# Patient Record
Sex: FEMALE | Race: WHITE | Employment: UNEMPLOYED | URBAN - METROPOLITAN AREA
[De-identification: names, ages, dates, MRNs, and addresses within clinical notes are randomized per-mention and may not be internally consistent; named-entity substitution may affect disease eponyms.]

---

## 2017-10-02 ENCOUNTER — HOSPITAL ENCOUNTER (EMERGENCY)
Age: 62
Discharge: HOME | End: 2017-10-02
Attending: EMERGENCY MEDICINE
Payer: COMMERCIAL

## 2017-10-02 VITALS
BODY MASS INDEX: 23.04 KG/M2 | DIASTOLIC BLOOD PRESSURE: 101 MMHG | WEIGHT: 130 LBS | HEIGHT: 63 IN | RESPIRATION RATE: 18 BRPM | HEART RATE: 85 BPM | TEMPERATURE: 98 F | SYSTOLIC BLOOD PRESSURE: 165 MMHG | OXYGEN SATURATION: 98 %

## 2017-10-02 DIAGNOSIS — L73.9 FOLLICULITIS: ICD-10-CM

## 2017-10-02 DIAGNOSIS — L30.9 DERMATITIS: Primary | ICD-10-CM

## 2017-10-02 PROCEDURE — 99284 EMERGENCY DEPT VISIT MOD MDM: CPT | Performed by: EMERGENCY MEDICINE

## 2017-10-02 PROCEDURE — 74011250637 HC RX REV CODE- 250/637: Performed by: EMERGENCY MEDICINE

## 2017-10-02 RX ORDER — LISINOPRIL 20 MG/1
40 TABLET ORAL ONCE
Status: COMPLETED | OUTPATIENT
Start: 2017-10-02 | End: 2017-10-02

## 2017-10-02 RX ORDER — HYDROXYZINE 25 MG/1
25-50 TABLET, FILM COATED ORAL
Qty: 24 TAB | Refills: 0 | Status: SHIPPED | OUTPATIENT
Start: 2017-10-02 | End: 2017-10-07

## 2017-10-02 RX ORDER — HYDROXYZINE PAMOATE 25 MG/1
50 CAPSULE ORAL
Status: COMPLETED | OUTPATIENT
Start: 2017-10-02 | End: 2017-10-02

## 2017-10-02 RX ORDER — IBUPROFEN 800 MG/1
800 TABLET ORAL
Status: COMPLETED | OUTPATIENT
Start: 2017-10-02 | End: 2017-10-02

## 2017-10-02 RX ORDER — BUTALBITAL, ACETAMINOPHEN AND CAFFEINE 50; 325; 40 MG/1; MG/1; MG/1
2 TABLET ORAL
Status: COMPLETED | OUTPATIENT
Start: 2017-10-02 | End: 2017-10-02

## 2017-10-02 RX ORDER — SULFAMETHOXAZOLE AND TRIMETHOPRIM 800; 160 MG/1; MG/1
1 TABLET ORAL 2 TIMES DAILY
Qty: 14 TAB | Refills: 0 | Status: SHIPPED | OUTPATIENT
Start: 2017-10-02 | End: 2019-07-26

## 2017-10-02 RX ORDER — SULFAMETHOXAZOLE AND TRIMETHOPRIM 800; 160 MG/1; MG/1
2 TABLET ORAL
Status: COMPLETED | OUTPATIENT
Start: 2017-10-02 | End: 2017-10-02

## 2017-10-02 RX ADMIN — IBUPROFEN 800 MG: 800 TABLET, FILM COATED ORAL at 00:59

## 2017-10-02 RX ADMIN — BUTALBITAL, ACETAMINOPHEN AND CAFFEINE 2 TABLET: 50; 325; 40 TABLET ORAL at 00:59

## 2017-10-02 RX ADMIN — SULFAMETHOXAZOLE AND TRIMETHOPRIM 2 TABLET: 800; 160 TABLET ORAL at 00:59

## 2017-10-02 RX ADMIN — LISINOPRIL 40 MG: 20 TABLET ORAL at 00:59

## 2017-10-02 RX ADMIN — HYDROXYZINE PAMOATE 50 MG: 25 CAPSULE ORAL at 00:59

## 2017-10-02 NOTE — ED NOTES
PIV established presumptively out of triage due to HTN, labs collected. Physician has signed up for the case- will await physician eval prior to any lab studies placed.

## 2017-10-02 NOTE — ED NOTES
Patient ambulatory into the ER with c/c skin infection. Patient reports persistent skin infections x \"a couple of years. \" Patient has had treatment in the past.  Patient reports has seen derm, PMDs, rheumatology over the past few years with no diagnosis. Patient reports the worst of the infected areas are on the scalp. Patient with HTN at time of triage (205/110). Patient reports taking anti-HTN Rx in AM/PM, had her dose tonight. Patient w/o fever- reports she \"seldom has a fever when I have an infection. \"

## 2017-10-02 NOTE — DISCHARGE INSTRUCTIONS
Get some hibicleanse surgical soap, and lather up  -head to toe, twice a day. Let ti sit on skin for five minutes before rinsing off. Follow up with your family md, or a different deratologist.     Dermatitis: Care Instructions  Your Care Instructions  Dermatitis is the general name used for any rash or inflammation of the skin. Different kinds of dermatitis cause different kinds of rashes. Common causes of a rash include new medicines, plants (such as poison oak or poison ivy), heat, and stress. Certain illnesses can also cause a rash. An allergic reaction to something that touches your skin, such as latex, nickel, or poison ivy, is called contact dermatitis. Contact dermatitis may also be caused by something that irritates the skin, such as bleach, a chemical, or soap. These types of rashes cannot be spread from person to person. How long your rash will last depends on what caused it. Rashes may last a few days or months. Follow-up care is a key part of your treatment and safety. Be sure to make and go to all appointments, and call your doctor if you are having problems. It's also a good idea to know your test results and keep a list of the medicines you take. How can you care for yourself at home? · Do not scratch the rash. Cut your nails short, and file them smooth. Or wear gloves if this helps keep you from scratching. · Wash the area with water only. Pat dry. · Put cold, wet cloths on the rash to reduce itching. · Keep cool, and stay out of the sun. · Leave the rash open to the air as much as possible. · If the rash itches, use hydrocortisone cream. Follow the directions on the label. Calamine lotion may help for plant rashes. · Take an over-the-counter antihistamine, such as diphenhydramine (Benadryl) or loratadine (Claritin), to help calm the itching. Read and follow all instructions on the label. · If your doctor prescribed a cream, use it as directed.  If your doctor prescribed medicine, take it exactly as directed. When should you call for help? Call your doctor now or seek immediate medical care if:  · You have symptoms of infection, such as:  ¨ Increased pain, swelling, warmth, or redness. ¨ Red streaks leading from the area. ¨ Pus draining from the area. ¨ A fever. · You have joint pain along with the rash. Watch closely for changes in your health, and be sure to contact your doctor if:  · Your rash is changing or getting worse. · You are not getting better as expected. Where can you learn more? Go to http://sandeep-ponce.info/. Enter [phone] in the search box to learn more about \"Dermatitis: Care Instructions. \"  Current as of: October 13, 2016  Content Version: 11.3  © 2698-2594 [employer]. Care instructions adapted under license by [employer] (which disclaims liability or warranty for this information). If you have questions about a medical condition or this instruction, always ask your healthcare professional. [place] [address] any warranty or liability for your use of this information. Folliculitis: Care Instructions  Your Care Instructions    Folliculitis (say \"wfe-LJC-mow-LY-tus\") is an infection of the pouches (follicles) in the skin where hair grows. It can occur on any part of the body, but it is most common on the scalp, face, armpits, and groin. Bacteria, such as those found in a hot tub, can cause folliculitis. Folliculitis begins as a red, tender area near a strand of hair. The skin can itch or burn and may drain pus or blood. Sometimes folliculitis can lead to more serious skin infections. Your doctor usually can treat mild folliculitis with an antibiotic cream or ointment. If you have folliculitis on your scalp, you may use a shampoo that kills bacteria. Antibiotics you take as pills can treat infections deeper in the skin. For stubborn cases of folliculitis, laser treatment may be an option.  Laser treatment uses strong beams of light to destroy the hair follicle. But hair will no longer grow in the treated area. Follow-up care is a key part of your treatment and safety. Be sure to make and go to all appointments, and call your doctor if you are having problems. It's also a good idea to know your test results and keep a list of the medicines you take. How can you care for yourself at home? · Take your medicine exactly as prescribed. If your doctor prescribed antibiotics, take them as directed. Do not stop taking them just because you feel better. You need to take the full course of antibiotics. · Use a soap that kills bacteria to wash the infected area. If your scalp or beard is infected, use a shampoo with selenium or propylene glycol. Be careful. Do not scrub too long or too hard. · Mix 1 1/3 cup warm water and 1 tablespoon vinegar. Soak a cloth in the mixture, and place it over the infected skin until it cools off (usually 5 to 10 minutes). You can do this 3 to 6 times a day. · Do not share your razor, towel, or washcloth. That can spread folliculitis. · Use a new blade in your razor each time you shave to keep from re-infecting your skin. · If you tend to get folliculitis, avoid using hot tubs. They can contain bacteria that cause folliculitis. When should you call for help? Call your doctor now or seek immediate medical care if:  · You have symptoms of infection, such as:  ¨ Increased pain, swelling, warmth, or redness. ¨ Red streaks leading from the area. ¨ Pus draining from the area. ¨ A fever. Watch closely for changes in your health, and be sure to contact your doctor if:  · You do not get better as expected. Where can you learn more? Go to http://sandeep-ponce.info/. Enter M257 in the search box to learn more about \"Folliculitis: Care Instructions. \"  Current as of: October 13, 2016  Content Version: 11.3  © 8270-0266 [employer].  Care instructions adapted under license by [employer] (which disclaims liability or warranty for this information). If you have questions about a medical condition or this instruction, always ask your healthcare professional. [address] any warranty or liability for your use of this information.

## 2017-10-02 NOTE — ED NOTES
I have reviewed discharge instructions with the patient. The patient verbalized understanding. Patient left ED via Discharge Method: ambulatory to Home with family member. Opportunity for questions and clarification provided. Patient given 2 scripts.

## 2017-10-06 NOTE — ED PROVIDER NOTES
HPI Comments: patient presents with diffuse skin lesions to include scalp, torso, extremities. Some of these are scabbed, some weeping. Some oozing\" \"pus\" according to sister. Has been placed on different remedies has seen dermatologists and other doctors but \"men can figure it out\" patient is not sure if she is or been put on any antibiotics for this. sHe seems to take offense at the suggestion that this may be anxiety skin picking related in nature. Patient is a 57 y.o. female presenting with skin problem. The history is provided by the patient. Skin Problem    This is a chronic problem. Past Medical History:   Diagnosis Date    CAD (coronary artery disease)     Chronic back pain     COPD (chronic obstructive pulmonary disease) (HCC)     Family history of colonic polyps     mother    History of MI (myocardial infarction)     History of peptic ulcer     HTN (hypertension)     Sjogren's syndrome ([facility] [address]     Tobacco abuse     1 ppd x 40 years       Past Surgical History:   Procedure Laterality Date    ABDOMEN SURGERY PROC UNLISTED      partial lower intestine removal 1991    BREAST SURGERY PROCEDURE UNLISTED      20yrs ago, removed tumor    CARDIAC SURG PROCEDURE UNLIST      heart cath with stent 2009    HX CORONARY STENT PLACEMENT      x3    HX TEE AND BSO  1991         Family History:   Problem Relation Age of Onset    Cancer Other      colon       Social History     Social History    Marital status:      Spouse name: N/A    Number of children: N/A    Years of education: N/A     Occupational History    Not on file.      Social History Main Topics    Smoking status: Current Every Day Smoker     Packs/day: 1.00     Years: 40.00    Smokeless tobacco: Never Used    Alcohol use No    Drug use: No    Sexual activity: Not on file     Other Topics Concern    Not on file     Social History Narrative         ALLERGIES: Aspirin and Codeine    Review of Systems   Constitutional: Negative for chills and fever. Skin: Positive for rash and wound. Negative for color change. Vitals:    10/02/17 0019 10/02/17 0059 10/02/17 0102   BP: (!) 205/110 (!) 165/101 (!) 165/101   Pulse: 88 85 85   Resp: 20  18   Temp: 97.6 °F (36.4 °C)  98 °F (36.7 °C)   SpO2: 97%  98%   Weight: 59 kg (130 lb)     Height: 5' 3\" (1.6 m)              Physical Exam   Constitutional: She is oriented to person, place, and time. She appears well-developed and well-nourished. No distress. HENT:   Head: Normocephalic and atraumatic. Eyes: Conjunctivae and EOM are normal. Right eye exhibits no discharge. Left eye exhibits no discharge. Neck: Normal range of motion. Neck supple. Pulmonary/Chest: Effort normal. No respiratory distress. Musculoskeletal: Normal range of motion. Neurological: She is alert and oriented to person, place, and time. She has normal strength. She exhibits normal muscle tone. cni 2-12 grossly  Nl gait,  Nl speech     Skin: Skin is warm and dry. Rash noted. She is not diaphoretic. Diffuse rash and excoriated lesions appears consistent with skin picking. Some seem to have minimal element of bacterial superinfection    No abscesses   Psychiatric: She has a normal mood and affect. Her behavior is normal.   Nursing note and vitals reviewed. MDM  Number of Diagnoses or Management Options  Dermatitis:   Folliculitis:   Diagnosis management comments: Medical decision making note:  Chronic neurotic dermatitis  Hydroxyzine and antibiotics  Follow-up  This concludes the \"medical decision making note\" part of this emergency department visit note.       ED Course       Procedures

## 2019-07-26 ENCOUNTER — HOSPITAL ENCOUNTER (EMERGENCY)
Age: 64
Discharge: LEFT BEFORE DISCHARGE | End: 2019-07-27
Attending: EMERGENCY MEDICINE
Payer: MEDICARE

## 2019-07-26 VITALS
RESPIRATION RATE: 18 BRPM | OXYGEN SATURATION: 95 % | HEART RATE: 93 BPM | WEIGHT: 160 LBS | DIASTOLIC BLOOD PRESSURE: 109 MMHG | BODY MASS INDEX: 29.44 KG/M2 | SYSTOLIC BLOOD PRESSURE: 190 MMHG | TEMPERATURE: 97.3 F | HEIGHT: 62 IN

## 2019-07-26 LAB
ALBUMIN SERPL-MCNC: 3.9 G/DL (ref 3.2–4.6)
ALBUMIN/GLOB SERPL: 0.8 {RATIO} (ref 1.2–3.5)
ALP SERPL-CCNC: 113 U/L (ref 50–136)
ALT SERPL-CCNC: 21 U/L (ref 12–65)
ANION GAP SERPL CALC-SCNC: 9 MMOL/L (ref 7–16)
AST SERPL-CCNC: 19 U/L (ref 15–37)
BASOPHILS # BLD: 0.1 K/UL (ref 0–0.2)
BASOPHILS NFR BLD: 1 % (ref 0–2)
BILIRUB SERPL-MCNC: 0.4 MG/DL (ref 0.2–1.1)
BUN SERPL-MCNC: 7 MG/DL (ref 8–23)
CALCIUM SERPL-MCNC: 9.9 MG/DL (ref 8.3–10.4)
CHLORIDE SERPL-SCNC: 106 MMOL/L (ref 98–107)
CO2 SERPL-SCNC: 24 MMOL/L (ref 21–32)
CREAT SERPL-MCNC: 0.73 MG/DL (ref 0.6–1)
DIFFERENTIAL METHOD BLD: ABNORMAL
EOSINOPHIL # BLD: 0.3 K/UL (ref 0–0.8)
EOSINOPHIL NFR BLD: 5 % (ref 0.5–7.8)
ERYTHROCYTE [DISTWIDTH] IN BLOOD BY AUTOMATED COUNT: 14.9 % (ref 11.9–14.6)
GLOBULIN SER CALC-MCNC: 4.8 G/DL (ref 2.3–3.5)
GLUCOSE SERPL-MCNC: 106 MG/DL (ref 65–100)
HCT VFR BLD AUTO: 50.5 % (ref 35.8–46.3)
HGB BLD-MCNC: 17 G/DL (ref 11.7–15.4)
IMM GRANULOCYTES # BLD AUTO: 0 K/UL (ref 0–0.5)
IMM GRANULOCYTES NFR BLD AUTO: 0 % (ref 0–5)
LACTATE BLD-SCNC: 0.79 MMOL/L (ref 0.5–1.9)
LYMPHOCYTES # BLD: 1.4 K/UL (ref 0.5–4.6)
LYMPHOCYTES NFR BLD: 25 % (ref 13–44)
MCH RBC QN AUTO: 29.9 PG (ref 26.1–32.9)
MCHC RBC AUTO-ENTMCNC: 33.7 G/DL (ref 31.4–35)
MCV RBC AUTO: 88.8 FL (ref 79.6–97.8)
MONOCYTES # BLD: 0.4 K/UL (ref 0.1–1.3)
MONOCYTES NFR BLD: 7 % (ref 4–12)
NEUTS SEG # BLD: 3.6 K/UL (ref 1.7–8.2)
NEUTS SEG NFR BLD: 63 % (ref 43–78)
NRBC # BLD: 0 K/UL (ref 0–0.2)
PLATELET # BLD AUTO: 239 K/UL (ref 150–450)
PMV BLD AUTO: 10.7 FL (ref 9.4–12.3)
POTASSIUM SERPL-SCNC: 4 MMOL/L (ref 3.5–5.1)
PROT SERPL-MCNC: 8.7 G/DL (ref 6.3–8.2)
RBC # BLD AUTO: 5.69 M/UL (ref 4.05–5.2)
SODIUM SERPL-SCNC: 139 MMOL/L (ref 136–145)
WBC # BLD AUTO: 5.8 K/UL (ref 4.3–11.1)

## 2019-07-26 PROCEDURE — 80053 COMPREHEN METABOLIC PANEL: CPT

## 2019-07-26 PROCEDURE — 75810000275 HC EMERGENCY DEPT VISIT NO LEVEL OF CARE: Performed by: EMERGENCY MEDICINE

## 2019-07-26 PROCEDURE — 87040 BLOOD CULTURE FOR BACTERIA: CPT

## 2019-07-26 PROCEDURE — 83605 ASSAY OF LACTIC ACID: CPT

## 2019-07-26 PROCEDURE — 85025 COMPLETE CBC W/AUTO DIFF WBC: CPT

## 2019-07-26 NOTE — ED TRIAGE NOTES
Patient reports bilateral \"breast infection. \" States that she has been \"sick\" and unable to keep anything down. Reports vomiting and diarrhea. Patient has small sores all over upper torso that \"sting, burn and drain green stuff. \"  Hx of lupus.

## 2019-08-01 LAB
BACTERIA SPEC CULT: NORMAL
SERVICE CMNT-IMP: NORMAL

## 2020-11-09 ENCOUNTER — APPOINTMENT (RX ONLY)
Dept: URBAN - METROPOLITAN AREA CLINIC 23 | Facility: CLINIC | Age: 65
Setting detail: DERMATOLOGY
End: 2020-11-09

## 2023-01-31 ENCOUNTER — HOSPITAL ENCOUNTER (EMERGENCY)
Age: 68
Discharge: HOME | End: 2023-01-31
Attending: GENERAL PRACTICE
Payer: MEDICARE

## 2023-01-31 VITALS
HEIGHT: 61 IN | HEART RATE: 85 BPM | SYSTOLIC BLOOD PRESSURE: 112 MMHG | WEIGHT: 130 LBS | RESPIRATION RATE: 16 BRPM | OXYGEN SATURATION: 95 % | BODY MASS INDEX: 24.55 KG/M2 | TEMPERATURE: 97.8 F | DIASTOLIC BLOOD PRESSURE: 62 MMHG

## 2023-01-31 DIAGNOSIS — R10.13 DYSPEPSIA: Primary | ICD-10-CM

## 2023-01-31 DIAGNOSIS — G62.9 NEUROPATHY: ICD-10-CM

## 2023-01-31 LAB
ALBUMIN SERPL-MCNC: 2.3 G/DL (ref 3.2–4.6)
ALBUMIN/GLOB SERPL: 0.5 (ref 0.4–1.6)
ALP SERPL-CCNC: 95 U/L (ref 50–136)
ALT SERPL-CCNC: 18 U/L (ref 12–65)
ANION GAP SERPL CALC-SCNC: 11 MMOL/L (ref 2–11)
AST SERPL-CCNC: 12 U/L (ref 15–37)
BASOPHILS # BLD: 0 K/UL (ref 0–0.2)
BASOPHILS NFR BLD: 1 % (ref 0–2)
BILIRUB SERPL-MCNC: 0.3 MG/DL (ref 0.2–1.1)
BUN SERPL-MCNC: 7 MG/DL (ref 8–23)
CALCIUM SERPL-MCNC: 9 MG/DL (ref 8.3–10.4)
CHLORIDE SERPL-SCNC: 103 MMOL/L (ref 101–110)
CO2 SERPL-SCNC: 26 MMOL/L (ref 21–32)
CREAT SERPL-MCNC: 0.8 MG/DL (ref 0.6–1)
DIFFERENTIAL METHOD BLD: ABNORMAL
EOSINOPHIL # BLD: 0.7 K/UL (ref 0–0.8)
EOSINOPHIL NFR BLD: 8 % (ref 0.5–7.8)
ERYTHROCYTE [DISTWIDTH] IN BLOOD BY AUTOMATED COUNT: 14.5 % (ref 11.9–14.6)
GLOBULIN SER CALC-MCNC: 4.5 G/DL (ref 2.8–4.5)
GLUCOSE SERPL-MCNC: 97 MG/DL (ref 65–100)
HCT VFR BLD AUTO: 46 % (ref 35.8–46.3)
HGB BLD-MCNC: 15.3 G/DL (ref 11.7–15.4)
IMM GRANULOCYTES # BLD AUTO: 0 K/UL (ref 0–0.5)
IMM GRANULOCYTES NFR BLD AUTO: 0 % (ref 0–5)
LIPASE SERPL-CCNC: 62 U/L (ref 73–393)
LYMPHOCYTES # BLD: 1.6 K/UL (ref 0.5–4.6)
LYMPHOCYTES NFR BLD: 20 % (ref 13–44)
MCH RBC QN AUTO: 29 PG (ref 26.1–32.9)
MCHC RBC AUTO-ENTMCNC: 33.3 G/DL (ref 31.4–35)
MCV RBC AUTO: 87.1 FL (ref 82–102)
MONOCYTES # BLD: 0.7 K/UL (ref 0.1–1.3)
MONOCYTES NFR BLD: 9 % (ref 4–12)
NEUTS SEG # BLD: 5 K/UL (ref 1.7–8.2)
NEUTS SEG NFR BLD: 62 % (ref 43–78)
NRBC # BLD: 0 K/UL (ref 0–0.2)
PLATELET # BLD AUTO: 237 K/UL (ref 150–450)
PMV BLD AUTO: 10.1 FL (ref 9.4–12.3)
POTASSIUM SERPL-SCNC: 3.5 MMOL/L (ref 3.5–5.1)
PROT SERPL-MCNC: 6.8 G/DL (ref 6.3–8.2)
RBC # BLD AUTO: 5.28 M/UL (ref 4.05–5.2)
SODIUM SERPL-SCNC: 140 MMOL/L (ref 133–143)
WBC # BLD AUTO: 8.1 K/UL (ref 4.3–11.1)

## 2023-01-31 PROCEDURE — 99284 EMERGENCY DEPT VISIT MOD MDM: CPT

## 2023-01-31 PROCEDURE — 80053 COMPREHEN METABOLIC PANEL: CPT

## 2023-01-31 PROCEDURE — 6370000000 HC RX 637 (ALT 250 FOR IP): Performed by: GENERAL PRACTICE

## 2023-01-31 PROCEDURE — 85025 COMPLETE CBC W/AUTO DIFF WBC: CPT

## 2023-01-31 PROCEDURE — 96374 THER/PROPH/DIAG INJ IV PUSH: CPT

## 2023-01-31 PROCEDURE — 83690 ASSAY OF LIPASE: CPT

## 2023-01-31 PROCEDURE — 6360000002 HC RX W HCPCS: Performed by: GENERAL PRACTICE

## 2023-01-31 RX ORDER — ONDANSETRON 2 MG/ML
4 INJECTION INTRAMUSCULAR; INTRAVENOUS ONCE
Status: COMPLETED | OUTPATIENT
Start: 2023-01-31 | End: 2023-01-31

## 2023-01-31 RX ORDER — MAGNESIUM HYDROXIDE/ALUMINUM HYDROXICE/SIMETHICONE 120; 1200; 1200 MG/30ML; MG/30ML; MG/30ML
30 SUSPENSION ORAL
Status: COMPLETED | OUTPATIENT
Start: 2023-01-31 | End: 2023-01-31

## 2023-01-31 RX ORDER — PREGABALIN 50 MG/1
100 CAPSULE ORAL
Status: COMPLETED | OUTPATIENT
Start: 2023-01-31 | End: 2023-01-31

## 2023-01-31 RX ORDER — FAMOTIDINE 20 MG/1
20 TABLET, FILM COATED ORAL 2 TIMES DAILY
Qty: 60 TABLET | Refills: 0 | Status: SHIPPED | OUTPATIENT
Start: 2023-01-31

## 2023-01-31 RX ORDER — PREGABALIN 150 MG/1
150 CAPSULE ORAL 2 TIMES DAILY
Qty: 60 CAPSULE | Refills: 0 | Status: SHIPPED | OUTPATIENT
Start: 2023-01-31 | End: 2023-03-02

## 2023-01-31 RX ADMIN — ALUMINUM HYDROXIDE, MAGNESIUM HYDROXIDE, AND SIMETHICONE 30 ML: 200; 200; 20 SUSPENSION ORAL at 17:08

## 2023-01-31 RX ADMIN — ONDANSETRON 4 MG: 2 INJECTION INTRAMUSCULAR; INTRAVENOUS at 17:08

## 2023-01-31 RX ADMIN — PREGABALIN 100 MG: 50 CAPSULE ORAL at 19:12

## 2023-01-31 ASSESSMENT — PAIN SCALES - GENERAL
PAINLEVEL_OUTOF10: 10

## 2023-01-31 ASSESSMENT — PAIN DESCRIPTION - DESCRIPTORS: DESCRIPTORS: BURNING

## 2023-01-31 ASSESSMENT — PAIN DESCRIPTION - LOCATION
LOCATION: ABDOMEN
LOCATION: ABDOMEN
LOCATION: GENERALIZED

## 2023-01-31 ASSESSMENT — LIFESTYLE VARIABLES
HOW OFTEN DO YOU HAVE A DRINK CONTAINING ALCOHOL: NEVER
HOW MANY STANDARD DRINKS CONTAINING ALCOHOL DO YOU HAVE ON A TYPICAL DAY: PATIENT DOES NOT DRINK

## 2023-01-31 ASSESSMENT — PAIN DESCRIPTION - ORIENTATION
ORIENTATION: UPPER;MID
ORIENTATION: MID;UPPER

## 2023-01-31 ASSESSMENT — PAIN - FUNCTIONAL ASSESSMENT: PAIN_FUNCTIONAL_ASSESSMENT: 0-10

## 2023-01-31 NOTE — ED TRIAGE NOTES
Pt arrives from home via EMS. Pt has Lupus Pt noticed sores on chest and abdomen x 2 wks and took protocol meds but w/ no improvement. Pt is complaining of pain and felt febrile. Pt also complaining of abd pain and nausea. No emesis.  /86 HR 80 96% RA

## 2023-01-31 NOTE — ED PROVIDER NOTES
Emergency Department Provider Note                   PCP:                None None               Age: 69 y.o. Sex: female     No diagnosis found. DISPOSITION          Medical Decision Making  Patient presents with epigastric abdominal pain. Differential is dyspepsia, peptic ulcer disease, GERD, bowel obstruction, surgical abdomen among other emergent pathologies. Patient's labs are normal and exam is benign and reassuring. On repeat examination the patient had no subjective pain after a GI cocktail and repeat exam remained benign. Patient also complaining of bilateral leg pain which is chronic which she takes Lyrica and she has run out of her prescriptions on the 28th. There is nothing to suggest limb ischemia DVT, or any other emergent pathology. Patient will be treated symptomatically and expectantly with primary care follow-up and return precautions. Problems Addressed:  Dyspepsia: acute illness or injury  Neuropathy: acute illness or injury    Amount and/or Complexity of Data Reviewed  Labs: ordered. Details: no acute abnormality    Risk  OTC drugs. Prescription drug management. Complexity of Problem: 2 or more stable chronic illnesses. (4)    I have conducted an independent ordering and review of Labs. Considerations: The following labs and/or imaging studies were considered but not ordered:   I did consider imaging of the abdomen but patient had a benign exam and negative diagnostic work-up and I did not feel she needed emergent imaging of the abdomen         patient is discharged in stable and improved condition with symptomatic measures and return precautions are given. Patient to follow-up with primary care.         Orders Placed This Encounter   Procedures    CMP    CBC with Auto Differential    Lipase        Medications   ondansetron (ZOFRAN) injection 4 mg (has no administration in time range)   aluminum & magnesium hydroxide-simethicone (MAALOX) 200-200-20 MG/5ML suspension 30 mL (has no administration in time range)       New Prescriptions    No medications on file        Rimma Ohara is a 69 y.o. female who presents to the Emergency Department with chief complaint of    Chief Complaint   Patient presents with    Lupus    Abdominal Pain      Patient presents with epigastric abdominal pain. Patient states has had the pain for 3 days. It has been mostly constant. She describes it as a burning pain without radiation to the back. Patient relates it to her lupus and that she has had no appetite and is dehydrated and has had very poor p.o. intake. She has had pain like this in the past.  Patient denies any shortness of breath or fevers or cough. She denies vomiting. She denies blood in the stool or melena. Review of Systems   All other systems reviewed and are negative. Past Medical History:   Diagnosis Date    CAD (coronary artery disease)     Chronic back pain     COPD (chronic obstructive pulmonary disease) (HCC)     Family history of colonic polyps     mother    History of MI (myocardial infarction)     History of peptic ulcer     HTN (hypertension)     Sjogren's syndrome ([facility] [address]     Tobacco abuse     1 ppd x 40 years        Past Surgical History:   Procedure Laterality Date    BREAST SURGERY      20yrs ago, removed tumor    CORONARY ANGIOPLASTY WITH STENT PLACEMENT      x3    [place] ABDOMEN SURGERY PROC UNLISTED      partial lower intestine removal     [place] CARDIAC SURG PROCEDURE UNLIST      heart cath with stent     MATY AND BSO  1991        Family History   Problem Relation Age of Onset    Cancer Other         colon        Social History     Socioeconomic History    Marital status:     Tobacco Use    Smoking status: Former     Packs/day: 1.00     Types: Cigarettes     Quit date: 2017     Years since quittin.5    Smokeless tobacco: Never   Substance and Sexual Activity    Alcohol use: No    Drug use: No         Codeine and Aspirin Previous Medications    LISINOPRIL (PRINIVIL;ZESTRIL) 10 MG TABLET    Take by mouth daily    PREGABALIN (LYRICA) 150 MG CAPSULE    Take by mouth. Vitals signs and nursing note reviewed. Patient Vitals for the past 4 hrs:   Temp Pulse Resp BP SpO2   01/31/23 1631 97.8 °F (36.6 °C) 79 20 126/72 92 %          Physical Exam  Constitutional:       General: She is not in acute distress. HENT:      Head: Normocephalic and atraumatic. Nose: Nose normal.      Mouth/Throat:      Mouth: Mucous membranes are moist.   Eyes:      Extraocular Movements: Extraocular movements intact. Pupils: Pupils are equal, round, and reactive to light. Cardiovascular:      Rate and Rhythm: Normal rate and regular rhythm. Heart sounds: Normal heart sounds. Pulmonary:      Effort: No respiratory distress. Breath sounds: No wheezing. Abdominal:      General: Abdomen is flat. Palpations: Abdomen is soft. Tenderness: There is abdominal tenderness in the epigastric area. Musculoskeletal:         General: No swelling or tenderness. Cervical back: Normal range of motion. Skin:     Findings: No erythema or rash. Neurological:      General: No focal deficit present. Mental Status: She is oriented to person, place, and time. Psychiatric:         Mood and Affect: Mood normal.         Behavior: Behavior normal.        Procedures    No results found for any visits on 01/31/23. No orders to display                       Voice dictation software was used during the making of this note. This software is not perfect and grammatical and other typographical errors may be present. This note has not been completely proofread for errors.      Domi Cole,   01/31/23 3119

## 2023-02-01 NOTE — ED NOTES
I have reviewed discharge instructions with the patient. The patient verbalized understanding. Patient left ED via Discharge Method: wheelchair to Home with (family). Opportunity for questions and clarification provided. Patient given 2 scripts. To continue your aftercare when you leave the hospital, you may receive an automated call from our care team to check in on how you are doing. This is a free service and part of our promise to provide the best care and service to meet your aftercare needs.  If you have questions, or wish to unsubscribe from this service please call [phone]. Thank you for Choosing our [facility] Emergency Department.        Caity Staples RN  01/31/23 6956

## 2023-02-03 ENCOUNTER — NURSE TRIAGE (OUTPATIENT)
Dept: OTHER | Facility: CLINIC | Age: 68
End: 2023-02-03

## 2023-02-03 NOTE — TELEPHONE ENCOUNTER
Location of patient: [place]    Received call from [facility] at [facility]; Patient with Red Flag Complaint requesting to establish care with [facility]. Subjective: Caller states \"swelling in ankles\"     Current Symptoms: see above, bilateral ankle swelling, feet, and all the way to her groin, and blisters all over her body    Onset: this is a chronic condition that she has been dealing with for 15 years, but has flared over the last 2 months ago; improving, intermittent. Says she has lupus. Associated Symptoms: reduced activity    Pain Severity: 10/10; burning; constant    Temperature: denies fever     What has been tried: lyrica, hydrochloriquine, pepcid    LMP: NA Pregnant: NA    Recommended disposition: Go to ED/UCC Now (Or to Office with PCP Approval), patient states she has already been and they do not help her. She wanted to be transferred to schedule new patient appointment. Care advice provided, patient verbalizes understanding; denies any other questions or concerns; instructed to call back for any new or worsening symptoms. Patient/Caller agrees with recommended disposition; writer provided warm transfer to [facility] at [facility] for appointment scheduling    Attention Provider: Thank you for allowing me to participate in the care of your patient. The patient was connected to triage in response to information provided to the ECC. Please do not respond through this encounter as the response is not directed to a shared pool.     Reason for Disposition   SEVERE swelling (e.g., swelling extends above knee, entire leg is swollen, weeping fluid)    Protocols used: Leg Swelling and Edema-ADULT-[place]

## 2023-06-21 ENCOUNTER — APPOINTMENT (OUTPATIENT)
Dept: GENERAL RADIOLOGY | Age: 68
End: 2023-06-21
Payer: MEDICARE

## 2023-06-21 ENCOUNTER — HOSPITAL ENCOUNTER (INPATIENT)
Age: 68
LOS: 2 days | Discharge: HOME | End: 2023-06-24
Attending: EMERGENCY MEDICINE | Admitting: FAMILY MEDICINE
Payer: MEDICARE
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
## 2023-06-21 DIAGNOSIS — M35.9 AUTOIMMUNE DISEASE (HCC): ICD-10-CM

## 2023-06-21 DIAGNOSIS — L98.9 SKIN LESIONS: ICD-10-CM

## 2023-06-21 DIAGNOSIS — J44.1 COPD EXACERBATION (HCC): Primary | ICD-10-CM

## 2023-06-21 DIAGNOSIS — Z59.82 LACK OF ACCESS TO TRANSPORTATION: ICD-10-CM

## 2023-06-21 DIAGNOSIS — R60.0 BILATERAL EDEMA OF LOWER EXTREMITY: ICD-10-CM

## 2023-06-21 DIAGNOSIS — J96.01 ACUTE RESPIRATORY FAILURE WITH HYPOXIA (HCC): ICD-10-CM

## 2023-06-21 LAB
ALBUMIN SERPL-MCNC: 1.9 G/DL (ref 3.2–4.6)
ALBUMIN/GLOB SERPL: 0.5 (ref 0.4–1.6)
ALP SERPL-CCNC: 109 U/L (ref 50–136)
ALT SERPL-CCNC: 13 U/L (ref 12–65)
ANION GAP SERPL CALC-SCNC: 3 MMOL/L (ref 2–11)
AST SERPL-CCNC: 25 U/L (ref 15–37)
BASOPHILS # BLD: 0.1 K/UL (ref 0–0.2)
BASOPHILS NFR BLD: 1 % (ref 0–2)
BILIRUB SERPL-MCNC: 0.1 MG/DL (ref 0.2–1.1)
BUN SERPL-MCNC: 14 MG/DL (ref 8–23)
CALCIUM SERPL-MCNC: 8.4 MG/DL (ref 8.3–10.4)
CHLORIDE SERPL-SCNC: 112 MMOL/L (ref 101–110)
CO2 SERPL-SCNC: 30 MMOL/L (ref 21–32)
CREAT SERPL-MCNC: 0.8 MG/DL (ref 0.6–1)
CRP SERPL-MCNC: 13.2 MG/DL (ref 0–0.9)
DIFFERENTIAL METHOD BLD: ABNORMAL
EOSINOPHIL # BLD: 1.2 K/UL (ref 0–0.8)
EOSINOPHIL NFR BLD: 13 % (ref 0.5–7.8)
ERYTHROCYTE [DISTWIDTH] IN BLOOD BY AUTOMATED COUNT: 14.6 % (ref 11.9–14.6)
GLOBULIN SER CALC-MCNC: 4.2 G/DL (ref 2.8–4.5)
GLUCOSE SERPL-MCNC: 102 MG/DL (ref 65–100)
HCT VFR BLD AUTO: 37.3 % (ref 35.8–46.3)
HGB BLD-MCNC: 11.6 G/DL (ref 11.7–15.4)
IMM GRANULOCYTES # BLD AUTO: 0 K/UL (ref 0–0.5)
IMM GRANULOCYTES NFR BLD AUTO: 0 % (ref 0–5)
LACTATE SERPL-SCNC: 1.4 MMOL/L (ref 0.4–2)
LYMPHOCYTES # BLD: 1.6 K/UL (ref 0.5–4.6)
LYMPHOCYTES NFR BLD: 18 % (ref 13–44)
MCH RBC QN AUTO: 28.1 PG (ref 26.1–32.9)
MCHC RBC AUTO-ENTMCNC: 31.1 G/DL (ref 31.4–35)
MCV RBC AUTO: 90.3 FL (ref 82–102)
MONOCYTES # BLD: 0.8 K/UL (ref 0.1–1.3)
MONOCYTES NFR BLD: 9 % (ref 4–12)
NEUTS SEG # BLD: 5.4 K/UL (ref 1.7–8.2)
NEUTS SEG NFR BLD: 59 % (ref 43–78)
NRBC # BLD: 0 K/UL (ref 0–0.2)
NT PRO BNP: 431 PG/ML (ref 5–125)
PLATELET # BLD AUTO: 225 K/UL (ref 150–450)
PMV BLD AUTO: 10.7 FL (ref 9.4–12.3)
POTASSIUM SERPL-SCNC: 4.1 MMOL/L (ref 3.5–5.1)
PROCALCITONIN SERPL-MCNC: <0.05 NG/ML (ref 0–0.49)
PROT SERPL-MCNC: 6.1 G/DL (ref 6.3–8.2)
RBC # BLD AUTO: 4.13 M/UL (ref 4.05–5.2)
SARS-COV-2 RDRP RESP QL NAA+PROBE: NOT DETECTED
SODIUM SERPL-SCNC: 145 MMOL/L (ref 133–143)
SOURCE: NORMAL
WBC # BLD AUTO: 9 K/UL (ref 4.3–11.1)

## 2023-06-21 PROCEDURE — 86140 C-REACTIVE PROTEIN: CPT

## 2023-06-21 PROCEDURE — 84145 PROCALCITONIN (PCT): CPT

## 2023-06-21 PROCEDURE — 96375 TX/PRO/DX INJ NEW DRUG ADDON: CPT

## 2023-06-21 PROCEDURE — 87040 BLOOD CULTURE FOR BACTERIA: CPT

## 2023-06-21 PROCEDURE — 6360000002 HC RX W HCPCS: Performed by: EMERGENCY MEDICINE

## 2023-06-21 PROCEDURE — 6370000000 HC RX 637 (ALT 250 FOR IP): Performed by: EMERGENCY MEDICINE

## 2023-06-21 PROCEDURE — 87635 SARS-COV-2 COVID-19 AMP PRB: CPT

## 2023-06-21 PROCEDURE — 83605 ASSAY OF LACTIC ACID: CPT

## 2023-06-21 PROCEDURE — 80053 COMPREHEN METABOLIC PANEL: CPT

## 2023-06-21 PROCEDURE — 83880 ASSAY OF NATRIURETIC PEPTIDE: CPT

## 2023-06-21 PROCEDURE — 99285 EMERGENCY DEPT VISIT HI MDM: CPT

## 2023-06-21 PROCEDURE — 71045 X-RAY EXAM CHEST 1 VIEW: CPT

## 2023-06-21 PROCEDURE — 93005 ELECTROCARDIOGRAM TRACING: CPT | Performed by: EMERGENCY MEDICINE

## 2023-06-21 PROCEDURE — 94640 AIRWAY INHALATION TREATMENT: CPT

## 2023-06-21 PROCEDURE — 96365 THER/PROPH/DIAG IV INF INIT: CPT

## 2023-06-21 PROCEDURE — 81003 URINALYSIS AUTO W/O SCOPE: CPT

## 2023-06-21 PROCEDURE — 81001 URINALYSIS AUTO W/SCOPE: CPT

## 2023-06-21 PROCEDURE — 2580000003 HC RX 258: Performed by: EMERGENCY MEDICINE

## 2023-06-21 PROCEDURE — 85025 COMPLETE CBC W/AUTO DIFF WBC: CPT

## 2023-06-21 PROCEDURE — 2500000003 HC RX 250 WO HCPCS: Performed by: EMERGENCY MEDICINE

## 2023-06-21 RX ORDER — AMOXICILLIN AND CLAVULANATE POTASSIUM 875; 125 MG/1; MG/1
1 TABLET, FILM COATED ORAL
Status: COMPLETED | OUTPATIENT
Start: 2023-06-21 | End: 2023-06-21

## 2023-06-21 RX ORDER — MAGNESIUM SULFATE IN WATER 40 MG/ML
2000 INJECTION, SOLUTION INTRAVENOUS ONCE
Status: DISCONTINUED | OUTPATIENT
Start: 2023-06-21 | End: 2023-06-21

## 2023-06-21 RX ORDER — FAMOTIDINE 20 MG/1
40 TABLET, FILM COATED ORAL ONCE
Status: COMPLETED | OUTPATIENT
Start: 2023-06-21 | End: 2023-06-21

## 2023-06-21 RX ORDER — ALBUTEROL SULFATE 2.5 MG/3ML
10 SOLUTION RESPIRATORY (INHALATION)
Status: COMPLETED | OUTPATIENT
Start: 2023-06-21 | End: 2023-06-21

## 2023-06-21 RX ORDER — DOXYCYCLINE HYCLATE 100 MG/1
100 CAPSULE ORAL
Status: DISCONTINUED | OUTPATIENT
Start: 2023-06-21 | End: 2023-06-21

## 2023-06-21 RX ORDER — ALBUTEROL SULFATE 2.5 MG/3ML
7.5 SOLUTION RESPIRATORY (INHALATION)
Status: COMPLETED | OUTPATIENT
Start: 2023-06-22 | End: 2023-06-22

## 2023-06-21 RX ORDER — SODIUM CHLORIDE, SODIUM LACTATE, POTASSIUM CHLORIDE, AND CALCIUM CHLORIDE .6; .31; .03; .02 G/100ML; G/100ML; G/100ML; G/100ML
2000 INJECTION, SOLUTION INTRAVENOUS ONCE
Status: COMPLETED | OUTPATIENT
Start: 2023-06-21 | End: 2023-06-22

## 2023-06-21 RX ADMIN — IPRATROPIUM BROMIDE 1.5 MG: 0.5 SOLUTION RESPIRATORY (INHALATION) at 22:20

## 2023-06-21 RX ADMIN — SODIUM CHLORIDE, POTASSIUM CHLORIDE, SODIUM LACTATE AND CALCIUM CHLORIDE 2000 ML: 600; 310; 30; 20 INJECTION, SOLUTION INTRAVENOUS at 23:43

## 2023-06-21 RX ADMIN — AMOXICILLIN AND CLAVULANATE POTASSIUM 1 TABLET: 875; 125 TABLET, FILM COATED ORAL at 23:13

## 2023-06-21 RX ADMIN — FAMOTIDINE 40 MG: 20 TABLET, FILM COATED ORAL at 23:13

## 2023-06-21 RX ADMIN — METHYLPREDNISOLONE SODIUM SUCCINATE 80 MG: 125 INJECTION, POWDER, FOR SOLUTION INTRAMUSCULAR; INTRAVENOUS at 23:16

## 2023-06-21 RX ADMIN — DOXYCYCLINE 100 MG: 100 INJECTION, POWDER, LYOPHILIZED, FOR SOLUTION INTRAVENOUS at 23:18

## 2023-06-21 RX ADMIN — ALBUTEROL SULFATE 10 MG/HR: 2.5 SOLUTION RESPIRATORY (INHALATION) at 22:19

## 2023-06-21 SDOH — ECONOMIC STABILITY - TRANSPORTATION SECURITY: TRANSPORTATION INSECURITY: Z59.82

## 2023-06-21 ASSESSMENT — PAIN SCALES - GENERAL: PAINLEVEL_OUTOF10: 7

## 2023-06-21 ASSESSMENT — PAIN DESCRIPTION - LOCATION: LOCATION: GENERALIZED

## 2023-06-21 ASSESSMENT — PAIN - FUNCTIONAL ASSESSMENT: PAIN_FUNCTIONAL_ASSESSMENT: 0-10

## 2023-06-22 ENCOUNTER — APPOINTMENT (OUTPATIENT)
Dept: NON INVASIVE DIAGNOSTICS | Age: 68
End: 2023-06-22
Attending: FAMILY MEDICINE
Payer: MEDICARE

## 2023-06-22 ENCOUNTER — APPOINTMENT (OUTPATIENT)
Dept: CT IMAGING | Age: 68
End: 2023-06-22
Payer: MEDICARE

## 2023-06-22 ENCOUNTER — TELEPHONE (OUTPATIENT)
Dept: PULMONOLOGY | Age: 68
End: 2023-06-22

## 2023-06-22 DIAGNOSIS — R91.8 LUNG MASS: Primary | ICD-10-CM

## 2023-06-22 PROBLEM — J44.1 COPD WITH ACUTE EXACERBATION (HCC): Status: ACTIVE | Noted: 2023-06-22

## 2023-06-22 PROBLEM — R21 RASH/SKIN ERUPTION: Status: ACTIVE | Noted: 2017-12-14

## 2023-06-22 PROBLEM — J96.01 ACUTE RESPIRATORY FAILURE WITH HYPOXIA (HCC): Status: ACTIVE | Noted: 2023-06-22

## 2023-06-22 PROBLEM — S81.851A: Status: ACTIVE | Noted: 2023-06-22

## 2023-06-22 PROBLEM — F17.219 CIGARETTE NICOTINE DEPENDENCE WITH NICOTINE-INDUCED DISORDER: Status: ACTIVE | Noted: 2023-06-22

## 2023-06-22 PROBLEM — Z85.3 HISTORY OF RIGHT BREAST CANCER: Chronic | Status: ACTIVE | Noted: 2023-06-22

## 2023-06-22 PROBLEM — H04.123 DRY EYES: Status: ACTIVE | Noted: 2017-12-14

## 2023-06-22 PROBLEM — I10 HYPERTENSION: Status: ACTIVE | Noted: 2017-08-22

## 2023-06-22 PROBLEM — F17.211 CIGARETTE NICOTINE DEPENDENCE IN REMISSION: Chronic | Status: ACTIVE | Noted: 2023-06-22

## 2023-06-22 PROBLEM — J44.1 COPD EXACERBATION (HCC): Status: ACTIVE | Noted: 2023-06-22
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
## 2023-06-22 PROBLEM — E77.8 HYPOPROTEINEMIA (HCC): Status: ACTIVE | Noted: 2023-06-22

## 2023-06-22 PROBLEM — M35.00 H/O SJOGREN'S DISEASE (HCC): Status: ACTIVE | Noted: 2023-06-22

## 2023-06-22 PROBLEM — M35.9 AUTOIMMUNE DISEASE (HCC): Status: ACTIVE | Noted: 2023-06-22

## 2023-06-22 PROBLEM — D72.10 EOSINOPHILIA: Status: ACTIVE | Noted: 2023-06-22

## 2023-06-22 PROBLEM — M32.9 SLE (SYSTEMIC LUPUS ERYTHEMATOSUS) (HCC): Status: ACTIVE | Noted: 2023-06-22

## 2023-06-22 PROBLEM — K21.9 GERD (GASTROESOPHAGEAL REFLUX DISEASE): Status: ACTIVE | Noted: 2023-06-22

## 2023-06-22 PROBLEM — R68.2 DRY MOUTH: Status: ACTIVE | Noted: 2017-12-14

## 2023-06-22 PROBLEM — R79.1 COAGULATION TEST ABNORMALITY: Status: ACTIVE | Noted: 2017-08-22

## 2023-06-22 LAB
APPEARANCE UR: CLEAR
ARTERIAL PATENCY WRIST A: POSITIVE
B PERT DNA SPEC QL NAA+PROBE: NOT DETECTED
BACTERIA URNS QL MICRO: ABNORMAL /HPF
BASE EXCESS BLD CALC-SCNC: 1.4 MMOL/L
BDY SITE: ABNORMAL
BILIRUB UR QL: NEGATIVE
BORDETELLA PARAPERTUSSIS BY PCR: NOT DETECTED
C PNEUM DNA SPEC QL NAA+PROBE: NOT DETECTED
CASTS URNS QL MICRO: ABNORMAL /LPF
COLOR UR: ABNORMAL
ECHO AO ASC DIAM: 3 CM
ECHO AO ASCENDING AORTA INDEX: 1.79 CM/M2
ECHO AO ROOT DIAM: 2.7 CM
ECHO AO ROOT INDEX: 1.61 CM/M2
ECHO AV AREA PEAK VELOCITY: 2.3 CM2
ECHO AV AREA VTI: 2.2 CM2
ECHO AV AREA/BSA PEAK VELOCITY: 1.4 CM2/M2
ECHO AV AREA/BSA VTI: 1.3 CM2/M2
ECHO AV MEAN GRADIENT: 9 MMHG
ECHO AV MEAN GRADIENT: 9 MMHG
ECHO AV MEAN VELOCITY: 1.4 M/S
ECHO AV MEAN VELOCITY: 1.4 M/S
ECHO AV PEAK GRADIENT: 20 MMHG
ECHO AV PEAK VELOCITY: 2.2 M/S
ECHO AV VELOCITY RATIO: 0.73
ECHO AV VTI: 44.2 CM
ECHO BSA: 1.65 M2
ECHO EST RA PRESSURE: 15 MMHG
ECHO IVC PROX: 3.2 CM
ECHO LA AREA 2C: 16.7 CM2
ECHO LA AREA 4C: 16.6 CM2
ECHO LA DIAMETER INDEX: 1.85 CM/M2
ECHO LA DIAMETER: 3.1 CM
ECHO LA MAJOR AXIS: 5.5 CM
ECHO LA MINOR AXIS: 5.6 CM
ECHO LA TO AORTIC ROOT RATIO: 1.15
ECHO LA VOL 2C: 41 ML (ref 22–52)
ECHO LA VOL 4C: 39 ML (ref 22–52)
ECHO LA VOL BP: 40 ML (ref 22–52)
ECHO LA VOL/BSA BIPLANE: 24 ML/M2 (ref 16–34)
ECHO LA VOLUME INDEX A2C: 24 ML/M2 (ref 16–34)
ECHO LA VOLUME INDEX A4C: 23 ML/M2 (ref 16–34)
ECHO LV E' LATERAL VELOCITY: 11 CM/S
ECHO LV E' SEPTAL VELOCITY: 9 CM/S
ECHO LV EDV A2C: 80 ML
ECHO LV EDV A4C: 103 ML
ECHO LV EDV INDEX A4C: 61 ML/M2
ECHO LV EDV NDEX A2C: 48 ML/M2
ECHO LV EJECTION FRACTION A2C: 61 %
ECHO LV EJECTION FRACTION A4C: 69 %
ECHO LV EJECTION FRACTION BIPLANE: 67 % (ref 55–100)
ECHO LV ESV A2C: 31 ML
ECHO LV ESV A4C: 32 ML
ECHO LV ESV INDEX A2C: 18 ML/M2
ECHO LV ESV INDEX A4C: 19 ML/M2
ECHO LV FRACTIONAL SHORTENING: 47 % (ref 28–44)
ECHO LV INTERNAL DIMENSION DIASTOLE INDEX: 2.56 CM/M2
ECHO LV INTERNAL DIMENSION DIASTOLIC: 4.3 CM (ref 3.9–5.3)
ECHO LV INTERNAL DIMENSION SYSTOLIC INDEX: 1.37 CM/M2
ECHO LV INTERNAL DIMENSION SYSTOLIC: 2.3 CM
ECHO LV IVSD: 1 CM (ref 0.6–0.9)
ECHO LV MASS 2D: 132.7 G (ref 67–162)
ECHO LV MASS INDEX 2D: 79 G/M2 (ref 43–95)
ECHO LV POSTERIOR WALL DIASTOLIC: 0.9 CM (ref 0.6–0.9)
ECHO LV RELATIVE WALL THICKNESS RATIO: 0.42
ECHO LVOT AREA: 3.1 CM2
ECHO LVOT AV VTI INDEX: 0.7
ECHO LVOT DIAM: 2 CM
ECHO LVOT MEAN GRADIENT: 5 MMHG
ECHO LVOT PEAK GRADIENT: 11 MMHG
ECHO LVOT PEAK VELOCITY: 1.6 M/S
ECHO LVOT STROKE VOLUME INDEX: 58.1 ML/M2
ECHO LVOT SV: 97.7 ML
ECHO LVOT VTI: 31.1 CM
ECHO MV A VELOCITY: 1.77 M/S
ECHO MV AREA VTI: 2.3 CM2
ECHO MV E DECELERATION TIME (DT): 283 MS
ECHO MV E VELOCITY: 1.29 M/S
ECHO MV E/A RATIO: 0.73
ECHO MV E/E' LATERAL: 11.73
ECHO MV E/E' RATIO (AVERAGED): 13.03
ECHO MV E/E' SEPTAL: 14.33
ECHO MV LVOT VTI INDEX: 1.34
ECHO MV MAX VELOCITY: 1.9 M/S
ECHO MV MEAN GRADIENT: 8 MMHG
ECHO MV MEAN VELOCITY: 1.3 M/S
ECHO MV PEAK GRADIENT: 14 MMHG
ECHO MV VTI: 41.8 CM
ECHO PV ACCELERATION TIME (AT): 129 MS
ECHO PV MAX VELOCITY: 1.1 M/S
ECHO PV PEAK GRADIENT: 5 MMHG
ECHO RIGHT VENTRICULAR SYSTOLIC PRESSURE (RVSP): 53 MMHG
ECHO RV BASAL DIMENSION: 3.5 CM
ECHO RV FREE WALL PEAK S': 13 CM/S
ECHO RV INTERNAL DIMENSION: 3 CM
ECHO RV TAPSE: 2.5 CM (ref 1.7–?)
ECHO TV REGURGITANT MAX VELOCITY: 3.07 M/S
ECHO TV REGURGITANT PEAK GRADIENT: 38 MMHG
EKG ATRIAL RATE: 92 BPM
EKG DIAGNOSIS: NORMAL
EKG P AXIS: 55 DEGREES
EKG P-R INTERVAL: 134 MS
EKG Q-T INTERVAL: 362 MS
EKG QRS DURATION: 88 MS
EKG QTC CALCULATION (BAZETT): 447 MS
EKG R AXIS: 83 DEGREES
EKG T AXIS: 44 DEGREES
EKG VENTRICULAR RATE: 92 BPM
EPI CELLS #/AREA URNS HPF: ABNORMAL /HPF
ERYTHROCYTE [SEDIMENTATION RATE] IN BLOOD: 70 MM/HR (ref 0–30)
FERRITIN SERPL-MCNC: 434 NG/ML (ref 8–388)
FLUAV SUBTYP SPEC NAA+PROBE: NOT DETECTED
FLUBV RNA SPEC QL NAA+PROBE: NOT DETECTED
FOLATE SERPL-MCNC: 12 NG/ML (ref 3.1–17.5)
GAS FLOW.O2 O2 DELIVERY SYS: ABNORMAL
GLUCOSE UR STRIP.AUTO-MCNC: NEGATIVE MG/DL
HADV DNA SPEC QL NAA+PROBE: NOT DETECTED
HCO3 BLD-SCNC: 27.3 MMOL/L (ref 22–26)
HCOV 229E RNA SPEC QL NAA+PROBE: NOT DETECTED
HCOV HKU1 RNA SPEC QL NAA+PROBE: NOT DETECTED
HCOV NL63 RNA SPEC QL NAA+PROBE: NOT DETECTED
HCOV OC43 RNA SPEC QL NAA+PROBE: NOT DETECTED
HGB UR QL STRIP: NEGATIVE
HMPV RNA SPEC QL NAA+PROBE: NOT DETECTED
HPIV1 RNA SPEC QL NAA+PROBE: NOT DETECTED
HPIV2 RNA SPEC QL NAA+PROBE: NOT DETECTED
HPIV3 RNA SPEC QL NAA+PROBE: NOT DETECTED
HPIV4 RNA SPEC QL NAA+PROBE: NOT DETECTED
IRON SATN MFR SERPL: 5 %
IRON SERPL-MCNC: 10 UG/DL (ref 35–150)
KETONES UR QL STRIP.AUTO: NEGATIVE MG/DL
LEUKOCYTE ESTERASE UR QL STRIP.AUTO: ABNORMAL
M PNEUMO DNA SPEC QL NAA+PROBE: NOT DETECTED
NITRITE UR QL STRIP.AUTO: NEGATIVE
OTHER OBSERVATIONS: ABNORMAL
PCO2 BLD: 47.4 MMHG (ref 35–45)
PH BLD: 7.37 (ref 7.35–7.45)
PH UR STRIP: 5 (ref 5–9)
PO2 BLD: 58 MMHG (ref 75–100)
POC FIO2: 2
PROT UR STRIP-MCNC: ABNORMAL MG/DL
RBC #/AREA URNS HPF: ABNORMAL /HPF
RHEUMATOID FACT SER QL LA: POSITIVE
RHEUMATOID FACT TITR SER LA: NORMAL {TITER}
RPR SER QL: NONREACTIVE
RSV RNA SPEC QL NAA+PROBE: NOT DETECTED
RV+EV RNA SPEC QL NAA+PROBE: NOT DETECTED
SAO2 % BLD: 88.5 % (ref 95–98)
SARS-COV-2 RNA RESP QL NAA+PROBE: NOT DETECTED
SERVICE CMNT-IMP: ABNORMAL
SP GR UR REFRACTOMETRY: 1.02 (ref 1–1.02)
SPECIMEN TYPE: ABNORMAL
TIBC SERPL-MCNC: 192 UG/DL (ref 250–450)
UROBILINOGEN UR QL STRIP.AUTO: 0.2 EU/DL (ref 0.2–1)
VIT B12 SERPL-MCNC: 186 PG/ML (ref 193–986)
WBC URNS QL MICRO: ABNORMAL /HPF

## 2023-06-22 PROCEDURE — 86225 DNA ANTIBODY NATIVE: CPT

## 2023-06-22 PROCEDURE — 6370000000 HC RX 637 (ALT 250 FOR IP): Performed by: FAMILY MEDICINE

## 2023-06-22 PROCEDURE — 93306 TTE W/DOPPLER COMPLETE: CPT

## 2023-06-22 PROCEDURE — 97535 SELF CARE MNGMENT TRAINING: CPT

## 2023-06-22 PROCEDURE — 85652 RBC SED RATE AUTOMATED: CPT

## 2023-06-22 PROCEDURE — 86037 ANCA TITER EACH ANTIBODY: CPT

## 2023-06-22 PROCEDURE — 94760 N-INVAS EAR/PLS OXIMETRY 1: CPT

## 2023-06-22 PROCEDURE — 86200 CCP ANTIBODY: CPT

## 2023-06-22 PROCEDURE — 36600 WITHDRAWAL OF ARTERIAL BLOOD: CPT

## 2023-06-22 PROCEDURE — 0202U NFCT DS 22 TRGT SARS-COV-2: CPT

## 2023-06-22 PROCEDURE — 97165 OT EVAL LOW COMPLEX 30 MIN: CPT

## 2023-06-22 PROCEDURE — 83540 ASSAY OF IRON: CPT

## 2023-06-22 PROCEDURE — 82728 ASSAY OF FERRITIN: CPT

## 2023-06-22 PROCEDURE — 71250 CT THORAX DX C-: CPT

## 2023-06-22 PROCEDURE — 36415 COLL VENOUS BLD VENIPUNCTURE: CPT

## 2023-06-22 PROCEDURE — 6360000002 HC RX W HCPCS: Performed by: FAMILY MEDICINE

## 2023-06-22 PROCEDURE — 94761 N-INVAS EAR/PLS OXIMETRY MLT: CPT

## 2023-06-22 PROCEDURE — 82803 BLOOD GASES ANY COMBINATION: CPT

## 2023-06-22 PROCEDURE — 99223 1ST HOSP IP/OBS HIGH 75: CPT | Performed by: INTERNAL MEDICINE

## 2023-06-22 PROCEDURE — 94640 AIRWAY INHALATION TREATMENT: CPT

## 2023-06-22 PROCEDURE — 86431 RHEUMATOID FACTOR QUANT: CPT

## 2023-06-22 PROCEDURE — 86235 NUCLEAR ANTIGEN ANTIBODY: CPT

## 2023-06-22 PROCEDURE — 86430 RHEUMATOID FACTOR TEST QUAL: CPT

## 2023-06-22 PROCEDURE — 83520 IMMUNOASSAY QUANT NOS NONAB: CPT

## 2023-06-22 PROCEDURE — 82607 VITAMIN B-12: CPT

## 2023-06-22 PROCEDURE — 82746 ASSAY OF FOLIC ACID SERUM: CPT

## 2023-06-22 PROCEDURE — 2580000003 HC RX 258: Performed by: PHYSICIAN ASSISTANT

## 2023-06-22 PROCEDURE — 6360000002 HC RX W HCPCS: Performed by: EMERGENCY MEDICINE

## 2023-06-22 PROCEDURE — 86038 ANTINUCLEAR ANTIBODIES: CPT

## 2023-06-22 PROCEDURE — 97530 THERAPEUTIC ACTIVITIES: CPT

## 2023-06-22 PROCEDURE — 83516 IMMUNOASSAY NONANTIBODY: CPT

## 2023-06-22 PROCEDURE — 6370000000 HC RX 637 (ALT 250 FOR IP): Performed by: PHYSICIAN ASSISTANT

## 2023-06-22 PROCEDURE — 1100000000 HC RM PRIVATE

## 2023-06-22 PROCEDURE — 97161 PT EVAL LOW COMPLEX 20 MIN: CPT

## 2023-06-22 PROCEDURE — 2700000000 HC OXYGEN THERAPY PER DAY

## 2023-06-22 PROCEDURE — 83550 IRON BINDING TEST: CPT

## 2023-06-22 PROCEDURE — 86592 SYPHILIS TEST NON-TREP QUAL: CPT

## 2023-06-22 PROCEDURE — 82785 ASSAY OF IGE: CPT

## 2023-06-22 PROCEDURE — 2580000003 HC RX 258: Performed by: FAMILY MEDICINE

## 2023-06-22 RX ORDER — DOXYCYCLINE HYCLATE 100 MG/1
100 CAPSULE ORAL EVERY 12 HOURS SCHEDULED
Status: DISCONTINUED | OUTPATIENT
Start: 2023-06-22 | End: 2023-06-24 | Stop reason: HOSPADM

## 2023-06-22 RX ORDER — SODIUM CHLORIDE 9 MG/ML
INJECTION, SOLUTION INTRAVENOUS PRN
Status: DISCONTINUED | OUTPATIENT
Start: 2023-06-22 | End: 2023-06-24 | Stop reason: HOSPADM

## 2023-06-22 RX ORDER — ACETAMINOPHEN 325 MG/1
650 TABLET ORAL EVERY 6 HOURS PRN
Status: DISCONTINUED | OUTPATIENT
Start: 2023-06-22 | End: 2023-06-24 | Stop reason: HOSPADM

## 2023-06-22 RX ORDER — AMOXICILLIN AND CLAVULANATE POTASSIUM 875; 125 MG/1; MG/1
1 TABLET, FILM COATED ORAL EVERY 12 HOURS SCHEDULED
Status: DISCONTINUED | OUTPATIENT
Start: 2023-06-22 | End: 2023-06-24 | Stop reason: HOSPADM

## 2023-06-22 RX ORDER — BUDESONIDE 0.5 MG/2ML
0.5 INHALANT ORAL 2 TIMES DAILY
Status: DISCONTINUED | OUTPATIENT
Start: 2023-06-22 | End: 2023-06-24 | Stop reason: HOSPADM

## 2023-06-22 RX ORDER — POLYETHYLENE GLYCOL 3350 17 G/17G
17 POWDER, FOR SOLUTION ORAL DAILY PRN
Status: DISCONTINUED | OUTPATIENT
Start: 2023-06-22 | End: 2023-06-24 | Stop reason: HOSPADM

## 2023-06-22 RX ORDER — PREDNISONE 20 MG/1
60 TABLET ORAL DAILY
Status: DISCONTINUED | OUTPATIENT
Start: 2023-06-22 | End: 2023-06-24 | Stop reason: HOSPADM

## 2023-06-22 RX ORDER — SODIUM CHLORIDE 0.9 % (FLUSH) 0.9 %
5-40 SYRINGE (ML) INJECTION PRN
Status: DISCONTINUED | OUTPATIENT
Start: 2023-06-22 | End: 2023-06-24 | Stop reason: HOSPADM

## 2023-06-22 RX ORDER — ONDANSETRON 2 MG/ML
4 INJECTION INTRAMUSCULAR; INTRAVENOUS EVERY 6 HOURS PRN
Status: DISCONTINUED | OUTPATIENT
Start: 2023-06-22 | End: 2023-06-24 | Stop reason: HOSPADM

## 2023-06-22 RX ORDER — HYDROXYCHLOROQUINE SULFATE 200 MG/1
200 TABLET, FILM COATED ORAL 2 TIMES DAILY WITH MEALS
Status: DISCONTINUED | OUTPATIENT
Start: 2023-06-22 | End: 2023-06-24 | Stop reason: HOSPADM

## 2023-06-22 RX ORDER — CETIRIZINE HYDROCHLORIDE 10 MG/1
10 TABLET ORAL DAILY
Status: DISCONTINUED | OUTPATIENT
Start: 2023-06-22 | End: 2023-06-24 | Stop reason: HOSPADM

## 2023-06-22 RX ORDER — SODIUM CHLORIDE, SODIUM LACTATE, POTASSIUM CHLORIDE, CALCIUM CHLORIDE 600; 310; 30; 20 MG/100ML; MG/100ML; MG/100ML; MG/100ML
INJECTION, SOLUTION INTRAVENOUS CONTINUOUS
Status: ACTIVE | OUTPATIENT
Start: 2023-06-22 | End: 2023-06-23

## 2023-06-22 RX ORDER — ARFORMOTEROL TARTRATE 15 UG/2ML
15 SOLUTION RESPIRATORY (INHALATION) 2 TIMES DAILY
Status: DISCONTINUED | OUTPATIENT
Start: 2023-06-22 | End: 2023-06-24 | Stop reason: HOSPADM

## 2023-06-22 RX ORDER — ACETAMINOPHEN 650 MG/1
650 SUPPOSITORY RECTAL EVERY 6 HOURS PRN
Status: DISCONTINUED | OUTPATIENT
Start: 2023-06-22 | End: 2023-06-24 | Stop reason: HOSPADM

## 2023-06-22 RX ORDER — GUAIFENESIN 600 MG/1
600 TABLET, EXTENDED RELEASE ORAL 2 TIMES DAILY
Status: DISCONTINUED | OUTPATIENT
Start: 2023-06-22 | End: 2023-06-22

## 2023-06-22 RX ORDER — IPRATROPIUM BROMIDE AND ALBUTEROL SULFATE 2.5; .5 MG/3ML; MG/3ML
1 SOLUTION RESPIRATORY (INHALATION) EVERY 4 HOURS PRN
Status: DISCONTINUED | OUTPATIENT
Start: 2023-06-22 | End: 2023-06-24 | Stop reason: HOSPADM

## 2023-06-22 RX ORDER — GUAIFENESIN 600 MG/1
1200 TABLET, EXTENDED RELEASE ORAL 2 TIMES DAILY
Status: DISCONTINUED | OUTPATIENT
Start: 2023-06-22 | End: 2023-06-24 | Stop reason: HOSPADM

## 2023-06-22 RX ORDER — HYDROXYCHLOROQUINE SULFATE 200 MG/1
TABLET, FILM COATED ORAL
COMMUNITY
Start: 2023-06-09

## 2023-06-22 RX ORDER — SODIUM CHLORIDE 0.9 % (FLUSH) 0.9 %
5-40 SYRINGE (ML) INJECTION EVERY 12 HOURS SCHEDULED
Status: DISCONTINUED | OUTPATIENT
Start: 2023-06-22 | End: 2023-06-24 | Stop reason: HOSPADM

## 2023-06-22 RX ORDER — MONTELUKAST SODIUM 10 MG/1
10 TABLET ORAL NIGHTLY
Status: DISCONTINUED | OUTPATIENT
Start: 2023-06-22 | End: 2023-06-24 | Stop reason: HOSPADM

## 2023-06-22 RX ORDER — SODIUM CHLORIDE, SODIUM LACTATE, POTASSIUM CHLORIDE, CALCIUM CHLORIDE 600; 310; 30; 20 MG/100ML; MG/100ML; MG/100ML; MG/100ML
INJECTION, SOLUTION INTRAVENOUS CONTINUOUS
Status: DISCONTINUED | OUTPATIENT
Start: 2023-06-22 | End: 2023-06-22

## 2023-06-22 RX ORDER — LISINOPRIL AND HYDROCHLOROTHIAZIDE 20; 12.5 MG/1; MG/1
TABLET ORAL
COMMUNITY
Start: 2023-04-07

## 2023-06-22 RX ORDER — PREGABALIN 200 MG/1
CAPSULE ORAL
COMMUNITY
Start: 2023-06-10

## 2023-06-22 RX ORDER — TRIAMCINOLONE ACETONIDE 1 MG/G
CREAM TOPICAL 2 TIMES DAILY
Status: DISPENSED | OUTPATIENT
Start: 2023-06-22 | End: 2023-06-23

## 2023-06-22 RX ORDER — ALBUTEROL SULFATE 2.5 MG/3ML
2.5 SOLUTION RESPIRATORY (INHALATION) 4 TIMES DAILY
Status: DISCONTINUED | OUTPATIENT
Start: 2023-06-22 | End: 2023-06-24 | Stop reason: HOSPADM

## 2023-06-22 RX ORDER — TRAZODONE HYDROCHLORIDE 50 MG/1
50 TABLET ORAL NIGHTLY PRN
Status: DISCONTINUED | OUTPATIENT
Start: 2023-06-22 | End: 2023-06-24 | Stop reason: HOSPADM

## 2023-06-22 RX ORDER — PANTOPRAZOLE SODIUM 40 MG/1
40 TABLET, DELAYED RELEASE ORAL
Status: DISCONTINUED | OUTPATIENT
Start: 2023-06-22 | End: 2023-06-24 | Stop reason: HOSPADM

## 2023-06-22 RX ORDER — ONDANSETRON 4 MG/1
4 TABLET, ORALLY DISINTEGRATING ORAL EVERY 8 HOURS PRN
Status: DISCONTINUED | OUTPATIENT
Start: 2023-06-22 | End: 2023-06-24 | Stop reason: HOSPADM

## 2023-06-22 RX ORDER — ENOXAPARIN SODIUM 100 MG/ML
40 INJECTION SUBCUTANEOUS DAILY
Status: DISCONTINUED | OUTPATIENT
Start: 2023-06-22 | End: 2023-06-24 | Stop reason: HOSPADM

## 2023-06-22 RX ORDER — HYDROXYZINE PAMOATE 25 MG/1
50 CAPSULE ORAL EVERY 6 HOURS PRN
Status: DISCONTINUED | OUTPATIENT
Start: 2023-06-22 | End: 2023-06-24 | Stop reason: HOSPADM

## 2023-06-22 RX ORDER — FAMOTIDINE 20 MG/1
20 TABLET, FILM COATED ORAL 2 TIMES DAILY PRN
Status: DISCONTINUED | OUTPATIENT
Start: 2023-06-22 | End: 2023-06-24 | Stop reason: HOSPADM

## 2023-06-22 RX ORDER — TRAZODONE HYDROCHLORIDE 100 MG/1
TABLET ORAL
Status: ON HOLD | COMMUNITY
Start: 2023-05-09 | End: 2023-06-24 | Stop reason: HOSPADM

## 2023-06-22 RX ORDER — BENZONATATE 100 MG/1
100 CAPSULE ORAL 3 TIMES DAILY PRN
Status: DISCONTINUED | OUTPATIENT
Start: 2023-06-22 | End: 2023-06-24 | Stop reason: HOSPADM

## 2023-06-22 RX ADMIN — AMOXICILLIN AND CLAVULANATE POTASSIUM 1 TABLET: 875; 125 TABLET, FILM COATED ORAL at 09:30

## 2023-06-22 RX ADMIN — ALBUTEROL SULFATE 2.5 MG: 2.5 SOLUTION RESPIRATORY (INHALATION) at 07:26

## 2023-06-22 RX ADMIN — PANTOPRAZOLE SODIUM 40 MG: 40 TABLET, DELAYED RELEASE ORAL at 05:22

## 2023-06-22 RX ADMIN — SODIUM CHLORIDE, PRESERVATIVE FREE 10 ML: 5 INJECTION INTRAVENOUS at 09:31

## 2023-06-22 RX ADMIN — GUAIFENESIN 600 MG: 600 TABLET ORAL at 09:29

## 2023-06-22 RX ADMIN — MONTELUKAST 10 MG: 10 TABLET, FILM COATED ORAL at 01:12

## 2023-06-22 RX ADMIN — SODIUM CHLORIDE, POTASSIUM CHLORIDE, SODIUM LACTATE AND CALCIUM CHLORIDE: 600; 310; 30; 20 INJECTION, SOLUTION INTRAVENOUS at 02:52

## 2023-06-22 RX ADMIN — DOXYCYCLINE HYCLATE 100 MG: 100 CAPSULE ORAL at 09:44

## 2023-06-22 RX ADMIN — CETIRIZINE HYDROCHLORIDE 10 MG: 10 TABLET, FILM COATED ORAL at 01:12

## 2023-06-22 RX ADMIN — BUDESONIDE INHALATION 500 MCG: 0.5 SUSPENSION RESPIRATORY (INHALATION) at 07:27

## 2023-06-22 RX ADMIN — BUDESONIDE INHALATION 500 MCG: 0.5 SUSPENSION RESPIRATORY (INHALATION) at 21:49

## 2023-06-22 RX ADMIN — TRAZODONE HYDROCHLORIDE 50 MG: 50 TABLET ORAL at 23:23

## 2023-06-22 RX ADMIN — ARFORMOTEROL TARTRATE 15 MCG: 15 SOLUTION RESPIRATORY (INHALATION) at 00:25

## 2023-06-22 RX ADMIN — PREGABALIN 200 MG: 25 CAPSULE ORAL at 19:30

## 2023-06-22 RX ADMIN — SODIUM CHLORIDE, SODIUM LACTATE, POTASSIUM CHLORIDE, AND CALCIUM CHLORIDE: 600; 310; 30; 20 INJECTION, SOLUTION INTRAVENOUS at 10:18

## 2023-06-22 RX ADMIN — ALBUTEROL SULFATE 7.5 MG/HR: 2.5 SOLUTION RESPIRATORY (INHALATION) at 00:25

## 2023-06-22 RX ADMIN — ALBUTEROL SULFATE 2.5 MG: 2.5 SOLUTION RESPIRATORY (INHALATION) at 21:49

## 2023-06-22 RX ADMIN — ALBUTEROL SULFATE 2.5 MG: 2.5 SOLUTION RESPIRATORY (INHALATION) at 15:19

## 2023-06-22 RX ADMIN — ARFORMOTEROL TARTRATE 15 MCG: 15 SOLUTION RESPIRATORY (INHALATION) at 21:49

## 2023-06-22 RX ADMIN — PREGABALIN 200 MG: 25 CAPSULE ORAL at 09:30

## 2023-06-22 RX ADMIN — ALBUTEROL SULFATE 2.5 MG: 2.5 SOLUTION RESPIRATORY (INHALATION) at 11:31

## 2023-06-22 RX ADMIN — ARFORMOTEROL TARTRATE 15 MCG: 15 SOLUTION RESPIRATORY (INHALATION) at 07:27

## 2023-06-22 RX ADMIN — TRIAMCINOLONE ACETONIDE: 1 CREAM TOPICAL at 13:56

## 2023-06-22 RX ADMIN — PREDNISONE 60 MG: 20 TABLET ORAL at 09:30

## 2023-06-22 RX ADMIN — HYDROXYCHLOROQUINE SULFATE 200 MG: 200 TABLET, FILM COATED ORAL at 16:56

## 2023-06-22 RX ADMIN — ENOXAPARIN SODIUM 40 MG: 40 INJECTION SUBCUTANEOUS at 09:30

## 2023-06-22 RX ADMIN — BUDESONIDE INHALATION 500 MCG: 0.5 SUSPENSION RESPIRATORY (INHALATION) at 00:25

## 2023-06-22 ASSESSMENT — PAIN SCALES - GENERAL: PAINLEVEL_OUTOF10: 0

## 2023-06-22 NOTE — H&P
Hospitalist History and Physical   Admit Date:  2023  9:22 PM   Name:  Cori Lynn   Age:  69 y.o. Sex:  female  :  1955   MRN:  [id number]   Room:  [place] [address]    Presenting/Chief Complaint: Animal Bite, Shortness of Breath, and Lupus     Reason(s) for Admission: COPD with acute exacerbation ([address] [J44.1]     History of Present Illness:   Cori Lynn is a 69 y.o. female with medical history of R breast cancer who presented with SOB x several months with acute worsening over the last week associated with waking up with rhinorrhea/congestion pouring out of her and BLE edema. Exacerbates by any exertion, walking around then improves with rest. Assocaited with mid thoracic back pain. No fever or chills. Dog bite yesterday right thigh. History of diffuse pustular rash that she notes first appeared 12 years ago that also acutely worsened. Very itchy. Blisters and erupts. Per record review, she had a rash s/p biopsy by dermatology results showed something called pemphigus foliaceus performed by mr. Pankaj FAJARDO at [facility] dermatology. Saw rheumatology with jeni in 2017 work up significant for + DAMIEN homogenous pattern, anticardiolipid antibody IgM (42), ++ dRVVT 66 sec, positive dimf1ldpnolniudwl I ab, iGM 26, RF 74     She has smoked for >40 years 0.5-1 PPD down to 5 cigs/day. Takes lyrica, lisonpril/hctz, hydroxychloroquine     Poor social situation, unable to afford meds. In ED   Sna 145 LA 1.4 Procal nil CRP 13.2 NTProBNP 431 albumin 1.9 hgb 11.6 with AEC 1170       CXR There is moderate underlying chronic interstitial lung disease. This is not   uncommon with chronic illness such as lupus. In ED, she rec'd albuterol 10 mg/hr continuous neb, f/b 7.5 mg./hr neb, atroventn neb, pepcid 40 mg, IV doxy 100, augmentin PO    Despite treatent remaind hypoxic on RA in the mid 80s with tachypnea.      On my eval 91% on 2 L   Assessment & Plan:     Acute respiratory insuffiency

## 2023-06-22 NOTE — ED NOTES
TRANSFER - OUT REPORT:    Verbal report given to SALENA Louis on [address]  being transferred to  for routine progression of patient care       Report consisted of patient's Situation, Background, Assessment and   Recommendations(SBAR). Information from the following report(s) ED SBAR was reviewed with the receiving nurse. Lines:   Peripheral IV 06/21/23 Left Antecubital (Active)       Peripheral IV 06/22/23 Right Antecubital (Active)   Site Assessment Clean, dry & intact 06/22/23 0113   Line Status Blood return noted 06/22/23 0113   Phlebitis Assessment No symptoms 06/22/23 0113   Infiltration Assessment 0 06/22/23 0113        Opportunity for questions and clarification was provided.       Patient transported with:  Joel Bustillos RN  06/22/23 7366

## 2023-06-22 NOTE — PROGRESS NOTES
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 Work-up so far consistent with hypoalbuminemia, high eosinophils, hypoxia (requiring 3L, no home o2 needs) and CT of the chest concerning for right lower lobe masses    Has been lost to follow-up with rheumatology and dermatology. Poor social situation, unable to afford meds. She was admitted for Acute respiratory insuffiency 2/2 Possible AECOPD or allergic asthma ex vs newly diagnosed ILD flare, started on nebulizers, steroids and antibiotics. Pulmonology consulted after high-resolution CT of the chest showed extensive emphysema and 2 right lower lobe masses with concerns for rounded atelectasis versus malignancy. Pulmonology recommends treating COPD and then following up with outpatient PET scan before pursuing BAL and biopsy. She probably needs Trelegy. RE rash: see pics in media. Wound team consulted due to her diffuse rash. We will continue on prednisone and initiate Kenalog. Resume home Plaquenil. Per up-to-date, rituximab may be used for this condition. Unfortunately we do not have inpatient dermatology consultants. Dr. Monika Zacarias ordered an extensive rheumatologic work-up, much of which is pending.

## 2023-06-22 NOTE — ED TRIAGE NOTES
Pt arrives to ED via POV with multiple complaints. Pt states that she was bitten by her dog yesterday. Pt also states that she has worsening SOB x 2-3 days. Pt has swelling to BLE, and skin problems from lupus. Pt also would like to talk to a  about her living conditions.

## 2023-06-22 NOTE — PROGRESS NOTES
ACUTE PHYSICAL THERAPY GOALS:   (Developed with and agreed upon by patient and/or caregiver.)  Pt will perform supine to/from sit with mod I in 7 treatment days. Pt will perform sit to/from stand with Mod I and LRAD in 7 treatment days. Pt will ambulate 150 ft with mod I and LRAD in 7 treatment days. Pt will negotiate 4 stairs with mod I and rail in 7 treatment days. Pt will be independent with HEP in 7 days. PHYSICAL THERAPY Initial Assessment, Daily Note, and AM  (Link to Caseload Tracking: PT Visit Days : 1  Acknowledge Orders  Time In/Out  PT Charge Capture  Rehab Caseload Tracker    Rodney Lobo is a 69 y.o. female   PRIMARY DIAGNOSIS: COPD with acute exacerbation ([address]  Autoimmune disease ([address] [M35.9]  COPD exacerbation ([address] [J44.1]  COPD with acute exacerbation ([address] [J44.1]  Acute respiratory failure with hypoxia ([address] [J96.01]  Bilateral edema of lower extremity [R60.0]  Dog bite, initial encounter [Q20. 0XXA]  Skin lesions [L98.9]  Lack of access to transportation [Z59.82]       Reason for Referral: Generalized Muscle Weakness (M62.81)  Difficulty in walking, Not elsewhere classified (R26.2)  Other abnormalities of gait and mobility (R26.89)  Inpatient: Payor: Ivy Lua / Plan: Exelis Watters / Product Type: *No Product type* /     ASSESSMENT:     REHAB RECOMMENDATIONS:   Recommendation to date pending progress:  Setting:  Home Health Therapy    Equipment:    None     ASSESSMENT:  Ms. Rinku Hill is a 69 y.o. female with hx of R breast cx admitted with SOB, dog bite, and rash. Upon PT evaluation, pt exhibits reduced respiratory reserve, requiring 2 lpm to maintain SpO2 >90%, BLE weakness, impaired balance, and reduced activity tolerance resulting in limited independence with functional mobility. At baseline, pt is independent for all mobility. Pt is now requiring SBA to ambulate 3 bouts of 40'  by standing rest breaks due to SOB.       Pt will require HHPT

## 2023-06-22 NOTE — ED PROVIDER NOTES
Emergency Department Provider Note                   PCP:                None None               Age: 69 y.o. Sex: female   Final diagnosis/impression:  1. COPD exacerbation ([facility]    2. Acute respiratory failure with hypoxia (HCC)    3. Dog bite, initial encounter    4. Lack of access to transportation    5. Autoimmune disease ([facility]    6. Skin lesions       Disposition: Admit to Hospitalist    MDM/Clinical Course:  Patient seen by myself at the [facility] emergency department. Patient had signs symptoms and clinical history most consistent with multiple complaints including increased bilateral lower extremity edema, possible lower extremity cellulitis, severe COPD exacerbation and shortness of breath. My independent analysis/interpretation of laboratory work-up here shows CBC unremarkable for acute/emergent abnormality though hemoglobin somewhat lower than January visit, unclear etiology overall, no reported hematochezia/melena symptoms, MCV elevated at 90.3 as I do not suspect blood loss related overall but more likely nutritional/vitamin related versus autoimmune possible but felt less likely. CMP shows signs of likely dehydration wound with minimally elevated BUN 14, creatinine 0.08, sodium elevated at 145, chloride 112, procalcitonin negative at less than 0.05, NT proBNP unremarkable at 431 potentially associated with COPD/pulmonary hypertension, x-ray shows chronic interstitial lung disease, this provider questions of possible right lower opacity but will defer to radiology read as clinical exam shows bilateral crackles and likely chronic interstitial lung disease as well as wheezing, urinalysis shows small leuk esterase, trace protein. While under my care, patient received hour-long treatments of albuterol, Atrovent, also received 80 mg IV Solu-Medrol, 40 mg p.o. famotidine. Patient given antibiotics including p.o.  Augmentin for dog bite, IV doxycycline for COPD exacerbation as well as soft

## 2023-06-22 NOTE — DISCHARGE INSTRUCTIONS
[facility]  [facility] [address]   [address]   [phone]    The pulmonary office will call you in 1-2 business days to arrange follow up in the pulmonary office. If you have not heard from the office after 2 full business days, please call the office to arrange follow up.

## 2023-06-22 NOTE — PROGRESS NOTES
ACUTE OCCUPATIONAL THERAPY GOALS:   (Developed with and agreed upon by patient and/or caregiver.)  1. Patient will complete lower body bathing and dressing with modified independence and adaptive equipment as needed. 2. Patient will complete toileting with modified independence. 3. Patient will tolerate 30 minutes of OT treatment with 2-3 rest breaks to increase activity tolerance for ADLs. 4. Patient will complete functional transfers with modified independence and adaptive equipment as needed. 5. Patient will complete functional mobility with modified independence and good safety awareness. Timeframe: 7 visits       OCCUPATIONAL THERAPY Initial Assessment, Daily Note, and AM       OT Visit Days: 1  Acknowledge Orders  Time  OT Charge Capture  Rehab Caseload Tracker      Jesús Rivera is a 69 y.o. female   PRIMARY DIAGNOSIS: COPD with acute exacerbation ([address]  Autoimmune disease ([address] [M35.9]  COPD exacerbation ([address] [J44.1]  COPD with acute exacerbation ([address] [J44.1]  Acute respiratory failure with hypoxia ([address] [J96.01]  Bilateral edema of lower extremity [R60.0]  Dog bite, initial encounter [U97. 0XXA]  Skin lesions [L98.9]  Lack of access to transportation [Z59.82]       Reason for Referral: Generalized Muscle Weakness (M62.81)  Other lack of cordination (R27.8)  Inpatient: Payor: Shanthi Hernandez / Plan: Gideon ALVAREZ MEDICARE HMO/PPO / Product Type: *No Product type* /     ASSESSMENT:     REHAB RECOMMENDATIONS:   Recommendation to date pending progress:  Setting:  Home Health Therapy    Equipment:    To Be Determined     ASSESSMENT:  Ms. Woodrow Casey presents to the hosptial  with COPD exacerbation, B LE edema,  and skin lesions. Pt is supine upon with no visitors at bedside and is alert and appropriate for her age. Pt lives alone and is typically modified independent at baseline. Pt reports no falls in the recent past.  Pt currently wearing 3 L of O2 but typically is not on O2 at home.  Pt

## 2023-06-22 NOTE — PROGRESS NOTES
TRANSFER - IN REPORT:    Verbal report received from SALENA Ceron on [address]  being received from ED for routine progression of patient care      Report consisted of patient's Situation, Background, Assessment and   Recommendations(SBAR). Information from the following report(s) Nurse Handoff Report was reviewed with the receiving nurse. Opportunity for questions and clarification was provided. Assessment completed upon patient's arrival to unit and care assumed.

## 2023-06-22 NOTE — WOUND CARE
Patient has diffuse rash. Her history includes lupus. Reported Dog bite right leg difficult to see the area within the rash, there is some bruising on the inner right thigh and patient states it was 2 places on her thigh. She is on antibiotics and prednisone, sometimes uses kenalog cream usually on Plaquenil not currently on MAR updated primary team about cream and plaquenil. These areas do not look infected to me, very severe Edema bilateral will wrap legs with ace after cream is available. 1330 Wrapped both calves will need to wrap above tomorrow.

## 2023-06-22 NOTE — PROGRESS NOTES
Pt resting in bed at present time without complaints. IVF infusing. Hourly rounds completed, all needs met this shift. Bed locked and low, call light within reach. Will give report to oncoming day shift nurse.

## 2023-06-22 NOTE — CONSULTS
ml     PHYSICAL EXAM   Constitutional:  the patient is well developed and in no acute distress  EENMT:  Sclera clear, pupils equal, oral mucosa moist  Respiratory: symmetric chest rise. Diminished throughout, no wheezing on 2lpm  Cardiovascular:  RRR without M,G,R. There is 2+ lower extremity edema. Gastrointestinal: soft and non-tender; with positive bowel sounds. Musculoskeletal: warm without cyanosis. Normal muscle tone. Skin:  no jaundice , +rash- chronic  Neurologic: symmetric strength, fluent speech  Psychiatric:  calm, alert    Imaging: I performed an independent interpretation of the patient's images. CXR:        CT Chest:           Recent Labs     06/21/23 2136   WBC 9.0   HGB 11.6*   HCT 37.3      *   K 4.1   *   CO2 30   BUN 14   CREATININE 0.80   BILITOT 0.1*   AST 25   ALT 13   ALKPHOS 109   NTPROBNP 431*   CRP 13.2*       Lab Results   Component Value Date/Time     06/21/2023 09:36 PM    K 4.1 06/21/2023 09:36 PM     06/21/2023 09:36 PM    CO2 30 06/21/2023 09:36 PM    BUN 14 06/21/2023 09:36 PM    CREATININE 0.80 06/21/2023 09:36 PM    GLUCOSE 102 06/21/2023 09:36 PM    CALCIUM 8.4 06/21/2023 09:36 PM      No results found for: BNP    ECHO: No results found for this or any previous visit. MICRO:   Recent Labs     06/21/23 2136   CULTURE NO GROWTH AFTER 9 HOURS     Assessment and Plan:  (Medical Decision Making)   Impression: 74QY with PMH of SLE, Sjogren's, COPD, tobacco abuse and breast cancer. Lost to follow up with Rheum. Presented with worsening SOB, dog bit and wheezing. Principal Problem:    COPD with acute exacerbation ([address]  Plan: currently on prednisone, pulmicort/brovana and albuterol. On augmentin and doxy. Increase mucinex. Will need OP Pulmonary follow up and patient is agreeable. Active Problems:    Rash/skin eruption  Plan: chronic, has been seen by Derm in past.     Eosinophilia  Plan: IgE in process, lactic and procal negative.

## 2023-06-22 NOTE — PROGRESS NOTES
6 Minute Walk Test   Pre-Test Vitals:  ;   95% 2L                                  92% room air    Post-Test Vitals:      94% room air    Distance:  ;  150'

## 2023-06-23 LAB
ALBUMIN SERPL-MCNC: 1.8 G/DL (ref 3.2–4.6)
ALBUMIN/GLOB SERPL: 0.4 (ref 0.4–1.6)
ALP SERPL-CCNC: 96 U/L (ref 50–136)
ALT SERPL-CCNC: 11 U/L (ref 12–65)
ANION GAP SERPL CALC-SCNC: 3 MMOL/L (ref 2–11)
AST SERPL-CCNC: 18 U/L (ref 15–37)
BASOPHILS # BLD: 0 K/UL (ref 0–0.2)
BASOPHILS NFR BLD: 0 % (ref 0–2)
BILIRUB SERPL-MCNC: 0.1 MG/DL (ref 0.2–1.1)
BUN SERPL-MCNC: 14 MG/DL (ref 8–23)
C-ANCA TITR SER IF: NORMAL TITER
CALCIUM SERPL-MCNC: 8.8 MG/DL (ref 8.3–10.4)
CCP IGA+IGG SERPL IA-ACNC: 4 UNITS (ref 0–19)
CHLORIDE SERPL-SCNC: 109 MMOL/L (ref 101–110)
CO2 SERPL-SCNC: 29 MMOL/L (ref 21–32)
CREAT SERPL-MCNC: 0.7 MG/DL (ref 0.6–1)
CRP SERPL-MCNC: 7.4 MG/DL (ref 0–0.9)
DIFFERENTIAL METHOD BLD: ABNORMAL
EOSINOPHIL # BLD: 0 K/UL (ref 0–0.8)
EOSINOPHIL NFR BLD: 0 % (ref 0.5–7.8)
ERYTHROCYTE [DISTWIDTH] IN BLOOD BY AUTOMATED COUNT: 14.6 % (ref 11.9–14.6)
GLOBULIN SER CALC-MCNC: 4.1 G/DL (ref 2.8–4.5)
GLUCOSE SERPL-MCNC: 117 MG/DL (ref 65–100)
HCT VFR BLD AUTO: 34.3 % (ref 35.8–46.3)
HGB BLD-MCNC: 10.6 G/DL (ref 11.7–15.4)
IMM GRANULOCYTES # BLD AUTO: 0.1 K/UL (ref 0–0.5)
IMM GRANULOCYTES NFR BLD AUTO: 1 % (ref 0–5)
LYMPHOCYTES # BLD: 0.9 K/UL (ref 0.5–4.6)
LYMPHOCYTES NFR BLD: 8 % (ref 13–44)
MCH RBC QN AUTO: 28.3 PG (ref 26.1–32.9)
MCHC RBC AUTO-ENTMCNC: 30.9 G/DL (ref 31.4–35)
MCV RBC AUTO: 91.5 FL (ref 82–102)
MONOCYTES # BLD: 0.7 K/UL (ref 0.1–1.3)
MONOCYTES NFR BLD: 6 % (ref 4–12)
MYELOPEROXIDASE AB SER IA-ACNC: <0.2 UNITS (ref 0–0.9)
NEUTS SEG # BLD: 10 K/UL (ref 1.7–8.2)
NEUTS SEG NFR BLD: 85 % (ref 43–78)
NRBC # BLD: 0 K/UL (ref 0–0.2)
P-ANCA ATYPICAL TITR SER IF: NORMAL TITER
P-ANCA TITR SER IF: NORMAL TITER
PATH REV BLD -IMP: NORMAL
PLATELET # BLD AUTO: 241 K/UL (ref 150–450)
PMV BLD AUTO: 10.7 FL (ref 9.4–12.3)
POTASSIUM SERPL-SCNC: 4.4 MMOL/L (ref 3.5–5.1)
PROT SERPL-MCNC: 5.9 G/DL (ref 6.3–8.2)
PROTEINASE3 AB SER IA-ACNC: 0.4 UNITS (ref 0–0.9)
RBC # BLD AUTO: 3.75 M/UL (ref 4.05–5.2)
SODIUM SERPL-SCNC: 141 MMOL/L (ref 133–143)
WBC # BLD AUTO: 11.7 K/UL (ref 4.3–11.1)

## 2023-06-23 PROCEDURE — 1100000000 HC RM PRIVATE

## 2023-06-23 PROCEDURE — 97530 THERAPEUTIC ACTIVITIES: CPT

## 2023-06-23 PROCEDURE — 6370000000 HC RX 637 (ALT 250 FOR IP): Performed by: PHYSICIAN ASSISTANT

## 2023-06-23 PROCEDURE — 85025 COMPLETE CBC W/AUTO DIFF WBC: CPT

## 2023-06-23 PROCEDURE — 80053 COMPREHEN METABOLIC PANEL: CPT

## 2023-06-23 PROCEDURE — 6370000000 HC RX 637 (ALT 250 FOR IP): Performed by: STUDENT IN AN ORGANIZED HEALTH CARE EDUCATION/TRAINING PROGRAM

## 2023-06-23 PROCEDURE — 6360000002 HC RX W HCPCS: Performed by: FAMILY MEDICINE

## 2023-06-23 PROCEDURE — 6370000000 HC RX 637 (ALT 250 FOR IP): Performed by: FAMILY MEDICINE

## 2023-06-23 PROCEDURE — 97116 GAIT TRAINING THERAPY: CPT

## 2023-06-23 PROCEDURE — 36415 COLL VENOUS BLD VENIPUNCTURE: CPT

## 2023-06-23 PROCEDURE — 6360000002 HC RX W HCPCS: Performed by: NURSE PRACTITIONER

## 2023-06-23 PROCEDURE — 94640 AIRWAY INHALATION TREATMENT: CPT

## 2023-06-23 PROCEDURE — 2700000000 HC OXYGEN THERAPY PER DAY

## 2023-06-23 PROCEDURE — 94761 N-INVAS EAR/PLS OXIMETRY MLT: CPT

## 2023-06-23 PROCEDURE — 86140 C-REACTIVE PROTEIN: CPT

## 2023-06-23 PROCEDURE — 6370000000 HC RX 637 (ALT 250 FOR IP): Performed by: NURSE PRACTITIONER

## 2023-06-23 RX ORDER — CYANOCOBALAMIN 1000 UG/ML
1000 INJECTION, SOLUTION INTRAMUSCULAR; SUBCUTANEOUS DAILY
Status: DISCONTINUED | OUTPATIENT
Start: 2023-06-23 | End: 2023-06-24 | Stop reason: HOSPADM

## 2023-06-23 RX ORDER — FERROUS SULFATE 325(65) MG
325 TABLET ORAL 2 TIMES DAILY WITH MEALS
Status: DISCONTINUED | OUTPATIENT
Start: 2023-06-23 | End: 2023-06-24 | Stop reason: HOSPADM

## 2023-06-23 RX ADMIN — ALBUTEROL SULFATE 2.5 MG: 2.5 SOLUTION RESPIRATORY (INHALATION) at 11:00

## 2023-06-23 RX ADMIN — HYDROXYCHLOROQUINE SULFATE 200 MG: 200 TABLET, FILM COATED ORAL at 09:45

## 2023-06-23 RX ADMIN — TRIAMCINOLONE ACETONIDE: 1 CREAM TOPICAL at 09:49

## 2023-06-23 RX ADMIN — ENOXAPARIN SODIUM 40 MG: 40 INJECTION SUBCUTANEOUS at 09:43

## 2023-06-23 RX ADMIN — CYANOCOBALAMIN 1000 MCG: 1000 INJECTION, SOLUTION INTRAMUSCULAR; SUBCUTANEOUS at 09:42

## 2023-06-23 RX ADMIN — CETIRIZINE HYDROCHLORIDE 10 MG: 10 TABLET, FILM COATED ORAL at 09:44

## 2023-06-23 RX ADMIN — ALBUTEROL SULFATE 2.5 MG: 2.5 SOLUTION RESPIRATORY (INHALATION) at 19:28

## 2023-06-23 RX ADMIN — PREGABALIN 200 MG: 25 CAPSULE ORAL at 20:00

## 2023-06-23 RX ADMIN — BUDESONIDE INHALATION 500 MCG: 0.5 SUSPENSION RESPIRATORY (INHALATION) at 07:11

## 2023-06-23 RX ADMIN — DOXYCYCLINE HYCLATE 100 MG: 100 CAPSULE ORAL at 09:45

## 2023-06-23 RX ADMIN — ARFORMOTEROL TARTRATE 15 MCG: 15 SOLUTION RESPIRATORY (INHALATION) at 07:11

## 2023-06-23 RX ADMIN — FERROUS SULFATE TAB 325 MG (65 MG ELEMENTAL FE) 325 MG: 325 (65 FE) TAB at 17:12

## 2023-06-23 RX ADMIN — ALBUTEROL SULFATE 2.5 MG: 2.5 SOLUTION RESPIRATORY (INHALATION) at 07:11

## 2023-06-23 RX ADMIN — ARFORMOTEROL TARTRATE 15 MCG: 15 SOLUTION RESPIRATORY (INHALATION) at 19:28

## 2023-06-23 RX ADMIN — MONTELUKAST 10 MG: 10 TABLET, FILM COATED ORAL at 20:01

## 2023-06-23 RX ADMIN — GUAIFENESIN 1200 MG: 600 TABLET ORAL at 20:00

## 2023-06-23 RX ADMIN — PREDNISONE 60 MG: 20 TABLET ORAL at 09:45

## 2023-06-23 RX ADMIN — HYDROXYCHLOROQUINE SULFATE 200 MG: 200 TABLET, FILM COATED ORAL at 17:10

## 2023-06-23 RX ADMIN — AMOXICILLIN AND CLAVULANATE POTASSIUM 1 TABLET: 875; 125 TABLET, FILM COATED ORAL at 09:44

## 2023-06-23 RX ADMIN — BUDESONIDE INHALATION 500 MCG: 0.5 SUSPENSION RESPIRATORY (INHALATION) at 19:28

## 2023-06-23 RX ADMIN — GUAIFENESIN 1200 MG: 600 TABLET ORAL at 09:44

## 2023-06-23 RX ADMIN — PREGABALIN 200 MG: 25 CAPSULE ORAL at 09:44

## 2023-06-23 RX ADMIN — ALBUTEROL SULFATE 2.5 MG: 2.5 SOLUTION RESPIRATORY (INHALATION) at 15:20

## 2023-06-23 ASSESSMENT — PAIN SCALES - GENERAL: PAINLEVEL_OUTOF10: 0

## 2023-06-23 NOTE — PROGRESS NOTES
ACUTE PHYSICAL THERAPY GOALS:   (Developed with and agreed upon by patient and/or caregiver.)  Pt will perform supine to/from sit with mod I in 7 treatment days. Pt will perform sit to/from stand with Mod I and LRAD in 7 treatment days. Pt will ambulate 150 ft with mod I and LRAD in 7 treatment days. Pt will negotiate 4 stairs with mod I and rail in 7 treatment days. Pt will be independent with HEP in 7 days    PHYSICAL THERAPY: Daily Note PM   (Link to Caseload Tracking: PT Visit Days : 2  Time In/Out PT Charge Capture  Rehab Caseload Tracker  Orders    Dalton Little is a 69 y.o. female   PRIMARY DIAGNOSIS: COPD with acute exacerbation ([address]  Autoimmune disease ([address] [M35.9]  COPD exacerbation ([address] [J44.1]  COPD with acute exacerbation ([address] [J44.1]  Acute respiratory failure with hypoxia ([address] [J96.01]  Bilateral edema of lower extremity [R60.0]  Dog bite, initial encounter [C54. 0XXA]  Skin lesions [L98.9]  Lack of access to transportation [Z59.82]       Inpatient: Payor: Jack Dow / Plan: Danna Mccarty / Product Type: *No Product type* /     ASSESSMENT:     REHAB RECOMMENDATIONS:   Recommendation to date pending progress:  Setting:  Home Health Therapy    Equipment:    To Be Determined     ASSESSMENT:  Ms. Cassius Garcia presents sitting up in chair, agreeable to PT. Pt presents on RA with SpO2 reading 90%, increased to 95% after deep breath. Pt able to perform STS and ambulate 250' x2 without AD with SBA for safety, pt did this on RA, SpO2 after activity was 89%, recovered to 93% after approx 10 sec.  Pt did well, only one instance of LOB that came when pt was walking and talking to someone behind her, pt making progress, will continue to see     SUBJECTIVE:   Ms. Cassius Garcia states, \"I tried to go downstairs and get me some candy\"     Social/Functional Lives With: Alone  Type of Home: House  Home Layout: One level  Home Access: Stairs to enter with rails  Entrance Stairs - Number of

## 2023-06-23 NOTE — PLAN OF CARE
Problem: Respiratory - Adult  Goal: Achieves optimal ventilation and oxygenation  6/23/2023 0939 by Ardiolimpia Dale, RCP  Outcome: Progressing  Flowsheets (Taken 6/23/2023 9515)  Achieves optimal ventilation and oxygenation:   Assess for changes in respiratory status   Respiratory therapy support as indicated   Encourage broncho-pulmonary hygiene including cough, deep breathe, incentive spirometry   Oxygen supplementation based on oxygen saturation or arterial blood gases  6/23/2023 0938 by Ardiolimpia Dale, RCP  Outcome: Progressing  Flowsheets (Taken 6/23/2023 1120)  Achieves optimal ventilation and oxygenation:   Assess for changes in respiratory status   Respiratory therapy support as indicated   Oxygen supplementation based on oxygen saturation or arterial blood gases   Encourage broncho-pulmonary hygiene including cough, deep breathe, incentive spirometry

## 2023-06-23 NOTE — PLAN OF CARE
Problem: Discharge Planning  Goal: Discharge to home or other facility with appropriate resources  Outcome: Progressing  Flowsheets (Taken 6/22/2023 1941)  Discharge to home or other facility with appropriate resources:   Identify barriers to discharge with patient and caregiver   Arrange for needed discharge resources and transportation as appropriate     Problem: Pain  Goal: Verbalizes/displays adequate comfort level or baseline comfort level  Outcome: Progressing     Problem: Safety - Adult  Goal: Free from fall injury  Outcome: Progressing     Problem: Skin/Tissue Integrity  Goal: Absence of new skin breakdown  Description: 1. Monitor for areas of redness and/or skin breakdown  2. Assess vascular access sites hourly  3. Every 4-6 hours minimum:  Change oxygen saturation probe site  4. Every 4-6 hours:  If on nasal continuous positive airway pressure, respiratory therapy assess nares and determine need for appliance change or resting period.   Outcome: Progressing

## 2023-06-23 NOTE — WOUND CARE
Noted great deal of improvement with edema and rash with Kenalog cream and wrap. Patient declines wraps at this time. Nurse updated and prn order for ace wraps for edema in chart.

## 2023-06-23 NOTE — PROGRESS NOTES
Hospitalist Progress Note   Admit Date:  2023  9:22 PM   Name:  Katharina Barrientos   Age:  69 y.o. Sex:  female  :  1955   MRN:  [id number]   Room:  [facility]/    Presenting/Chief Complaint: Animal Bite, Shortness of Breath, and Lupus     Reason(s) for Admission: Autoimmune disease ([facility] [M35.9]  COPD exacerbation ([facility] [J44.1]  COPD with acute exacerbation ([facility] [J44.1]  Acute respiratory failure with hypoxia ([facility] [J96.01]  Bilateral edema of lower extremity [R60.0]  Dog bite, initial encounter [T27. 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 Work-up so far consistent with hypoalbuminemia, high eosinophils, hypoxia (requiring 3L, no home o2 needs) and CT of the chest concerning for right lower lobe masses     Has been lost to follow-up with rheumatology and dermatology. Poor social situation, unable to afford meds. She was admitted for Acute respiratory insuffiency 2/2 Possible AECOPD or allergic asthma ex vs newly diagnosed ILD flare, started on nebulizers, steroids and antibiotics. Pulmonology consulted after high-resolution CT of the chest showed extensive emphysema and 2 right lower lobe masses with concerns for rounded atelectasis versus malignancy. Pulmonology recommends treating COPD and then following up with outpatient PET scan before pursuing BAL and biopsy. She probably needs Trelegy. RE rash: see pics in media. Wound team consulted due to her diffuse rash. We will continue on prednisone and initiate Kenalog. Resume home Plaquenil. Per up-to-date, rituximab may be used for this condition. Unfortunately we do not have inpatient dermatology consultants.       Subjective & 24hr

## 2023-06-24 VITALS
RESPIRATION RATE: 20 BRPM | HEIGHT: 61 IN | BODY MASS INDEX: 27.47 KG/M2 | DIASTOLIC BLOOD PRESSURE: 69 MMHG | SYSTOLIC BLOOD PRESSURE: 138 MMHG | TEMPERATURE: 98.1 F | HEART RATE: 86 BPM | OXYGEN SATURATION: 87 % | WEIGHT: 145.5 LBS

## 2023-06-24 LAB
ANA SER QL: POSITIVE
CENTROMERE B AB SER-ACNC: <0.2 AI (ref 0–0.9)
CHROMATIN AB SERPL-ACNC: <0.2 AI (ref 0–0.9)
DSDNA AB SER-ACNC: 1 IU/ML (ref 0–9)
ENA JO1 AB SER-ACNC: <0.2 AI (ref 0–0.9)
ENA RNP AB SER-ACNC: <0.2 AI (ref 0–0.9)
ENA SCL70 AB SER-ACNC: <0.2 AI (ref 0–0.9)
ENA SM AB SER-ACNC: <0.2 AI (ref 0–0.9)
ENA SS-A AB SER-ACNC: >8 AI (ref 0–0.9)
ENA SS-B AB SER-ACNC: <0.2 AI (ref 0–0.9)
Lab: ABNORMAL

## 2023-06-24 PROCEDURE — 6360000002 HC RX W HCPCS: Performed by: FAMILY MEDICINE

## 2023-06-24 PROCEDURE — 94761 N-INVAS EAR/PLS OXIMETRY MLT: CPT

## 2023-06-24 PROCEDURE — 6370000000 HC RX 637 (ALT 250 FOR IP): Performed by: FAMILY MEDICINE

## 2023-06-24 PROCEDURE — 94640 AIRWAY INHALATION TREATMENT: CPT

## 2023-06-24 RX ORDER — MONTELUKAST SODIUM 10 MG/1
10 TABLET ORAL NIGHTLY
Qty: 30 TABLET | Refills: 3 | Status: SHIPPED | OUTPATIENT
Start: 2023-06-24

## 2023-06-24 RX ORDER — PREDNISONE 10 MG/1
TABLET ORAL
Qty: 28 TABLET | Refills: 0 | Status: SHIPPED | OUTPATIENT
Start: 2023-06-25 | End: 2023-07-01

## 2023-06-24 RX ORDER — FLUTICASONE FUROATE, UMECLIDINIUM BROMIDE AND VILANTEROL TRIFENATATE 200; 62.5; 25 UG/1; UG/1; UG/1
1 POWDER RESPIRATORY (INHALATION) DAILY
Qty: 1 EACH | Refills: 1 | Status: SHIPPED | OUTPATIENT
Start: 2023-06-24 | End: 2023-08-23

## 2023-06-24 RX ORDER — FERROUS SULFATE 325(65) MG
325 TABLET ORAL 2 TIMES DAILY WITH MEALS
Qty: 30 TABLET | Refills: 3 | Status: SHIPPED | OUTPATIENT
Start: 2023-06-24

## 2023-06-24 RX ORDER — ALBUTEROL SULFATE 90 UG/1
2 AEROSOL, METERED RESPIRATORY (INHALATION) 4 TIMES DAILY PRN
Qty: 54 G | Refills: 1 | Status: SHIPPED | OUTPATIENT
Start: 2023-06-24

## 2023-06-24 RX ORDER — DOXYCYCLINE HYCLATE 100 MG/1
100 CAPSULE ORAL EVERY 12 HOURS SCHEDULED
Qty: 8 CAPSULE | Refills: 0 | Status: SHIPPED | OUTPATIENT
Start: 2023-06-24 | End: 2023-06-28

## 2023-06-24 RX ORDER — GUAIFENESIN 600 MG/1
1200 TABLET, EXTENDED RELEASE ORAL 2 TIMES DAILY
Qty: 12 TABLET | Refills: 0 | Status: SHIPPED | OUTPATIENT
Start: 2023-06-24 | End: 2023-06-27

## 2023-06-24 RX ORDER — AMOXICILLIN AND CLAVULANATE POTASSIUM 875; 125 MG/1; MG/1
1 TABLET, FILM COATED ORAL EVERY 12 HOURS SCHEDULED
Qty: 8 TABLET | Refills: 0 | Status: SHIPPED | OUTPATIENT
Start: 2023-06-24 | End: 2023-06-28

## 2023-06-24 RX ADMIN — ARFORMOTEROL TARTRATE 15 MCG: 15 SOLUTION RESPIRATORY (INHALATION) at 07:47

## 2023-06-24 RX ADMIN — PANTOPRAZOLE SODIUM 40 MG: 40 TABLET, DELAYED RELEASE ORAL at 04:55

## 2023-06-24 RX ADMIN — ALBUTEROL SULFATE 2.5 MG: 2.5 SOLUTION RESPIRATORY (INHALATION) at 07:47

## 2023-06-24 RX ADMIN — PREGABALIN 200 MG: 25 CAPSULE ORAL at 10:04

## 2023-06-24 RX ADMIN — BUDESONIDE INHALATION 500 MCG: 0.5 SUSPENSION RESPIRATORY (INHALATION) at 07:47

## 2023-06-24 RX ADMIN — ENOXAPARIN SODIUM 40 MG: 40 INJECTION SUBCUTANEOUS at 10:03

## 2023-06-24 NOTE — PROGRESS NOTES
Pt refused to take her morning meds stating she is too sick on the stomach. Offered zofran, she declined.

## 2023-06-24 NOTE — DISCHARGE SUMMARY
Hospitalist Discharge Summary   Admit Date:  2023  9:22 PM   DC Note date: 2023  Name:  Arthur Campbell   Age:  69 y.o. Sex:  female  :  1955   MRN:  [id number]   Room:  [facility] [place]  PCP:  Jamie Stern MD    Presenting Complaint: Animal Bite, Shortness of Breath, and Lupus     Initial Admission Diagnosis: Autoimmune disease ([address] [M35.9]  COPD exacerbation ([address] [J44.1]  COPD with acute exacerbation ([address] [J44.1]  Acute respiratory failure with hypoxia ([address] [J96.01]  Bilateral edema of lower extremity [R60.0]  Dog bite, initial encounter [H72. 0XXA]  Skin lesions [L98.9]  Lack of access to transportation [Z59.82]     Problem List for this Hospitalization (present on admission):    Principal Problem:    COPD with acute exacerbation ([address]  Active Problems:    Rash/skin eruption    Hypertension    Hypoproteinemia (HCC)    Eosinophilia    GERD (gastroesophageal reflux disease)    Cigarette nicotine dependence with nicotine-induced disorder    History of right breast cancer    Dog bite of lower leg, right, initial encounter    SLE (systemic lupus erythematosus) (HCC)    H/O Sjogren's disease ([address]    Acute respiratory failure with hypoxia (HCC)    Lung mass    Autoimmune disease ([address]    COPD exacerbation ([address]  Resolved Problems:    * No resolved hospital problems. 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 Work-up so far consistent with hypoalbuminemia, high eosinophils, hypoxia (requiring 3L, no home o2 needs) and CT of the chest concerning for right lower lobe masses. Has been lost to follow-up with rheumatology and dermatology. Poor social situation, unable to afford meds.      She was admitted for Acute respiratory insuffiency 2/2 Possible AECOPD or allergic asthma ex vs newly

## 2023-06-24 NOTE — PROGRESS NOTES
Oxygen Qualifier       Room air: SpO2 with O2 and liter flow   Resting SpO2  85%  88% on 1L / 91% / 91% on 2L     Ambulating SpO2     PT refuses to walk , finish breathing treatment or wear oxygen    Completed by:    Cooper Pierce RCP

## 2023-06-24 NOTE — CARE COORDINATION
Dispo update: From home health choices, Ms. Vandana Calderon set up with [facility] home [facility] OT, PT, and RN (SN). Ordered faxed to [facility] [phone] (phone [phone]).
Pt is for discharge home today with [facility]. Referral called/faxed to [facility] for follow up home care as ordered. Pt was provided with portable home oxygen tank by Misti SMITH was notified of the pt being discharged for follow up. No additional CM orders received or supportive care needs expressed at this time. 06/24/23 [address] Discharge   Transition of Care Consult (CM Consult) Discharge Planning;Home Health   Internal Home Health No  ([facility])   Reason Outside Agency Chosen   (Pt preference)   [address] Provided? No   Mode of Transport at Discharge Other (see comment)  (Family)   Confirm Follow Up Transport Family   Condition of Participation: Discharge Planning   The Plan for Transition of Care is related to the following treatment goals: Pt will return home at discharge with [facility] health. Pt was provided with portable home oxygen. The Patient and/or Patient Representative was provided with a Choice of Provider? Patient   The Patient and/Or Patient Representative agree with the Discharge Plan? Yes   Freedom of Choice list was provided with basic dialogue that supports the patient's individualized plan of care/goals, treatment preferences, and shares the quality data associated with the providers?   Yes
return to prior living arrangement Unknown at present   Ability to make needs known: Good   Family able to assist with home care needs: No   Would you like for me to discuss the discharge plan with any other family members/significant others, and if so, who?  No   Condition of Participation: Discharge Planning   The Plan for Transition of Care is related to the following treatment goals: LUIS GODWIN to follow

## 2023-06-24 NOTE — PROGRESS NOTES
It is with great ivone we pray for your family today: \"Because you dared to believe,    Your luisa has healed you. Go with peace in your heart,    And be free from your suffering! \"    Dalton Corbin,    I believe that if you would touch my body I shall be healed. Give me the luisa to come boldly into your presence.     Amen

## 2023-06-25 LAB
BACTERIA SPEC CULT: NORMAL
BACTERIA SPEC CULT: NORMAL
SERVICE CMNT-IMP: NORMAL
SERVICE CMNT-IMP: NORMAL
TRYPTASE SERPL-MCNC: 9 UG/L (ref 2.2–13.2)

## 2023-06-26 LAB
BACTERIA SPEC CULT: NORMAL
IGE SERPL-ACNC: 2507 IU/ML (ref 6–495)
SERVICE CMNT-IMP: NORMAL

## 2023-06-27 LAB
BACTERIA SPEC CULT: NORMAL
C-ANCA TITR SER IF: NORMAL TITER
MYELOPEROXIDASE AB SER IA-ACNC: <0.2 UNITS (ref 0–0.9)
P-ANCA ATYPICAL TITR SER IF: NORMAL TITER
P-ANCA TITR SER IF: NORMAL TITER
PROTEINASE3 AB SER IA-ACNC: 0.4 UNITS (ref 0–0.9)
SERVICE CMNT-IMP: NORMAL

## 2023-07-24 ENCOUNTER — TRANSCRIBE ORDERS (OUTPATIENT)
Dept: SCHEDULING | Age: 68
End: 2023-07-24

## 2023-07-24 DIAGNOSIS — Z12.31 ENCOUNTER FOR SCREENING MAMMOGRAM FOR MALIGNANT NEOPLASM OF BREAST: Primary | ICD-10-CM

## 2023-07-24 DIAGNOSIS — R91.8 OTHER NONSPECIFIC ABNORMAL FINDING OF LUNG FIELD: Primary | ICD-10-CM

## 2023-08-06 ENCOUNTER — APPOINTMENT (OUTPATIENT)
Dept: GENERAL RADIOLOGY | Age: 68
End: 2023-08-06
Payer: MEDICARE

## 2023-08-06 ENCOUNTER — HOSPITAL ENCOUNTER (EMERGENCY)
Age: 68
Discharge: HOME | End: 2023-08-06
Attending: EMERGENCY MEDICINE
Payer: MEDICARE

## 2023-08-06 VITALS
OXYGEN SATURATION: 91 % | DIASTOLIC BLOOD PRESSURE: 80 MMHG | HEIGHT: 61 IN | HEART RATE: 97 BPM | RESPIRATION RATE: 20 BRPM | TEMPERATURE: 97.6 F | BODY MASS INDEX: 29.36 KG/M2 | SYSTOLIC BLOOD PRESSURE: 138 MMHG | WEIGHT: 155.5 LBS

## 2023-08-06 DIAGNOSIS — R21 RASH AND OTHER NONSPECIFIC SKIN ERUPTION: Primary | ICD-10-CM

## 2023-08-06 DIAGNOSIS — R05.2 SUBACUTE COUGH: ICD-10-CM

## 2023-08-06 LAB
ALBUMIN SERPL-MCNC: 2 G/DL (ref 3.2–4.6)
ALBUMIN/GLOB SERPL: 0.5 (ref 0.4–1.6)
ALP SERPL-CCNC: 84 U/L (ref 50–136)
ALT SERPL-CCNC: 11 U/L (ref 12–65)
ANION GAP SERPL CALC-SCNC: 12 MMOL/L (ref 2–11)
AST SERPL-CCNC: 17 U/L (ref 15–37)
BASOPHILS # BLD: 0 K/UL (ref 0–0.2)
BASOPHILS NFR BLD: 0 % (ref 0–2)
BILIRUB SERPL-MCNC: 0.5 MG/DL (ref 0.2–1.1)
BUN SERPL-MCNC: 15 MG/DL (ref 8–23)
CALCIUM SERPL-MCNC: 8.7 MG/DL (ref 8.3–10.4)
CHLORIDE SERPL-SCNC: 101 MMOL/L (ref 101–110)
CO2 SERPL-SCNC: 25 MMOL/L (ref 21–32)
CREAT SERPL-MCNC: 0.5 MG/DL (ref 0.6–1)
DIFFERENTIAL METHOD BLD: ABNORMAL
EOSINOPHIL # BLD: 0.5 K/UL (ref 0–0.8)
EOSINOPHIL NFR BLD: 5 % (ref 0.5–7.8)
ERYTHROCYTE [DISTWIDTH] IN BLOOD BY AUTOMATED COUNT: 16.8 % (ref 11.9–14.6)
GLOBULIN SER CALC-MCNC: 4.3 G/DL (ref 2.8–4.5)
GLUCOSE BLD STRIP.AUTO-MCNC: 76 MG/DL (ref 65–100)
GLUCOSE SERPL-MCNC: 49 MG/DL (ref 65–100)
HCT VFR BLD AUTO: 45.7 % (ref 35.8–46.3)
HGB BLD-MCNC: 14.6 G/DL (ref 11.7–15.4)
IMM GRANULOCYTES # BLD AUTO: 0.1 K/UL (ref 0–0.5)
IMM GRANULOCYTES NFR BLD AUTO: 1 % (ref 0–5)
INR PPP: 1
LACTATE SERPL-SCNC: 1 MMOL/L (ref 0.4–2)
LYMPHOCYTES # BLD: 1 K/UL (ref 0.5–4.6)
LYMPHOCYTES NFR BLD: 10 % (ref 13–44)
MCH RBC QN AUTO: 27.7 PG (ref 26.1–32.9)
MCHC RBC AUTO-ENTMCNC: 31.9 G/DL (ref 31.4–35)
MCV RBC AUTO: 86.7 FL (ref 82–102)
MONOCYTES # BLD: 1.1 K/UL (ref 0.1–1.3)
MONOCYTES NFR BLD: 11 % (ref 4–12)
NEUTS SEG # BLD: 7.2 K/UL (ref 1.7–8.2)
NEUTS SEG NFR BLD: 73 % (ref 43–78)
NRBC # BLD: 0 K/UL (ref 0–0.2)
PLATELET # BLD AUTO: 328 K/UL (ref 150–450)
PMV BLD AUTO: 9.8 FL (ref 9.4–12.3)
POTASSIUM SERPL-SCNC: 3.3 MMOL/L (ref 3.5–5.1)
PROCALCITONIN SERPL-MCNC: <0.05 NG/ML (ref 0–0.49)
PROT SERPL-MCNC: 6.3 G/DL (ref 6.3–8.2)
PROTHROMBIN TIME: 14 SEC (ref 12.6–14.3)
RBC # BLD AUTO: 5.27 M/UL (ref 4.05–5.2)
SERVICE CMNT-IMP: NORMAL
SODIUM SERPL-SCNC: 138 MMOL/L (ref 133–143)
TROPONIN I SERPL HS-MCNC: 8 PG/ML (ref 0–14)
WBC # BLD AUTO: 9.8 K/UL (ref 4.3–11.1)

## 2023-08-06 PROCEDURE — 2580000003 HC RX 258: Performed by: EMERGENCY MEDICINE

## 2023-08-06 PROCEDURE — 96361 HYDRATE IV INFUSION ADD-ON: CPT

## 2023-08-06 PROCEDURE — 6360000002 HC RX W HCPCS: Performed by: STUDENT IN AN ORGANIZED HEALTH CARE EDUCATION/TRAINING PROGRAM

## 2023-08-06 PROCEDURE — 6370000000 HC RX 637 (ALT 250 FOR IP): Performed by: STUDENT IN AN ORGANIZED HEALTH CARE EDUCATION/TRAINING PROGRAM

## 2023-08-06 PROCEDURE — 82962 GLUCOSE BLOOD TEST: CPT

## 2023-08-06 PROCEDURE — 6360000002 HC RX W HCPCS: Performed by: EMERGENCY MEDICINE

## 2023-08-06 PROCEDURE — 80053 COMPREHEN METABOLIC PANEL: CPT

## 2023-08-06 PROCEDURE — 71045 X-RAY EXAM CHEST 1 VIEW: CPT

## 2023-08-06 PROCEDURE — 84145 PROCALCITONIN (PCT): CPT

## 2023-08-06 PROCEDURE — 6360000002 HC RX W HCPCS: Performed by: INTERNAL MEDICINE

## 2023-08-06 PROCEDURE — 85610 PROTHROMBIN TIME: CPT

## 2023-08-06 PROCEDURE — 96366 THER/PROPH/DIAG IV INF ADDON: CPT

## 2023-08-06 PROCEDURE — 96375 TX/PRO/DX INJ NEW DRUG ADDON: CPT

## 2023-08-06 PROCEDURE — 2580000003 HC RX 258: Performed by: INTERNAL MEDICINE

## 2023-08-06 PROCEDURE — 83605 ASSAY OF LACTIC ACID: CPT

## 2023-08-06 PROCEDURE — 96376 TX/PRO/DX INJ SAME DRUG ADON: CPT

## 2023-08-06 PROCEDURE — 85025 COMPLETE CBC W/AUTO DIFF WBC: CPT

## 2023-08-06 PROCEDURE — 96365 THER/PROPH/DIAG IV INF INIT: CPT

## 2023-08-06 PROCEDURE — 96367 TX/PROPH/DG ADDL SEQ IV INF: CPT

## 2023-08-06 PROCEDURE — 99285 EMERGENCY DEPT VISIT HI MDM: CPT

## 2023-08-06 PROCEDURE — 87040 BLOOD CULTURE FOR BACTERIA: CPT

## 2023-08-06 PROCEDURE — 84484 ASSAY OF TROPONIN QUANT: CPT

## 2023-08-06 RX ORDER — MORPHINE SULFATE 4 MG/ML
4 INJECTION INTRAVENOUS ONCE
Status: COMPLETED | OUTPATIENT
Start: 2023-08-06 | End: 2023-08-06

## 2023-08-06 RX ORDER — NICOTINE 21 MG/24HR
1 PATCH, TRANSDERMAL 24 HOURS TRANSDERMAL ONCE
Status: DISCONTINUED | OUTPATIENT
Start: 2023-08-06 | End: 2023-08-06 | Stop reason: HOSPADM

## 2023-08-06 RX ORDER — IBUPROFEN 200 MG
TABLET ORAL ONCE
Status: COMPLETED | OUTPATIENT
Start: 2023-08-06 | End: 2023-08-06

## 2023-08-06 RX ORDER — SODIUM CHLORIDE, SODIUM LACTATE, POTASSIUM CHLORIDE, AND CALCIUM CHLORIDE .6; .31; .03; .02 G/100ML; G/100ML; G/100ML; G/100ML
1000 INJECTION, SOLUTION INTRAVENOUS ONCE
Status: COMPLETED | OUTPATIENT
Start: 2023-08-06 | End: 2023-08-06

## 2023-08-06 RX ORDER — ALBUTEROL SULFATE 2.5 MG/3ML
2.5 SOLUTION RESPIRATORY (INHALATION) EVERY 4 HOURS
Status: DISCONTINUED | OUTPATIENT
Start: 2023-08-06 | End: 2023-08-06 | Stop reason: HOSPADM

## 2023-08-06 RX ADMIN — BACITRACIN ZINC NEOMYCIN SULFATE POLYMYXIN B SULFATE: 400; 3.5; 5 OINTMENT TOPICAL at 19:34

## 2023-08-06 RX ADMIN — SODIUM CHLORIDE, POTASSIUM CHLORIDE, SODIUM LACTATE AND CALCIUM CHLORIDE 1000 ML: 600; 310; 30; 20 INJECTION, SOLUTION INTRAVENOUS at 14:55

## 2023-08-06 RX ADMIN — MORPHINE SULFATE 4 MG: 4 INJECTION INTRAVENOUS at 21:21

## 2023-08-06 RX ADMIN — VANCOMYCIN HYDROCHLORIDE 1750 MG: 10 INJECTION, POWDER, LYOPHILIZED, FOR SOLUTION INTRAVENOUS at 16:25

## 2023-08-06 RX ADMIN — WATER 40 MG: 1 INJECTION INTRAMUSCULAR; INTRAVENOUS; SUBCUTANEOUS at 18:04

## 2023-08-06 RX ADMIN — CEFEPIME 2000 MG: 2 INJECTION, POWDER, FOR SOLUTION INTRAVENOUS at 15:33

## 2023-08-06 RX ADMIN — FENTANYL CITRATE 50 MCG: 50 INJECTION, SOLUTION INTRAMUSCULAR; INTRAVENOUS at 15:32

## 2023-08-06 RX ADMIN — MORPHINE SULFATE 4 MG: 4 INJECTION INTRAVENOUS at 17:25

## 2023-08-06 ASSESSMENT — PAIN SCALES - GENERAL
PAINLEVEL_OUTOF10: 7

## 2023-08-06 ASSESSMENT — LIFESTYLE VARIABLES
HOW MANY STANDARD DRINKS CONTAINING ALCOHOL DO YOU HAVE ON A TYPICAL DAY: PATIENT DOES NOT DRINK
HOW OFTEN DO YOU HAVE A DRINK CONTAINING ALCOHOL: NEVER

## 2023-08-06 ASSESSMENT — ENCOUNTER SYMPTOMS
GASTROINTESTINAL NEGATIVE: 1
BACK PAIN: 0
RESPIRATORY NEGATIVE: 1

## 2023-08-06 NOTE — ED PROVIDER NOTES
MCG/ACT INHALER    Inhale 2 puffs into the lungs 4 times daily as needed for Wheezing or Shortness of Breath    FAMOTIDINE (PEPCID) 20 MG TABLET    Take 1 tablet by mouth 2 times daily    FERROUS SULFATE (IRON 325) 325 (65 FE) MG TABLET    Take 1 tablet by mouth 2 times daily (with meals)    FLUTICASONE-UMECLIDIN-VILANT (TRELEGY ELLIPTA) 200-62.5-25 MCG/ACT AEPB INHALER    Inhale 1 puff into the lungs daily    HYDROXYCHLOROQUINE (PLAQUENIL) 200 MG TABLET        LISINOPRIL-HYDROCHLOROTHIAZIDE (PRINZIDE;ZESTORETIC) 20-12.5 MG PER TABLET        MONTELUKAST (SINGULAIR) 10 MG TABLET    Take 1 tablet by mouth nightly    PREGABALIN (LYRICA) 200 MG CAPSULE            Results for orders placed or performed during the hospital encounter of 08/06/23   XR CHEST PORTABLE    Narrative    Chest portable    CLINICAL INDICATION: Acute sepsis with chills, generalized body rash. History  of COPD, lupus. COMPARISON: 6/22/2023 CT, 6/21/2023 radiography    TECHNIQUE: single AP portable view chest at 3:00 PM upright     FINDINGS: Lungs are well inflated. Right lower lobe opacities, and COPD,  scattered scarring have not definitely changed and were further evaluated on the  recent CT. There is no evidence of consolidation, pneumothorax, pleural  effusion or pulmonary edema. The mediastinal and hilar contours are stable given  technique. Impression    1. No acute consolidation. 2.  No significant change involving the opacities further evaluated on recent  CT.      Comprehensive Metabolic Panel   Result Value Ref Range    Sodium 138 133 - 143 mmol/L    Potassium 3.3 (L) 3.5 - 5.1 mmol/L    Chloride 101 101 - 110 mmol/L    CO2 25 21 - 32 mmol/L    Anion Gap 12 (H) 2 - 11 mmol/L    Glucose 49 (L) 65 - 100 mg/dL    BUN 15 8 - 23 MG/DL    Creatinine 0.50 (L) 0.6 - 1.0 MG/DL    Est, Glom Filt Rate >60 >60 ml/min/1.73m2    Calcium 8.7 8.3 - 10.4 MG/DL    Total Bilirubin 0.5 0.2 - 1.1 MG/DL    ALT 11 (L) 12 - 65 U/L    AST 17 15 - 37

## 2023-08-06 NOTE — ED TRIAGE NOTES
Pt brought in by EMS from home c/o painful pustules w/ irregular rounded edges over her entire body (7/10) (x4 days). Pt reports that the pustules have been draining greenish/ yellowish fluid. Pt denies any previous rash of this type or sick contacts. Pt reports body chills but unsure what temp was. Pt reports hx of lupus and COPD. Pt wears home oxygen. A&O 4/4. GCS 15. Speech clear. Pt resting in bed attached to cardiac monitor.

## 2023-08-06 NOTE — ED NOTES
Report given to [facility], RN and care assumed at this time.       Aleja Reyes RN  08/06/23 1933

## 2023-08-06 NOTE — ED NOTES
TRANSFER - OUT REPORT:    Verbal report given to Steffanie Rm RN on 1 Paras Christian  being transferred to burn center for routine progression of patient care       Report consisted of patient's Situation, Background, Assessment and   Recommendations(SBAR). Information from the following report(s) ED Encounter Summary, ED SBAR, [facility], and Recent Results was reviewed with the receiving nurse. [place] Assessment:    Presents to emergency department  because of falls (Syncope, seizure, or loss of consciousness): No  Age > 70: No  Altered Mental Status, Intoxication with alcohol or substance confusion (Disorientation, impaired judgment, poor safety awaremess, or inability to follow instructions): No  Impaired Mobility: Ambulates or transfers with assistive devices or assistance; Unable to ambulate or transer.: No  Nursing Judgement: No          Lines:   Peripheral IV 08/06/23 Left;Dorsal Forearm (Active)        Opportunity for questions and clarification was provided. Patient transported with:  Tech will be transferred via transport truck.            Diana Martin, [place]  08/06/23 3902

## 2023-08-06 NOTE — PROGRESS NOTES
It is with great ivone we pray for your family today:        \" If you trust him to save your soul,  Why would you not also,trust him to care for your body and life? He has never refused to carry your burdens; He has never fainted under their weight. So then, be finished with anxious worry, and leave all your concerns behind,  Placing them in the hand of a gracious God. \"              Faithful God,    The weight of my illness is more than I can bear. I need your help. I place the full weight of my burdens in your loving care. Thank you for being so faithful to me.     Dennis hernandez  [phone]

## 2023-08-06 NOTE — CONSULTS
[facility] Hospitalist Consult   Admit Date:  2023  2:05 PM   Name:  Dylan Costello   Age:  69 y.o. Sex:  female  :  1955   MRN:  [id number]   Room:  [facility]/    Presenting/Chief Complaint: Rash    Reason(s) for Admission: No admission diagnoses are documented for this encounter. Hospitalists consulted by Sharmaine Patel MD for: admission for worsening full-body rash and possible pneumonia    History of Presenting Illness:   Dylan Costello is a 69 y.o. 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 She was admitted to [facility] at the end of 2023 for shortness of breath with CT imaging of the chest showing RLL masses and treated for acute hypoxic respiratory failure. She was initiated on prednisone and Kenalog with referrals sent for dermatology and rheumatology . Today, patient with subjective fevers/chills and shortness of breath. No sick contacts. Per chart review she has been lost to follow up. She is not the most reliable historian as she says she is unsure of how much worse (or when) her rash has changed over the course of the past several weeks. She does have a cough and productive of \"clear sputum\". ED course: labs essentially unrevealing, given vancomycin and cefepime along with fentanyl, LR bolus. Hospitalist consulted for admission. 10 point ROS negative except for HPI above.    Assessment & Plan:     ASSESSMENT:  # COPD exacerbation  # Worsening pustular skin rash with skin sloughing, unclear if this is her known pemphigus or if this represents SJS    PLAN:   - discussed with Dr. Luis Armando Estrada, would recommend that she be evaluated at burn center as we do not have inpatient dermatology at [facility]  - Solumedrol and jet nebs for COPD exacerbation  - discussed that patient's skin condition puts her at higher risk for

## 2023-08-11 LAB
BACTERIA SPEC CULT: NORMAL
BACTERIA SPEC CULT: NORMAL
SERVICE CMNT-IMP: NORMAL
SERVICE CMNT-IMP: NORMAL

## 2023-10-03 ENCOUNTER — HOSPITAL ENCOUNTER (EMERGENCY)
Age: 68
Discharge: LEFT BEFORE DISCHARGE | End: 2023-10-03
Attending: GENERAL PRACTICE
Payer: MEDICARE

## 2023-10-03 VITALS
DIASTOLIC BLOOD PRESSURE: 106 MMHG | HEART RATE: 91 BPM | WEIGHT: 142 LBS | TEMPERATURE: 97.7 F | RESPIRATION RATE: 20 BRPM | HEIGHT: 61 IN | SYSTOLIC BLOOD PRESSURE: 171 MMHG | BODY MASS INDEX: 26.81 KG/M2 | OXYGEN SATURATION: 95 %

## 2023-10-03 DIAGNOSIS — L03.019 PARONYCHIA OF FINGER, UNSPECIFIED LATERALITY: Primary | ICD-10-CM

## 2023-10-03 PROCEDURE — 2500000003 HC RX 250 WO HCPCS: Performed by: GENERAL PRACTICE

## 2023-10-03 PROCEDURE — 99283 EMERGENCY DEPT VISIT LOW MDM: CPT

## 2023-10-03 RX ORDER — LIDOCAINE HYDROCHLORIDE 10 MG/ML
5 INJECTION, SOLUTION EPIDURAL; INFILTRATION; INTRACAUDAL; PERINEURAL ONCE
Status: DISCONTINUED | OUTPATIENT
Start: 2023-10-03 | End: 2023-10-03 | Stop reason: SDUPTHER

## 2023-10-03 RX ORDER — LIDOCAINE HYDROCHLORIDE 10 MG/ML
5 INJECTION, SOLUTION INFILTRATION; PERINEURAL ONCE
Status: COMPLETED | OUTPATIENT
Start: 2023-10-03 | End: 2023-10-03

## 2023-10-03 RX ORDER — LIDOCAINE/RACEPINEP/TETRACAINE 4-0.05-0.5
SOLUTION WITH PREFILLED APPLICATOR (ML) TOPICAL
Status: COMPLETED | OUTPATIENT
Start: 2023-10-03 | End: 2023-10-03

## 2023-10-03 RX ADMIN — LIDOCAINE HYDROCHLORIDE 5 ML: 10 INJECTION, SOLUTION INFILTRATION; PERINEURAL at 20:45

## 2023-10-03 RX ADMIN — Medication 3 ML: at 20:42

## 2023-10-03 ASSESSMENT — PAIN DESCRIPTION - ORIENTATION: ORIENTATION: RIGHT

## 2023-10-03 ASSESSMENT — PAIN - FUNCTIONAL ASSESSMENT
PAIN_FUNCTIONAL_ASSESSMENT: 0-10
PAIN_FUNCTIONAL_ASSESSMENT: 0-10

## 2023-10-03 ASSESSMENT — PAIN SCALES - GENERAL
PAINLEVEL_OUTOF10: 10
PAINLEVEL_OUTOF10: 0

## 2023-10-03 ASSESSMENT — PAIN DESCRIPTION - LOCATION: LOCATION: HAND

## 2023-10-03 ASSESSMENT — PAIN DESCRIPTION - PAIN TYPE: TYPE: ACUTE PAIN

## 2023-10-03 ASSESSMENT — PAIN DESCRIPTION - DESCRIPTORS: DESCRIPTORS: ACHING

## 2023-10-03 NOTE — ED PROVIDER NOTES
Cardiovascular:      Rate and Rhythm: Normal rate and regular rhythm. Pulmonary:      Effort: Pulmonary effort is normal.   Abdominal:      General: Abdomen is flat. Palpations: Abdomen is soft. Tenderness: There is no abdominal tenderness. Musculoskeletal:         General: No swelling or tenderness. Cervical back: Normal range of motion. Comments: Swelling to the eponychium of the first and third nailbeds on the right hand. Redness and tenderness. Pictures provided   Skin:     Findings: No erythema or rash. Neurological:      General: No focal deficit present. Mental Status: She is oriented to person, place, and time. Psychiatric:         Mood and Affect: Mood normal.         Behavior: Behavior normal.              Procedures     Procedures    No orders of the defined types were placed in this encounter. Medications given during this emergency department visit:  Medications   lidocaine-EPINEPHrine-tetracaine soln (3 mLs Topical Given 10/3/23 2042)   lidocaine 1 % injection 5 mL (5 mLs IntraDERmal Given 10/3/23 2045)       Discharge Medication List as of 10/3/2023 10:55 PM           Past Medical History:   Diagnosis Date    CAD (coronary artery disease)     Chronic back pain     COPD (chronic obstructive pulmonary disease) (HCC)     Family history of colonic polyps     mother    History of MI (myocardial infarction)     History of peptic ulcer     HTN (hypertension)     Sjogren's syndrome ([address])     Tobacco abuse     1 ppd x 40 years        Past Surgical History:   Procedure Laterality Date    BREAST SURGERY      20yrs ago, removed tumor    CORONARY ANGIOPLASTY WITH STENT PLACEMENT      x3    AR UNLISTED PROCEDURE ABDOMEN PERITONEUM & OMENTUM      partial lower intestine removal 1991    AR UNLISTED PROCEDURE CARDIAC SURGERY      heart cath with stent 2009    MATY AND BSO (CERVIX REMOVED)  1991        Social History     Socioeconomic History    Marital status:

## 2023-10-03 NOTE — ED TRIAGE NOTES
Pt ambulatory to ED w/ cc of pain in first and third digits of R hand x 1 wk. Hx of Lupus. Pt states that she has had infections in other digits before and had to have fingernails removed.  Pt denies chest pain, shortness of breath, NVD, Fever Pt A&O x 4

## 2023-11-02 ENCOUNTER — APPOINTMENT (OUTPATIENT)
Dept: GENERAL RADIOLOGY | Age: 68
DRG: 191 | End: 2023-11-02
Payer: MEDICARE

## 2023-11-02 ENCOUNTER — HOSPITAL ENCOUNTER (INPATIENT)
Age: 68
LOS: 3 days | Discharge: HOME | DRG: 191 | End: 2023-11-05
Attending: EMERGENCY MEDICINE | Admitting: FAMILY MEDICINE
Payer: MEDICARE

## 2023-11-02 DIAGNOSIS — J44.1 COPD EXACERBATION (HCC): ICD-10-CM

## 2023-11-02 DIAGNOSIS — M32.9 SLE EXACERBATION (HCC): Primary | ICD-10-CM

## 2023-11-02 LAB
ANION GAP SERPL CALC-SCNC: 7 MMOL/L (ref 2–11)
APPEARANCE UR: CLEAR
BASOPHILS # BLD: 0.1 K/UL (ref 0–0.2)
BASOPHILS NFR BLD: 1 % (ref 0–2)
BILIRUB UR QL: NEGATIVE
BUN SERPL-MCNC: 18 MG/DL (ref 8–23)
CALCIUM SERPL-MCNC: 8.9 MG/DL (ref 8.3–10.4)
CHLORIDE SERPL-SCNC: 106 MMOL/L (ref 101–110)
CO2 SERPL-SCNC: 25 MMOL/L (ref 21–32)
COLOR UR: NORMAL
CREAT SERPL-MCNC: 0.9 MG/DL (ref 0.6–1)
CRP SERPL-MCNC: 0.5 MG/DL (ref 0–0.9)
DIFFERENTIAL METHOD BLD: ABNORMAL
EKG ATRIAL RATE: 89 BPM
EKG DIAGNOSIS: NORMAL
EKG P AXIS: -12 DEGREES
EKG P-R INTERVAL: 113 MS
EKG Q-T INTERVAL: 374 MS
EKG QRS DURATION: 97 MS
EKG QTC CALCULATION (BAZETT): 458 MS
EKG R AXIS: 84 DEGREES
EKG T AXIS: 14 DEGREES
EKG VENTRICULAR RATE: 90 BPM
EOSINOPHIL # BLD: 1.4 K/UL (ref 0–0.8)
EOSINOPHIL NFR BLD: 12 % (ref 0.5–7.8)
ERYTHROCYTE [DISTWIDTH] IN BLOOD BY AUTOMATED COUNT: 18.3 % (ref 11.9–14.6)
ERYTHROCYTE [SEDIMENTATION RATE] IN BLOOD: 32 MM/HR (ref 0–30)
GLUCOSE SERPL-MCNC: 141 MG/DL (ref 65–100)
GLUCOSE UR STRIP.AUTO-MCNC: NEGATIVE MG/DL
HCT VFR BLD AUTO: 47.7 % (ref 35.8–46.3)
HGB BLD-MCNC: 15.2 G/DL (ref 11.7–15.4)
HGB UR QL STRIP: NEGATIVE
IMM GRANULOCYTES # BLD AUTO: 0 K/UL (ref 0–0.5)
IMM GRANULOCYTES NFR BLD AUTO: 0 % (ref 0–5)
KETONES UR QL STRIP.AUTO: NEGATIVE MG/DL
LACTATE SERPL-SCNC: 0.9 MMOL/L (ref 0.4–2)
LACTATE SERPL-SCNC: 2.7 MMOL/L (ref 0.4–2)
LEUKOCYTE ESTERASE UR QL STRIP.AUTO: NEGATIVE
LYMPHOCYTES # BLD: 1.7 K/UL (ref 0.5–4.6)
LYMPHOCYTES NFR BLD: 15 % (ref 13–44)
MAGNESIUM SERPL-MCNC: 2.2 MG/DL (ref 1.8–2.4)
MCH RBC QN AUTO: 27.4 PG (ref 26.1–32.9)
MCHC RBC AUTO-ENTMCNC: 31.9 G/DL (ref 31.4–35)
MCV RBC AUTO: 86.1 FL (ref 82–102)
MONOCYTES # BLD: 0.9 K/UL (ref 0.1–1.3)
MONOCYTES NFR BLD: 8 % (ref 4–12)
NEUTS SEG # BLD: 7.1 K/UL (ref 1.7–8.2)
NEUTS SEG NFR BLD: 64 % (ref 43–78)
NITRITE UR QL STRIP.AUTO: NEGATIVE
NRBC # BLD: 0 K/UL (ref 0–0.2)
PH UR STRIP: 5.5 (ref 5–9)
PLATELET # BLD AUTO: 275 K/UL (ref 150–450)
PMV BLD AUTO: 10.2 FL (ref 9.4–12.3)
POTASSIUM SERPL-SCNC: 3.3 MMOL/L (ref 3.5–5.1)
PROCALCITONIN SERPL-MCNC: <0.05 NG/ML (ref 0–0.49)
PROT UR STRIP-MCNC: NEGATIVE MG/DL
RBC # BLD AUTO: 5.54 M/UL (ref 4.05–5.2)
SODIUM SERPL-SCNC: 138 MMOL/L (ref 133–143)
SP GR UR REFRACTOMETRY: 1.02 (ref 1–1.02)
TROPONIN I SERPL HS-MCNC: 10.8 PG/ML (ref 0–14)
TROPONIN I SERPL HS-MCNC: 8.5 PG/ML (ref 0–14)
UROBILINOGEN UR QL STRIP.AUTO: 0.2 EU/DL (ref 0.2–1)
WBC # BLD AUTO: 11.2 K/UL (ref 4.3–11.1)

## 2023-11-02 PROCEDURE — 85025 COMPLETE CBC W/AUTO DIFF WBC: CPT

## 2023-11-02 PROCEDURE — 2580000003 HC RX 258: Performed by: EMERGENCY MEDICINE

## 2023-11-02 PROCEDURE — 96365 THER/PROPH/DIAG IV INF INIT: CPT

## 2023-11-02 PROCEDURE — 96375 TX/PRO/DX INJ NEW DRUG ADDON: CPT

## 2023-11-02 PROCEDURE — 1100000000 HC RM PRIVATE

## 2023-11-02 PROCEDURE — 84145 PROCALCITONIN (PCT): CPT

## 2023-11-02 PROCEDURE — 84484 ASSAY OF TROPONIN QUANT: CPT

## 2023-11-02 PROCEDURE — 94640 AIRWAY INHALATION TREATMENT: CPT

## 2023-11-02 PROCEDURE — 80048 BASIC METABOLIC PNL TOTAL CA: CPT

## 2023-11-02 PROCEDURE — 85652 RBC SED RATE AUTOMATED: CPT

## 2023-11-02 PROCEDURE — 2580000003 HC RX 258: Performed by: FAMILY MEDICINE

## 2023-11-02 PROCEDURE — 2700000000 HC OXYGEN THERAPY PER DAY

## 2023-11-02 PROCEDURE — 83605 ASSAY OF LACTIC ACID: CPT

## 2023-11-02 PROCEDURE — 96366 THER/PROPH/DIAG IV INF ADDON: CPT

## 2023-11-02 PROCEDURE — 93005 ELECTROCARDIOGRAM TRACING: CPT | Performed by: EMERGENCY MEDICINE

## 2023-11-02 PROCEDURE — 87040 BLOOD CULTURE FOR BACTERIA: CPT

## 2023-11-02 PROCEDURE — 83735 ASSAY OF MAGNESIUM: CPT

## 2023-11-02 PROCEDURE — 6370000000 HC RX 637 (ALT 250 FOR IP): Performed by: EMERGENCY MEDICINE

## 2023-11-02 PROCEDURE — 81003 URINALYSIS AUTO W/O SCOPE: CPT

## 2023-11-02 PROCEDURE — 93010 ELECTROCARDIOGRAM REPORT: CPT | Performed by: INTERNAL MEDICINE

## 2023-11-02 PROCEDURE — 99285 EMERGENCY DEPT VISIT HI MDM: CPT

## 2023-11-02 PROCEDURE — 71046 X-RAY EXAM CHEST 2 VIEWS: CPT

## 2023-11-02 PROCEDURE — 6360000002 HC RX W HCPCS: Performed by: EMERGENCY MEDICINE

## 2023-11-02 PROCEDURE — 96368 THER/DIAG CONCURRENT INF: CPT

## 2023-11-02 PROCEDURE — 86140 C-REACTIVE PROTEIN: CPT

## 2023-11-02 PROCEDURE — 6370000000 HC RX 637 (ALT 250 FOR IP): Performed by: FAMILY MEDICINE

## 2023-11-02 PROCEDURE — 36415 COLL VENOUS BLD VENIPUNCTURE: CPT

## 2023-11-02 RX ORDER — IPRATROPIUM BROMIDE AND ALBUTEROL SULFATE 2.5; .5 MG/3ML; MG/3ML
1 SOLUTION RESPIRATORY (INHALATION)
Status: DISCONTINUED | OUTPATIENT
Start: 2023-11-03 | End: 2023-11-04

## 2023-11-02 RX ORDER — 0.9 % SODIUM CHLORIDE 0.9 %
1000 INTRAVENOUS SOLUTION INTRAVENOUS ONCE
Status: DISCONTINUED | OUTPATIENT
Start: 2023-11-02 | End: 2023-11-05 | Stop reason: HOSPADM

## 2023-11-02 RX ORDER — SODIUM CHLORIDE 0.9 % (FLUSH) 0.9 %
5-40 SYRINGE (ML) INJECTION PRN
Status: DISCONTINUED | OUTPATIENT
Start: 2023-11-02 | End: 2023-11-05 | Stop reason: HOSPADM

## 2023-11-02 RX ORDER — POLYETHYLENE GLYCOL 3350 17 G/17G
17 POWDER, FOR SOLUTION ORAL DAILY PRN
Status: DISCONTINUED | OUTPATIENT
Start: 2023-11-02 | End: 2023-11-05 | Stop reason: HOSPADM

## 2023-11-02 RX ORDER — ACETAMINOPHEN 325 MG/1
650 TABLET ORAL EVERY 6 HOURS PRN
Status: DISCONTINUED | OUTPATIENT
Start: 2023-11-02 | End: 2023-11-05 | Stop reason: HOSPADM

## 2023-11-02 RX ORDER — LISINOPRIL 20 MG/1
20 TABLET ORAL DAILY
Status: DISCONTINUED | OUTPATIENT
Start: 2023-11-03 | End: 2023-11-05 | Stop reason: HOSPADM

## 2023-11-02 RX ORDER — PREGABALIN 100 MG/1
200 CAPSULE ORAL
Status: COMPLETED | OUTPATIENT
Start: 2023-11-02 | End: 2023-11-02

## 2023-11-02 RX ORDER — IPRATROPIUM BROMIDE AND ALBUTEROL SULFATE 2.5; .5 MG/3ML; MG/3ML
1 SOLUTION RESPIRATORY (INHALATION)
Status: COMPLETED | OUTPATIENT
Start: 2023-11-02 | End: 2023-11-02

## 2023-11-02 RX ORDER — SODIUM CHLORIDE 9 MG/ML
INJECTION, SOLUTION INTRAVENOUS PRN
Status: DISCONTINUED | OUTPATIENT
Start: 2023-11-02 | End: 2023-11-05 | Stop reason: HOSPADM

## 2023-11-02 RX ORDER — MONTELUKAST SODIUM 10 MG/1
10 TABLET ORAL NIGHTLY
Status: DISCONTINUED | OUTPATIENT
Start: 2023-11-02 | End: 2023-11-05 | Stop reason: HOSPADM

## 2023-11-02 RX ORDER — LISINOPRIL AND HYDROCHLOROTHIAZIDE 20; 12.5 MG/1; MG/1
1 TABLET ORAL DAILY
Status: DISCONTINUED | OUTPATIENT
Start: 2023-11-03 | End: 2023-11-02 | Stop reason: SDUPTHER

## 2023-11-02 RX ORDER — FAMOTIDINE 20 MG/1
20 TABLET, FILM COATED ORAL DAILY
Status: DISCONTINUED | OUTPATIENT
Start: 2023-11-03 | End: 2023-11-05 | Stop reason: HOSPADM

## 2023-11-02 RX ORDER — ENOXAPARIN SODIUM 100 MG/ML
40 INJECTION SUBCUTANEOUS DAILY
Status: DISCONTINUED | OUTPATIENT
Start: 2023-11-03 | End: 2023-11-05 | Stop reason: HOSPADM

## 2023-11-02 RX ORDER — ALBUTEROL SULFATE 2.5 MG/3ML
2.5 SOLUTION RESPIRATORY (INHALATION) EVERY 4 HOURS PRN
Status: DISCONTINUED | OUTPATIENT
Start: 2023-11-02 | End: 2023-11-05 | Stop reason: HOSPADM

## 2023-11-02 RX ORDER — ONDANSETRON 2 MG/ML
4 INJECTION INTRAMUSCULAR; INTRAVENOUS EVERY 6 HOURS PRN
Status: DISCONTINUED | OUTPATIENT
Start: 2023-11-02 | End: 2023-11-05 | Stop reason: HOSPADM

## 2023-11-02 RX ORDER — PREGABALIN 100 MG/1
200 CAPSULE ORAL DAILY
Status: DISCONTINUED | OUTPATIENT
Start: 2023-11-03 | End: 2023-11-04

## 2023-11-02 RX ORDER — SODIUM CHLORIDE 0.9 % (FLUSH) 0.9 %
5-40 SYRINGE (ML) INJECTION EVERY 12 HOURS SCHEDULED
Status: DISCONTINUED | OUTPATIENT
Start: 2023-11-02 | End: 2023-11-05 | Stop reason: HOSPADM

## 2023-11-02 RX ORDER — HYDROCHLOROTHIAZIDE 25 MG/1
12.5 TABLET ORAL DAILY
Status: DISCONTINUED | OUTPATIENT
Start: 2023-11-03 | End: 2023-11-05 | Stop reason: HOSPADM

## 2023-11-02 RX ORDER — POTASSIUM CHLORIDE 20 MEQ/1
40 TABLET, EXTENDED RELEASE ORAL PRN
Status: DISCONTINUED | OUTPATIENT
Start: 2023-11-02 | End: 2023-11-05 | Stop reason: HOSPADM

## 2023-11-02 RX ORDER — POTASSIUM CHLORIDE 7.45 MG/ML
10 INJECTION INTRAVENOUS PRN
Status: DISCONTINUED | OUTPATIENT
Start: 2023-11-02 | End: 2023-11-05 | Stop reason: HOSPADM

## 2023-11-02 RX ORDER — ACETAMINOPHEN 650 MG/1
650 SUPPOSITORY RECTAL EVERY 6 HOURS PRN
Status: DISCONTINUED | OUTPATIENT
Start: 2023-11-02 | End: 2023-11-05 | Stop reason: HOSPADM

## 2023-11-02 RX ORDER — MAGNESIUM SULFATE IN WATER 40 MG/ML
2000 INJECTION, SOLUTION INTRAVENOUS PRN
Status: DISCONTINUED | OUTPATIENT
Start: 2023-11-02 | End: 2023-11-05 | Stop reason: HOSPADM

## 2023-11-02 RX ORDER — HYDROXYCHLOROQUINE SULFATE 200 MG/1
200 TABLET, FILM COATED ORAL DAILY
Status: DISCONTINUED | OUTPATIENT
Start: 2023-11-03 | End: 2023-11-05 | Stop reason: HOSPADM

## 2023-11-02 RX ORDER — DIPHENHYDRAMINE HCL 25 MG
25 CAPSULE ORAL EVERY 6 HOURS PRN
Status: DISCONTINUED | OUTPATIENT
Start: 2023-11-02 | End: 2023-11-03

## 2023-11-02 RX ORDER — ONDANSETRON 4 MG/1
4 TABLET, ORALLY DISINTEGRATING ORAL EVERY 8 HOURS PRN
Status: DISCONTINUED | OUTPATIENT
Start: 2023-11-02 | End: 2023-11-05 | Stop reason: HOSPADM

## 2023-11-02 RX ORDER — FERROUS SULFATE 325(65) MG
325 TABLET ORAL 2 TIMES DAILY WITH MEALS
Status: DISCONTINUED | OUTPATIENT
Start: 2023-11-03 | End: 2023-11-05 | Stop reason: HOSPADM

## 2023-11-02 RX ORDER — SODIUM CHLORIDE 9 MG/ML
INJECTION, SOLUTION INTRAVENOUS CONTINUOUS
Status: ACTIVE | OUTPATIENT
Start: 2023-11-02 | End: 2023-11-04

## 2023-11-02 RX ADMIN — METHYLPREDNISOLONE SODIUM SUCCINATE 125 MG: 125 INJECTION, POWDER, FOR SOLUTION INTRAMUSCULAR; INTRAVENOUS at 17:05

## 2023-11-02 RX ADMIN — SODIUM CHLORIDE: 9 INJECTION, SOLUTION INTRAVENOUS at 21:12

## 2023-11-02 RX ADMIN — VANCOMYCIN HYDROCHLORIDE 1500 MG: 10 INJECTION, POWDER, LYOPHILIZED, FOR SOLUTION INTRAVENOUS at 17:54

## 2023-11-02 RX ADMIN — Medication 1000 ML: at 19:44

## 2023-11-02 RX ADMIN — IPRATROPIUM BROMIDE AND ALBUTEROL SULFATE 1 DOSE: 2.5; .5 SOLUTION RESPIRATORY (INHALATION) at 16:44

## 2023-11-02 RX ADMIN — MONTELUKAST 10 MG: 10 TABLET, FILM COATED ORAL at 21:33

## 2023-11-02 RX ADMIN — CEFEPIME 2000 MG: 2 INJECTION, POWDER, FOR SOLUTION INTRAVENOUS at 17:05

## 2023-11-02 RX ADMIN — SODIUM CHLORIDE, PRESERVATIVE FREE 10 ML: 5 INJECTION INTRAVENOUS at 21:13

## 2023-11-02 RX ADMIN — PREGABALIN 200 MG: 100 CAPSULE ORAL at 17:08

## 2023-11-02 ASSESSMENT — ENCOUNTER SYMPTOMS
WHEEZING: 1
RHINORRHEA: 0
NAUSEA: 0
ABDOMINAL PAIN: 0
VOMITING: 0
DIARRHEA: 0
COUGH: 1
SHORTNESS OF BREATH: 1

## 2023-11-02 ASSESSMENT — PAIN DESCRIPTION - LOCATION: LOCATION: GENERALIZED

## 2023-11-02 ASSESSMENT — PAIN SCALES - GENERAL: PAINLEVEL_OUTOF10: 10

## 2023-11-02 ASSESSMENT — PAIN - FUNCTIONAL ASSESSMENT: PAIN_FUNCTIONAL_ASSESSMENT: 0-10

## 2023-11-02 NOTE — ED TRIAGE NOTES
Patient arrives to ED via EMS from home. Patient reports Lupus flare up x 3 days. Patient reports burning rash all over her body. Patient also reports chest pain and SOB.

## 2023-11-02 NOTE — ED PROVIDER NOTES
and time. Cranial Nerves: No cranial nerve deficit. Sensory: No sensory deficit. Motor: No weakness.                 Procedures     Procedures    Orders Placed This Encounter   Procedures    Blood Culture 1    Blood Culture 2    XR CHEST (2 VW)    Basic Metabolic Panel    CBC with Auto Differential    Troponin    Magnesium    Sedimentation Rate    C-Reactive Protein    Lactate, Sepsis    Procalcitonin    Urinalysis    Cardiac Monitor - ED Only    Initiate Oxygen Therapy Protocol    Pulse oximetry, continuous    EKG 12 Lead    Saline lock IV        Medications given during this emergency department visit:  Medications   vancomycin (VANCOCIN) 1500 mg in sodium chloride 0.9 % 250 mL IVPB (has no administration in time range)   ipratropium 0.5 mg-albuterol 2.5 mg (DUONEB) nebulizer solution 1 Dose (1 Dose Inhalation Given 11/2/23 1644)   methylPREDNISolone sodium (PF) (SOLU-MEDROL PF) injection 125 mg (125 mg IntraVENous Given 11/2/23 1705)   pregabalin (LYRICA) capsule 200 mg (200 mg Oral Given 11/2/23 1708)   ceFEPIme (MAXIPIME) 2,000 mg in sodium chloride 0.9 % 100 mL IVPB (mini-bag) (2,000 mg IntraVENous New Bag 11/2/23 1705)       New Prescriptions    No medications on file        Past Medical History:   Diagnosis Date    CAD (coronary artery disease)     Chronic back pain     COPD (chronic obstructive pulmonary disease) (HCC)     Family history of colonic polyps     mother    History of MI (myocardial infarction)     History of peptic ulcer     HTN (hypertension)     Sjogren's syndrome ([address])     Tobacco abuse     1 ppd x 40 years        Past Surgical History:   Procedure Laterality Date    BREAST SURGERY      20yrs ago, removed tumor    CORONARY ANGIOPLASTY WITH STENT PLACEMENT      x3    LA UNLISTED PROCEDURE ABDOMEN PERITONEUM & OMENTUM      partial lower intestine removal 1991    LA UNLISTED PROCEDURE CARDIAC SURGERY      heart cath with stent 2009    MATY AND BSO (CERVIX REMOVED)  1991

## 2023-11-03 LAB
ANION GAP SERPL CALC-SCNC: 4 MMOL/L (ref 2–11)
BASOPHILS # BLD: 0 K/UL (ref 0–0.2)
BASOPHILS NFR BLD: 0 % (ref 0–2)
BUN SERPL-MCNC: 17 MG/DL (ref 8–23)
CALCIUM SERPL-MCNC: 8.8 MG/DL (ref 8.3–10.4)
CHLORIDE SERPL-SCNC: 112 MMOL/L (ref 101–110)
CO2 SERPL-SCNC: 26 MMOL/L (ref 21–32)
CREAT SERPL-MCNC: 0.8 MG/DL (ref 0.6–1)
DIFFERENTIAL METHOD BLD: ABNORMAL
EOSINOPHIL # BLD: 0 K/UL (ref 0–0.8)
EOSINOPHIL NFR BLD: 0 % (ref 0.5–7.8)
ERYTHROCYTE [DISTWIDTH] IN BLOOD BY AUTOMATED COUNT: 17.6 % (ref 11.9–14.6)
GLUCOSE SERPL-MCNC: 125 MG/DL (ref 65–100)
HCT VFR BLD AUTO: 44.8 % (ref 35.8–46.3)
HGB BLD-MCNC: 13.8 G/DL (ref 11.7–15.4)
IMM GRANULOCYTES # BLD AUTO: 0 K/UL (ref 0–0.5)
IMM GRANULOCYTES NFR BLD AUTO: 0 % (ref 0–5)
LYMPHOCYTES # BLD: 1.2 K/UL (ref 0.5–4.6)
LYMPHOCYTES NFR BLD: 17 % (ref 13–44)
MCH RBC QN AUTO: 26.8 PG (ref 26.1–32.9)
MCHC RBC AUTO-ENTMCNC: 30.8 G/DL (ref 31.4–35)
MCV RBC AUTO: 87 FL (ref 82–102)
MONOCYTES # BLD: 0.4 K/UL (ref 0.1–1.3)
MONOCYTES NFR BLD: 6 % (ref 4–12)
NEUTS SEG # BLD: 5.1 K/UL (ref 1.7–8.2)
NEUTS SEG NFR BLD: 76 % (ref 43–78)
NRBC # BLD: 0 K/UL (ref 0–0.2)
PLATELET # BLD AUTO: 247 K/UL (ref 150–450)
PMV BLD AUTO: 10.9 FL (ref 9.4–12.3)
POTASSIUM SERPL-SCNC: 4.5 MMOL/L (ref 3.5–5.1)
RBC # BLD AUTO: 5.15 M/UL (ref 4.05–5.2)
SODIUM SERPL-SCNC: 142 MMOL/L (ref 133–143)
WBC # BLD AUTO: 6.7 K/UL (ref 4.3–11.1)

## 2023-11-03 PROCEDURE — 87070 CULTURE OTHR SPECIMN AEROBIC: CPT

## 2023-11-03 PROCEDURE — 2700000000 HC OXYGEN THERAPY PER DAY

## 2023-11-03 PROCEDURE — 6370000000 HC RX 637 (ALT 250 FOR IP): Performed by: HOSPITALIST

## 2023-11-03 PROCEDURE — 6370000000 HC RX 637 (ALT 250 FOR IP): Performed by: FAMILY MEDICINE

## 2023-11-03 PROCEDURE — 6360000002 HC RX W HCPCS: Performed by: FAMILY MEDICINE

## 2023-11-03 PROCEDURE — 36415 COLL VENOUS BLD VENIPUNCTURE: CPT

## 2023-11-03 PROCEDURE — 94761 N-INVAS EAR/PLS OXIMETRY MLT: CPT

## 2023-11-03 PROCEDURE — 87205 SMEAR GRAM STAIN: CPT

## 2023-11-03 PROCEDURE — 2580000003 HC RX 258: Performed by: HOSPITALIST

## 2023-11-03 PROCEDURE — 1100000000 HC RM PRIVATE

## 2023-11-03 PROCEDURE — 94640 AIRWAY INHALATION TREATMENT: CPT

## 2023-11-03 PROCEDURE — 6370000000 HC RX 637 (ALT 250 FOR IP): Performed by: DERMATOLOGY

## 2023-11-03 PROCEDURE — 94760 N-INVAS EAR/PLS OXIMETRY 1: CPT

## 2023-11-03 PROCEDURE — 2580000003 HC RX 258: Performed by: FAMILY MEDICINE

## 2023-11-03 PROCEDURE — 6360000002 HC RX W HCPCS: Performed by: HOSPITALIST

## 2023-11-03 PROCEDURE — 80048 BASIC METABOLIC PNL TOTAL CA: CPT

## 2023-11-03 PROCEDURE — 88305 TISSUE EXAM BY PATHOLOGIST: CPT

## 2023-11-03 PROCEDURE — 85025 COMPLETE CBC W/AUTO DIFF WBC: CPT

## 2023-11-03 RX ORDER — HYDROXYZINE HYDROCHLORIDE 25 MG/1
25 TABLET, FILM COATED ORAL 4 TIMES DAILY PRN
Status: DISCONTINUED | OUTPATIENT
Start: 2023-11-03 | End: 2023-11-05 | Stop reason: HOSPADM

## 2023-11-03 RX ORDER — POLYETHYLENE GLYCOL 3350 17 G
2 POWDER IN PACKET (EA) ORAL
Status: DISCONTINUED | OUTPATIENT
Start: 2023-11-03 | End: 2023-11-05 | Stop reason: HOSPADM

## 2023-11-03 RX ADMIN — FERROUS SULFATE TAB 325 MG (65 MG ELEMENTAL FE) 325 MG: 325 (65 FE) TAB at 08:41

## 2023-11-03 RX ADMIN — HYDROXYCHLOROQUINE SULFATE 200 MG: 200 TABLET ORAL at 08:41

## 2023-11-03 RX ADMIN — IPRATROPIUM BROMIDE AND ALBUTEROL SULFATE 1 DOSE: 2.5; .5 SOLUTION RESPIRATORY (INHALATION) at 09:22

## 2023-11-03 RX ADMIN — SODIUM CHLORIDE: 9 INJECTION, SOLUTION INTRAVENOUS at 02:44

## 2023-11-03 RX ADMIN — LISINOPRIL 20 MG: 20 TABLET ORAL at 08:40

## 2023-11-03 RX ADMIN — SODIUM CHLORIDE, PRESERVATIVE FREE 10 ML: 5 INJECTION INTRAVENOUS at 08:42

## 2023-11-03 RX ADMIN — SODIUM CHLORIDE: 9 INJECTION, SOLUTION INTRAVENOUS at 23:44

## 2023-11-03 RX ADMIN — NICOTINE POLACRILEX 2 MG: 2 LOZENGE ORAL at 21:07

## 2023-11-03 RX ADMIN — CEFEPIME 2000 MG: 2 INJECTION, POWDER, FOR SOLUTION INTRAVENOUS at 13:50

## 2023-11-03 RX ADMIN — HYDROCHLOROTHIAZIDE 12.5 MG: 25 TABLET ORAL at 08:40

## 2023-11-03 RX ADMIN — METHYLPREDNISOLONE SODIUM SUCCINATE 40 MG: 40 INJECTION INTRAMUSCULAR; INTRAVENOUS at 08:42

## 2023-11-03 RX ADMIN — DIPHENHYDRAMINE HYDROCHLORIDE 25 MG: 25 CAPSULE ORAL at 11:03

## 2023-11-03 RX ADMIN — ACETAMINOPHEN 650 MG: 325 TABLET ORAL at 11:03

## 2023-11-03 RX ADMIN — PREGABALIN 200 MG: 100 CAPSULE ORAL at 08:41

## 2023-11-03 RX ADMIN — IPRATROPIUM BROMIDE AND ALBUTEROL SULFATE 1 DOSE: 2.5; .5 SOLUTION RESPIRATORY (INHALATION) at 20:18

## 2023-11-03 RX ADMIN — FAMOTIDINE 20 MG: 20 TABLET, FILM COATED ORAL at 08:41

## 2023-11-03 RX ADMIN — MONTELUKAST 10 MG: 10 TABLET, FILM COATED ORAL at 21:06

## 2023-11-03 RX ADMIN — IPRATROPIUM BROMIDE AND ALBUTEROL SULFATE 1 DOSE: 2.5; .5 SOLUTION RESPIRATORY (INHALATION) at 15:18

## 2023-11-03 RX ADMIN — HYDROXYZINE HYDROCHLORIDE 25 MG: 25 TABLET, FILM COATED ORAL at 21:25

## 2023-11-03 RX ADMIN — ENOXAPARIN SODIUM 40 MG: 100 INJECTION SUBCUTANEOUS at 08:41

## 2023-11-03 RX ADMIN — SODIUM CHLORIDE, PRESERVATIVE FREE 10 ML: 5 INJECTION INTRAVENOUS at 21:06

## 2023-11-03 RX ADMIN — FERROUS SULFATE TAB 325 MG (65 MG ELEMENTAL FE) 325 MG: 325 (65 FE) TAB at 17:08

## 2023-11-03 RX ADMIN — IPRATROPIUM BROMIDE AND ALBUTEROL SULFATE 1 DOSE: 2.5; .5 SOLUTION RESPIRATORY (INHALATION) at 11:44

## 2023-11-03 RX ADMIN — NICOTINE POLACRILEX 2 MG: 2 LOZENGE ORAL at 11:09

## 2023-11-03 RX ADMIN — VANCOMYCIN HYDROCHLORIDE 1000 MG: 1 INJECTION, POWDER, LYOPHILIZED, FOR SOLUTION INTRAVENOUS at 11:38

## 2023-11-03 ASSESSMENT — PAIN SCALES - GENERAL
PAINLEVEL_OUTOF10: 0
PAINLEVEL_OUTOF10: 0
PAINLEVEL_OUTOF10: 3

## 2023-11-03 ASSESSMENT — PAIN DESCRIPTION - LOCATION: LOCATION: BACK

## 2023-11-03 ASSESSMENT — PAIN DESCRIPTION - DESCRIPTORS: DESCRIPTORS: BURNING;ITCHING

## 2023-11-03 NOTE — ED NOTES
TRANSFER - OUT REPORT:    Verbal report given to RN on 1 Paras Christian  being transferred to [address] [place] [address] for routine progression of patient care       Report consisted of patient's Situation, Background, Assessment and   Recommendations(SBAR). Information from the following report(s) Nurse Handoff Report, ED Encounter Summary, ED SBAR, Adult Overview, Intake/Output, MAR, and Recent Results was reviewed with the receiving nurse. [place] Fall Assessment:    Presents to emergency department  because of falls (Syncope, seizure, or loss of consciousness): No  Age > 70: No  Altered Mental Status, Intoxication with alcohol or substance confusion (Disorientation, impaired judgment, poor safety awaremess, or inability to follow instructions): No  Impaired Mobility: Ambulates or transfers with assistive devices or assistance; Unable to ambulate or transer.: No             Lines:   Peripheral IV 11/02/23 Right Antecubital (Active)       Peripheral IV 11/02/23 Left Antecubital (Active)        Opportunity for questions and clarification was provided.       Patient transported with:  O2 @ 2lpm and [address] [place], [place]  11/02/23 2018

## 2023-11-03 NOTE — ASSESSMENT & PLAN NOTE
- Currently on 2L/m supplemental O2 via NC  - Duoneb and solumedrol given in ER  - Continue scheduled Duonebs, IV solumedrol, PRN albuterol  - Wean O2 as tolerated

## 2023-11-03 NOTE — ASSESSMENT & PLAN NOTE
- Diffuse rash throughout body  - Reports symptoms started 3 days ago, however rash/lesions appear older than that  - Will continue home plaquenil  - Lesions/rash do not appear to be infected to me at this time, however patient did receive IV antibiotics in the ER, will defer additional antibiotics for now  - IV solumedrol started for COPD exacerbation may also help with rash if this is a cutaneous manifestation of her SLE  - PRN benadryl

## 2023-11-03 NOTE — H&P
76 yo wf  with hx of lupus dx by dr Evangelist Bernard yrs ago'has gina on plaquenil  Wa in [facility] in [place] for skin flare  Supposedly cleared but then strted to [facility]   Has taken pred prn in past   Not being followed by rheum now  Progressive  skin eruption with pustules      O/  Unusual arcuate plaques  with raised borders and surface crusting -very extensive  Central clearing   Scattered pustules  All over trunk  some on extremities     A  Not a typical lupus rash at all    Not typical erythema multiforme    Suspect agep  Acute generalized exanthematous pustulosis  Mostly caused by meds but not always  Will bx  Cont solumedrol  40 mg ed for now   Will also culture pustule

## 2023-11-03 NOTE — PLAN OF CARE
Problem: Respiratory - Adult  Goal: Achieves optimal ventilation and oxygenation  Outcome: Progressing  Flowsheets (Taken 11/3/2023 6222)  Achieves optimal ventilation and oxygenation:   Assess for changes in respiratory status   Position to facilitate oxygenation and minimize respiratory effort   Respiratory therapy support as indicated   Assess for changes in mentation and behavior   Assess and instruct to report shortness of breath or any respiratory difficulty

## 2023-11-03 NOTE — H&P
Hospitalist History and Physical   Admit Date:  2023  2:27 PM   Name:  Sherwin David   Age:  77 y.o. Sex:  female  :  1955   MRN:  [id number]     Presenting Complaint: rash  Reason(s) for Admission: COPD exacerbation ([address]) [J44.1]  SLE exacerbation ([address]) [M32.9]     History of Present Illness:   Sherwin David is a 77 y.o. female with medical history of SLE who presented to ED with lupus flare. She reports that she has developed a burning, pruritic rash all over her body. Started ~3 days ago. Reports that she ran out of lyrica as well at the same time. Upon ER evaluation, patient found to be hypoxic down to 89% on RA. Patient currently requiring 2L/m supplemental O2 to maintain O2 sat of 94%. Lab workup shows WBC of 11.2.  K is 3.3. Patient does have wide-spread rash on body, throughout torso. Given hypoxia, Hospitalist asked to admit for COPD exacerbation. Review of Systems:  10 systems reviewed and negative except as noted in HPI. Assessment & Plan:   * COPD exacerbation ([address])  Assessment & Plan  - Currently on 2L/m supplemental O2 via NC  - Duoneb and solumedrol given in ER  - Continue scheduled Duonebs, IV solumedrol, PRN albuterol  - Wean O2 as tolerated    SLE (systemic lupus erythematosus) (HCC)  Assessment & Plan  - Continue home plaquenil and lyrica    Rash/skin eruption  Assessment & Plan  - Diffuse rash throughout body  - Reports symptoms started 3 days ago, however rash/lesions appear older than that  - Will continue home plaquenil  - Lesions/rash do not appear to be infected to me at this time, however patient did receive IV antibiotics in the ER, will defer additional antibiotics for now  - IV solumedrol started for COPD exacerbation may also help with rash if this is a cutaneous manifestation of her SLE  - PRN benadryl        Disposition: inpatient      Past medical history reviewed.     Past Medical History:   Diagnosis Date    CAD (coronary artery disease)

## 2023-11-04 LAB
ANION GAP SERPL CALC-SCNC: 6 MMOL/L (ref 2–11)
BASOPHILS # BLD: 0.1 K/UL (ref 0–0.2)
BASOPHILS NFR BLD: 1 % (ref 0–2)
BUN SERPL-MCNC: 17 MG/DL (ref 8–23)
CALCIUM SERPL-MCNC: 8.7 MG/DL (ref 8.3–10.4)
CHLORIDE SERPL-SCNC: 112 MMOL/L (ref 101–110)
CO2 SERPL-SCNC: 26 MMOL/L (ref 21–32)
CREAT SERPL-MCNC: 0.9 MG/DL (ref 0.6–1)
DIFFERENTIAL METHOD BLD: ABNORMAL
EOSINOPHIL # BLD: 0.3 K/UL (ref 0–0.8)
EOSINOPHIL NFR BLD: 3 % (ref 0.5–7.8)
ERYTHROCYTE [DISTWIDTH] IN BLOOD BY AUTOMATED COUNT: 17.9 % (ref 11.9–14.6)
GLUCOSE SERPL-MCNC: 121 MG/DL (ref 65–100)
HCT VFR BLD AUTO: 40.7 % (ref 35.8–46.3)
HGB BLD-MCNC: 12.3 G/DL (ref 11.7–15.4)
IMM GRANULOCYTES # BLD AUTO: 0 K/UL (ref 0–0.5)
IMM GRANULOCYTES NFR BLD AUTO: 0 % (ref 0–5)
LYMPHOCYTES # BLD: 2.1 K/UL (ref 0.5–4.6)
LYMPHOCYTES NFR BLD: 17 % (ref 13–44)
MCH RBC QN AUTO: 27 PG (ref 26.1–32.9)
MCHC RBC AUTO-ENTMCNC: 30.2 G/DL (ref 31.4–35)
MCV RBC AUTO: 89.3 FL (ref 82–102)
MONOCYTES # BLD: 1.5 K/UL (ref 0.1–1.3)
MONOCYTES NFR BLD: 12 % (ref 4–12)
NEUTS SEG # BLD: 8.2 K/UL (ref 1.7–8.2)
NEUTS SEG NFR BLD: 67 % (ref 43–78)
NRBC # BLD: 0 K/UL (ref 0–0.2)
PLATELET # BLD AUTO: 238 K/UL (ref 150–450)
PMV BLD AUTO: 11.3 FL (ref 9.4–12.3)
POTASSIUM SERPL-SCNC: 3.7 MMOL/L (ref 3.5–5.1)
RBC # BLD AUTO: 4.56 M/UL (ref 4.05–5.2)
SODIUM SERPL-SCNC: 144 MMOL/L (ref 133–143)
VANCOMYCIN SERPL-MCNC: 10.9 UG/ML
WBC # BLD AUTO: 12.2 K/UL (ref 4.3–11.1)

## 2023-11-04 PROCEDURE — 2700000000 HC OXYGEN THERAPY PER DAY

## 2023-11-04 PROCEDURE — 94760 N-INVAS EAR/PLS OXIMETRY 1: CPT

## 2023-11-04 PROCEDURE — 2580000003 HC RX 258: Performed by: FAMILY MEDICINE

## 2023-11-04 PROCEDURE — 2580000003 HC RX 258: Performed by: HOSPITALIST

## 2023-11-04 PROCEDURE — 94640 AIRWAY INHALATION TREATMENT: CPT

## 2023-11-04 PROCEDURE — 85025 COMPLETE CBC W/AUTO DIFF WBC: CPT

## 2023-11-04 PROCEDURE — 6360000002 HC RX W HCPCS: Performed by: FAMILY MEDICINE

## 2023-11-04 PROCEDURE — 94761 N-INVAS EAR/PLS OXIMETRY MLT: CPT

## 2023-11-04 PROCEDURE — 6370000000 HC RX 637 (ALT 250 FOR IP): Performed by: HOSPITALIST

## 2023-11-04 PROCEDURE — 6360000002 HC RX W HCPCS: Performed by: HOSPITALIST

## 2023-11-04 PROCEDURE — 97161 PT EVAL LOW COMPLEX 20 MIN: CPT

## 2023-11-04 PROCEDURE — 1100000000 HC RM PRIVATE

## 2023-11-04 PROCEDURE — 80048 BASIC METABOLIC PNL TOTAL CA: CPT

## 2023-11-04 PROCEDURE — 80202 ASSAY OF VANCOMYCIN: CPT

## 2023-11-04 PROCEDURE — 6370000000 HC RX 637 (ALT 250 FOR IP): Performed by: FAMILY MEDICINE

## 2023-11-04 PROCEDURE — 36415 COLL VENOUS BLD VENIPUNCTURE: CPT

## 2023-11-04 PROCEDURE — 6370000000 HC RX 637 (ALT 250 FOR IP): Performed by: DERMATOLOGY

## 2023-11-04 RX ORDER — IPRATROPIUM BROMIDE AND ALBUTEROL SULFATE 2.5; .5 MG/3ML; MG/3ML
1 SOLUTION RESPIRATORY (INHALATION)
Status: DISCONTINUED | OUTPATIENT
Start: 2023-11-04 | End: 2023-11-05 | Stop reason: HOSPADM

## 2023-11-04 RX ORDER — IPRATROPIUM BROMIDE AND ALBUTEROL SULFATE 2.5; .5 MG/3ML; MG/3ML
1 SOLUTION RESPIRATORY (INHALATION)
Status: DISCONTINUED | OUTPATIENT
Start: 2023-11-05 | End: 2023-11-04

## 2023-11-04 RX ORDER — PREGABALIN 100 MG/1
200 CAPSULE ORAL 2 TIMES DAILY
Status: DISCONTINUED | OUTPATIENT
Start: 2023-11-04 | End: 2023-11-05 | Stop reason: HOSPADM

## 2023-11-04 RX ORDER — IPRATROPIUM BROMIDE AND ALBUTEROL SULFATE 2.5; .5 MG/3ML; MG/3ML
1 SOLUTION RESPIRATORY (INHALATION)
Status: DISCONTINUED | OUTPATIENT
Start: 2023-11-04 | End: 2023-11-04

## 2023-11-04 RX ADMIN — SODIUM CHLORIDE, PRESERVATIVE FREE 10 ML: 5 INJECTION INTRAVENOUS at 21:27

## 2023-11-04 RX ADMIN — CEFEPIME 2000 MG: 2 INJECTION, POWDER, FOR SOLUTION INTRAVENOUS at 12:53

## 2023-11-04 RX ADMIN — METHYLPREDNISOLONE SODIUM SUCCINATE 40 MG: 40 INJECTION INTRAMUSCULAR; INTRAVENOUS at 09:34

## 2023-11-04 RX ADMIN — SODIUM CHLORIDE, PRESERVATIVE FREE 10 ML: 5 INJECTION INTRAVENOUS at 09:35

## 2023-11-04 RX ADMIN — HYDROXYCHLOROQUINE SULFATE 200 MG: 200 TABLET ORAL at 09:33

## 2023-11-04 RX ADMIN — PREGABALIN 200 MG: 100 CAPSULE ORAL at 21:25

## 2023-11-04 RX ADMIN — HYDROXYZINE HYDROCHLORIDE 25 MG: 25 TABLET, FILM COATED ORAL at 09:33

## 2023-11-04 RX ADMIN — FERROUS SULFATE TAB 325 MG (65 MG ELEMENTAL FE) 325 MG: 325 (65 FE) TAB at 17:58

## 2023-11-04 RX ADMIN — NICOTINE POLACRILEX 2 MG: 2 LOZENGE ORAL at 00:37

## 2023-11-04 RX ADMIN — LISINOPRIL 20 MG: 20 TABLET ORAL at 09:34

## 2023-11-04 RX ADMIN — CEFEPIME 2000 MG: 2 INJECTION, POWDER, FOR SOLUTION INTRAVENOUS at 01:42

## 2023-11-04 RX ADMIN — MONTELUKAST 10 MG: 10 TABLET, FILM COATED ORAL at 21:25

## 2023-11-04 RX ADMIN — ACETAMINOPHEN 650 MG: 325 TABLET ORAL at 00:37

## 2023-11-04 RX ADMIN — VANCOMYCIN HYDROCHLORIDE 1000 MG: 1 INJECTION, POWDER, LYOPHILIZED, FOR SOLUTION INTRAVENOUS at 06:00

## 2023-11-04 RX ADMIN — FERROUS SULFATE TAB 325 MG (65 MG ELEMENTAL FE) 325 MG: 325 (65 FE) TAB at 09:33

## 2023-11-04 RX ADMIN — FAMOTIDINE 20 MG: 20 TABLET, FILM COATED ORAL at 09:34

## 2023-11-04 RX ADMIN — PREGABALIN 200 MG: 100 CAPSULE ORAL at 09:33

## 2023-11-04 RX ADMIN — ENOXAPARIN SODIUM 40 MG: 100 INJECTION SUBCUTANEOUS at 09:34

## 2023-11-04 RX ADMIN — IPRATROPIUM BROMIDE AND ALBUTEROL SULFATE 1 DOSE: 2.5; .5 SOLUTION RESPIRATORY (INHALATION) at 09:21

## 2023-11-04 ASSESSMENT — PAIN SCALES - GENERAL
PAINLEVEL_OUTOF10: 10
PAINLEVEL_OUTOF10: 4
PAINLEVEL_OUTOF10: 0
PAINLEVEL_OUTOF10: 3

## 2023-11-04 ASSESSMENT — PAIN DESCRIPTION - ORIENTATION: ORIENTATION: RIGHT

## 2023-11-04 ASSESSMENT — PAIN DESCRIPTION - DESCRIPTORS
DESCRIPTORS: ACHING;DISCOMFORT
DESCRIPTORS: BURNING

## 2023-11-04 ASSESSMENT — PAIN DESCRIPTION - LOCATION
LOCATION: GENERALIZED
LOCATION: BREAST

## 2023-11-05 VITALS
HEART RATE: 108 BPM | DIASTOLIC BLOOD PRESSURE: 95 MMHG | BODY MASS INDEX: 23.77 KG/M2 | RESPIRATION RATE: 22 BRPM | SYSTOLIC BLOOD PRESSURE: 118 MMHG | WEIGHT: 125.9 LBS | OXYGEN SATURATION: 94 % | HEIGHT: 61 IN | TEMPERATURE: 98.2 F

## 2023-11-05 LAB
ANION GAP SERPL CALC-SCNC: 5 MMOL/L (ref 2–11)
APPEARANCE UR: CLEAR
ARTERIAL PATENCY WRIST A: ABNORMAL
BACTERIA URNS QL MICRO: NEGATIVE /HPF
BASE EXCESS BLD CALC-SCNC: 2.1 MMOL/L
BDY SITE: ABNORMAL
BILIRUB UR QL: NEGATIVE
BUN SERPL-MCNC: 19 MG/DL (ref 8–23)
CALCIUM SERPL-MCNC: 9 MG/DL (ref 8.3–10.4)
CASTS URNS QL MICRO: ABNORMAL /LPF
CHLORIDE SERPL-SCNC: 112 MMOL/L (ref 101–110)
CO2 SERPL-SCNC: 27 MMOL/L (ref 21–32)
COLOR UR: ABNORMAL
CREAT SERPL-MCNC: 0.9 MG/DL (ref 0.6–1)
EPI CELLS #/AREA URNS HPF: ABNORMAL /HPF
GAS FLOW.O2 O2 DELIVERY SYS: ABNORMAL
GLUCOSE BLD STRIP.AUTO-MCNC: 109 MG/DL (ref 65–100)
GLUCOSE SERPL-MCNC: 100 MG/DL (ref 65–100)
GLUCOSE UR STRIP.AUTO-MCNC: NEGATIVE MG/DL
HCO3 BLD-SCNC: 27.7 MMOL/L (ref 22–26)
HGB UR QL STRIP: NEGATIVE
KETONES UR QL STRIP.AUTO: NEGATIVE MG/DL
LEUKOCYTE ESTERASE UR QL STRIP.AUTO: ABNORMAL
NITRITE UR QL STRIP.AUTO: NEGATIVE
PCO2 BLD: 46 MMHG (ref 35–45)
PH BLD: 7.39 (ref 7.35–7.45)
PH UR STRIP: 5.5 (ref 5–9)
PO2 BLD: 63 MMHG (ref 75–100)
POTASSIUM SERPL-SCNC: 4.1 MMOL/L (ref 3.5–5.1)
PROT UR STRIP-MCNC: NEGATIVE MG/DL
RBC #/AREA URNS HPF: ABNORMAL /HPF
SAO2 % BLD: 91.1 % (ref 95–98)
SERVICE CMNT-IMP: ABNORMAL
SERVICE CMNT-IMP: ABNORMAL
SODIUM SERPL-SCNC: 144 MMOL/L (ref 133–143)
SP GR UR REFRACTOMETRY: 1.01 (ref 1–1.02)
SPECIMEN TYPE: ABNORMAL
UROBILINOGEN UR QL STRIP.AUTO: 0.2 EU/DL (ref 0.2–1)
WBC URNS QL MICRO: ABNORMAL /HPF

## 2023-11-05 PROCEDURE — 6360000002 HC RX W HCPCS: Performed by: INTERNAL MEDICINE

## 2023-11-05 PROCEDURE — 81001 URINALYSIS AUTO W/SCOPE: CPT

## 2023-11-05 PROCEDURE — 36415 COLL VENOUS BLD VENIPUNCTURE: CPT

## 2023-11-05 PROCEDURE — 2580000003 HC RX 258: Performed by: FAMILY MEDICINE

## 2023-11-05 PROCEDURE — 6360000002 HC RX W HCPCS: Performed by: HOSPITALIST

## 2023-11-05 PROCEDURE — 82962 GLUCOSE BLOOD TEST: CPT

## 2023-11-05 PROCEDURE — 6370000000 HC RX 637 (ALT 250 FOR IP): Performed by: FAMILY MEDICINE

## 2023-11-05 PROCEDURE — 82803 BLOOD GASES ANY COMBINATION: CPT

## 2023-11-05 PROCEDURE — 94761 N-INVAS EAR/PLS OXIMETRY MLT: CPT

## 2023-11-05 PROCEDURE — 36600 WITHDRAWAL OF ARTERIAL BLOOD: CPT

## 2023-11-05 PROCEDURE — 80048 BASIC METABOLIC PNL TOTAL CA: CPT

## 2023-11-05 PROCEDURE — 2580000003 HC RX 258: Performed by: HOSPITALIST

## 2023-11-05 PROCEDURE — 94640 AIRWAY INHALATION TREATMENT: CPT

## 2023-11-05 PROCEDURE — 6360000002 HC RX W HCPCS: Performed by: FAMILY MEDICINE

## 2023-11-05 PROCEDURE — 2580000003 HC RX 258: Performed by: INTERNAL MEDICINE

## 2023-11-05 PROCEDURE — 6370000000 HC RX 637 (ALT 250 FOR IP): Performed by: INTERNAL MEDICINE

## 2023-11-05 PROCEDURE — 94760 N-INVAS EAR/PLS OXIMETRY 1: CPT

## 2023-11-05 RX ORDER — PREDNISONE 20 MG/1
20 TABLET ORAL DAILY
Qty: 5 TABLET | Refills: 0 | Status: SHIPPED | OUTPATIENT
Start: 2023-11-05 | End: 2023-11-10

## 2023-11-05 RX ADMIN — CEFEPIME 2000 MG: 2 INJECTION, POWDER, FOR SOLUTION INTRAVENOUS at 01:05

## 2023-11-05 RX ADMIN — SODIUM CHLORIDE, PRESERVATIVE FREE 10 ML: 5 INJECTION INTRAVENOUS at 11:41

## 2023-11-05 RX ADMIN — LISINOPRIL 20 MG: 20 TABLET ORAL at 09:10

## 2023-11-05 RX ADMIN — METHYLPREDNISOLONE SODIUM SUCCINATE 40 MG: 40 INJECTION INTRAMUSCULAR; INTRAVENOUS at 09:10

## 2023-11-05 RX ADMIN — HYDROXYCHLOROQUINE SULFATE 200 MG: 200 TABLET ORAL at 09:10

## 2023-11-05 RX ADMIN — PREGABALIN 200 MG: 100 CAPSULE ORAL at 09:10

## 2023-11-05 RX ADMIN — VANCOMYCIN HYDROCHLORIDE 1250 MG: 10 INJECTION, POWDER, LYOPHILIZED, FOR SOLUTION INTRAVENOUS at 00:17

## 2023-11-05 RX ADMIN — FAMOTIDINE 20 MG: 20 TABLET, FILM COATED ORAL at 09:10

## 2023-11-05 RX ADMIN — IPRATROPIUM BROMIDE AND ALBUTEROL SULFATE 1 DOSE: 2.5; .5 SOLUTION RESPIRATORY (INHALATION) at 07:48

## 2023-11-05 RX ADMIN — FERROUS SULFATE TAB 325 MG (65 MG ELEMENTAL FE) 325 MG: 325 (65 FE) TAB at 09:13

## 2023-11-05 RX ADMIN — ENOXAPARIN SODIUM 40 MG: 100 INJECTION SUBCUTANEOUS at 09:11

## 2023-11-05 NOTE — CARE COORDINATION
Patient has discahrge orders for today. CM has reviewed patient medical chart/ discharge summary, Disposition: Home and reviewed weekend report states that patient will be here this weekend. PT was pending and recommendations: No further skilled physical therapy after discharge from hospital.  CM arranged  [address] for transport home. Patient has no needs identified at this time. Family will transport patient home. Patient has met all treatment goals / milestones. CM will continue to follow and remain available for nay needs, concerns or questions that may arise. 11/03/23 0929   Service Assessment   Patient Orientation Alert and Oriented   Cognition Alert   History Provided By Patient   Primary Caregiver Self   Accompanied By/Relationship no one in the room   Support Systems None   Patient's Healthcare Decision Maker is:   (patient states she has no support)   PCP Verified by CM Yes   Last Visit to PCP Within last 3 months   Prior Functional Level Independent in ADLs/IADLs   Current Functional Level Other (see comment)  (therapy will see to determine needs)   Can patient return to prior living arrangement Yes   Ability to make needs known: Good   Family able to assist with home care needs: No   Would you like for me to discuss the discharge plan with any other family members/significant others, and if so, who? No   Financial Resources Medicare   Community Resources None   Social/Functional History   Lives With Alone   Type of Home House   Home Equipment Oxygen; [place], toni   Receives Help From Other (comment)  (patient has no support)   ADL Assistance Independent   [place] Responsibilities No   Ambulation Assistance Independent   Transfer Assistance Independent   Active  No   Occupation Retired   Discharge Planning   Type of [facility] Prior To Admission None   Potential Assistance Needed N/A
following.

## 2023-11-05 NOTE — PLAN OF CARE
Problem: Respiratory - Adult  Goal: Achieves optimal ventilation and oxygenation  Outcome: Progressing  Flowsheets (Taken 11/5/2023 0755)  Achieves optimal ventilation and oxygenation:   Assess for changes in respiratory status   Respiratory therapy support as indicated   Assess for changes in mentation and behavior   Assess and instruct to report shortness of breath or any respiratory difficulty   Encourage broncho-pulmonary hygiene including cough, deep breathe, incentive spirometry

## 2023-11-05 NOTE — PLAN OF CARE
Problem: Discharge Planning  Goal: Discharge to home or other facility with appropriate resources  Outcome: Adequate for Discharge     Problem: Pain  Goal: Verbalizes/displays adequate comfort level or baseline comfort level  Outcome: Adequate for Discharge     Problem: Skin/Tissue Integrity  Goal: Absence of new skin breakdown  Description: 1. Monitor for areas of redness and/or skin breakdown  2. Assess vascular access sites hourly  3. Every 4-6 hours minimum:  Change oxygen saturation probe site  4. Every 4-6 hours:  If on nasal continuous positive airway pressure, respiratory therapy assess nares and determine need for appliance change or resting period.   Outcome: Adequate for Discharge     Problem: Safety - Adult  Goal: Free from fall injury  Outcome: Adequate for Discharge     Problem: Respiratory - Adult  Goal: Achieves optimal ventilation and oxygenation  11/5/2023 1158 by Cassie Bowling RN  Outcome: Adequate for Discharge  11/5/2023 0755 by Mark Sheffield RCP  Outcome: Progressing  Flowsheets (Taken 11/5/2023 0755)  Achieves optimal ventilation and oxygenation:   Assess for changes in respiratory status   Respiratory therapy support as indicated   Assess for changes in mentation and behavior   Assess and instruct to report shortness of breath or any respiratory difficulty   Encourage broncho-pulmonary hygiene including cough, deep breathe, incentive spirometry

## 2023-11-06 LAB
BACTERIA SPEC CULT: NORMAL
GRAM STN SPEC: NORMAL
GRAM STN SPEC: NORMAL
SERVICE CMNT-IMP: NORMAL

## 2023-12-30 ENCOUNTER — APPOINTMENT (OUTPATIENT)
Dept: GENERAL RADIOLOGY | Age: 68
End: 2023-12-30
Payer: MEDICARE

## 2023-12-30 ENCOUNTER — APPOINTMENT (OUTPATIENT)
Dept: CT IMAGING | Age: 68
End: 2023-12-30
Payer: MEDICARE

## 2023-12-30 ENCOUNTER — HOSPITAL ENCOUNTER (EMERGENCY)
Age: 68
Discharge: HOME | End: 2023-12-30
Attending: EMERGENCY MEDICINE
Payer: MEDICARE

## 2023-12-30 VITALS
BODY MASS INDEX: 28.47 KG/M2 | SYSTOLIC BLOOD PRESSURE: 159 MMHG | WEIGHT: 145 LBS | HEART RATE: 94 BPM | RESPIRATION RATE: 18 BRPM | HEIGHT: 60 IN | TEMPERATURE: 98 F | OXYGEN SATURATION: 90 % | DIASTOLIC BLOOD PRESSURE: 85 MMHG

## 2023-12-30 DIAGNOSIS — M25.552 LEFT HIP PAIN: Primary | ICD-10-CM

## 2023-12-30 DIAGNOSIS — M54.32 SCIATICA OF LEFT SIDE: ICD-10-CM

## 2023-12-30 DIAGNOSIS — R07.9 CHEST PAIN, UNSPECIFIED TYPE: ICD-10-CM

## 2023-12-30 LAB
ALBUMIN SERPL-MCNC: 3.9 G/DL (ref 3.2–4.6)
ALBUMIN/GLOB SERPL: 1 (ref 0.4–1.6)
ALP SERPL-CCNC: 104 U/L (ref 50–136)
ALT SERPL-CCNC: 23 U/L (ref 12–65)
ANION GAP SERPL CALC-SCNC: 2 MMOL/L (ref 2–11)
AST SERPL-CCNC: 20 U/L (ref 15–37)
BASOPHILS # BLD: 0.1 K/UL (ref 0–0.2)
BASOPHILS NFR BLD: 1 % (ref 0–2)
BILIRUB SERPL-MCNC: 1 MG/DL (ref 0.2–1.1)
BUN SERPL-MCNC: 14 MG/DL (ref 8–23)
CALCIUM SERPL-MCNC: 9.4 MG/DL (ref 8.3–10.4)
CHLORIDE SERPL-SCNC: 108 MMOL/L (ref 103–113)
CO2 SERPL-SCNC: 26 MMOL/L (ref 21–32)
CREAT SERPL-MCNC: 0.9 MG/DL (ref 0.6–1)
DIFFERENTIAL METHOD BLD: ABNORMAL
EKG ATRIAL RATE: 82 BPM
EKG ATRIAL RATE: 86 BPM
EKG DIAGNOSIS: NORMAL
EKG DIAGNOSIS: NORMAL
EKG P AXIS: 60 DEGREES
EKG P AXIS: 71 DEGREES
EKG P-R INTERVAL: 132 MS
EKG P-R INTERVAL: 140 MS
EKG Q-T INTERVAL: 362 MS
EKG Q-T INTERVAL: 370 MS
EKG QRS DURATION: 76 MS
EKG QRS DURATION: 86 MS
EKG QTC CALCULATION (BAZETT): 432 MS
EKG QTC CALCULATION (BAZETT): 433 MS
EKG R AXIS: 84 DEGREES
EKG R AXIS: 86 DEGREES
EKG T AXIS: 58 DEGREES
EKG T AXIS: 59 DEGREES
EKG VENTRICULAR RATE: 82 BPM
EKG VENTRICULAR RATE: 86 BPM
EOSINOPHIL # BLD: 0.3 K/UL (ref 0–0.8)
EOSINOPHIL NFR BLD: 3 % (ref 0.5–7.8)
ERYTHROCYTE [DISTWIDTH] IN BLOOD BY AUTOMATED COUNT: 20.6 % (ref 11.9–14.6)
GLOBULIN SER CALC-MCNC: 3.8 G/DL (ref 2.8–4.5)
GLUCOSE SERPL-MCNC: 157 MG/DL (ref 65–100)
HCT VFR BLD AUTO: 46.5 % (ref 35.8–46.3)
HGB BLD-MCNC: 14.1 G/DL (ref 11.7–15.4)
IMM GRANULOCYTES # BLD AUTO: 0 K/UL (ref 0–0.5)
IMM GRANULOCYTES NFR BLD AUTO: 0 % (ref 0–5)
LIPASE SERPL-CCNC: 124 U/L (ref 73–393)
LYMPHOCYTES # BLD: 1.9 K/UL (ref 0.5–4.6)
LYMPHOCYTES NFR BLD: 20 % (ref 13–44)
MCH RBC QN AUTO: 29.7 PG (ref 26.1–32.9)
MCHC RBC AUTO-ENTMCNC: 30.3 G/DL (ref 31.4–35)
MCV RBC AUTO: 97.9 FL (ref 82–102)
MONOCYTES # BLD: 0.9 K/UL (ref 0.1–1.3)
MONOCYTES NFR BLD: 9 % (ref 4–12)
NEUTS SEG # BLD: 6.5 K/UL (ref 1.7–8.2)
NEUTS SEG NFR BLD: 67 % (ref 43–78)
NRBC # BLD: 0 K/UL (ref 0–0.2)
PLATELET # BLD AUTO: 288 K/UL (ref 150–450)
PMV BLD AUTO: 10 FL (ref 9.4–12.3)
POTASSIUM SERPL-SCNC: 3.9 MMOL/L (ref 3.5–5.1)
PROT SERPL-MCNC: 7.7 G/DL (ref 6.3–8.2)
RBC # BLD AUTO: 4.75 M/UL (ref 4.05–5.2)
SODIUM SERPL-SCNC: 136 MMOL/L (ref 136–146)
TROPONIN I SERPL HS-MCNC: 12.1 PG/ML (ref 0–14)
TROPONIN I SERPL HS-MCNC: 12.8 PG/ML (ref 0–14)
WBC # BLD AUTO: 9.7 K/UL (ref 4.3–11.1)

## 2023-12-30 PROCEDURE — 93005 ELECTROCARDIOGRAM TRACING: CPT | Performed by: EMERGENCY MEDICINE

## 2023-12-30 PROCEDURE — 6370000000 HC RX 637 (ALT 250 FOR IP): Performed by: EMERGENCY MEDICINE

## 2023-12-30 PROCEDURE — 99285 EMERGENCY DEPT VISIT HI MDM: CPT

## 2023-12-30 PROCEDURE — 85025 COMPLETE CBC W/AUTO DIFF WBC: CPT

## 2023-12-30 PROCEDURE — 83690 ASSAY OF LIPASE: CPT

## 2023-12-30 PROCEDURE — 73700 CT LOWER EXTREMITY W/O DYE: CPT

## 2023-12-30 PROCEDURE — 6360000002 HC RX W HCPCS: Performed by: EMERGENCY MEDICINE

## 2023-12-30 PROCEDURE — 72131 CT LUMBAR SPINE W/O DYE: CPT

## 2023-12-30 PROCEDURE — 93010 ELECTROCARDIOGRAM REPORT: CPT | Performed by: INTERNAL MEDICINE

## 2023-12-30 PROCEDURE — 84484 ASSAY OF TROPONIN QUANT: CPT

## 2023-12-30 PROCEDURE — 96372 THER/PROPH/DIAG INJ SC/IM: CPT

## 2023-12-30 PROCEDURE — 80053 COMPREHEN METABOLIC PANEL: CPT

## 2023-12-30 PROCEDURE — 71046 X-RAY EXAM CHEST 2 VIEWS: CPT

## 2023-12-30 RX ORDER — MORPHINE SULFATE 4 MG/ML
4 INJECTION, SOLUTION INTRAMUSCULAR; INTRAVENOUS
Status: COMPLETED | OUTPATIENT
Start: 2023-12-30 | End: 2023-12-30

## 2023-12-30 RX ORDER — HYDROCODONE BITARTRATE AND ACETAMINOPHEN 7.5; 325 MG/1; MG/1
1 TABLET ORAL EVERY 6 HOURS PRN
Qty: 12 TABLET | Refills: 0 | Status: SHIPPED | OUTPATIENT
Start: 2023-12-30 | End: 2024-01-02

## 2023-12-30 RX ORDER — KETOROLAC TROMETHAMINE 30 MG/ML
30 INJECTION, SOLUTION INTRAMUSCULAR; INTRAVENOUS
Status: COMPLETED | OUTPATIENT
Start: 2023-12-30 | End: 2023-12-30

## 2023-12-30 RX ORDER — LIDOCAINE HYDROCHLORIDE 20 MG/ML
15 SOLUTION OROPHARYNGEAL
Status: COMPLETED | OUTPATIENT
Start: 2023-12-30 | End: 2023-12-30

## 2023-12-30 RX ORDER — DICYCLOMINE HYDROCHLORIDE 10 MG/1
10 CAPSULE ORAL
Status: COMPLETED | OUTPATIENT
Start: 2023-12-30 | End: 2023-12-30

## 2023-12-30 RX ORDER — MAGNESIUM HYDROXIDE/ALUMINUM HYDROXICE/SIMETHICONE 120; 1200; 1200 MG/30ML; MG/30ML; MG/30ML
30 SUSPENSION ORAL
Status: COMPLETED | OUTPATIENT
Start: 2023-12-30 | End: 2023-12-30

## 2023-12-30 RX ADMIN — ALUMINUM HYDROXIDE, MAGNESIUM HYDROXIDE, AND SIMETHICONE 30 ML: 1200; 120; 1200 SUSPENSION ORAL at 17:31

## 2023-12-30 RX ADMIN — MORPHINE SULFATE 4 MG: 4 INJECTION INTRAVENOUS at 15:20

## 2023-12-30 RX ADMIN — LIDOCAINE HYDROCHLORIDE 15 ML: 20 SOLUTION ORAL; TOPICAL at 17:31

## 2023-12-30 RX ADMIN — KETOROLAC TROMETHAMINE 30 MG: 30 INJECTION, SOLUTION INTRAMUSCULAR; INTRAVENOUS at 15:15

## 2023-12-30 RX ADMIN — DICYCLOMINE HYDROCHLORIDE 10 MG: 10 CAPSULE ORAL at 17:31

## 2023-12-30 ASSESSMENT — PAIN DESCRIPTION - ORIENTATION
ORIENTATION: LEFT

## 2023-12-30 ASSESSMENT — PAIN DESCRIPTION - LOCATION
LOCATION: HIP
LOCATION: HIP
LOCATION: CHEST
LOCATION: HIP

## 2023-12-30 ASSESSMENT — PAIN SCALES - GENERAL
PAINLEVEL_OUTOF10: 8
PAINLEVEL_OUTOF10: 7
PAINLEVEL_OUTOF10: 6
PAINLEVEL_OUTOF10: 10

## 2023-12-30 ASSESSMENT — PAIN - FUNCTIONAL ASSESSMENT: PAIN_FUNCTIONAL_ASSESSMENT: 0-10

## 2023-12-30 ASSESSMENT — PAIN DESCRIPTION - DESCRIPTORS
DESCRIPTORS: JABBING
DESCRIPTORS: ACHING

## 2023-12-30 NOTE — ED TRIAGE NOTES
Pt BP elevated 190/96 pt reports HX of HTN and reports she did not take her BP medication this am,  notified of pt BP

## 2023-12-30 NOTE — ED TRIAGE NOTES
Pt brought in by EMS by home c/o right hip. Pt has a fall last Thursday and was seen and evaluated in ER. Pt reports pain is still present.  Per EMS vitals stable en route

## 2023-12-30 NOTE — ED NOTES
Patient states she came by ambulance and she states she cannot walk and does not have a ride home. We cannot provide a ride home for her because she will need to walk from the uber car to her home and she cannot walk.       Alyson Boucher, LPN  26/51/96 27 [facility] HospitalZ[facility], LPN  35/27/23 5794

## 2023-12-30 NOTE — ED PROVIDER NOTES
[employer] Emergency Department Provider Note                   PCP:                Monica Larkin MD               Age: 77 y.o. Sex: female     MEDICAL DECISION MAKING  Complexity of Problems Addressed:   1 or more acute illness/injury that poses a threat to life or bodily function    Data Reviewed and Analyzed:  Category 1:    I have reviewed outside records from an external source for any pertinent PMH, ED visits, primary care visits, specialist visits, labs, EKG, and/or radiologic studies. Category 2:     ED EKG Interpretation  EKG was interpreted in the absence of a cardiologist.    Rate: Rate: Normal  EKG Interpretation: EKG Interpretation: sinus rhythm, no evidence of arrhythmia  ST Segments: Nonspecific ST segments - NO STEMI, motion artifact    Repeat EKG showed normal sinus rhythm with no acute ischemic ST changes, no motion artifact        I have reviewed the Radiologist interpretation of the radiologic studies. Category 3:     Discussion of management or test interpretation:    MDM  Number of Diagnoses or Management Options  Chest pain, unspecified type  Left hip pain  Sciatica of left side  Diagnosis management comments: Patient had a mechanical fall about a week and a half ago and presented with persistent left hip as well as low back pain. She does have some symptoms consistent with sciatica. Patient already had a left hip x-ray which showed no fracture on her previous visit. I obtained a left hip CT which showed no fracture or dislocation. There is degenerative changes and incidental small 3 cm AAA. Patient's lumbar spine CT shows moderate to severe degenerative disc disease with moderate disc bulging but no fracture or acute process. Patient has no cauda equina or red flag symptoms to warrant emergent spinal MRI. Patient was given Norco and Toradol. Afterwards, patient complained of some upper abdominal and lower chest pain which may be GERD.   Her EKG showed normal sinus burning sensation.  She states it radiates up to her throat.  She denies any history of GERD.  She is currently on oxygen which she wears for chronic lung disease.  She is at her normal level of shortness of breath.  Her EKG shows a normal sinus rhythm with nonspecific ST changes but there is a lot of motion artifact.  Will give a GI cocktail and check chest x-ray and labs including serial troponins [CW]   1740 Patient received GI cocktail which she states resolved her chest pain.  I did repeat her EKG and it is in normal sinus rhythm no acute ischemic ST changes [CW]      ED Course User Index  [CW] Favian Ro MD             ICD-10-CM    1. Left hip pain  M25.552 HYDROcodone-acetaminophen (NORCO) 7.5-325 MG per tablet      2. Sciatica of left side  M54.32       3. Chest pain, unspecified type  R07.9           DISPOSITION         Voice dictation software was used during the making of this note.  This software is not perfect and grammatical and other typographical errors may be present.  This note has not been completely proofread for errors.     Favian Ro MD  12/30/23 9381

## 2023-12-30 NOTE — DISCHARGE INSTRUCTIONS
We encourage you to follow-up with your primary care physician concerning your sciatica and hip pain.   Your workup for your chest pain here shows no signs of any acute cardiac abnormalities  Return to the ER for any new, worsening or life-threatening symptoms

## 2023-12-31 NOTE — ED NOTES
I have reviewed discharge instructions with the patient.  The patient verbalized understanding.    Patient left ED via Discharge Method: ambulance to Home with self.    Opportunity for questions and clarification provided.       Patient given 1 scripts.         To continue your aftercare when you leave the hospital, you may receive an automated call from our care team to check in on how you are doing.  This is a free service and part of our promise to provide the best care and service to meet your aftercare needs.” If you have questions, or wish to unsubscribe from this service please call [phone].  Thank you for Choosing our [facility] Emergency Department.        Africa Oro, RN  12/30/23 7971

## 2023-12-31 NOTE — ED PROVIDER NOTES
Emergency Department Provider Signout / Continuation of Care Note         DISPOSITION         ICD-10-CM    1. Left hip pain  M25.552 HYDROcodone-acetaminophen (NORCO) 7.5-325 MG per tablet      2. Sciatica of left side  M54.32       3. Chest pain, unspecified type  R07.9           The patient's care was signed out to me at shift change.      Final Plan      I received signout on patient pending results of cardiac enzymes.  Patient has 2 negative sets of cardiac enzymes and is chest pain-free at this time.  Plan to discharge home.  She was discussed with results of her previous CT scanning.  Encourage PCP follow-up       Chuck Ngo MD  12/30/23 4879

## 2024-01-24 ENCOUNTER — HOSPITAL ENCOUNTER (INPATIENT)
Age: 69
LOS: 7 days | Discharge: HOME | DRG: 872 | End: 2024-01-31
Attending: EMERGENCY MEDICINE | Admitting: INTERNAL MEDICINE
Payer: MEDICARE

## 2024-01-24 ENCOUNTER — APPOINTMENT (OUTPATIENT)
Dept: GENERAL RADIOLOGY | Age: 69
DRG: 872 | End: 2024-01-24
Payer: MEDICARE

## 2024-01-24 DIAGNOSIS — J44.1 COPD WITH ACUTE EXACERBATION (HCC): ICD-10-CM

## 2024-01-24 DIAGNOSIS — L27.0 ACUTE GENERALIZED EXANTHEMATOUS PUSTULOSIS DUE TO DRUG: Primary | ICD-10-CM

## 2024-01-24 DIAGNOSIS — T50.905A ACUTE GENERALIZED EXANTHEMATOUS PUSTULOSIS DUE TO DRUG: Primary | ICD-10-CM

## 2024-01-24 DIAGNOSIS — G93.40 ENCEPHALOPATHY: ICD-10-CM

## 2024-01-24 PROBLEM — L03.115 BILATERAL LOWER LEG CELLULITIS: Status: ACTIVE | Noted: 2024-01-24

## 2024-01-24 PROBLEM — R21 RASH: Status: ACTIVE | Noted: 2024-01-24

## 2024-01-24 PROBLEM — L08.0 PUSTULAR RASH: Status: ACTIVE | Noted: 2024-01-24

## 2024-01-24 PROBLEM — L03.116 BILATERAL LOWER LEG CELLULITIS: Status: ACTIVE | Noted: 2024-01-24

## 2024-01-24 LAB
ALBUMIN SERPL-MCNC: 2.7 G/DL (ref 3.2–4.6)
ALBUMIN/GLOB SERPL: 0.8 (ref 0.4–1.6)
ALP SERPL-CCNC: 111 U/L (ref 50–136)
ALT SERPL-CCNC: 11 U/L (ref 12–65)
ANION GAP SERPL CALC-SCNC: 3 MMOL/L (ref 2–11)
AST SERPL-CCNC: 11 U/L (ref 15–37)
BASOPHILS # BLD: 0 K/UL (ref 0–0.2)
BASOPHILS NFR BLD: 0 % (ref 0–2)
BILIRUB SERPL-MCNC: 0.5 MG/DL (ref 0.2–1.1)
BUN SERPL-MCNC: 16 MG/DL (ref 8–23)
CALCIUM SERPL-MCNC: 9.4 MG/DL (ref 8.3–10.4)
CHLORIDE SERPL-SCNC: 107 MMOL/L (ref 103–113)
CO2 SERPL-SCNC: 30 MMOL/L (ref 21–32)
CORTIS BS SERPL-MCNC: 23.7 UG/DL
CREAT SERPL-MCNC: 0.8 MG/DL (ref 0.6–1)
DIFFERENTIAL METHOD BLD: ABNORMAL
EKG ATRIAL RATE: 224 BPM
EKG DIAGNOSIS: NORMAL
EKG P AXIS: 0 DEGREES
EKG P-R INTERVAL: 51 MS
EKG Q-T INTERVAL: 405 MS
EKG QRS DURATION: 100 MS
EKG QTC CALCULATION (BAZETT): 485 MS
EKG R AXIS: 83 DEGREES
EKG T AXIS: 56 DEGREES
EKG VENTRICULAR RATE: 86 BPM
EOSINOPHIL # BLD: 0.8 K/UL (ref 0–0.8)
EOSINOPHIL NFR BLD: 7 % (ref 0.5–7.8)
ERYTHROCYTE [DISTWIDTH] IN BLOOD BY AUTOMATED COUNT: 14.6 % (ref 11.9–14.6)
GLOBULIN SER CALC-MCNC: 3.5 G/DL (ref 2.8–4.5)
GLUCOSE SERPL-MCNC: 149 MG/DL (ref 65–100)
HCT VFR BLD AUTO: 38.1 % (ref 35.8–46.3)
HGB BLD-MCNC: 11.7 G/DL (ref 11.7–15.4)
IMM GRANULOCYTES # BLD AUTO: 0 K/UL (ref 0–0.5)
IMM GRANULOCYTES NFR BLD AUTO: 0 % (ref 0–5)
LACTATE SERPL-SCNC: 1.6 MMOL/L (ref 0.4–2)
LYMPHOCYTES # BLD: 1.8 K/UL (ref 0.5–4.6)
LYMPHOCYTES NFR BLD: 15 % (ref 13–44)
MCH RBC QN AUTO: 31.5 PG (ref 26.1–32.9)
MCHC RBC AUTO-ENTMCNC: 30.7 G/DL (ref 31.4–35)
MCV RBC AUTO: 102.4 FL (ref 82–102)
MONOCYTES # BLD: 1.6 K/UL (ref 0.1–1.3)
MONOCYTES NFR BLD: 14 % (ref 4–12)
NEUTS SEG # BLD: 7.5 K/UL (ref 1.7–8.2)
NEUTS SEG NFR BLD: 64 % (ref 43–78)
NRBC # BLD: 0 K/UL (ref 0–0.2)
PLATELET # BLD AUTO: 213 K/UL (ref 150–450)
PMV BLD AUTO: 10.3 FL (ref 9.4–12.3)
POTASSIUM SERPL-SCNC: 3.8 MMOL/L (ref 3.5–5.1)
PROT SERPL-MCNC: 6.2 G/DL (ref 6.3–8.2)
RBC # BLD AUTO: 3.72 M/UL (ref 4.05–5.2)
SODIUM SERPL-SCNC: 140 MMOL/L (ref 136–146)
WBC # BLD AUTO: 11.7 K/UL (ref 4.3–11.1)

## 2024-01-24 PROCEDURE — 6370000000 HC RX 637 (ALT 250 FOR IP): Performed by: EMERGENCY MEDICINE

## 2024-01-24 PROCEDURE — 2580000003 HC RX 258: Performed by: EMERGENCY MEDICINE

## 2024-01-24 PROCEDURE — 87040 BLOOD CULTURE FOR BACTERIA: CPT

## 2024-01-24 PROCEDURE — 1100000000 HC RM PRIVATE

## 2024-01-24 PROCEDURE — 87186 SC STD MICRODIL/AGAR DIL: CPT

## 2024-01-24 PROCEDURE — 82533 TOTAL CORTISOL: CPT

## 2024-01-24 PROCEDURE — 2580000003 HC RX 258: Performed by: INTERNAL MEDICINE

## 2024-01-24 PROCEDURE — 36415 COLL VENOUS BLD VENIPUNCTURE: CPT

## 2024-01-24 PROCEDURE — 71045 X-RAY EXAM CHEST 1 VIEW: CPT

## 2024-01-24 PROCEDURE — 96374 THER/PROPH/DIAG INJ IV PUSH: CPT

## 2024-01-24 PROCEDURE — 6360000002 HC RX W HCPCS: Performed by: EMERGENCY MEDICINE

## 2024-01-24 PROCEDURE — 6370000000 HC RX 637 (ALT 250 FOR IP): Performed by: INTERNAL MEDICINE

## 2024-01-24 PROCEDURE — 87205 SMEAR GRAM STAIN: CPT

## 2024-01-24 PROCEDURE — 87077 CULTURE AEROBIC IDENTIFY: CPT

## 2024-01-24 PROCEDURE — 6360000002 HC RX W HCPCS

## 2024-01-24 PROCEDURE — 80053 COMPREHEN METABOLIC PANEL: CPT

## 2024-01-24 PROCEDURE — 93005 ELECTROCARDIOGRAM TRACING: CPT | Performed by: EMERGENCY MEDICINE

## 2024-01-24 PROCEDURE — 93010 ELECTROCARDIOGRAM REPORT: CPT | Performed by: INTERNAL MEDICINE

## 2024-01-24 PROCEDURE — 6360000002 HC RX W HCPCS: Performed by: INTERNAL MEDICINE

## 2024-01-24 PROCEDURE — 87070 CULTURE OTHR SPECIMN AEROBIC: CPT

## 2024-01-24 PROCEDURE — 99285 EMERGENCY DEPT VISIT HI MDM: CPT

## 2024-01-24 PROCEDURE — 83605 ASSAY OF LACTIC ACID: CPT

## 2024-01-24 PROCEDURE — 85025 COMPLETE CBC W/AUTO DIFF WBC: CPT

## 2024-01-24 RX ORDER — PREDNISONE 10 MG/1
40 TABLET ORAL
Status: DISCONTINUED | OUTPATIENT
Start: 2024-01-24 | End: 2024-01-24

## 2024-01-24 RX ORDER — SODIUM CHLORIDE 0.9 % (FLUSH) 0.9 %
5-40 SYRINGE (ML) INJECTION EVERY 12 HOURS SCHEDULED
Status: DISCONTINUED | OUTPATIENT
Start: 2024-01-24 | End: 2024-01-31 | Stop reason: HOSPADM

## 2024-01-24 RX ORDER — 0.9 % SODIUM CHLORIDE 0.9 %
1000 INTRAVENOUS SOLUTION INTRAVENOUS ONCE
Status: DISCONTINUED | OUTPATIENT
Start: 2024-01-24 | End: 2024-01-31 | Stop reason: HOSPADM

## 2024-01-24 RX ORDER — ONDANSETRON 2 MG/ML
4 INJECTION INTRAMUSCULAR; INTRAVENOUS EVERY 6 HOURS PRN
Status: DISCONTINUED | OUTPATIENT
Start: 2024-01-24 | End: 2024-01-31 | Stop reason: HOSPADM

## 2024-01-24 RX ORDER — ENOXAPARIN SODIUM 100 MG/ML
40 INJECTION SUBCUTANEOUS DAILY
Status: DISCONTINUED | OUTPATIENT
Start: 2024-01-24 | End: 2024-01-31 | Stop reason: HOSPADM

## 2024-01-24 RX ORDER — FERROUS SULFATE 325(65) MG
325 TABLET ORAL 2 TIMES DAILY WITH MEALS
Status: DISCONTINUED | OUTPATIENT
Start: 2024-01-24 | End: 2024-01-31 | Stop reason: HOSPADM

## 2024-01-24 RX ORDER — HYDROCODONE BITARTRATE AND ACETAMINOPHEN 5; 325 MG/1; MG/1
1 TABLET ORAL
Status: COMPLETED | OUTPATIENT
Start: 2024-01-24 | End: 2024-01-24

## 2024-01-24 RX ORDER — ACETAMINOPHEN 650 MG/1
650 SUPPOSITORY RECTAL EVERY 6 HOURS PRN
Status: DISCONTINUED | OUTPATIENT
Start: 2024-01-24 | End: 2024-01-31 | Stop reason: HOSPADM

## 2024-01-24 RX ORDER — HYDROCODONE BITARTRATE AND ACETAMINOPHEN 5; 325 MG/1; MG/1
1 TABLET ORAL EVERY 6 HOURS PRN
Status: DISCONTINUED | OUTPATIENT
Start: 2024-01-24 | End: 2024-01-25

## 2024-01-24 RX ORDER — TRAZODONE HYDROCHLORIDE 50 MG/1
100 TABLET ORAL NIGHTLY PRN
Status: DISCONTINUED | OUTPATIENT
Start: 2024-01-24 | End: 2024-01-29

## 2024-01-24 RX ORDER — POTASSIUM CHLORIDE 7.45 MG/ML
10 INJECTION INTRAVENOUS PRN
Status: DISCONTINUED | OUTPATIENT
Start: 2024-01-24 | End: 2024-01-31 | Stop reason: HOSPADM

## 2024-01-24 RX ORDER — ONDANSETRON 4 MG/1
4 TABLET, ORALLY DISINTEGRATING ORAL EVERY 8 HOURS PRN
Status: DISCONTINUED | OUTPATIENT
Start: 2024-01-24 | End: 2024-01-31 | Stop reason: HOSPADM

## 2024-01-24 RX ORDER — MORPHINE SULFATE 4 MG/ML
2 INJECTION INTRAVENOUS ONCE
Status: DISCONTINUED | OUTPATIENT
Start: 2024-01-24 | End: 2024-01-25

## 2024-01-24 RX ORDER — POTASSIUM CHLORIDE 20 MEQ/1
40 TABLET, EXTENDED RELEASE ORAL PRN
Status: DISCONTINUED | OUTPATIENT
Start: 2024-01-24 | End: 2024-01-31 | Stop reason: HOSPADM

## 2024-01-24 RX ORDER — HYDROXYCHLOROQUINE SULFATE 200 MG/1
200 TABLET, FILM COATED ORAL DAILY
Status: DISCONTINUED | OUTPATIENT
Start: 2024-01-25 | End: 2024-01-31 | Stop reason: HOSPADM

## 2024-01-24 RX ORDER — ACETAMINOPHEN 325 MG/1
650 TABLET ORAL EVERY 6 HOURS PRN
Status: DISCONTINUED | OUTPATIENT
Start: 2024-01-24 | End: 2024-01-31 | Stop reason: HOSPADM

## 2024-01-24 RX ORDER — 0.9 % SODIUM CHLORIDE 0.9 %
500 INTRAVENOUS SOLUTION INTRAVENOUS
Status: COMPLETED | OUTPATIENT
Start: 2024-01-24 | End: 2024-01-24

## 2024-01-24 RX ORDER — DAPSONE 25 MG/1
100 TABLET ORAL DAILY
Status: DISCONTINUED | OUTPATIENT
Start: 2024-01-25 | End: 2024-01-31 | Stop reason: HOSPADM

## 2024-01-24 RX ORDER — POLYETHYLENE GLYCOL 3350 17 G/17G
17 POWDER, FOR SOLUTION ORAL DAILY PRN
Status: DISCONTINUED | OUTPATIENT
Start: 2024-01-24 | End: 2024-01-31 | Stop reason: HOSPADM

## 2024-01-24 RX ORDER — PREDNISONE 20 MG/1
40 TABLET ORAL DAILY
Status: DISCONTINUED | OUTPATIENT
Start: 2024-01-24 | End: 2024-01-31 | Stop reason: HOSPADM

## 2024-01-24 RX ORDER — MAGNESIUM SULFATE IN WATER 40 MG/ML
2000 INJECTION, SOLUTION INTRAVENOUS PRN
Status: DISCONTINUED | OUTPATIENT
Start: 2024-01-24 | End: 2024-01-31 | Stop reason: HOSPADM

## 2024-01-24 RX ORDER — SODIUM CHLORIDE 9 MG/ML
INJECTION, SOLUTION INTRAVENOUS PRN
Status: DISCONTINUED | OUTPATIENT
Start: 2024-01-24 | End: 2024-01-31 | Stop reason: HOSPADM

## 2024-01-24 RX ORDER — SODIUM CHLORIDE 0.9 % (FLUSH) 0.9 %
5-40 SYRINGE (ML) INJECTION PRN
Status: DISCONTINUED | OUTPATIENT
Start: 2024-01-24 | End: 2024-01-31 | Stop reason: HOSPADM

## 2024-01-24 RX ORDER — MORPHINE SULFATE 2 MG/ML
INJECTION, SOLUTION INTRAMUSCULAR; INTRAVENOUS
Status: COMPLETED
Start: 2024-01-24 | End: 2024-01-24

## 2024-01-24 RX ADMIN — HYDROCODONE BITARTRATE AND ACETAMINOPHEN 1 TABLET: 5; 325 TABLET ORAL at 22:52

## 2024-01-24 RX ADMIN — ENOXAPARIN SODIUM 40 MG: 100 INJECTION SUBCUTANEOUS at 22:51

## 2024-01-24 RX ADMIN — PREGABALIN 200 MG: 150 CAPSULE ORAL at 22:52

## 2024-01-24 RX ADMIN — MORPHINE SULFATE 2 MG: 2 INJECTION, SOLUTION INTRAMUSCULAR; INTRAVENOUS at 15:40

## 2024-01-24 RX ADMIN — WATER 1000 MG: 1 INJECTION INTRAMUSCULAR; INTRAVENOUS; SUBCUTANEOUS at 17:33

## 2024-01-24 RX ADMIN — Medication 1000 ML: at 17:45

## 2024-01-24 RX ADMIN — SODIUM CHLORIDE 500 ML: 9 INJECTION, SOLUTION INTRAVENOUS at 14:59

## 2024-01-24 RX ADMIN — HYDROCODONE BITARTRATE AND ACETAMINOPHEN 1 TABLET: 5; 325 TABLET ORAL at 16:41

## 2024-01-24 RX ADMIN — SODIUM CHLORIDE, PRESERVATIVE FREE 10 ML: 5 INJECTION INTRAVENOUS at 20:17

## 2024-01-24 RX ADMIN — VANCOMYCIN HYDROCHLORIDE 1750 MG: 10 INJECTION, POWDER, LYOPHILIZED, FOR SOLUTION INTRAVENOUS at 20:15

## 2024-01-24 ASSESSMENT — PAIN DESCRIPTION - FREQUENCY: FREQUENCY: CONTINUOUS

## 2024-01-24 ASSESSMENT — PAIN DESCRIPTION - LOCATION
LOCATION: LEG
LOCATION: LEG

## 2024-01-24 ASSESSMENT — PAIN SCALES - GENERAL
PAINLEVEL_OUTOF10: 0
PAINLEVEL_OUTOF10: 10
PAINLEVEL_OUTOF10: 10
PAINLEVEL_OUTOF10: 0
PAINLEVEL_OUTOF10: 10

## 2024-01-24 ASSESSMENT — PAIN DESCRIPTION - DESCRIPTORS
DESCRIPTORS: ACHING
DESCRIPTORS: BURNING

## 2024-01-24 ASSESSMENT — PAIN DESCRIPTION - ORIENTATION
ORIENTATION: RIGHT;LEFT
ORIENTATION: LEFT;RIGHT

## 2024-01-24 ASSESSMENT — PAIN - FUNCTIONAL ASSESSMENT
PAIN_FUNCTIONAL_ASSESSMENT: 0-10
PAIN_FUNCTIONAL_ASSESSMENT: ACTIVITIES ARE NOT PREVENTED

## 2024-01-24 ASSESSMENT — PAIN DESCRIPTION - ONSET: ONSET: ON-GOING

## 2024-01-24 ASSESSMENT — PAIN DESCRIPTION - PAIN TYPE: TYPE: CHRONIC PAIN

## 2024-01-24 NOTE — ED PROVIDER NOTES
0.9 % injection 5-40 mL (has no administration in time range)   0.9 % sodium chloride infusion (has no administration in time range)   potassium chloride (KLOR-CON M) extended release tablet 40 mEq (has no administration in time range)     Or   potassium bicarb-citric acid (EFFER-K) effervescent tablet 40 mEq (has no administration in time range)     Or   potassium chloride 10 mEq/100 mL IVPB (Peripheral Line) (has no administration in time range)   magnesium sulfate 2000 mg in 50 mL IVPB premix (has no administration in time range)   ondansetron (ZOFRAN-ODT) disintegrating tablet 4 mg (has no administration in time range)     Or   ondansetron (ZOFRAN) injection 4 mg (has no administration in time range)   polyethylene glycol (GLYCOLAX) packet 17 g (has no administration in time range)   acetaminophen (TYLENOL) tablet 650 mg (has no administration in time range)     Or   acetaminophen (TYLENOL) suppository 650 mg (has no administration in time range)   enoxaparin (LOVENOX) injection 40 mg (has no administration in time range)   cefTRIAXone (ROCEPHIN) 1,000 mg in sterile water 10 mL IV syringe (has no administration in time range)   HYDROcodone-acetaminophen (NORCO) 5-325 MG per tablet 1 tablet (has no administration in time range)   vancomycin (VANCOCIN) 1750 mg in sodium chloride 0.9 % 500 mL IVPB (1,750 mg IntraVENous New Bag 1/24/24 2015)   vancomycin (VANCOCIN) 1250 mg in sodium chloride 0.9% 250 mL IVPB (has no administration in time range)   predniSONE (DELTASONE) tablet 40 mg (has no administration in time range)   sodium chloride 0.9 % bolus 500 mL (0 mLs IntraVENous Stopped 1/24/24 1613)   morphine 2 MG/ML injection (2 mg  Given 1/24/24 1540)   HYDROcodone-acetaminophen (NORCO) 5-325 MG per tablet 1 tablet (1 tablet Oral Given 1/24/24 1641)   cefTRIAXone (ROCEPHIN) 1,000 mg in sterile water 10 mL IV syringe (1,000 mg IntraVENous Given 1/24/24 1733)       Current Discharge Medication List           Past  Differential Type AUTOMATED      Neutrophils % 64 43 - 78 %    Lymphocytes % 15 13 - 44 %    Monocytes % 14 (H) 4.0 - 12.0 %    Eosinophils % 7 0.5 - 7.8 %    Basophils % 0 0.0 - 2.0 %    Immature Granulocytes 0 0.0 - 5.0 %    Neutrophils Absolute 7.5 1.7 - 8.2 K/UL    Lymphocytes Absolute 1.8 0.5 - 4.6 K/UL    Monocytes Absolute 1.6 (H) 0.1 - 1.3 K/UL    Eosinophils Absolute 0.8 0.0 - 0.8 K/UL    Basophils Absolute 0.0 0.0 - 0.2 K/UL    Absolute Immature Granulocyte 0.0 0.0 - 0.5 K/UL   Lactate, Sepsis   Result Value Ref Range    Lactic Acid, Sepsis 1.6 0.4 - 2.0 MMOL/L   EKG 12 Lead   Result Value Ref Range    Ventricular Rate 86 BPM    Atrial Rate 224 BPM    P-R Interval 51 ms    QRS Duration 100 ms    Q-T Interval 405 ms    QTc Calculation (Bazett) 485 ms    P Axis 0 degrees    R Axis 83 degrees    T Axis 56 degrees    Diagnosis       Sinus rhythm  Short ID interval  Borderline right axis deviation      Confirmed by Blaine Escobar MD ([id number]) on 1/24/2024 4:13:14 PM          XR CHEST PORTABLE   Final Result   Increased interstitial prominence and mild pulmonary edema with   concern for right lower lobe infiltrate.                              Voice dictation software was used during the making of this note.  This software is not perfect and grammatical and other typographical errors may be present.  This note has not been completely proofread for errors.     Liu Rivera MD  01/24/24 2040

## 2024-01-24 NOTE — ED TRIAGE NOTES
Pt reports bilateral leg sores and face sores for 2 weeks.   Pt was seen Friday and Sunday at [facility].  Pt was prescribed keflex with no relief.     Vitals stable

## 2024-01-24 NOTE — ED NOTES
TRANSFER - OUT REPORT:    Verbal report given to Jean MARTINO on Meli Blancas  being transferred to 5th floor for routine progression of patient care       Report consisted of patient's Situation, Background, Assessment and   Recommendations(SBAR).     Information from the following report(s) Nurse Handoff Report was reviewed with the receiving nurse.    Anaya Fall Assessment:    Presents to emergency department  because of falls (Syncope, seizure, or loss of consciousness): No  Age > 70: No  Altered Mental Status, Intoxication with alcohol or substance confusion (Disorientation, impaired judgment, poor safety awaremess, or inability to follow instructions): No  Impaired Mobility: Ambulates or transfers with assistive devices or assistance; Unable to ambulate or transer.: Yes  Nursing Judgement: Yes          Lines:   Peripheral IV 01/24/24 Right Antecubital (Active)        Opportunity for questions and clarification was provided.      Patient transported with:  Jenna Talamantes RN  01/24/24 8841

## 2024-01-24 NOTE — PROGRESS NOTES
TRANSFER - IN REPORT:    Verbal report received from SALENA Arizmendi on Meli Blancas  being received from ED for routine progression of patient care      Report consisted of patient's Situation, Background, Assessment and   Recommendations(SBAR).     Information from the following report(s) Nurse Handoff Report was reviewed with the receiving nurse.    Opportunity for questions and clarification was provided.      Report taken for primary nurse SALENA Cho who will be assuming care upon admission the unit .

## 2024-01-24 NOTE — PROGRESS NOTES
VANCO DAILY FOLLOW UP NOTE  [facility]   Pharmacy Pharmacokinetic Monitoring Service - Vancomycin    Consulting Provider: Mitch De Paz MDAttending    Indication: SSTI  Target Concentration: Goal AUC/AVTAR 400-600 mg*hr/L  Day of Therapy: 1  Additional Antimicrobials: ceftriaxone    Patient eligible for piperacillin-tazobactam to cefepime auto-substitution per P&T approved protocol? N/A    Pertinent Laboratory Values:   Wt Readings from Last 1 Encounters:   01/24/24 65.8 kg (145 lb 1 oz)     Temp Readings from Last 1 Encounters:   01/24/24 97.7 °F (36.5 °C) (Oral)     Recent Labs     01/24/24  1451   BUN 16   CREATININE 0.80   WBC 11.7*   LACTSEPSIS 1.6     Estimated Creatinine Clearance: 55 mL/min (based on SCr of 0.8 mg/dL).    Lab Results   Component Value Date/Time    VANCORANDOM 10.9 11/04/2023 03:47 AM       MRSA Nasal Swab: N/A. Non-respiratory infection    Assessment:  Date/Time Dose Concentration AUC         Note: Serum concentrations collected for AUC dosing may appear elevated if collected in close proximity to the dose administered, this is not necessarily an indication of toxicity    Plan:  Dosing recommendations based on Bayesian software  Start vancomycin with 1750 mg x 1 and then 1250 mg q24h  Anticipated AUC of 486 and trough concentration of 11.8 at steady state  Renal labs as indicated   Vancomycin concentrations will be ordered as clinically appropriate   Pharmacy will continue to monitor patient and adjust therapy as indicated    Thank you for the consult,  Jesse Ferrell, PharmD, BCOP  Clinical Pharmacist  Contact Via [employer]
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
## 2024-01-25 PROBLEM — R50.9 FEBRILE ILLNESS: Status: ACTIVE | Noted: 2024-01-25

## 2024-01-25 LAB
ALBUMIN SERPL-MCNC: 2.7 G/DL (ref 3.2–4.6)
ALBUMIN/GLOB SERPL: 0.8 (ref 0.4–1.6)
ALP SERPL-CCNC: 113 U/L (ref 50–136)
ALT SERPL-CCNC: 11 U/L (ref 12–65)
ANION GAP SERPL CALC-SCNC: 1 MMOL/L (ref 2–11)
APPEARANCE UR: ABNORMAL
AST SERPL-CCNC: 15 U/L (ref 15–37)
BACTERIA URNS QL MICRO: ABNORMAL /HPF
BASOPHILS # BLD: 0.1 K/UL (ref 0–0.2)
BASOPHILS NFR BLD: 0 % (ref 0–2)
BILIRUB SERPL-MCNC: 0.3 MG/DL (ref 0.2–1.1)
BILIRUB UR QL: NEGATIVE
BUN SERPL-MCNC: 14 MG/DL (ref 8–23)
CALCIUM SERPL-MCNC: 8.8 MG/DL (ref 8.3–10.4)
CHLORIDE SERPL-SCNC: 106 MMOL/L (ref 103–113)
CO2 SERPL-SCNC: 28 MMOL/L (ref 21–32)
COLOR UR: ABNORMAL
CORTIS AM PEAK SERPL-MCNC: 16.5 UG/DL (ref 7–25)
CREAT SERPL-MCNC: 0.8 MG/DL (ref 0.6–1)
DIFFERENTIAL METHOD BLD: ABNORMAL
EOSINOPHIL # BLD: 0.8 K/UL (ref 0–0.8)
EOSINOPHIL NFR BLD: 7 % (ref 0.5–7.8)
EPI CELLS #/AREA URNS HPF: ABNORMAL /HPF
ERYTHROCYTE [DISTWIDTH] IN BLOOD BY AUTOMATED COUNT: 14.4 % (ref 11.9–14.6)
GLOBULIN SER CALC-MCNC: 3.6 G/DL (ref 2.8–4.5)
GLUCOSE SERPL-MCNC: 90 MG/DL (ref 65–100)
GLUCOSE UR STRIP.AUTO-MCNC: NEGATIVE MG/DL
HCT VFR BLD AUTO: 37.6 % (ref 35.8–46.3)
HGB BLD-MCNC: 11.4 G/DL (ref 11.7–15.4)
HGB UR QL STRIP: NEGATIVE
IMM GRANULOCYTES # BLD AUTO: 0 K/UL (ref 0–0.5)
IMM GRANULOCYTES NFR BLD AUTO: 0 % (ref 0–5)
KETONES UR QL STRIP.AUTO: NEGATIVE MG/DL
LEUKOCYTE ESTERASE UR QL STRIP.AUTO: ABNORMAL
LYMPHOCYTES # BLD: 2.1 K/UL (ref 0.5–4.6)
LYMPHOCYTES NFR BLD: 17 % (ref 13–44)
MCH RBC QN AUTO: 31.8 PG (ref 26.1–32.9)
MCHC RBC AUTO-ENTMCNC: 30.3 G/DL (ref 31.4–35)
MCV RBC AUTO: 105 FL (ref 82–102)
MONOCYTES # BLD: 1.5 K/UL (ref 0.1–1.3)
MONOCYTES NFR BLD: 12 % (ref 4–12)
MUCOUS THREADS URNS QL MICRO: ABNORMAL /LPF
NEUTS SEG # BLD: 7.9 K/UL (ref 1.7–8.2)
NEUTS SEG NFR BLD: 64 % (ref 43–78)
NITRITE UR QL STRIP.AUTO: NEGATIVE
NRBC # BLD: 0 K/UL (ref 0–0.2)
OTHER OBSERVATIONS: ABNORMAL
PH UR STRIP: 5 (ref 5–9)
PLATELET # BLD AUTO: 206 K/UL (ref 150–450)
PMV BLD AUTO: 10.3 FL (ref 9.4–12.3)
POTASSIUM SERPL-SCNC: 4.2 MMOL/L (ref 3.5–5.1)
PROT SERPL-MCNC: 6.3 G/DL (ref 6.3–8.2)
PROT UR STRIP-MCNC: ABNORMAL MG/DL
RBC # BLD AUTO: 3.58 M/UL (ref 4.05–5.2)
RBC #/AREA URNS HPF: ABNORMAL /HPF
SODIUM SERPL-SCNC: 135 MMOL/L (ref 136–146)
SP GR UR REFRACTOMETRY: 1.03 (ref 1–1.02)
UROBILINOGEN UR QL STRIP.AUTO: 1 EU/DL (ref 0.2–1)
WBC # BLD AUTO: 12.5 K/UL (ref 4.3–11.1)
WBC URNS QL MICRO: ABNORMAL /HPF

## 2024-01-25 PROCEDURE — 2500000003 HC RX 250 WO HCPCS: Performed by: PHYSICIAN ASSISTANT

## 2024-01-25 PROCEDURE — 81001 URINALYSIS AUTO W/SCOPE: CPT

## 2024-01-25 PROCEDURE — 85025 COMPLETE CBC W/AUTO DIFF WBC: CPT

## 2024-01-25 PROCEDURE — 2580000003 HC RX 258: Performed by: INTERNAL MEDICINE

## 2024-01-25 PROCEDURE — 6360000002 HC RX W HCPCS: Performed by: INTERNAL MEDICINE

## 2024-01-25 PROCEDURE — 80053 COMPREHEN METABOLIC PANEL: CPT

## 2024-01-25 PROCEDURE — 2580000003 HC RX 258: Performed by: PHYSICIAN ASSISTANT

## 2024-01-25 PROCEDURE — 6370000000 HC RX 637 (ALT 250 FOR IP): Performed by: INTERNAL MEDICINE

## 2024-01-25 PROCEDURE — 82533 TOTAL CORTISOL: CPT

## 2024-01-25 PROCEDURE — 1100000000 HC RM PRIVATE

## 2024-01-25 PROCEDURE — 36415 COLL VENOUS BLD VENIPUNCTURE: CPT

## 2024-01-25 RX ORDER — HYDROCODONE BITARTRATE AND ACETAMINOPHEN 10; 325 MG/1; MG/1
1 TABLET ORAL EVERY 4 HOURS PRN
Status: DISCONTINUED | OUTPATIENT
Start: 2024-01-25 | End: 2024-01-30

## 2024-01-25 RX ORDER — HYDROMORPHONE HYDROCHLORIDE 1 MG/ML
0.5 INJECTION, SOLUTION INTRAMUSCULAR; INTRAVENOUS; SUBCUTANEOUS EVERY 4 HOURS PRN
Status: DISCONTINUED | OUTPATIENT
Start: 2024-01-25 | End: 2024-01-31 | Stop reason: HOSPADM

## 2024-01-25 RX ORDER — HYDROMORPHONE HYDROCHLORIDE 1 MG/ML
1 INJECTION, SOLUTION INTRAMUSCULAR; INTRAVENOUS; SUBCUTANEOUS EVERY 4 HOURS PRN
Status: DISCONTINUED | OUTPATIENT
Start: 2024-01-25 | End: 2024-01-30

## 2024-01-25 RX ORDER — SODIUM CHLORIDE 9 MG/ML
INJECTION, SOLUTION INTRAVENOUS CONTINUOUS
Status: DISCONTINUED | OUTPATIENT
Start: 2024-01-25 | End: 2024-01-26

## 2024-01-25 RX ADMIN — DAPSONE 100 MG: 25 TABLET ORAL at 07:58

## 2024-01-25 RX ADMIN — PREGABALIN 200 MG: 150 CAPSULE ORAL at 07:58

## 2024-01-25 RX ADMIN — SODIUM CHLORIDE, PRESERVATIVE FREE 10 ML: 5 INJECTION INTRAVENOUS at 07:59

## 2024-01-25 RX ADMIN — FERROUS SULFATE TAB 325 MG (65 MG ELEMENTAL FE) 325 MG: 325 (65 FE) TAB at 07:58

## 2024-01-25 RX ADMIN — PREGABALIN 200 MG: 150 CAPSULE ORAL at 20:06

## 2024-01-25 RX ADMIN — HYDROMORPHONE HYDROCHLORIDE 1 MG: 1 INJECTION, SOLUTION INTRAMUSCULAR; INTRAVENOUS; SUBCUTANEOUS at 22:38

## 2024-01-25 RX ADMIN — SODIUM CHLORIDE, PRESERVATIVE FREE 10 ML: 5 INJECTION INTRAVENOUS at 20:07

## 2024-01-25 RX ADMIN — HYDROMORPHONE HYDROCHLORIDE 0.5 MG: 1 INJECTION, SOLUTION INTRAMUSCULAR; INTRAVENOUS; SUBCUTANEOUS at 09:57

## 2024-01-25 RX ADMIN — WATER 1000 MG: 1 INJECTION INTRAMUSCULAR; INTRAVENOUS; SUBCUTANEOUS at 17:18

## 2024-01-25 RX ADMIN — ENOXAPARIN SODIUM 40 MG: 100 INJECTION SUBCUTANEOUS at 20:06

## 2024-01-25 RX ADMIN — HYDROXYCHLOROQUINE SULFATE 200 MG: 200 TABLET ORAL at 07:59

## 2024-01-25 RX ADMIN — FERROUS SULFATE TAB 325 MG (65 MG ELEMENTAL FE) 325 MG: 325 (65 FE) TAB at 17:17

## 2024-01-25 RX ADMIN — SODIUM CHLORIDE: 9 INJECTION, SOLUTION INTRAVENOUS at 09:43

## 2024-01-25 RX ADMIN — SODIUM CHLORIDE: 9 INJECTION, SOLUTION INTRAVENOUS at 22:34

## 2024-01-25 RX ADMIN — PREDNISONE 40 MG: 20 TABLET ORAL at 07:58

## 2024-01-25 RX ADMIN — VANCOMYCIN HYDROCHLORIDE 1250 MG: 10 INJECTION, POWDER, LYOPHILIZED, FOR SOLUTION INTRAVENOUS at 18:17

## 2024-01-25 RX ADMIN — HYDROMORPHONE HYDROCHLORIDE 0.5 MG: 1 INJECTION, SOLUTION INTRAMUSCULAR; INTRAVENOUS; SUBCUTANEOUS at 18:24

## 2024-01-25 ASSESSMENT — PAIN DESCRIPTION - DESCRIPTORS
DESCRIPTORS: ACHING
DESCRIPTORS: ACHING
DESCRIPTORS: STABBING
DESCRIPTORS: ACHING

## 2024-01-25 ASSESSMENT — PAIN DESCRIPTION - LOCATION
LOCATION: LEG
LOCATION: GENERALIZED
LOCATION: GENERALIZED
LOCATION: LEG

## 2024-01-25 ASSESSMENT — PAIN DESCRIPTION - FREQUENCY: FREQUENCY: CONTINUOUS

## 2024-01-25 ASSESSMENT — PAIN - FUNCTIONAL ASSESSMENT
PAIN_FUNCTIONAL_ASSESSMENT: ACTIVITIES ARE NOT PREVENTED
PAIN_FUNCTIONAL_ASSESSMENT: PREVENTS OR INTERFERES SOME ACTIVE ACTIVITIES AND ADLS

## 2024-01-25 ASSESSMENT — PAIN SCALES - GENERAL
PAINLEVEL_OUTOF10: 7
PAINLEVEL_OUTOF10: 9
PAINLEVEL_OUTOF10: 4
PAINLEVEL_OUTOF10: 10
PAINLEVEL_OUTOF10: 4
PAINLEVEL_OUTOF10: 0

## 2024-01-25 ASSESSMENT — PAIN DESCRIPTION - ORIENTATION
ORIENTATION: RIGHT;LEFT
ORIENTATION: RIGHT;LEFT;LOWER;INNER;MID
ORIENTATION: RIGHT;LEFT
ORIENTATION: OTHER (COMMENT)

## 2024-01-25 ASSESSMENT — PAIN DESCRIPTION - PAIN TYPE: TYPE: CHRONIC PAIN

## 2024-01-25 ASSESSMENT — PAIN DESCRIPTION - ONSET: ONSET: ON-GOING

## 2024-01-25 NOTE — PROGRESS NOTES
VANCO DAILY FOLLOW UP NOTE  [facility]   Pharmacy Pharmacokinetic Monitoring Service - Vancomycin    Consulting Provider: Mitch De Paz MDAttending    Indication: SSTI  Target Concentration: Goal AUC/AVTAR 400-600 mg*hr/L  Day of Therapy: 2  Additional Antimicrobials: ceftriaxone    Patient eligible for piperacillin-tazobactam to cefepime auto-substitution per P&T approved protocol? N/A    Pertinent Laboratory Values:   Wt Readings from Last 1 Encounters:   01/24/24 69 kg (152 lb 1.6 oz)     Temp Readings from Last 1 Encounters:   01/25/24 (!) 101.7 °F (38.7 °C) (Oral)     Recent Labs     01/24/24  1451 01/25/24  0533   BUN 16 14   CREATININE 0.80 0.80   WBC 11.7* 12.5*   LACTSEPSIS 1.6  --      Estimated Creatinine Clearance: 57 mL/min (based on SCr of 0.8 mg/dL).    Lab Results   Component Value Date/Time    VANCORANDOM 10.9 11/04/2023 03:47 AM       MRSA Nasal Swab: N/A. Non-respiratory infection    Assessment:  Date/Time Dose Concentration AUC         Note: Serum concentrations collected for AUC dosing may appear elevated if collected in close proximity to the dose administered, this is not necessarily an indication of toxicity    Plan:  Dosing recommendations based on Bayesian software  Continue vancomycin 1250 mg q24h  Renal labs as indicated   Vancomycin concentrations will be ordered as clinically appropriate   Pharmacy will continue to monitor patient and adjust therapy as indicated    Thank you for the consult,  Jesse Ferrell, PharmD, BCOP  Clinical Pharmacist  Contact Via [employer]

## 2024-01-25 NOTE — CARE COORDINATION
she would like listed as an emergency contact \"but you can put the mortuary.\"  Per pt she has [facility].  Pt has the following home DME: quad cane, RW, shower chair, and home O2 through [facility] that she uses PRN.  Pt does not drive and pays people to run errands.  CM provided pt with information about CLTC and encouraged her to call her Medicaid  to apply for this program as it could definitely benefit pt.  Pt was pleasant during conversation but short and voiced dislike for every HH agency she has had as well as hospital staff.  Pt is observed to be quick-tempered and impatient.  During rounds it was discussed that pt will likely remain IP through the weekend unless the decision is made to transfer her to the [facility].  Pt's skin has multiple skin lesions that are weeping.  Dermatology and wound care have been consulted.  Anticipated dispo: pt will return home once medically stable unless she is d/c to the [facility] for further Tx.  CM will continue to follow and remain available if any needs arise.    VALDEZ Nava 01/25/24 11:42 AM

## 2024-01-25 NOTE — PROGRESS NOTES
attempted to visit patient, as per staff request.  Patient was out of the room at time.   will plan to follow up.    Peace be with you,  Signed by  ALBERTO ColemanDiv.   [phone]

## 2024-01-25 NOTE — ACP (ADVANCE CARE PLANNING)
Advance Care Planning   Healthcare Decision Maker:      Click here to complete Healthcare Decision Makers including selection of the Healthcare Decision Maker Relationship (ie \"Primary\").  Today we Pt states she does not have anyone she wants listed as an emergency contact or healthcare decision maker.       If the relationship to the patient does NOT follow our state's Next of Kin hierarchy, the patient MUST complete an ACP Document to allow him/her to act on the patient's behalf. :  No ACP documents on file.  Full Code per physician order.
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
## 2024-01-26 LAB
ANION GAP SERPL CALC-SCNC: 4 MMOL/L (ref 2–11)
BUN SERPL-MCNC: 13 MG/DL (ref 8–23)
CALCIUM SERPL-MCNC: 9.1 MG/DL (ref 8.3–10.4)
CHLORIDE SERPL-SCNC: 112 MMOL/L (ref 103–113)
CO2 SERPL-SCNC: 25 MMOL/L (ref 21–32)
CREAT SERPL-MCNC: 0.6 MG/DL (ref 0.6–1)
ERYTHROCYTE [DISTWIDTH] IN BLOOD BY AUTOMATED COUNT: 14.1 % (ref 11.9–14.6)
GLUCOSE SERPL-MCNC: 111 MG/DL (ref 65–100)
HCT VFR BLD AUTO: 32.6 % (ref 35.8–46.3)
HGB BLD-MCNC: 10.2 G/DL (ref 11.7–15.4)
MCH RBC QN AUTO: 31.5 PG (ref 26.1–32.9)
MCHC RBC AUTO-ENTMCNC: 31.3 G/DL (ref 31.4–35)
MCV RBC AUTO: 100.6 FL (ref 82–102)
NRBC # BLD: 0 K/UL (ref 0–0.2)
PLATELET # BLD AUTO: 238 K/UL (ref 150–450)
PMV BLD AUTO: 10 FL (ref 9.4–12.3)
POTASSIUM SERPL-SCNC: 3.7 MMOL/L (ref 3.5–5.1)
RBC # BLD AUTO: 3.24 M/UL (ref 4.05–5.2)
SODIUM SERPL-SCNC: 141 MMOL/L (ref 136–146)
VANCOMYCIN SERPL-MCNC: 10.7 UG/ML
WBC # BLD AUTO: 11.6 K/UL (ref 4.3–11.1)

## 2024-01-26 PROCEDURE — 6360000002 HC RX W HCPCS: Performed by: PHYSICIAN ASSISTANT

## 2024-01-26 PROCEDURE — 2580000003 HC RX 258: Performed by: PHYSICIAN ASSISTANT

## 2024-01-26 PROCEDURE — 80202 ASSAY OF VANCOMYCIN: CPT

## 2024-01-26 PROCEDURE — 2580000003 HC RX 258: Performed by: INTERNAL MEDICINE

## 2024-01-26 PROCEDURE — 6370000000 HC RX 637 (ALT 250 FOR IP): Performed by: INTERNAL MEDICINE

## 2024-01-26 PROCEDURE — 2500000003 HC RX 250 WO HCPCS: Performed by: PHYSICIAN ASSISTANT

## 2024-01-26 PROCEDURE — 85027 COMPLETE CBC AUTOMATED: CPT

## 2024-01-26 PROCEDURE — 6360000002 HC RX W HCPCS: Performed by: INTERNAL MEDICINE

## 2024-01-26 PROCEDURE — 1100000000 HC RM PRIVATE

## 2024-01-26 PROCEDURE — 6370000000 HC RX 637 (ALT 250 FOR IP): Performed by: DERMATOLOGY

## 2024-01-26 PROCEDURE — 6370000000 HC RX 637 (ALT 250 FOR IP): Performed by: PHYSICIAN ASSISTANT

## 2024-01-26 PROCEDURE — 36415 COLL VENOUS BLD VENIPUNCTURE: CPT

## 2024-01-26 PROCEDURE — 80048 BASIC METABOLIC PNL TOTAL CA: CPT

## 2024-01-26 PROCEDURE — 76937 US GUIDE VASCULAR ACCESS: CPT

## 2024-01-26 RX ORDER — HYDROXYZINE HYDROCHLORIDE 25 MG/1
25 TABLET, FILM COATED ORAL 3 TIMES DAILY PRN
Status: DISCONTINUED | OUTPATIENT
Start: 2024-01-26 | End: 2024-01-31 | Stop reason: HOSPADM

## 2024-01-26 RX ORDER — LORAZEPAM 0.5 MG/1
0.5 TABLET ORAL NIGHTLY PRN
Status: DISCONTINUED | OUTPATIENT
Start: 2024-01-26 | End: 2024-01-29

## 2024-01-26 RX ADMIN — HYDROXYZINE HYDROCHLORIDE 25 MG: 25 TABLET, FILM COATED ORAL at 11:48

## 2024-01-26 RX ADMIN — HYDROMORPHONE HYDROCHLORIDE 1 MG: 1 INJECTION, SOLUTION INTRAMUSCULAR; INTRAVENOUS; SUBCUTANEOUS at 20:39

## 2024-01-26 RX ADMIN — ENOXAPARIN SODIUM 40 MG: 100 INJECTION SUBCUTANEOUS at 20:39

## 2024-01-26 RX ADMIN — Medication: at 09:16

## 2024-01-26 RX ADMIN — VANCOMYCIN HYDROCHLORIDE 1000 MG: 1 INJECTION, POWDER, LYOPHILIZED, FOR SOLUTION INTRAVENOUS at 18:02

## 2024-01-26 RX ADMIN — DAPSONE 100 MG: 25 TABLET ORAL at 08:30

## 2024-01-26 RX ADMIN — FERROUS SULFATE TAB 325 MG (65 MG ELEMENTAL FE) 325 MG: 325 (65 FE) TAB at 18:01

## 2024-01-26 RX ADMIN — FERROUS SULFATE TAB 325 MG (65 MG ELEMENTAL FE) 325 MG: 325 (65 FE) TAB at 08:27

## 2024-01-26 RX ADMIN — PREGABALIN 200 MG: 150 CAPSULE ORAL at 08:30

## 2024-01-26 RX ADMIN — PREDNISONE 40 MG: 20 TABLET ORAL at 08:27

## 2024-01-26 RX ADMIN — WATER 1000 MG: 1 INJECTION INTRAMUSCULAR; INTRAVENOUS; SUBCUTANEOUS at 18:01

## 2024-01-26 RX ADMIN — SODIUM CHLORIDE, PRESERVATIVE FREE 10 ML: 5 INJECTION INTRAVENOUS at 20:39

## 2024-01-26 RX ADMIN — HYDROXYCHLOROQUINE SULFATE 200 MG: 200 TABLET ORAL at 08:30

## 2024-01-26 RX ADMIN — HYDROMORPHONE HYDROCHLORIDE 1 MG: 1 INJECTION, SOLUTION INTRAMUSCULAR; INTRAVENOUS; SUBCUTANEOUS at 09:27

## 2024-01-26 RX ADMIN — PREGABALIN 200 MG: 150 CAPSULE ORAL at 20:39

## 2024-01-26 ASSESSMENT — PAIN DESCRIPTION - ORIENTATION
ORIENTATION: RIGHT
ORIENTATION: RIGHT

## 2024-01-26 ASSESSMENT — PAIN SCALES - GENERAL
PAINLEVEL_OUTOF10: 10
PAINLEVEL_OUTOF10: 10

## 2024-01-26 ASSESSMENT — PAIN DESCRIPTION - DESCRIPTORS
DESCRIPTORS: STABBING
DESCRIPTORS: ACHING;THROBBING

## 2024-01-26 ASSESSMENT — PAIN DESCRIPTION - LOCATION
LOCATION: HEAD
LOCATION: LEG

## 2024-01-26 NOTE — PROGRESS NOTES
VANCO DAILY FOLLOW UP NOTE  [facility]   Pharmacy Pharmacokinetic Monitoring Service - Vancomycin    Consulting Provider: Mitch De Paz MD    Indication: SSTI  Target Concentration: Goal AUC/AVTAR 400-600 mg*hr/L  Day of Therapy: 3  Additional Antimicrobials: ceftriaxone    Patient eligible for piperacillin-tazobactam to cefepime auto-substitution per P&T approved protocol? N/A    Pertinent Laboratory Values:   Wt Readings from Last 1 Encounters:   01/24/24 69 kg (152 lb 1.6 oz)     Temp Readings from Last 1 Encounters:   01/26/24 98.1 °F (36.7 °C)     Recent Labs     01/24/24  1451 01/25/24  0533 01/26/24  0615   BUN 16 14 13   CREATININE 0.80 0.80 0.60   WBC 11.7* 12.5* 11.6*   LACTSEPSIS 1.6  --   --      Estimated Creatinine Clearance: 76 mL/min (based on SCr of 0.6 mg/dL).    Lab Results   Component Value Date/Time    VANCORANDOM 10.7 01/26/2024 06:15 AM       MRSA Nasal Swab: N/A. Non-respiratory infection    Assessment:  Date/Time Dose Concentration AUC   1/26 0615 1250 mg Q24H 10.7 321   Note: Serum concentrations collected for AUC dosing may appear elevated if collected in close proximity to the dose administered, this is not necessarily an indication of toxicity    Plan:  Dosing recommendations based on Bayesian software  Regimen subthrapeutic, so adjusted to 1000 mg Q12H for predicted AUC/Tr of 490/12.9  Renal labs as indicated   Vancomycin concentrations will be ordered as clinically appropriate   Pharmacy will continue to monitor patient and adjust therapy as indicated    Thank you for the consult,  Feliciano Anderson [facility]

## 2024-01-26 NOTE — H&P
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
## 2024-01-26 NOTE — PROGRESS NOTES
US Guided PIV access-    Ultrasound was used to find the vein which was compressible and without any ultrasound features of an artery or nerve bundle. Skin was cleaned and disinfected prior to IV puncture.  Under real-time ultrasound guidance peripheral access was obtained in the right forearm using 20 G 1.75\" Peripheral IV catheter after 1 attempt(s). Blood return was present and IV flushed without difficulty with no clinical signs of infiltration. IV dressing applied and no immediate complications noted. Patient tolerated the procedure well.
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
## 2024-01-26 NOTE — PROGRESS NOTES
Report received from Mynor MARTINO.     1100: dressing applied to bilateral legs per order, pt tolerated well.

## 2024-01-26 NOTE — PROGRESS NOTES
completed initial visit with patient, per previous staff request.  Patient had had a rapid response and a code violet earlier in the day.  At time of visit patient was calm and reflective.  Patient talked much about the stress of her illness and her grief after losing several family members and friends, including her mother and her  in years past.  Patient stated that she feels she is facing these stresses alone, without her family and friends to support her.  Patient also recalled the events of her rapid response and code violet earlier in the day.  Patient vented her emotions and stated is was a relief to be able to talk about it with a calm presence and to pray together.   and patent hugged, to which patient stated she was grateful and felt cared for.   provided pastoral presence, prayer and empathetic listening.  Peace be with you,  Signed by  ALBERTO ColemanDiv.   [phone]

## 2024-01-27 PROCEDURE — 6360000002 HC RX W HCPCS: Performed by: INTERNAL MEDICINE

## 2024-01-27 PROCEDURE — 1100000000 HC RM PRIVATE

## 2024-01-27 PROCEDURE — 2580000003 HC RX 258: Performed by: INTERNAL MEDICINE

## 2024-01-27 PROCEDURE — 2500000003 HC RX 250 WO HCPCS: Performed by: PHYSICIAN ASSISTANT

## 2024-01-27 PROCEDURE — 2580000003 HC RX 258: Performed by: PHYSICIAN ASSISTANT

## 2024-01-27 PROCEDURE — 6360000002 HC RX W HCPCS: Performed by: PHYSICIAN ASSISTANT

## 2024-01-27 PROCEDURE — 6370000000 HC RX 637 (ALT 250 FOR IP): Performed by: INTERNAL MEDICINE

## 2024-01-27 RX ORDER — LANOLIN ALCOHOL/MO/W.PET/CERES
3 CREAM (GRAM) TOPICAL NIGHTLY PRN
Status: DISCONTINUED | OUTPATIENT
Start: 2024-01-27 | End: 2024-01-31 | Stop reason: HOSPADM

## 2024-01-27 RX ORDER — FUROSEMIDE 10 MG/ML
40 INJECTION INTRAMUSCULAR; INTRAVENOUS ONCE
Status: COMPLETED | OUTPATIENT
Start: 2024-01-27 | End: 2024-01-27

## 2024-01-27 RX ADMIN — FERROUS SULFATE TAB 325 MG (65 MG ELEMENTAL FE) 325 MG: 325 (65 FE) TAB at 16:37

## 2024-01-27 RX ADMIN — PREGABALIN 200 MG: 150 CAPSULE ORAL at 20:20

## 2024-01-27 RX ADMIN — FERROUS SULFATE TAB 325 MG (65 MG ELEMENTAL FE) 325 MG: 325 (65 FE) TAB at 09:34

## 2024-01-27 RX ADMIN — VANCOMYCIN HYDROCHLORIDE 1000 MG: 1 INJECTION, POWDER, LYOPHILIZED, FOR SOLUTION INTRAVENOUS at 16:41

## 2024-01-27 RX ADMIN — PREGABALIN 200 MG: 150 CAPSULE ORAL at 09:35

## 2024-01-27 RX ADMIN — SODIUM CHLORIDE, PRESERVATIVE FREE 10 ML: 5 INJECTION INTRAVENOUS at 09:35

## 2024-01-27 RX ADMIN — PREDNISONE 40 MG: 20 TABLET ORAL at 09:35

## 2024-01-27 RX ADMIN — FUROSEMIDE 40 MG: 10 INJECTION, SOLUTION INTRAMUSCULAR; INTRAVENOUS at 10:11

## 2024-01-27 RX ADMIN — SODIUM CHLORIDE, PRESERVATIVE FREE 10 ML: 5 INJECTION INTRAVENOUS at 20:27

## 2024-01-27 RX ADMIN — VANCOMYCIN HYDROCHLORIDE 1000 MG: 1 INJECTION, POWDER, LYOPHILIZED, FOR SOLUTION INTRAVENOUS at 05:08

## 2024-01-27 RX ADMIN — SODIUM CHLORIDE: 9 INJECTION, SOLUTION INTRAVENOUS at 16:41

## 2024-01-27 RX ADMIN — TUBERCULIN PURIFIED PROTEIN DERIVATIVE 5 UNITS: 5 INJECTION, SOLUTION INTRADERMAL at 10:11

## 2024-01-27 RX ADMIN — HYDROMORPHONE HYDROCHLORIDE 0.5 MG: 1 INJECTION, SOLUTION INTRAMUSCULAR; INTRAVENOUS; SUBCUTANEOUS at 10:12

## 2024-01-27 RX ADMIN — HYDROMORPHONE HYDROCHLORIDE 0.5 MG: 1 INJECTION, SOLUTION INTRAMUSCULAR; INTRAVENOUS; SUBCUTANEOUS at 21:47

## 2024-01-27 RX ADMIN — DAPSONE 100 MG: 25 TABLET ORAL at 09:34

## 2024-01-27 RX ADMIN — HYDROXYCHLOROQUINE SULFATE 200 MG: 200 TABLET ORAL at 09:34

## 2024-01-27 RX ADMIN — HYDROMORPHONE HYDROCHLORIDE 0.5 MG: 1 INJECTION, SOLUTION INTRAMUSCULAR; INTRAVENOUS; SUBCUTANEOUS at 17:49

## 2024-01-27 RX ADMIN — ENOXAPARIN SODIUM 40 MG: 100 INJECTION SUBCUTANEOUS at 20:20

## 2024-01-27 ASSESSMENT — PAIN DESCRIPTION - FREQUENCY
FREQUENCY: CONTINUOUS
FREQUENCY: INTERMITTENT
FREQUENCY: INTERMITTENT

## 2024-01-27 ASSESSMENT — PAIN DESCRIPTION - ORIENTATION
ORIENTATION: LEFT;RIGHT;ANTERIOR;POSTERIOR
ORIENTATION: RIGHT;LEFT
ORIENTATION: LEFT;RIGHT

## 2024-01-27 ASSESSMENT — PAIN DESCRIPTION - PAIN TYPE
TYPE: CHRONIC PAIN

## 2024-01-27 ASSESSMENT — PAIN DESCRIPTION - DESCRIPTORS
DESCRIPTORS: DISCOMFORT
DESCRIPTORS: BURNING;CRAMPING;SORE
DESCRIPTORS: ACHING;SORE;TENDER

## 2024-01-27 ASSESSMENT — PAIN DESCRIPTION - LOCATION
LOCATION: LEG
LOCATION: LEG
LOCATION: GENERALIZED

## 2024-01-27 ASSESSMENT — PAIN - FUNCTIONAL ASSESSMENT
PAIN_FUNCTIONAL_ASSESSMENT: PREVENTS OR INTERFERES SOME ACTIVE ACTIVITIES AND ADLS
PAIN_FUNCTIONAL_ASSESSMENT: ACTIVITIES ARE NOT PREVENTED
PAIN_FUNCTIONAL_ASSESSMENT: ACTIVITIES ARE NOT PREVENTED

## 2024-01-27 ASSESSMENT — PAIN DESCRIPTION - ONSET
ONSET: ON-GOING

## 2024-01-27 ASSESSMENT — PAIN SCALES - GENERAL
PAINLEVEL_OUTOF10: 10
PAINLEVEL_OUTOF10: 4
PAINLEVEL_OUTOF10: 10
PAINLEVEL_OUTOF10: 10
PAINLEVEL_OUTOF10: 5

## 2024-01-27 NOTE — PROGRESS NOTES
VANCO DAILY FOLLOW UP NOTE  [facility]   Pharmacy Pharmacokinetic Monitoring Service - Vancomycin    Consulting Provider: Mitch De Paz MDAttending    Indication: SSTI  Target Concentration: Goal AUC/AVTAR 400-600 mg*hr/L  Day of Therapy: 4  Additional Antimicrobials: ceftriaxone    Patient eligible for piperacillin-tazobactam to cefepime auto-substitution per P&T approved protocol? N/A    Pertinent Laboratory Values:   Wt Readings from Last 1 Encounters:   01/24/24 69 kg (152 lb 1.6 oz)     Temp Readings from Last 1 Encounters:   01/27/24 98.3 °F (36.8 °C) (Oral)     Recent Labs     01/24/24  1451 01/25/24  0533 01/26/24  0615   BUN 16 14 13   CREATININE 0.80 0.80 0.60   WBC 11.7* 12.5* 11.6*   LACTSEPSIS 1.6  --   --      Estimated Creatinine Clearance: 76 mL/min (based on SCr of 0.6 mg/dL).    Lab Results   Component Value Date/Time    VANCORANDOM 10.7 01/26/2024 06:15 AM       MRSA Nasal Swab: N/A. Non-respiratory infection    Assessment:  Date/Time Dose Concentration AUC   1/26 0615 1250 mg Q24H 10.7 321   Note: Serum concentrations collected for AUC dosing may appear elevated if collected in close proximity to the dose administered, this is not necessarily an indication of toxicity    Plan:  Dosing recommendations based on Bayesian software  Will continue the current vancomycin dose of 1000 mg IV every 12 hours  Renal labs as indicated   Vancomycin concentrations will be ordered as clinically appropriate   Pharmacy will continue to monitor patient and adjust therapy as indicated    Thank you for the consult,  Chuck Kaba [facility]

## 2024-01-27 NOTE — PROGRESS NOTES
Hospitalist Progress Note   Admit Date:  2024  2:33 PM   Name:  Meli Blancas   Age:  68 y.o.  Sex:  female  :  1955   MRN:  [id number]   Room:  [facility]    Presenting/Chief Complaint: Sore and Rash     Reason(s) for Admission: Rash [R21]     Hospital Course:   Meli Blancas is a 68 y.o. female with a h/o COPD who presents today with worsening blisters over her entire body for the past 2-3 weeks. She is convinced it has something to do with her hot water heater being broken and unable to cleans herself. She was admitted and started on steroids and ABX: rocephin, dapsone, vancomycin. Baseline cortisol 23.7, AM cortisol 16.4.    Subjective & 24hr Events:   Rash continues to improve  She continues to wake up and feel discombobulated and out of sorts.  Apparently got agitated with staff.  I was called up to prevent another code Kasey.  She is redirectable.  It seems like it takes a couple of minutes for her to reorient herself in the morning.  Her disorientation is also coupled by the fact that she is fighting at least superficial infection of her skin as cultures from her skin is growing staph.    Antibiotics de-escalated to contain only dapsone and vancomycin, Rocephin discontinued.  Her legs have been wrapped with Aquaphor and Kerlix.  Her underarm area is much less sore today.    We discussed her mental health.  She tells me that she is dealt with anxiety and depression ever since losing her mother and her .  Has been put on SSRI by her primary doctor but she states she only took it for short while and then discontinued it.  She intermittently became tearful having this conversation with me.  She seems to struggle with coping and seems to have some prolonged grief about her mother's loss as she lives in the house that her mother used to live in.    She agrees to Lasix today as she appears hypervolemic after giving IV fluids on admission  She agrees to being seen by physical therapy  35.8 - 46.3 %    .6 82 - 102 FL    MCH 31.5 26.1 - 32.9 PG    MCHC 31.3 (L) 31.4 - 35.0 g/dL    RDW 14.1 11.9 - 14.6 %    Platelets 238 150 - 450 K/uL    MPV 10.0 9.4 - 12.3 FL    nRBC 0.00 0.0 - 0.2 K/uL   Basic Metabolic Panel w/ Reflex to MG    Collection Time: 01/26/24  6:15 AM   Result Value Ref Range    Sodium 141 136 - 146 mmol/L    Potassium 3.7 3.5 - 5.1 mmol/L    Chloride 112 103 - 113 mmol/L    CO2 25 21 - 32 mmol/L    Anion Gap 4 2 - 11 mmol/L    Glucose 111 (H) 65 - 100 mg/dL    BUN 13 8 - 23 MG/DL    Creatinine 0.60 0.6 - 1.0 MG/DL    Est, Glom Filt Rate >60 >60 ml/min/1.73m2    Calcium 9.1 8.3 - 10.4 MG/DL       Current Meds:  Current Facility-Administered Medications   Medication Dose Route Frequency    tuberculin injection 5 Units  5 Units IntraDERmal Once    mineral oil-hydrophilic petrolatum (AQUAPHOR) ointment   Topical Daily    hydrOXYzine HCl (ATARAX) tablet 25 mg  25 mg Oral TID PRN    LORazepam (ATIVAN) tablet 0.5 mg  0.5 mg Oral Nightly PRN    vancomycin (VANCOCIN) 1,000 mg in sodium chloride 0.9 % 250 mL IVPB (Icue2Mnw)  1,000 mg IntraVENous Q12H    HYDROcodone-acetaminophen (NORCO)  MG per tablet 1 tablet  1 tablet Oral Q4H PRN    HYDROmorphone HCl PF (DILAUDID) injection 0.5 mg  0.5 mg IntraVENous Q4H PRN    HYDROmorphone HCl PF (DILAUDID) injection 1 mg  1 mg IntraVENous Q4H PRN    sodium chloride 0.9 % bolus 1,000 mL  1,000 mL IntraVENous Once    dapsone tablet 100 mg  100 mg Oral Daily    hydroxychloroquine (PLAQUENIL) tablet 200 mg  200 mg Oral Daily    ferrous sulfate (IRON 325) tablet 325 mg  325 mg Oral BID WC    pregabalin (LYRICA) capsule 200 mg  200 mg Oral BID    traZODone (DESYREL) tablet 100 mg  100 mg Oral Nightly PRN    sodium chloride flush 0.9 % injection 5-40 mL  5-40 mL IntraVENous 2 times per day    sodium chloride flush 0.9 % injection 5-40 mL  5-40 mL IntraVENous PRN    0.9 % sodium chloride infusion   IntraVENous PRN    potassium chloride (KLOR-CON M)

## 2024-01-27 NOTE — PROGRESS NOTES
Pt agitated this am refusing vs, medications and AM assessment. Pt redirected, security call, notified NP. Pt pleasant for the rest of the shift without further incident.

## 2024-01-28 LAB
BACTERIA SPEC CULT: ABNORMAL
GRAM STN SPEC: ABNORMAL
GRAM STN SPEC: ABNORMAL
SERVICE CMNT-IMP: ABNORMAL
VANCOMYCIN SERPL-MCNC: 12.5 UG/ML

## 2024-01-28 PROCEDURE — 2580000003 HC RX 258: Performed by: INTERNAL MEDICINE

## 2024-01-28 PROCEDURE — 80202 ASSAY OF VANCOMYCIN: CPT

## 2024-01-28 PROCEDURE — 6360000002 HC RX W HCPCS: Performed by: INTERNAL MEDICINE

## 2024-01-28 PROCEDURE — 6360000002 HC RX W HCPCS: Performed by: PHYSICIAN ASSISTANT

## 2024-01-28 PROCEDURE — 2580000003 HC RX 258: Performed by: PHYSICIAN ASSISTANT

## 2024-01-28 PROCEDURE — 6370000000 HC RX 637 (ALT 250 FOR IP): Performed by: DERMATOLOGY

## 2024-01-28 PROCEDURE — 36415 COLL VENOUS BLD VENIPUNCTURE: CPT

## 2024-01-28 PROCEDURE — 97161 PT EVAL LOW COMPLEX 20 MIN: CPT

## 2024-01-28 PROCEDURE — 6370000000 HC RX 637 (ALT 250 FOR IP): Performed by: PHYSICIAN ASSISTANT

## 2024-01-28 PROCEDURE — 6370000000 HC RX 637 (ALT 250 FOR IP): Performed by: FAMILY MEDICINE

## 2024-01-28 PROCEDURE — 97530 THERAPEUTIC ACTIVITIES: CPT

## 2024-01-28 PROCEDURE — 1100000000 HC RM PRIVATE

## 2024-01-28 PROCEDURE — 2500000003 HC RX 250 WO HCPCS: Performed by: PHYSICIAN ASSISTANT

## 2024-01-28 PROCEDURE — 97535 SELF CARE MNGMENT TRAINING: CPT

## 2024-01-28 PROCEDURE — 6370000000 HC RX 637 (ALT 250 FOR IP): Performed by: INTERNAL MEDICINE

## 2024-01-28 PROCEDURE — 97165 OT EVAL LOW COMPLEX 30 MIN: CPT

## 2024-01-28 RX ORDER — FUROSEMIDE 20 MG/1
20 TABLET ORAL ONCE
Status: COMPLETED | OUTPATIENT
Start: 2024-01-28 | End: 2024-01-28

## 2024-01-28 RX ORDER — GUAIFENESIN 600 MG/1
1200 TABLET, EXTENDED RELEASE ORAL 2 TIMES DAILY
Status: DISCONTINUED | OUTPATIENT
Start: 2024-01-28 | End: 2024-01-31 | Stop reason: HOSPADM

## 2024-01-28 RX ADMIN — HYDROXYCHLOROQUINE SULFATE 200 MG: 200 TABLET ORAL at 08:47

## 2024-01-28 RX ADMIN — PREDNISONE 40 MG: 20 TABLET ORAL at 08:47

## 2024-01-28 RX ADMIN — DAPSONE 100 MG: 25 TABLET ORAL at 08:46

## 2024-01-28 RX ADMIN — HYDROMORPHONE HYDROCHLORIDE 0.5 MG: 1 INJECTION, SOLUTION INTRAMUSCULAR; INTRAVENOUS; SUBCUTANEOUS at 21:10

## 2024-01-28 RX ADMIN — Medication: at 08:56

## 2024-01-28 RX ADMIN — PREGABALIN 200 MG: 150 CAPSULE ORAL at 08:46

## 2024-01-28 RX ADMIN — HYDROMORPHONE HYDROCHLORIDE 0.5 MG: 1 INJECTION, SOLUTION INTRAMUSCULAR; INTRAVENOUS; SUBCUTANEOUS at 04:19

## 2024-01-28 RX ADMIN — FUROSEMIDE 20 MG: 20 TABLET ORAL at 21:09

## 2024-01-28 RX ADMIN — GUAIFENESIN 1200 MG: 600 TABLET ORAL at 14:32

## 2024-01-28 RX ADMIN — HYDROMORPHONE HYDROCHLORIDE 1 MG: 1 INJECTION, SOLUTION INTRAMUSCULAR; INTRAVENOUS; SUBCUTANEOUS at 17:02

## 2024-01-28 RX ADMIN — PREGABALIN 200 MG: 150 CAPSULE ORAL at 19:50

## 2024-01-28 RX ADMIN — FERROUS SULFATE TAB 325 MG (65 MG ELEMENTAL FE) 325 MG: 325 (65 FE) TAB at 08:47

## 2024-01-28 RX ADMIN — HYDROMORPHONE HYDROCHLORIDE 1 MG: 1 INJECTION, SOLUTION INTRAMUSCULAR; INTRAVENOUS; SUBCUTANEOUS at 08:47

## 2024-01-28 RX ADMIN — FERROUS SULFATE TAB 325 MG (65 MG ELEMENTAL FE) 325 MG: 325 (65 FE) TAB at 16:59

## 2024-01-28 RX ADMIN — VANCOMYCIN HYDROCHLORIDE 1000 MG: 1 INJECTION, POWDER, LYOPHILIZED, FOR SOLUTION INTRAVENOUS at 17:06

## 2024-01-28 RX ADMIN — ENOXAPARIN SODIUM 40 MG: 100 INJECTION SUBCUTANEOUS at 19:50

## 2024-01-28 RX ADMIN — SODIUM CHLORIDE, PRESERVATIVE FREE 10 ML: 5 INJECTION INTRAVENOUS at 08:47

## 2024-01-28 RX ADMIN — VANCOMYCIN HYDROCHLORIDE 1000 MG: 1 INJECTION, POWDER, LYOPHILIZED, FOR SOLUTION INTRAVENOUS at 04:28

## 2024-01-28 RX ADMIN — SODIUM CHLORIDE, PRESERVATIVE FREE 10 ML: 5 INJECTION INTRAVENOUS at 19:50

## 2024-01-28 ASSESSMENT — PAIN SCALES - GENERAL
PAINLEVEL_OUTOF10: 3
PAINLEVEL_OUTOF10: 10
PAINLEVEL_OUTOF10: 10
PAINLEVEL_OUTOF10: 0
PAINLEVEL_OUTOF10: 3
PAINLEVEL_OUTOF10: 7
PAINLEVEL_OUTOF10: 4
PAINLEVEL_OUTOF10: 10

## 2024-01-28 ASSESSMENT — PAIN - FUNCTIONAL ASSESSMENT
PAIN_FUNCTIONAL_ASSESSMENT: ACTIVITIES ARE NOT PREVENTED
PAIN_FUNCTIONAL_ASSESSMENT: ACTIVITIES ARE NOT PREVENTED

## 2024-01-28 ASSESSMENT — PAIN DESCRIPTION - LOCATION
LOCATION: LEG

## 2024-01-28 ASSESSMENT — PAIN DESCRIPTION - ORIENTATION
ORIENTATION: LEFT;RIGHT
ORIENTATION: RIGHT;LEFT
ORIENTATION: LEFT;RIGHT

## 2024-01-28 ASSESSMENT — PAIN DESCRIPTION - PAIN TYPE
TYPE: CHRONIC PAIN
TYPE: CHRONIC PAIN

## 2024-01-28 ASSESSMENT — PAIN DESCRIPTION - ONSET
ONSET: PROGRESSIVE
ONSET: ON-GOING

## 2024-01-28 ASSESSMENT — PAIN DESCRIPTION - DESCRIPTORS
DESCRIPTORS: SHARP;SORE
DESCRIPTORS: ACHING;BURNING

## 2024-01-28 ASSESSMENT — PAIN DESCRIPTION - FREQUENCY
FREQUENCY: CONTINUOUS
FREQUENCY: CONTINUOUS

## 2024-01-28 NOTE — PLAN OF CARE
Problem: Pain  Goal: Verbalizes/displays adequate comfort level or baseline comfort level  Outcome: Progressing     Problem: Safety - Adult  Goal: Free from fall injury  1/27/2024 2559 by Manas Og RN  Outcome: Progressing     Problem: Skin/Tissue Integrity  Goal: Absence of new skin breakdown  Description: 1.  Monitor for areas of redness and/or skin breakdown  2.  Assess vascular access sites hourly  3.  Every 4-6 hours minimum:  Change oxygen saturation probe site  4.  Every 4-6 hours:  If on nasal continuous positive airway pressure, respiratory therapy assess nares and determine need for appliance change or resting period.  Outcome: Progressing

## 2024-01-28 NOTE — PROGRESS NOTES
Hospitalist Progress Note   Admit Date:  2024  2:33 PM   Name:  Meli Blancas   Age:  68 y.o.  Sex:  female  :  1955   MRN:  [id number]   Room:  [facility]    Presenting/Chief Complaint: Sore and Rash     Reason(s) for Admission: Rash [R21]     Hospital Course:   Meli Blancas is a 68 y.o. female with a h/o COPD who presents today with worsening blisters over her entire body for the past 2-3 weeks. She is convinced it has something to do with her hot water heater being broken and unable to cleans herself. She was admitted and started on steroids and ABX: rocephin, dapsone, vancomycin. Baseline cortisol 23.7, AM cortisol 16.4.    Subjective & 24hr Events:   Rash continues to improve  She was pleasant this am   Asking for mucinex  Afebrile  No hypoxia   Diuresed well yesterday despite I&O    Assessment & Plan:       Bilateral lower leg cellulitis // pustula rash  - woriking dx of pemphigus foliaceous   - Dr Humeniuk saw her   - Will continue on Vancomycin (day 5) and dapsone (day 4).  Discontinued rocephin based off of wound cultures which is growing staph   - continue steroids: prednisone 40 daily  - Aquaphor and kerlex to legs  - wound care ordered  - per Derm, once  better will try cellcept, ultimately may consider rituxan  - continue analgesia: Norco and Dilaudud  - Follow bcx and wound cx      Hypotension   - hold anti-hypertensives (lisinopril-hydrochlorothiazide)      COPD  - not in exacerbation      Hyponatremia  -Resolved    Anxiety/depression  - I discussed initiating BuSpar or resuming SSRI but she does not express any interest in doing this.  Would not initiate an antipsychotic on her.    Anticipated Discharge Arrangements:   Stay through the weekend     PT/OT evals and PPD ordered?  No  Diet:  ADULT DIET; Regular  VTE prophylaxis: lovenox   Code status: Full Code      Non-peripheral Lines and Tubes (if present):          Telemetry (if present):       Hospital

## 2024-01-28 NOTE — PROGRESS NOTES
VANCO DAILY FOLLOW UP NOTE  [facility]   Pharmacy Pharmacokinetic Monitoring Service - Vancomycin    Consulting Provider: Mitch De Paz MD    Indication: SSTI  Target Concentration: Goal AUC/AVTAR 400-600 mg*hr/L  Day of Therapy: 5  Additional Antimicrobials: ceftriaxone    Patient eligible for piperacillin-tazobactam to cefepime auto-substitution per P&T approved protocol? N/A    Pertinent Laboratory Values:   Wt Readings from Last 1 Encounters:   01/24/24 69 kg (152 lb 1.6 oz)     Temp Readings from Last 1 Encounters:   01/28/24 98.1 °F (36.7 °C) (Oral)     Recent Labs     01/26/24  0615   BUN 13   CREATININE 0.60   WBC 11.6*     Estimated Creatinine Clearance: 76 mL/min (based on SCr of 0.6 mg/dL).    Lab Results   Component Value Date/Time    VANCORANDOM 12.5 01/28/2024 03:49 AM       MRSA Nasal Swab: N/A. Non-respiratory infection    Assessment:  Date/Time Dose Concentration AUC   1/26 0615 1250 mg Q24H 10.7 321   1/28 0349 1000 mg Q12H 12.5 521   Note: Serum concentrations collected for AUC dosing may appear elevated if collected in close proximity to the dose administered, this is not necessarily an indication of toxicity    Plan:  Dosing recommendations based on Bayesian software  Will continue the current vancomycin dose of 1000 mg IV every 12 hours  Renal labs as indicated   Vancomycin concentrations will be ordered as clinically appropriate   Pharmacy will continue to monitor patient and adjust therapy as indicated    Thank you for the consult,  Chuck Kaba RP

## 2024-01-28 NOTE — PROGRESS NOTES
putting on and taking off regular upper body clothing?: None  How much help is needed for taking care of personal grooming?: None  How much help for eating meals?: None  AM-PAC Inpatient Daily Activity Raw Score: 21  AM-PAC Inpatient ADL T-Scale Score : 44.27  ADL Inpatient CMS 0-100% Score: 32.79  ADL Inpatient CMS G-Code Modifier : CJ           SUBJECTIVE:     Ms. Balncas states, \"I am doing much better.\"     Social/Functional Lives With: Alone  Type of Home: House  Home Layout: One level  Bathroom Toilet: Standard  Bathroom Equipment: Shower chair  Bathroom Accessibility: Accessible  Home Equipment: Cane, quad, Walker, rolling, Oxygen (Home O2 through [employer] (uses PRN))  Receives Help From: Other (comment), Home health (Medicaid .  Per pt she has [facility].)  ADL Assistance: Independent  Homemaking Assistance: Independent  Ambulation Assistance: Independent  Transfer Assistance: Independent  Active : No  Patient's  Info: Pays people for transportation  Occupation: Retired    OBJECTIVE:     LINES / DRAINS / AIRWAY: IV    RESTRICTIONS/PRECAUTIONS:       PAIN: VITALS / O2:   Pre Treatment:    10/10      Post Treatment: 10/10 (BLEs - RN aware)       Vitals          Oxygen     RA       GROSS EVALUATION: INTACT IMPAIRED   (See Comments)   UE AROM [x] []Generally decreased   UE PROM [x] []   Strength [x] Generally decreased; BUE WFL     Posture / Balance [] intact unsupported sitting balance   Fair + static standing balance  Fair + dynamic standing balance   Sensation [x]     Coordination [x]       Tone [x]       Edema [] 2+ edema in BLEs   Activity Tolerance [] Generally decreased        Hand Dominance R [] L []      COGNITION/  PERCEPTION: INTACT IMPAIRED   (See Comments)   Orientation [x]     Vision [x]     Hearing [x]     Cognition  [x]     Perception [x]       MOBILITY: I Mod I S SBA CGA Min Mod Max Total  NT x2 Comments:   Bed Mobility    Rolling [] [] [] [] [] [] [] [] []  [] [] Already sitting EOB upon arrival   Supine to Sit [] [] [] [] [] [] [] [] [] [] []    Scooting [] [] [] [] [] [] [] [] [] [] []    Sit to Supine [] [] [] [] [] [] [] [] [] [] []    Transfers    Sit to Stand [] [] [x] [x] [] [] [] [] [] [] []    Bed to Chair [] [] [x] [x] [] [] [] [] [] [] []    Stand to Sit [] [] [x] [x] [] [] [] [] [] [] []    Tub/Shower [] [] [] [] [] [] [] [] [] [] []     Toilet [] [] [] [] [] [] [] [] [] [] []      [] [] [] [] [] [] [] [] [] [] []    I=Independent, Mod I=Modified Independent, S=Supervision/Setup, SBA=Standby Assistance, CGA=Contact Guard Assistance, Min=Minimal Assistance, Mod=Moderate Assistance, Max=Maximal Assistance, Total=Total Assistance, NT=Not Tested    ACTIVITIES OF DAILY LIVING: I Mod I S SBA CGA Min Mod Max Total NT Comments   BASIC ADLs:              Upper Body Bathing  [] [] [] [] [] [] [] [] [] []     Lower Body Bathing [] [] [] [] [] [] [] [] [] []     Toileting [] [] [] [] [] [] [] [] [] []    Upper Body Dressing [] [] [x] [] [] [] [] [] [] [] Donning gown as robe   Lower Body Dressing [] [] [] [] [] [] [] [] [] []    Feeding [] [] [] [] [] [] [] [] [] []    Grooming [] [] [x] [x] [] [] [] [] [] [] Standing EOS   Personal Device Care [] [] [] [] [] [] [] [] [] []    Functional Mobility [] [] [x] [x] [] [] [] [] [] []    I=Independent, Mod I=Modified Independent, S=Supervision/Setup, SBA=Standby Assistance, CGA=Contact Guard Assistance, Min=Minimal Assistance, Mod=Moderate Assistance, Max=Maximal Assistance, Total=Total Assistance, NT=Not Tested    PLAN:   FREQUENCY/DURATION     for duration of hospital stay or until stated goals are met, whichever comes first.    PROBLEM LIST:   (Skilled intervention is medically necessary to address:)  discharge   INTERVENTIONS PLANNED:  (Benefits and precautions of occupational therapy have been discussed with the patient.)  discharge         TREATMENT:     EVALUATION: LOW COMPLEXITY: (Untimed Charge)    TREATMENT:   Self

## 2024-01-29 ENCOUNTER — APPOINTMENT (OUTPATIENT)
Dept: GENERAL RADIOLOGY | Age: 69
DRG: 872 | End: 2024-01-29
Payer: MEDICARE

## 2024-01-29 ENCOUNTER — APPOINTMENT (OUTPATIENT)
Dept: CT IMAGING | Age: 69
DRG: 872 | End: 2024-01-29
Payer: MEDICARE

## 2024-01-29 ENCOUNTER — APPOINTMENT (OUTPATIENT)
Dept: ULTRASOUND IMAGING | Age: 69
DRG: 872 | End: 2024-01-29
Payer: MEDICARE

## 2024-01-29 PROBLEM — B95.62 CELLULITIS DUE TO MRSA: Status: ACTIVE | Noted: 2024-01-29

## 2024-01-29 PROBLEM — R41.0 DELIRIUM: Status: ACTIVE | Noted: 2024-01-29

## 2024-01-29 PROBLEM — F39 MOOD DISORDER (HCC): Status: ACTIVE | Noted: 2024-01-29

## 2024-01-29 PROBLEM — L03.90 CELLULITIS DUE TO MRSA: Status: ACTIVE | Noted: 2024-01-29

## 2024-01-29 LAB
ALBUMIN SERPL-MCNC: 2.8 G/DL (ref 3.2–4.6)
ALBUMIN/GLOB SERPL: 0.9 (ref 0.4–1.6)
ALP SERPL-CCNC: 113 U/L (ref 50–136)
ALT SERPL-CCNC: 18 U/L (ref 12–65)
AMMONIA PLAS-SCNC: 33 UMOL/L (ref 11–32)
ANION GAP BLD CALC-SCNC: ABNORMAL MMOL/L
ANION GAP SERPL CALC-SCNC: 1 MMOL/L (ref 2–11)
ANION GAP SERPL CALC-SCNC: 4 MMOL/L (ref 2–11)
APPEARANCE UR: CLEAR
ARTERIAL PATENCY WRIST A: POSITIVE
AST SERPL-CCNC: 19 U/L (ref 15–37)
BACTERIA SPEC CULT: NORMAL
BACTERIA SPEC CULT: NORMAL
BASE EXCESS BLD CALC-SCNC: 6.2 MMOL/L
BASOPHILS # BLD: 0.1 K/UL (ref 0–0.2)
BASOPHILS # BLD: 0.1 K/UL (ref 0–0.2)
BASOPHILS NFR BLD: 1 % (ref 0–2)
BASOPHILS NFR BLD: 1 % (ref 0–2)
BDY SITE: ABNORMAL
BILIRUB SERPL-MCNC: 0.5 MG/DL (ref 0.2–1.1)
BILIRUB UR QL: NEGATIVE
BUN SERPL-MCNC: 15 MG/DL (ref 8–23)
BUN SERPL-MCNC: 15 MG/DL (ref 8–23)
CA-I BLD-MCNC: 1.27 MMOL/L (ref 1.12–1.32)
CALCIUM SERPL-MCNC: 8.9 MG/DL (ref 8.3–10.4)
CALCIUM SERPL-MCNC: 9.5 MG/DL (ref 8.3–10.4)
CHLORIDE SERPL-SCNC: 107 MMOL/L (ref 103–113)
CHLORIDE SERPL-SCNC: 107 MMOL/L (ref 103–113)
CO2 BLD-SCNC: 32 MMOL/L (ref 13–23)
CO2 SERPL-SCNC: 30 MMOL/L (ref 21–32)
CO2 SERPL-SCNC: 33 MMOL/L (ref 21–32)
COLOR UR: NORMAL
CREAT SERPL-MCNC: 0.6 MG/DL (ref 0.6–1)
CREAT SERPL-MCNC: 0.7 MG/DL (ref 0.6–1)
CRP SERPL-MCNC: 2.3 MG/DL (ref 0–0.9)
D DIMER PPP FEU-MCNC: <0.27 UG/ML(FEU)
DIFFERENTIAL METHOD BLD: ABNORMAL
DIFFERENTIAL METHOD BLD: ABNORMAL
EOSINOPHIL # BLD: 0.1 K/UL (ref 0–0.8)
EOSINOPHIL # BLD: 0.3 K/UL (ref 0–0.8)
EOSINOPHIL NFR BLD: 2 % (ref 0.5–7.8)
EOSINOPHIL NFR BLD: 4 % (ref 0.5–7.8)
ERYTHROCYTE [DISTWIDTH] IN BLOOD BY AUTOMATED COUNT: 14.7 % (ref 11.9–14.6)
ERYTHROCYTE [DISTWIDTH] IN BLOOD BY AUTOMATED COUNT: 14.9 % (ref 11.9–14.6)
ERYTHROCYTE [SEDIMENTATION RATE] IN BLOOD: 28 MM/HR (ref 0–30)
GAS FLOW.O2 O2 DELIVERY SYS: ABNORMAL
GLOBULIN SER CALC-MCNC: 3.2 G/DL (ref 2.8–4.5)
GLUCOSE BLD STRIP.AUTO-MCNC: 108 MG/DL (ref 65–100)
GLUCOSE BLD STRIP.AUTO-MCNC: 108 MG/DL (ref 65–100)
GLUCOSE SERPL-MCNC: 124 MG/DL (ref 65–100)
GLUCOSE SERPL-MCNC: 99 MG/DL (ref 65–100)
GLUCOSE UR STRIP.AUTO-MCNC: NEGATIVE MG/DL
HCO3 BLD-SCNC: 32.4 MMOL/L (ref 22–26)
HCT VFR BLD AUTO: 33 % (ref 35.8–46.3)
HCT VFR BLD AUTO: 33.9 % (ref 35.8–46.3)
HGB BLD-MCNC: 10 G/DL (ref 11.7–15.4)
HGB BLD-MCNC: 10.4 G/DL (ref 11.7–15.4)
HGB UR QL STRIP: NEGATIVE
IMM GRANULOCYTES # BLD AUTO: 0.1 K/UL (ref 0–0.5)
IMM GRANULOCYTES # BLD AUTO: 0.2 K/UL (ref 0–0.5)
IMM GRANULOCYTES NFR BLD AUTO: 2 % (ref 0–5)
IMM GRANULOCYTES NFR BLD AUTO: 2 % (ref 0–5)
KETONES UR QL STRIP.AUTO: NEGATIVE MG/DL
LACTATE SERPL-SCNC: 1.5 MMOL/L (ref 0.4–2)
LEUKOCYTE ESTERASE UR QL STRIP.AUTO: NEGATIVE
LYMPHOCYTES # BLD: 0.9 K/UL (ref 0.5–4.6)
LYMPHOCYTES # BLD: 1.3 K/UL (ref 0.5–4.6)
LYMPHOCYTES NFR BLD: 11 % (ref 13–44)
LYMPHOCYTES NFR BLD: 18 % (ref 13–44)
MAGNESIUM SERPL-MCNC: 2.2 MG/DL (ref 1.8–2.4)
MAGNESIUM SERPL-MCNC: 2.2 MG/DL (ref 1.8–2.4)
MCH RBC QN AUTO: 30.9 PG (ref 26.1–32.9)
MCH RBC QN AUTO: 31.1 PG (ref 26.1–32.9)
MCHC RBC AUTO-ENTMCNC: 30.3 G/DL (ref 31.4–35)
MCHC RBC AUTO-ENTMCNC: 30.7 G/DL (ref 31.4–35)
MCV RBC AUTO: 100.6 FL (ref 82–102)
MCV RBC AUTO: 102.5 FL (ref 82–102)
MM INDURATION, POC: 0 MM (ref 0–5)
MONOCYTES # BLD: 1 K/UL (ref 0.1–1.3)
MONOCYTES # BLD: 1.3 K/UL (ref 0.1–1.3)
MONOCYTES NFR BLD: 12 % (ref 4–12)
MONOCYTES NFR BLD: 18 % (ref 4–12)
NEUTS SEG # BLD: 4.4 K/UL (ref 1.7–8.2)
NEUTS SEG # BLD: 6.4 K/UL (ref 1.7–8.2)
NEUTS SEG NFR BLD: 59 % (ref 43–78)
NEUTS SEG NFR BLD: 70 % (ref 43–78)
NITRITE UR QL STRIP.AUTO: NEGATIVE
NRBC # BLD: 0.02 K/UL (ref 0–0.2)
NRBC # BLD: 0.03 K/UL (ref 0–0.2)
NT PRO BNP: 1415 PG/ML (ref 5–125)
PCO2 BLD: 53.2 MMHG (ref 35–45)
PH BLD: 7.39 (ref 7.35–7.45)
PH UR STRIP: 5 (ref 5–9)
PLATELET # BLD AUTO: 298 K/UL (ref 150–450)
PLATELET # BLD AUTO: 301 K/UL (ref 150–450)
PMV BLD AUTO: 9.6 FL (ref 9.4–12.3)
PMV BLD AUTO: 9.8 FL (ref 9.4–12.3)
PO2 BLD: 90 MMHG (ref 75–100)
POC FIO2: 4
POTASSIUM BLD-SCNC: 3.2 MMOL/L (ref 3.5–5.1)
POTASSIUM SERPL-SCNC: 3.2 MMOL/L (ref 3.5–5.1)
POTASSIUM SERPL-SCNC: 3.3 MMOL/L (ref 3.5–5.1)
PPD, POC: NEGATIVE
PROCALCITONIN SERPL-MCNC: <0.05 NG/ML (ref 0–0.49)
PROCALCITONIN SERPL-MCNC: <0.05 NG/ML (ref 0–0.49)
PROT SERPL-MCNC: 6 G/DL (ref 6.3–8.2)
PROT UR STRIP-MCNC: NEGATIVE MG/DL
RBC # BLD AUTO: 3.22 M/UL (ref 4.05–5.2)
RBC # BLD AUTO: 3.37 M/UL (ref 4.05–5.2)
SAO2 % BLD: 97 %
SERVICE CMNT-IMP: ABNORMAL
SERVICE CMNT-IMP: ABNORMAL
SERVICE CMNT-IMP: NORMAL
SERVICE CMNT-IMP: NORMAL
SODIUM BLD-SCNC: 142 MMOL/L (ref 136–145)
SODIUM SERPL-SCNC: 141 MMOL/L (ref 136–146)
SODIUM SERPL-SCNC: 141 MMOL/L (ref 136–146)
SP GR UR REFRACTOMETRY: 1.02 (ref 1–1.02)
SPECIMEN SITE: ABNORMAL
UROBILINOGEN UR QL STRIP.AUTO: 0.2 EU/DL (ref 0.2–1)
WBC # BLD AUTO: 7.2 K/UL (ref 4.3–11.1)
WBC # BLD AUTO: 9 K/UL (ref 4.3–11.1)

## 2024-01-29 PROCEDURE — 84295 ASSAY OF SERUM SODIUM: CPT

## 2024-01-29 PROCEDURE — 93970 EXTREMITY STUDY: CPT

## 2024-01-29 PROCEDURE — 2580000003 HC RX 258: Performed by: INTERNAL MEDICINE

## 2024-01-29 PROCEDURE — 82947 ASSAY GLUCOSE BLOOD QUANT: CPT

## 2024-01-29 PROCEDURE — 85379 FIBRIN DEGRADATION QUANT: CPT

## 2024-01-29 PROCEDURE — 87899 AGENT NOS ASSAY W/OPTIC: CPT

## 2024-01-29 PROCEDURE — 2500000003 HC RX 250 WO HCPCS: Performed by: PHYSICIAN ASSISTANT

## 2024-01-29 PROCEDURE — 85652 RBC SED RATE AUTOMATED: CPT

## 2024-01-29 PROCEDURE — 87641 MR-STAPH DNA AMP PROBE: CPT

## 2024-01-29 PROCEDURE — 81003 URINALYSIS AUTO W/O SCOPE: CPT

## 2024-01-29 PROCEDURE — 82140 ASSAY OF AMMONIA: CPT

## 2024-01-29 PROCEDURE — 1100000000 HC RM PRIVATE

## 2024-01-29 PROCEDURE — 6360000002 HC RX W HCPCS: Performed by: INTERNAL MEDICINE

## 2024-01-29 PROCEDURE — 83880 ASSAY OF NATRIURETIC PEPTIDE: CPT

## 2024-01-29 PROCEDURE — 83735 ASSAY OF MAGNESIUM: CPT

## 2024-01-29 PROCEDURE — 82330 ASSAY OF CALCIUM: CPT

## 2024-01-29 PROCEDURE — 83605 ASSAY OF LACTIC ACID: CPT

## 2024-01-29 PROCEDURE — 82962 GLUCOSE BLOOD TEST: CPT

## 2024-01-29 PROCEDURE — 6370000000 HC RX 637 (ALT 250 FOR IP): Performed by: PHYSICIAN ASSISTANT

## 2024-01-29 PROCEDURE — 84145 PROCALCITONIN (PCT): CPT

## 2024-01-29 PROCEDURE — 84132 ASSAY OF SERUM POTASSIUM: CPT

## 2024-01-29 PROCEDURE — 87449 NOS EACH ORGANISM AG IA: CPT

## 2024-01-29 PROCEDURE — 86140 C-REACTIVE PROTEIN: CPT

## 2024-01-29 PROCEDURE — 2580000003 HC RX 258: Performed by: PHYSICIAN ASSISTANT

## 2024-01-29 PROCEDURE — 85025 COMPLETE CBC W/AUTO DIFF WBC: CPT

## 2024-01-29 PROCEDURE — 80053 COMPREHEN METABOLIC PANEL: CPT

## 2024-01-29 PROCEDURE — 36415 COLL VENOUS BLD VENIPUNCTURE: CPT

## 2024-01-29 PROCEDURE — 70450 CT HEAD/BRAIN W/O DYE: CPT

## 2024-01-29 PROCEDURE — 82803 BLOOD GASES ANY COMBINATION: CPT

## 2024-01-29 PROCEDURE — 6370000000 HC RX 637 (ALT 250 FOR IP): Performed by: INTERNAL MEDICINE

## 2024-01-29 PROCEDURE — 71045 X-RAY EXAM CHEST 1 VIEW: CPT

## 2024-01-29 PROCEDURE — 6360000002 HC RX W HCPCS: Performed by: PHYSICIAN ASSISTANT

## 2024-01-29 PROCEDURE — 86738 MYCOPLASMA ANTIBODY: CPT

## 2024-01-29 RX ORDER — TRAZODONE HYDROCHLORIDE 50 MG/1
100 TABLET ORAL NIGHTLY
Status: DISCONTINUED | OUTPATIENT
Start: 2024-01-29 | End: 2024-01-31 | Stop reason: HOSPADM

## 2024-01-29 RX ORDER — QUETIAPINE FUMARATE 25 MG/1
25 TABLET, FILM COATED ORAL NIGHTLY
Status: DISCONTINUED | OUTPATIENT
Start: 2024-01-29 | End: 2024-01-29

## 2024-01-29 RX ORDER — LABETALOL HYDROCHLORIDE 5 MG/ML
10 INJECTION, SOLUTION INTRAVENOUS ONCE
Status: COMPLETED | OUTPATIENT
Start: 2024-01-29 | End: 2024-01-29

## 2024-01-29 RX ORDER — LABETALOL HYDROCHLORIDE 5 MG/ML
10 INJECTION, SOLUTION INTRAVENOUS EVERY 4 HOURS PRN
Status: DISCONTINUED | OUTPATIENT
Start: 2024-01-29 | End: 2024-01-31 | Stop reason: HOSPADM

## 2024-01-29 RX ORDER — LISINOPRIL 20 MG/1
20 TABLET ORAL DAILY
Status: DISCONTINUED | OUTPATIENT
Start: 2024-01-29 | End: 2024-01-31 | Stop reason: HOSPADM

## 2024-01-29 RX ORDER — FUROSEMIDE 20 MG/1
20 TABLET ORAL DAILY
Status: DISCONTINUED | OUTPATIENT
Start: 2024-01-29 | End: 2024-01-31 | Stop reason: HOSPADM

## 2024-01-29 RX ORDER — OLANZAPINE 5 MG/1
2.5 TABLET ORAL NIGHTLY PRN
Status: DISCONTINUED | OUTPATIENT
Start: 2024-01-29 | End: 2024-01-31 | Stop reason: HOSPADM

## 2024-01-29 RX ADMIN — PREGABALIN 200 MG: 150 CAPSULE ORAL at 09:23

## 2024-01-29 RX ADMIN — PREDNISONE 40 MG: 20 TABLET ORAL at 09:23

## 2024-01-29 RX ADMIN — DAPSONE 100 MG: 25 TABLET ORAL at 09:23

## 2024-01-29 RX ADMIN — HYDROMORPHONE HYDROCHLORIDE 0.5 MG: 1 INJECTION, SOLUTION INTRAMUSCULAR; INTRAVENOUS; SUBCUTANEOUS at 19:49

## 2024-01-29 RX ADMIN — PREGABALIN 200 MG: 150 CAPSULE ORAL at 19:51

## 2024-01-29 RX ADMIN — Medication 1 AMPULE: at 19:56

## 2024-01-29 RX ADMIN — VANCOMYCIN HYDROCHLORIDE 1000 MG: 1 INJECTION, POWDER, LYOPHILIZED, FOR SOLUTION INTRAVENOUS at 04:55

## 2024-01-29 RX ADMIN — GUAIFENESIN 1200 MG: 600 TABLET ORAL at 19:51

## 2024-01-29 RX ADMIN — GUAIFENESIN 1200 MG: 600 TABLET ORAL at 09:23

## 2024-01-29 RX ADMIN — ENOXAPARIN SODIUM 40 MG: 100 INJECTION SUBCUTANEOUS at 19:51

## 2024-01-29 RX ADMIN — HYDROMORPHONE HYDROCHLORIDE 1 MG: 1 INJECTION, SOLUTION INTRAMUSCULAR; INTRAVENOUS; SUBCUTANEOUS at 11:19

## 2024-01-29 RX ADMIN — WATER 1000 MG: 1 INJECTION INTRAMUSCULAR; INTRAVENOUS; SUBCUTANEOUS at 17:09

## 2024-01-29 RX ADMIN — SODIUM CHLORIDE, PRESERVATIVE FREE 10 ML: 5 INJECTION INTRAVENOUS at 09:23

## 2024-01-29 RX ADMIN — VANCOMYCIN HYDROCHLORIDE 1000 MG: 1 INJECTION, POWDER, LYOPHILIZED, FOR SOLUTION INTRAVENOUS at 17:16

## 2024-01-29 RX ADMIN — HYDROXYCHLOROQUINE SULFATE 200 MG: 200 TABLET ORAL at 09:23

## 2024-01-29 RX ADMIN — FERROUS SULFATE TAB 325 MG (65 MG ELEMENTAL FE) 325 MG: 325 (65 FE) TAB at 17:07

## 2024-01-29 RX ADMIN — LISINOPRIL 20 MG: 20 TABLET ORAL at 17:07

## 2024-01-29 RX ADMIN — HYDROMORPHONE HYDROCHLORIDE 0.5 MG: 1 INJECTION, SOLUTION INTRAMUSCULAR; INTRAVENOUS; SUBCUTANEOUS at 05:38

## 2024-01-29 RX ADMIN — LABETALOL HYDROCHLORIDE 10 MG: 5 INJECTION INTRAVENOUS at 11:19

## 2024-01-29 RX ADMIN — SODIUM CHLORIDE, PRESERVATIVE FREE 10 ML: 5 INJECTION INTRAVENOUS at 19:54

## 2024-01-29 RX ADMIN — FUROSEMIDE 20 MG: 20 TABLET ORAL at 14:03

## 2024-01-29 ASSESSMENT — PAIN SCALES - GENERAL
PAINLEVEL_OUTOF10: 10
PAINLEVEL_OUTOF10: 10
PAINLEVEL_OUTOF10: 4
PAINLEVEL_OUTOF10: 4
PAINLEVEL_OUTOF10: 3
PAINLEVEL_OUTOF10: 10

## 2024-01-29 ASSESSMENT — PAIN DESCRIPTION - ORIENTATION: ORIENTATION: RIGHT;LEFT

## 2024-01-29 ASSESSMENT — PAIN DESCRIPTION - LOCATION
LOCATION: LEG
LOCATION: LEG

## 2024-01-29 ASSESSMENT — PAIN DESCRIPTION - PAIN TYPE: TYPE: CHRONIC PAIN

## 2024-01-29 ASSESSMENT — PAIN DESCRIPTION - ONSET: ONSET: PROGRESSIVE

## 2024-01-29 ASSESSMENT — PAIN DESCRIPTION - DESCRIPTORS: DESCRIPTORS: BURNING;SHARP

## 2024-01-29 ASSESSMENT — PAIN DESCRIPTION - FREQUENCY: FREQUENCY: CONTINUOUS

## 2024-01-29 ASSESSMENT — PAIN - FUNCTIONAL ASSESSMENT: PAIN_FUNCTIONAL_ASSESSMENT: ACTIVITIES ARE NOT PREVENTED

## 2024-01-29 NOTE — PROGRESS NOTES
0410: Patient standing at nursing station in tears, stating \"I have to pee,\" and \"I don't know where I am.\" This RN walked patient back to her room, and assisted her to bathroom. Patient unable to tell this RN her name or where she is. Patient verbalizing \"I don't know\" to all questions. Patient also seemed to be falling asleep on the toilet. This RN asked for another nurse to assist in the room.    0415: Vitals taken. Oxygen found to be 72% on RA, with elevated HR and BP. Patient put on 6L O2 via NC until saturation up to mid-90's. Turned down to 4L NC, sating 95%. Pupils equal and reactive.    0420: Patient stopped responding to all verbal stimuli. Sternal rubbing patient, patient would open her eyes for a few seconds, and then seemed to fall back asleep. Due to decreased responsive, this RN called rapid response.     0422: Rapid response team arrived at bedside. MD sternal rubbed patient and patient cried out. MD ordered ABG's, patient became inconsolable after ABG's drawn. CBC and BMP drawn, as well. ICU [place] and this RN calmed patient down.    0445: Patient resting quietly in bed. Bedrails up x 3. Bed alarm on. Door left open. Call light left within reach. No new orders received at this time. Will continue to monitor.

## 2024-01-29 NOTE — PROGRESS NOTES
RRT Clinical Rounding Nurse Update    Vitals:    01/29/24 1119 01/29/24 1201 01/29/24 1550 01/29/24 1611   BP:  (!) 140/74 (!) 164/122 (!) 146/72   Pulse:  80 (!) 103    Resp: 17 18 18    Temp:  97.7 °F (36.5 °C) 97.9 °F (36.6 °C)    TempSrc:  Oral Oral    SpO2:  90% 90%    Weight:       Height:            DETERIORATION INDEX SCORE: 25    ASSESSMENT:  Previous outreach assessment was reviewed. There have been no significant changes since previous assessment.    PLAN:  Will discharge from RRT Clinical Rounding Program per protocol. Please call if needed.    Vesna Hoffmann RN  [facility]: [phone]  [facility]: [phone]

## 2024-01-29 NOTE — PROGRESS NOTES
Hospitalist Rapid Response or Critical Care Event   Admit Date:  2024  2:33 PM   Name:  Meli Blancas   Age:  68 y.o.  Sex:  female  :  1955   MRN:  [id number]   Room:  [facility]    Presenting Complaint: Sore and Rash    Reason(s) for Admission: Rash [R21]     Rapid Response or Critical Care Event:   Rapid Response called for obtundation.  Patient had walked to the nurses station and reportedly told staff that she had to go to the bathroom and did not know where she was.  Her primary RN escorted her back to her room.  Upon returning to her room, patient was found to be hypoxic with O2 sat of 72% on RA.  Patient then stopped talking and would not respond any further.  Rapid was then called.  Upon my bedside evaluation, patient had her eyes partially closed and was sitting up in bed.  Would not respond to verbal stimuli or light touch/shake.  Upon attempt to open patient's eyes, she appeared to resist and her eyes would follow her lids back into her sockets (not allowing assessment of pupils, even though RN checked pupils earlier without problem).  Upon deep sternal rub, patient began sobbing loudly.  BG was 108.  ABG was ordered as patient reportedly takes off her O2 frequently.  Upon obtaining ABG, patient began speaking coherently telling us that we were \"hurting her.\"  She continued to cry loudly, but moved all 4 extremities spontaneously.  She then clutch the ICU [place] and hugged him until primary RN and ICU rover were able to calm her down.  Episode likely related to psychiatric issue.  Patient may be sundowning at night as well.  Patient stable to stay on floor.    Additionally, patient was weaned down to baseline O2 and ABG was unremarkable.    Critical care and other interventions:   STAT glucose check.  STAT ABG.  CBC/BMP.    Total critical care time spent: 35 minutes.          Current Problem List:   Principal Problem:    Bilateral lower leg cellulitis  Active Problems:    Pustular rash

## 2024-01-29 NOTE — PROGRESS NOTES
Physician Progress Note      PATIENT:               MERT AREVALO  CSN #:                  [id number]  :                       1955  ADMIT DATE:       2024 2:33 PM  DISCH DATE:  RESPONDING  PROVIDER #:        ISAIAH CHADWICK          QUERY TEXT:    Pt admitted with Cellulitis B/L LE. Pt noted to have hypotension, tachycardia   and leukocytosis. If possible, please document in the progress notes and   discharge summary if you are evaluating and /or treating any of the following:    The medical record reflects the following:  Risk Factors: Cellulitis B/L LE  Clinical Indicators: WBC 11.7>12.5, HR 90s with hypotension  Treatment: hold anti-hypertensives, aggressive IVF, continue on Vancomycin,   rocephin and dapsone  Options provided:  -- Sepsis, present on admission  -- Sepsis, present on admission, now resolved  -- Sepsis was ruled out  -- Other - I will add my own diagnosis  -- Disagree - Not applicable / Not valid  -- Disagree - Clinically unable to determine / Unknown  -- Refer to Clinical Documentation Reviewer    PROVIDER RESPONSE TEXT:    This patient has sepsis which was present on admission.    Query created by: Bhargavi Cid on 2024 10:07 AM      Electronically signed by:  ISAIAH CHADWICK 2024 3:18 PM

## 2024-01-29 NOTE — PROGRESS NOTES
VANCO DAILY FOLLOW UP NOTE  [facility]   Pharmacy Pharmacokinetic Monitoring Service - Vancomycin    Consulting Provider: Mitch De Paz MD    Indication: SSTI  Target Concentration: Goal AUC/AVTAR 400-600 mg*hr/L  Day of Therapy: 6  Additional Antimicrobials: ceftriaxone    Patient eligible for piperacillin-tazobactam to cefepime auto-substitution per P&T approved protocol? N/A    Pertinent Laboratory Values:   Wt Readings from Last 1 Encounters:   01/28/24 73 kg (161 lb)     Temp Readings from Last 1 Encounters:   01/29/24 98.2 °F (36.8 °C) (Oral)     Recent Labs     01/29/24  0445   BUN 15   CREATININE 0.60   WBC 7.2     Estimated Creatinine Clearance: 78 mL/min (based on SCr of 0.6 mg/dL).    Lab Results   Component Value Date/Time    VANCORANDOM 12.5 01/28/2024 03:49 AM       MRSA Nasal Swab: N/A. Non-respiratory infection    Assessment:  Date/Time Dose Concentration AUC   1/26 0615 1250 mg Q24H 10.7 321   1/28 0349 1000 mg Q12H 12.5 521   Note: Serum concentrations collected for AUC dosing may appear elevated if collected in close proximity to the dose administered, this is not necessarily an indication of toxicity    Plan:  Dosing recommendations based on Bayesian software  Will continue the current vancomycin dose of 1000 mg IV every 12 hours  Renal labs as indicated   Vancomycin concentrations will be ordered as clinically appropriate   Pharmacy will continue to monitor patient and adjust therapy as indicated    Thank you for the consult,  Jesse Ferrell, PharmD, BCOP  Clinical Pharmacist  [employer]

## 2024-01-29 NOTE — PROGRESS NOTES
RRT Clinical Rounding Nurse Progress Report      SUBJECTIVE: Patient assessed secondary to recent rapid response.      Vitals:    01/29/24 0538 01/29/24 0815 01/29/24 1119 01/29/24 1201   BP:  (!) 189/72  (!) 140/74   Pulse:  (!) 108  80   Resp: 22 20 17 18   Temp:  98.2 °F (36.8 °C)  97.7 °F (36.5 °C)   TempSrc:  Oral  Oral   SpO2:  93%  90%   Weight:       Height:            DETERIORATION INDEX SCORE: 39    ASSESSMENT:  Pt sitting up on side of bed, in NAD.  Alert and oriented x3.  Lung sounds w/expiratory wheezes noted.  Does not have NC in place, O2 Sat 88-89% on room air.  Per pt, she wears her oxygen at home prn, when she feels like she needs it.  Appetite is good.  Voiding ok.  Denies any complaints at this time.     PLAN:  Will follow per RRT Clinical Rounding Program protocol.    Vesna Hoffmann RN  [facility]: [phone]  [facility]: [phone]

## 2024-01-29 NOTE — PROGRESS NOTES
Pt had  confusion and hypoxia lat night   This am await and orientated   Blisters have pretty much dried in   Mras  rown from  wounds  When ready to dischange place on pred 30 mg qd and have pt see me in the officee right away   Will consider trial of cell cept for steroid sparing effect   If not  rituxin next

## 2024-01-29 NOTE — ICUWATCH
Rapid Response Team Note      Subjective: Responded to Rapid Response secondary to decreased LOC/somnolence.    Summary: RR called for patient somnolent in bed. Pt walked to nursing station confused and crying, was redirected to her bed where she lay down and stopped responding to staff. Pt could not state name, only saying \"I don't know\". On arrival pt was in bed on 4L NC, no responding to voice. On painful stimulation pt began crying but could not state name. ABG drawn [id number], , SpO2 95% on 4L. Pupils equal and reactive. Chest expansion symmetrical, breathing unlabored. Hx of anxiety and depression noted, no longer taking SSRI. Has had agitation and Code Kasey situation during this hospital stay. Labs ordered and drawn. Pt consoled and calm, primary nurse at bedside.      See Rapid Response/Code Blue Narrator for full documentation    Outcome: Patient to remain in current location. Will follow-up per Critical Care Clinical Rounding protocol.      Art Gary RN  [facility]: [phone]  [facility]: [phone]

## 2024-01-29 NOTE — PROGRESS NOTES
Hospitalist Progress Note   Admit Date:  2024  2:33 PM   Name:  Meli Blancas   Age:  68 y.o.  Sex:  female  :  1955   MRN:  [id number]   Room:  [facility]    Presenting/Chief Complaint: Sore and Rash     Reason(s) for Admission: Rash [R21]     Hospital Course:   Meli Blancas is a 68 y.o. female with a h/o mood disorder, COPD who presents today with worsening blisters over her entire body for the past 2-3 weeks. She is convinced it has something to do with her hot water heater being broken and unable to cleans herself. She was admitted and started on steroids and ABX: rocephin, dapsone, vancomycin. Baseline cortisol 23.7, AM cortisol 16.4.    She has had some issues with Sundowning  Had Rapid response 24 due to somnolence but nothing organic to explain    Subjective & 24hr Events:   Rash continues to improve but now with cellulitis and induration and swelling to posterior calves  Dyspnea but states mucinex helps  Appears hypervolemic to LE  MRSA in wound culture  Seems she has not been taking her trazodone nightly  Got disoriented last night again   Was ?sleep walking  Rapid note reviewed  She does remember some of the events that occurred     Assessment & Plan:       Bilateral lower leg cellulitis // pustula rash //MRSA infection  - woriking dx of pemphigus foliaceous   - Dr Humeniuk saw her   - Will continue on Vancomycin (day 6) and dapsone (day 5, she takes this outpatient).  Discontinued rocephin based off of wound cultures which is growing MRSA sens to doxy, clinda and bactrim  - nozin  - continue steroids: prednisone 40 mg daily; at ID will send her out on 30 mg  - Aquaphor and kerlex to legs  - wound care ordered  - per Derm, once  better will try cellcept, ultimately may consider rituxan  - continue analgesia: Norco and Era  - wound cx with MRSA  - she may benefit from bleach baths but will defer to derm  - dopplers of LE - concerns for abscess forming in legs  mg Oral Nightly PRN    vancomycin (VANCOCIN) 1,000 mg in sodium chloride 0.9 % 250 mL IVPB ([id number])  1,000 mg IntraVENous Q12H    HYDROcodone-acetaminophen (NORCO)  MG per tablet 1 tablet  1 tablet Oral Q4H PRN    HYDROmorphone HCl PF (DILAUDID) injection 0.5 mg  0.5 mg IntraVENous Q4H PRN    HYDROmorphone HCl PF (DILAUDID) injection 1 mg  1 mg IntraVENous Q4H PRN    sodium chloride 0.9 % bolus 1,000 mL  1,000 mL IntraVENous Once    dapsone tablet 100 mg  100 mg Oral Daily    hydroxychloroquine (PLAQUENIL) tablet 200 mg  200 mg Oral Daily    ferrous sulfate (IRON 325) tablet 325 mg  325 mg Oral BID WC    pregabalin (LYRICA) capsule 200 mg  200 mg Oral BID    sodium chloride flush 0.9 % injection 5-40 mL  5-40 mL IntraVENous 2 times per day    sodium chloride flush 0.9 % injection 5-40 mL  5-40 mL IntraVENous PRN    0.9 % sodium chloride infusion   IntraVENous PRN    potassium chloride (KLOR-CON M) extended release tablet 40 mEq  40 mEq Oral PRN    Or    potassium bicarb-citric acid (EFFER-K) effervescent tablet 40 mEq  40 mEq Oral PRN    Or    potassium chloride 10 mEq/100 mL IVPB (Peripheral Line)  10 mEq IntraVENous PRN    magnesium sulfate 2000 mg in 50 mL IVPB premix  2,000 mg IntraVENous PRN    ondansetron (ZOFRAN-ODT) disintegrating tablet 4 mg  4 mg Oral Q8H PRN    Or    ondansetron (ZOFRAN) injection 4 mg  4 mg IntraVENous Q6H PRN    polyethylene glycol (GLYCOLAX) packet 17 g  17 g Oral Daily PRN    acetaminophen (TYLENOL) tablet 650 mg  650 mg Oral Q6H PRN    Or    acetaminophen (TYLENOL) suppository 650 mg  650 mg Rectal Q6H PRN    enoxaparin (LOVENOX) injection 40 mg  40 mg SubCUTAneous Daily    predniSONE (DELTASONE) tablet 40 mg  40 mg Oral Daily     Signed:  SCOTTY Eastman    Part of this note may have been written by using a voice dictation software.  The note has been proof read but may still contain some grammatical/other typographical errors.

## 2024-01-30 LAB
ANION GAP SERPL CALC-SCNC: 1 MMOL/L (ref 2–11)
BUN SERPL-MCNC: 13 MG/DL (ref 8–23)
CALCIUM SERPL-MCNC: 8.7 MG/DL (ref 8.3–10.4)
CHLORIDE SERPL-SCNC: 108 MMOL/L (ref 103–113)
CO2 SERPL-SCNC: 34 MMOL/L (ref 21–32)
CREAT SERPL-MCNC: 0.8 MG/DL (ref 0.6–1)
CRP SERPL-MCNC: 1.8 MG/DL (ref 0–0.9)
ERYTHROCYTE [DISTWIDTH] IN BLOOD BY AUTOMATED COUNT: 15.5 % (ref 11.9–14.6)
FLUAV RNA SPEC QL NAA+PROBE: NOT DETECTED
FLUBV RNA SPEC QL NAA+PROBE: NOT DETECTED
GLUCOSE SERPL-MCNC: 84 MG/DL (ref 65–100)
HCT VFR BLD AUTO: 35 % (ref 35.8–46.3)
HGB BLD-MCNC: 10.4 G/DL (ref 11.7–15.4)
M PNEUMO IGG SER IA-ACNC: <100 U/ML (ref 0–99)
M PNEUMO IGM SER IA-ACNC: <770 U/ML (ref 0–769)
MAGNESIUM SERPL-MCNC: 2.1 MG/DL (ref 1.8–2.4)
MCH RBC QN AUTO: 30.9 PG (ref 26.1–32.9)
MCHC RBC AUTO-ENTMCNC: 29.7 G/DL (ref 31.4–35)
MCV RBC AUTO: 103.9 FL (ref 82–102)
MRSA DNA SPEC QL NAA+PROBE: DETECTED
NRBC # BLD: 0.03 K/UL (ref 0–0.2)
PLATELET # BLD AUTO: 311 K/UL (ref 150–450)
PMV BLD AUTO: 9.3 FL (ref 9.4–12.3)
POTASSIUM SERPL-SCNC: 3.2 MMOL/L (ref 3.5–5.1)
RBC # BLD AUTO: 3.37 M/UL (ref 4.05–5.2)
S AUREUS CPE NOSE QL NAA+PROBE: DETECTED
SARS-COV-2 RDRP RESP QL NAA+PROBE: NOT DETECTED
SODIUM SERPL-SCNC: 143 MMOL/L (ref 136–146)
SOURCE: NORMAL
WBC # BLD AUTO: 10.3 K/UL (ref 4.3–11.1)

## 2024-01-30 PROCEDURE — 2700000000 HC OXYGEN THERAPY PER DAY

## 2024-01-30 PROCEDURE — 6370000000 HC RX 637 (ALT 250 FOR IP): Performed by: PHYSICIAN ASSISTANT

## 2024-01-30 PROCEDURE — 87502 INFLUENZA DNA AMP PROBE: CPT

## 2024-01-30 PROCEDURE — 2500000003 HC RX 250 WO HCPCS: Performed by: PHYSICIAN ASSISTANT

## 2024-01-30 PROCEDURE — 87635 SARS-COV-2 COVID-19 AMP PRB: CPT

## 2024-01-30 PROCEDURE — 6370000000 HC RX 637 (ALT 250 FOR IP): Performed by: INTERNAL MEDICINE

## 2024-01-30 PROCEDURE — 85027 COMPLETE CBC AUTOMATED: CPT

## 2024-01-30 PROCEDURE — 94640 AIRWAY INHALATION TREATMENT: CPT

## 2024-01-30 PROCEDURE — 94760 N-INVAS EAR/PLS OXIMETRY 1: CPT

## 2024-01-30 PROCEDURE — 6360000002 HC RX W HCPCS: Performed by: PHYSICIAN ASSISTANT

## 2024-01-30 PROCEDURE — 94761 N-INVAS EAR/PLS OXIMETRY MLT: CPT

## 2024-01-30 PROCEDURE — 2580000003 HC RX 258: Performed by: PHYSICIAN ASSISTANT

## 2024-01-30 PROCEDURE — 6370000000 HC RX 637 (ALT 250 FOR IP): Performed by: FAMILY MEDICINE

## 2024-01-30 PROCEDURE — 1100000000 HC RM PRIVATE

## 2024-01-30 PROCEDURE — 36415 COLL VENOUS BLD VENIPUNCTURE: CPT

## 2024-01-30 PROCEDURE — 80048 BASIC METABOLIC PNL TOTAL CA: CPT

## 2024-01-30 PROCEDURE — 6370000000 HC RX 637 (ALT 250 FOR IP): Performed by: DERMATOLOGY

## 2024-01-30 PROCEDURE — 2580000003 HC RX 258: Performed by: INTERNAL MEDICINE

## 2024-01-30 PROCEDURE — 83735 ASSAY OF MAGNESIUM: CPT

## 2024-01-30 PROCEDURE — 86140 C-REACTIVE PROTEIN: CPT

## 2024-01-30 RX ORDER — MONTELUKAST SODIUM 10 MG/1
10 TABLET ORAL NIGHTLY
Status: DISCONTINUED | OUTPATIENT
Start: 2024-01-31 | End: 2024-01-31 | Stop reason: HOSPADM

## 2024-01-30 RX ORDER — LORAZEPAM 0.5 MG/1
0.5 TABLET ORAL ONCE
Status: DISCONTINUED | OUTPATIENT
Start: 2024-01-30 | End: 2024-01-30

## 2024-01-30 RX ORDER — IPRATROPIUM BROMIDE AND ALBUTEROL SULFATE 2.5; .5 MG/3ML; MG/3ML
1 SOLUTION RESPIRATORY (INHALATION)
Status: DISCONTINUED | OUTPATIENT
Start: 2024-01-30 | End: 2024-01-31 | Stop reason: HOSPADM

## 2024-01-30 RX ORDER — GUAIFENESIN/DEXTROMETHORPHAN 100-10MG/5
5 SYRUP ORAL ONCE
Status: COMPLETED | OUTPATIENT
Start: 2024-01-30 | End: 2024-01-30

## 2024-01-30 RX ORDER — MONTELUKAST SODIUM 10 MG/1
10 TABLET ORAL ONCE
Status: COMPLETED | OUTPATIENT
Start: 2024-01-30 | End: 2024-01-30

## 2024-01-30 RX ORDER — QUETIAPINE FUMARATE 25 MG/1
25 TABLET, FILM COATED ORAL 2 TIMES DAILY
Status: DISCONTINUED | OUTPATIENT
Start: 2024-01-30 | End: 2024-01-31 | Stop reason: HOSPADM

## 2024-01-30 RX ORDER — LANOLIN ALCOHOL/MO/W.PET/CERES
3 CREAM (GRAM) TOPICAL NIGHTLY
Status: DISCONTINUED | OUTPATIENT
Start: 2024-01-30 | End: 2024-01-31 | Stop reason: HOSPADM

## 2024-01-30 RX ORDER — DOXYCYCLINE HYCLATE 100 MG/1
100 CAPSULE ORAL 2 TIMES DAILY
Status: DISCONTINUED | OUTPATIENT
Start: 2024-01-30 | End: 2024-01-31 | Stop reason: HOSPADM

## 2024-01-30 RX ADMIN — Medication: at 08:50

## 2024-01-30 RX ADMIN — IPRATROPIUM BROMIDE AND ALBUTEROL SULFATE 1 DOSE: .5; 3 SOLUTION RESPIRATORY (INHALATION) at 15:34

## 2024-01-30 RX ADMIN — IPRATROPIUM BROMIDE AND ALBUTEROL SULFATE 1 DOSE: .5; 3 SOLUTION RESPIRATORY (INHALATION) at 19:44

## 2024-01-30 RX ADMIN — GUAIFENESIN SYRUP AND DEXTROMETHORPHAN 5 ML: 100; 10 SYRUP ORAL at 08:47

## 2024-01-30 RX ADMIN — HYDROMORPHONE HYDROCHLORIDE 0.5 MG: 1 INJECTION, SOLUTION INTRAMUSCULAR; INTRAVENOUS; SUBCUTANEOUS at 14:03

## 2024-01-30 RX ADMIN — MONTELUKAST 10 MG: 10 TABLET, FILM COATED ORAL at 10:03

## 2024-01-30 RX ADMIN — FERROUS SULFATE TAB 325 MG (65 MG ELEMENTAL FE) 325 MG: 325 (65 FE) TAB at 08:47

## 2024-01-30 RX ADMIN — DAPSONE 100 MG: 25 TABLET ORAL at 08:47

## 2024-01-30 RX ADMIN — VANCOMYCIN HYDROCHLORIDE 750 MG: 750 INJECTION, POWDER, LYOPHILIZED, FOR SOLUTION INTRAVENOUS at 22:36

## 2024-01-30 RX ADMIN — LISINOPRIL 20 MG: 20 TABLET ORAL at 08:47

## 2024-01-30 RX ADMIN — Medication 1 AMPULE: at 19:47

## 2024-01-30 RX ADMIN — HYDROXYCHLOROQUINE SULFATE 200 MG: 200 TABLET ORAL at 08:47

## 2024-01-30 RX ADMIN — SODIUM CHLORIDE, PRESERVATIVE FREE 10 ML: 5 INJECTION INTRAVENOUS at 08:50

## 2024-01-30 RX ADMIN — WATER 5 MG: 1 INJECTION INTRAMUSCULAR; INTRAVENOUS; SUBCUTANEOUS at 20:35

## 2024-01-30 RX ADMIN — FERROUS SULFATE TAB 325 MG (65 MG ELEMENTAL FE) 325 MG: 325 (65 FE) TAB at 17:23

## 2024-01-30 RX ADMIN — SODIUM CHLORIDE: 9 INJECTION, SOLUTION INTRAVENOUS at 22:35

## 2024-01-30 RX ADMIN — WATER 1000 MG: 1 INJECTION INTRAMUSCULAR; INTRAVENOUS; SUBCUTANEOUS at 17:23

## 2024-01-30 RX ADMIN — GUAIFENESIN 1200 MG: 600 TABLET ORAL at 08:47

## 2024-01-30 RX ADMIN — HYDROMORPHONE HYDROCHLORIDE 1 MG: 1 INJECTION, SOLUTION INTRAMUSCULAR; INTRAVENOUS; SUBCUTANEOUS at 10:02

## 2024-01-30 RX ADMIN — HYDROMORPHONE HYDROCHLORIDE 0.5 MG: 1 INJECTION, SOLUTION INTRAMUSCULAR; INTRAVENOUS; SUBCUTANEOUS at 01:30

## 2024-01-30 RX ADMIN — PREGABALIN 200 MG: 150 CAPSULE ORAL at 08:46

## 2024-01-30 RX ADMIN — VANCOMYCIN HYDROCHLORIDE 1000 MG: 1 INJECTION, POWDER, LYOPHILIZED, FOR SOLUTION INTRAVENOUS at 05:14

## 2024-01-30 RX ADMIN — PREDNISONE 40 MG: 20 TABLET ORAL at 08:47

## 2024-01-30 RX ADMIN — IPRATROPIUM BROMIDE AND ALBUTEROL SULFATE 1 DOSE: .5; 3 SOLUTION RESPIRATORY (INHALATION) at 09:54

## 2024-01-30 RX ADMIN — FUROSEMIDE 20 MG: 20 TABLET ORAL at 08:47

## 2024-01-30 RX ADMIN — WATER 80 MG: 1 INJECTION INTRAMUSCULAR; INTRAVENOUS; SUBCUTANEOUS at 10:03

## 2024-01-30 RX ADMIN — OLANZAPINE 2.5 MG: 5 TABLET, FILM COATED ORAL at 01:33

## 2024-01-30 ASSESSMENT — PAIN - FUNCTIONAL ASSESSMENT: PAIN_FUNCTIONAL_ASSESSMENT: ACTIVITIES ARE NOT PREVENTED

## 2024-01-30 ASSESSMENT — PAIN DESCRIPTION - ONSET: ONSET: GRADUAL

## 2024-01-30 ASSESSMENT — PAIN DESCRIPTION - LOCATION: LOCATION: LEG

## 2024-01-30 ASSESSMENT — PAIN SCALES - GENERAL
PAINLEVEL_OUTOF10: 10
PAINLEVEL_OUTOF10: 10
PAINLEVEL_OUTOF10: 0
PAINLEVEL_OUTOF10: 0
PAINLEVEL_OUTOF10: 7
PAINLEVEL_OUTOF10: 4

## 2024-01-30 ASSESSMENT — PAIN DESCRIPTION - ORIENTATION: ORIENTATION: RIGHT;LEFT

## 2024-01-30 ASSESSMENT — PAIN DESCRIPTION - DESCRIPTORS: DESCRIPTORS: BURNING;SHARP

## 2024-01-30 ASSESSMENT — PAIN DESCRIPTION - PAIN TYPE: TYPE: CHRONIC PAIN

## 2024-01-30 ASSESSMENT — PAIN DESCRIPTION - FREQUENCY: FREQUENCY: CONTINUOUS

## 2024-01-30 NOTE — PROGRESS NOTES
VANCO DAILY FOLLOW UP NOTE  [facility]   Pharmacy Pharmacokinetic Monitoring Service - Vancomycin    Consulting Provider: Mitch De Paz MD    Indication: SSTI  Target Concentration: Goal AUC/AVTAR 400-600 mg*hr/L  Day of Therapy: 7  Additional Antimicrobials: ceftriaxone    Patient eligible for piperacillin-tazobactam to cefepime auto-substitution per P&T approved protocol? N/A    Pertinent Laboratory Values:   Wt Readings from Last 1 Encounters:   01/29/24 69.5 kg (153 lb 3.2 oz)     Temp Readings from Last 1 Encounters:   01/30/24 97.8 °F (36.6 °C) (Oral)     Recent Labs     01/29/24  0445 01/29/24  1105 01/29/24  1640 01/30/24  1019   BUN 15 15  --  13   CREATININE 0.60 0.70  --  0.80   WBC 7.2 9.0  --  10.3   PROCAL  --  <0.05 <0.05  --    LACACIDPL  --  1.5  --   --      Estimated Creatinine Clearance: 57 mL/min (based on SCr of 0.8 mg/dL).    Lab Results   Component Value Date/Time    VANCORANDOM 12.5 01/28/2024 03:49 AM       MRSA Nasal Swab: N/A. Non-respiratory infection    Assessment:  Date/Time Dose Concentration AUC   1/26 0615 1250 mg Q24H 10.7 321   1/28 0349 1000 mg Q12H 12.5 521   Note: Serum concentrations collected for AUC dosing may appear elevated if collected in close proximity to the dose administered, this is not necessarily an indication of toxicity    Plan:  Current dosing regimen is supra-therapeutic  Decrease dose to 750 mg q12h for predicted AUC/Tr of 530/16  Repeat vancomycin concentrations will be ordered as clinically appropriate   Pharmacy will continue to monitor patient and adjust therapy as indicated    Thank you for the consult,  Jesse Ferrell, PharmD, BCOP  Clinical Pharmacist  Contact [employer]

## 2024-01-30 NOTE — PROGRESS NOTES
Phlebotomist in room attempting to draw labs on patient. This RN went to bedside to assist with keeping patient still. Patient pulled hand back as she was stuck. Patient began screaming and cursing and swinging at this RN. Patient screaming, \"I just want to be left alone!\" Patient refusing band-aid, hand bleeding. Put band-aid on hand. Told phlebotomist that patient is more alert during the day, may attempt to redraw later today.     On-call MD made aware.

## 2024-01-30 NOTE — PROGRESS NOTES
Patient showered independently.    After shower, wound care was completed per MD order.     Patient tolerated well, and has no complaints of pain.

## 2024-01-30 NOTE — CARE COORDINATION
CM continuing to follow for ongoing discharge planning.  Last CM documentation on 1/25 states, \"Pt states she does not have anyone she would like listed as an emergency contact \"but you can put the mortuary.\"  Per pt she has [facility].  Pt has the following home DME: quad cane, RW, shower chair, and [facility] that she uses PRN.  Pt does not drive and pays people to run errands.  CM provided pt with information about CLTC and encouraged her to call her Medicaid  to apply for this program as it could definitely benefit pt.  Pt was pleasant during conversation but short and voiced dislike for every HH agency she has had as well as hospital staff.  Pt is observed to be quick-tempered and impatient.  During rounds it was discussed that pt will likely remain IP through the weekend unless the decision is made to transfer her to the [facility].\"    PT/OT recommending no further needs at discharge.      CM will continue to follow.

## 2024-01-31 VITALS
HEIGHT: 60 IN | BODY MASS INDEX: 30.08 KG/M2 | OXYGEN SATURATION: 91 % | DIASTOLIC BLOOD PRESSURE: 77 MMHG | WEIGHT: 153.2 LBS | SYSTOLIC BLOOD PRESSURE: 106 MMHG | TEMPERATURE: 98.1 F | HEART RATE: 125 BPM | RESPIRATION RATE: 18 BRPM

## 2024-01-31 PROBLEM — R50.9 FEBRILE ILLNESS: Status: RESOLVED | Noted: 2024-01-25 | Resolved: 2024-01-31

## 2024-01-31 PROBLEM — J44.1 COPD EXACERBATION (HCC): Status: RESOLVED | Noted: 2023-06-22 | Resolved: 2024-01-31

## 2024-01-31 LAB
FLUID CULTURE: NORMAL
L PNEUMO1 AG UR QL IA: NEGATIVE
Lab: NORMAL
ORGANISM ID: NORMAL
S PNEUM AG SPEC QL LA: NEGATIVE
SPECIMEN SOURCE: NORMAL
SPECIMEN SOURCE: NORMAL
SPECIMEN: NORMAL

## 2024-01-31 PROCEDURE — 2700000000 HC OXYGEN THERAPY PER DAY

## 2024-01-31 PROCEDURE — 6370000000 HC RX 637 (ALT 250 FOR IP): Performed by: PHYSICIAN ASSISTANT

## 2024-01-31 PROCEDURE — 6370000000 HC RX 637 (ALT 250 FOR IP): Performed by: INTERNAL MEDICINE

## 2024-01-31 PROCEDURE — 94640 AIRWAY INHALATION TREATMENT: CPT

## 2024-01-31 PROCEDURE — 2500000003 HC RX 250 WO HCPCS: Performed by: PHYSICIAN ASSISTANT

## 2024-01-31 RX ORDER — POTASSIUM CHLORIDE 20 MEQ/1
20 TABLET, EXTENDED RELEASE ORAL DAILY
Qty: 5 TABLET | Refills: 0 | Status: SHIPPED | OUTPATIENT
Start: 2024-01-31 | End: 2024-02-05

## 2024-01-31 RX ORDER — GUAIFENESIN 600 MG/1
1200 TABLET, EXTENDED RELEASE ORAL 2 TIMES DAILY
Qty: 40 TABLET | Refills: 0 | Status: SHIPPED | OUTPATIENT
Start: 2024-01-31 | End: 2024-02-10

## 2024-01-31 RX ORDER — PREDNISONE 10 MG/1
30 TABLET ORAL DAILY
Qty: 90 TABLET | Refills: 0 | Status: SHIPPED | OUTPATIENT
Start: 2024-01-31 | End: 2024-03-01

## 2024-01-31 RX ORDER — HYDROXYZINE HYDROCHLORIDE 25 MG/1
25 TABLET, FILM COATED ORAL 3 TIMES DAILY PRN
Qty: 30 TABLET | Refills: 0 | Status: SHIPPED | OUTPATIENT
Start: 2024-01-31 | End: 2024-02-10

## 2024-01-31 RX ORDER — DOXYCYCLINE HYCLATE 100 MG/1
100 CAPSULE ORAL 2 TIMES DAILY
Qty: 14 CAPSULE | Refills: 0 | Status: SHIPPED | OUTPATIENT
Start: 2024-01-31 | End: 2024-02-07

## 2024-01-31 RX ORDER — FUROSEMIDE 20 MG/1
20 TABLET ORAL DAILY
Qty: 5 TABLET | Refills: 0 | Status: SHIPPED | OUTPATIENT
Start: 2024-02-01 | End: 2024-02-06

## 2024-01-31 RX ORDER — IPRATROPIUM BROMIDE AND ALBUTEROL SULFATE 2.5; .5 MG/3ML; MG/3ML
3 SOLUTION RESPIRATORY (INHALATION)
Qty: 360 ML | Refills: 0 | Status: SHIPPED | OUTPATIENT
Start: 2024-01-31 | End: 2024-03-01

## 2024-01-31 RX ADMIN — PREGABALIN 200 MG: 150 CAPSULE ORAL at 08:24

## 2024-01-31 RX ADMIN — IPRATROPIUM BROMIDE AND ALBUTEROL SULFATE 1 DOSE: .5; 3 SOLUTION RESPIRATORY (INHALATION) at 07:04

## 2024-01-31 RX ADMIN — FUROSEMIDE 20 MG: 20 TABLET ORAL at 08:15

## 2024-01-31 RX ADMIN — PREDNISONE 40 MG: 20 TABLET ORAL at 08:16

## 2024-01-31 RX ADMIN — HYDROXYCHLOROQUINE SULFATE 200 MG: 200 TABLET ORAL at 08:15

## 2024-01-31 RX ADMIN — FERROUS SULFATE TAB 325 MG (65 MG ELEMENTAL FE) 325 MG: 325 (65 FE) TAB at 08:16

## 2024-01-31 RX ADMIN — DAPSONE 100 MG: 25 TABLET ORAL at 08:15

## 2024-01-31 RX ADMIN — HYDROMORPHONE HYDROCHLORIDE 0.5 MG: 1 INJECTION, SOLUTION INTRAMUSCULAR; INTRAVENOUS; SUBCUTANEOUS at 05:27

## 2024-01-31 RX ADMIN — Medication: at 08:25

## 2024-01-31 RX ADMIN — GUAIFENESIN 1200 MG: 600 TABLET ORAL at 05:45

## 2024-01-31 RX ADMIN — LISINOPRIL 20 MG: 20 TABLET ORAL at 08:16

## 2024-01-31 ASSESSMENT — PAIN SCALES - GENERAL
PAINLEVEL_OUTOF10: 10
PAINLEVEL_OUTOF10: 0

## 2024-01-31 ASSESSMENT — PAIN DESCRIPTION - DESCRIPTORS: DESCRIPTORS: ACHING

## 2024-01-31 ASSESSMENT — PAIN DESCRIPTION - ORIENTATION: ORIENTATION: OTHER (COMMENT)

## 2024-01-31 ASSESSMENT — PAIN DESCRIPTION - LOCATION: LOCATION: LEG;BACK

## 2024-01-31 NOTE — PROGRESS NOTES
END OF SHIFT SUMMARY:    Significant vitals this shift:    Pt refused vitals signs for entire shift, screaming and swatting at staff who attempt to approach her  Pt cooperative for vitals at 0330, O2 SAT found to be at 80% on RA. Pt encouraged to use O2. 88% on 3L HFNC      Additional events this shift:   Pt removed dressings to BLE, refused to have them replaced. This RN made several attempts this shift.     Pt extremely agitated and aggressive throughout entirity of shift. Pt screaming at staff using vulgar language and calling staff inappropriate terms. Pt slamming doors and threw water pitcher at wall telling all staff to \"get out.\" Code violet called on pt, IM zyprexa administered with house supervisors and security at bedside.     Multiple attempts to deescalate behavior made throughout shift by this RN as well as nursing supervisor and unit supervisor    Pt refusing all meds at PM med pass    Pt alert and oriented x4 throughout shift. No signs of confusion noted.     Pt made multiple calls to 911 this shift stating, security notified. Officer responded to bedside to do a welfare check.     Bedside shift report given to Roxi Daigle, RN

## 2024-01-31 NOTE — PROGRESS NOTES
01/31/24 0842   Resting (On O2)   SpO2 91      O2 Device Nasal cannula   O2 Flow Rate (l/min) 5 l/min   FIO2 (%) 40     Patient states she is unable to walk due to \"they cut my pain medicine\". Unable to assess need while ambulating. Patient states \"I know what I need. Malu been dealing with this for years\".

## 2024-01-31 NOTE — DISCHARGE INSTRUCTIONS
Apply aquaphor to legs then wrap with kerlex dressing  Continue doxycycline for 7 additional days  Continue lasix and potassium for 5 days  Wear your oxygen at night. I recommend you have a sleep study and referred you to sleep clinic.   Continue prednisone but you need to take 30mg until you see Dr. Humeniuck   Be well!

## 2024-01-31 NOTE — DISCHARGE SUMMARY
Hospitalist Discharge Summary   Admit Date:  2024  2:33 PM   DC Note date: 2024  Name:  Meli Blancas   Age:  68 y.o.  Sex:  female  :  1955   MRN:  [id number]   Room:  [facility]  PCP:  Florencio Banegas MD    Presenting Complaint: Sore and Rash     Initial Admission Diagnosis: Rash [R21]     Problem List for this Hospitalization (present on admission):    Principal Problem:    Bilateral lower leg cellulitis  Active Problems:    Pustular rash    Cellulitis due to MRSA    Delirium    Mood disorder (HCC)  Resolved Problems:    COPD exacerbation ([facility])    Febrile illness      Hospital Course:  Meli Blancas is a 68 y.o. female with a h/o mood disorder, COPD who presents today with worsening blisters over her entire body for the past 2-3 weeks. She is convinced it has something to do with her hot water heater being broken and unable to cleans herself. She was admitted and started on steroids and ABX: rocephin, dapsone, vancomycin. Baseline cortisol 23.7, AM cortisol 16.4. Dermatology was consulted: woriking dx of pemphigus foliaceous. Dr Humeniuk recommended that she continue on oral prednisone daily with transition to 30 mg daily at discharge.  He also recommended Aquaphor and Curlex dressings to prophylax.  He ultimately will likely transition her to CellCept or Rituxan.  Notably her wound cultures grew MRSA sensitive to tetracyclines, clindamycin and Bactrim.  She was treated with 7 days of vancomycin inpatient and then transition to doxycycline for an additional 7 days at discharge.    Her wound is healed up very nicely but she still had a very diffuse rash throughout her entire body.  On  she developed a COPD exacerbation and was given high-dose steroids as well as DuoNebs.  Her exacerbation resolved.  She wears supplemental oxygen at home and may ultimately need this more frequently than she has been wearing it.  Especially at nighttime.      She seemed to have trouble with  nondistended.  Extremities:     No cyanosis or clubbing.  No edema  Skin:                Bilateral LE pitting edema has much improved.  Overall the pustules on her skin seem to have improved tremendously.  Some are intact and some are still purulent and covered.  No foul odors.  The sloughing of her skin under her breasts has much improved and she no longer has any honey colored crusting.     Neuro:             CN II-XII grossly intact.    Psych:             agitated this am, curing, not easily redirectable. She has a rather labile mood. She was not hallucinating when I saw her.     Signed:  SCOTTY Eastman    Part of this note may have been written by using a voice dictation software.  The note has been proof read but may still contain some grammatical/other typographical errors.

## 2024-01-31 NOTE — CARE COORDINATION
Pt to d/c home today.  Transport scheduled via [employer] for 0945.  CM called [employer] and obtained permission from Leta to send pt home with a portable O2 tank.  Nebulizer kit ordered through [employer].  Per pt HH has been arranged prior to admission.  PT/OT do not recommend any further skilled therapy at d/c.  No other supportive care needs identified.  Pt agrees with d/c plan.  Milestones met.  LOS = 7 days       01/25/24 0953   Service Assessment   Patient Orientation Alert and Oriented;Person;Place;Self   Cognition Alert   History Provided By Patient;Medical Record   Primary Caregiver Self   Accompanied By/Relationship N/A   Support Systems Other (Comment)  (Medicaid )   Patient's Healthcare Decision Maker is: Legal Next of Kin   PCP Verified by CM Yes  (Florencio Banegas MD)   Last Visit to PCP Within last 3 months   Prior Functional Level Independent in ADLs/IADLs   Current Functional Level Independent in ADLs/IADLs   Can patient return to prior living arrangement Yes   Ability to make needs known: Good   Family able to assist with home care needs: No  (Pt states she does not have any familial support)   Would you like for me to discuss the discharge plan with any other family members/significant others, and if so, who? No   Financial Resources Medicare;Medicaid  ([facility].  Pt states she was recently approved for Medicaid.  This is not showing in the chart.)   Community Resources None   CM/SW Referral Other (see comment)  (N/A)   Social/Functional History   Lives With Alone   Type of Home House   Home Layout One level   Bathroom Toilet Standard   Bathroom Equipment Shower chair   Bathroom Accessibility Accessible   Home Equipment Cane, quad;Walker, rolling;Oxygen  (Home O2 through [employer] (uses PRN))   Receives Help From Other (comment);Home health  (Medicaid .  Per pt she has [facility].)   ADL Assistance Independent   Homemaking Assistance Independent    Ambulation Assistance Independent   Transfer Assistance Independent   Active  No   Patient's  Info Pays people for transportation   Occupation Retired   Discharge Planning   Type of Residence House   Living Arrangements Alone   Current Services Prior To Admission Durable Medical Equipment;Home Care  (Pt states she has [facility] )   Current DME Prior to Arrival Oxygen Therapy (Comment);Cane;Shower Chair;Walker  (Home O2 through [employer] (uses PRN))   Potential Assistance Needed N/A   DME Ordered? Home aerosol  (Nebulizer kit ordered through [employer].  Portable O2 tank sent with pt.)   Potential Assistance Purchasing Medications No   Type of Home Care Services Nursing Services;Aide Services   Patient expects to be discharged to: House   One/Two Story Residence One story   History of falls? 0   Services At/After Discharge   Transition of Care Consult (CM Consult) Discharge Planning;DME/Supply Assistance;Transportation Assistance   Services At/After Discharge DME;Transport;In cab  ([facility])   [place] Resource Information Provided? No   Mode of Transport at Discharge Other (see comment)  (Roundtrip)   Confirm Follow Up Transport Other (see comment)  (Roundtrip)   Condition of Participation: Discharge Planning   The Plan for Transition of Care is related to the following treatment goals: Pt to obtain care to become medically stable and to return with a safe transition.   The Patient and/or Patient Representative was provided with a Choice of Provider? Patient   The Patient and/Or Patient Representative agree with the Discharge Plan? Yes   Freedom of Choice list was provided with basic dialogue that supports the patient's individualized plan of care/goals, treatment preferences, and shares the quality data associated with the providers?  Yes

## 2024-02-04 ENCOUNTER — HOSPITAL ENCOUNTER (EMERGENCY)
Age: 69
Discharge: HOME | End: 2024-02-04
Attending: EMERGENCY MEDICINE
Payer: MEDICARE

## 2024-02-04 ENCOUNTER — APPOINTMENT (OUTPATIENT)
Dept: GENERAL RADIOLOGY | Age: 69
End: 2024-02-04
Payer: MEDICARE

## 2024-02-04 VITALS
RESPIRATION RATE: 18 BRPM | DIASTOLIC BLOOD PRESSURE: 90 MMHG | HEIGHT: 60 IN | BODY MASS INDEX: 29.45 KG/M2 | OXYGEN SATURATION: 90 % | WEIGHT: 150 LBS | SYSTOLIC BLOOD PRESSURE: 168 MMHG | TEMPERATURE: 97.7 F | HEART RATE: 90 BPM

## 2024-02-04 DIAGNOSIS — J44.1 COPD EXACERBATION (HCC): Primary | ICD-10-CM

## 2024-02-04 PROCEDURE — 71045 X-RAY EXAM CHEST 1 VIEW: CPT

## 2024-02-04 PROCEDURE — 94664 DEMO&/EVAL PT USE INHALER: CPT

## 2024-02-04 PROCEDURE — 2580000003 HC RX 258: Performed by: EMERGENCY MEDICINE

## 2024-02-04 PROCEDURE — 6370000000 HC RX 637 (ALT 250 FOR IP): Performed by: EMERGENCY MEDICINE

## 2024-02-04 PROCEDURE — 94640 AIRWAY INHALATION TREATMENT: CPT

## 2024-02-04 PROCEDURE — 99284 EMERGENCY DEPT VISIT MOD MDM: CPT

## 2024-02-04 PROCEDURE — 94761 N-INVAS EAR/PLS OXIMETRY MLT: CPT

## 2024-02-04 PROCEDURE — 6360000002 HC RX W HCPCS: Performed by: EMERGENCY MEDICINE

## 2024-02-04 PROCEDURE — 96372 THER/PROPH/DIAG INJ SC/IM: CPT

## 2024-02-04 RX ORDER — IPRATROPIUM BROMIDE AND ALBUTEROL SULFATE 2.5; .5 MG/3ML; MG/3ML
1 SOLUTION RESPIRATORY (INHALATION)
Status: COMPLETED | OUTPATIENT
Start: 2024-02-04 | End: 2024-02-04

## 2024-02-04 RX ADMIN — IPRATROPIUM BROMIDE AND ALBUTEROL SULFATE 1 DOSE: .5; 3 SOLUTION RESPIRATORY (INHALATION) at 12:09

## 2024-02-04 RX ADMIN — WATER 125 MG: 1 INJECTION INTRAMUSCULAR; INTRAVENOUS; SUBCUTANEOUS at 12:16

## 2024-02-04 ASSESSMENT — PAIN - FUNCTIONAL ASSESSMENT: PAIN_FUNCTIONAL_ASSESSMENT: 0-10

## 2024-02-04 ASSESSMENT — PAIN SCALES - GENERAL: PAINLEVEL_OUTOF10: 10

## 2024-02-04 NOTE — ED NOTES
I have reviewed discharge instructions with the patient.  The patient verbalized understanding.    Patient left ED via Discharge Method: stretcher to Home with EMS    Opportunity for questions and clarification provided.       Patient given 0 scripts.         To continue your aftercare when you leave the hospital, you may receive an automated call from our care team to check in on how you are doing.  This is a free service and part of our promise to provide the best care and service to meet your aftercare needs.” If you have questions, or wish to unsubscribe from this service please call [phone].  Thank you for Choosing our [facility] Emergency Department.

## 2024-02-04 NOTE — ED PROVIDER NOTES
Emergency Department Provider Note       PCP: Florencio Banegas MD   Age: 68 y.o.   Sex: female     DISPOSITION Decision To Discharge 02/04/2024 02:11:00 PM       ICD-10-CM    1. COPD exacerbation (HCC)  J44.1           Medical Decision Making   Level 5 48719  68-year-old female presents to the emergency department via EMS with chief complaint shortness of breath.  Patient states she has been feeling short of breath ever since being discharged from the hospital 1-24.  Vital signs here are reviewed.  Respiratory work appears initiated external records were reviewed shared decision-making was utilized in the patient's visit.  Patient is somewhat noncompliant with her medical history as well as medication use.  She does have wheezing on exam.  Patient was given a breathing treatment here as well as Solu-Medrol.  She is already on prednisone at home currently she has not establish any type of follow-up with the pulmonologist.  Chest x-ray shows some vascular congestion.  I did recommend some blood work but the patient refused.  She states that she is ready to go home.  I told her I will place a social care worker consult for helping establish pulmonology follow-up.  Patient is satting 90% on 4 L of nasal cannula on discharge    Complexity of Problems Addressed:  1 or more chronic illnesses with a severe exacerbation or progression.  1 or more acute illnesses that pose a threat to life or bodily function.     Data Reviewed and Analyzed:  I independently ordered and reviewed each unique test.  I reviewed external records: ED visit note from an outside group.  I reviewed external records: provider visit note from PCP.  I reviewed external records: provider visit note from outside specialist.   The patients assessment required an independent historian: ems.  The reason they were needed is pre hospital report.    I interpreted the X-rays vascular congestion.    Discussion of management or test interpretation.  Discussed the

## 2024-02-04 NOTE — DISCHARGE INSTRUCTIONS
You are not requiring any extra oxygen demand.  Will treat you for COPD exacerbation.  I put in for case management consult for contacting you about getting you set up for pulmonology follow-up.  Please return to the ER for any worsening symptoms

## 2024-02-04 NOTE — ED TRIAGE NOTES
Patient arrives from home VIA EMS for SOB and cough. Patient reports recent visit for cellulitis, unsure of which prescribed medications she filled. EMS gave nebulizer treatment prior to arrival.

## 2024-02-05 NOTE — PROGRESS NOTES
Physician Progress Note      PATIENT:               MERT AREVALO  CSN #:                  [id number]  :                       1955  ADMIT DATE:       2024 2:33 PM  DISCH DATE:        2024 9:45 AM  RESPONDING  PROVIDER #:        ISAIAH CHADWICK          QUERY TEXT:    Patient admitted with sepsis, noted to have abnormal CXR. If possible, please   document in progress notes and discharge summary if you are evaluating and/or   treating any of the following:    The medical record reflects the following:  Risk Factors: ***  Clinical Indicators: Progress notes on 24 by Madeleine Martin PAC -    URI vs LRI  - CXR concerning for PNA  Imaging orders and results report - Chest X-ray - Decreased, but persistent   mild consolidation right lower lung consistent with pneumonia  Treatment: s/p 7 days vancomycin, add on rocephin, mucinex, PNA studies  Options provided:  -- Pneumonia  -- Pneumonia not clinically significant  -- Other - I will add my own diagnosis  -- Disagree - Not applicable / Not valid  -- Disagree - Clinically unable to determine / Unknown  -- Refer to Clinical Documentation Reviewer    PROVIDER RESPONSE TEXT:    This patient has pneumonia.    Query created by: Bhargavi Cid on 2024 3:28 PM      Electronically signed by:  ISAIAH CHADWICK 2024 3:33 PM

## 2024-03-17 ENCOUNTER — HOSPITAL ENCOUNTER (EMERGENCY)
Age: 69
Discharge: HOME | End: 2024-03-17
Attending: GENERAL PRACTICE
Payer: MEDICARE

## 2024-03-17 ENCOUNTER — APPOINTMENT (OUTPATIENT)
Dept: GENERAL RADIOLOGY | Age: 69
End: 2024-03-17
Payer: MEDICARE

## 2024-03-17 VITALS
OXYGEN SATURATION: 93 % | DIASTOLIC BLOOD PRESSURE: 84 MMHG | HEART RATE: 84 BPM | TEMPERATURE: 98 F | RESPIRATION RATE: 18 BRPM | SYSTOLIC BLOOD PRESSURE: 128 MMHG

## 2024-03-17 DIAGNOSIS — I50.9 CONGESTIVE HEART FAILURE, UNSPECIFIED HF CHRONICITY, UNSPECIFIED HEART FAILURE TYPE (HCC): ICD-10-CM

## 2024-03-17 DIAGNOSIS — J44.1 COPD EXACERBATION (HCC): Primary | ICD-10-CM

## 2024-03-17 LAB
ALBUMIN SERPL-MCNC: 2.4 G/DL (ref 3.2–4.6)
ALBUMIN/GLOB SERPL: 0.6 (ref 0.4–1.6)
ALP SERPL-CCNC: 134 U/L (ref 50–136)
ALT SERPL-CCNC: 16 U/L (ref 12–65)
ANION GAP SERPL CALC-SCNC: 2 MMOL/L (ref 2–11)
AST SERPL-CCNC: 20 U/L (ref 15–37)
BASOPHILS # BLD: 0.1 K/UL (ref 0–0.2)
BASOPHILS NFR BLD: 1 % (ref 0–2)
BILIRUB SERPL-MCNC: 0.5 MG/DL (ref 0.2–1.1)
BUN SERPL-MCNC: 5 MG/DL (ref 8–23)
CALCIUM SERPL-MCNC: 9.3 MG/DL (ref 8.3–10.4)
CHLORIDE SERPL-SCNC: 105 MMOL/L (ref 103–113)
CO2 SERPL-SCNC: 34 MMOL/L (ref 21–32)
CREAT SERPL-MCNC: 0.6 MG/DL (ref 0.6–1)
DIFFERENTIAL METHOD BLD: ABNORMAL
EOSINOPHIL # BLD: 0.4 K/UL (ref 0–0.8)
EOSINOPHIL NFR BLD: 4 % (ref 0.5–7.8)
ERYTHROCYTE [DISTWIDTH] IN BLOOD BY AUTOMATED COUNT: 17.3 % (ref 11.9–14.6)
GLOBULIN SER CALC-MCNC: 4 G/DL (ref 2.8–4.5)
GLUCOSE SERPL-MCNC: 91 MG/DL (ref 65–100)
HCT VFR BLD AUTO: 37 % (ref 35.8–46.3)
HGB BLD-MCNC: 11 G/DL (ref 11.7–15.4)
IMM GRANULOCYTES # BLD AUTO: 0.1 K/UL (ref 0–0.5)
IMM GRANULOCYTES NFR BLD AUTO: 1 % (ref 0–5)
LYMPHOCYTES # BLD: 2.5 K/UL (ref 0.5–4.6)
LYMPHOCYTES NFR BLD: 26 % (ref 13–44)
MCH RBC QN AUTO: 26.6 PG (ref 26.1–32.9)
MCHC RBC AUTO-ENTMCNC: 29.7 G/DL (ref 31.4–35)
MCV RBC AUTO: 89.6 FL (ref 82–102)
MONOCYTES # BLD: 0.8 K/UL (ref 0.1–1.3)
MONOCYTES NFR BLD: 9 % (ref 4–12)
NEUTS SEG # BLD: 5.8 K/UL (ref 1.7–8.2)
NEUTS SEG NFR BLD: 60 % (ref 43–78)
NRBC # BLD: 0 K/UL (ref 0–0.2)
NT PRO BNP: 1640 PG/ML (ref 5–125)
PLATELET # BLD AUTO: 336 K/UL (ref 150–450)
PMV BLD AUTO: 9.1 FL (ref 9.4–12.3)
POTASSIUM SERPL-SCNC: 3.5 MMOL/L (ref 3.5–5.1)
PROT SERPL-MCNC: 6.4 G/DL (ref 6.3–8.2)
RBC # BLD AUTO: 4.13 M/UL (ref 4.05–5.2)
SODIUM SERPL-SCNC: 141 MMOL/L (ref 136–146)
WBC # BLD AUTO: 9.7 K/UL (ref 4.3–11.1)

## 2024-03-17 PROCEDURE — 71045 X-RAY EXAM CHEST 1 VIEW: CPT

## 2024-03-17 PROCEDURE — 83880 ASSAY OF NATRIURETIC PEPTIDE: CPT

## 2024-03-17 PROCEDURE — 80053 COMPREHEN METABOLIC PANEL: CPT

## 2024-03-17 PROCEDURE — 6360000002 HC RX W HCPCS: Performed by: GENERAL PRACTICE

## 2024-03-17 PROCEDURE — 99285 EMERGENCY DEPT VISIT HI MDM: CPT

## 2024-03-17 PROCEDURE — 93005 ELECTROCARDIOGRAM TRACING: CPT | Performed by: GENERAL PRACTICE

## 2024-03-17 PROCEDURE — 85025 COMPLETE CBC W/AUTO DIFF WBC: CPT

## 2024-03-17 PROCEDURE — 96374 THER/PROPH/DIAG INJ IV PUSH: CPT

## 2024-03-17 RX ORDER — FUROSEMIDE 10 MG/ML
40 INJECTION INTRAMUSCULAR; INTRAVENOUS ONCE
Status: COMPLETED | OUTPATIENT
Start: 2024-03-17 | End: 2024-03-17

## 2024-03-17 RX ORDER — FUROSEMIDE 20 MG/1
40 TABLET ORAL DAILY
Qty: 60 TABLET | Refills: 0 | Status: SHIPPED | OUTPATIENT
Start: 2024-03-17 | End: 2024-04-16

## 2024-03-17 RX ADMIN — FUROSEMIDE 40 MG: 10 INJECTION, SOLUTION INTRAMUSCULAR; INTRAVENOUS at 16:00

## 2024-03-17 ASSESSMENT — PAIN DESCRIPTION - LOCATION: LOCATION: BUTTOCKS

## 2024-03-17 ASSESSMENT — PAIN SCALES - GENERAL: PAINLEVEL_OUTOF10: 8

## 2024-03-17 NOTE — ED PROVIDER NOTES
Emergency Department Provider Note       PCP: Florencio Banegas MD   Age: 68 y.o.   Sex: female     DISPOSITION Decision To Discharge 03/17/2024 07:45:42 PM       ICD-10-CM    1. COPD exacerbation ([facility])  J44.1       2. Congestive heart failure, unspecified HF chronicity, unspecified heart failure type (HCC)  I50.9           Medical Decision Making     Patient presented with wheezing and route though she was not wheezing here but she also had an exam more consistent with heart failure.  Patient was able to be taken off BiPAP immediately upon arrival and she was able to stay off BiPAP and on her baseline oxygen.  Workup here does show some mild edema on chest x-ray and mild elevation of BNP.  There is nothing to suggest pulmonary embolism or pneumonia.  I did discuss with her and I told her that I did have a slight preference for admission to the hospital but the patient stated that she will not be admitted.  She says she has not been taking diuretic and she is agreeable to take it now.  At this time, she is in no significant distress and she is on her baseline oxygen and through shared decision making she will be discharged home with Lasix and strict return precautions are given and primary care follow-up is encouraged.     1 or more acute illnesses that pose a threat to life or bodily function.   1 chronic illness with exacerbation.  Prescription drug management performed.  Drug therapy given requiring intensive monitoring for toxicity.  Patient was discharged risks and benefits of hospitalization were considered.  Chronic medical problems impacting care include COPD.  Shared medical decision making was utilized in creating the patients health plan today.    I independently ordered and reviewed each unique test.  I reviewed external records: ED visit note from an outside group.  I reviewed external records: provider visit note from outside specialist.  I reviewed external records: previous lab results from outside

## 2024-03-17 NOTE — ED TRIAGE NOTES
Pt arrives via [facility] coming from home c/o SOB that started last night. At time of arrival, pt is on CPAP. 90% on 4L NC on EMS arrival with a RR of 36.

## 2024-03-18 LAB
EKG ATRIAL RATE: 93 BPM
EKG DIAGNOSIS: NORMAL
EKG P AXIS: 57 DEGREES
EKG P-R INTERVAL: 141 MS
EKG Q-T INTERVAL: 379 MS
EKG QRS DURATION: 92 MS
EKG QTC CALCULATION (BAZETT): 472 MS
EKG R AXIS: 74 DEGREES
EKG T AXIS: 34 DEGREES
EKG VENTRICULAR RATE: 93 BPM

## 2024-03-18 PROCEDURE — 93010 ELECTROCARDIOGRAM REPORT: CPT | Performed by: INTERNAL MEDICINE

## 2024-04-15 ENCOUNTER — HOSPITAL ENCOUNTER (INPATIENT)
Age: 69
LOS: 3 days | Discharge: LEFT BEFORE DISCHARGE | End: 2024-04-18
Attending: EMERGENCY MEDICINE | Admitting: FAMILY MEDICINE
Payer: MEDICARE

## 2024-04-15 ENCOUNTER — APPOINTMENT (OUTPATIENT)
Dept: GENERAL RADIOLOGY | Age: 69
End: 2024-04-15
Payer: MEDICARE

## 2024-04-15 DIAGNOSIS — J18.9 PNEUMONIA OF RIGHT LOWER LOBE DUE TO INFECTIOUS ORGANISM: ICD-10-CM

## 2024-04-15 DIAGNOSIS — R09.02 HYPOXEMIA: ICD-10-CM

## 2024-04-15 DIAGNOSIS — J44.1 COPD EXACERBATION (HCC): Primary | ICD-10-CM

## 2024-04-15 LAB
ALBUMIN SERPL-MCNC: 2.8 G/DL (ref 3.2–4.6)
ALBUMIN/GLOB SERPL: 0.6 (ref 0.4–1.6)
ALP SERPL-CCNC: 156 U/L (ref 50–136)
ALT SERPL-CCNC: 10 U/L (ref 12–65)
ANION GAP SERPL CALC-SCNC: 4 MMOL/L (ref 2–11)
AST SERPL-CCNC: 16 U/L (ref 15–37)
BASOPHILS # BLD: 0.1 K/UL (ref 0–0.2)
BASOPHILS NFR BLD: 1 % (ref 0–2)
BILIRUB SERPL-MCNC: 0.5 MG/DL (ref 0.2–1.1)
BUN SERPL-MCNC: 7 MG/DL (ref 8–23)
CALCIUM SERPL-MCNC: 9.7 MG/DL (ref 8.3–10.4)
CHLORIDE SERPL-SCNC: 107 MMOL/L (ref 103–113)
CO2 SERPL-SCNC: 29 MMOL/L (ref 21–32)
CREAT SERPL-MCNC: 1 MG/DL (ref 0.6–1)
DIFFERENTIAL METHOD BLD: ABNORMAL
EOSINOPHIL # BLD: 0.4 K/UL (ref 0–0.8)
EOSINOPHIL NFR BLD: 5 % (ref 0.5–7.8)
ERYTHROCYTE [DISTWIDTH] IN BLOOD BY AUTOMATED COUNT: 20.8 % (ref 11.9–14.6)
GLOBULIN SER CALC-MCNC: 4.7 G/DL (ref 2.8–4.5)
GLUCOSE SERPL-MCNC: 163 MG/DL (ref 65–100)
HCT VFR BLD AUTO: 46 % (ref 35.8–46.3)
HGB BLD-MCNC: 13.4 G/DL (ref 11.7–15.4)
IMM GRANULOCYTES # BLD AUTO: 0 K/UL (ref 0–0.5)
IMM GRANULOCYTES NFR BLD AUTO: 0 % (ref 0–5)
LACTATE SERPL-SCNC: 1.2 MMOL/L (ref 0.4–2)
LYMPHOCYTES # BLD: 2.5 K/UL (ref 0.5–4.6)
LYMPHOCYTES NFR BLD: 29 % (ref 13–44)
MAGNESIUM SERPL-MCNC: 2.2 MG/DL (ref 1.8–2.4)
MCH RBC QN AUTO: 24 PG (ref 26.1–32.9)
MCHC RBC AUTO-ENTMCNC: 29.1 G/DL (ref 31.4–35)
MCV RBC AUTO: 82.3 FL (ref 82–102)
MONOCYTES # BLD: 0.9 K/UL (ref 0.1–1.3)
MONOCYTES NFR BLD: 11 % (ref 4–12)
NEUTS SEG # BLD: 4.7 K/UL (ref 1.7–8.2)
NEUTS SEG NFR BLD: 54 % (ref 43–78)
NRBC # BLD: 0 K/UL (ref 0–0.2)
NT PRO BNP: 1507 PG/ML (ref 5–125)
PLATELET # BLD AUTO: 243 K/UL (ref 150–450)
PMV BLD AUTO: 10.3 FL (ref 9.4–12.3)
POTASSIUM SERPL-SCNC: 3.9 MMOL/L (ref 3.5–5.1)
PROCALCITONIN SERPL-MCNC: <0.05 NG/ML (ref 0–0.49)
PROT SERPL-MCNC: 7.5 G/DL (ref 6.3–8.2)
RBC # BLD AUTO: 5.59 M/UL (ref 4.05–5.2)
SODIUM SERPL-SCNC: 140 MMOL/L (ref 136–146)
TROPONIN I SERPL HS-MCNC: 6.1 PG/ML (ref 0–14)
TROPONIN I SERPL HS-MCNC: 7.5 PG/ML (ref 0–14)
WBC # BLD AUTO: 8.6 K/UL (ref 4.3–11.1)

## 2024-04-15 PROCEDURE — 99285 EMERGENCY DEPT VISIT HI MDM: CPT

## 2024-04-15 PROCEDURE — 96375 TX/PRO/DX INJ NEW DRUG ADDON: CPT

## 2024-04-15 PROCEDURE — 84484 ASSAY OF TROPONIN QUANT: CPT

## 2024-04-15 PROCEDURE — 83605 ASSAY OF LACTIC ACID: CPT

## 2024-04-15 PROCEDURE — 96365 THER/PROPH/DIAG IV INF INIT: CPT

## 2024-04-15 PROCEDURE — 71045 X-RAY EXAM CHEST 1 VIEW: CPT

## 2024-04-15 PROCEDURE — 87040 BLOOD CULTURE FOR BACTERIA: CPT

## 2024-04-15 PROCEDURE — 93005 ELECTROCARDIOGRAM TRACING: CPT | Performed by: EMERGENCY MEDICINE

## 2024-04-15 PROCEDURE — 6360000002 HC RX W HCPCS: Performed by: EMERGENCY MEDICINE

## 2024-04-15 PROCEDURE — 94640 AIRWAY INHALATION TREATMENT: CPT

## 2024-04-15 PROCEDURE — 6370000000 HC RX 637 (ALT 250 FOR IP): Performed by: EMERGENCY MEDICINE

## 2024-04-15 PROCEDURE — 94761 N-INVAS EAR/PLS OXIMETRY MLT: CPT

## 2024-04-15 PROCEDURE — 2580000003 HC RX 258: Performed by: EMERGENCY MEDICINE

## 2024-04-15 PROCEDURE — 84145 PROCALCITONIN (PCT): CPT

## 2024-04-15 PROCEDURE — 1100000000 HC RM PRIVATE

## 2024-04-15 PROCEDURE — 83735 ASSAY OF MAGNESIUM: CPT

## 2024-04-15 PROCEDURE — 93005 ELECTROCARDIOGRAM TRACING: CPT | Performed by: FAMILY MEDICINE

## 2024-04-15 PROCEDURE — 80053 COMPREHEN METABOLIC PANEL: CPT

## 2024-04-15 PROCEDURE — 85025 COMPLETE CBC W/AUTO DIFF WBC: CPT

## 2024-04-15 PROCEDURE — 83880 ASSAY OF NATRIURETIC PEPTIDE: CPT

## 2024-04-15 RX ORDER — FERROUS SULFATE 325(65) MG
325 TABLET ORAL 2 TIMES DAILY WITH MEALS
Status: DISCONTINUED | OUTPATIENT
Start: 2024-04-16 | End: 2024-04-18 | Stop reason: HOSPADM

## 2024-04-15 RX ORDER — BUDESONIDE 0.5 MG/2ML
0.5 INHALANT ORAL
Status: DISCONTINUED | OUTPATIENT
Start: 2024-04-15 | End: 2024-04-18 | Stop reason: HOSPADM

## 2024-04-15 RX ORDER — LISINOPRIL 20 MG/1
20 TABLET ORAL DAILY
Status: DISCONTINUED | OUTPATIENT
Start: 2024-04-16 | End: 2024-04-15

## 2024-04-15 RX ORDER — SODIUM CHLORIDE 0.9 % (FLUSH) 0.9 %
5-40 SYRINGE (ML) INJECTION EVERY 12 HOURS SCHEDULED
Status: DISCONTINUED | OUTPATIENT
Start: 2024-04-15 | End: 2024-04-18 | Stop reason: HOSPADM

## 2024-04-15 RX ORDER — DEXAMETHASONE SODIUM PHOSPHATE 10 MG/ML
10 INJECTION INTRAMUSCULAR; INTRAVENOUS
Status: COMPLETED | OUTPATIENT
Start: 2024-04-15 | End: 2024-04-15

## 2024-04-15 RX ORDER — ACETAMINOPHEN 650 MG/1
650 SUPPOSITORY RECTAL EVERY 6 HOURS PRN
Status: DISCONTINUED | OUTPATIENT
Start: 2024-04-15 | End: 2024-04-18 | Stop reason: HOSPADM

## 2024-04-15 RX ORDER — FUROSEMIDE 10 MG/ML
40 INJECTION INTRAMUSCULAR; INTRAVENOUS
Status: COMPLETED | OUTPATIENT
Start: 2024-04-15 | End: 2024-04-15

## 2024-04-15 RX ORDER — GUAIFENESIN 600 MG/1
1200 TABLET, EXTENDED RELEASE ORAL 2 TIMES DAILY
Status: DISCONTINUED | OUTPATIENT
Start: 2024-04-15 | End: 2024-04-18 | Stop reason: HOSPADM

## 2024-04-15 RX ORDER — IPRATROPIUM BROMIDE AND ALBUTEROL SULFATE 2.5; .5 MG/3ML; MG/3ML
1 SOLUTION RESPIRATORY (INHALATION)
Status: COMPLETED | OUTPATIENT
Start: 2024-04-15 | End: 2024-04-15

## 2024-04-15 RX ORDER — ACETAMINOPHEN 325 MG/1
650 TABLET ORAL EVERY 6 HOURS PRN
Status: DISCONTINUED | OUTPATIENT
Start: 2024-04-15 | End: 2024-04-18 | Stop reason: HOSPADM

## 2024-04-15 RX ORDER — FAMOTIDINE 20 MG/1
10 TABLET, FILM COATED ORAL DAILY PRN
Status: DISCONTINUED | OUTPATIENT
Start: 2024-04-15 | End: 2024-04-18 | Stop reason: HOSPADM

## 2024-04-15 RX ORDER — ENOXAPARIN SODIUM 100 MG/ML
40 INJECTION SUBCUTANEOUS DAILY
Status: DISCONTINUED | OUTPATIENT
Start: 2024-04-16 | End: 2024-04-18 | Stop reason: HOSPADM

## 2024-04-15 RX ORDER — SODIUM CHLORIDE 9 MG/ML
INJECTION, SOLUTION INTRAVENOUS PRN
Status: DISCONTINUED | OUTPATIENT
Start: 2024-04-15 | End: 2024-04-18 | Stop reason: HOSPADM

## 2024-04-15 RX ORDER — DAPSONE 25 MG/1
100 TABLET ORAL DAILY
Status: DISCONTINUED | OUTPATIENT
Start: 2024-04-16 | End: 2024-04-18 | Stop reason: HOSPADM

## 2024-04-15 RX ORDER — POTASSIUM CHLORIDE 20 MEQ/1
40 TABLET, EXTENDED RELEASE ORAL PRN
Status: DISCONTINUED | OUTPATIENT
Start: 2024-04-15 | End: 2024-04-18 | Stop reason: HOSPADM

## 2024-04-15 RX ORDER — MAGNESIUM HYDROXIDE/ALUMINUM HYDROXICE/SIMETHICONE 120; 1200; 1200 MG/30ML; MG/30ML; MG/30ML
30 SUSPENSION ORAL EVERY 6 HOURS PRN
Status: DISCONTINUED | OUTPATIENT
Start: 2024-04-15 | End: 2024-04-18 | Stop reason: HOSPADM

## 2024-04-15 RX ORDER — POLYETHYLENE GLYCOL 3350 17 G/17G
17 POWDER, FOR SOLUTION ORAL DAILY PRN
Status: DISCONTINUED | OUTPATIENT
Start: 2024-04-15 | End: 2024-04-18 | Stop reason: HOSPADM

## 2024-04-15 RX ORDER — SODIUM CHLORIDE 0.9 % (FLUSH) 0.9 %
5-40 SYRINGE (ML) INJECTION PRN
Status: DISCONTINUED | OUTPATIENT
Start: 2024-04-15 | End: 2024-04-18 | Stop reason: HOSPADM

## 2024-04-15 RX ORDER — MAGNESIUM SULFATE IN WATER 40 MG/ML
2000 INJECTION, SOLUTION INTRAVENOUS PRN
Status: DISCONTINUED | OUTPATIENT
Start: 2024-04-15 | End: 2024-04-18 | Stop reason: HOSPADM

## 2024-04-15 RX ORDER — TRAZODONE HYDROCHLORIDE 50 MG/1
100 TABLET ORAL NIGHTLY
Status: DISCONTINUED | OUTPATIENT
Start: 2024-04-15 | End: 2024-04-15

## 2024-04-15 RX ORDER — DAPSONE 25 MG/1
100 TABLET ORAL DAILY
Status: DISCONTINUED | OUTPATIENT
Start: 2024-04-16 | End: 2024-04-15

## 2024-04-15 RX ORDER — POTASSIUM CHLORIDE 7.45 MG/ML
10 INJECTION INTRAVENOUS PRN
Status: DISCONTINUED | OUTPATIENT
Start: 2024-04-15 | End: 2024-04-18 | Stop reason: HOSPADM

## 2024-04-15 RX ORDER — BISACODYL 10 MG
10 SUPPOSITORY, RECTAL RECTAL DAILY PRN
Status: DISCONTINUED | OUTPATIENT
Start: 2024-04-15 | End: 2024-04-18 | Stop reason: HOSPADM

## 2024-04-15 RX ADMIN — DEXAMETHASONE SODIUM PHOSPHATE 10 MG: 10 INJECTION INTRAMUSCULAR; INTRAVENOUS at 18:27

## 2024-04-15 RX ADMIN — WATER 1000 MG: 1 INJECTION INTRAMUSCULAR; INTRAVENOUS; SUBCUTANEOUS at 20:52

## 2024-04-15 RX ADMIN — IPRATROPIUM BROMIDE AND ALBUTEROL SULFATE 1 DOSE: .5; 3 SOLUTION RESPIRATORY (INHALATION) at 17:54

## 2024-04-15 RX ADMIN — FUROSEMIDE 40 MG: 10 INJECTION, SOLUTION INTRAMUSCULAR; INTRAVENOUS at 18:27

## 2024-04-15 RX ADMIN — AZITHROMYCIN MONOHYDRATE 500 MG: 500 INJECTION, POWDER, LYOPHILIZED, FOR SOLUTION INTRAVENOUS at 20:55

## 2024-04-15 ASSESSMENT — ENCOUNTER SYMPTOMS
COLOR CHANGE: 0
DIARRHEA: 1
SHORTNESS OF BREATH: 1
BACK PAIN: 0
VOMITING: 0
RHINORRHEA: 0
ABDOMINAL PAIN: 1
COUGH: 0
NAUSEA: 0

## 2024-04-15 NOTE — ED PROVIDER NOTES
Emergency Department Provider Note       PCP: Florencio Banegas MD   Age: 68 y.o.   Sex: female     DISPOSITION Decision To Admit 04/15/2024 08:47:04 PM       ICD-10-CM    1. COPD exacerbation (HCC)  J44.1       2. Hypoxemia  R09.02       3. Pneumonia of right lower lobe due to infectious organism  J18.9           Medical Decision Making     Patient presented with increasing shortness of breath over the last week with a history of COPD.  She complained of dehydration due to diarrhea throughout the day today.  DuoNeb and steroids provided.  Initial chest x-ray looked to me more like a CHF but only mild elevation of BNP to 1500.  Radiology read as right lower lobe infiltrate.  Procalcitonin and lactic acid were normal and there was no leukocytosis or fever.  Blood cultures were obtained and antibiotics provided.  No sign of sepsis.  Later blood pressure dropped transiently likely due to dehydration.  Small fluid bolus ordered.  Given increased oxygen requirements hospitalist was consulted for admission     1 or more acute illnesses that pose a threat to life or bodily function.   1 or more chronic illnesses with a severe exacerbation or progression.  Shared medical decision making was utilized in creating the patients health plan today.    I independently ordered and reviewed each unique test.  I reviewed external records: provider visit note from outside specialist.   The patients assessment required an independent historian: Patient's son.  The reason they were needed is important historical information not provided by the patient.  I interpreted the X-rays CHF versus right lower lobe infiltrate on chest x-ray.  My Independent EKG Interpretation: sinus rhythm, no evidence of arrhythmia, non-specific EKG, and prolonged QT interval      ST Segments:Nonspecific ST segments - NO STEMI   Rate: 91  The patient was admitted and I have discussed patient management with the admitting provider.    Exclusion criteria - the  mother    History of MI (myocardial infarction)     History of peptic ulcer     HTN (hypertension)     Mood disorder (HCC) 2024    Sjogren's syndrome (HCC)     Tobacco abuse     1 ppd x 40 years        Past Surgical History:   Procedure Laterality Date    BREAST SURGERY      20yrs ago, removed tumor    CORONARY ANGIOPLASTY WITH STENT PLACEMENT      x3    MA UNLISTED PROCEDURE ABDOMEN PERITONEUM & OMENTUM      partial lower intestine removal     MA UNLISTED PROCEDURE CARDIAC SURGERY      heart cath with stent 2009    MATY AND BSO (CERVIX REMOVED)          Social History     Socioeconomic History    Marital status:    Tobacco Use    Smoking status: Former     Current packs/day: 0.00     Types: Cigarettes     Quit date: 2017     Years since quittin.7    Smokeless tobacco: Never   Substance and Sexual Activity    Alcohol use: No    Drug use: No     Social Determinants of Health     Food Insecurity: No Food Insecurity (2024)    Hunger Vital Sign     Worried About Running Out of Food in the Last Year: Never true     Ran Out of Food in the Last Year: Never true   Transportation Needs: Unmet Transportation Needs (2024)    PRAPARE - Transportation     Lack of Transportation (Medical): Yes     Lack of Transportation (Non-Medical): Yes   Housing Stability: High Risk (2024)    Housing Stability Vital Sign     Unable to Pay for Housing in the Last Year: Yes     Number of Places Lived in the Last Year: 1     Unstable Housing in the Last Year: No        Previous Medications    DAPSONE 100 MG TABLET    Take 1 tablet by mouth daily    FERROUS SULFATE (IRON 325) 325 (65 FE) MG TABLET    Take 1 tablet by mouth 2 times daily (with meals)    FUROSEMIDE (LASIX) 20 MG TABLET    Take 1 tablet by mouth daily for 5 days    FUROSEMIDE (LASIX) 20 MG TABLET    Take 2 tablets by mouth daily    HYDROXYCHLOROQUINE (PLAQUENIL) 200 MG TABLET    Take 1 tablet by mouth daily    IPRATROPIUM 0.5 MG-ALBUTEROL

## 2024-04-15 NOTE — ED TRIAGE NOTES
Pt arrives via EMS from home c/o SOB/diarrhea/ lower abd discomfort intermittently. Denies nausea/vomiting. Pt presents discolored and fatigue. Pt denies chest discomfort. Pt reports difficult to get a deep breath.       88% 10L NRB with fire. 15% with EMS. 91%. Wears 2L at baseline due to COPD. Clear lung sounds.     12 lead unrem.      18g l ac.

## 2024-04-16 ENCOUNTER — APPOINTMENT (OUTPATIENT)
Dept: CT IMAGING | Age: 69
End: 2024-04-16
Payer: MEDICARE

## 2024-04-16 LAB
ALBUMIN SERPL-MCNC: 2.8 G/DL (ref 3.2–4.6)
ALBUMIN/GLOB SERPL: 0.7 (ref 0.4–1.6)
ALP SERPL-CCNC: 138 U/L (ref 50–136)
ALT SERPL-CCNC: 10 U/L (ref 12–65)
ANION GAP SERPL CALC-SCNC: 2 MMOL/L (ref 2–11)
ARTERIAL PATENCY WRIST A: POSITIVE
AST SERPL-CCNC: 12 U/L (ref 15–37)
BASE EXCESS BLD CALC-SCNC: 2.4 MMOL/L
BASOPHILS # BLD: 0 K/UL (ref 0–0.2)
BASOPHILS NFR BLD: 0 % (ref 0–2)
BDY SITE: ABNORMAL
BILIRUB DIRECT SERPL-MCNC: 0.2 MG/DL
BILIRUB SERPL-MCNC: 0.6 MG/DL (ref 0.2–1.1)
BUN SERPL-MCNC: 13 MG/DL (ref 8–23)
CALCIUM SERPL-MCNC: 8.9 MG/DL (ref 8.3–10.4)
CHLORIDE SERPL-SCNC: 103 MMOL/L (ref 103–113)
CO2 SERPL-SCNC: 30 MMOL/L (ref 21–32)
CREAT SERPL-MCNC: 0.9 MG/DL (ref 0.6–1)
D DIMER PPP FEU-MCNC: 1.98 UG/ML(FEU)
DIFFERENTIAL METHOD BLD: ABNORMAL
EKG ATRIAL RATE: 91 BPM
EKG ATRIAL RATE: 94 BPM
EKG DIAGNOSIS: NORMAL
EKG DIAGNOSIS: NORMAL
EKG P AXIS: 59 DEGREES
EKG P AXIS: 84 DEGREES
EKG P-R INTERVAL: 133 MS
EKG P-R INTERVAL: 170 MS
EKG Q-T INTERVAL: 372 MS
EKG Q-T INTERVAL: 447 MS
EKG QRS DURATION: 95 MS
EKG QRS DURATION: 97 MS
EKG QTC CALCULATION (BAZETT): 466 MS
EKG QTC CALCULATION (BAZETT): 550 MS
EKG R AXIS: 90 DEGREES
EKG R AXIS: 90 DEGREES
EKG T AXIS: -28 DEGREES
EKG T AXIS: 5 DEGREES
EKG VENTRICULAR RATE: 91 BPM
EKG VENTRICULAR RATE: 94 BPM
EOSINOPHIL # BLD: 0 K/UL (ref 0–0.8)
EOSINOPHIL NFR BLD: 0 % (ref 0.5–7.8)
ERYTHROCYTE [DISTWIDTH] IN BLOOD BY AUTOMATED COUNT: 20.7 % (ref 11.9–14.6)
GAS FLOW.O2 O2 DELIVERY SYS: ABNORMAL
GLOBULIN SER CALC-MCNC: 4.1 G/DL (ref 2.8–4.5)
GLUCOSE SERPL-MCNC: 159 MG/DL (ref 65–100)
HCO3 BLD-SCNC: 28.4 MMOL/L (ref 22–26)
HCT VFR BLD AUTO: 43.2 % (ref 35.8–46.3)
HGB BLD-MCNC: 12.4 G/DL (ref 11.7–15.4)
IMM GRANULOCYTES # BLD AUTO: 0 K/UL (ref 0–0.5)
IMM GRANULOCYTES NFR BLD AUTO: 0 % (ref 0–5)
LYMPHOCYTES # BLD: 1.6 K/UL (ref 0.5–4.6)
LYMPHOCYTES NFR BLD: 16 % (ref 13–44)
MCH RBC QN AUTO: 23.5 PG (ref 26.1–32.9)
MCHC RBC AUTO-ENTMCNC: 28.7 G/DL (ref 31.4–35)
MCV RBC AUTO: 82 FL (ref 82–102)
MONOCYTES # BLD: 0.4 K/UL (ref 0.1–1.3)
MONOCYTES NFR BLD: 4 % (ref 4–12)
NEUTS SEG # BLD: 7.9 K/UL (ref 1.7–8.2)
NEUTS SEG NFR BLD: 79 % (ref 43–78)
NRBC # BLD: 0 K/UL (ref 0–0.2)
PCO2 BLD: 47.7 MMHG (ref 35–45)
PH BLD: 7.38 (ref 7.35–7.45)
PLATELET # BLD AUTO: 241 K/UL (ref 150–450)
PMV BLD AUTO: 10.5 FL (ref 9.4–12.3)
PO2 BLD: 82 MMHG (ref 75–100)
POC FIO2: 9
POTASSIUM SERPL-SCNC: 4.1 MMOL/L (ref 3.5–5.1)
PROT SERPL-MCNC: 6.9 G/DL (ref 6.3–8.2)
RBC # BLD AUTO: 5.27 M/UL (ref 4.05–5.2)
SAO2 % BLD: 95.6 % (ref 95–98)
SERVICE CMNT-IMP: ABNORMAL
SERVICE CMNT-IMP: ABNORMAL
SODIUM SERPL-SCNC: 135 MMOL/L (ref 136–146)
SPECIMEN TYPE: ABNORMAL
WBC # BLD AUTO: 10.1 K/UL (ref 4.3–11.1)

## 2024-04-16 PROCEDURE — 94761 N-INVAS EAR/PLS OXIMETRY MLT: CPT

## 2024-04-16 PROCEDURE — 93010 ELECTROCARDIOGRAM REPORT: CPT | Performed by: INTERNAL MEDICINE

## 2024-04-16 PROCEDURE — 82803 BLOOD GASES ANY COMBINATION: CPT

## 2024-04-16 PROCEDURE — 2580000003 HC RX 258: Performed by: FAMILY MEDICINE

## 2024-04-16 PROCEDURE — 71260 CT THORAX DX C+: CPT

## 2024-04-16 PROCEDURE — 6360000002 HC RX W HCPCS: Performed by: FAMILY MEDICINE

## 2024-04-16 PROCEDURE — 94640 AIRWAY INHALATION TREATMENT: CPT

## 2024-04-16 PROCEDURE — 97165 OT EVAL LOW COMPLEX 30 MIN: CPT

## 2024-04-16 PROCEDURE — 6370000000 HC RX 637 (ALT 250 FOR IP): Performed by: INTERNAL MEDICINE

## 2024-04-16 PROCEDURE — 6370000000 HC RX 637 (ALT 250 FOR IP): Performed by: FAMILY MEDICINE

## 2024-04-16 PROCEDURE — 80076 HEPATIC FUNCTION PANEL: CPT

## 2024-04-16 PROCEDURE — 80048 BASIC METABOLIC PNL TOTAL CA: CPT

## 2024-04-16 PROCEDURE — 97112 NEUROMUSCULAR REEDUCATION: CPT

## 2024-04-16 PROCEDURE — 2700000000 HC OXYGEN THERAPY PER DAY

## 2024-04-16 PROCEDURE — 97530 THERAPEUTIC ACTIVITIES: CPT

## 2024-04-16 PROCEDURE — 97161 PT EVAL LOW COMPLEX 20 MIN: CPT

## 2024-04-16 PROCEDURE — 36600 WITHDRAWAL OF ARTERIAL BLOOD: CPT

## 2024-04-16 PROCEDURE — 85379 FIBRIN DEGRADATION QUANT: CPT

## 2024-04-16 PROCEDURE — 2500000003 HC RX 250 WO HCPCS: Performed by: FAMILY MEDICINE

## 2024-04-16 PROCEDURE — 1100000000 HC RM PRIVATE

## 2024-04-16 PROCEDURE — 85025 COMPLETE CBC W/AUTO DIFF WBC: CPT

## 2024-04-16 PROCEDURE — 36415 COLL VENOUS BLD VENIPUNCTURE: CPT

## 2024-04-16 PROCEDURE — 6360000004 HC RX CONTRAST MEDICATION: Performed by: FAMILY MEDICINE

## 2024-04-16 RX ORDER — PREGABALIN 100 MG/1
200 CAPSULE ORAL 2 TIMES DAILY
Status: DISCONTINUED | OUTPATIENT
Start: 2024-04-16 | End: 2024-04-18 | Stop reason: HOSPADM

## 2024-04-16 RX ORDER — TRAZODONE HYDROCHLORIDE 50 MG/1
100 TABLET ORAL NIGHTLY PRN
Status: DISCONTINUED | OUTPATIENT
Start: 2024-04-16 | End: 2024-04-18 | Stop reason: HOSPADM

## 2024-04-16 RX ADMIN — SODIUM CHLORIDE: 9 INJECTION, SOLUTION INTRAVENOUS at 01:22

## 2024-04-16 RX ADMIN — ENOXAPARIN SODIUM 40 MG: 100 INJECTION SUBCUTANEOUS at 08:38

## 2024-04-16 RX ADMIN — PIPERACILLIN AND TAZOBACTAM 3375 MG: 3; .375 INJECTION, POWDER, FOR SOLUTION INTRAVENOUS at 13:33

## 2024-04-16 RX ADMIN — PIPERACILLIN AND TAZOBACTAM 3375 MG: 3; .375 INJECTION, POWDER, FOR SOLUTION INTRAVENOUS at 05:13

## 2024-04-16 RX ADMIN — GUAIFENESIN 1200 MG: 600 TABLET ORAL at 21:22

## 2024-04-16 RX ADMIN — WATER 125 MG: 1 INJECTION INTRAMUSCULAR; INTRAVENOUS; SUBCUTANEOUS at 08:38

## 2024-04-16 RX ADMIN — PREGABALIN 200 MG: 75 CAPSULE ORAL at 08:38

## 2024-04-16 RX ADMIN — IOPAMIDOL 75 ML: 755 INJECTION, SOLUTION INTRAVENOUS at 09:25

## 2024-04-16 RX ADMIN — DAPSONE 100 MG: 25 TABLET ORAL at 10:13

## 2024-04-16 RX ADMIN — PREGABALIN 200 MG: 100 CAPSULE ORAL at 22:21

## 2024-04-16 RX ADMIN — SODIUM CHLORIDE: 9 INJECTION, SOLUTION INTRAVENOUS at 13:30

## 2024-04-16 RX ADMIN — SODIUM CHLORIDE, PRESERVATIVE FREE 10 ML: 5 INJECTION INTRAVENOUS at 08:44

## 2024-04-16 RX ADMIN — BUDESONIDE 500 MCG: 0.5 SUSPENSION RESPIRATORY (INHALATION) at 19:42

## 2024-04-16 RX ADMIN — TRAZODONE HYDROCHLORIDE 100 MG: 50 TABLET ORAL at 01:24

## 2024-04-16 RX ADMIN — SODIUM CHLORIDE, PRESERVATIVE FREE 10 ML: 5 INJECTION INTRAVENOUS at 22:00

## 2024-04-16 RX ADMIN — FERROUS SULFATE TAB 325 MG (65 MG ELEMENTAL FE) 325 MG: 325 (65 FE) TAB at 17:47

## 2024-04-16 RX ADMIN — PIPERACILLIN AND TAZOBACTAM 3375 MG: 3; .375 INJECTION, POWDER, FOR SOLUTION INTRAVENOUS at 21:59

## 2024-04-16 RX ADMIN — GUAIFENESIN 1200 MG: 600 TABLET ORAL at 01:25

## 2024-04-16 RX ADMIN — GUAIFENESIN 1200 MG: 600 TABLET ORAL at 08:38

## 2024-04-16 RX ADMIN — TUBERCULIN PURIFIED PROTEIN DERIVATIVE 5 UNITS: 5 INJECTION, SOLUTION INTRADERMAL at 02:04

## 2024-04-16 RX ADMIN — FERROUS SULFATE TAB 325 MG (65 MG ELEMENTAL FE) 325 MG: 325 (65 FE) TAB at 08:38

## 2024-04-16 RX ADMIN — PREGABALIN 200 MG: 75 CAPSULE ORAL at 01:24

## 2024-04-16 RX ADMIN — SODIUM CHLORIDE, PRESERVATIVE FREE 10 ML: 5 INJECTION INTRAVENOUS at 01:49

## 2024-04-16 RX ADMIN — PIPERACILLIN AND TAZOBACTAM 4500 MG: 4; .5 INJECTION, POWDER, LYOPHILIZED, FOR SOLUTION INTRAVENOUS at 01:23

## 2024-04-16 RX ADMIN — SODIUM CHLORIDE: 9 INJECTION, SOLUTION INTRAVENOUS at 05:13

## 2024-04-16 RX ADMIN — VANCOMYCIN HYDROCHLORIDE 1500 MG: 10 INJECTION, POWDER, LYOPHILIZED, FOR SOLUTION INTRAVENOUS at 02:04

## 2024-04-16 RX ADMIN — ACETAMINOPHEN 650 MG: 325 TABLET ORAL at 14:50

## 2024-04-16 ASSESSMENT — PAIN SCALES - GENERAL: PAINLEVEL_OUTOF10: 7

## 2024-04-16 NOTE — PROGRESS NOTES
4 Eyes Skin Assessment     NAME:  Meli Blancas  YOB: 1955  MEDICAL RECORD NUMBER:  [id number]    The patient is being assessed for  Admission    I agree that at least one RN has performed a thorough Head to Toe Skin Assessment on the patient. ALL assessment sites listed below have been assessed.      Areas assessed by both nurses:    Head, Face, Ears, Shoulders, Back, Chest, Arms, Elbows, Hands, Sacrum. Buttock, Coccyx, Ischium, and Legs. Feet and Heels        Does the Patient have a Wound? No noted wound(s)       David Prevention initiated by RN: No  Wound Care Orders initiated by RN: No    Pressure Injury (Stage 3,4, Unstageable, DTI, NWPT, and Complex wounds) if present, place Wound referral order by RN under : No    New Ostomies, if present place, Ostomy referral order under : No     Nurse 1 eSignature: Electronically signed by AMANDA CHAN RN on 4/16/24 at 2:14 AM EDT    **SHARE this note so that the co-signing nurse can place an eSignature**    Nurse 2 eSignature: Electronically signed by DANIA GRIMM RN on 4/16/24 at 2:17 AM EDT

## 2024-04-16 NOTE — PROGRESS NOTES
ACUTE PHYSICAL THERAPY GOALS:   (Developed with and agreed upon by patient and/or caregiver.)    (1.) Meli Blancas  will move from supine to sit and sit to supine , scoot up and down, and roll side to side with INDEPENDENT within 7 treatment day(s).    (2.) Meli Blancas will transfer from bed to chair and chair to bed with INDEPENDENT using the least restrictive device within 7 treatment day(s).    (3.) Meli Blancas will ambulate with INDEPENDENT for 150 feet with the least restrictive device within 7 treatment day(s).   (4.) Meli Blancas will perform standing static and dynamic balance activities x 10 minutes with SUPERVISION to improve safety within 7 treatment day(s).  (5.) Meli Blancas will ascend and descend 5 stairs using 1 hand rail(s) with SUPERVISION to improve functional mobility and safety within 7 treatment day(s).  (6.) Meli Blancas will perform therapeutic exercises x 10 min for HEP with SUPERVISION to improve strength, endurance, and functional mobility within 7 treatment day(s).      PHYSICAL THERAPY Initial Assessment, Daily Note, and AM  (Link to Caseload Tracking: PT Visit Days : 1  Acknowledge Orders  Time In/Out  PT Charge Capture  Rehab Caseload Tracker    Meli Blancas is a 68 y.o. female   PRIMARY DIAGNOSIS: Community acquired pneumonia of right lower lobe of lung  Hypoxemia [R09.02]  COPD exacerbation (HCC) [J44.1]  Pneumonia of right lower lobe due to infectious organism [J18.9]  Community acquired pneumonia of right lower lobe of lung [J18.9]       Reason for Referral: Generalized Muscle Weakness (M62.81)  Inpatient: Payor: [facility] MEDICARE / Plan: [facility] MEDICARE COMPLETE / Product Type: *No Product type* /     ASSESSMENT:     REHAB RECOMMENDATIONS:   Recommendation to date pending progress:  Setting:  Home Health Therapy    Equipment:    None     ASSESSMENT:   Ms. Blancas is a 68 year old female who presents to hospital secondary to above diagnosis.  LIST:   (Skilled intervention is medically necessary to address:)  Decreased ADL/Functional Activities  Decreased Activity Tolerance  Decreased Balance  Decreased Gait Ability  Decreased Strength  Decreased Transfer Abilities INTERVENTIONS PLANNED:   (Benefits and precautions of physical therapy have been discussed with the patient.)  Self Care Training  Therapeutic Activity  Therapeutic Exercise/HEP  Gait Training  Education       TREATMENT:   EVALUATION: LOW COMPLEXITY: (Untimed Charge)  The initial evaluation charge encompasses clinical chart review, objective assessment, interpretation of assessment, and skilled monitoring of the patient's response to treatment in order to develop a plan of care.     TREATMENT:   Co-Treatment PT/OT necessary due to patient's decreased overall endurance/tolerance levels, as well as need for high level skilled assistance to complete functional transfers/mobility and functional tasks  Therapeutic Activity (21 Minutes): Therapeutic activity included Supine to Sit, Scooting, Transfer Training, Ambulation on level ground, Sitting balance , and Standing balance to improve functional Activity tolerance, Balance, Mobility, and Strength.    TREATMENT GRID:  N/A    AFTER TREATMENT PRECAUTIONS: Alarm Activated, Bed/Chair Locked, Call light within reach, Chair, Needs within reach, and RN notified    INTERDISCIPLINARY COLLABORATION:  RN/ PCT and OT/ PETERS    EDUCATION: Education Given To: Patient  Education Provided: Role of Therapy;Plan of Care;Transfer Training;Fall Prevention Strategies  Education Method: Verbal  Barriers to Learning: None  Education Outcome: Verbalized understanding    TIME IN/OUT:  Time In: 1058  Time Out: 1123  Minutes: 25    Rick Celis PT

## 2024-04-16 NOTE — ED NOTES
TRANSFER - OUT REPORT:    Verbal report given to RN on Meli Blancas  being transferred to [facility] for routine progression of patient care       Report consisted of patient's Situation, Background, Assessment and   Recommendations(SBAR).     Information from the following report(s) Nurse Handoff Report was reviewed with the receiving nurse.    Anaya Fall Assessment:    Presents to emergency department  because of falls (Syncope, seizure, or loss of consciousness): No  Age > 70: Yes  Altered Mental Status, Intoxication with alcohol or substance confusion (Disorientation, impaired judgment, poor safety awaremess, or inability to follow instructions): No  Impaired Mobility: Ambulates or transfers with assistive devices or assistance; Unable to ambulate or transer.: Yes  Nursing Judgement: Yes          Lines:   Peripheral IV 04/15/24 Left Antecubital (Active)   Site Assessment Clean, dry & intact 04/15/24 1636   Line Status Blood return noted 04/15/24 1636   Phlebitis Assessment No symptoms 04/15/24 1636   Infiltration Assessment 0 04/15/24 1636        Opportunity for questions and clarification was provided.      Patient transported with:  Piyush Yeager RN  04/16/24 0017

## 2024-04-16 NOTE — PROGRESS NOTES
Code violent called on pt around 6:47. Phlebotomist yelled for nursing staff from pt's room because pt was pushing phlebotomist, yelling about how she was not allowing her to use the bathroom, actively trying to rip out her IV. Pt A&Ox3(refused to state situation). Pt continuously stating \"leave me alone\" to RN staff and security. Pt was assisted to bathroom and educated once again on using call light.

## 2024-04-16 NOTE — PROGRESS NOTES
Physician Progress Note      PATIENT:               MERT AREVALO  CSN #:                  [id number]  :                       1955  ADMIT DATE:       4/15/2024 4:27 PM  DISCH DATE:  RESPONDING  PROVIDER #:        Hank Amado MD          QUERY TEXT:    Pt admitted with COPD exacerbation.  Pt noted to have increase in supplemental   oxygen requirements from baseline. If possible, please document in the   progress notes and discharge summary if you are evaluating and/or treating any   of the following:    The medical record reflects the following:  Risk Factors: COPD on 2LNC, mood disorder, recent rare skin lesions, SLE  Clinical Indicators: ED Triage Note- 88% 10L NRB with fire. 15% with EMS. 91%.   Wears 2L at baseline due to COPD  Treatment: Supplemental O2 noted up to 14L  Options provided:  -- Acute respiratory failure with hypoxia  -- Acute respiratory failure with hypercapnia  -- Acute respiratory failure with hypoxia and hypercapnia  -- Chronic respiratory failure with hypoxia  -- Chronic respiratory failure with hypercapnia  -- Chronic respiratory failure with hypoxia and hypercapnia  -- Acute on chronic respiratory failure with hypoxia  -- Acute on chronic respiratory failure with hypercapnia  -- Acute on chronic respiratory failure with hypoxia and hypercapnia  -- Other - I will add my own diagnosis  -- Disagree - Not applicable / Not valid  -- Disagree - Clinically unable to determine / Unknown  -- Refer to Clinical Documentation Reviewer    PROVIDER RESPONSE TEXT:    This patient is in acute respiratory failure with hypoxia.    Query created by: Bhargavi Cid on 2024 11:44 AM      Electronically signed by:  Hank Amado MD 2024 1:08 PM

## 2024-04-16 NOTE — PROGRESS NOTES
Pt is refusing to go down for CT scan. Tried to educate pt on rationale for going but pt still refused.

## 2024-04-16 NOTE — PROGRESS NOTES
Pt is in bed without complaints. Hourly rounds completed and all needs met. Pt initially refused CT scan this morning but agreed to go after breakfast. Tylenol given x1 for headache. Bed is low, locked, and call light is in reach. Pt encouraged to call for assistance.

## 2024-04-16 NOTE — PROGRESS NOTES
VANCO DAILY FOLLOW UP NOTE  [facility]   Pharmacy Pharmacokinetic Monitoring Service - Vancomycin    Consulting Provider: Dr. Grimaldo   Indication: HAP  Target Concentration: Goal AUC/AVTAR 400-600 mg*hr/L  Day of Therapy: 1 of 7  Additional Antimicrobials: pip/tazo    Pertinent Laboratory Values:   Wt Readings from Last 1 Encounters:   04/15/24 64.9 kg (143 lb)     Temp Readings from Last 1 Encounters:   04/16/24 97.7 °F (36.5 °C) (Oral)     Recent Labs     04/15/24  1639 04/15/24  1737   BUN 7*  --    CREATININE 1.00  --    WBC 8.6  --    PROCAL <0.05  --    LACACIDPL  --  1.2     Estimated Creatinine Clearance: 44 mL/min (based on SCr of 1 mg/dL).    Lab Results   Component Value Date/Time    VANCORANDOM 12.5 01/28/2024 03:49 AM     MRSA Nasal Swab: showed MRSA positive result on 1/30/24    Assessment:  Date/Time Dose Concentration AUC         Note: Serum concentrations collected for AUC dosing may appear elevated if collected in close proximity to the dose administered, this is not necessarily an indication of toxicity    Plan:  Dosing recommendations based on Bayesian software  Continue vancomycin 1250 mg every 24 hours  Renal labs as indicated   Vancomycin concentrations will be ordered as clinically appropriate   Pharmacy will continue to monitor patient and adjust therapy as indicated    Thank you for the consult,  Lee Ann Warren RPH

## 2024-04-16 NOTE — H&P
Hospitalist History and Physical   Admit Date:  4/15/2024  4:27 PM   Name:  Meli Blancas   Age:  68 y.o.  Sex:  female  :  1955   MRN:  [id number]   Room:  [place] [address]    Presenting/Chief Complaint: Shortness of Breath     Reason(s) for Admission: Community acquired pneumonia of right lower lobe of lung [J18.9]     History of Present Illness:   Meli Blancas is a 68 y.o. female who presented to the ED for cc SOB needing more oxygen supplement at home. Now on 10L high flow.     Also with diarrhea over the past week but today resolved.      Hx of COPD on 2LNC, mood disorder, recent rare skin lesions followed by Dr. Toribio, SLE  Assessment & Plan:     Active Problems:    RLL pneumonia - Vanc, Zosyn. Sputum culture. Ordering continuous pulse ox for tonight. Mucinex. Due to the extent of her hypoxia and need for 10L high flow, will order d dimer.     COPD exacerbation - Albuterol, pulmicort, Solumedrol.     Skin lesions - Dapsone    Lyrica BID    Holding plaquenil      PT/OT evals and PPD ordered?  Therapy and PPD  Diet: ADULT DIET; Regular; Low Fat/Low Chol/High Fiber/2 gm Na  VTE prophylaxis: Lovenox  Code status: Full Code      Non-peripheral Lines and Tubes (if present):             Hospital Problems:  Principal Problem:    Community acquired pneumonia of right lower lobe of lung  Active Problems:    COPD with acute exacerbation (HCC)    GERD (gastroesophageal reflux disease)    SLE (systemic lupus erythematosus) (HCC)    Mood disorder (HCC)  Resolved Problems:    * No resolved hospital problems. *        Objective:   Patient Vitals for the past 24 hrs:   Temp Pulse Resp BP SpO2   04/15/24 2300 -- 87 -- 104/71 --   04/15/24 2259 -- 92 13 -- --   04/15/24 2230 -- 92 18 98/75 --   04/15/24 2200 -- 98 17 92/68 --   04/15/24 2131 -- 100 18 -- 91 %   04/15/24 2130 -- 99 15 101/71 --   04/15/24 2101 -- 91 12 (!) 121/103 91 %   04/15/24 2058 -- 91 14 -- 92 %   04/15/24 2031 -- 96 10 (!) 85/66 --

## 2024-04-16 NOTE — ACP (ADVANCE CARE PLANNING)
[employer] Hospitalist Service  At the heart of better care     Advance Care Planning   Admit Date:  4/15/2024  4:27 PM   Name:  Meli Blancas   Age:  68 y.o.  Sex:  female  :  1955   MRN:  [id number]   Room:  [place] [address]    Meli Blancas has capacity to make her own decisions:   Yes    Patient / surrogate decision-maker directed code status:  Full Code    Patient or surrogate consented to discussion of the current conditions, workup, management plans, prognosis, and the risk for further deterioration.  Time spent: 17 minutes in direct discussion.      Signed:  MONIQUE SHARPE DO

## 2024-04-16 NOTE — PROGRESS NOTES
VANCO DAILY FOLLOW UP NOTE  [facility]   Pharmacy Pharmacokinetic Monitoring Service - Vancomycin    Consulting Provider: Dillan   Indication: HAP  Target Concentration: Goal AUC/AVTAR 400-600 mg*hr/L  Day of Therapy: 1  Additional Antimicrobials: Zosyn    Pertinent Laboratory Values:   Wt Readings from Last 1 Encounters:   04/15/24 64.9 kg (143 lb)     Temp Readings from Last 1 Encounters:   04/16/24 98.1 °F (36.7 °C)     Recent Labs     04/15/24  1639 04/15/24  1737   BUN 7*  --    CREATININE 1.00  --    WBC 8.6  --    PROCAL <0.05  --    LACACIDPL  --  1.2     Estimated Creatinine Clearance: 44 mL/min (based on SCr of 1 mg/dL).    Lab Results   Component Value Date/Time    VANCORANDOM 12.5 01/28/2024 03:49 AM       MRSA Nasal Swab: showed MRSA positive result on 1/29/24    Assessment:  Date/Time Dose Concentration AUC         Note: Serum concentrations collected for AUC dosing may appear elevated if collected in close proximity to the dose administered, this is not necessarily an indication of toxicity    Plan:  Dosing recommendations based on Bayesian software  Start vancomycin 1250mg q24h.  Anticipated AUC of 474 and trough concentration of 13.6 at steady state  Renal labs as indicated   Vancomycin concentrations will be ordered as clinically appropriate   Pharmacy will continue to monitor patient and adjust therapy as indicated    Thank you for the consult,  Rach Marina [facility]

## 2024-04-16 NOTE — PROGRESS NOTES
ACUTE OCCUPATIONAL THERAPY GOALS:   (Developed with and agreed upon by patient and/or caregiver.)  1. Patient will complete total body bathing and dressing with independence.   2. Patient will complete toileting with independence.   3. Patient will tolerate 30 minutes of OT treatment with 1-2 rest breaks to increase activity tolerance for ADLs.   4. Patient will complete functional transfers and functional mobility with independence and good safety awareness.   5. Patient will demonstrate 3 ways to complete energy conservation with ADL/functional mobility.     Timeframe: 7 visits       OCCUPATIONAL THERAPY Initial Assessment, Daily Note, and AM       OT Visit Days: 1  Acknowledge Orders  Time  OT Charge Capture  Rehab Caseload Tracker      Meli Blancas is a 68 y.o. female   PRIMARY DIAGNOSIS: Community acquired pneumonia of right lower lobe of lung  Hypoxemia [R09.02]  COPD exacerbation (HCC) [J44.1]  Pneumonia of right lower lobe due to infectious organism [J18.9]  Community acquired pneumonia of right lower lobe of lung [J18.9]       Reason for Referral: Generalized Muscle Weakness (M62.81)  Other lack of cordination (R27.8)  Inpatient: Payor: [facility] MEDICARE / Plan: [facility] MEDICARE COMPLETE / Product Type: *No Product type* /     ASSESSMENT:     REHAB RECOMMENDATIONS:   Recommendation to date pending progress:  Setting:  Home Health Therapy    Equipment:    To Be Determined     ASSESSMENT:  Ms. Blancas presents to the hosptial  with hypoxemia, COPD exacerbation, and pneumonia. Pt is supine in the bed upon arrival with no visitors at bedside is alert and cooperative. Pt lives alone and is typically independent with basic ADL but does have aides come in for iADL and has meal delivery service at baseline. Pt reports one near falls in the recent past. Pt reports that over the past 6 months she has had increased difficulty with her memory. Pt is currently wearing 6 L of O2 but was increased to 7 L of O2 with  Comments)   UE AROM [x] []   UE PROM [x] []   Strength []  Generally decreased     Posture / Balance []  Good sitting; fair+ to fair standing    Sensation []     Coordination []       Tone []       Edema []    Activity Tolerance []  Limited by fatigue     Hand Dominance R [] L []      COGNITION/  PERCEPTION: INTACT IMPAIRED   (See Comments)   Orientation [x]     Vision [x]     Hearing [x]     Cognition  [] Overall Cognitive Status: Exceptions  Safety Judgement: Decreased awareness of need for assistance, Decreased awareness of need for safety   Perception [x]       MOBILITY: I Mod I S SBA CGA Min Mod Max Total  NT x2 Comments:   Bed Mobility    Rolling [] [] [] [x] [] [] [] [] [] [] []    Supine to Sit [] [] [] [x] [] [] [] [] [] [] []    Scooting [] [] [] [x] [] [] [] [] [] [] []    Sit to Supine [] [] [] [] [] [] [] [] [] [x] []    Transfers    Sit to Stand [] [] [] [x] [] [] [] [] [] [] []    Bed to Chair [] [] [] [x] [x] [] [] [] [] [] []    Stand to Sit [] [] [] [x] [] [] [] [] [] [] []    Tub/Shower [] [] [] [] [] [] [] [] [] [x] []     Toilet [] [] [] [] [] [] [] [] [] [x] []      [] [] [] [] [] [] [] [] [] [] []    I=Independent, Mod I=Modified Independent, S=Supervision/Setup, SBA=Standby Assistance, CGA=Contact Guard Assistance, Min=Minimal Assistance, Mod=Moderate Assistance, Max=Maximal Assistance, Total=Total Assistance, NT=Not Tested    ACTIVITIES OF DAILY LIVING: I Mod I S SBA CGA Min Mod Max Total NT Comments   BASIC ADLs:              Upper Body Bathing  [] [] [] [] [] [] [] [] [] [x]     Lower Body Bathing [] [] [] [] [] [] [] [] [] [x]     Toileting [] [] [] [] [] [] [] [] [] [x]    Upper Body Dressing [] [] [] [] [] [] [] [] [] [x]    Lower Body Dressing [] [] [] [] [] [] [] [] [] [x]    Feeding [] [] [] [] [] [] [] [] [] [x]    Grooming [] [] [] [] [] [] [] [] [] [x]    Personal Device Care [] [] [] [] [] [] [] [] [] [x]    Functional Mobility [] [] [] [x] [x] [] [] [] [] [] No AD

## 2024-04-16 NOTE — PROGRESS NOTES
TRANSFER - IN REPORT:    Verbal report received from SALENA Pickett on Meli Blancas  being received from ED for routine progression of patient care      Report consisted of patient's Situation, Background, Assessment and   Recommendations(SBAR).     Information from the following report(s) Nurse Handoff Report was reviewed with the receiving nurse.    Opportunity for questions and clarification was provided.      Assessment completed upon patient's arrival to unit and care assumed.

## 2024-04-16 NOTE — PROGRESS NOTES
Pt medicated per MAR. She arrived on floor on 9L high flow, O2 sat at 90-91%. Continuous pulse ox has been placed on pt. She has had PPD placed In right arm at 02:00. D dimer: 1.98, MD notified via [employer], no new orders placed. Pt states she has not had diarrhea since her admission to hospital. No c/o pain. All known needs met at this time. Bed locked and lowered with call light within reach.

## 2024-04-16 NOTE — PROGRESS NOTES
Hospitalist Progress Note   Admit Date:  4/15/2024  4:27 PM   Name:  Meli Blancas   Age:  68 y.o.  Sex:  female  :  1955   MRN:  [id number]   Room:  [facility]    Presenting/Chief Complaint: Shortness of Breath     Reason(s) for Admission: Hypoxemia [R09.02]  COPD exacerbation (HCC) [J44.1]  Pneumonia of right lower lobe due to infectious organism [J18.9]  Community acquired pneumonia of right lower lobe of lung [J18.9]     Hospital Course:   Meli Blancas is a 68 y.o. female with medical history of COPD on 2LNC, mood disorder, recent rare skin lesions followed by Dr. Toribio, SLE admitted with acute respiratory failure with hypoxia secondary to right lower lobe pneumonia and COPD exacerbation.    Subjective & 24hr Events:   Patient is seen at the bedside.  On high flow nasal cannula 6 L. Shortness of breath is improving.  Denies chest pain, palpitation, nausea or vomiting.    Given extent of her hypoxia and oxygen requirement with elevated D-dimer, I I ordered CTA to rule out PE.    Assessment & Plan:     Acute hypoxic respiratory failure:  Right lower lobe pneumonia:  COPD exacerbation:  On admission requiring 10 L high flow nasal cannula, now down to 6 L.  Continue to wean as tolerated  CTA negative for PE  Continue vancomycin and Zosyn  Continue albuterol nebs treatment  Continue Pulmicort  Continue Solu-Medrol every 8 hours  Continue Mucinex twice daily    Skin lesions:  Continue dapsone  Outpatient dermatology follow-up    Neuropathy:  Continue Lyrica twice daily    SLE:  Arthritis:  Holding Plaquenil due to acute illness      Anticipated Discharge Arrangements:   Home Health    PT/OT evals and PPD ordered?  Therapy   Diet:  ADULT DIET; Regular; Low Fat/Low Chol/High Fiber/2 gm Na  VTE prophylaxis: Lovenox  Code status: Full Code      Non-peripheral Lines and Tubes (if present):          Telemetry (if present):  Cardiac/Telemetry Monitor On: No        Hospital Problems:  Principal  sulfate 2000 mg in 50 mL IVPB premix  2,000 mg IntraVENous PRN    polyethylene glycol (GLYCOLAX) packet 17 g  17 g Oral Daily PRN    bisacodyl (DULCOLAX) suppository 10 mg  10 mg Rectal Daily PRN    famotidine (PEPCID) tablet 10 mg  10 mg Oral Daily PRN    aluminum & magnesium hydroxide-simethicone (MAALOX) 200-200-20 MG/5ML suspension 30 mL  30 mL Oral Q6H PRN    acetaminophen (TYLENOL) tablet 650 mg  650 mg Oral Q6H PRN    Or    acetaminophen (TYLENOL) suppository 650 mg  650 mg Rectal Q6H PRN    enoxaparin (LOVENOX) injection 40 mg  40 mg SubCUTAneous Daily    methylPREDNISolone sodium succ (SOLU-MEDROL) 125 mg in sterile water 2 mL injection  125 mg IntraVENous Daily    guaiFENesin (MUCINEX) extended release tablet 1,200 mg  1,200 mg Oral BID       Signed:  JOEY SWANSON MD    Part of this note may have been written by using a voice dictation software.  The note has been proof read but may still contain some grammatical/other typographical errors.

## 2024-04-17 ENCOUNTER — APPOINTMENT (OUTPATIENT)
Dept: GENERAL RADIOLOGY | Age: 69
End: 2024-04-17
Payer: MEDICARE

## 2024-04-17 PROBLEM — R91.8 LUNG NODULES: Status: ACTIVE | Noted: 2024-04-17

## 2024-04-17 LAB
ANION GAP SERPL CALC-SCNC: 2 MMOL/L (ref 2–11)
BASOPHILS # BLD: 0.1 K/UL (ref 0–0.2)
BASOPHILS NFR BLD: 1 % (ref 0–2)
BUN SERPL-MCNC: 16 MG/DL (ref 8–23)
CALCIUM SERPL-MCNC: 9 MG/DL (ref 8.3–10.4)
CHLORIDE SERPL-SCNC: 105 MMOL/L (ref 103–113)
CO2 SERPL-SCNC: 30 MMOL/L (ref 21–32)
CREAT SERPL-MCNC: 0.8 MG/DL (ref 0.6–1)
DIFFERENTIAL METHOD BLD: ABNORMAL
EOSINOPHIL # BLD: 0 K/UL (ref 0–0.8)
EOSINOPHIL NFR BLD: 0 % (ref 0.5–7.8)
ERYTHROCYTE [DISTWIDTH] IN BLOOD BY AUTOMATED COUNT: 20.8 % (ref 11.9–14.6)
GLUCOSE SERPL-MCNC: 119 MG/DL (ref 65–100)
HCT VFR BLD AUTO: 42 % (ref 35.8–46.3)
HGB BLD-MCNC: 12 G/DL (ref 11.7–15.4)
IMM GRANULOCYTES # BLD AUTO: 0 K/UL (ref 0–0.5)
IMM GRANULOCYTES NFR BLD AUTO: 0 % (ref 0–5)
LYMPHOCYTES # BLD: 2.7 K/UL (ref 0.5–4.6)
LYMPHOCYTES NFR BLD: 25 % (ref 13–44)
MCH RBC QN AUTO: 24 PG (ref 26.1–32.9)
MCHC RBC AUTO-ENTMCNC: 28.6 G/DL (ref 31.4–35)
MCV RBC AUTO: 84.2 FL (ref 82–102)
MM INDURATION, POC: 0 MM (ref 0–5)
MONOCYTES # BLD: 1.2 K/UL (ref 0.1–1.3)
MONOCYTES NFR BLD: 11 % (ref 4–12)
NEUTS SEG # BLD: 6.9 K/UL (ref 1.7–8.2)
NEUTS SEG NFR BLD: 63 % (ref 43–78)
NRBC # BLD: 0 K/UL (ref 0–0.2)
PLATELET # BLD AUTO: 247 K/UL (ref 150–450)
PMV BLD AUTO: 9.9 FL (ref 9.4–12.3)
POTASSIUM SERPL-SCNC: 4 MMOL/L (ref 3.5–5.1)
PPD, POC: NEGATIVE
RBC # BLD AUTO: 4.99 M/UL (ref 4.05–5.2)
SODIUM SERPL-SCNC: 137 MMOL/L (ref 136–146)
WBC # BLD AUTO: 11 K/UL (ref 4.3–11.1)

## 2024-04-17 PROCEDURE — 2700000000 HC OXYGEN THERAPY PER DAY

## 2024-04-17 PROCEDURE — 36415 COLL VENOUS BLD VENIPUNCTURE: CPT

## 2024-04-17 PROCEDURE — 1100000000 HC RM PRIVATE

## 2024-04-17 PROCEDURE — 94640 AIRWAY INHALATION TREATMENT: CPT

## 2024-04-17 PROCEDURE — 6360000002 HC RX W HCPCS: Performed by: FAMILY MEDICINE

## 2024-04-17 PROCEDURE — 2580000003 HC RX 258: Performed by: FAMILY MEDICINE

## 2024-04-17 PROCEDURE — 85025 COMPLETE CBC W/AUTO DIFF WBC: CPT

## 2024-04-17 PROCEDURE — 71045 X-RAY EXAM CHEST 1 VIEW: CPT

## 2024-04-17 PROCEDURE — 6360000002 HC RX W HCPCS: Performed by: STUDENT IN AN ORGANIZED HEALTH CARE EDUCATION/TRAINING PROGRAM

## 2024-04-17 PROCEDURE — 94761 N-INVAS EAR/PLS OXIMETRY MLT: CPT

## 2024-04-17 PROCEDURE — 2580000003 HC RX 258: Performed by: STUDENT IN AN ORGANIZED HEALTH CARE EDUCATION/TRAINING PROGRAM

## 2024-04-17 PROCEDURE — 99223 1ST HOSP IP/OBS HIGH 75: CPT | Performed by: INTERNAL MEDICINE

## 2024-04-17 PROCEDURE — 6370000000 HC RX 637 (ALT 250 FOR IP): Performed by: FAMILY MEDICINE

## 2024-04-17 PROCEDURE — 80048 BASIC METABOLIC PNL TOTAL CA: CPT

## 2024-04-17 PROCEDURE — 94669 MECHANICAL CHEST WALL OSCILL: CPT

## 2024-04-17 RX ORDER — ACETYLCYSTEINE 200 MG/ML
600 SOLUTION ORAL; RESPIRATORY (INHALATION)
Status: DISCONTINUED | OUTPATIENT
Start: 2024-04-17 | End: 2024-04-17

## 2024-04-17 RX ORDER — SODIUM CHLORIDE FOR INHALATION 3 %
4 VIAL, NEBULIZER (ML) INHALATION
Status: DISCONTINUED | OUTPATIENT
Start: 2024-04-17 | End: 2024-04-18 | Stop reason: HOSPADM

## 2024-04-17 RX ADMIN — SODIUM CHLORIDE, PRESERVATIVE FREE 10 ML: 5 INJECTION INTRAVENOUS at 09:56

## 2024-04-17 RX ADMIN — SODIUM CHLORIDE, PRESERVATIVE FREE 10 ML: 5 INJECTION INTRAVENOUS at 20:54

## 2024-04-17 RX ADMIN — WATER 40 MG: 1 INJECTION INTRAMUSCULAR; INTRAVENOUS; SUBCUTANEOUS at 09:55

## 2024-04-17 RX ADMIN — PIPERACILLIN AND TAZOBACTAM 3375 MG: 3; .375 INJECTION, POWDER, FOR SOLUTION INTRAVENOUS at 20:58

## 2024-04-17 RX ADMIN — SODIUM CHLORIDE: 9 INJECTION, SOLUTION INTRAVENOUS at 20:58

## 2024-04-17 RX ADMIN — PREGABALIN 200 MG: 100 CAPSULE ORAL at 20:25

## 2024-04-17 RX ADMIN — GUAIFENESIN 1200 MG: 600 TABLET ORAL at 09:54

## 2024-04-17 RX ADMIN — PIPERACILLIN AND TAZOBACTAM 3375 MG: 3; .375 INJECTION, POWDER, FOR SOLUTION INTRAVENOUS at 06:38

## 2024-04-17 RX ADMIN — Medication 4 ML: at 08:19

## 2024-04-17 RX ADMIN — ENOXAPARIN SODIUM 40 MG: 100 INJECTION SUBCUTANEOUS at 09:56

## 2024-04-17 RX ADMIN — PREGABALIN 200 MG: 100 CAPSULE ORAL at 09:55

## 2024-04-17 RX ADMIN — BUDESONIDE 500 MCG: 0.5 SUSPENSION RESPIRATORY (INHALATION) at 08:19

## 2024-04-17 RX ADMIN — WATER 40 MG: 1 INJECTION INTRAMUSCULAR; INTRAVENOUS; SUBCUTANEOUS at 20:54

## 2024-04-17 RX ADMIN — VANCOMYCIN HYDROCHLORIDE 1250 MG: 10 INJECTION, POWDER, LYOPHILIZED, FOR SOLUTION INTRAVENOUS at 03:04

## 2024-04-17 RX ADMIN — FERROUS SULFATE TAB 325 MG (65 MG ELEMENTAL FE) 325 MG: 325 (65 FE) TAB at 09:55

## 2024-04-17 RX ADMIN — DAPSONE 100 MG: 25 TABLET ORAL at 09:54

## 2024-04-17 ASSESSMENT — PAIN SCALES - GENERAL: PAINLEVEL_OUTOF10: 0

## 2024-04-17 NOTE — PROGRESS NOTES
Family friend at the bedside. Pt is sleeping and barely waking up. Provider at the bedside and STAT ABG ordered. Skin is blue tinged/gray. O2 sats at 83% with 5LNC. AIRVO placed back on pt and sats raised to mid 90's. Pt continues to sleep. No IV access at this time. Stat ABG order cancelled.

## 2024-04-17 NOTE — PROGRESS NOTES
Pt is sitting on the side of the bed. She has ripped out her IV and taken off of her AIRVO. Oxygen sats are at 79%. Code violent called.

## 2024-04-17 NOTE — PROGRESS NOTES
Upon arrival to pts room, airvo off and continuous pulse ox off. Pt very agitated and verbally aggressive threatening to leave AMA. No respiratory distress noted. Pt alert and oriented x4 at this time. No IV access at this time. Pt allowed this nurse to assess her but refusing vitals. MD notified via perfectserve. Order for bedside sitter.

## 2024-04-17 NOTE — CONSULTS
History and Physical Initial Visit NOTE           4/17/2024    Meli Blancas                        Date of Admission:  4/15/2024    The patient's chart is reviewed and the patient is discussed with the staff.    Subjective:     Patient is a 68 y.o.  female seen and evaluated at the request of Dr. Amado for worsening respiratory failure requiring airvo. PMH includes SLE - on plaquenil, follows with Dr. Toribio, COPD on 2lpm PRN, HTN and breast cancer. Not on inhalers, states she can't take them or tolerate them. We saw in consult last June, at that time CT with extensive emphysema and RLL mass but ? Rounded atelectasis. PET CT was recommended at that time however patient has not done this. She quit smoking in November 2023, 1ppd x 40years. Previously checked ANCA, CCP, RF. Has had multiple admissions.    Presented to ER with worsening SOB after working outside in the yard. She states she has felt fine and that her aide called EMS. She denies any cough, fevers, sick contacts, chest pain or edema. She is now on airvo 50L/86%. Started on nebs, steroids and abx.  Recently had biopsies for skin lesions by Rheum and on dapsone.  Notable labs procal <0.05, ntBNP 1507, WBC 8.6, ddimer 1.98. CT Chest negative for PE, still with extensive emphysema.    Review of Systems: Comprehensive ROS negative except in HPI    Current Outpatient Medications   Medication Instructions    dapsone 100 mg, Oral, DAILY    ferrous sulfate (IRON 325) 325 mg, Oral, 2 TIMES DAILY WITH MEALS    furosemide (LASIX) 20 mg, Oral, DAILY    furosemide (LASIX) 40 mg, Oral, DAILY    hydroxychloroquine (PLAQUENIL) 200 mg, Oral, DAILY    ipratropium 0.5 mg-albuterol 2.5 mg (DUONEB) 0.5-2.5 (3) MG/3ML SOLN nebulizer solution 3 mLs, Inhalation, EVERY 4 HOURS WHILE AWAKE RESP    lisinopril (PRINIVIL;ZESTRIL) 20 mg, Oral, DAILY    mineral oil-hydrophilic petrolatum (AQUAPHOR) ointment Apply topically as needed.    potassium chloride  4    Imaging: I performed an independent interpretation of the patient's images.  CXR:  4/17      CT Chest: 4/16      Recent Labs     04/15/24  1639 04/15/24  1935 04/16/24  1031 04/17/24  0501   WBC 8.6  --  10.1 11.0   HGB 13.4  --  12.4 12.0   HCT 46.0  --  43.2 42.0     --  241 247     --  135* 137   K 3.9  --  4.1 4.0     --  103 105   CO2 29  --  30 30   BUN 7*  --  13 16   CREATININE 1.00  --  0.90 0.80   MG 2.2  --   --   --    BILITOT 0.5  --  0.6  --    AST 16  --  12*  --    ALT 10*  --  10*  --    ALKPHOS 156*  --  138*  --    TROPHS 7.5 6.1  --   --    NTPROBNP 1,507*  --   --   --        Lab Results   Component Value Date/Time     04/17/2024 05:01 AM    K 4.0 04/17/2024 05:01 AM     04/17/2024 05:01 AM    CO2 30 04/17/2024 05:01 AM    BUN 16 04/17/2024 05:01 AM    CREATININE 0.80 04/17/2024 05:01 AM    GLUCOSE 119 04/17/2024 05:01 AM    CALCIUM 9.0 04/17/2024 05:01 AM      No results found for: \"BNP\"    ECHO: 06/21/23    TRANSTHORACIC ECHOCARDIOGRAM (TTE) COMPLETE (CONTRAST/BUBBLE/3D PRN) 06/22/2023  4:05 PM (Final)    Interpretation Summary    Left Ventricle: Normal left ventricular systolic function with a visually estimated EF of 60 - 65%. Left ventricle size is normal. Normal wall thickness. Normal wall motion. Indeterminate diastolic function.    Aortic Valve: Moderate sclerosis of the aortic valve cusp. Trace regurgitation.    Mitral Valve: Mild to moderate regurgitation.    Tricuspid Valve: Mild regurgitation. Moderately elevated RVSP. The estimated RVSP is 53 mmHg.    Signed by: Abrahan Damon MD on 6/22/2023  4:05 PM    MICRO:   Recent Labs     04/15/24  1715 04/15/24  1737   CULTURE NO GROWTH 2 DAYS NO GROWTH 2 DAYS     No results for input(s): \"COVID19\" in the last 72 hours.  Assessment and Plan:  (Medical Decision Making)   Impression: 69yo  female with PMH of SLE - on plaquenil, follows with Dr. Toribio, COPD on 2lpm PRN, HTN and breast

## 2024-04-17 NOTE — PROGRESS NOTES
Patient denies any needs at this time. Pt is sitting on the side of the bed at this time. She is on RA. She has remove the continuous pulse ox and AIRVO. Pt refuses to put either on and gets upset when asked. Pt appears narvaez with blue lips and finger tips. No s/s of respiratory distress noted. Pt educated on the importance of compliance with healthcare team but refuses. Bed is in the low position, locked and call light/personal items are within reach. Hourly rounds performed during shift.

## 2024-04-17 NOTE — PROGRESS NOTES
Hospitalist Progress Note   Admit Date:  4/15/2024  4:27 PM   Name:  Meli Blancas   Age:  68 y.o.  Sex:  female  :  1955   MRN:  [id number]   Room:  [facility]    Presenting/Chief Complaint: Shortness of Breath     Reason(s) for Admission: Hypoxemia [R09.02]  COPD exacerbation (HCC) [J44.1]  Pneumonia of right lower lobe due to infectious organism [J18.9]  Community acquired pneumonia of right lower lobe of lung [J18.9]     Hospital Course:   Meli Blancas is a 68 y.o. female with medical history of COPD on 2LNC, mood disorder, recent rare skin lesions followed by Dr. Toribio, SLE admitted with acute respiratory failure with hypoxia secondary to right lower lobe pneumonia and COPD exacerbation.    Subjective & 24hr Events:   Patient is seen at the bedside.  Noted with worsening hypoxia.  Oxygen requirement increased from 8 L high flow nasal cannula to Airvo 85% with saturation 88 to 90%.  Also noted with some bluish discoloration but patient reports she always has bluish discoloration.  Reports she is feeling fine.  Denies chest pain, shortness of breath.    I consulted pulmonology for evaluation.  Patient is very high risk for decompensate.     Addendum :11:25 AM  I responded to code violet.  Nurses found patient sitting on the side of the bed, ripped out her IVs and taken off her Airvo.  Oxygen sats 79% on room air. She refused to wear her oxygen and would like to go home.  Refusing all treatments, adamant not to wear oxygen and would like to go home.  Reports she is feeling fine and can take care of herself.    Patient is alert and oriented at this time.  I informed her if she go home like this she can die.  She reports she would rather die at home than here.  Does not have any family member.  Has home aides.  Reports they can pick her up.  I tried to discuss CODE STATUS but she does not want to talk about it.  She is refusing all treatments.  I requested to just put the nasal cannula  encounter: 64.9 kg (143 lb).    Intake/Output Summary (Last 24 hours) at 4/17/2024 1158  Last data filed at 4/17/2024 0918  Gross per 24 hour   Intake 1131.21 ml   Output --   Net 1131.21 ml           Physical Exam:     General:    Well nourished.  On Airvo  Head:  Normocephalic, atraumatic  Eyes:  Sclerae appear normal.  Pupils equally round.  ENT:  Nares appear normal.  Moist oral mucosa  Neck:  No restricted ROM.  Trachea midline   CV:   RRR.  No m/r/g.  No jugular venous distension.  Lungs:   Decreased breath sounds at the bases, no wheezing  Abdomen:   Soft, nontender, nondistended.  Extremities: No cyanosis or clubbing.  No edema  Skin:     No rashes.  Normal coloration.   Warm and dry.    Neuro:  CN II-XII grossly intact.    Psych:  Agitated    I have personally reviewed labs and tests:  Recent Labs:  Recent Results (from the past 48 hour(s))   EKG 12 Lead    Collection Time: 04/15/24  4:36 PM   Result Value Ref Range    Ventricular Rate 94 BPM    Atrial Rate 94 BPM    P-R Interval 133 ms    QRS Duration 95 ms    Q-T Interval 372 ms    QTc Calculation (Bazett) 466 ms    P Axis 59 degrees    R Axis 90 degrees    T Axis -28 degrees    Diagnosis       Sinus rhythm  Probable left atrial enlargement  Borderline right axis deviation  Borderline T abnormalities, diffuse leads    Confirmed by BAILEY IVAN (), NANY LEDEZMA ([id number]) on 4/16/2024 9:14:01 AM     CBC with Auto Differential    Collection Time: 04/15/24  4:39 PM   Result Value Ref Range    WBC 8.6 4.3 - 11.1 K/uL    RBC 5.59 (H) 4.05 - 5.2 M/uL    Hemoglobin 13.4 11.7 - 15.4 g/dL    Hematocrit 46.0 35.8 - 46.3 %    MCV 82.3 82 - 102 FL    MCH 24.0 (L) 26.1 - 32.9 PG    MCHC 29.1 (L) 31.4 - 35.0 g/dL    RDW 20.8 (H) 11.9 - 14.6 %    Platelets 243 150 - 450 K/uL    MPV 10.3 9.4 - 12.3 FL    nRBC 0.00 0.0 - 0.2 K/uL    Differential Type AUTOMATED      Neutrophils % 54 43 - 78 %    Lymphocytes % 29 13 - 44 %    Monocytes % 11 4.0 - 12.0 %    Eosinophils % 5 0.5 -  PG    MCHC 28.6 (L) 31.4 - 35.0 g/dL    RDW 20.8 (H) 11.9 - 14.6 %    Platelets 247 150 - 450 K/uL    MPV 9.9 9.4 - 12.3 FL    nRBC 0.00 0.0 - 0.2 K/uL    Differential Type AUTOMATED      Neutrophils % 63 43 - 78 %    Lymphocytes % 25 13 - 44 %    Monocytes % 11 4.0 - 12.0 %    Eosinophils % 0 (L) 0.5 - 7.8 %    Basophils % 1 0.0 - 2.0 %    Immature Granulocytes % 0 0.0 - 5.0 %    Neutrophils Absolute 6.9 1.7 - 8.2 K/UL    Lymphocytes Absolute 2.7 0.5 - 4.6 K/UL    Monocytes Absolute 1.2 0.1 - 1.3 K/UL    Eosinophils Absolute 0.0 0.0 - 0.8 K/UL    Basophils Absolute 0.1 0.0 - 0.2 K/UL    Immature Granulocytes Absolute 0.0 0.0 - 0.5 K/UL       No results for input(s): \"COVID19\" in the last 72 hours.    Current Meds:  Current Facility-Administered Medications   Medication Dose Route Frequency    sodium chloride (Inhalant) 3 % nebulizer solution 4 mL  4 mL Nebulization BID RT    acetylcysteine (MUCOMYST) 20 % solution 600 mg  600 mg Inhalation BID RT    [START ON 4/18/2024] vancomycin (VANCOCIN) 1500 mg in sodium chloride 0.9 % 250 mL IVPB  1,500 mg IntraVENous Q24H    methylPREDNISolone sodium succ (SOLU-MEDROL) 40 mg in sterile water 1 mL injection  40 mg IntraVENous Q12H    traZODone (DESYREL) tablet 100 mg  100 mg Oral Nightly PRN    pregabalin (LYRICA) capsule 200 mg  200 mg Oral BID    piperacillin-tazobactam (ZOSYN) 3,375 mg in sodium chloride 0.9 % 50 mL IVPB (mini-bag)  3,375 mg IntraVENous Q8H    ferrous sulfate (IRON 325) tablet 325 mg  325 mg Oral BID WC    albuterol (PROVENTIL) nebulizer solution 2.5 mg  2.5 mg Nebulization Q6H PRN    budesonide (PULMICORT) nebulizer suspension 500 mcg  0.5 mg Nebulization BID RT    dapsone tablet 100 mg  100 mg Oral Daily    sodium chloride flush 0.9 % injection 5-40 mL  5-40 mL IntraVENous 2 times per day    sodium chloride flush 0.9 % injection 5-40 mL  5-40 mL IntraVENous PRN    0.9 % sodium chloride infusion   IntraVENous PRN    potassium chloride (KLOR-CON M)

## 2024-04-17 NOTE — PROGRESS NOTES
VANCO DAILY FOLLOW UP NOTE  [facility]   Pharmacy Pharmacokinetic Monitoring Service - Vancomycin    Consulting Provider: Dr. Grimaldo   Indication: HAP  Target Concentration: Goal AUC/AVTAR 400-600 mg*hr/L  Day of Therapy: 2 of 7  Additional Antimicrobials: pip/tazo    Pertinent Laboratory Values:   Wt Readings from Last 1 Encounters:   04/15/24 64.9 kg (143 lb)     Temp Readings from Last 1 Encounters:   04/17/24 97.5 °F (36.4 °C) (Oral)     Recent Labs     04/15/24  1639 04/15/24  1737 04/16/24  1031 04/17/24  0501   BUN 7*  --  13 16   CREATININE 1.00  --  0.90 0.80   WBC 8.6  --  10.1 11.0   PROCAL <0.05  --   --   --    LACACIDPL  --  1.2  --   --      Estimated Creatinine Clearance: 55 mL/min (based on SCr of 0.8 mg/dL).      MRSA Nasal Swab: showed MRSA positive result on 1/30/24    Assessment:  Date/Time Dose Concentration AUC         Note: Serum concentrations collected for AUC dosing may appear elevated if collected in close proximity to the dose administered, this is not necessarily an indication of toxicity    Plan:  Dosing recommendations based on Bayesian software  Continue vancomycin 1500 mg every 24 hours for predicted AUC/Tr of 487/13.1  Renal labs as indicated   Vancomycin concentrations will be ordered as clinically appropriate   Pharmacy will continue to monitor patient and adjust therapy as indicated    Thank you for the consult,  Feliciano Anderson [facility]

## 2024-04-17 NOTE — ICUWATCH
RRT Clinical Rounding Nurse Progress Report      SUBJECTIVE: Called to assess patient secondary to MD/nurse concern - increased oxygen needs .      Vitals:    04/17/24 0048 04/17/24 0410 04/17/24 0752 04/17/24 0823   BP:  110/77 (!) 151/76    Pulse:  78 75 74   Resp: 18 20 14 16   Temp:  97.7 °F (36.5 °C) 97.5 °F (36.4 °C)    TempSrc:  Oral Oral    SpO2: 90% 91% 92% 92%   Weight:       Height:              ASSESSMENT:  On arrival to room, I found patient to be resting in bed with eyes closed. Patient briefly awakes to voice. Patient is not restless or agitated on exam. Bilateral lung sounds diminished. Respirations even and unlabored. No distress noted on exam.    Patient currently on Airvo 50L 85% with O2 saturation per bedside monitor showing 93%.    PLAN:  VS, labs, and progress notes reviewed. No concern per primary RN. Will follow per RRT Clinical Rounding Program protocol. Primary RN to call with concerns.    Todd Chavarria RN  [place]: [phone]

## 2024-04-17 NOTE — CARE COORDINATION
04/17/24 6602   Service Assessment   Patient Orientation Alert and Oriented;Person;Place;Situation   Cognition Alert   History Provided By Friend   Primary Caregiver Friend   Support Systems Friends/Neighbors;Home Care Staff   Patient's Healthcare Decision Maker is: Legal Next of Kin   PCP Verified by CM Yes   Last Visit to PCP Within last 3 months   Prior Functional Level Assistance with the following:   Current Functional Level Assistance with the following:   Can patient return to prior living arrangement Unknown at present   Ability to make needs known: Good   Family able to assist with home care needs: No  (Has 2 friends that help her)   Financial Resources Medicare  ([facility] Medicare)   Community Resources None   Social/Functional History   Lives With Alone   Active  No   Patient's  Info Friends drive her as needed     CM met with pt friend, Krupa Bishop, at bedside, pt is resting she states pt is A&O x 3, demographics and pcp verified. Pt does not have any family, Krupa and another friend, Leanna, help pt as they can. They are also her part time aide caregivers M-F for 3 hrs a day. No help on the week-ends, pt has refused placement in the past. Friend concerned that there is no hot water in the home. Friends drive her to store and appts as needed, has DME but does not use except for the home O2 provided by [place], baseline rate is @ 2 l/min but pt often increases the rate, has RW and cane. Per MD note, pt has a hx of being noncompliant. Pt had called her friend this morning saying she was leaving AMA but she states she will not transport pt home if she leaves AMA. PT/OT recommending H/H. Referrals not sent yet. CM will follow for discharge plan/needs.

## 2024-04-17 NOTE — PROGRESS NOTES
Pt is in bed resting with eyes closed in bed at this time. Respirations present and WNL. Pt was on RA. 5LNC placed on pt while she was sleeping.

## 2024-04-18 VITALS
BODY MASS INDEX: 28.07 KG/M2 | TEMPERATURE: 97.9 F | OXYGEN SATURATION: 92 % | HEIGHT: 60 IN | HEART RATE: 81 BPM | SYSTOLIC BLOOD PRESSURE: 181 MMHG | DIASTOLIC BLOOD PRESSURE: 89 MMHG | WEIGHT: 143 LBS | RESPIRATION RATE: 20 BRPM

## 2024-04-18 PROBLEM — Z91.199 NON-COMPLIANCE: Status: ACTIVE | Noted: 2024-04-18

## 2024-04-18 LAB
ANION GAP SERPL CALC-SCNC: 0 MMOL/L (ref 2–11)
BASOPHILS # BLD: 0 K/UL (ref 0–0.2)
BASOPHILS NFR BLD: 1 % (ref 0–2)
BUN SERPL-MCNC: 18 MG/DL (ref 8–23)
CALCIUM SERPL-MCNC: 9.6 MG/DL (ref 8.3–10.4)
CHLORIDE SERPL-SCNC: 108 MMOL/L (ref 103–113)
CO2 SERPL-SCNC: 29 MMOL/L (ref 21–32)
CREAT SERPL-MCNC: 0.9 MG/DL (ref 0.6–1)
DIFFERENTIAL METHOD BLD: ABNORMAL
EOSINOPHIL # BLD: 0 K/UL (ref 0–0.8)
EOSINOPHIL NFR BLD: 0 % (ref 0.5–7.8)
ERYTHROCYTE [DISTWIDTH] IN BLOOD BY AUTOMATED COUNT: 21.2 % (ref 11.9–14.6)
GLUCOSE SERPL-MCNC: 148 MG/DL (ref 65–100)
HCT VFR BLD AUTO: 44.6 % (ref 35.8–46.3)
HGB BLD-MCNC: 12.7 G/DL (ref 11.7–15.4)
IMM GRANULOCYTES # BLD AUTO: 0 K/UL (ref 0–0.5)
IMM GRANULOCYTES NFR BLD AUTO: 1 % (ref 0–5)
LYMPHOCYTES # BLD: 2.2 K/UL (ref 0.5–4.6)
LYMPHOCYTES NFR BLD: 26 % (ref 13–44)
MCH RBC QN AUTO: 23.8 PG (ref 26.1–32.9)
MCHC RBC AUTO-ENTMCNC: 28.5 G/DL (ref 31.4–35)
MCV RBC AUTO: 83.5 FL (ref 82–102)
MM INDURATION, POC: 0 MM (ref 0–5)
MONOCYTES # BLD: 0.3 K/UL (ref 0.1–1.3)
MONOCYTES NFR BLD: 4 % (ref 4–12)
NEUTS SEG # BLD: 5.8 K/UL (ref 1.7–8.2)
NEUTS SEG NFR BLD: 69 % (ref 43–78)
NRBC # BLD: 0 K/UL (ref 0–0.2)
PLATELET # BLD AUTO: 255 K/UL (ref 150–450)
PMV BLD AUTO: 9.9 FL (ref 9.4–12.3)
POTASSIUM SERPL-SCNC: 4.3 MMOL/L (ref 3.5–5.1)
PPD, POC: NEGATIVE
RBC # BLD AUTO: 5.34 M/UL (ref 4.05–5.2)
SODIUM SERPL-SCNC: 137 MMOL/L (ref 136–146)
WBC # BLD AUTO: 8.4 K/UL (ref 4.3–11.1)

## 2024-04-18 PROCEDURE — 2580000003 HC RX 258: Performed by: FAMILY MEDICINE

## 2024-04-18 PROCEDURE — 94760 N-INVAS EAR/PLS OXIMETRY 1: CPT

## 2024-04-18 PROCEDURE — 94640 AIRWAY INHALATION TREATMENT: CPT

## 2024-04-18 PROCEDURE — 2700000000 HC OXYGEN THERAPY PER DAY

## 2024-04-18 PROCEDURE — 6360000002 HC RX W HCPCS: Performed by: FAMILY MEDICINE

## 2024-04-18 PROCEDURE — 85025 COMPLETE CBC W/AUTO DIFF WBC: CPT

## 2024-04-18 PROCEDURE — 6370000000 HC RX 637 (ALT 250 FOR IP): Performed by: FAMILY MEDICINE

## 2024-04-18 PROCEDURE — 36415 COLL VENOUS BLD VENIPUNCTURE: CPT

## 2024-04-18 PROCEDURE — 80048 BASIC METABOLIC PNL TOTAL CA: CPT

## 2024-04-18 RX ORDER — CEFDINIR 300 MG/1
300 CAPSULE ORAL 2 TIMES DAILY
Qty: 10 CAPSULE | Refills: 0 | Status: SHIPPED | OUTPATIENT
Start: 2024-04-18 | End: 2024-04-23

## 2024-04-18 RX ORDER — FLUTICASONE PROPIONATE AND SALMETEROL 250; 50 UG/1; UG/1
1 POWDER RESPIRATORY (INHALATION) EVERY 12 HOURS
Qty: 60 EACH | Refills: 3 | Status: SHIPPED | OUTPATIENT
Start: 2024-04-18

## 2024-04-18 RX ORDER — AZITHROMYCIN 500 MG/1
500 TABLET, FILM COATED ORAL DAILY
Qty: 5 TABLET | Refills: 0 | Status: SHIPPED | OUTPATIENT
Start: 2024-04-18 | End: 2024-04-23

## 2024-04-18 RX ORDER — GUAIFENESIN 600 MG/1
1200 TABLET, EXTENDED RELEASE ORAL 2 TIMES DAILY
Qty: 40 TABLET | Refills: 0 | Status: SHIPPED | OUTPATIENT
Start: 2024-04-18 | End: 2024-04-28

## 2024-04-18 RX ORDER — PREDNISONE 20 MG/1
TABLET ORAL
Qty: 9 TABLET | Refills: 0 | Status: SHIPPED | OUTPATIENT
Start: 2024-04-18 | End: 2024-04-25

## 2024-04-18 RX ORDER — SODIUM CHLORIDE FOR INHALATION 3 %
4 VIAL, NEBULIZER (ML) INHALATION
Qty: 30 ML | Refills: 0 | Status: SHIPPED | OUTPATIENT
Start: 2024-04-18

## 2024-04-18 RX ADMIN — PREGABALIN 200 MG: 100 CAPSULE ORAL at 08:29

## 2024-04-18 RX ADMIN — BUDESONIDE 500 MCG: 0.5 SUSPENSION RESPIRATORY (INHALATION) at 07:23

## 2024-04-18 RX ADMIN — ENOXAPARIN SODIUM 40 MG: 100 INJECTION SUBCUTANEOUS at 08:29

## 2024-04-18 RX ADMIN — VANCOMYCIN HYDROCHLORIDE 1500 MG: 10 INJECTION, POWDER, LYOPHILIZED, FOR SOLUTION INTRAVENOUS at 02:08

## 2024-04-18 RX ADMIN — PIPERACILLIN AND TAZOBACTAM 3375 MG: 3; .375 INJECTION, POWDER, FOR SOLUTION INTRAVENOUS at 05:44

## 2024-04-18 RX ADMIN — Medication 4 ML: at 07:23

## 2024-04-18 RX ADMIN — SODIUM CHLORIDE: 9 INJECTION, SOLUTION INTRAVENOUS at 05:36

## 2024-04-18 RX ADMIN — SODIUM CHLORIDE, PRESERVATIVE FREE 10 ML: 5 INJECTION INTRAVENOUS at 08:31

## 2024-04-18 NOTE — ICUWATCH
RRT Clinical Rounding Nurse Update    Vitals:    04/17/24 2113 04/18/24 0538 04/18/24 0713 04/18/24 0727   BP: 103/74 (!) 157/65 (!) 181/89    Pulse: 83 80 81    Resp: 20 20 20    Temp: 97.9 °F (36.6 °C)  97.9 °F (36.6 °C)    TempSrc: Oral Oral Oral    SpO2: (!) 79% (!) 88% 90% 92%   Weight:       Height:            DETERIORATION INDEX SCORE: 38    ASSESSMENT:  Previous outreach assessment was reviewed. There have been no significant changes since previous assessment. Patient is on 3L with O2 saturation of 92%. Patient is hypertensive this morning, but is refusing interventions.    No needs or concerns per primary RN.    PLAN:  Will discharge from RRT Clinical Rounding Program per protocol. Please call if needed.    Todd Chavarria RN  [place]: [phone]

## 2024-04-18 NOTE — PROGRESS NOTES
Pt sitting in chair, allowed nurse to get IV access and give medications. Still refusing to wear oxygen, states \" I don't need all of that yall are pumping me up with too much\". Despite education of the importance of wearing oxygen she refuses airvo but agreeable to wear 2L NC. IV abx infusing and pt content at this time.

## 2024-04-18 NOTE — ICUWATCH
RRT Clinical Rounding Nurse Progress Report      SUBJECTIVE: Patient assessed secondary to RN/provider concern - increased O2 needs.      Vitals:    04/17/24 0752 04/17/24 0823 04/17/24 1555 04/17/24 2113   BP: (!) 151/76  118/72 103/74   Pulse: 75 74 80 83   Resp: 14 16 16 20   Temp: 97.5 °F (36.4 °C)  97.3 °F (36.3 °C) 97.9 °F (36.6 °C)   TempSrc: Oral  Oral Oral   SpO2: 92% 92% 94% (!) 79%   Weight:       Height:            DETERIORATION INDEX SCORE: 33    ASSESSMENT:  Previous outreach assessment reviewed. Pt is resting with eyes closed in the chair, but awakens to voice. Oriented to self, place and time. Pt is not wearing any oxygen at the time of exam, nor at all thus far this shift. Pt has been educated on numerous occassions prior to this exam, and again during this exam, of the importance of wearing oxygen. When educated on the fact that oxygen saturation needs to be above 92%, she replies \"for y'all it does, not for me.\" When educated on her skin color being grey and dusky throughout with blue lips, she replies \"that's my natural skin color, this is how it has always been.\" Pt overall has verbally been educated and remains to refuse any form of O2 therapy presented to her. Currently tachypneic, unlabored breathing. Refuses to allow me to listen to her lungs or approach for a physical assessment.     Pertinent lab work, vital signs and progress notes from today have been reviewed.    PLAN:  Will follow per RRT Clinical Rounding Program protocol.    Blaine Foreman RN  [facility]: [phone]  [facility]: [phone]

## 2024-04-18 NOTE — DISCHARGE SUMMARY
Hospitalist Discharge Summary   Admit Date:  4/15/2024  4:27 PM   DC Note date: 2024  Name:  Meli Blancas   Age:  68 y.o.  Sex:  female  :  1955   MRN:  [id number]   Room:  [facility]  PCP:  Florencio Banegas MD    Presenting Complaint: Shortness of Breath     Initial Admission Diagnosis: Hypoxemia [R09.02]  COPD exacerbation (HCC) [J44.1]  Pneumonia of right lower lobe due to infectious organism [J18.9]  Community acquired pneumonia of right lower lobe of lung [J18.9]     Problem List for this Hospitalization (present on admission):    Principal Problem:    Community acquired pneumonia of right lower lobe of lung  Active Problems:    COPD with acute exacerbation (HCC)    GERD (gastroesophageal reflux disease)    SLE (systemic lupus erythematosus) (HCC)    Acute respiratory failure with hypoxia (HCC)    Mood disorder (HCC)    Lung nodules    Non-compliance  Resolved Problems:    * No resolved hospital problems. *      Hospital Course:  Meli Blancas is a 68 y.o. female with medical history of COPD on 2LNC, mood disorder, recent rare skin lesions followed by Dr. Toribio, SLE and noncompliance admitted with acute respiratory failure with hypoxia secondary to right lower lobe pneumonia and COPD exacerbation.  Patient started on empiric antibiotic, nebs treatment and supportive care.  Oxygen requirement increased and patient requiring Airvo.  Pulmonology consulted.  CT negative for PE but noted for lesions, possible CVA.  Pulmonology recommend to consider PET scan in the future and consider CT-guided biopsy if patient agreeable.    Code violet was called on .  Nurses found patient sitting on the side of the bed, ripped out her IVs and taken off her Airvo.  Oxygen sats 79% on room air. She refused to wear her oxygen and would like to go home.  Refused all treatments, adamant not to wear oxygen and would like to go home.  Reports she is feeling fine and can take care of herself.  Discussed she could  die, if she does not wear oxygen.  Reports she is okay with dying and would like to die at home, not here.  She is alert and oriented and capable of making her own decisions.  She is not suicidal.  Discussed with her POA/caregiver.  Pulmonology also spoke to patient, but patient does not  want any aggressive care.  CODE STATUS changed to DNR.    Patient hypertensive this morning with systolic in 180s.  Does not want to get any treatment for hypertension.  Would like to leave AMA.  In the morning on 3 L, she was saturating 90%.  Currently sitting in bed without oxygen and not letting anyone check her vitals.  I discussed if she leaves AMA, she can die.  She understands all consequences and still wants to leave AMA.  She has home oxygen and only uses as needed.  She signed AMA paper and waiting for her ride to come to leave.    Disposition: Left Against Medical Advice (AMA)  Diet: ADULT DIET; Regular; Low Fat/Low Chol/High Fiber/2 gm Na  Code Status: DNR    Follow Ups:  Follow-up Information       Follow up With Specialties Details Why Contact Info    Florencio Banegas MD Family Medicine Follow up in 1 week(s)  [address]  [place] [id number] [phone]      [facility] Pulmonology Follow up in 1 week(s)  [address]  [facility] [address] [phone]            Time spent in patient discharge and coordination 42 minutes.    Pending Labs       Order Current Status    Culture, Blood 1 Preliminary result    Culture, Blood 1 Preliminary result            Current Discharge Medication List        START taking these medications    Details   guaiFENesin (MUCINEX) 600 MG extended release tablet Take 2 tablets by mouth 2 times daily for 10 days  Qty: 40 tablet, Refills: 0      sodium chloride, Inhalant, 3 % nebulizer solution Take 4 mLs by nebulization in the morning and 4 mLs in the evening.  Qty: 30 mL, Refills: 0      fluticasone-salmeterol (ADVAIR DISKUS)  Collected: 04/15/24 1737    Order Status: Completed Specimen: Blood Updated: 04/18/24 0749     Special Requests --        RIGHT  Antecubital       Culture NO GROWTH 3 DAYS       Culture, Blood 1 [[id number]] Collected: 04/15/24 1715    Order Status: Completed Specimen: Blood Updated: 04/18/24 0749     Special Requests LEFT ARM        Culture NO GROWTH 3 DAYS               All Labs from Last 24 Hrs:  Recent Results (from the past 24 hour(s))   PLEASE READ & DOCUMENT PPD TEST IN 48 HRS    Collection Time: 04/18/24  2:05 AM   Result Value Ref Range    PPD, (POC) Negative     mm Induration 0 0 - 5 mm   Basic Metabolic Panel w/ Reflex to MG    Collection Time: 04/18/24  5:43 AM   Result Value Ref Range    Sodium 137 136 - 146 mmol/L    Potassium 4.3 3.5 - 5.1 mmol/L    Chloride 108 103 - 113 mmol/L    CO2 29 21 - 32 mmol/L    Anion Gap 0 (L) 2 - 11 mmol/L    Glucose 148 (H) 65 - 100 mg/dL    BUN 18 8 - 23 MG/DL    Creatinine 0.90 0.6 - 1.0 MG/DL    Est, Glom Filt Rate 69 >60 ml/min/1.73m2    Calcium 9.6 8.3 - 10.4 MG/DL   CBC with Auto Differential    Collection Time: 04/18/24  5:43 AM   Result Value Ref Range    WBC 8.4 4.3 - 11.1 K/uL    RBC 5.34 (H) 4.05 - 5.2 M/uL    Hemoglobin 12.7 11.7 - 15.4 g/dL    Hematocrit 44.6 35.8 - 46.3 %    MCV 83.5 82 - 102 FL    MCH 23.8 (L) 26.1 - 32.9 PG    MCHC 28.5 (L) 31.4 - 35.0 g/dL    RDW 21.2 (H) 11.9 - 14.6 %    Platelets 255 150 - 450 K/uL    MPV 9.9 9.4 - 12.3 FL    nRBC 0.00 0.0 - 0.2 K/uL    Differential Type AUTOMATED      Neutrophils % 69 43 - 78 %    Lymphocytes % 26 13 - 44 %    Monocytes % 4 4.0 - 12.0 %    Eosinophils % 0 (L) 0.5 - 7.8 %    Basophils % 1 0.0 - 2.0 %    Immature Granulocytes % 1 0.0 - 5.0 %    Neutrophils Absolute 5.8 1.7 - 8.2 K/UL    Lymphocytes Absolute 2.2 0.5 - 4.6 K/UL    Monocytes Absolute 0.3 0.1 - 1.3 K/UL    Eosinophils Absolute 0.0 0.0 - 0.8 K/UL    Basophils Absolute 0.0 0.0 - 0.2 K/UL    Immature Granulocytes Absolute 0.0 0.0 - 0.5 K/UL

## 2024-04-18 NOTE — ICUWATCH
RRT Clinical Rounding Nurse Update    Vitals:    04/17/24 0752 04/17/24 0823 04/17/24 1555 04/17/24 2113   BP: (!) 151/76  118/72 103/74   Pulse: 75 74 80 83   Resp: 14 16 16 20   Temp: 97.5 °F (36.4 °C)  97.3 °F (36.3 °C) 97.9 °F (36.6 °C)   TempSrc: Oral  Oral Oral   SpO2: 92% 92% 94% (!) 79%   Weight:       Height:            DETERIORATION INDEX SCORE: 44    ASSESSMENT:  Previous outreach assessment was reviewed. There have been no significant changes since previous assessment. Pt resting eyes closed in bed with unlabored breathing on 2nd rounds. Agreeable to 2LNC and antibiotic administration.    PLAN:  Will follow per RRT Clinical Rounding Program protocol.    Blaine Foreman RN  [facility]: [phone]  [facility]: [phone]

## 2024-09-25 ENCOUNTER — TRANSCRIBE ORDERS (OUTPATIENT)
Dept: SCHEDULING | Age: 69
End: 2024-09-25

## 2024-09-25 DIAGNOSIS — K43.9 VENTRAL HERNIA WITHOUT OBSTRUCTION OR GANGRENE: Primary | ICD-10-CM

## 2024-10-08 NOTE — PROGRESS NOTES
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 Blancas is a 69 y.o. female who presents today in referral from Dr. Humeniuk for consultation regarding pemphigus foliaceous.  The past medical history is significant for HTN, Tobacco use, Hx of CAD, Disorder of lipid metabolism, Hx of Breast cancer right-UIQ (6/2021), DDD, Sjogren's syndrome, Complex xyxic lesion L suprahyoid area, Depression    Today, patient is here for consultation regarding pemphigus foliaceous.  She was referred for evaluation and treatment with rituximab.  Her dermatological symptoms started about 30 years ago.  She has been treated for various conditions and most recently diagnosed with pemphigus foliaceous.  She is a former smoker of about 40 years and quit 1 year ago.  She was just admitted earlier in October with respiratory failure and bacteremia.  She has been on chronic prednisone, hydroxychloroquine and dapsone.  She stated she has flareups about monthly.  It takes a long time for the lesions to heal - she feels she's always w skin changes.  She was referred by Dr Humeniuk for rituximab therapy.  We discussed the MOA of rituximab and side effects in detail.  She was also given printed information about the medication for her reference.  She was here with her caretaker who has been with her for about a year.  Dosing for Rituxan reviewed with patient and the caretaker.  With her respiratory issues as well as underlying medical conditions, would be safest for her to be admitted for first infusion with observation.  Reviewed this with dr Martinez who will be on call when she's admitted next week.  The dosing of rituximab for
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
## 2024-10-23 ENCOUNTER — CLINICAL DOCUMENTATION (OUTPATIENT)
Dept: ONCOLOGY | Age: 69
End: 2024-10-23

## 2024-10-23 ENCOUNTER — HOSPITAL ENCOUNTER (OUTPATIENT)
Dept: LAB | Age: 69
Discharge: HOME | End: 2024-10-23
Payer: MEDICARE

## 2024-10-23 ENCOUNTER — OFFICE VISIT (OUTPATIENT)
Dept: ONCOLOGY | Age: 69
End: 2024-10-23

## 2024-10-23 VITALS
BODY MASS INDEX: 32.2 KG/M2 | DIASTOLIC BLOOD PRESSURE: 67 MMHG | HEIGHT: 60 IN | RESPIRATION RATE: 16 BRPM | WEIGHT: 164 LBS | OXYGEN SATURATION: 85 % | TEMPERATURE: 97.9 F | HEART RATE: 71 BPM | SYSTOLIC BLOOD PRESSURE: 99 MMHG

## 2024-10-23 DIAGNOSIS — Z13.820 ENCOUNTER FOR OSTEOPOROSIS SCREENING IN ASYMPTOMATIC POSTMENOPAUSAL PATIENT: ICD-10-CM

## 2024-10-23 DIAGNOSIS — Z85.3 HX OF BREAST CANCER: ICD-10-CM

## 2024-10-23 DIAGNOSIS — L10.2 PEMPHIGUS FOLIACEUS: ICD-10-CM

## 2024-10-23 DIAGNOSIS — M35.9 AUTOIMMUNE DISEASE (HCC): ICD-10-CM

## 2024-10-23 DIAGNOSIS — Z78.0 ENCOUNTER FOR OSTEOPOROSIS SCREENING IN ASYMPTOMATIC POSTMENOPAUSAL PATIENT: ICD-10-CM

## 2024-10-23 DIAGNOSIS — Z79.899 HIGH RISK MEDICATION USE: ICD-10-CM

## 2024-10-23 DIAGNOSIS — J96.11 CHRONIC RESPIRATORY FAILURE WITH HYPOXIA: ICD-10-CM

## 2024-10-23 DIAGNOSIS — L10.2 PEMPHIGUS FOLIACEUS: Primary | ICD-10-CM

## 2024-10-23 DIAGNOSIS — Z87.891 SMOKING HISTORY: ICD-10-CM

## 2024-10-23 DIAGNOSIS — I25.10 CORONARY ARTERY CALCIFICATION SEEN ON CT SCAN: ICD-10-CM

## 2024-10-23 DIAGNOSIS — Z12.31 ENCOUNTER FOR SCREENING MAMMOGRAM FOR MALIGNANT NEOPLASM OF BREAST: ICD-10-CM

## 2024-10-23 DIAGNOSIS — Z59.82 TRANSPORTATION INSECURITY: ICD-10-CM

## 2024-10-23 DIAGNOSIS — Z91.89 AT RISK FOR BONE DENSITY LOSS: ICD-10-CM

## 2024-10-23 LAB
ALBUMIN SERPL-MCNC: 3.3 G/DL (ref 3.2–4.6)
ALBUMIN/GLOB SERPL: 1.1 (ref 1–1.9)
ALP SERPL-CCNC: 96 U/L (ref 35–104)
ALT SERPL-CCNC: 14 U/L (ref 8–45)
ANION GAP SERPL CALC-SCNC: 9 MMOL/L (ref 9–18)
AST SERPL-CCNC: 22 U/L (ref 15–37)
BASOPHILS # BLD: 0.1 K/UL (ref 0–0.2)
BASOPHILS NFR BLD: 1 % (ref 0–2)
BILIRUB SERPL-MCNC: 0.9 MG/DL (ref 0–1.2)
BUN SERPL-MCNC: 15 MG/DL (ref 8–23)
CALCIUM SERPL-MCNC: 8.7 MG/DL (ref 8.8–10.2)
CHLORIDE SERPL-SCNC: 99 MMOL/L (ref 98–107)
CO2 SERPL-SCNC: 29 MMOL/L (ref 20–28)
CREAT SERPL-MCNC: 0.9 MG/DL (ref 0.6–1.1)
DIFFERENTIAL METHOD BLD: ABNORMAL
EOSINOPHIL # BLD: 0.4 K/UL (ref 0–0.8)
EOSINOPHIL NFR BLD: 4 % (ref 0.5–7.8)
ERYTHROCYTE [DISTWIDTH] IN BLOOD BY AUTOMATED COUNT: 19.3 % (ref 11.9–14.6)
GLOBULIN SER CALC-MCNC: 3.1 G/DL (ref 2.3–3.5)
GLUCOSE SERPL-MCNC: 89 MG/DL (ref 70–99)
HAV IGM SER QL: NONREACTIVE
HBV CORE IGM SER QL: NONREACTIVE
HBV SURFACE AB SERPL IA-ACNC: <3.5 MIU/ML
HBV SURFACE AG SER QL: NONREACTIVE
HCT VFR BLD AUTO: 40.3 % (ref 35.8–46.3)
HCV AB SER QL: NONREACTIVE
HGB BLD-MCNC: 11.7 G/DL (ref 11.7–15.4)
IMM GRANULOCYTES # BLD AUTO: 0.1 K/UL (ref 0–0.5)
IMM GRANULOCYTES NFR BLD AUTO: 1 % (ref 0–5)
LYMPHOCYTES # BLD: 2.3 K/UL (ref 0.5–4.6)
LYMPHOCYTES NFR BLD: 21 % (ref 13–44)
MCH RBC QN AUTO: 27 PG (ref 26.1–32.9)
MCHC RBC AUTO-ENTMCNC: 29 G/DL (ref 31.4–35)
MCV RBC AUTO: 92.9 FL (ref 82–102)
MONOCYTES # BLD: 1.1 K/UL (ref 0.1–1.3)
MONOCYTES NFR BLD: 10 % (ref 4–12)
NEUTS SEG # BLD: 7 K/UL (ref 1.7–8.2)
NEUTS SEG NFR BLD: 63 % (ref 43–78)
NRBC # BLD: 0 K/UL (ref 0–0.2)
PLATELET # BLD AUTO: 272 K/UL (ref 150–450)
PLATELET COMMENT: ADEQUATE
PMV BLD AUTO: 11.2 FL (ref 9.4–12.3)
POTASSIUM SERPL-SCNC: 4 MMOL/L (ref 3.5–5.1)
PROT SERPL-MCNC: 6.4 G/DL (ref 6.3–8.2)
RBC # BLD AUTO: 4.34 M/UL (ref 4.05–5.2)
RBC MORPH BLD: ABNORMAL
RBC MORPH BLD: ABNORMAL
SODIUM SERPL-SCNC: 137 MMOL/L (ref 136–145)
WBC # BLD AUTO: 11 K/UL (ref 4.3–11.1)
WBC MORPH BLD: ABNORMAL

## 2024-10-23 PROCEDURE — 86706 HEP B SURFACE ANTIBODY: CPT

## 2024-10-23 PROCEDURE — 85025 COMPLETE CBC W/AUTO DIFF WBC: CPT

## 2024-10-23 PROCEDURE — 80053 COMPREHEN METABOLIC PANEL: CPT

## 2024-10-23 PROCEDURE — 80074 ACUTE HEPATITIS PANEL: CPT

## 2024-10-23 PROCEDURE — 83516 IMMUNOASSAY NONANTIBODY: CPT

## 2024-10-23 PROCEDURE — 36415 COLL VENOUS BLD VENIPUNCTURE: CPT

## 2024-10-23 PROCEDURE — 86704 HEP B CORE ANTIBODY TOTAL: CPT

## 2024-10-23 RX ORDER — TRIAMCINOLONE ACETONIDE 1 MG/G
CREAM TOPICAL
COMMUNITY

## 2024-10-23 RX ORDER — HYDROCODONE BITARTRATE AND ACETAMINOPHEN 7.5; 325 MG/1; MG/1
TABLET ORAL
COMMUNITY

## 2024-10-23 RX ORDER — HEPARIN SODIUM (PORCINE) LOCK FLUSH IV SOLN 100 UNIT/ML 100 UNIT/ML
500 SOLUTION INTRAVENOUS PRN
OUTPATIENT
Start: 2024-10-31

## 2024-10-23 RX ORDER — SODIUM CHLORIDE 9 MG/ML
INJECTION, SOLUTION INTRAVENOUS CONTINUOUS
OUTPATIENT
Start: 2024-10-31

## 2024-10-23 RX ORDER — DIPHENHYDRAMINE HYDROCHLORIDE 50 MG/ML
50 INJECTION INTRAMUSCULAR; INTRAVENOUS
OUTPATIENT
Start: 2024-10-31

## 2024-10-23 RX ORDER — ACETAMINOPHEN 325 MG/1
650 TABLET ORAL ONCE
OUTPATIENT
Start: 2024-10-31 | End: 2024-10-31

## 2024-10-23 RX ORDER — SODIUM CHLORIDE 9 MG/ML
5-250 INJECTION, SOLUTION INTRAVENOUS PRN
OUTPATIENT
Start: 2024-10-31

## 2024-10-23 RX ORDER — DIPHENHYDRAMINE HYDROCHLORIDE 50 MG/ML
25 INJECTION INTRAMUSCULAR; INTRAVENOUS ONCE
OUTPATIENT
Start: 2024-10-31 | End: 2024-10-31

## 2024-10-23 RX ORDER — MEPERIDINE HYDROCHLORIDE 50 MG/ML
12.5 INJECTION INTRAMUSCULAR; INTRAVENOUS; SUBCUTANEOUS PRN
OUTPATIENT
Start: 2024-10-31

## 2024-10-23 RX ORDER — EPINEPHRINE 1 MG/ML
0.3 INJECTION, SOLUTION, CONCENTRATE INTRAVENOUS PRN
OUTPATIENT
Start: 2024-10-31

## 2024-10-23 RX ORDER — ACETAMINOPHEN 325 MG/1
650 TABLET ORAL
OUTPATIENT
Start: 2024-10-31

## 2024-10-23 RX ORDER — ALBUTEROL SULFATE 90 UG/1
4 INHALANT RESPIRATORY (INHALATION) PRN
OUTPATIENT
Start: 2024-10-31

## 2024-10-23 RX ORDER — DULOXETIN HYDROCHLORIDE 60 MG/1
CAPSULE, DELAYED RELEASE ORAL
COMMUNITY
Start: 2024-10-21

## 2024-10-23 RX ORDER — ONDANSETRON 2 MG/ML
8 INJECTION INTRAMUSCULAR; INTRAVENOUS
OUTPATIENT
Start: 2024-10-31

## 2024-10-23 RX ORDER — PREDNISONE 10 MG/1
TABLET ORAL
COMMUNITY

## 2024-10-23 RX ORDER — FAMOTIDINE 10 MG/ML
20 INJECTION, SOLUTION INTRAVENOUS
OUTPATIENT
Start: 2024-10-31

## 2024-10-23 RX ORDER — SODIUM CHLORIDE 0.9 % (FLUSH) 0.9 %
5-40 SYRINGE (ML) INJECTION PRN
OUTPATIENT
Start: 2024-10-31

## 2024-10-23 SDOH — ECONOMIC STABILITY - TRANSPORTATION SECURITY: TRANSPORTATION INSECURITY: Z59.82

## 2024-10-23 ASSESSMENT — ENCOUNTER SYMPTOMS
BLOOD IN STOOL: 0
COUGH: 0
SORE THROAT: 0
WHEEZING: 0
HEMOPTYSIS: 0
VOICE CHANGE: 0
DIARRHEA: 0
CONSTIPATION: 0
ABDOMINAL DISTENTION: 0
TROUBLE SWALLOWING: 0
CHEST TIGHTNESS: 0
VOMITING: 0
ABDOMINAL PAIN: 0
NAUSEA: 0
SHORTNESS OF BREATH: 1
SCLERAL ICTERUS: 0

## 2024-10-23 ASSESSMENT — PATIENT HEALTH QUESTIONNAIRE - PHQ9
2. FEELING DOWN, DEPRESSED OR HOPELESS: NOT AT ALL
SUM OF ALL RESPONSES TO PHQ QUESTIONS 1-9: 0
1. LITTLE INTEREST OR PLEASURE IN DOING THINGS: NOT AT ALL
SUM OF ALL RESPONSES TO PHQ9 QUESTIONS 1 & 2: 0
SUM OF ALL RESPONSES TO PHQ QUESTIONS 1-9: 0

## 2024-10-23 NOTE — PROGRESS NOTES
**Please call admitting provider for clarification regarding orders or plan.**    Date of Admission: 10/31/24  Admitting Physician: Dr. Jason Martinez  Diagnosis:Pemphigus foliaceus  Time Conduit Contacted [phone]: 10:19 am  Conduit Associate Name: SALENA Lobo  Bed Assignment, if known: Unknown    UNPLANNED OR PLANNED: PLANNED DIRECT TO FLOOR FROM HOME    Reason for Admission: D1C1 IV Rituximab   Chemotherapy/ Biotherapy Agent & Cycle Number, if applicable: Rituximab  Pertinent Information: Patient receiving IV Rituximab for D1C1   Time Report called to Inpatient Charge(Unplanned admissions only):N/A  Report received by (Unplanned Admissions Only): N/A  Method of Transportation:Personal vehicle approved per provider (Caregiver)

## 2024-10-23 NOTE — PATIENT INSTRUCTIONS
Patient Information from Today's Visit    Diagnosis: Pemphigus foliaceus      Follow Up Instructions: Next week for direct admission.    History reviewed.  Symptoms reviewed.  Referral to Pulmonary.  Referral to Cardiology.  Discussed recommendations of Rituximab.  Discussed hospital admission for first dose.  We will set you up for direct admission next Thursday 10/31 if a bed is available.  DEXA bone density scan and screening mammogram recommended.  You can call to schedule this at [phone].      Treatment Summary has been discussed and given to patient: N/A      Current Labs: N/A    Please refer to After Visit Summary or MyChart for upcoming appointment information. If you have any questions regarding your upcoming schedule please reach out to your care team through [employer] or call [phone].    Please notify your assigned Nurse Navigator of any unplanned hospital admissions or Emergency Room visits within 24 hours of discharge.    -------------------------------------------------------------------------------------------------------------------  Please call our office at [phone] if you have any  of the following symptoms:   Fever of 100.5 or greater  Chills  Shortness of breath  Swelling or pain in one leg    After office hours an answering service is available and will contact a provider for emergencies or if you are experiencing any of the above symptoms.        YENIFER NOEL RN

## 2024-10-24 LAB — HBV CORE AB SERPL QL IA: NEGATIVE

## 2024-10-25 LAB
Lab: NORMAL
Lab: NORMAL
REFERENCE LAB: NORMAL

## 2024-10-30 ENCOUNTER — TELEPHONE (OUTPATIENT)
Dept: INFUSION THERAPY | Age: 69
End: 2024-10-30

## 2024-10-30 ENCOUNTER — HOSPITAL ENCOUNTER (INPATIENT)
Age: 69
LOS: 14 days | Discharge: HOME HEALTH | DRG: 189 | End: 2024-11-13
Attending: EMERGENCY MEDICINE
Payer: MEDICARE

## 2024-10-30 ENCOUNTER — APPOINTMENT (OUTPATIENT)
Dept: CT IMAGING | Age: 69
DRG: 189 | End: 2024-10-30
Payer: MEDICARE

## 2024-10-30 ENCOUNTER — APPOINTMENT (OUTPATIENT)
Dept: GENERAL RADIOLOGY | Age: 69
DRG: 189 | End: 2024-10-30
Payer: MEDICARE

## 2024-10-30 DIAGNOSIS — J96.21 ACUTE ON CHRONIC HYPOXIC RESPIRATORY FAILURE: Primary | ICD-10-CM

## 2024-10-30 DIAGNOSIS — L03.90 CELLULITIS OF SKIN: ICD-10-CM

## 2024-10-30 DIAGNOSIS — J96.01 ACUTE HYPOXIC RESPIRATORY FAILURE: ICD-10-CM

## 2024-10-30 PROBLEM — L03.313 CELLULITIS OF CHEST WALL: Status: ACTIVE | Noted: 2024-10-30

## 2024-10-30 PROBLEM — J43.8 OTHER EMPHYSEMA (HCC): Status: ACTIVE | Noted: 2024-10-30

## 2024-10-30 PROBLEM — M35.0C: Status: ACTIVE | Noted: 2024-10-30

## 2024-10-30 PROBLEM — L10.2 PEMPHIGUS FOLIACEOUS: Status: ACTIVE | Noted: 2024-10-30

## 2024-10-30 PROBLEM — Z79.52 CURRENT CHRONIC USE OF SYSTEMIC STEROIDS: Status: ACTIVE | Noted: 2024-10-30

## 2024-10-30 LAB
ALBUMIN SERPL-MCNC: 2.9 G/DL (ref 3.2–4.6)
ALBUMIN/GLOB SERPL: 0.8 (ref 1–1.9)
ALP SERPL-CCNC: 113 U/L (ref 35–104)
ALT SERPL-CCNC: 18 U/L (ref 8–45)
ANION GAP SERPL CALC-SCNC: 11 MMOL/L (ref 7–16)
AST SERPL-CCNC: 22 U/L (ref 15–37)
BASOPHILS # BLD: 0 K/UL (ref 0–0.2)
BASOPHILS NFR BLD: 0 % (ref 0–2)
BILIRUB SERPL-MCNC: 1.3 MG/DL (ref 0–1.2)
BUN SERPL-MCNC: 8 MG/DL (ref 8–23)
CALCIUM SERPL-MCNC: 9.2 MG/DL (ref 8.8–10.2)
CHLORIDE SERPL-SCNC: 99 MMOL/L (ref 98–107)
CO2 SERPL-SCNC: 30 MMOL/L (ref 20–29)
CREAT SERPL-MCNC: 0.76 MG/DL (ref 0.6–1.1)
DIFFERENTIAL METHOD BLD: ABNORMAL
EOSINOPHIL # BLD: 0.4 K/UL (ref 0–0.8)
EOSINOPHIL NFR BLD: 5 % (ref 0.5–7.8)
ERYTHROCYTE [DISTWIDTH] IN BLOOD BY AUTOMATED COUNT: 21 % (ref 11.9–14.6)
GLOBULIN SER CALC-MCNC: 3.7 G/DL (ref 2.3–3.5)
GLUCOSE SERPL-MCNC: 128 MG/DL (ref 70–99)
HCT VFR BLD AUTO: 45.2 % (ref 35.8–46.3)
HGB BLD-MCNC: 13.3 G/DL (ref 11.7–15.4)
IMM GRANULOCYTES # BLD AUTO: 0.1 K/UL (ref 0–0.5)
IMM GRANULOCYTES NFR BLD AUTO: 1 % (ref 0–5)
LACTATE SERPL-SCNC: 1.4 MMOL/L (ref 0.5–2)
LYMPHOCYTES # BLD: 1.4 K/UL (ref 0.5–4.6)
LYMPHOCYTES NFR BLD: 15 % (ref 13–44)
MCH RBC QN AUTO: 26.7 PG (ref 26.1–32.9)
MCHC RBC AUTO-ENTMCNC: 29.4 G/DL (ref 31.4–35)
MCV RBC AUTO: 90.8 FL (ref 82–102)
MONOCYTES # BLD: 1.1 K/UL (ref 0.1–1.3)
MONOCYTES NFR BLD: 12 % (ref 4–12)
NEUTS SEG # BLD: 6.7 K/UL (ref 1.7–8.2)
NEUTS SEG NFR BLD: 68 % (ref 43–78)
NRBC # BLD: 0 K/UL (ref 0–0.2)
PLATELET # BLD AUTO: 225 K/UL (ref 150–450)
PMV BLD AUTO: 10.3 FL (ref 9.4–12.3)
POTASSIUM SERPL-SCNC: 3.3 MMOL/L (ref 3.5–5.1)
PROT SERPL-MCNC: 6.6 G/DL (ref 6.3–8.2)
RBC # BLD AUTO: 4.98 M/UL (ref 4.05–5.2)
SODIUM SERPL-SCNC: 139 MMOL/L (ref 136–145)
WBC # BLD AUTO: 9.8 K/UL (ref 4.3–11.1)

## 2024-10-30 PROCEDURE — 1100000000 HC RM PRIVATE

## 2024-10-30 PROCEDURE — 2500000003 HC RX 250 WO HCPCS: Performed by: EMERGENCY MEDICINE

## 2024-10-30 PROCEDURE — 80053 COMPREHEN METABOLIC PANEL: CPT

## 2024-10-30 PROCEDURE — 96365 THER/PROPH/DIAG IV INF INIT: CPT

## 2024-10-30 PROCEDURE — 71045 X-RAY EXAM CHEST 1 VIEW: CPT

## 2024-10-30 PROCEDURE — 6370000000 HC RX 637 (ALT 250 FOR IP): Performed by: EMERGENCY MEDICINE

## 2024-10-30 PROCEDURE — 2580000003 HC RX 258: Performed by: EMERGENCY MEDICINE

## 2024-10-30 PROCEDURE — 85025 COMPLETE CBC W/AUTO DIFF WBC: CPT

## 2024-10-30 PROCEDURE — 94640 AIRWAY INHALATION TREATMENT: CPT

## 2024-10-30 PROCEDURE — 71260 CT THORAX DX C+: CPT

## 2024-10-30 PROCEDURE — 83605 ASSAY OF LACTIC ACID: CPT

## 2024-10-30 PROCEDURE — 94761 N-INVAS EAR/PLS OXIMETRY MLT: CPT

## 2024-10-30 PROCEDURE — 99285 EMERGENCY DEPT VISIT HI MDM: CPT

## 2024-10-30 PROCEDURE — 6360000004 HC RX CONTRAST MEDICATION: Performed by: EMERGENCY MEDICINE

## 2024-10-30 PROCEDURE — 2700000000 HC OXYGEN THERAPY PER DAY

## 2024-10-30 RX ORDER — DULOXETIN HYDROCHLORIDE 60 MG/1
60 CAPSULE, DELAYED RELEASE ORAL DAILY
Status: DISCONTINUED | OUTPATIENT
Start: 2024-10-31 | End: 2024-11-13 | Stop reason: HOSPADM

## 2024-10-30 RX ORDER — IPRATROPIUM BROMIDE AND ALBUTEROL SULFATE 2.5; .5 MG/3ML; MG/3ML
1 SOLUTION RESPIRATORY (INHALATION)
Status: COMPLETED | OUTPATIENT
Start: 2024-10-30 | End: 2024-10-30

## 2024-10-30 RX ORDER — PREDNISONE 20 MG/1
40 TABLET ORAL DAILY
Status: DISCONTINUED | OUTPATIENT
Start: 2024-10-30 | End: 2024-11-02

## 2024-10-30 RX ORDER — DOXYCYCLINE 100 MG/1
100 CAPSULE ORAL EVERY 12 HOURS SCHEDULED
Status: DISCONTINUED | OUTPATIENT
Start: 2024-10-31 | End: 2024-11-01

## 2024-10-30 RX ORDER — POTASSIUM CHLORIDE 1500 MG/1
40 TABLET, EXTENDED RELEASE ORAL PRN
Status: DISCONTINUED | OUTPATIENT
Start: 2024-10-30 | End: 2024-11-13 | Stop reason: HOSPADM

## 2024-10-30 RX ORDER — BISACODYL 10 MG
10 SUPPOSITORY, RECTAL RECTAL DAILY PRN
Status: DISCONTINUED | OUTPATIENT
Start: 2024-10-30 | End: 2024-11-13 | Stop reason: HOSPADM

## 2024-10-30 RX ORDER — LISINOPRIL 20 MG/1
20 TABLET ORAL DAILY
Status: DISCONTINUED | OUTPATIENT
Start: 2024-10-31 | End: 2024-11-13 | Stop reason: HOSPADM

## 2024-10-30 RX ORDER — ONDANSETRON 4 MG/1
4 TABLET, ORALLY DISINTEGRATING ORAL EVERY 8 HOURS PRN
Status: DISCONTINUED | OUTPATIENT
Start: 2024-10-30 | End: 2024-11-13 | Stop reason: HOSPADM

## 2024-10-30 RX ORDER — SODIUM CHLORIDE 9 MG/ML
INJECTION, SOLUTION INTRAVENOUS PRN
Status: DISCONTINUED | OUTPATIENT
Start: 2024-10-30 | End: 2024-11-13 | Stop reason: HOSPADM

## 2024-10-30 RX ORDER — ENOXAPARIN SODIUM 100 MG/ML
40 INJECTION SUBCUTANEOUS DAILY
Status: DISCONTINUED | OUTPATIENT
Start: 2024-10-31 | End: 2024-11-13 | Stop reason: HOSPADM

## 2024-10-30 RX ORDER — SODIUM CHLORIDE 0.9 % (FLUSH) 0.9 %
5-40 SYRINGE (ML) INJECTION EVERY 12 HOURS SCHEDULED
Status: DISCONTINUED | OUTPATIENT
Start: 2024-10-30 | End: 2024-11-13 | Stop reason: HOSPADM

## 2024-10-30 RX ORDER — HYDROMORPHONE HYDROCHLORIDE 1 MG/ML
1 INJECTION, SOLUTION INTRAMUSCULAR; INTRAVENOUS; SUBCUTANEOUS ONCE
Status: DISCONTINUED | OUTPATIENT
Start: 2024-10-30 | End: 2024-11-01

## 2024-10-30 RX ORDER — POTASSIUM CHLORIDE 7.45 MG/ML
10 INJECTION INTRAVENOUS PRN
Status: DISCONTINUED | OUTPATIENT
Start: 2024-10-30 | End: 2024-11-13 | Stop reason: HOSPADM

## 2024-10-30 RX ORDER — FERROUS SULFATE 325(65) MG
325 TABLET ORAL 2 TIMES DAILY WITH MEALS
Status: DISCONTINUED | OUTPATIENT
Start: 2024-10-31 | End: 2024-11-13 | Stop reason: HOSPADM

## 2024-10-30 RX ORDER — POTASSIUM CHLORIDE 7.45 MG/ML
10 INJECTION INTRAVENOUS PRN
Status: DISCONTINUED | OUTPATIENT
Start: 2024-10-30 | End: 2024-10-30 | Stop reason: SDUPTHER

## 2024-10-30 RX ORDER — MAGNESIUM HYDROXIDE/ALUMINUM HYDROXICE/SIMETHICONE 120; 1200; 1200 MG/30ML; MG/30ML; MG/30ML
30 SUSPENSION ORAL EVERY 6 HOURS PRN
Status: DISCONTINUED | OUTPATIENT
Start: 2024-10-30 | End: 2024-11-13 | Stop reason: HOSPADM

## 2024-10-30 RX ORDER — MAGNESIUM SULFATE IN WATER 40 MG/ML
2000 INJECTION, SOLUTION INTRAVENOUS PRN
Status: DISCONTINUED | OUTPATIENT
Start: 2024-10-30 | End: 2024-11-13 | Stop reason: HOSPADM

## 2024-10-30 RX ORDER — SODIUM CHLORIDE 0.9 % (FLUSH) 0.9 %
5-40 SYRINGE (ML) INJECTION PRN
Status: DISCONTINUED | OUTPATIENT
Start: 2024-10-30 | End: 2024-11-13 | Stop reason: HOSPADM

## 2024-10-30 RX ORDER — ONDANSETRON 2 MG/ML
4 INJECTION INTRAMUSCULAR; INTRAVENOUS EVERY 6 HOURS PRN
Status: DISCONTINUED | OUTPATIENT
Start: 2024-10-30 | End: 2024-11-13 | Stop reason: HOSPADM

## 2024-10-30 RX ORDER — IPRATROPIUM BROMIDE AND ALBUTEROL SULFATE 2.5; .5 MG/3ML; MG/3ML
1 SOLUTION RESPIRATORY (INHALATION) EVERY 4 HOURS PRN
Status: DISCONTINUED | OUTPATIENT
Start: 2024-10-30 | End: 2024-11-04

## 2024-10-30 RX ORDER — DAPSONE 25 MG/1
100 TABLET ORAL DAILY
Status: DISCONTINUED | OUTPATIENT
Start: 2024-10-31 | End: 2024-11-05

## 2024-10-30 RX ORDER — LANOLIN ALCOHOL/MO/W.PET/CERES
3 CREAM (GRAM) TOPICAL NIGHTLY
Status: DISCONTINUED | OUTPATIENT
Start: 2024-10-30 | End: 2024-11-13 | Stop reason: HOSPADM

## 2024-10-30 RX ORDER — POLYETHYLENE GLYCOL 3350 17 G/17G
17 POWDER, FOR SOLUTION ORAL DAILY PRN
Status: DISCONTINUED | OUTPATIENT
Start: 2024-10-30 | End: 2024-11-13 | Stop reason: HOSPADM

## 2024-10-30 RX ORDER — HYDROXYCHLOROQUINE SULFATE 200 MG/1
200 TABLET, FILM COATED ORAL DAILY
Status: DISCONTINUED | OUTPATIENT
Start: 2024-10-31 | End: 2024-11-13 | Stop reason: HOSPADM

## 2024-10-30 RX ORDER — IOPAMIDOL 755 MG/ML
100 INJECTION, SOLUTION INTRAVASCULAR
Status: COMPLETED | OUTPATIENT
Start: 2024-10-30 | End: 2024-10-30

## 2024-10-30 RX ADMIN — IPRATROPIUM BROMIDE AND ALBUTEROL SULFATE 1 DOSE: 2.5; .5 SOLUTION RESPIRATORY (INHALATION) at 17:49

## 2024-10-30 RX ADMIN — DOXYCYCLINE 100 MG: 100 INJECTION, POWDER, LYOPHILIZED, FOR SOLUTION INTRAVENOUS at 17:29

## 2024-10-30 RX ADMIN — IOPAMIDOL 100 ML: 755 INJECTION, SOLUTION INTRAVENOUS at 20:32

## 2024-10-30 ASSESSMENT — PAIN - FUNCTIONAL ASSESSMENT: PAIN_FUNCTIONAL_ASSESSMENT: 0-10

## 2024-10-30 ASSESSMENT — PAIN SCALES - GENERAL
PAINLEVEL_OUTOF10: 7
PAINLEVEL_OUTOF10: 0

## 2024-10-30 NOTE — TELEPHONE ENCOUNTER
Call back to patient and caregiver Leanna answered.  Explained the direct admission process and patient's caregiver VU.  They are set up to be direct admitted tomorrow 10/31, pending [facility]'s bed availability.  Encouraged them to give our office a call if they do not hear from us or [facility] bed board by 12pm tomorrow.  They VU and appreciated call back.

## 2024-10-30 NOTE — ED TRIAGE NOTES
Arrives via [facility] from home.     Dermatologist called called EMS to be evaluated.   Pt hx of Pemphigus Foliaceous

## 2024-10-30 NOTE — TELEPHONE ENCOUNTER
Patients caregiver called and stated patient was supposed to start treatment tomorrow and has not heard any dates or times. Please advise on when patient needs treatment. Patients caregiver Leanna is requesting a call back regarding this.

## 2024-10-31 ENCOUNTER — CLINICAL DOCUMENTATION (OUTPATIENT)
Dept: ONCOLOGY | Age: 69
End: 2024-10-31

## 2024-10-31 DIAGNOSIS — Z79.899 HIGH RISK MEDICATION USE: ICD-10-CM

## 2024-10-31 DIAGNOSIS — Z85.3 HX OF BREAST CANCER: Primary | ICD-10-CM

## 2024-10-31 DIAGNOSIS — L10.2 PEMPHIGUS FOLIACEUS: ICD-10-CM

## 2024-10-31 PROBLEM — Z91.199 NONCOMPLIANCE: Status: ACTIVE | Noted: 2024-10-31

## 2024-10-31 PROBLEM — J96.21 ACUTE ON CHRONIC HYPOXIC RESPIRATORY FAILURE: Status: ACTIVE | Noted: 2024-10-31

## 2024-10-31 LAB
ALBUMIN SERPL-MCNC: 2.7 G/DL (ref 3.2–4.6)
ALBUMIN/GLOB SERPL: 0.7 (ref 1–1.9)
ALP SERPL-CCNC: 105 U/L (ref 35–104)
ALT SERPL-CCNC: 14 U/L (ref 8–45)
ANION GAP SERPL CALC-SCNC: 11 MMOL/L (ref 7–16)
AST SERPL-CCNC: 22 U/L (ref 15–37)
B PERT DNA SPEC QL NAA+PROBE: NOT DETECTED
BASOPHILS # BLD: 0 K/UL (ref 0–0.2)
BASOPHILS NFR BLD: 0 % (ref 0–2)
BILIRUB SERPL-MCNC: 1.1 MG/DL (ref 0–1.2)
BORDETELLA PARAPERTUSSIS BY PCR: NOT DETECTED
BUN SERPL-MCNC: 11 MG/DL (ref 8–23)
C PNEUM DNA SPEC QL NAA+PROBE: NOT DETECTED
CALCIUM SERPL-MCNC: 8.9 MG/DL (ref 8.8–10.2)
CHLORIDE SERPL-SCNC: 100 MMOL/L (ref 98–107)
CO2 SERPL-SCNC: 25 MMOL/L (ref 20–29)
CREAT SERPL-MCNC: 0.75 MG/DL (ref 0.6–1.1)
DIFFERENTIAL METHOD BLD: ABNORMAL
EOSINOPHIL # BLD: 0.1 K/UL (ref 0–0.8)
EOSINOPHIL NFR BLD: 1 % (ref 0.5–7.8)
ERYTHROCYTE [DISTWIDTH] IN BLOOD BY AUTOMATED COUNT: 20.3 % (ref 11.9–14.6)
FLUAV SUBTYP SPEC NAA+PROBE: NOT DETECTED
FLUBV RNA SPEC QL NAA+PROBE: NOT DETECTED
GLOBULIN SER CALC-MCNC: 3.7 G/DL (ref 2.3–3.5)
GLUCOSE SERPL-MCNC: 148 MG/DL (ref 70–99)
HADV DNA SPEC QL NAA+PROBE: NOT DETECTED
HCOV 229E RNA SPEC QL NAA+PROBE: NOT DETECTED
HCOV HKU1 RNA SPEC QL NAA+PROBE: NOT DETECTED
HCOV NL63 RNA SPEC QL NAA+PROBE: NOT DETECTED
HCOV OC43 RNA SPEC QL NAA+PROBE: NOT DETECTED
HCT VFR BLD AUTO: 43.6 % (ref 35.8–46.3)
HGB BLD-MCNC: 12.6 G/DL (ref 11.7–15.4)
HMPV RNA SPEC QL NAA+PROBE: NOT DETECTED
HPIV1 RNA SPEC QL NAA+PROBE: NOT DETECTED
HPIV2 RNA SPEC QL NAA+PROBE: NOT DETECTED
HPIV3 RNA SPEC QL NAA+PROBE: NOT DETECTED
HPIV4 RNA SPEC QL NAA+PROBE: NOT DETECTED
IMM GRANULOCYTES # BLD AUTO: 0.1 K/UL (ref 0–0.5)
IMM GRANULOCYTES NFR BLD AUTO: 1 % (ref 0–5)
LYMPHOCYTES # BLD: 1 K/UL (ref 0.5–4.6)
LYMPHOCYTES NFR BLD: 10 % (ref 13–44)
M PNEUMO DNA SPEC QL NAA+PROBE: NOT DETECTED
MCH RBC QN AUTO: 26.6 PG (ref 26.1–32.9)
MCHC RBC AUTO-ENTMCNC: 28.9 G/DL (ref 31.4–35)
MCV RBC AUTO: 92 FL (ref 82–102)
MONOCYTES # BLD: 0.3 K/UL (ref 0.1–1.3)
MONOCYTES NFR BLD: 3 % (ref 4–12)
NEUTS SEG # BLD: 9.3 K/UL (ref 1.7–8.2)
NEUTS SEG NFR BLD: 86 % (ref 43–78)
NRBC # BLD: 0 K/UL (ref 0–0.2)
PLATELET # BLD AUTO: 209 K/UL (ref 150–450)
PMV BLD AUTO: 10.1 FL (ref 9.4–12.3)
POTASSIUM SERPL-SCNC: 4.3 MMOL/L (ref 3.5–5.1)
PROT SERPL-MCNC: 6.4 G/DL (ref 6.3–8.2)
RBC # BLD AUTO: 4.74 M/UL (ref 4.05–5.2)
RSV RNA SPEC QL NAA+PROBE: NOT DETECTED
RV+EV RNA SPEC QL NAA+PROBE: NOT DETECTED
SARS-COV-2 RNA RESP QL NAA+PROBE: NOT DETECTED
SODIUM SERPL-SCNC: 136 MMOL/L (ref 136–145)
WBC # BLD AUTO: 10.9 K/UL (ref 4.3–11.1)

## 2024-10-31 PROCEDURE — 99223 1ST HOSP IP/OBS HIGH 75: CPT | Performed by: INTERNAL MEDICINE

## 2024-10-31 PROCEDURE — 6360000002 HC RX W HCPCS

## 2024-10-31 PROCEDURE — APPSS60 APP SPLIT SHARED TIME 46-60 MINUTES: Performed by: NURSE PRACTITIONER

## 2024-10-31 PROCEDURE — 2580000003 HC RX 258: Performed by: FAMILY MEDICINE

## 2024-10-31 PROCEDURE — 5A0955A ASSISTANCE WITH RESPIRATORY VENTILATION, GREATER THAN 96 CONSECUTIVE HOURS, HIGH NASAL FLOW/VELOCITY: ICD-10-PCS | Performed by: INTERNAL MEDICINE

## 2024-10-31 PROCEDURE — 85025 COMPLETE CBC W/AUTO DIFF WBC: CPT

## 2024-10-31 PROCEDURE — 2580000003 HC RX 258

## 2024-10-31 PROCEDURE — 80053 COMPREHEN METABOLIC PANEL: CPT

## 2024-10-31 PROCEDURE — 94640 AIRWAY INHALATION TREATMENT: CPT

## 2024-10-31 PROCEDURE — 6370000000 HC RX 637 (ALT 250 FOR IP)

## 2024-10-31 PROCEDURE — 6360000002 HC RX W HCPCS: Performed by: FAMILY MEDICINE

## 2024-10-31 PROCEDURE — 94761 N-INVAS EAR/PLS OXIMETRY MLT: CPT

## 2024-10-31 PROCEDURE — 36415 COLL VENOUS BLD VENIPUNCTURE: CPT

## 2024-10-31 PROCEDURE — 2500000003 HC RX 250 WO HCPCS: Performed by: FAMILY MEDICINE

## 2024-10-31 PROCEDURE — 2700000000 HC OXYGEN THERAPY PER DAY

## 2024-10-31 PROCEDURE — 1100000000 HC RM PRIVATE

## 2024-10-31 PROCEDURE — 0202U NFCT DS 22 TRGT SARS-COV-2: CPT

## 2024-10-31 RX ORDER — MORPHINE SULFATE 2 MG/ML
2 INJECTION, SOLUTION INTRAMUSCULAR; INTRAVENOUS ONCE
Status: COMPLETED | OUTPATIENT
Start: 2024-10-31 | End: 2024-10-31

## 2024-10-31 RX ADMIN — DOXYCYCLINE HYCLATE 100 MG: 100 CAPSULE ORAL at 05:12

## 2024-10-31 RX ADMIN — Medication 3 MG: at 20:34

## 2024-10-31 RX ADMIN — PREDNISONE 40 MG: 20 TABLET ORAL at 12:38

## 2024-10-31 RX ADMIN — DOXYCYCLINE HYCLATE 100 MG: 100 CAPSULE ORAL at 16:43

## 2024-10-31 RX ADMIN — PREGABALIN 200 MG: 50 CAPSULE ORAL at 20:34

## 2024-10-31 RX ADMIN — ARFORMOTEROL TARTRATE: 15 SOLUTION RESPIRATORY (INHALATION) at 08:03

## 2024-10-31 RX ADMIN — MORPHINE SULFATE 2 MG: 2 INJECTION, SOLUTION INTRAMUSCULAR; INTRAVENOUS at 06:18

## 2024-10-31 RX ADMIN — Medication 3 MG: at 01:01

## 2024-10-31 RX ADMIN — SODIUM CHLORIDE, PRESERVATIVE FREE 10 ML: 5 INJECTION INTRAVENOUS at 20:52

## 2024-10-31 RX ADMIN — DULOXETINE HYDROCHLORIDE 60 MG: 60 CAPSULE, DELAYED RELEASE ORAL at 12:38

## 2024-10-31 RX ADMIN — SODIUM CHLORIDE, PRESERVATIVE FREE 10 ML: 5 INJECTION INTRAVENOUS at 06:45

## 2024-10-31 RX ADMIN — DAPSONE 100 MG: 25 TABLET ORAL at 12:38

## 2024-10-31 RX ADMIN — PREGABALIN 200 MG: 50 CAPSULE ORAL at 01:37

## 2024-10-31 RX ADMIN — SODIUM CHLORIDE, PRESERVATIVE FREE 10 ML: 5 INJECTION INTRAVENOUS at 01:02

## 2024-10-31 RX ADMIN — FERROUS SULFATE TAB 325 MG (65 MG ELEMENTAL FE) 325 MG: 325 (65 FE) TAB at 16:43

## 2024-10-31 RX ADMIN — HYDROXYCHLOROQUINE SULFATE 200 MG: 200 TABLET ORAL at 13:09

## 2024-10-31 RX ADMIN — ENOXAPARIN SODIUM 40 MG: 100 INJECTION SUBCUTANEOUS at 12:39

## 2024-10-31 RX ADMIN — SODIUM CHLORIDE 1 MG: 9 INJECTION INTRAMUSCULAR; INTRAVENOUS; SUBCUTANEOUS at 06:50

## 2024-10-31 RX ADMIN — PREDNISONE 40 MG: 20 TABLET ORAL at 01:37

## 2024-10-31 RX ADMIN — ARFORMOTEROL TARTRATE: 15 SOLUTION RESPIRATORY (INHALATION) at 19:14

## 2024-10-31 ASSESSMENT — PAIN DESCRIPTION - DESCRIPTORS: DESCRIPTORS: SHARP

## 2024-10-31 ASSESSMENT — PAIN SCALES - GENERAL
PAINLEVEL_OUTOF10: 0
PAINLEVEL_OUTOF10: 8

## 2024-10-31 ASSESSMENT — PAIN DESCRIPTION - LOCATION: LOCATION: BREAST

## 2024-10-31 ASSESSMENT — PAIN DESCRIPTION - ORIENTATION: ORIENTATION: MID

## 2024-10-31 NOTE — H&P
for the past 24 hrs:   Temp Pulse Resp BP SpO2   10/30/24 2233 -- -- -- -- 91 %   10/30/24 2230 -- -- -- 126/62 --   10/30/24 2225 -- -- -- -- (!) 81 %   10/30/24 2111 -- -- -- (!) 135/59 --   10/30/24 2019 -- -- -- -- 90 %   10/30/24 2018 -- -- -- 119/61 90 %   10/30/24 1932 -- -- -- -- 91 %   10/30/24 1930 -- -- -- -- 91 %   10/30/24 1929 -- -- -- -- 92 %   10/30/24 1926 -- -- -- -- (!) 89 %   10/30/24 1859 -- -- -- -- 90 %   10/30/24 1844 -- -- -- -- 92 %   10/30/24 1750 -- 88 18 -- 93 %   10/30/24 1714 -- -- -- 131/85 96 %   10/30/24 1709 -- -- -- (!) 159/77 93 %   10/30/24 1703 -- -- 16 -- 94 %   10/30/24 1701 98.2 °F (36.8 °C) 90 -- -- (!) 80 %       Oxygen Therapy  SpO2: 91 %  Pulse via Oximetry: 87 beats per minute  O2 Device: Nasal cannula  O2 Flow Rate (L/min): 4 L/min    Estimated body mass index is 32.03 kg/m² as calculated from the following:    Height as of this encounter: 1.524 m (5').    Weight as of this encounter: 74.4 kg (164 lb).    Intake/Output Summary (Last 24 hours) at 10/30/2024 2253  Last data filed at 10/30/2024 1918  Gross per 24 hour   Intake 99.49 ml   Output --   Net 99.49 ml         Physical Exam:  Patient seen and examined on 10/30/2024  GEN: Alert and answers questions appropriately. Significant distress due to pain.   CV: Regular rate and rhythm.  No lower extremity edema.  PULM: On 5 L NC.  Nonlabored respirations.  No crackles or wheezing.  GI: Abdomen soft, nontender and nondistended.  EXT: Numerous blistering lesions appreciated over chest wall with surround erythema and warmth. Very tender to palpation. Yellow drainage appreciated on shirt.   PSYCH: Appropriate mood and affect.     Orders Placed This Encounter   Medications    doxycycline (VIBRAMYCIN) 100 mg in sodium chloride 0.9 % 100 mL IVPB (mini-bag)     Order Specific Question:   Antimicrobial Indications     Answer:   Skin and Soft Tissue Infection    ipratropium 0.5 mg-albuterol 2.5 mg (DUONEB) nebulizer solution 1

## 2024-10-31 NOTE — PLAN OF CARE
Problem: Discharge Planning  Goal: Discharge to home or other facility with appropriate resources  Outcome: Progressing     Problem: Pain  Goal: Verbalizes/displays adequate comfort level or baseline comfort level  Outcome: Progressing     Problem: Skin/Tissue Integrity  Goal: Absence of new skin breakdown  Outcome: Progressing     Problem: Safety - Adult  Goal: Free from fall injury  Outcome: Progressing

## 2024-10-31 NOTE — FLOWSHEET NOTE
10/31/24 0545 10/31/24 0548 10/31/24 0551   Oxygen Therapy   SpO2 (!) 86 % (!) 87 % (!) 88 %   O2 Device High flow nasal cannula High flow nasal cannula High flow nasal cannula   O2 Flow Rate (L/min) 8 L/min 10 L/min 12 L/min      10/31/24 0600   Oxygen Therapy   SpO2 92 %   O2 Device Non-rebreather mask   O2 Flow Rate (L/min) 15 L/min       Pt arrived to floor. See above sats.    0621: RT at bedside placing pt on airvo, morphine given for pain per MD Boykin    Pt screaming and refusing care upon admission. Supervisor Verónica called to room to assist in bed bath as pt was visibly soiled. See previous skin note on areas that have blisters/ wounds. Antifungal cream applied.    Pt resting in bed. No needs voiced at this time.

## 2024-10-31 NOTE — CARE COORDINATION
ASSESSMENT NOTE    Attending Physician: Chandan Castillo MD  Admit Problem: Cellulitis of skin [L03.90]  Acute hypoxic respiratory failure [J96.01]  Acute on chronic hypoxic respiratory failure [J96.21]  Date/Time of Admission: 10/30/2024  4:54 PM  Problem List:  Patient Active Problem List   Diagnosis    COPD with acute exacerbation (HCC)    Rash/skin eruption    Hypertension    Dry mouth    Dry eyes    Disorder of lipid metabolism    Coagulation test abnormality    Chronic coronary artery disease    Hypoproteinemia (HCC)    Eosinophilia    GERD (gastroesophageal reflux disease)    Cigarette nicotine dependence with nicotine-induced disorder    History of right breast cancer    Dog bite of lower leg, right, initial encounter    SLE (systemic lupus erythematosus) (HCC)    H/O Sjogren's disease (HCC)    Acute respiratory failure with hypoxia    Lung mass    Autoimmune disease (HCC)    COPD exacerbation (HCC)    Pustular rash    Bilateral lower leg cellulitis    Cellulitis due to MRSA    Delirium    Mood disorder (HCC)    Community acquired pneumonia of right lower lobe of lung    Lung nodules    Non-compliance    Pemphigus foliaceus    Chronic respiratory failure with hypoxia    Smoking history    High risk medication use    Coronary artery calcification seen on CT scan    At risk for bone density loss    Encounter for osteoporosis screening in asymptomatic postmenopausal patient    Transportation insecurity    Encounter for screening mammogram for malignant neoplasm of breast    Hx of breast cancer    Pemphigus foliaceous    Acute hypoxic respiratory failure    Cellulitis of chest wall    Other emphysema (HCC)    Sjogren syndrome with dental involvement (HCC)    Current chronic use of systemic steroids       Service Assessment  Patient Orientation Alert and Oriented, Person, Place, Self   Cognition Alert   History Provided By Patient, Medical Record   Primary Caregiver Self   Accompanied By/Relationship N/A

## 2024-10-31 NOTE — PLAN OF CARE
-0650-pt kept removing oxygen this AM despite multiple staff members educating pt on ts importance.  Oxygen dropping into the low 80s on RA.  pt on continuous pulse ox.  VS stable.  Night shift RN administered ativan.   -0900: pt has been resting soundly, oxygen 90-92% on Airvo. Respirations even and unlabored.  -Lisinopril held this AM for soft BP in the 90s.   -no pain or anxiety meds given on day shift.   Patient has slept this AM then on and off this evening. Pt A&O x3-4 while awake.   -Resp viral panel sent per order.     Patient educated on new medication Doxycyline and Dapsone . Patient rounded on hourly by myself or patient assistant and encouraged to call with any needs. No signs of distress noted. Bed low, locked, call bell within reach.   BSSR given to SALENA Pacheco RN    Problem: Discharge Planning  Goal: Discharge to home or other facility with appropriate resources  10/31/2024 0733 by Renata Steen RN  Outcome: Progressing  10/31/2024 0001 by Loren Patel RN  Outcome: Progressing     Problem: Skin/Tissue Integrity  Goal: Absence of new skin breakdown  Description: 1.  Monitor for areas of redness and/or skin breakdown  2.  Assess vascular access sites hourly  3.  Every 4-6 hours minimum:  Change oxygen saturation probe site  4.  Every 4-6 hours:  If on nasal continuous positive airway pressure, respiratory therapy assess nares and determine need for appliance change or resting period.  10/31/2024 0733 by Renata Steen RN  Outcome: Progressing  10/31/2024 0001 by Loren Patel RN  Outcome: Progressing     Problem: Safety - Adult  Goal: Free from fall injury  10/31/2024 0733 by Renata Steen RN  Outcome: Progressing  10/31/2024 0001 by Loren Patel RN  Outcome: Progressing     Problem: Confusion  Goal: Confusion, delirium, dementia, or psychosis is improved or at baseline  Description: INTERVENTIONS:  1. Assess for possible contributors to thought disturbance, including

## 2024-10-31 NOTE — PROGRESS NOTES
Sw received referral requesting assistance with transportation.  Pt is currently admitted however should pt require transportation for  [facility] appointment on 11/7/2024 she has medicaid.  Pt will qualify for transportation services through her insurance provider.  Sw will check with pt as her appointment date approaches.

## 2024-10-31 NOTE — ED NOTES
Pt desatted to 80% on 4LNC when sleeping. 02 increased to 6LNC and 02 sat increased to 91%, Dr. Zepeda aware. RT to place humidified high flow 6LNC     Rossi, Janee SEXTON RN  10/30/24 9519

## 2024-10-31 NOTE — ED NOTES
TRANSFER - OUT REPORT:    Verbal report given to Ralph MARTINO on Meli Blancas  being transferred to [place] [address] for routine progression of patient care       Report consisted of patient's Situation, Background, Assessment and   Recommendations(SBAR).     Information from the following report(s) ED SBAR was reviewed with the receiving nurse.    Baylis Fall Assessment:    Presents to emergency department  because of falls (Syncope, seizure, or loss of consciousness): No  Age > 70: Yes  Altered Mental Status, Intoxication with alcohol or substance confusion (Disorientation, impaired judgment, poor safety awaremess, or inability to follow instructions): No  Impaired Mobility: Ambulates or transfers with assistive devices or assistance; Unable to ambulate or transer.: No             Lines:   Peripheral IV 10/30/24 Right Antecubital (Active)        Opportunity for questions and clarification was provided.      Patient transported with:  Registered Janee Fontana RN  10/30/24 2543

## 2024-10-31 NOTE — WOUND CARE
Rash on chest with known pemphigus foliaceous, which often responds to an ointment such as Kenalog or Aquaphor. Noted she is already on Plaquenil, Dapsone and Prednisone.  If a bandage is desired would use petroleum gauze abd and paper tape with the ointments. Discussed with Charge nurse.  Available if needs arise, please call.

## 2024-11-01 ENCOUNTER — APPOINTMENT (OUTPATIENT)
Dept: NON INVASIVE DIAGNOSTICS | Age: 69
DRG: 189 | End: 2024-11-01
Attending: INTERNAL MEDICINE
Payer: MEDICARE

## 2024-11-01 PROBLEM — J43.8 OTHER EMPHYSEMA (HCC): Status: RESOLVED | Noted: 2024-10-30 | Resolved: 2024-11-01

## 2024-11-01 PROBLEM — I33.0 ACUTE BACTERIAL ENDOCARDITIS: Status: ACTIVE | Noted: 2024-11-01

## 2024-11-01 PROBLEM — J43.2 CENTRILOBULAR EMPHYSEMA (HCC): Status: ACTIVE | Noted: 2024-10-30

## 2024-11-01 LAB
ALBUMIN SERPL-MCNC: 2.6 G/DL (ref 3.2–4.6)
ALBUMIN/GLOB SERPL: 0.8 (ref 1–1.9)
ALP SERPL-CCNC: 97 U/L (ref 35–104)
ALT SERPL-CCNC: 14 U/L (ref 8–45)
ANION GAP SERPL CALC-SCNC: 14 MMOL/L (ref 7–16)
APPEARANCE UR: CLEAR
AST SERPL-CCNC: 25 U/L (ref 15–37)
BASOPHILS # BLD: 0 K/UL (ref 0–0.2)
BASOPHILS NFR BLD: 0 % (ref 0–2)
BILIRUB SERPL-MCNC: 0.6 MG/DL (ref 0–1.2)
BILIRUB UR QL: NEGATIVE
BUN SERPL-MCNC: 21 MG/DL (ref 8–23)
CALCIUM SERPL-MCNC: 9.1 MG/DL (ref 8.8–10.2)
CHLORIDE SERPL-SCNC: 100 MMOL/L (ref 98–107)
CO2 SERPL-SCNC: 23 MMOL/L (ref 20–29)
COLOR UR: NORMAL
CREAT SERPL-MCNC: 0.95 MG/DL (ref 0.6–1.1)
DIFFERENTIAL METHOD BLD: ABNORMAL
EOSINOPHIL # BLD: 0 K/UL (ref 0–0.8)
EOSINOPHIL NFR BLD: 0 % (ref 0.5–7.8)
ERYTHROCYTE [DISTWIDTH] IN BLOOD BY AUTOMATED COUNT: 19.8 % (ref 11.9–14.6)
GLOBULIN SER CALC-MCNC: 3.1 G/DL (ref 2.3–3.5)
GLUCOSE SERPL-MCNC: 153 MG/DL (ref 70–99)
GLUCOSE UR STRIP.AUTO-MCNC: NEGATIVE MG/DL
HCT VFR BLD AUTO: 41.8 % (ref 35.8–46.3)
HGB BLD-MCNC: 12 G/DL (ref 11.7–15.4)
HGB UR QL STRIP: NEGATIVE
IMM GRANULOCYTES # BLD AUTO: 0 K/UL (ref 0–0.5)
IMM GRANULOCYTES NFR BLD AUTO: 1 % (ref 0–5)
KETONES UR QL STRIP.AUTO: NEGATIVE MG/DL
LEUKOCYTE ESTERASE UR QL STRIP.AUTO: NEGATIVE
LYMPHOCYTES # BLD: 1.8 K/UL (ref 0.5–4.6)
LYMPHOCYTES NFR BLD: 21 % (ref 13–44)
MCH RBC QN AUTO: 26.9 PG (ref 26.1–32.9)
MCHC RBC AUTO-ENTMCNC: 28.7 G/DL (ref 31.4–35)
MCV RBC AUTO: 93.7 FL (ref 82–102)
MONOCYTES # BLD: 0.8 K/UL (ref 0.1–1.3)
MONOCYTES NFR BLD: 9 % (ref 4–12)
NEUTS SEG # BLD: 5.9 K/UL (ref 1.7–8.2)
NEUTS SEG NFR BLD: 69 % (ref 43–78)
NITRITE UR QL STRIP.AUTO: NEGATIVE
NRBC # BLD: 0.03 K/UL (ref 0–0.2)
PH UR STRIP: 6 (ref 5–9)
PLATELET # BLD AUTO: 220 K/UL (ref 150–450)
PMV BLD AUTO: 10.6 FL (ref 9.4–12.3)
POTASSIUM SERPL-SCNC: 3.7 MMOL/L (ref 3.5–5.1)
POTASSIUM SERPL-SCNC: 5.3 MMOL/L (ref 3.5–5.1)
PROT SERPL-MCNC: 5.7 G/DL (ref 6.3–8.2)
PROT UR STRIP-MCNC: NEGATIVE MG/DL
RBC # BLD AUTO: 4.46 M/UL (ref 4.05–5.2)
SODIUM SERPL-SCNC: 136 MMOL/L (ref 136–145)
SP GR UR REFRACTOMETRY: 1.01 (ref 1–1.02)
UROBILINOGEN UR QL STRIP.AUTO: 1 EU/DL (ref 0.2–1)
WBC # BLD AUTO: 8.6 K/UL (ref 4.3–11.1)

## 2024-11-01 PROCEDURE — 99233 SBSQ HOSP IP/OBS HIGH 50: CPT | Performed by: INTERNAL MEDICINE

## 2024-11-01 PROCEDURE — 97530 THERAPEUTIC ACTIVITIES: CPT

## 2024-11-01 PROCEDURE — 6370000000 HC RX 637 (ALT 250 FOR IP): Performed by: NURSE PRACTITIONER

## 2024-11-01 PROCEDURE — 97112 NEUROMUSCULAR REEDUCATION: CPT

## 2024-11-01 PROCEDURE — 85025 COMPLETE CBC W/AUTO DIFF WBC: CPT

## 2024-11-01 PROCEDURE — 87040 BLOOD CULTURE FOR BACTERIA: CPT

## 2024-11-01 PROCEDURE — 84132 ASSAY OF SERUM POTASSIUM: CPT

## 2024-11-01 PROCEDURE — 97165 OT EVAL LOW COMPLEX 30 MIN: CPT

## 2024-11-01 PROCEDURE — 94761 N-INVAS EAR/PLS OXIMETRY MLT: CPT

## 2024-11-01 PROCEDURE — 2700000000 HC OXYGEN THERAPY PER DAY

## 2024-11-01 PROCEDURE — 6360000002 HC RX W HCPCS

## 2024-11-01 PROCEDURE — 2580000003 HC RX 258: Performed by: NURSE PRACTITIONER

## 2024-11-01 PROCEDURE — 2580000003 HC RX 258

## 2024-11-01 PROCEDURE — 94640 AIRWAY INHALATION TREATMENT: CPT

## 2024-11-01 PROCEDURE — 97161 PT EVAL LOW COMPLEX 20 MIN: CPT

## 2024-11-01 PROCEDURE — 36415 COLL VENOUS BLD VENIPUNCTURE: CPT

## 2024-11-01 PROCEDURE — 1100000000 HC RM PRIVATE

## 2024-11-01 PROCEDURE — 80053 COMPREHEN METABOLIC PANEL: CPT

## 2024-11-01 PROCEDURE — 6370000000 HC RX 637 (ALT 250 FOR IP)

## 2024-11-01 PROCEDURE — 6360000002 HC RX W HCPCS: Performed by: NURSE PRACTITIONER

## 2024-11-01 PROCEDURE — 81003 URINALYSIS AUTO W/O SCOPE: CPT

## 2024-11-01 PROCEDURE — 93306 TTE W/DOPPLER COMPLETE: CPT

## 2024-11-01 RX ORDER — RIFAMPIN 300 MG/1
300 CAPSULE ORAL EVERY 12 HOURS SCHEDULED
Status: DISCONTINUED | OUTPATIENT
Start: 2024-11-01 | End: 2024-11-01

## 2024-11-01 RX ORDER — SODIUM CHLORIDE FOR INHALATION 3 %
4 VIAL, NEBULIZER (ML) INHALATION 2 TIMES DAILY
Status: DISCONTINUED | OUTPATIENT
Start: 2024-11-01 | End: 2024-11-05

## 2024-11-01 RX ADMIN — PREGABALIN 200 MG: 50 CAPSULE ORAL at 20:55

## 2024-11-01 RX ADMIN — ENOXAPARIN SODIUM 40 MG: 100 INJECTION SUBCUTANEOUS at 09:58

## 2024-11-01 RX ADMIN — VANCOMYCIN HYDROCHLORIDE: 1 INJECTION, POWDER, LYOPHILIZED, FOR SOLUTION INTRAVENOUS at 17:00

## 2024-11-01 RX ADMIN — DULOXETINE HYDROCHLORIDE 60 MG: 60 CAPSULE, DELAYED RELEASE ORAL at 09:56

## 2024-11-01 RX ADMIN — FERROUS SULFATE TAB 325 MG (65 MG ELEMENTAL FE) 325 MG: 325 (65 FE) TAB at 16:09

## 2024-11-01 RX ADMIN — FERROUS SULFATE TAB 325 MG (65 MG ELEMENTAL FE) 325 MG: 325 (65 FE) TAB at 09:56

## 2024-11-01 RX ADMIN — ARFORMOTEROL TARTRATE: 15 SOLUTION RESPIRATORY (INHALATION) at 08:41

## 2024-11-01 RX ADMIN — DOXYCYCLINE HYCLATE 100 MG: 100 CAPSULE ORAL at 05:21

## 2024-11-01 RX ADMIN — Medication 3 MG: at 20:55

## 2024-11-01 RX ADMIN — PREDNISONE 40 MG: 20 TABLET ORAL at 09:57

## 2024-11-01 RX ADMIN — CIPROFLOXACIN 750 MG: 500 TABLET, FILM COATED ORAL at 16:31

## 2024-11-01 RX ADMIN — HYDROXYCHLOROQUINE SULFATE 200 MG: 200 TABLET ORAL at 11:01

## 2024-11-01 RX ADMIN — PREGABALIN 200 MG: 50 CAPSULE ORAL at 09:56

## 2024-11-01 RX ADMIN — SODIUM CHLORIDE, PRESERVATIVE FREE 10 ML: 5 INJECTION INTRAVENOUS at 20:55

## 2024-11-01 RX ADMIN — DAPSONE 100 MG: 25 TABLET ORAL at 09:57

## 2024-11-01 NOTE — THERAPY EVALUATION
ACUTE OCCUPATIONAL THERAPY GOALS:   (Developed with and agreed upon by patient and/or caregiver.)  1) Patient will complete lower body bathing and dressing with INDEPENDENCE and adaptive equipment as needed.   2) Patient will complete toileting with INDEPENDENCE.   3) Patient will complete functional transfers with INDEPENDENCE and adaptive equipment as needed.   4) Patient will tolerate at least 25 minutes of OT activity with 1-2 rest breaks while maintaining O2 sats >90%.   5) Patient will verbalize at least 3 energy conservation technique to utilize during ADL/IADL.     Timeframe: 7 visits      OCCUPATIONAL THERAPY Initial Assessment and Daily Note       OT Visit Days: 1  Acknowledge Orders  Time  OT Charge Capture  Rehab Caseload Tracker      Meli Blancas is a 69 y.o. female   PRIMARY DIAGNOSIS: Acute hypoxic respiratory failure  Cellulitis of skin [L03.90]  Acute hypoxic respiratory failure [J96.01]  Acute on chronic hypoxic respiratory failure [J96.21]       Reason for Referral: Generalized Muscle Weakness (M62.81)  Difficulty in walking, Not elsewhere classified (R26.2)  Inpatient: Payor: [facility] MEDICARE / Plan: [facility] MEDICARE COMPLETE / Product Type: *No Product type* /     ASSESSMENT:     REHAB RECOMMENDATIONS:   Recommendation to date pending progress:  Setting:  Home Health Therapy    Equipment:    To Be Determined     ASSESSMENT:  Ms. Blancas is a 70 y/o female who presents with worsening blistering lesions. Pt with hx of pemphigus foliaceous and COPD; admitted with cellulitis and acute on chronic respiratory failure. At baseline pt lives alone, is independent with ADLs, and does not use any AD for mobility. Pt does get assistance with household tasks and with getting to appointments.    This date, pt presented supine and agreeable to session. Pt overall CGA/SBA for functional transfers and mobility of household distances. Pt easily fatigued with mobility as well as had SpO2 decrease but recovered

## 2024-11-01 NOTE — PLAN OF CARE
0735-  Pt agitated this Am and keep taking Airvo off.  Pt placed on 11L HF oxygen. Oxygen 90-94%. BP 89/64, pt denies symptoms.  Dr. Wallace this AM. Per MD, telma to give Lyrica but hold lisinopril.  Will encourage fluids and recheck BP after breakfast.     1135- BP checked but low,  pt asymptomatic.  Rechecked with new cuff on other arm.  BP in 150s, flowsheet edited to reflect.     Patient has remained A&O x 4. More awake and alert today. Patient educated on new medication Cipro and IV Doxy.   -BC x2  -UA sent per order  -pt aware of needing sputum culture, but has not coughed any sputum up.    Pain when moving,  pt did not request any pain medications. Patient rounded on hourly by myself or patient assistant and encouraged to call with any needs. No signs of distress noted. Bed low, locked, call bell within reach.     BSSR given to SALENA Lu RN    Problem: Pain  Goal: Verbalizes/displays adequate comfort level or baseline comfort level  11/1/2024 0735 by Renata Steen RN  Outcome: Progressing  10/31/2024 2204 by Tara Aly RN  Outcome: Progressing     Problem: Skin/Tissue Integrity  Goal: Absence of new skin breakdown  Description: 1.  Monitor for areas of redness and/or skin breakdown  2.  Assess vascular access sites hourly  3.  Every 4-6 hours minimum:  Change oxygen saturation probe site  4.  Every 4-6 hours:  If on nasal continuous positive airway pressure, respiratory therapy assess nares and determine need for appliance change or resting period.  11/1/2024 0735 by Renata Steen RN  Outcome: Progressing  10/31/2024 2204 by Tara Aly RN  Outcome: Progressing     Problem: Safety - Adult  Goal: Free from fall injury  11/1/2024 0735 by Renata Steen RN  Outcome: Progressing  10/31/2024 2204 by Tara Aly RN  Outcome: Progressing     Problem: Confusion  Goal: Confusion, delirium, dementia, or psychosis is improved or at baseline  Description: INTERVENTIONS:  1. Assess for possible

## 2024-11-02 PROBLEM — I38 ENDOCARDITIS: Status: RESOLVED | Noted: 2024-11-02 | Resolved: 2024-11-02

## 2024-11-02 PROBLEM — I38 ENDOCARDITIS: Status: ACTIVE | Noted: 2024-11-02

## 2024-11-02 LAB
ALBUMIN SERPL-MCNC: 2.4 G/DL (ref 3.2–4.6)
ALBUMIN/GLOB SERPL: 0.8 (ref 1–1.9)
ALP SERPL-CCNC: 83 U/L (ref 35–104)
ALT SERPL-CCNC: 8 U/L (ref 8–45)
ANION GAP SERPL CALC-SCNC: 9 MMOL/L (ref 7–16)
AST SERPL-CCNC: 20 U/L (ref 15–37)
BASOPHILS # BLD: 0 K/UL (ref 0–0.2)
BASOPHILS NFR BLD: 0 % (ref 0–2)
BILIRUB SERPL-MCNC: 0.5 MG/DL (ref 0–1.2)
BUN SERPL-MCNC: 20 MG/DL (ref 8–23)
CALCIUM SERPL-MCNC: 9 MG/DL (ref 8.8–10.2)
CHLORIDE SERPL-SCNC: 101 MMOL/L (ref 98–107)
CO2 SERPL-SCNC: 27 MMOL/L (ref 20–29)
CREAT SERPL-MCNC: 0.62 MG/DL (ref 0.6–1.1)
DIFFERENTIAL METHOD BLD: ABNORMAL
ECHO AO ASC DIAM: 2.9 CM
ECHO AO ASCENDING AORTA INDEX: 1.72 CM/M2
ECHO AO ROOT DIAM: 3 CM
ECHO AO ROOT INDEX: 1.78 CM/M2
ECHO AV AREA PEAK VELOCITY: 2.1 CM2
ECHO AV AREA VTI: 2 CM2
ECHO AV AREA/BSA PEAK VELOCITY: 1.2 CM2/M2
ECHO AV AREA/BSA VTI: 1.2 CM2/M2
ECHO AV MEAN GRADIENT: 7 MMHG
ECHO AV MEAN VELOCITY: 1.2 M/S
ECHO AV PEAK GRADIENT: 13 MMHG
ECHO AV PEAK VELOCITY: 1.8 M/S
ECHO AV VELOCITY RATIO: 0.78
ECHO AV VTI: 35.8 CM
ECHO BSA: 1.77 M2
ECHO EST RA PRESSURE: 15 MMHG
ECHO IVC PROX: 2.4 CM
ECHO LA AREA 2C: 15 CM2
ECHO LA AREA 4C: 13.2 CM2
ECHO LA DIAMETER INDEX: 2.13 CM/M2
ECHO LA DIAMETER: 3.6 CM
ECHO LA MAJOR AXIS: 5 CM
ECHO LA MINOR AXIS: 5 CM
ECHO LA TO AORTIC ROOT RATIO: 1.2
ECHO LA VOL BP: 32 ML (ref 22–52)
ECHO LA VOL MOD A2C: 38 ML (ref 22–52)
ECHO LA VOL MOD A4C: 28 ML (ref 22–52)
ECHO LA VOL/BSA BIPLANE: 19 ML/M2 (ref 16–34)
ECHO LA VOLUME INDEX MOD A2C: 22 ML/M2 (ref 16–34)
ECHO LA VOLUME INDEX MOD A4C: 17 ML/M2 (ref 16–34)
ECHO LV E' LATERAL VELOCITY: 10.1 CM/S
ECHO LV E' SEPTAL VELOCITY: 9.1 CM/S
ECHO LV EDV A2C: 83 ML
ECHO LV EDV A4C: 91 ML
ECHO LV EDV INDEX A4C: 54 ML/M2
ECHO LV EDV NDEX A2C: 49 ML/M2
ECHO LV EJECTION FRACTION A2C: 51 %
ECHO LV EJECTION FRACTION A4C: 58 %
ECHO LV EJECTION FRACTION BIPLANE: 55 % (ref 55–100)
ECHO LV ESV A2C: 40 ML
ECHO LV ESV A4C: 39 ML
ECHO LV ESV INDEX A2C: 24 ML/M2
ECHO LV ESV INDEX A4C: 23 ML/M2
ECHO LV FRACTIONAL SHORTENING: 30 % (ref 28–44)
ECHO LV INTERNAL DIMENSION DIASTOLE INDEX: 2.72 CM/M2
ECHO LV INTERNAL DIMENSION DIASTOLIC: 4.6 CM (ref 3.9–5.3)
ECHO LV INTERNAL DIMENSION SYSTOLIC INDEX: 1.89 CM/M2
ECHO LV INTERNAL DIMENSION SYSTOLIC: 3.2 CM
ECHO LV IVSD: 1.2 CM (ref 0.6–0.9)
ECHO LV MASS 2D: 205 G (ref 67–162)
ECHO LV MASS INDEX 2D: 121.3 G/M2 (ref 43–95)
ECHO LV POSTERIOR WALL DIASTOLIC: 1.2 CM (ref 0.6–0.9)
ECHO LV RELATIVE WALL THICKNESS RATIO: 0.52
ECHO LVOT AREA: 2.8 CM2
ECHO LVOT AV VTI INDEX: 0.69
ECHO LVOT DIAM: 1.9 CM
ECHO LVOT MEAN GRADIENT: 4 MMHG
ECHO LVOT PEAK GRADIENT: 7 MMHG
ECHO LVOT PEAK VELOCITY: 1.4 M/S
ECHO LVOT STROKE VOLUME INDEX: 41.4 ML/M2
ECHO LVOT SV: 70 ML
ECHO LVOT VTI: 24.7 CM
ECHO MV A VELOCITY: 1.61 M/S
ECHO MV AREA VTI: 2.3 CM2
ECHO MV E DECELERATION TIME (DT): 201 MS
ECHO MV E VELOCITY: 1.3 M/S
ECHO MV E/A RATIO: 0.81
ECHO MV E/E' LATERAL: 12.87
ECHO MV E/E' RATIO (AVERAGED): 13.58
ECHO MV E/E' SEPTAL: 14.29
ECHO MV LVOT VTI INDEX: 1.22
ECHO MV MAX VELOCITY: 1.4 M/S
ECHO MV MEAN GRADIENT: 3 MMHG
ECHO MV MEAN VELOCITY: 0.9 M/S
ECHO MV PEAK GRADIENT: 8 MMHG
ECHO MV VTI: 30.1 CM
ECHO PV ACCELERATION TIME (AT): 180 MS
ECHO PV MAX VELOCITY: 1.3 M/S
ECHO PV PEAK GRADIENT: 7 MMHG
ECHO RIGHT VENTRICULAR SYSTOLIC PRESSURE (RVSP): 33 MMHG
ECHO RV BASAL DIMENSION: 3 CM
ECHO RV FREE WALL PEAK S': 11.7 CM/S
ECHO RV INTERNAL DIMENSION: 2.7 CM
ECHO RV TAPSE: 1.9 CM (ref 1.7–?)
ECHO TV REGURGITANT MAX VELOCITY: 2.15 M/S
ECHO TV REGURGITANT PEAK GRADIENT: 18 MMHG
EOSINOPHIL # BLD: 0.2 K/UL (ref 0–0.8)
EOSINOPHIL NFR BLD: 3 % (ref 0.5–7.8)
ERYTHROCYTE [DISTWIDTH] IN BLOOD BY AUTOMATED COUNT: 19.2 % (ref 11.9–14.6)
GLOBULIN SER CALC-MCNC: 3.1 G/DL (ref 2.3–3.5)
GLUCOSE SERPL-MCNC: 122 MG/DL (ref 70–99)
HCT VFR BLD AUTO: 37.2 % (ref 35.8–46.3)
HGB BLD-MCNC: 10.7 G/DL (ref 11.7–15.4)
IMM GRANULOCYTES # BLD AUTO: 0.1 K/UL (ref 0–0.5)
IMM GRANULOCYTES NFR BLD AUTO: 1 % (ref 0–5)
LYMPHOCYTES # BLD: 1.5 K/UL (ref 0.5–4.6)
LYMPHOCYTES NFR BLD: 17 % (ref 13–44)
MCH RBC QN AUTO: 26.4 PG (ref 26.1–32.9)
MCHC RBC AUTO-ENTMCNC: 28.8 G/DL (ref 31.4–35)
MCV RBC AUTO: 91.6 FL (ref 82–102)
MONOCYTES # BLD: 0.7 K/UL (ref 0.1–1.3)
MONOCYTES NFR BLD: 8 % (ref 4–12)
NEUTS SEG # BLD: 6.2 K/UL (ref 1.7–8.2)
NEUTS SEG NFR BLD: 71 % (ref 43–78)
NRBC # BLD: 0 K/UL (ref 0–0.2)
PLATELET # BLD AUTO: 201 K/UL (ref 150–450)
PMV BLD AUTO: 10.2 FL (ref 9.4–12.3)
POTASSIUM SERPL-SCNC: 4.4 MMOL/L (ref 3.5–5.1)
PROT SERPL-MCNC: 5.5 G/DL (ref 6.3–8.2)
RBC # BLD AUTO: 4.06 M/UL (ref 4.05–5.2)
SODIUM SERPL-SCNC: 137 MMOL/L (ref 136–145)
VANCOMYCIN SERPL-MCNC: 17.8 UG/ML
WBC # BLD AUTO: 8.7 K/UL (ref 4.3–11.1)

## 2024-11-02 PROCEDURE — 2580000003 HC RX 258: Performed by: NURSE PRACTITIONER

## 2024-11-02 PROCEDURE — 1100000000 HC RM PRIVATE

## 2024-11-02 PROCEDURE — 85025 COMPLETE CBC W/AUTO DIFF WBC: CPT

## 2024-11-02 PROCEDURE — 94761 N-INVAS EAR/PLS OXIMETRY MLT: CPT

## 2024-11-02 PROCEDURE — 6370000000 HC RX 637 (ALT 250 FOR IP)

## 2024-11-02 PROCEDURE — 99233 SBSQ HOSP IP/OBS HIGH 50: CPT | Performed by: INTERNAL MEDICINE

## 2024-11-02 PROCEDURE — 94640 AIRWAY INHALATION TREATMENT: CPT

## 2024-11-02 PROCEDURE — 2700000000 HC OXYGEN THERAPY PER DAY

## 2024-11-02 PROCEDURE — 6360000002 HC RX W HCPCS

## 2024-11-02 PROCEDURE — 6370000000 HC RX 637 (ALT 250 FOR IP): Performed by: NURSE PRACTITIONER

## 2024-11-02 PROCEDURE — 80202 ASSAY OF VANCOMYCIN: CPT

## 2024-11-02 PROCEDURE — 80053 COMPREHEN METABOLIC PANEL: CPT

## 2024-11-02 PROCEDURE — 6370000000 HC RX 637 (ALT 250 FOR IP): Performed by: INTERNAL MEDICINE

## 2024-11-02 PROCEDURE — 93306 TTE W/DOPPLER COMPLETE: CPT | Performed by: INTERNAL MEDICINE

## 2024-11-02 PROCEDURE — 6360000002 HC RX W HCPCS: Performed by: INTERNAL MEDICINE

## 2024-11-02 PROCEDURE — 36415 COLL VENOUS BLD VENIPUNCTURE: CPT

## 2024-11-02 PROCEDURE — 6360000002 HC RX W HCPCS: Performed by: NURSE PRACTITIONER

## 2024-11-02 PROCEDURE — 2580000003 HC RX 258

## 2024-11-02 PROCEDURE — 2580000003 HC RX 258: Performed by: INTERNAL MEDICINE

## 2024-11-02 PROCEDURE — 6360000002 HC RX W HCPCS: Performed by: FAMILY MEDICINE

## 2024-11-02 RX ORDER — MORPHINE SULFATE 2 MG/ML
1 INJECTION, SOLUTION INTRAMUSCULAR; INTRAVENOUS ONCE
Status: COMPLETED | OUTPATIENT
Start: 2024-11-02 | End: 2024-11-02

## 2024-11-02 RX ADMIN — FERROUS SULFATE TAB 325 MG (65 MG ELEMENTAL FE) 325 MG: 325 (65 FE) TAB at 16:38

## 2024-11-02 RX ADMIN — ARFORMOTEROL TARTRATE: 15 SOLUTION RESPIRATORY (INHALATION) at 08:32

## 2024-11-02 RX ADMIN — ENOXAPARIN SODIUM 40 MG: 100 INJECTION SUBCUTANEOUS at 09:04

## 2024-11-02 RX ADMIN — VANCOMYCIN HYDROCHLORIDE 750 MG: 750 INJECTION, POWDER, LYOPHILIZED, FOR SOLUTION INTRAVENOUS at 20:15

## 2024-11-02 RX ADMIN — VANCOMYCIN HYDROCHLORIDE 750 MG: 750 INJECTION, POWDER, LYOPHILIZED, FOR SOLUTION INTRAVENOUS at 04:04

## 2024-11-02 RX ADMIN — HYDROXYCHLOROQUINE SULFATE 200 MG: 200 TABLET ORAL at 09:50

## 2024-11-02 RX ADMIN — VANCOMYCIN HYDROCHLORIDE 750 MG: 750 INJECTION, POWDER, LYOPHILIZED, FOR SOLUTION INTRAVENOUS at 12:34

## 2024-11-02 RX ADMIN — Medication 4 ML: at 08:32

## 2024-11-02 RX ADMIN — PREGABALIN 200 MG: 50 CAPSULE ORAL at 20:10

## 2024-11-02 RX ADMIN — DAPSONE 100 MG: 25 TABLET ORAL at 09:04

## 2024-11-02 RX ADMIN — SODIUM CHLORIDE, PRESERVATIVE FREE 10 ML: 5 INJECTION INTRAVENOUS at 08:55

## 2024-11-02 RX ADMIN — PREDNISONE 40 MG: 20 TABLET ORAL at 09:05

## 2024-11-02 RX ADMIN — Medication 4 ML: at 19:20

## 2024-11-02 RX ADMIN — CIPROFLOXACIN 750 MG: 500 TABLET, FILM COATED ORAL at 04:30

## 2024-11-02 RX ADMIN — DULOXETINE HYDROCHLORIDE 60 MG: 60 CAPSULE, DELAYED RELEASE ORAL at 09:05

## 2024-11-02 RX ADMIN — FERROUS SULFATE TAB 325 MG (65 MG ELEMENTAL FE) 325 MG: 325 (65 FE) TAB at 09:05

## 2024-11-02 RX ADMIN — ARFORMOTEROL TARTRATE: 15 SOLUTION RESPIRATORY (INHALATION) at 19:19

## 2024-11-02 RX ADMIN — SODIUM CHLORIDE, PRESERVATIVE FREE 10 ML: 5 INJECTION INTRAVENOUS at 20:10

## 2024-11-02 RX ADMIN — CIPROFLOXACIN 750 MG: 500 TABLET, FILM COATED ORAL at 16:38

## 2024-11-02 RX ADMIN — MORPHINE SULFATE 1 MG: 2 INJECTION, SOLUTION INTRAMUSCULAR; INTRAVENOUS at 20:55

## 2024-11-02 RX ADMIN — LISINOPRIL 20 MG: 20 TABLET ORAL at 09:05

## 2024-11-02 RX ADMIN — PREGABALIN 200 MG: 50 CAPSULE ORAL at 09:05

## 2024-11-02 RX ADMIN — Medication 3 MG: at 20:10

## 2024-11-02 ASSESSMENT — PAIN SCALES - GENERAL
PAINLEVEL_OUTOF10: 7
PAINLEVEL_OUTOF10: 0

## 2024-11-02 ASSESSMENT — PAIN DESCRIPTION - LOCATION: LOCATION: CHEST

## 2024-11-02 ASSESSMENT — PAIN DESCRIPTION - DESCRIPTORS: DESCRIPTORS: ACHING

## 2024-11-02 ASSESSMENT — PAIN - FUNCTIONAL ASSESSMENT: PAIN_FUNCTIONAL_ASSESSMENT: ACTIVITIES ARE NOT PREVENTED

## 2024-11-02 ASSESSMENT — PAIN DESCRIPTION - PAIN TYPE: TYPE: ACUTE PAIN

## 2024-11-03 LAB
BASOPHILS # BLD: 0 K/UL (ref 0–0.2)
BASOPHILS NFR BLD: 0 % (ref 0–2)
CREAT SERPL-MCNC: 0.58 MG/DL (ref 0.6–1.1)
DIFFERENTIAL METHOD BLD: ABNORMAL
EOSINOPHIL # BLD: 0.2 K/UL (ref 0–0.8)
EOSINOPHIL NFR BLD: 2 % (ref 0.5–7.8)
ERYTHROCYTE [DISTWIDTH] IN BLOOD BY AUTOMATED COUNT: 19.6 % (ref 11.9–14.6)
HCT VFR BLD AUTO: 35.7 % (ref 35.8–46.3)
HGB BLD-MCNC: 10.1 G/DL (ref 11.7–15.4)
IMM GRANULOCYTES # BLD AUTO: 0 K/UL (ref 0–0.5)
IMM GRANULOCYTES NFR BLD AUTO: 0 % (ref 0–5)
LYMPHOCYTES # BLD: 1.5 K/UL (ref 0.5–4.6)
LYMPHOCYTES NFR BLD: 19 % (ref 13–44)
MCH RBC QN AUTO: 26 PG (ref 26.1–32.9)
MCHC RBC AUTO-ENTMCNC: 28.3 G/DL (ref 31.4–35)
MCV RBC AUTO: 92 FL (ref 82–102)
MONOCYTES # BLD: 0.9 K/UL (ref 0.1–1.3)
MONOCYTES NFR BLD: 12 % (ref 4–12)
NEUTS SEG # BLD: 5.2 K/UL (ref 1.7–8.2)
NEUTS SEG NFR BLD: 66 % (ref 43–78)
NRBC # BLD: 0 K/UL (ref 0–0.2)
PLATELET # BLD AUTO: 202 K/UL (ref 150–450)
PMV BLD AUTO: 10.2 FL (ref 9.4–12.3)
RBC # BLD AUTO: 3.88 M/UL (ref 4.05–5.2)
VANCOMYCIN SERPL-MCNC: 17.8 UG/ML
WBC # BLD AUTO: 7.9 K/UL (ref 4.3–11.1)

## 2024-11-03 PROCEDURE — 6360000002 HC RX W HCPCS: Performed by: INTERNAL MEDICINE

## 2024-11-03 PROCEDURE — 99232 SBSQ HOSP IP/OBS MODERATE 35: CPT | Performed by: INTERNAL MEDICINE

## 2024-11-03 PROCEDURE — 94640 AIRWAY INHALATION TREATMENT: CPT

## 2024-11-03 PROCEDURE — 85025 COMPLETE CBC W/AUTO DIFF WBC: CPT

## 2024-11-03 PROCEDURE — 6360000002 HC RX W HCPCS

## 2024-11-03 PROCEDURE — 6370000000 HC RX 637 (ALT 250 FOR IP): Performed by: NURSE PRACTITIONER

## 2024-11-03 PROCEDURE — 36415 COLL VENOUS BLD VENIPUNCTURE: CPT

## 2024-11-03 PROCEDURE — 6370000000 HC RX 637 (ALT 250 FOR IP): Performed by: INTERNAL MEDICINE

## 2024-11-03 PROCEDURE — 80202 ASSAY OF VANCOMYCIN: CPT

## 2024-11-03 PROCEDURE — 6370000000 HC RX 637 (ALT 250 FOR IP): Performed by: HOSPITALIST

## 2024-11-03 PROCEDURE — 82565 ASSAY OF CREATININE: CPT

## 2024-11-03 PROCEDURE — 1100000000 HC RM PRIVATE

## 2024-11-03 PROCEDURE — 2700000000 HC OXYGEN THERAPY PER DAY

## 2024-11-03 PROCEDURE — 2580000003 HC RX 258

## 2024-11-03 PROCEDURE — 2580000003 HC RX 258: Performed by: NURSE PRACTITIONER

## 2024-11-03 PROCEDURE — 6370000000 HC RX 637 (ALT 250 FOR IP)

## 2024-11-03 PROCEDURE — 2580000003 HC RX 258: Performed by: INTERNAL MEDICINE

## 2024-11-03 PROCEDURE — 94761 N-INVAS EAR/PLS OXIMETRY MLT: CPT

## 2024-11-03 RX ORDER — HYDROCODONE BITARTRATE AND ACETAMINOPHEN 7.5; 325 MG/1; MG/1
1 TABLET ORAL EVERY 6 HOURS PRN
Status: DISCONTINUED | OUTPATIENT
Start: 2024-11-03 | End: 2024-11-13 | Stop reason: HOSPADM

## 2024-11-03 RX ORDER — ACETAMINOPHEN 325 MG/1
650 TABLET ORAL EVERY 4 HOURS PRN
Status: DISCONTINUED | OUTPATIENT
Start: 2024-11-03 | End: 2024-11-13 | Stop reason: HOSPADM

## 2024-11-03 RX ORDER — TRIAMCINOLONE ACETONIDE 1 MG/G
CREAM TOPICAL 2 TIMES DAILY
Status: DISCONTINUED | OUTPATIENT
Start: 2024-11-03 | End: 2024-11-13 | Stop reason: HOSPADM

## 2024-11-03 RX ORDER — HYDROXYZINE HYDROCHLORIDE 10 MG/1
10 TABLET, FILM COATED ORAL 3 TIMES DAILY PRN
Status: DISCONTINUED | OUTPATIENT
Start: 2024-11-03 | End: 2024-11-13 | Stop reason: HOSPADM

## 2024-11-03 RX ORDER — MINERAL OIL/HYDROPHIL PETROLAT
OINTMENT (GRAM) TOPICAL 2 TIMES DAILY PRN
Status: DISCONTINUED | OUTPATIENT
Start: 2024-11-03 | End: 2024-11-13 | Stop reason: HOSPADM

## 2024-11-03 RX ORDER — PANTOPRAZOLE SODIUM 40 MG/1
40 TABLET, DELAYED RELEASE ORAL
Status: DISCONTINUED | OUTPATIENT
Start: 2024-11-03 | End: 2024-11-13 | Stop reason: HOSPADM

## 2024-11-03 RX ADMIN — Medication 4 ML: at 19:08

## 2024-11-03 RX ADMIN — PREGABALIN 200 MG: 50 CAPSULE ORAL at 09:24

## 2024-11-03 RX ADMIN — CIPROFLOXACIN 750 MG: 500 TABLET, FILM COATED ORAL at 17:10

## 2024-11-03 RX ADMIN — DULOXETINE HYDROCHLORIDE 60 MG: 60 CAPSULE, DELAYED RELEASE ORAL at 09:24

## 2024-11-03 RX ADMIN — PANTOPRAZOLE SODIUM 40 MG: 40 TABLET, DELAYED RELEASE ORAL at 09:41

## 2024-11-03 RX ADMIN — TRIAMCINOLONE ACETONIDE: 1 CREAM TOPICAL at 21:40

## 2024-11-03 RX ADMIN — ARFORMOTEROL TARTRATE: 15 SOLUTION RESPIRATORY (INHALATION) at 19:08

## 2024-11-03 RX ADMIN — HYDROXYCHLOROQUINE SULFATE 200 MG: 200 TABLET ORAL at 09:25

## 2024-11-03 RX ADMIN — HYDROCODONE BITARTRATE AND ACETAMINOPHEN 1 TABLET: 7.5; 325 TABLET ORAL at 12:22

## 2024-11-03 RX ADMIN — VANCOMYCIN HYDROCHLORIDE 750 MG: 750 INJECTION, POWDER, LYOPHILIZED, FOR SOLUTION INTRAVENOUS at 21:32

## 2024-11-03 RX ADMIN — Medication 3 MG: at 21:34

## 2024-11-03 RX ADMIN — PREGABALIN 200 MG: 50 CAPSULE ORAL at 21:34

## 2024-11-03 RX ADMIN — CIPROFLOXACIN 750 MG: 500 TABLET, FILM COATED ORAL at 04:30

## 2024-11-03 RX ADMIN — ARFORMOTEROL TARTRATE: 15 SOLUTION RESPIRATORY (INHALATION) at 07:13

## 2024-11-03 RX ADMIN — SODIUM CHLORIDE, PRESERVATIVE FREE 10 ML: 5 INJECTION INTRAVENOUS at 09:27

## 2024-11-03 RX ADMIN — VANCOMYCIN HYDROCHLORIDE 750 MG: 750 INJECTION, POWDER, LYOPHILIZED, FOR SOLUTION INTRAVENOUS at 03:47

## 2024-11-03 RX ADMIN — Medication 4 ML: at 07:14

## 2024-11-03 RX ADMIN — TRIAMCINOLONE ACETONIDE: 1 CREAM TOPICAL at 14:49

## 2024-11-03 RX ADMIN — PREDNISONE 30 MG: 20 TABLET ORAL at 09:25

## 2024-11-03 RX ADMIN — Medication: at 14:48

## 2024-11-03 RX ADMIN — VANCOMYCIN HYDROCHLORIDE 750 MG: 750 INJECTION, POWDER, LYOPHILIZED, FOR SOLUTION INTRAVENOUS at 12:13

## 2024-11-03 RX ADMIN — PANTOPRAZOLE SODIUM 40 MG: 40 TABLET, DELAYED RELEASE ORAL at 17:10

## 2024-11-03 RX ADMIN — ENOXAPARIN SODIUM 40 MG: 100 INJECTION SUBCUTANEOUS at 09:36

## 2024-11-03 RX ADMIN — DAPSONE 100 MG: 25 TABLET ORAL at 09:24

## 2024-11-03 RX ADMIN — HYDROXYZINE HYDROCHLORIDE 10 MG: 10 TABLET ORAL at 12:22

## 2024-11-03 RX ADMIN — SODIUM CHLORIDE, PRESERVATIVE FREE 10 ML: 5 INJECTION INTRAVENOUS at 21:34

## 2024-11-03 ASSESSMENT — PAIN DESCRIPTION - ORIENTATION
ORIENTATION: LEFT
ORIENTATION: LEFT

## 2024-11-03 ASSESSMENT — PAIN SCALES - GENERAL
PAINLEVEL_OUTOF10: 3
PAINLEVEL_OUTOF10: 3
PAINLEVEL_OUTOF10: 6
PAINLEVEL_OUTOF10: 6

## 2024-11-03 ASSESSMENT — PAIN DESCRIPTION - LOCATION
LOCATION: ARM;SHOULDER

## 2024-11-03 ASSESSMENT — PAIN DESCRIPTION - DESCRIPTORS
DESCRIPTORS: ACHING;CRAMPING
DESCRIPTORS: ACHING;CRAMPING;DISCOMFORT

## 2024-11-04 LAB
ANION GAP SERPL CALC-SCNC: 5 MMOL/L (ref 7–16)
BACTERIA SPEC CULT: ABNORMAL
BASOPHILS # BLD: 0 K/UL (ref 0–0.2)
BASOPHILS NFR BLD: 0 % (ref 0–2)
BUN SERPL-MCNC: 22 MG/DL (ref 8–23)
CALCIUM SERPL-MCNC: 9.2 MG/DL (ref 8.8–10.2)
CHLORIDE SERPL-SCNC: 105 MMOL/L (ref 98–107)
CO2 SERPL-SCNC: 33 MMOL/L (ref 20–29)
CREAT SERPL-MCNC: 0.58 MG/DL (ref 0.6–1.1)
DIFFERENTIAL METHOD BLD: ABNORMAL
EOSINOPHIL # BLD: 0.1 K/UL (ref 0–0.8)
EOSINOPHIL NFR BLD: 2 % (ref 0.5–7.8)
ERYTHROCYTE [DISTWIDTH] IN BLOOD BY AUTOMATED COUNT: 19.4 % (ref 11.9–14.6)
GLUCOSE SERPL-MCNC: 127 MG/DL (ref 70–99)
GRAM STN SPEC: ABNORMAL
HCT VFR BLD AUTO: 36.1 % (ref 35.8–46.3)
HGB BLD-MCNC: 10.1 G/DL (ref 11.7–15.4)
IMM GRANULOCYTES # BLD AUTO: 0 K/UL (ref 0–0.5)
IMM GRANULOCYTES NFR BLD AUTO: 0 % (ref 0–5)
LYMPHOCYTES # BLD: 1.1 K/UL (ref 0.5–4.6)
LYMPHOCYTES NFR BLD: 18 % (ref 13–44)
MCH RBC QN AUTO: 25.7 PG (ref 26.1–32.9)
MCHC RBC AUTO-ENTMCNC: 28 G/DL (ref 31.4–35)
MCV RBC AUTO: 91.9 FL (ref 82–102)
MONOCYTES # BLD: 0.7 K/UL (ref 0.1–1.3)
MONOCYTES NFR BLD: 12 % (ref 4–12)
NEUTS SEG # BLD: 4.1 K/UL (ref 1.7–8.2)
NEUTS SEG NFR BLD: 68 % (ref 43–78)
NRBC # BLD: 0 K/UL (ref 0–0.2)
PLATELET # BLD AUTO: 216 K/UL (ref 150–450)
PMV BLD AUTO: 10 FL (ref 9.4–12.3)
POTASSIUM SERPL-SCNC: 4.4 MMOL/L (ref 3.5–5.1)
RBC # BLD AUTO: 3.93 M/UL (ref 4.05–5.2)
SERVICE CMNT-IMP: ABNORMAL
SODIUM SERPL-SCNC: 142 MMOL/L (ref 136–145)
WBC # BLD AUTO: 6.1 K/UL (ref 4.3–11.1)

## 2024-11-04 PROCEDURE — 97112 NEUROMUSCULAR REEDUCATION: CPT

## 2024-11-04 PROCEDURE — 6370000000 HC RX 637 (ALT 250 FOR IP)

## 2024-11-04 PROCEDURE — 2580000003 HC RX 258: Performed by: INTERNAL MEDICINE

## 2024-11-04 PROCEDURE — 6360000002 HC RX W HCPCS

## 2024-11-04 PROCEDURE — 94640 AIRWAY INHALATION TREATMENT: CPT

## 2024-11-04 PROCEDURE — 80048 BASIC METABOLIC PNL TOTAL CA: CPT

## 2024-11-04 PROCEDURE — 6370000000 HC RX 637 (ALT 250 FOR IP): Performed by: INTERNAL MEDICINE

## 2024-11-04 PROCEDURE — 6360000002 HC RX W HCPCS: Performed by: INTERNAL MEDICINE

## 2024-11-04 PROCEDURE — 85025 COMPLETE CBC W/AUTO DIFF WBC: CPT

## 2024-11-04 PROCEDURE — 6370000000 HC RX 637 (ALT 250 FOR IP): Performed by: HOSPITALIST

## 2024-11-04 PROCEDURE — 1100000000 HC RM PRIVATE

## 2024-11-04 PROCEDURE — 6360000002 HC RX W HCPCS: Performed by: STUDENT IN AN ORGANIZED HEALTH CARE EDUCATION/TRAINING PROGRAM

## 2024-11-04 PROCEDURE — 97530 THERAPEUTIC ACTIVITIES: CPT

## 2024-11-04 PROCEDURE — 97535 SELF CARE MNGMENT TRAINING: CPT

## 2024-11-04 PROCEDURE — 36415 COLL VENOUS BLD VENIPUNCTURE: CPT

## 2024-11-04 PROCEDURE — 2580000003 HC RX 258: Performed by: NURSE PRACTITIONER

## 2024-11-04 PROCEDURE — 2580000003 HC RX 258

## 2024-11-04 PROCEDURE — 99233 SBSQ HOSP IP/OBS HIGH 50: CPT | Performed by: STUDENT IN AN ORGANIZED HEALTH CARE EDUCATION/TRAINING PROGRAM

## 2024-11-04 PROCEDURE — 2700000000 HC OXYGEN THERAPY PER DAY

## 2024-11-04 PROCEDURE — 94761 N-INVAS EAR/PLS OXIMETRY MLT: CPT

## 2024-11-04 RX ORDER — ALBUTEROL SULFATE 0.83 MG/ML
2.5 SOLUTION RESPIRATORY (INHALATION) EVERY 4 HOURS PRN
Status: DISCONTINUED | OUTPATIENT
Start: 2024-11-04 | End: 2024-11-13 | Stop reason: HOSPADM

## 2024-11-04 RX ADMIN — DAPTOMYCIN 750 MG: 500 INJECTION, POWDER, LYOPHILIZED, FOR SOLUTION INTRAVENOUS at 16:55

## 2024-11-04 RX ADMIN — VANCOMYCIN HYDROCHLORIDE 750 MG: 750 INJECTION, POWDER, LYOPHILIZED, FOR SOLUTION INTRAVENOUS at 04:32

## 2024-11-04 RX ADMIN — HYDROCODONE BITARTRATE AND ACETAMINOPHEN 1 TABLET: 7.5; 325 TABLET ORAL at 09:29

## 2024-11-04 RX ADMIN — PANTOPRAZOLE SODIUM 40 MG: 40 TABLET, DELAYED RELEASE ORAL at 04:33

## 2024-11-04 RX ADMIN — CIPROFLOXACIN 750 MG: 500 TABLET, FILM COATED ORAL at 04:33

## 2024-11-04 RX ADMIN — ARFORMOTEROL TARTRATE: 15 SOLUTION RESPIRATORY (INHALATION) at 19:18

## 2024-11-04 RX ADMIN — TRIAMCINOLONE ACETONIDE: 1 CREAM TOPICAL at 19:58

## 2024-11-04 RX ADMIN — PREGABALIN 200 MG: 50 CAPSULE ORAL at 19:57

## 2024-11-04 RX ADMIN — LISINOPRIL 20 MG: 20 TABLET ORAL at 09:12

## 2024-11-04 RX ADMIN — ENOXAPARIN SODIUM 40 MG: 100 INJECTION SUBCUTANEOUS at 09:07

## 2024-11-04 RX ADMIN — HYDROXYCHLOROQUINE SULFATE 200 MG: 200 TABLET ORAL at 09:12

## 2024-11-04 RX ADMIN — DAPSONE 100 MG: 25 TABLET ORAL at 09:11

## 2024-11-04 RX ADMIN — Medication 4 ML: at 07:19

## 2024-11-04 RX ADMIN — PREDNISONE 30 MG: 10 TABLET ORAL at 09:11

## 2024-11-04 RX ADMIN — DULOXETINE HYDROCHLORIDE 60 MG: 60 CAPSULE, DELAYED RELEASE ORAL at 09:12

## 2024-11-04 RX ADMIN — ARFORMOTEROL TARTRATE: 15 SOLUTION RESPIRATORY (INHALATION) at 07:19

## 2024-11-04 RX ADMIN — PREGABALIN 200 MG: 50 CAPSULE ORAL at 09:11

## 2024-11-04 RX ADMIN — CIPROFLOXACIN 750 MG: 500 TABLET, FILM COATED ORAL at 15:57

## 2024-11-04 RX ADMIN — PANTOPRAZOLE SODIUM 40 MG: 40 TABLET, DELAYED RELEASE ORAL at 15:57

## 2024-11-04 RX ADMIN — SODIUM CHLORIDE, PRESERVATIVE FREE 10 ML: 5 INJECTION INTRAVENOUS at 19:57

## 2024-11-04 RX ADMIN — IPRATROPIUM BROMIDE 0.5 MG: 0.5 SOLUTION RESPIRATORY (INHALATION) at 19:18

## 2024-11-04 RX ADMIN — Medication 4 ML: at 19:18

## 2024-11-04 RX ADMIN — IPRATROPIUM BROMIDE 0.5 MG: 0.5 SOLUTION RESPIRATORY (INHALATION) at 15:11

## 2024-11-04 RX ADMIN — Medication 3 MG: at 19:57

## 2024-11-04 RX ADMIN — SODIUM CHLORIDE, PRESERVATIVE FREE 10 ML: 5 INJECTION INTRAVENOUS at 09:16

## 2024-11-04 ASSESSMENT — PAIN DESCRIPTION - LOCATION: LOCATION: CHEST;OTHER (COMMENT)

## 2024-11-04 ASSESSMENT — PAIN SCALES - GENERAL
PAINLEVEL_OUTOF10: 6
PAINLEVEL_OUTOF10: 4

## 2024-11-04 NOTE — PLAN OF CARE
Problem: Discharge Planning  Goal: Discharge to home or other facility with appropriate resources  Outcome: Progressing     Problem: Pain  Goal: Verbalizes/displays adequate comfort level or baseline comfort level  Outcome: Progressing  Flowsheets (Taken 11/4/2024 0900)  Verbalizes/displays adequate comfort level or baseline comfort level: Encourage patient to monitor pain and request assistance     Problem: Skin/Tissue Integrity  Goal: Absence of new skin breakdown  Description: 1.  Monitor for areas of redness and/or skin breakdown  2.  Assess vascular access sites hourly  3.  Every 4-6 hours minimum:  Change oxygen saturation probe site  4.  Every 4-6 hours:  If on nasal continuous positive airway pressure, respiratory therapy assess nares and determine need for appliance change or resting period.  11/3/2024 2317 by Bharti Ngo, RN  Outcome: Progressing     Problem: Safety - Adult  Goal: Free from fall injury  11/3/2024 2317 by Bharti Ngo, RN  Outcome: Progressing

## 2024-11-04 NOTE — CARE COORDINATION
Chart screened by CM for d/c planning.  Currently requiring 8L O2 HFNC.  Wean as tolerated.  PT/OT recommend HH at d/c.  ID has placed final recs:  IV Daptomycin 750 mg q 24 with EOT 12/11/24.  CM has placed a referral to [facility] to follow pt.  CM received notification from the liaison that pt has good coverage with her insurance.  CM met with pt at the bedside to discuss d/c planning.  She feels that receiving home IV ABX would be easier.  She states she will speak with her aide.  Dispo: return home with HH and [facility] once able to wean O2.  CM will continue to follow.  LOS = 5 days

## 2024-11-05 LAB
ANION GAP SERPL CALC-SCNC: 6 MMOL/L (ref 7–16)
ARTERIAL PATENCY WRIST A: POSITIVE
BASE EXCESS BLDV CALC-SCNC: 8.9 MMOL/L
BASOPHILS # BLD: 0 K/UL (ref 0–0.2)
BASOPHILS NFR BLD: 0 % (ref 0–2)
BDY SITE: ABNORMAL
BUN SERPL-MCNC: 20 MG/DL (ref 8–23)
CALCIUM SERPL-MCNC: 9.2 MG/DL (ref 8.8–10.2)
CHLORIDE SERPL-SCNC: 101 MMOL/L (ref 98–107)
CK SERPL-CCNC: 18 U/L (ref 21–215)
CO2 SERPL-SCNC: 33 MMOL/L (ref 20–29)
CREAT SERPL-MCNC: 0.75 MG/DL (ref 0.6–1.1)
DIFFERENTIAL METHOD BLD: ABNORMAL
EOSINOPHIL # BLD: 0.2 K/UL (ref 0–0.8)
EOSINOPHIL NFR BLD: 3 % (ref 0.5–7.8)
ERYTHROCYTE [DISTWIDTH] IN BLOOD BY AUTOMATED COUNT: 19.6 % (ref 11.9–14.6)
GAS FLOW.O2 O2 DELIVERY SYS: ABNORMAL
GLUCOSE SERPL-MCNC: 132 MG/DL (ref 70–99)
HCO3 BLDV-SCNC: 36 MMOL/L (ref 23–28)
HCT VFR BLD AUTO: 36.1 % (ref 35.8–46.3)
HGB BLD-MCNC: 10.2 G/DL (ref 11.7–15.4)
IMM GRANULOCYTES # BLD AUTO: 0 K/UL (ref 0–0.5)
IMM GRANULOCYTES NFR BLD AUTO: 0 % (ref 0–5)
LYMPHOCYTES # BLD: 1.2 K/UL (ref 0.5–4.6)
LYMPHOCYTES NFR BLD: 19 % (ref 13–44)
MCH RBC QN AUTO: 26 PG (ref 26.1–32.9)
MCHC RBC AUTO-ENTMCNC: 28.3 G/DL (ref 31.4–35)
MCV RBC AUTO: 92.1 FL (ref 82–102)
MONOCYTES # BLD: 0.8 K/UL (ref 0.1–1.3)
MONOCYTES NFR BLD: 11 % (ref 4–12)
NEUTS SEG # BLD: 4.5 K/UL (ref 1.7–8.2)
NEUTS SEG NFR BLD: 67 % (ref 43–78)
NRBC # BLD: 0 K/UL (ref 0–0.2)
PCO2 BLDV: 60.8 MMHG (ref 41–51)
PH BLDV: 7.38 (ref 7.32–7.42)
PLATELET # BLD AUTO: 225 K/UL (ref 150–450)
PMV BLD AUTO: 10.3 FL (ref 9.4–12.3)
PO2 BLDV: 36 MMHG
POC FIO2: 11
POTASSIUM SERPL-SCNC: 3.9 MMOL/L (ref 3.5–5.1)
RBC # BLD AUTO: 3.92 M/UL (ref 4.05–5.2)
SAO2 % BLDV: 65.3 % (ref 65–88)
SERVICE CMNT-IMP: ABNORMAL
SERVICE CMNT-IMP: ABNORMAL
SODIUM SERPL-SCNC: 140 MMOL/L (ref 136–145)
SPECIMEN TYPE: ABNORMAL
WBC # BLD AUTO: 6.7 K/UL (ref 4.3–11.1)

## 2024-11-05 PROCEDURE — 36415 COLL VENOUS BLD VENIPUNCTURE: CPT

## 2024-11-05 PROCEDURE — 82955 ASSAY OF G6PD ENZYME: CPT

## 2024-11-05 PROCEDURE — 2580000003 HC RX 258: Performed by: INTERNAL MEDICINE

## 2024-11-05 PROCEDURE — 6360000002 HC RX W HCPCS

## 2024-11-05 PROCEDURE — 85025 COMPLETE CBC W/AUTO DIFF WBC: CPT

## 2024-11-05 PROCEDURE — 6360000002 HC RX W HCPCS: Performed by: STUDENT IN AN ORGANIZED HEALTH CARE EDUCATION/TRAINING PROGRAM

## 2024-11-05 PROCEDURE — 6370000000 HC RX 637 (ALT 250 FOR IP)

## 2024-11-05 PROCEDURE — 6360000002 HC RX W HCPCS: Performed by: INTERNAL MEDICINE

## 2024-11-05 PROCEDURE — 6370000000 HC RX 637 (ALT 250 FOR IP): Performed by: HOSPITALIST

## 2024-11-05 PROCEDURE — 99232 SBSQ HOSP IP/OBS MODERATE 35: CPT | Performed by: STUDENT IN AN ORGANIZED HEALTH CARE EDUCATION/TRAINING PROGRAM

## 2024-11-05 PROCEDURE — 94761 N-INVAS EAR/PLS OXIMETRY MLT: CPT

## 2024-11-05 PROCEDURE — 2700000000 HC OXYGEN THERAPY PER DAY

## 2024-11-05 PROCEDURE — 2580000003 HC RX 258

## 2024-11-05 PROCEDURE — 6370000000 HC RX 637 (ALT 250 FOR IP): Performed by: INTERNAL MEDICINE

## 2024-11-05 PROCEDURE — 82550 ASSAY OF CK (CPK): CPT

## 2024-11-05 PROCEDURE — 85041 AUTOMATED RBC COUNT: CPT

## 2024-11-05 PROCEDURE — 1100000000 HC RM PRIVATE

## 2024-11-05 PROCEDURE — 80048 BASIC METABOLIC PNL TOTAL CA: CPT

## 2024-11-05 PROCEDURE — 82803 BLOOD GASES ANY COMBINATION: CPT

## 2024-11-05 PROCEDURE — 94640 AIRWAY INHALATION TREATMENT: CPT

## 2024-11-05 RX ORDER — FUROSEMIDE 10 MG/ML
20 INJECTION INTRAMUSCULAR; INTRAVENOUS ONCE
Status: COMPLETED | OUTPATIENT
Start: 2024-11-05 | End: 2024-11-05

## 2024-11-05 RX ORDER — SODIUM CHLORIDE FOR INHALATION 3 %
4 VIAL, NEBULIZER (ML) INHALATION
Status: DISCONTINUED | OUTPATIENT
Start: 2024-11-05 | End: 2024-11-12

## 2024-11-05 RX ADMIN — HYDROCODONE BITARTRATE AND ACETAMINOPHEN 1 TABLET: 7.5; 325 TABLET ORAL at 09:08

## 2024-11-05 RX ADMIN — PANTOPRAZOLE SODIUM 40 MG: 40 TABLET, DELAYED RELEASE ORAL at 16:45

## 2024-11-05 RX ADMIN — IPRATROPIUM BROMIDE 0.5 MG: 0.5 SOLUTION RESPIRATORY (INHALATION) at 11:23

## 2024-11-05 RX ADMIN — SODIUM CHLORIDE, PRESERVATIVE FREE 10 ML: 5 INJECTION INTRAVENOUS at 08:18

## 2024-11-05 RX ADMIN — PREDNISONE 30 MG: 10 TABLET ORAL at 08:07

## 2024-11-05 RX ADMIN — IPRATROPIUM BROMIDE 0.5 MG: 0.5 SOLUTION RESPIRATORY (INHALATION) at 19:10

## 2024-11-05 RX ADMIN — IPRATROPIUM BROMIDE 0.5 MG: 0.5 SOLUTION RESPIRATORY (INHALATION) at 15:52

## 2024-11-05 RX ADMIN — LISINOPRIL 20 MG: 20 TABLET ORAL at 08:08

## 2024-11-05 RX ADMIN — ENOXAPARIN SODIUM 40 MG: 100 INJECTION SUBCUTANEOUS at 08:12

## 2024-11-05 RX ADMIN — TRIAMCINOLONE ACETONIDE: 1 CREAM TOPICAL at 08:14

## 2024-11-05 RX ADMIN — PANTOPRAZOLE SODIUM 40 MG: 40 TABLET, DELAYED RELEASE ORAL at 04:58

## 2024-11-05 RX ADMIN — DAPTOMYCIN 750 MG: 500 INJECTION, POWDER, LYOPHILIZED, FOR SOLUTION INTRAVENOUS at 16:46

## 2024-11-05 RX ADMIN — SODIUM CHLORIDE, PRESERVATIVE FREE 10 ML: 5 INJECTION INTRAVENOUS at 20:56

## 2024-11-05 RX ADMIN — DAPSONE 100 MG: 25 TABLET ORAL at 08:07

## 2024-11-05 RX ADMIN — PREGABALIN 200 MG: 50 CAPSULE ORAL at 20:56

## 2024-11-05 RX ADMIN — DULOXETINE HYDROCHLORIDE 60 MG: 60 CAPSULE, DELAYED RELEASE ORAL at 08:08

## 2024-11-05 RX ADMIN — CIPROFLOXACIN 750 MG: 500 TABLET, FILM COATED ORAL at 04:58

## 2024-11-05 RX ADMIN — Medication 4 ML: at 19:10

## 2024-11-05 RX ADMIN — ARFORMOTEROL TARTRATE: 15 SOLUTION RESPIRATORY (INHALATION) at 19:10

## 2024-11-05 RX ADMIN — CIPROFLOXACIN 750 MG: 500 TABLET, FILM COATED ORAL at 16:45

## 2024-11-05 RX ADMIN — HYDROCODONE BITARTRATE AND ACETAMINOPHEN 1 TABLET: 7.5; 325 TABLET ORAL at 16:58

## 2024-11-05 RX ADMIN — FUROSEMIDE 20 MG: 10 INJECTION, SOLUTION INTRAMUSCULAR; INTRAVENOUS at 11:38

## 2024-11-05 RX ADMIN — TRIAMCINOLONE ACETONIDE: 1 CREAM TOPICAL at 20:56

## 2024-11-05 RX ADMIN — Medication 3 MG: at 20:56

## 2024-11-05 RX ADMIN — PREGABALIN 200 MG: 50 CAPSULE ORAL at 08:08

## 2024-11-05 ASSESSMENT — PAIN DESCRIPTION - ORIENTATION
ORIENTATION: RIGHT
ORIENTATION: RIGHT;LEFT

## 2024-11-05 ASSESSMENT — PAIN - FUNCTIONAL ASSESSMENT
PAIN_FUNCTIONAL_ASSESSMENT: PREVENTS OR INTERFERES SOME ACTIVE ACTIVITIES AND ADLS
PAIN_FUNCTIONAL_ASSESSMENT: ACTIVITIES ARE NOT PREVENTED

## 2024-11-05 ASSESSMENT — PAIN DESCRIPTION - LOCATION
LOCATION: BACK;GENERALIZED
LOCATION: GENERALIZED;WRIST

## 2024-11-05 ASSESSMENT — PAIN DESCRIPTION - DESCRIPTORS
DESCRIPTORS: THROBBING
DESCRIPTORS: ACHING

## 2024-11-05 ASSESSMENT — PAIN SCALES - GENERAL
PAINLEVEL_OUTOF10: 6
PAINLEVEL_OUTOF10: 6

## 2024-11-05 NOTE — PLAN OF CARE
Patient has remained A&O x 4. Patient educated on new medication.  -refused AM breathing treatment.  Did allow RT to draw MD LAMBERT notified of results.   -norco x2   Patient rounded on hourly by myself or patient assistant and encouraged to call with any needs. No signs of distress noted. Bed low, locked, call bell within reach.   BSSR given to SALENA Hay RN    Problem: Discharge Planning  Goal: Discharge to home or other facility with appropriate resources  Outcome: Progressing     Problem: Pain  Goal: Verbalizes/displays adequate comfort level or baseline comfort level  11/5/2024 1111 by Renata Steen RN  Outcome: Progressing  11/4/2024 2213 by Bharti Ngo RN  Outcome: Progressing  Flowsheets (Taken 11/4/2024 1901)  Verbalizes/displays adequate comfort level or baseline comfort level:   Encourage patient to monitor pain and request assistance   Assess pain using appropriate pain scale     Problem: Skin/Tissue Integrity  Goal: Absence of new skin breakdown  Description: 1.  Monitor for areas of redness and/or skin breakdown  2.  Assess vascular access sites hourly  3.  Every 4-6 hours minimum:  Change oxygen saturation probe site  4.  Every 4-6 hours:  If on nasal continuous positive airway pressure, respiratory therapy assess nares and determine need for appliance change or resting period.  11/5/2024 1111 by Renata Steen RN  Outcome: Progressing  11/4/2024 2213 by Bharti Ngo RN  Outcome: Progressing     Problem: Safety - Adult  Goal: Free from fall injury  11/5/2024 1111 by Renata Steen RN  Outcome: Progressing  11/4/2024 2213 by Bharti Ngo RN  Outcome: Progressing     Problem: Confusion  Goal: Confusion, delirium, dementia, or psychosis is improved or at baseline  Description: INTERVENTIONS:  1. Assess for possible contributors to thought disturbance, including medications, impaired vision or hearing, underlying metabolic abnormalities, dehydration, psychiatric diagnoses, and notify

## 2024-11-06 LAB
ANION GAP SERPL CALC-SCNC: 6 MMOL/L (ref 7–16)
BACTERIA SPEC CULT: NORMAL
BASOPHILS # BLD: 0 K/UL (ref 0–0.2)
BASOPHILS NFR BLD: 0 % (ref 0–2)
BUN SERPL-MCNC: 18 MG/DL (ref 8–23)
CALCIUM SERPL-MCNC: 8.9 MG/DL (ref 8.8–10.2)
CHLORIDE SERPL-SCNC: 100 MMOL/L (ref 98–107)
CO2 SERPL-SCNC: 34 MMOL/L (ref 20–29)
CREAT SERPL-MCNC: 0.62 MG/DL (ref 0.6–1.1)
DIFFERENTIAL METHOD BLD: ABNORMAL
EOSINOPHIL # BLD: 0.2 K/UL (ref 0–0.8)
EOSINOPHIL NFR BLD: 2 % (ref 0.5–7.8)
ERYTHROCYTE [DISTWIDTH] IN BLOOD BY AUTOMATED COUNT: 19.5 % (ref 11.9–14.6)
GLUCOSE SERPL-MCNC: 125 MG/DL (ref 70–99)
HCT VFR BLD AUTO: 35.9 % (ref 35.8–46.3)
HGB BLD-MCNC: 10.2 G/DL (ref 11.7–15.4)
IMM GRANULOCYTES # BLD AUTO: 0 K/UL (ref 0–0.5)
IMM GRANULOCYTES NFR BLD AUTO: 0 % (ref 0–5)
LYMPHOCYTES # BLD: 1.3 K/UL (ref 0.5–4.6)
LYMPHOCYTES NFR BLD: 19 % (ref 13–44)
MCH RBC QN AUTO: 25.9 PG (ref 26.1–32.9)
MCHC RBC AUTO-ENTMCNC: 28.4 G/DL (ref 31.4–35)
MCV RBC AUTO: 91.1 FL (ref 82–102)
MONOCYTES # BLD: 0.8 K/UL (ref 0.1–1.3)
MONOCYTES NFR BLD: 12 % (ref 4–12)
NEUTS SEG # BLD: 4.5 K/UL (ref 1.7–8.2)
NEUTS SEG NFR BLD: 66 % (ref 43–78)
NRBC # BLD: 0 K/UL (ref 0–0.2)
PLATELET # BLD AUTO: 233 K/UL (ref 150–450)
PMV BLD AUTO: 10.2 FL (ref 9.4–12.3)
POTASSIUM SERPL-SCNC: 3.7 MMOL/L (ref 3.5–5.1)
RBC # BLD AUTO: 3.94 M/UL (ref 4.05–5.2)
SERVICE CMNT-IMP: NORMAL
SODIUM SERPL-SCNC: 140 MMOL/L (ref 136–145)
WBC # BLD AUTO: 6.8 K/UL (ref 4.3–11.1)

## 2024-11-06 PROCEDURE — 6370000000 HC RX 637 (ALT 250 FOR IP): Performed by: HOSPITALIST

## 2024-11-06 PROCEDURE — 6370000000 HC RX 637 (ALT 250 FOR IP)

## 2024-11-06 PROCEDURE — 94640 AIRWAY INHALATION TREATMENT: CPT

## 2024-11-06 PROCEDURE — 94761 N-INVAS EAR/PLS OXIMETRY MLT: CPT

## 2024-11-06 PROCEDURE — 80048 BASIC METABOLIC PNL TOTAL CA: CPT

## 2024-11-06 PROCEDURE — 2580000003 HC RX 258

## 2024-11-06 PROCEDURE — 97530 THERAPEUTIC ACTIVITIES: CPT

## 2024-11-06 PROCEDURE — 36415 COLL VENOUS BLD VENIPUNCTURE: CPT

## 2024-11-06 PROCEDURE — 97535 SELF CARE MNGMENT TRAINING: CPT

## 2024-11-06 PROCEDURE — 6360000002 HC RX W HCPCS: Performed by: FAMILY MEDICINE

## 2024-11-06 PROCEDURE — 99232 SBSQ HOSP IP/OBS MODERATE 35: CPT | Performed by: INTERNAL MEDICINE

## 2024-11-06 PROCEDURE — 2700000000 HC OXYGEN THERAPY PER DAY

## 2024-11-06 PROCEDURE — 2580000003 HC RX 258: Performed by: INTERNAL MEDICINE

## 2024-11-06 PROCEDURE — 6360000002 HC RX W HCPCS

## 2024-11-06 PROCEDURE — 1100000000 HC RM PRIVATE

## 2024-11-06 PROCEDURE — 85025 COMPLETE CBC W/AUTO DIFF WBC: CPT

## 2024-11-06 PROCEDURE — 97112 NEUROMUSCULAR REEDUCATION: CPT

## 2024-11-06 PROCEDURE — 6370000000 HC RX 637 (ALT 250 FOR IP): Performed by: INTERNAL MEDICINE

## 2024-11-06 PROCEDURE — 6360000002 HC RX W HCPCS: Performed by: INTERNAL MEDICINE

## 2024-11-06 RX ORDER — SULFAMETHOXAZOLE AND TRIMETHOPRIM 800; 160 MG/1; MG/1
1 TABLET ORAL
Status: DISCONTINUED | OUTPATIENT
Start: 2024-11-06 | End: 2024-11-06

## 2024-11-06 RX ADMIN — TRIAMCINOLONE ACETONIDE: 1 CREAM TOPICAL at 09:28

## 2024-11-06 RX ADMIN — DAPTOMYCIN 750 MG: 500 INJECTION, POWDER, LYOPHILIZED, FOR SOLUTION INTRAVENOUS at 16:15

## 2024-11-06 RX ADMIN — PREGABALIN 200 MG: 50 CAPSULE ORAL at 22:01

## 2024-11-06 RX ADMIN — IPRATROPIUM BROMIDE 0.5 MG: 0.5 SOLUTION RESPIRATORY (INHALATION) at 14:37

## 2024-11-06 RX ADMIN — HYDROCODONE BITARTRATE AND ACETAMINOPHEN 1 TABLET: 7.5; 325 TABLET ORAL at 09:34

## 2024-11-06 RX ADMIN — ARFORMOTEROL TARTRATE: 15 SOLUTION RESPIRATORY (INHALATION) at 19:41

## 2024-11-06 RX ADMIN — PANTOPRAZOLE SODIUM 40 MG: 40 TABLET, DELAYED RELEASE ORAL at 16:15

## 2024-11-06 RX ADMIN — HYDROCODONE BITARTRATE AND ACETAMINOPHEN 1 TABLET: 7.5; 325 TABLET ORAL at 22:01

## 2024-11-06 RX ADMIN — SODIUM CHLORIDE, PRESERVATIVE FREE 10 ML: 5 INJECTION INTRAVENOUS at 09:28

## 2024-11-06 RX ADMIN — ENOXAPARIN SODIUM 40 MG: 100 INJECTION SUBCUTANEOUS at 09:28

## 2024-11-06 RX ADMIN — DULOXETINE HYDROCHLORIDE 60 MG: 60 CAPSULE, DELAYED RELEASE ORAL at 09:27

## 2024-11-06 RX ADMIN — IPRATROPIUM BROMIDE 0.5 MG: 0.5 SOLUTION RESPIRATORY (INHALATION) at 19:41

## 2024-11-06 RX ADMIN — PANTOPRAZOLE SODIUM 40 MG: 40 TABLET, DELAYED RELEASE ORAL at 05:06

## 2024-11-06 RX ADMIN — PREDNISONE 30 MG: 10 TABLET ORAL at 09:27

## 2024-11-06 RX ADMIN — PREGABALIN 200 MG: 50 CAPSULE ORAL at 09:26

## 2024-11-06 RX ADMIN — Medication 3 MG: at 22:01

## 2024-11-06 RX ADMIN — CIPROFLOXACIN 750 MG: 500 TABLET, FILM COATED ORAL at 05:06

## 2024-11-06 RX ADMIN — SODIUM CHLORIDE, PRESERVATIVE FREE 10 ML: 5 INJECTION INTRAVENOUS at 22:02

## 2024-11-06 RX ADMIN — LISINOPRIL 20 MG: 20 TABLET ORAL at 09:26

## 2024-11-06 ASSESSMENT — PAIN SCALES - GENERAL
PAINLEVEL_OUTOF10: 2
PAINLEVEL_OUTOF10: 6
PAINLEVEL_OUTOF10: 0
PAINLEVEL_OUTOF10: 7

## 2024-11-06 ASSESSMENT — PAIN DESCRIPTION - DESCRIPTORS
DESCRIPTORS: DISCOMFORT
DESCRIPTORS: ACHING

## 2024-11-06 ASSESSMENT — PAIN DESCRIPTION - LOCATION
LOCATION: GENERALIZED
LOCATION: BREAST;CHEST

## 2024-11-06 ASSESSMENT — PAIN - FUNCTIONAL ASSESSMENT: PAIN_FUNCTIONAL_ASSESSMENT: ACTIVITIES ARE NOT PREVENTED

## 2024-11-06 ASSESSMENT — PAIN DESCRIPTION - ORIENTATION: ORIENTATION: RIGHT;LEFT

## 2024-11-06 NOTE — ACP (ADVANCE CARE PLANNING)
Advance Care Planning Note   Admit Date:  10/30/2024  4:54 PM   Name:  Meli Blancas   Age:  69 y.o.  Sex:  female  :  1955   MRN:  [id number]   Room:  Formerly named [facility]    Meli Blancas has capacity to make her own decisions:   Yes    If pt unable to make decisions, POA/surrogate decision maker:  Ms Kaitlin Comer who is her aide .  Patient has a sister who is also in a nursing home and is not able to make decisions.  No other family.    Other people present:   Ms Madrigal    Patient / surrogate decision-maker directed code status:  DNR/DNI    Other ACP topics discussed, if applicable:   None    Patient or surrogate consented to discussion of the current conditions, workup, management plans, prognosis, and the risk for further deterioration.  Time spent: 17 minutes in direct discussion.      Signed:  JOEY SWANSON MD

## 2024-11-06 NOTE — PLAN OF CARE
Patient has remained A&O x 4.   -on 10 L NC  -norco x1  -ambulated in [place] with therapy  Patient rounded on hourly by myself or patient assistant and encouraged to call with any needs. No signs of distress noted. Bed low, locked, call bell within reach.   BSSR given to SALENA Olsen RN    Problem: Discharge Planning  Goal: Discharge to home or other facility with appropriate resources  Outcome: Progressing     Problem: Pain  Goal: Verbalizes/displays adequate comfort level or baseline comfort level  Outcome: Progressing     Problem: Skin/Tissue Integrity  Goal: Absence of new skin breakdown  Description: 1.  Monitor for areas of redness and/or skin breakdown  2.  Assess vascular access sites hourly  3.  Every 4-6 hours minimum:  Change oxygen saturation probe site  4.  Every 4-6 hours:  If on nasal continuous positive airway pressure, respiratory therapy assess nares and determine need for appliance change or resting period.  Outcome: Progressing     Problem: Safety - Adult  Goal: Free from fall injury  Outcome: Progressing     Problem: Confusion  Goal: Confusion, delirium, dementia, or psychosis is improved or at baseline  Description: INTERVENTIONS:  1. Assess for possible contributors to thought disturbance, including medications, impaired vision or hearing, underlying metabolic abnormalities, dehydration, psychiatric diagnoses, and notify attending LIP  2. [place] high risk fall precautions, as indicated  3. Provide frequent short contacts to provide reality reorientation, refocusing and direction  4. Decrease environmental stimuli, including noise as appropriate  5. Monitor and intervene to maintain adequate nutrition, hydration, elimination, sleep and activity  6. If unable to ensure safety without constant attention obtain sitter and review sitter guidelines with assigned personnel  7. Initiate Psychosocial CNS and Spiritual Care consult, as indicated  Outcome: Progressing

## 2024-11-06 NOTE — CARE COORDINATION
Pt discussed during IDR and chart screened by CM for d/c planning.  Pt remains agreeable to home IV ABX through [facility].  Still unable to wean O2.  Pt is now requiring 10L HF 70% FiO2.  O2 needs are delaying pt's d/c.  CM will continue to follow.  LOS = 7 days

## 2024-11-07 ENCOUNTER — APPOINTMENT (OUTPATIENT)
Dept: GENERAL RADIOLOGY | Age: 69
DRG: 189 | End: 2024-11-07
Payer: MEDICARE

## 2024-11-07 LAB
ANION GAP SERPL CALC-SCNC: 6 MMOL/L (ref 7–16)
BASOPHILS # BLD: 0 K/UL (ref 0–0.2)
BASOPHILS NFR BLD: 0 % (ref 0–2)
BUN SERPL-MCNC: 21 MG/DL (ref 8–23)
CALCIUM SERPL-MCNC: 9 MG/DL (ref 8.8–10.2)
CHLORIDE SERPL-SCNC: 101 MMOL/L (ref 98–107)
CO2 SERPL-SCNC: 34 MMOL/L (ref 20–29)
CREAT SERPL-MCNC: 0.67 MG/DL (ref 0.6–1.1)
DIFFERENTIAL METHOD BLD: ABNORMAL
EOSINOPHIL # BLD: 0.1 K/UL (ref 0–0.8)
EOSINOPHIL NFR BLD: 2 % (ref 0.5–7.8)
ERYTHROCYTE [DISTWIDTH] IN BLOOD BY AUTOMATED COUNT: 19.8 % (ref 11.9–14.6)
G6PD BLD QN: 382 U/10E12 RBC (ref 127–427)
GLUCOSE SERPL-MCNC: 114 MG/DL (ref 70–99)
HCT VFR BLD AUTO: 34.7 % (ref 35.8–46.3)
HGB BLD-MCNC: 9.9 G/DL (ref 11.7–15.4)
IMM GRANULOCYTES # BLD AUTO: 0.1 K/UL (ref 0–0.5)
IMM GRANULOCYTES NFR BLD AUTO: 1 % (ref 0–5)
LYMPHOCYTES # BLD: 1.4 K/UL (ref 0.5–4.6)
LYMPHOCYTES NFR BLD: 17 % (ref 13–44)
MCH RBC QN AUTO: 25.8 PG (ref 26.1–32.9)
MCHC RBC AUTO-ENTMCNC: 28.5 G/DL (ref 31.4–35)
MCV RBC AUTO: 90.6 FL (ref 82–102)
MONOCYTES # BLD: 0.9 K/UL (ref 0.1–1.3)
MONOCYTES NFR BLD: 10 % (ref 4–12)
NEUTS SEG # BLD: 5.8 K/UL (ref 1.7–8.2)
NEUTS SEG NFR BLD: 70 % (ref 43–78)
NRBC # BLD: 0 K/UL (ref 0–0.2)
PLATELET # BLD AUTO: 236 K/UL (ref 150–450)
PMV BLD AUTO: 11 FL (ref 9.4–12.3)
POTASSIUM SERPL-SCNC: 4 MMOL/L (ref 3.5–5.1)
RBC # BLD AUTO: 3.83 M/UL (ref 4.05–5.2)
RBC # BLD AUTO: 4.01 X10E6/UL (ref 3.77–5.28)
SODIUM SERPL-SCNC: 141 MMOL/L (ref 136–145)
WBC # BLD AUTO: 8.3 K/UL (ref 4.3–11.1)

## 2024-11-07 PROCEDURE — 6360000002 HC RX W HCPCS: Performed by: INTERNAL MEDICINE

## 2024-11-07 PROCEDURE — 94761 N-INVAS EAR/PLS OXIMETRY MLT: CPT

## 2024-11-07 PROCEDURE — 2580000003 HC RX 258

## 2024-11-07 PROCEDURE — 1100000000 HC RM PRIVATE

## 2024-11-07 PROCEDURE — 6370000000 HC RX 637 (ALT 250 FOR IP): Performed by: HOSPITALIST

## 2024-11-07 PROCEDURE — 6360000002 HC RX W HCPCS: Performed by: FAMILY MEDICINE

## 2024-11-07 PROCEDURE — 6360000002 HC RX W HCPCS

## 2024-11-07 PROCEDURE — 2700000000 HC OXYGEN THERAPY PER DAY

## 2024-11-07 PROCEDURE — 99232 SBSQ HOSP IP/OBS MODERATE 35: CPT | Performed by: INTERNAL MEDICINE

## 2024-11-07 PROCEDURE — 97530 THERAPEUTIC ACTIVITIES: CPT

## 2024-11-07 PROCEDURE — 94640 AIRWAY INHALATION TREATMENT: CPT

## 2024-11-07 PROCEDURE — 6370000000 HC RX 637 (ALT 250 FOR IP)

## 2024-11-07 PROCEDURE — 80048 BASIC METABOLIC PNL TOTAL CA: CPT

## 2024-11-07 PROCEDURE — 2580000003 HC RX 258: Performed by: INTERNAL MEDICINE

## 2024-11-07 PROCEDURE — 85025 COMPLETE CBC W/AUTO DIFF WBC: CPT

## 2024-11-07 PROCEDURE — 97535 SELF CARE MNGMENT TRAINING: CPT

## 2024-11-07 PROCEDURE — 36415 COLL VENOUS BLD VENIPUNCTURE: CPT

## 2024-11-07 PROCEDURE — 71045 X-RAY EXAM CHEST 1 VIEW: CPT

## 2024-11-07 RX ORDER — FUROSEMIDE 10 MG/ML
20 INJECTION INTRAMUSCULAR; INTRAVENOUS DAILY
Status: DISCONTINUED | OUTPATIENT
Start: 2024-11-07 | End: 2024-11-07

## 2024-11-07 RX ORDER — FUROSEMIDE 10 MG/ML
20 INJECTION INTRAMUSCULAR; INTRAVENOUS 2 TIMES DAILY
Status: DISCONTINUED | OUTPATIENT
Start: 2024-11-07 | End: 2024-11-09

## 2024-11-07 RX ADMIN — PREGABALIN 200 MG: 50 CAPSULE ORAL at 08:26

## 2024-11-07 RX ADMIN — Medication 3 MG: at 21:00

## 2024-11-07 RX ADMIN — ENOXAPARIN SODIUM 40 MG: 100 INJECTION SUBCUTANEOUS at 08:27

## 2024-11-07 RX ADMIN — DULOXETINE HYDROCHLORIDE 60 MG: 60 CAPSULE, DELAYED RELEASE ORAL at 08:27

## 2024-11-07 RX ADMIN — DAPTOMYCIN 750 MG: 500 INJECTION, POWDER, LYOPHILIZED, FOR SOLUTION INTRAVENOUS at 16:33

## 2024-11-07 RX ADMIN — FUROSEMIDE 20 MG: 10 INJECTION, SOLUTION INTRAMUSCULAR; INTRAVENOUS at 08:32

## 2024-11-07 RX ADMIN — FUROSEMIDE 20 MG: 10 INJECTION, SOLUTION INTRAMUSCULAR; INTRAVENOUS at 16:42

## 2024-11-07 RX ADMIN — IPRATROPIUM BROMIDE 0.5 MG: 0.5 SOLUTION RESPIRATORY (INHALATION) at 14:31

## 2024-11-07 RX ADMIN — LISINOPRIL 20 MG: 20 TABLET ORAL at 08:26

## 2024-11-07 RX ADMIN — PREDNISONE 30 MG: 10 TABLET ORAL at 08:27

## 2024-11-07 RX ADMIN — SODIUM CHLORIDE, PRESERVATIVE FREE 10 ML: 5 INJECTION INTRAVENOUS at 08:32

## 2024-11-07 RX ADMIN — TRIAMCINOLONE ACETONIDE: 1 CREAM TOPICAL at 21:00

## 2024-11-07 RX ADMIN — PREGABALIN 200 MG: 50 CAPSULE ORAL at 20:59

## 2024-11-07 RX ADMIN — PANTOPRAZOLE SODIUM 40 MG: 40 TABLET, DELAYED RELEASE ORAL at 15:29

## 2024-11-07 RX ADMIN — HYDROCODONE BITARTRATE AND ACETAMINOPHEN 1 TABLET: 7.5; 325 TABLET ORAL at 14:23

## 2024-11-07 RX ADMIN — PANTOPRAZOLE SODIUM 40 MG: 40 TABLET, DELAYED RELEASE ORAL at 06:22

## 2024-11-07 RX ADMIN — SODIUM CHLORIDE, PRESERVATIVE FREE 10 ML: 5 INJECTION INTRAVENOUS at 21:00

## 2024-11-07 RX ADMIN — HYDROCODONE BITARTRATE AND ACETAMINOPHEN 1 TABLET: 7.5; 325 TABLET ORAL at 20:59

## 2024-11-07 ASSESSMENT — PAIN SCALES - GENERAL
PAINLEVEL_OUTOF10: 7
PAINLEVEL_OUTOF10: 4

## 2024-11-07 ASSESSMENT — PAIN DESCRIPTION - DESCRIPTORS: DESCRIPTORS: ACHING

## 2024-11-07 NOTE — PLAN OF CARE
Problem: Discharge Planning  Goal: Discharge to home or other facility with appropriate resources  Outcome: Progressing  Flowsheets (Taken 11/7/2024 0825)  Discharge to home or other facility with appropriate resources: Identify barriers to discharge with patient and caregiver     Problem: Pain  Goal: Verbalizes/displays adequate comfort level or baseline comfort level  11/7/2024 1235 by Radames Engle RN  Outcome: Progressing  Flowsheets (Taken 11/7/2024 0825)  Verbalizes/displays adequate comfort level or baseline comfort level: Encourage patient to monitor pain and request assistance  11/7/2024 0549 by Pretty Ortiz RN  Outcome: Progressing     Problem: Skin/Tissue Integrity  Goal: Absence of new skin breakdown  Description: 1.  Monitor for areas of redness and/or skin breakdown  2.  Assess vascular access sites hourly  3.  Every 4-6 hours minimum:  Change oxygen saturation probe site  4.  Every 4-6 hours:  If on nasal continuous positive airway pressure, respiratory therapy assess nares and determine need for appliance change or resting period.  11/7/2024 1235 by Radames Engle RN  Outcome: Progressing  11/7/2024 0549 by Pretty Ortiz RN  Outcome: Progressing     Problem: Safety - Adult  Goal: Free from fall injury  11/7/2024 0549 by Pretty Ortiz RN  Outcome: Progressing     Problem: Confusion  Goal: Confusion, delirium, dementia, or psychosis is improved or at baseline  Description: INTERVENTIONS:  1. Assess for possible contributors to thought disturbance, including medications, impaired vision or hearing, underlying metabolic abnormalities, dehydration, psychiatric diagnoses, and notify attending LIP  2. [place] high risk fall precautions, as indicated  3. Provide frequent short contacts to provide reality reorientation, refocusing and direction  4. Decrease environmental stimuli, including noise as appropriate  5. Monitor and intervene to maintain adequate nutrition, hydration,

## 2024-11-08 LAB
ANION GAP SERPL CALC-SCNC: 6 MMOL/L (ref 7–16)
BASOPHILS # BLD: 0 K/UL (ref 0–0.2)
BASOPHILS NFR BLD: 0 % (ref 0–2)
BUN SERPL-MCNC: 22 MG/DL (ref 8–23)
CALCIUM SERPL-MCNC: 9.6 MG/DL (ref 8.8–10.2)
CHLORIDE SERPL-SCNC: 98 MMOL/L (ref 98–107)
CO2 SERPL-SCNC: 37 MMOL/L (ref 20–29)
CREAT SERPL-MCNC: 0.71 MG/DL (ref 0.6–1.1)
DIFFERENTIAL METHOD BLD: ABNORMAL
EOSINOPHIL # BLD: 0.5 K/UL (ref 0–0.8)
EOSINOPHIL NFR BLD: 4 % (ref 0.5–7.8)
ERYTHROCYTE [DISTWIDTH] IN BLOOD BY AUTOMATED COUNT: 19.7 % (ref 11.9–14.6)
GLUCOSE SERPL-MCNC: 122 MG/DL (ref 70–99)
HCT VFR BLD AUTO: 36.3 % (ref 35.8–46.3)
HGB BLD-MCNC: 10.3 G/DL (ref 11.7–15.4)
IMM GRANULOCYTES # BLD AUTO: 0.1 K/UL (ref 0–0.5)
IMM GRANULOCYTES NFR BLD AUTO: 1 % (ref 0–5)
LYMPHOCYTES # BLD: 1.7 K/UL (ref 0.5–4.6)
LYMPHOCYTES NFR BLD: 16 % (ref 13–44)
MCH RBC QN AUTO: 25.7 PG (ref 26.1–32.9)
MCHC RBC AUTO-ENTMCNC: 28.4 G/DL (ref 31.4–35)
MCV RBC AUTO: 90.5 FL (ref 82–102)
MONOCYTES # BLD: 1.3 K/UL (ref 0.1–1.3)
MONOCYTES NFR BLD: 12 % (ref 4–12)
NEUTS SEG # BLD: 7.1 K/UL (ref 1.7–8.2)
NEUTS SEG NFR BLD: 67 % (ref 43–78)
NRBC # BLD: 0 K/UL (ref 0–0.2)
PLATELET # BLD AUTO: 248 K/UL (ref 150–450)
PMV BLD AUTO: 10.3 FL (ref 9.4–12.3)
POTASSIUM SERPL-SCNC: 4.2 MMOL/L (ref 3.5–5.1)
RBC # BLD AUTO: 4.01 M/UL (ref 4.05–5.2)
SODIUM SERPL-SCNC: 140 MMOL/L (ref 136–145)
WBC # BLD AUTO: 10.6 K/UL (ref 4.3–11.1)

## 2024-11-08 PROCEDURE — 6360000002 HC RX W HCPCS: Performed by: INTERNAL MEDICINE

## 2024-11-08 PROCEDURE — 94761 N-INVAS EAR/PLS OXIMETRY MLT: CPT

## 2024-11-08 PROCEDURE — 85025 COMPLETE CBC W/AUTO DIFF WBC: CPT

## 2024-11-08 PROCEDURE — 6360000002 HC RX W HCPCS

## 2024-11-08 PROCEDURE — 2580000003 HC RX 258: Performed by: INTERNAL MEDICINE

## 2024-11-08 PROCEDURE — 6360000002 HC RX W HCPCS: Performed by: FAMILY MEDICINE

## 2024-11-08 PROCEDURE — 6370000000 HC RX 637 (ALT 250 FOR IP): Performed by: HOSPITALIST

## 2024-11-08 PROCEDURE — 36415 COLL VENOUS BLD VENIPUNCTURE: CPT

## 2024-11-08 PROCEDURE — 99232 SBSQ HOSP IP/OBS MODERATE 35: CPT | Performed by: INTERNAL MEDICINE

## 2024-11-08 PROCEDURE — 80048 BASIC METABOLIC PNL TOTAL CA: CPT

## 2024-11-08 PROCEDURE — 1100000000 HC RM PRIVATE

## 2024-11-08 PROCEDURE — 2700000000 HC OXYGEN THERAPY PER DAY

## 2024-11-08 PROCEDURE — 2580000003 HC RX 258

## 2024-11-08 PROCEDURE — 97530 THERAPEUTIC ACTIVITIES: CPT

## 2024-11-08 PROCEDURE — 6370000000 HC RX 637 (ALT 250 FOR IP)

## 2024-11-08 PROCEDURE — 94640 AIRWAY INHALATION TREATMENT: CPT

## 2024-11-08 PROCEDURE — 97112 NEUROMUSCULAR REEDUCATION: CPT

## 2024-11-08 RX ADMIN — Medication 4 ML: at 07:53

## 2024-11-08 RX ADMIN — SODIUM CHLORIDE, PRESERVATIVE FREE 10 ML: 5 INJECTION INTRAVENOUS at 08:39

## 2024-11-08 RX ADMIN — PREDNISONE 30 MG: 10 TABLET ORAL at 08:34

## 2024-11-08 RX ADMIN — Medication 3 MG: at 20:29

## 2024-11-08 RX ADMIN — FUROSEMIDE 20 MG: 10 INJECTION, SOLUTION INTRAMUSCULAR; INTRAVENOUS at 08:36

## 2024-11-08 RX ADMIN — ENOXAPARIN SODIUM 40 MG: 100 INJECTION SUBCUTANEOUS at 08:37

## 2024-11-08 RX ADMIN — HYDROCODONE BITARTRATE AND ACETAMINOPHEN 1 TABLET: 7.5; 325 TABLET ORAL at 20:29

## 2024-11-08 RX ADMIN — PREGABALIN 200 MG: 50 CAPSULE ORAL at 20:30

## 2024-11-08 RX ADMIN — SODIUM CHLORIDE, PRESERVATIVE FREE 10 ML: 5 INJECTION INTRAVENOUS at 20:30

## 2024-11-08 RX ADMIN — DAPTOMYCIN 750 MG: 500 INJECTION, POWDER, LYOPHILIZED, FOR SOLUTION INTRAVENOUS at 16:33

## 2024-11-08 RX ADMIN — TRIAMCINOLONE ACETONIDE: 1 CREAM TOPICAL at 20:31

## 2024-11-08 RX ADMIN — PREGABALIN 200 MG: 50 CAPSULE ORAL at 08:33

## 2024-11-08 RX ADMIN — DULOXETINE HYDROCHLORIDE 60 MG: 60 CAPSULE, DELAYED RELEASE ORAL at 08:36

## 2024-11-08 RX ADMIN — IPRATROPIUM BROMIDE 0.5 MG: 0.5 SOLUTION RESPIRATORY (INHALATION) at 07:53

## 2024-11-08 RX ADMIN — LISINOPRIL 20 MG: 20 TABLET ORAL at 08:34

## 2024-11-08 RX ADMIN — PANTOPRAZOLE SODIUM 40 MG: 40 TABLET, DELAYED RELEASE ORAL at 06:06

## 2024-11-08 RX ADMIN — ARFORMOTEROL TARTRATE: 15 SOLUTION RESPIRATORY (INHALATION) at 07:53

## 2024-11-08 RX ADMIN — TRIAMCINOLONE ACETONIDE: 1 CREAM TOPICAL at 08:39

## 2024-11-08 ASSESSMENT — PAIN DESCRIPTION - LOCATION
LOCATION: ABDOMEN
LOCATION: CHEST

## 2024-11-08 ASSESSMENT — PAIN SCALES - GENERAL
PAINLEVEL_OUTOF10: 0
PAINLEVEL_OUTOF10: 8
PAINLEVEL_OUTOF10: 6
PAINLEVEL_OUTOF10: 0
PAINLEVEL_OUTOF10: 0

## 2024-11-08 ASSESSMENT — PAIN DESCRIPTION - ORIENTATION: ORIENTATION: MID

## 2024-11-08 ASSESSMENT — PAIN DESCRIPTION - DESCRIPTORS: DESCRIPTORS: BURNING

## 2024-11-08 NOTE — PLAN OF CARE
Problem: Discharge Planning  Goal: Discharge to home or other facility with appropriate resources  Outcome: Progressing     Problem: Pain  Goal: Verbalizes/displays adequate comfort level or baseline comfort level  Outcome: Progressing     Problem: Skin/Tissue Integrity  Goal: Absence of new skin breakdown  Description: 1.  Monitor for areas of redness and/or skin breakdown  2.  Assess vascular access sites hourly  3.  Every 4-6 hours minimum:  Change oxygen saturation probe site  4.  Every 4-6 hours:  If on nasal continuous positive airway pressure, respiratory therapy assess nares and determine need for appliance change or resting period.  Outcome: Progressing     Problem: Safety - Adult  Goal: Free from fall injury  Outcome: Progressing     Problem: Confusion  Goal: Confusion, delirium, dementia, or psychosis is improved or at baseline  Description: INTERVENTIONS:  1. Assess for possible contributors to thought disturbance, including medications, impaired vision or hearing, underlying metabolic abnormalities, dehydration, psychiatric diagnoses, and notify attending LIP  2. [place] high risk fall precautions, as indicated  3. Provide frequent short contacts to provide reality reorientation, refocusing and direction  4. Decrease environmental stimuli, including noise as appropriate  5. Monitor and intervene to maintain adequate nutrition, hydration, elimination, sleep and activity  6. If unable to ensure safety without constant attention obtain sitter and review sitter guidelines with assigned personnel  7. Initiate Psychosocial CNS and Spiritual Care consult, as indicated  Outcome: Progressing

## 2024-11-08 NOTE — CARE COORDINATION
Pt discussed during IDR and chart screened by CM for d/c planning.  Pt remains agreeable to home IV ABX through [facility].  Still trying to wean O2.  Pt is now requiring 7L HF.  O2 needs are delaying pt's d/c.  Important: When pt's O2 is at a liter flow that is acceptable for d/c [facility] must be contacted to perform home IV ABX teaching at the bedside Prior to pt's d/c.  CM will continue to follow.  LOS = 9 days

## 2024-11-09 LAB
ANION GAP SERPL CALC-SCNC: 6 MMOL/L (ref 7–16)
BASOPHILS # BLD: 0 K/UL (ref 0–0.2)
BASOPHILS NFR BLD: 0 % (ref 0–2)
BUN SERPL-MCNC: 24 MG/DL (ref 8–23)
CALCIUM SERPL-MCNC: 9.2 MG/DL (ref 8.8–10.2)
CHLORIDE SERPL-SCNC: 97 MMOL/L (ref 98–107)
CO2 SERPL-SCNC: 38 MMOL/L (ref 20–29)
CREAT SERPL-MCNC: 0.7 MG/DL (ref 0.6–1.1)
DIFFERENTIAL METHOD BLD: ABNORMAL
EOSINOPHIL # BLD: 0.5 K/UL (ref 0–0.8)
EOSINOPHIL NFR BLD: 5 % (ref 0.5–7.8)
ERYTHROCYTE [DISTWIDTH] IN BLOOD BY AUTOMATED COUNT: 19.5 % (ref 11.9–14.6)
GLUCOSE SERPL-MCNC: 87 MG/DL (ref 70–99)
HCT VFR BLD AUTO: 37.3 % (ref 35.8–46.3)
HGB BLD-MCNC: 10.5 G/DL (ref 11.7–15.4)
IMM GRANULOCYTES # BLD AUTO: 0.1 K/UL (ref 0–0.5)
IMM GRANULOCYTES NFR BLD AUTO: 1 % (ref 0–5)
LYMPHOCYTES # BLD: 1.9 K/UL (ref 0.5–4.6)
LYMPHOCYTES NFR BLD: 18 % (ref 13–44)
MCH RBC QN AUTO: 25.6 PG (ref 26.1–32.9)
MCHC RBC AUTO-ENTMCNC: 28.2 G/DL (ref 31.4–35)
MCV RBC AUTO: 91 FL (ref 82–102)
MONOCYTES # BLD: 1.2 K/UL (ref 0.1–1.3)
MONOCYTES NFR BLD: 11 % (ref 4–12)
NEUTS SEG # BLD: 7 K/UL (ref 1.7–8.2)
NEUTS SEG NFR BLD: 66 % (ref 43–78)
NRBC # BLD: 0 K/UL (ref 0–0.2)
PLATELET # BLD AUTO: 255 K/UL (ref 150–450)
PMV BLD AUTO: 10.5 FL (ref 9.4–12.3)
POTASSIUM SERPL-SCNC: 4.2 MMOL/L (ref 3.5–5.1)
RBC # BLD AUTO: 4.1 M/UL (ref 4.05–5.2)
SODIUM SERPL-SCNC: 141 MMOL/L (ref 136–145)
WBC # BLD AUTO: 10.7 K/UL (ref 4.3–11.1)

## 2024-11-09 PROCEDURE — 6360000002 HC RX W HCPCS: Performed by: INTERNAL MEDICINE

## 2024-11-09 PROCEDURE — 94640 AIRWAY INHALATION TREATMENT: CPT

## 2024-11-09 PROCEDURE — 6360000002 HC RX W HCPCS: Performed by: FAMILY MEDICINE

## 2024-11-09 PROCEDURE — 6370000000 HC RX 637 (ALT 250 FOR IP): Performed by: HOSPITALIST

## 2024-11-09 PROCEDURE — 36415 COLL VENOUS BLD VENIPUNCTURE: CPT

## 2024-11-09 PROCEDURE — 6360000002 HC RX W HCPCS

## 2024-11-09 PROCEDURE — 6370000000 HC RX 637 (ALT 250 FOR IP)

## 2024-11-09 PROCEDURE — 1100000000 HC RM PRIVATE

## 2024-11-09 PROCEDURE — 2700000000 HC OXYGEN THERAPY PER DAY

## 2024-11-09 PROCEDURE — 94760 N-INVAS EAR/PLS OXIMETRY 1: CPT

## 2024-11-09 PROCEDURE — 76937 US GUIDE VASCULAR ACCESS: CPT

## 2024-11-09 PROCEDURE — 6370000000 HC RX 637 (ALT 250 FOR IP): Performed by: FAMILY MEDICINE

## 2024-11-09 PROCEDURE — 80048 BASIC METABOLIC PNL TOTAL CA: CPT

## 2024-11-09 PROCEDURE — 6370000000 HC RX 637 (ALT 250 FOR IP): Performed by: INTERNAL MEDICINE

## 2024-11-09 PROCEDURE — 85025 COMPLETE CBC W/AUTO DIFF WBC: CPT

## 2024-11-09 PROCEDURE — 99232 SBSQ HOSP IP/OBS MODERATE 35: CPT | Performed by: INTERNAL MEDICINE

## 2024-11-09 PROCEDURE — 2580000003 HC RX 258: Performed by: INTERNAL MEDICINE

## 2024-11-09 PROCEDURE — 2580000003 HC RX 258

## 2024-11-09 RX ORDER — ACETAZOLAMIDE 250 MG/1
250 TABLET ORAL 2 TIMES DAILY
Status: DISCONTINUED | OUTPATIENT
Start: 2024-11-09 | End: 2024-11-12

## 2024-11-09 RX ORDER — GUAIFENESIN 600 MG/1
1200 TABLET, EXTENDED RELEASE ORAL 2 TIMES DAILY
Status: DISCONTINUED | OUTPATIENT
Start: 2024-11-09 | End: 2024-11-13 | Stop reason: HOSPADM

## 2024-11-09 RX ORDER — FUROSEMIDE 10 MG/ML
20 INJECTION INTRAMUSCULAR; INTRAVENOUS DAILY
Status: DISCONTINUED | OUTPATIENT
Start: 2024-11-10 | End: 2024-11-12

## 2024-11-09 RX ADMIN — Medication 3 MG: at 20:06

## 2024-11-09 RX ADMIN — ACETAZOLAMIDE 250 MG: 250 TABLET ORAL at 13:08

## 2024-11-09 RX ADMIN — PANTOPRAZOLE SODIUM 40 MG: 40 TABLET, DELAYED RELEASE ORAL at 15:43

## 2024-11-09 RX ADMIN — TRIAMCINOLONE ACETONIDE: 1 CREAM TOPICAL at 20:08

## 2024-11-09 RX ADMIN — GUAIFENESIN 1200 MG: 600 TABLET ORAL at 20:06

## 2024-11-09 RX ADMIN — SODIUM CHLORIDE, PRESERVATIVE FREE 10 ML: 5 INJECTION INTRAVENOUS at 07:20

## 2024-11-09 RX ADMIN — SODIUM CHLORIDE, PRESERVATIVE FREE 10 ML: 5 INJECTION INTRAVENOUS at 20:08

## 2024-11-09 RX ADMIN — PREGABALIN 200 MG: 50 CAPSULE ORAL at 07:18

## 2024-11-09 RX ADMIN — ACETAZOLAMIDE 250 MG: 250 TABLET ORAL at 20:06

## 2024-11-09 RX ADMIN — DULOXETINE HYDROCHLORIDE 60 MG: 60 CAPSULE, DELAYED RELEASE ORAL at 07:19

## 2024-11-09 RX ADMIN — HYDROCODONE BITARTRATE AND ACETAMINOPHEN 1 TABLET: 7.5; 325 TABLET ORAL at 15:43

## 2024-11-09 RX ADMIN — Medication 4 ML: at 08:11

## 2024-11-09 RX ADMIN — DAPTOMYCIN 750 MG: 500 INJECTION, POWDER, LYOPHILIZED, FOR SOLUTION INTRAVENOUS at 15:43

## 2024-11-09 RX ADMIN — ARFORMOTEROL TARTRATE: 15 SOLUTION RESPIRATORY (INHALATION) at 08:11

## 2024-11-09 RX ADMIN — IPRATROPIUM BROMIDE 0.5 MG: 0.5 SOLUTION RESPIRATORY (INHALATION) at 08:11

## 2024-11-09 RX ADMIN — PANTOPRAZOLE SODIUM 40 MG: 40 TABLET, DELAYED RELEASE ORAL at 05:54

## 2024-11-09 RX ADMIN — ENOXAPARIN SODIUM 40 MG: 100 INJECTION SUBCUTANEOUS at 07:20

## 2024-11-09 RX ADMIN — LISINOPRIL 20 MG: 20 TABLET ORAL at 07:18

## 2024-11-09 RX ADMIN — PREGABALIN 200 MG: 50 CAPSULE ORAL at 20:06

## 2024-11-09 RX ADMIN — FUROSEMIDE 20 MG: 10 INJECTION, SOLUTION INTRAMUSCULAR; INTRAVENOUS at 07:20

## 2024-11-09 RX ADMIN — TRIAMCINOLONE ACETONIDE: 1 CREAM TOPICAL at 07:55

## 2024-11-09 RX ADMIN — PREDNISONE 30 MG: 10 TABLET ORAL at 07:18

## 2024-11-09 RX ADMIN — HYDROXYCHLOROQUINE SULFATE 200 MG: 200 TABLET ORAL at 07:19

## 2024-11-09 ASSESSMENT — PAIN SCALES - GENERAL
PAINLEVEL_OUTOF10: 0
PAINLEVEL_OUTOF10: 6

## 2024-11-09 ASSESSMENT — PAIN DESCRIPTION - DESCRIPTORS: DESCRIPTORS: ACHING

## 2024-11-09 ASSESSMENT — PAIN DESCRIPTION - LOCATION: LOCATION: GENERALIZED

## 2024-11-09 ASSESSMENT — PAIN DESCRIPTION - ORIENTATION: ORIENTATION: LEFT;RIGHT

## 2024-11-09 ASSESSMENT — PAIN - FUNCTIONAL ASSESSMENT: PAIN_FUNCTIONAL_ASSESSMENT: ACTIVITIES ARE NOT PREVENTED

## 2024-11-09 NOTE — PLAN OF CARE
Problem: Discharge Planning  Goal: Discharge to home or other facility with appropriate resources  11/9/2024 0651 by Malathi Elizabeth RN  Outcome: Progressing  11/9/2024 0506 by Manas Og RN  Outcome: Progressing     Problem: Pain  Goal: Verbalizes/displays adequate comfort level or baseline comfort level  11/9/2024 0651 by Malathi Elizabeth RN  Outcome: Progressing  11/9/2024 0506 by Manas Og RN  Outcome: Progressing     Problem: Skin/Tissue Integrity  Goal: Absence of new skin breakdown  Description: 1.  Monitor for areas of redness and/or skin breakdown  2.  Assess vascular access sites hourly  3.  Every 4-6 hours minimum:  Change oxygen saturation probe site  4.  Every 4-6 hours:  If on nasal continuous positive airway pressure, respiratory therapy assess nares and determine need for appliance change or resting period.  11/9/2024 0651 by Malathi Elizabeth RN  Outcome: Progressing  11/9/2024 0506 by Manas Og RN  Outcome: Progressing     Problem: Safety - Adult  Goal: Free from fall injury  11/9/2024 0506 by Manas Og, RN  Outcome: Progressing     Problem: Confusion  Goal: Confusion, delirium, dementia, or psychosis is improved or at baseline  Description: INTERVENTIONS:  1. Assess for possible contributors to thought disturbance, including medications, impaired vision or hearing, underlying metabolic abnormalities, dehydration, psychiatric diagnoses, and notify attending LIP  2. [place] high risk fall precautions, as indicated  3. Provide frequent short contacts to provide reality reorientation, refocusing and direction  4. Decrease environmental stimuli, including noise as appropriate  5. Monitor and intervene to maintain adequate nutrition, hydration, elimination, sleep and activity  6. If unable to ensure safety without constant attention obtain sitter and review sitter guidelines with assigned personnel  7. Initiate Psychosocial CNS and Spiritual Care consult, as

## 2024-11-09 NOTE — PLAN OF CARE
Patient has remained A&O x 4. Patient educated on new medication.   -oxygen increased from 4 L to 5 L this AM for oxygen periodically dipping inti the low 80S.  Oxygen now 89-92 at rest but does de sat to 85-86% periodically while eating/moving. Decreased back to 4 L. Pt taken off of pulse ox.  Verbal order from Dr. Leone with pulm.   -norco x1  -pt requested mucinex this evening, but non ordered. Dr. Zayas notified.   Patient rounded on hourly by myself or patient assistant and encouraged to call with any needs. No signs of distress noted. Bed low, locked, call bell within reach.   BSSR given to SALENA Gomez RN    Problem: Discharge Planning  Goal: Discharge to home or other facility with appropriate resources  11/9/2024 1155 by Renata Steen RN  Outcome: Progressing  11/9/2024 0651 by Malathi Elizabeth RN  Outcome: Progressing  11/9/2024 0506 by Manas Og RN  Outcome: Progressing     Problem: Pain  Goal: Verbalizes/displays adequate comfort level or baseline comfort level  11/9/2024 1155 by Renata Steen RN  Outcome: Progressing  11/9/2024 0651 by Malathi Elizabeth RN  Outcome: Progressing  11/9/2024 0506 by Manas Og RN  Outcome: Progressing     Problem: Skin/Tissue Integrity  Goal: Absence of new skin breakdown  Description: 1.  Monitor for areas of redness and/or skin breakdown  2.  Assess vascular access sites hourly  3.  Every 4-6 hours minimum:  Change oxygen saturation probe site  4.  Every 4-6 hours:  If on nasal continuous positive airway pressure, respiratory therapy assess nares and determine need for appliance change or resting period.  11/9/2024 1155 by Renata Steen RN  Outcome: Progressing  11/9/2024 0651 by Malathi Elizabeth RN  Outcome: Progressing  11/9/2024 0506 by Manas Og RN  Outcome: Progressing     Problem: Safety - Adult  Goal: Free from fall injury  11/9/2024 1155 by Renata Steen RN  Outcome: Progressing  11/9/2024 0506 by Manas Og RN  Outcome:

## 2024-11-10 ENCOUNTER — APPOINTMENT (OUTPATIENT)
Dept: GENERAL RADIOLOGY | Age: 69
DRG: 189 | End: 2024-11-10
Payer: MEDICARE

## 2024-11-10 LAB
ANION GAP SERPL CALC-SCNC: 7 MMOL/L (ref 7–16)
APPEARANCE UR: CLEAR
BACTERIA URNS QL MICRO: NEGATIVE /HPF
BASOPHILS # BLD: 0.1 K/UL (ref 0–0.2)
BASOPHILS NFR BLD: 1 % (ref 0–2)
BILIRUB UR QL: NEGATIVE
BUN SERPL-MCNC: 24 MG/DL (ref 8–23)
CALCIUM SERPL-MCNC: 9.5 MG/DL (ref 8.8–10.2)
CASTS URNS QL MICRO: 0 /LPF
CHLORIDE SERPL-SCNC: 101 MMOL/L (ref 98–107)
CO2 SERPL-SCNC: 32 MMOL/L (ref 20–29)
COLOR UR: NORMAL
CREAT SERPL-MCNC: 0.91 MG/DL (ref 0.6–1.1)
CRYSTALS URNS QL MICRO: 0 /LPF
DIFFERENTIAL METHOD BLD: ABNORMAL
EKG ATRIAL RATE: 84 BPM
EKG DIAGNOSIS: NORMAL
EKG P AXIS: 40 DEGREES
EKG P-R INTERVAL: 142 MS
EKG Q-T INTERVAL: 348 MS
EKG QRS DURATION: 90 MS
EKG QTC CALCULATION (BAZETT): 411 MS
EKG R AXIS: 76 DEGREES
EKG T AXIS: 26 DEGREES
EKG VENTRICULAR RATE: 84 BPM
EOSINOPHIL # BLD: 0.9 K/UL (ref 0–0.8)
EOSINOPHIL NFR BLD: 5 % (ref 0.5–7.8)
EPI CELLS #/AREA URNS HPF: NORMAL /HPF
ERYTHROCYTE [DISTWIDTH] IN BLOOD BY AUTOMATED COUNT: 19.8 % (ref 11.9–14.6)
GLUCOSE SERPL-MCNC: 122 MG/DL (ref 70–99)
GLUCOSE UR STRIP.AUTO-MCNC: NEGATIVE MG/DL
HCT VFR BLD AUTO: 35.4 % (ref 35.8–46.3)
HGB BLD-MCNC: 10 G/DL (ref 11.7–15.4)
HGB UR QL STRIP: NEGATIVE
HYALINE CASTS URNS QL MICRO: NORMAL /LPF
IMM GRANULOCYTES # BLD AUTO: 0.1 K/UL (ref 0–0.5)
IMM GRANULOCYTES NFR BLD AUTO: 1 % (ref 0–5)
KETONES UR QL STRIP.AUTO: NEGATIVE MG/DL
LEUKOCYTE ESTERASE UR QL STRIP.AUTO: NEGATIVE
LYMPHOCYTES # BLD: 2.1 K/UL (ref 0.5–4.6)
LYMPHOCYTES NFR BLD: 12 % (ref 13–44)
MCH RBC QN AUTO: 26.2 PG (ref 26.1–32.9)
MCHC RBC AUTO-ENTMCNC: 28.2 G/DL (ref 31.4–35)
MCV RBC AUTO: 92.7 FL (ref 82–102)
MONOCYTES # BLD: 1.7 K/UL (ref 0.1–1.3)
MONOCYTES NFR BLD: 10 % (ref 4–12)
MUCOUS THREADS URNS QL MICRO: 0 /LPF
NEUTS SEG # BLD: 12.5 K/UL (ref 1.7–8.2)
NEUTS SEG NFR BLD: 72 % (ref 43–78)
NITRITE UR QL STRIP.AUTO: NEGATIVE
NRBC # BLD: 0 K/UL (ref 0–0.2)
PH UR STRIP: 7 (ref 5–9)
PLATELET # BLD AUTO: 244 K/UL (ref 150–450)
PMV BLD AUTO: 10.6 FL (ref 9.4–12.3)
POTASSIUM SERPL-SCNC: 3.8 MMOL/L (ref 3.5–5.1)
PROT UR STRIP-MCNC: NEGATIVE MG/DL
RBC # BLD AUTO: 3.82 M/UL (ref 4.05–5.2)
RBC #/AREA URNS HPF: NORMAL /HPF
SODIUM SERPL-SCNC: 140 MMOL/L (ref 136–145)
SP GR UR REFRACTOMETRY: 1.01 (ref 1–1.02)
URINE CULTURE IF INDICATED: NORMAL
UROBILINOGEN UR QL STRIP.AUTO: 0.2 EU/DL (ref 0.2–1)
WBC # BLD AUTO: 17.5 K/UL (ref 4.3–11.1)
WBC URNS QL MICRO: NORMAL /HPF

## 2024-11-10 PROCEDURE — 81001 URINALYSIS AUTO W/SCOPE: CPT

## 2024-11-10 PROCEDURE — 6370000000 HC RX 637 (ALT 250 FOR IP): Performed by: NURSE PRACTITIONER

## 2024-11-10 PROCEDURE — 36415 COLL VENOUS BLD VENIPUNCTURE: CPT

## 2024-11-10 PROCEDURE — 94761 N-INVAS EAR/PLS OXIMETRY MLT: CPT

## 2024-11-10 PROCEDURE — 6360000002 HC RX W HCPCS: Performed by: INTERNAL MEDICINE

## 2024-11-10 PROCEDURE — 94640 AIRWAY INHALATION TREATMENT: CPT

## 2024-11-10 PROCEDURE — 2580000003 HC RX 258

## 2024-11-10 PROCEDURE — 1100000000 HC RM PRIVATE

## 2024-11-10 PROCEDURE — 2700000000 HC OXYGEN THERAPY PER DAY

## 2024-11-10 PROCEDURE — 6370000000 HC RX 637 (ALT 250 FOR IP): Performed by: INTERNAL MEDICINE

## 2024-11-10 PROCEDURE — 93010 ELECTROCARDIOGRAM REPORT: CPT | Performed by: INTERNAL MEDICINE

## 2024-11-10 PROCEDURE — 6370000000 HC RX 637 (ALT 250 FOR IP): Performed by: HOSPITALIST

## 2024-11-10 PROCEDURE — 2580000003 HC RX 258: Performed by: INTERNAL MEDICINE

## 2024-11-10 PROCEDURE — 6360000002 HC RX W HCPCS

## 2024-11-10 PROCEDURE — 99232 SBSQ HOSP IP/OBS MODERATE 35: CPT | Performed by: INTERNAL MEDICINE

## 2024-11-10 PROCEDURE — 85025 COMPLETE CBC W/AUTO DIFF WBC: CPT

## 2024-11-10 PROCEDURE — 93005 ELECTROCARDIOGRAM TRACING: CPT | Performed by: INTERNAL MEDICINE

## 2024-11-10 PROCEDURE — 6370000000 HC RX 637 (ALT 250 FOR IP): Performed by: FAMILY MEDICINE

## 2024-11-10 PROCEDURE — 6360000002 HC RX W HCPCS: Performed by: FAMILY MEDICINE

## 2024-11-10 PROCEDURE — 71045 X-RAY EXAM CHEST 1 VIEW: CPT

## 2024-11-10 PROCEDURE — 80048 BASIC METABOLIC PNL TOTAL CA: CPT

## 2024-11-10 PROCEDURE — 6370000000 HC RX 637 (ALT 250 FOR IP)

## 2024-11-10 PROCEDURE — 94760 N-INVAS EAR/PLS OXIMETRY 1: CPT

## 2024-11-10 RX ORDER — LIDOCAINE 4 G/G
1 PATCH TOPICAL DAILY
Status: DISCONTINUED | OUTPATIENT
Start: 2024-11-10 | End: 2024-11-13 | Stop reason: HOSPADM

## 2024-11-10 RX ADMIN — PREGABALIN 200 MG: 50 CAPSULE ORAL at 09:39

## 2024-11-10 RX ADMIN — SODIUM CHLORIDE, PRESERVATIVE FREE 10 ML: 5 INJECTION INTRAVENOUS at 10:07

## 2024-11-10 RX ADMIN — PANTOPRAZOLE SODIUM 40 MG: 40 TABLET, DELAYED RELEASE ORAL at 16:27

## 2024-11-10 RX ADMIN — SODIUM CHLORIDE, PRESERVATIVE FREE 10 ML: 5 INJECTION INTRAVENOUS at 20:42

## 2024-11-10 RX ADMIN — IPRATROPIUM BROMIDE 0.5 MG: 0.5 SOLUTION RESPIRATORY (INHALATION) at 11:37

## 2024-11-10 RX ADMIN — Medication 4 ML: at 11:35

## 2024-11-10 RX ADMIN — HYDROCODONE BITARTRATE AND ACETAMINOPHEN 1 TABLET: 7.5; 325 TABLET ORAL at 09:39

## 2024-11-10 RX ADMIN — ACETAZOLAMIDE 250 MG: 250 TABLET ORAL at 20:40

## 2024-11-10 RX ADMIN — Medication 3 MG: at 20:40

## 2024-11-10 RX ADMIN — PREDNISONE 30 MG: 10 TABLET ORAL at 09:39

## 2024-11-10 RX ADMIN — DAPTOMYCIN 750 MG: 500 INJECTION, POWDER, LYOPHILIZED, FOR SOLUTION INTRAVENOUS at 16:27

## 2024-11-10 RX ADMIN — GUAIFENESIN 1200 MG: 600 TABLET ORAL at 20:40

## 2024-11-10 RX ADMIN — ACETAZOLAMIDE 250 MG: 250 TABLET ORAL at 09:39

## 2024-11-10 RX ADMIN — ENOXAPARIN SODIUM 40 MG: 100 INJECTION SUBCUTANEOUS at 09:40

## 2024-11-10 RX ADMIN — PANTOPRAZOLE SODIUM 40 MG: 40 TABLET, DELAYED RELEASE ORAL at 06:28

## 2024-11-10 RX ADMIN — PREGABALIN 200 MG: 50 CAPSULE ORAL at 20:40

## 2024-11-10 RX ADMIN — DULOXETINE HYDROCHLORIDE 60 MG: 60 CAPSULE, DELAYED RELEASE ORAL at 09:39

## 2024-11-10 RX ADMIN — GUAIFENESIN 1200 MG: 600 TABLET ORAL at 09:39

## 2024-11-10 RX ADMIN — HYDROCODONE BITARTRATE AND ACETAMINOPHEN 1 TABLET: 7.5; 325 TABLET ORAL at 20:41

## 2024-11-10 RX ADMIN — FUROSEMIDE 20 MG: 10 INJECTION, SOLUTION INTRAMUSCULAR; INTRAVENOUS at 09:39

## 2024-11-10 RX ADMIN — ARFORMOTEROL TARTRATE: 15 SOLUTION RESPIRATORY (INHALATION) at 11:34

## 2024-11-10 ASSESSMENT — PAIN DESCRIPTION - LOCATION: LOCATION: CHEST

## 2024-11-10 ASSESSMENT — PAIN DESCRIPTION - DESCRIPTORS
DESCRIPTORS: DISCOMFORT;ACHING
DESCRIPTORS: ACHING;DISCOMFORT

## 2024-11-10 ASSESSMENT — PAIN DESCRIPTION - ORIENTATION
ORIENTATION: RIGHT;LEFT
ORIENTATION: UPPER;RIGHT

## 2024-11-10 ASSESSMENT — PAIN SCALES - GENERAL
PAINLEVEL_OUTOF10: 6
PAINLEVEL_OUTOF10: 6

## 2024-11-10 ASSESSMENT — PAIN - FUNCTIONAL ASSESSMENT: PAIN_FUNCTIONAL_ASSESSMENT: PREVENTS OR INTERFERES SOME ACTIVE ACTIVITIES AND ADLS

## 2024-11-10 NOTE — PLAN OF CARE
Patient has remained A&O x 4 mostly. This evening pt was seen ambulating in the [place] without oxygen on and took a moment to find her room again.  Pt placed back on oxygen and required 4 L NC to recover to 91%  (was previously on 2 L.  -has remained afebrile.   -WBC increased to 17.5, pt looks more tired today. She states that she is not feeling well today. C/o right chest pain.   - Urinalysis sent- Negative  -EKG- NSR  -Lidocaine patch applied to right chest per order.      Patient educated on new medication, Lidocaine patch. Patient rounded on hourly by myself or patient assistant and encouraged to call with any needs. No signs of distress noted. Bed low, locked, call bell within reach.   BSSR given to SALENA Thayer RN    Problem: Discharge Planning  Goal: Discharge to home or other facility with appropriate resources  11/10/2024 1133 by Renata Steen RN  Outcome: Progressing  Flowsheets (Taken 11/10/2024 0800)  Discharge to home or other facility with appropriate resources:   Identify barriers to discharge with patient and caregiver   Arrange for needed discharge resources and transportation as appropriate   Identify discharge learning needs (meds, wound care, etc)   Arrange for interpreters to assist at discharge as needed   Refer to discharge planning if patient needs post-hospital services based on physician order or complex needs related to functional status, cognitive ability or social support system  11/9/2024 2253 by Patricia Harris RN  Outcome: Progressing     Problem: Pain  Goal: Verbalizes/displays adequate comfort level or baseline comfort level  11/10/2024 1133 by Renata Steen RN  Outcome: Progressing  11/9/2024 2253 by Patricia Harris RN  Outcome: Progressing     Problem: Skin/Tissue Integrity  Goal: Absence of new skin breakdown  Description: 1.  Monitor for areas of redness and/or skin breakdown  2.  Assess vascular access sites hourly  3.  Every 4-6 hours minimum:  Change oxygen

## 2024-11-11 PROBLEM — R91.8 PULMONARY INFILTRATES: Status: ACTIVE | Noted: 2024-11-11

## 2024-11-11 LAB
ANION GAP SERPL CALC-SCNC: 9 MMOL/L (ref 7–16)
BASOPHILS # BLD: 0.1 K/UL (ref 0–0.2)
BASOPHILS NFR BLD: 0 % (ref 0–2)
BUN SERPL-MCNC: 31 MG/DL (ref 8–23)
CALCIUM SERPL-MCNC: 9.8 MG/DL (ref 8.8–10.2)
CHLORIDE SERPL-SCNC: 101 MMOL/L (ref 98–107)
CO2 SERPL-SCNC: 30 MMOL/L (ref 20–29)
CREAT SERPL-MCNC: 0.79 MG/DL (ref 0.6–1.1)
DIFFERENTIAL METHOD BLD: ABNORMAL
EOSINOPHIL # BLD: 1.2 K/UL (ref 0–0.8)
EOSINOPHIL NFR BLD: 6 % (ref 0.5–7.8)
ERYTHROCYTE [DISTWIDTH] IN BLOOD BY AUTOMATED COUNT: 19.8 % (ref 11.9–14.6)
GLUCOSE SERPL-MCNC: 150 MG/DL (ref 70–99)
HCT VFR BLD AUTO: 36.4 % (ref 35.8–46.3)
HGB BLD-MCNC: 10.3 G/DL (ref 11.7–15.4)
IMM GRANULOCYTES # BLD AUTO: 0.1 K/UL (ref 0–0.5)
IMM GRANULOCYTES NFR BLD AUTO: 1 % (ref 0–5)
LYMPHOCYTES # BLD: 2.2 K/UL (ref 0.5–4.6)
LYMPHOCYTES NFR BLD: 10 % (ref 13–44)
MCH RBC QN AUTO: 26 PG (ref 26.1–32.9)
MCHC RBC AUTO-ENTMCNC: 28.3 G/DL (ref 31.4–35)
MCV RBC AUTO: 91.9 FL (ref 82–102)
MONOCYTES # BLD: 1.7 K/UL (ref 0.1–1.3)
MONOCYTES NFR BLD: 8 % (ref 4–12)
NEUTS SEG # BLD: 15.8 K/UL (ref 1.7–8.2)
NEUTS SEG NFR BLD: 75 % (ref 43–78)
NRBC # BLD: 0 K/UL (ref 0–0.2)
PLATELET # BLD AUTO: 259 K/UL (ref 150–450)
PMV BLD AUTO: 10.6 FL (ref 9.4–12.3)
POTASSIUM SERPL-SCNC: 3.8 MMOL/L (ref 3.5–5.1)
RBC # BLD AUTO: 3.96 M/UL (ref 4.05–5.2)
SODIUM SERPL-SCNC: 139 MMOL/L (ref 136–145)
WBC # BLD AUTO: 21 K/UL (ref 4.3–11.1)

## 2024-11-11 PROCEDURE — 94760 N-INVAS EAR/PLS OXIMETRY 1: CPT

## 2024-11-11 PROCEDURE — 1100000000 HC RM PRIVATE

## 2024-11-11 PROCEDURE — 6370000000 HC RX 637 (ALT 250 FOR IP)

## 2024-11-11 PROCEDURE — 2580000003 HC RX 258

## 2024-11-11 PROCEDURE — 97535 SELF CARE MNGMENT TRAINING: CPT

## 2024-11-11 PROCEDURE — 6370000000 HC RX 637 (ALT 250 FOR IP): Performed by: INTERNAL MEDICINE

## 2024-11-11 PROCEDURE — 85025 COMPLETE CBC W/AUTO DIFF WBC: CPT

## 2024-11-11 PROCEDURE — 2580000003 HC RX 258: Performed by: INTERNAL MEDICINE

## 2024-11-11 PROCEDURE — 6370000000 HC RX 637 (ALT 250 FOR IP): Performed by: HOSPITALIST

## 2024-11-11 PROCEDURE — 94761 N-INVAS EAR/PLS OXIMETRY MLT: CPT

## 2024-11-11 PROCEDURE — 6360000002 HC RX W HCPCS: Performed by: INTERNAL MEDICINE

## 2024-11-11 PROCEDURE — 6360000002 HC RX W HCPCS: Performed by: FAMILY MEDICINE

## 2024-11-11 PROCEDURE — 99232 SBSQ HOSP IP/OBS MODERATE 35: CPT | Performed by: INTERNAL MEDICINE

## 2024-11-11 PROCEDURE — 6360000002 HC RX W HCPCS

## 2024-11-11 PROCEDURE — 94640 AIRWAY INHALATION TREATMENT: CPT

## 2024-11-11 PROCEDURE — 80048 BASIC METABOLIC PNL TOTAL CA: CPT

## 2024-11-11 PROCEDURE — 2700000000 HC OXYGEN THERAPY PER DAY

## 2024-11-11 PROCEDURE — 6370000000 HC RX 637 (ALT 250 FOR IP): Performed by: NURSE PRACTITIONER

## 2024-11-11 PROCEDURE — 36415 COLL VENOUS BLD VENIPUNCTURE: CPT

## 2024-11-11 PROCEDURE — 97530 THERAPEUTIC ACTIVITIES: CPT

## 2024-11-11 PROCEDURE — 6370000000 HC RX 637 (ALT 250 FOR IP): Performed by: FAMILY MEDICINE

## 2024-11-11 RX ADMIN — ENOXAPARIN SODIUM 40 MG: 100 INJECTION SUBCUTANEOUS at 08:37

## 2024-11-11 RX ADMIN — PREDNISONE 30 MG: 10 TABLET ORAL at 08:32

## 2024-11-11 RX ADMIN — IPRATROPIUM BROMIDE 0.5 MG: 0.5 SOLUTION RESPIRATORY (INHALATION) at 21:44

## 2024-11-11 RX ADMIN — IPRATROPIUM BROMIDE 0.5 MG: 0.5 SOLUTION RESPIRATORY (INHALATION) at 07:15

## 2024-11-11 RX ADMIN — Medication 4 ML: at 07:15

## 2024-11-11 RX ADMIN — PANTOPRAZOLE SODIUM 40 MG: 40 TABLET, DELAYED RELEASE ORAL at 06:08

## 2024-11-11 RX ADMIN — SODIUM CHLORIDE, PRESERVATIVE FREE 10 ML: 5 INJECTION INTRAVENOUS at 08:38

## 2024-11-11 RX ADMIN — ARFORMOTEROL TARTRATE: 15 SOLUTION RESPIRATORY (INHALATION) at 21:44

## 2024-11-11 RX ADMIN — SODIUM CHLORIDE, PRESERVATIVE FREE 10 ML: 5 INJECTION INTRAVENOUS at 22:21

## 2024-11-11 RX ADMIN — ARFORMOTEROL TARTRATE: 15 SOLUTION RESPIRATORY (INHALATION) at 07:15

## 2024-11-11 RX ADMIN — TRIAMCINOLONE ACETONIDE: 1 CREAM TOPICAL at 08:38

## 2024-11-11 RX ADMIN — PREGABALIN 200 MG: 50 CAPSULE ORAL at 08:33

## 2024-11-11 RX ADMIN — DAPTOMYCIN 750 MG: 500 INJECTION, POWDER, LYOPHILIZED, FOR SOLUTION INTRAVENOUS at 17:23

## 2024-11-11 RX ADMIN — LISINOPRIL 20 MG: 20 TABLET ORAL at 08:31

## 2024-11-11 RX ADMIN — FUROSEMIDE 20 MG: 10 INJECTION, SOLUTION INTRAMUSCULAR; INTRAVENOUS at 08:57

## 2024-11-11 RX ADMIN — GUAIFENESIN 1200 MG: 600 TABLET ORAL at 08:34

## 2024-11-11 RX ADMIN — ACETAZOLAMIDE 250 MG: 250 TABLET ORAL at 08:29

## 2024-11-11 RX ADMIN — HYDROCODONE BITARTRATE AND ACETAMINOPHEN 1 TABLET: 7.5; 325 TABLET ORAL at 06:08

## 2024-11-11 RX ADMIN — Medication 4 ML: at 21:44

## 2024-11-11 RX ADMIN — HYDROXYCHLOROQUINE SULFATE 200 MG: 200 TABLET ORAL at 08:32

## 2024-11-11 RX ADMIN — DULOXETINE HYDROCHLORIDE 60 MG: 60 CAPSULE, DELAYED RELEASE ORAL at 09:44

## 2024-11-11 RX ADMIN — ALUMINUM HYDROXIDE, MAGNESIUM HYDROXIDE, AND SIMETHICONE 30 ML: 200; 200; 20 SUSPENSION ORAL at 09:47

## 2024-11-11 ASSESSMENT — PAIN DESCRIPTION - ORIENTATION: ORIENTATION: RIGHT;UPPER

## 2024-11-11 ASSESSMENT — PAIN SCALES - GENERAL: PAINLEVEL_OUTOF10: 5

## 2024-11-11 ASSESSMENT — PAIN DESCRIPTION - DESCRIPTORS: DESCRIPTORS: ACHING;DISCOMFORT

## 2024-11-11 ASSESSMENT — PAIN DESCRIPTION - LOCATION: LOCATION: CHEST

## 2024-11-11 NOTE — CARE COORDINATION
Chart screened by CM for d/c planning.  Currently requiring 4L O2 NC.  On 11/10 at 1035 RT attempted to perform a 6MWT with pt but she refused.  Pt will need a 6MWT before d/c.  Pt also needs placement of a central line prior to d/c in order for her to receive home IV ABX.  CM needs to be notified when this will occur so that [facility] can plan to perform IP bedside education.  PT recommends HH at d/c.  OT does not recommend any further skilled therapy at d/c.  CM will continue to follow.  LOS = 12 days    1535: RT conducted a 6MWT and pt required 10L O2 NC.

## 2024-11-12 ENCOUNTER — APPOINTMENT (OUTPATIENT)
Dept: INTERVENTIONAL RADIOLOGY/VASCULAR | Age: 69
DRG: 189 | End: 2024-11-12
Attending: FAMILY MEDICINE
Payer: MEDICARE

## 2024-11-12 LAB
ANION GAP SERPL CALC-SCNC: 7 MMOL/L (ref 7–16)
BASOPHILS # BLD: 0.1 K/UL (ref 0–0.2)
BASOPHILS NFR BLD: 0 % (ref 0–2)
BUN SERPL-MCNC: 29 MG/DL (ref 8–23)
CALCIUM SERPL-MCNC: 10 MG/DL (ref 8.8–10.2)
CHLORIDE SERPL-SCNC: 105 MMOL/L (ref 98–107)
CK SERPL-CCNC: 88 U/L (ref 21–215)
CO2 SERPL-SCNC: 31 MMOL/L (ref 20–29)
CREAT SERPL-MCNC: 0.88 MG/DL (ref 0.6–1.1)
DIFFERENTIAL METHOD BLD: ABNORMAL
EOSINOPHIL # BLD: 1.3 K/UL (ref 0–0.8)
EOSINOPHIL NFR BLD: 6 % (ref 0.5–7.8)
ERYTHROCYTE [DISTWIDTH] IN BLOOD BY AUTOMATED COUNT: 19.3 % (ref 11.9–14.6)
GLUCOSE SERPL-MCNC: 122 MG/DL (ref 70–99)
HCT VFR BLD AUTO: 36.5 % (ref 35.8–46.3)
HGB BLD-MCNC: 10.5 G/DL (ref 11.7–15.4)
IMM GRANULOCYTES # BLD AUTO: 0.2 K/UL (ref 0–0.5)
IMM GRANULOCYTES NFR BLD AUTO: 1 % (ref 0–5)
LYMPHOCYTES # BLD: 2 K/UL (ref 0.5–4.6)
LYMPHOCYTES NFR BLD: 10 % (ref 13–44)
MCH RBC QN AUTO: 26.1 PG (ref 26.1–32.9)
MCHC RBC AUTO-ENTMCNC: 28.8 G/DL (ref 31.4–35)
MCV RBC AUTO: 90.8 FL (ref 82–102)
MONOCYTES # BLD: 1.8 K/UL (ref 0.1–1.3)
MONOCYTES NFR BLD: 9 % (ref 4–12)
NEUTS SEG # BLD: 15.7 K/UL (ref 1.7–8.2)
NEUTS SEG NFR BLD: 74 % (ref 43–78)
NRBC # BLD: 0 K/UL (ref 0–0.2)
PLATELET # BLD AUTO: 271 K/UL (ref 150–450)
PMV BLD AUTO: 10.7 FL (ref 9.4–12.3)
POTASSIUM SERPL-SCNC: 3.9 MMOL/L (ref 3.5–5.1)
RBC # BLD AUTO: 4.02 M/UL (ref 4.05–5.2)
SODIUM SERPL-SCNC: 144 MMOL/L (ref 136–145)
WBC # BLD AUTO: 21.1 K/UL (ref 4.3–11.1)

## 2024-11-12 PROCEDURE — 80048 BASIC METABOLIC PNL TOTAL CA: CPT

## 2024-11-12 PROCEDURE — 6360000002 HC RX W HCPCS: Performed by: INTERNAL MEDICINE

## 2024-11-12 PROCEDURE — 99232 SBSQ HOSP IP/OBS MODERATE 35: CPT | Performed by: INTERNAL MEDICINE

## 2024-11-12 PROCEDURE — 2700000000 HC OXYGEN THERAPY PER DAY

## 2024-11-12 PROCEDURE — 82550 ASSAY OF CK (CPK): CPT

## 2024-11-12 PROCEDURE — 0JH63XZ INSERTION OF TUNNELED VASCULAR ACCESS DEVICE INTO CHEST SUBCUTANEOUS TISSUE AND FASCIA, PERCUTANEOUS APPROACH: ICD-10-PCS | Performed by: RADIOLOGY

## 2024-11-12 PROCEDURE — 6370000000 HC RX 637 (ALT 250 FOR IP): Performed by: HOSPITALIST

## 2024-11-12 PROCEDURE — 76937 US GUIDE VASCULAR ACCESS: CPT | Performed by: PHYSICIAN ASSISTANT

## 2024-11-12 PROCEDURE — 2580000003 HC RX 258

## 2024-11-12 PROCEDURE — 6370000000 HC RX 637 (ALT 250 FOR IP): Performed by: FAMILY MEDICINE

## 2024-11-12 PROCEDURE — 94640 AIRWAY INHALATION TREATMENT: CPT

## 2024-11-12 PROCEDURE — 36558 INSERT TUNNELED CV CATH: CPT | Performed by: PHYSICIAN ASSISTANT

## 2024-11-12 PROCEDURE — 02HV33Z INSERTION OF INFUSION DEVICE INTO SUPERIOR VENA CAVA, PERCUTANEOUS APPROACH: ICD-10-PCS | Performed by: RADIOLOGY

## 2024-11-12 PROCEDURE — 36415 COLL VENOUS BLD VENIPUNCTURE: CPT

## 2024-11-12 PROCEDURE — 77001 FLUOROGUIDE FOR VEIN DEVICE: CPT | Performed by: PHYSICIAN ASSISTANT

## 2024-11-12 PROCEDURE — 6360000002 HC RX W HCPCS: Performed by: FAMILY MEDICINE

## 2024-11-12 PROCEDURE — 85025 COMPLETE CBC W/AUTO DIFF WBC: CPT

## 2024-11-12 PROCEDURE — 1100000000 HC RM PRIVATE

## 2024-11-12 PROCEDURE — 94761 N-INVAS EAR/PLS OXIMETRY MLT: CPT

## 2024-11-12 PROCEDURE — 2580000003 HC RX 258: Performed by: INTERNAL MEDICINE

## 2024-11-12 PROCEDURE — 97530 THERAPEUTIC ACTIVITIES: CPT

## 2024-11-12 PROCEDURE — 97535 SELF CARE MNGMENT TRAINING: CPT

## 2024-11-12 PROCEDURE — 77001 FLUOROGUIDE FOR VEIN DEVICE: CPT

## 2024-11-12 PROCEDURE — 6370000000 HC RX 637 (ALT 250 FOR IP)

## 2024-11-12 PROCEDURE — 6370000000 HC RX 637 (ALT 250 FOR IP): Performed by: INTERNAL MEDICINE

## 2024-11-12 PROCEDURE — 6370000000 HC RX 637 (ALT 250 FOR IP): Performed by: NURSE PRACTITIONER

## 2024-11-12 PROCEDURE — 36558 INSERT TUNNELED CV CATH: CPT

## 2024-11-12 RX ORDER — FUROSEMIDE 20 MG/1
20 TABLET ORAL DAILY
Status: DISCONTINUED | OUTPATIENT
Start: 2024-11-12 | End: 2024-11-13 | Stop reason: HOSPADM

## 2024-11-12 RX ADMIN — PREGABALIN 200 MG: 50 CAPSULE ORAL at 08:31

## 2024-11-12 RX ADMIN — HYDROXYCHLOROQUINE SULFATE 200 MG: 200 TABLET ORAL at 08:32

## 2024-11-12 RX ADMIN — PREDNISONE 30 MG: 10 TABLET ORAL at 08:32

## 2024-11-12 RX ADMIN — SODIUM CHLORIDE, PRESERVATIVE FREE 5 ML: 5 INJECTION INTRAVENOUS at 20:00

## 2024-11-12 RX ADMIN — SODIUM CHLORIDE, PRESERVATIVE FREE 10 ML: 5 INJECTION INTRAVENOUS at 08:33

## 2024-11-12 RX ADMIN — IPRATROPIUM BROMIDE 0.5 MG: 0.5 SOLUTION RESPIRATORY (INHALATION) at 14:48

## 2024-11-12 RX ADMIN — DAPTOMYCIN 750 MG: 500 INJECTION, POWDER, LYOPHILIZED, FOR SOLUTION INTRAVENOUS at 16:34

## 2024-11-12 RX ADMIN — ACETAZOLAMIDE 250 MG: 250 TABLET ORAL at 08:31

## 2024-11-12 RX ADMIN — FUROSEMIDE 20 MG: 20 TABLET ORAL at 14:24

## 2024-11-12 RX ADMIN — GUAIFENESIN 1200 MG: 600 TABLET ORAL at 08:31

## 2024-11-12 RX ADMIN — HYDROCODONE BITARTRATE AND ACETAMINOPHEN 1 TABLET: 7.5; 325 TABLET ORAL at 14:25

## 2024-11-12 RX ADMIN — LISINOPRIL 20 MG: 20 TABLET ORAL at 08:32

## 2024-11-12 RX ADMIN — TRIAMCINOLONE ACETONIDE: 1 CREAM TOPICAL at 08:33

## 2024-11-12 RX ADMIN — PREGABALIN 200 MG: 50 CAPSULE ORAL at 23:36

## 2024-11-12 RX ADMIN — FUROSEMIDE 20 MG: 10 INJECTION, SOLUTION INTRAMUSCULAR; INTRAVENOUS at 08:32

## 2024-11-12 RX ADMIN — DULOXETINE HYDROCHLORIDE 60 MG: 60 CAPSULE, DELAYED RELEASE ORAL at 08:32

## 2024-11-12 RX ADMIN — HYDROCODONE BITARTRATE AND ACETAMINOPHEN 1 TABLET: 7.5; 325 TABLET ORAL at 08:48

## 2024-11-12 ASSESSMENT — PAIN SCALES - GENERAL
PAINLEVEL_OUTOF10: 8
PAINLEVEL_OUTOF10: 0

## 2024-11-12 ASSESSMENT — PAIN DESCRIPTION - LOCATION
LOCATION: CHEST
LOCATION: CHEST

## 2024-11-12 ASSESSMENT — PAIN DESCRIPTION - DESCRIPTORS
DESCRIPTORS: DISCOMFORT
DESCRIPTORS: ACHING

## 2024-11-12 ASSESSMENT — PAIN DESCRIPTION - ORIENTATION
ORIENTATION: RIGHT
ORIENTATION: RIGHT

## 2024-11-12 NOTE — CONSULTS
History and Physical Initial Visit NOTE           10/31/2024    Meli Blancas                        Date of Admission:  10/30/2024    The patient's chart is reviewed and the patient is discussed with the staff.    Subjective:     Patient is a 69 y.o.  female seen and evaluated at the request of Hospitalist for acute hypoxic respiratory failure. Significant PMH of CAD, COPD, hypertension, Sjogren syndome on chronic steroids, and tobacco abuse. We have previously seen her in consult in April of this year for worsening respiratory failure. Hx of COPD and on 2 L oxygen as needed at home at that time. She was also not on inhalers at that time reporting she could not tolerate them. Has 40 year smoking hx 1 PPD. History of noncompliance with treatment and aggressive behavior in the past.      She did have recent admission at [facility] for bacteremia and respiratory failure. Left hospital AMA on 10/17. She is followed by oncology for pemphigus foliaceous and was to start rituximab on 10/31 admission. She has not seen pulmonary outpatient for follow up on Emphysema and on Advair at home. 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  She reported foul smelling drainage from the lesions. During her ER course she became hypoxic with saturations in the 80s on room air. She was then requiring 5 L oxygen to maintain saturations >90%.  WBC 9.8.    She is lying semi-nobles in bed on Airvo NC flow rate 60 L/min with FiO2 85%. Her breathing is shallow and she is mainly breathing through her mouth. She denies any sob, cough, or fever at home prior to going to the ER. Denies being sob presently. She does report having 2 L oxygen at home that she uses as needed. Denies have any noticeable swelling in her lower extremities. She does have purulent rash around neck and under left armpit which she reports is very painful and she does seem very sensitive to any touch with 
ID consult received.  Case discussed/reviewed with Dr. Coffey and plan as follows:    Patient previously seen by ID at [facility] for polymicrobial bacteremia (MRSA and Proteus) 10/10/24 and AV Endocarditis on SILVA 10/16/24.  Patient left AMA on 10/16/24.  ID recommendations for Cipro and Rifampin were made in this scenario given patient was leaving AMA but this was noted to be inferior therapy given bacteremia with endocarditis and only provided as an option because patient was leaving AMA.  Upon review of [facility] Epic, it does not appear patient was discharged with prescriptions for Cipro/Rifampin so not clear patient has been taking medications prior to current hospitalization at [facility].  Also, note Cipro sensitivity was intermediate to MRSA.                  Plan  Obtain blood cultures   Stop doxycyline and rifampin   Continue Cipro for now as I am not certain patient completed full course for Proteus bacteremia previously.    Start Vancomycin pharmacy to dose.  (Plan to utilize Vancomycin for now given patient's respiratory status.  Patient did have a brief episode of elevated eosinophils while on Datpomycin at [facility] but not clear this was truly related to Daptomycin.)  Continue Dapsone   Appreciate Pulmonary ordering TTE  Formal ID consult will be completed tomorrow       
Infectious Diseases Consult    Today's Date: 2024   Admit Date: 10/30/2024  : 1955    Impression:   Recent MRSA aortic valve endocarditis-did not stay for treatment, unclear if oral antibiotics were called in.  She certainly did not pick anything up.  Polymicrobial bacteremia, incompletely treated  Pemphigus foliaceous-has been considered for rituximab, would need to hold off until patient has endocarditis treated.  Currently on dapsone, Plaquenil and prednisone  Acute on chronic hypoxic respiratory failure    Plan:   Continue vancomycin, duration to be determined.  Follow-up blood cultures pending, if those have cleared, will just target  6 weeks of therapy.   Continue ciprofloxacin, will target 5 to 7 days as long as blood cultures remain negative  Patient is stated that she would not be willing to stick around to the hospital for 6 weeks for IV antibiotics.  I think she also included not being willing to stay at a skilled nursing facility for this long either.  I think it would be reasonable to consider dalbavancin in her given her refusal to remain inpatient.   Will recommend holding off on rituximab or other severely immunosuppressive medications until she has had several days of improvement  ID will follow along    Anti-infectives:   Vancomycin 10/10 - 10/16; -  Ciprofloxacin -  Chronic Plaquenil/dapsone    Subjective:   Date of Consultation:  2024  Referring Physician: Chandan Castillo MD for: assistance in management of prior MRSA infection    Patient is a 69 y.o. female with a past medical history of pemphigus foliaceous/erythematous, recurrent skin infections, and a recent history of MRSA positive history of infection in the setting of a new aortic valve vegetation.  She was found to be bacteremic had a ED evaluation on 10/3 blood cultures were obtained.  Eventually one of the stool came back positive for Proteus and MRSA and she was called back to the hospital on 
Procedure performed.  
Neutrophils Absolute 6.7 1.7 - 8.2 K/UL    Lymphocytes Absolute 1.4 0.5 - 4.6 K/UL    Monocytes Absolute 1.1 0.1 - 1.3 K/UL    Eosinophils Absolute 0.4 0.0 - 0.8 K/UL    Basophils Absolute 0.0 0.0 - 0.2 K/UL    Immature Granulocytes Absolute 0.1 0.0 - 0.5 K/UL   CMP    Collection Time: 10/30/24  5:30 PM   Result Value Ref Range    Sodium 139 136 - 145 mmol/L    Potassium 3.3 (L) 3.5 - 5.1 mmol/L    Chloride 99 98 - 107 mmol/L    CO2 30 (H) 20 - 29 mmol/L    Anion Gap 11 7 - 16 mmol/L    Glucose 128 (H) 70 - 99 mg/dL    BUN 8 8 - 23 MG/DL    Creatinine 0.76 0.60 - 1.10 MG/DL    Est, Glom Filt Rate 84 >60 ml/min/1.73m2    Calcium 9.2 8.8 - 10.2 MG/DL    Total Bilirubin 1.3 (H) 0.0 - 1.2 MG/DL    ALT 18 8 - 45 U/L    AST 22 15 - 37 U/L    Alk Phosphatase 113 (H) 35 - 104 U/L    Total Protein 6.6 6.3 - 8.2 g/dL    Albumin 2.9 (L) 3.2 - 4.6 g/dL    Globulin 3.7 (H) 2.3 - 3.5 g/dL    Albumin/Globulin Ratio 0.8 (L) 1.0 - 1.9     Lactic Acid    Collection Time: 10/30/24  5:30 PM   Result Value Ref Range    Lactic Acid 1.4 0.5 - 2.0 mmol/L   CBC with Auto Differential    Collection Time: 10/31/24  6:07 AM   Result Value Ref Range    WBC 10.9 4.3 - 11.1 K/uL    RBC 4.74 4.05 - 5.2 M/uL    Hemoglobin 12.6 11.7 - 15.4 g/dL    Hematocrit 43.6 35.8 - 46.3 %    MCV 92.0 82 - 102 FL    MCH 26.6 26.1 - 32.9 PG    MCHC 28.9 (L) 31.4 - 35.0 g/dL    RDW 20.3 (H) 11.9 - 14.6 %    Platelets 209 150 - 450 K/uL    MPV 10.1 9.4 - 12.3 FL    nRBC 0.00 0.0 - 0.2 K/uL    Differential Type AUTOMATED      Neutrophils % 86 (H) 43 - 78 %    Lymphocytes % 10 (L) 13 - 44 %    Monocytes % 3 (L) 4.0 - 12.0 %    Eosinophils % 1 0.5 - 7.8 %    Basophils % 0 0.0 - 2.0 %    Immature Granulocytes % 1 0.0 - 5.0 %    Neutrophils Absolute 9.3 (H) 1.7 - 8.2 K/UL    Lymphocytes Absolute 1.0 0.5 - 4.6 K/UL    Monocytes Absolute 0.3 0.1 - 1.3 K/UL    Eosinophils Absolute 0.1 0.0 - 0.8 K/UL    Basophils Absolute 0.0 0.0 - 0.2 K/UL    Immature Granulocytes Absolute

## 2024-11-12 NOTE — PLAN OF CARE
Problem: Discharge Planning  Goal: Discharge to home or other facility with appropriate resources  11/12/2024 1117 by Lauryn Albert RN  Outcome: Progressing  11/12/2024 0112 by Vinh Hooker RN  Outcome: Progressing     Problem: Pain  Goal: Verbalizes/displays adequate comfort level or baseline comfort level  11/12/2024 1117 by Lauryn Albert RN  Outcome: Progressing  11/12/2024 0112 by Vinh Hooker RN  Outcome: Progressing     Problem: Skin/Tissue Integrity  Goal: Absence of new skin breakdown  Description: 1.  Monitor for areas of redness and/or skin breakdown  2.  Assess vascular access sites hourly  3.  Every 4-6 hours minimum:  Change oxygen saturation probe site  4.  Every 4-6 hours:  If on nasal continuous positive airway pressure, respiratory therapy assess nares and determine need for appliance change or resting period.  11/12/2024 1117 by Lauryn Albert RN  Outcome: Progressing  11/12/2024 0112 by Vinh Hooker RN  Outcome: Progressing     Problem: Safety - Adult  Goal: Free from fall injury  11/12/2024 1117 by Lauryn Albert RN  Outcome: Progressing  11/12/2024 0112 by Vinh Hooker RN  Outcome: Progressing  Flowsheets (Taken 11/11/2024 2004)  Free From Fall Injury: Instruct family/caregiver on patient safety     Problem: Confusion  Goal: Confusion, delirium, dementia, or psychosis is improved or at baseline  Description: INTERVENTIONS:  1. Assess for possible contributors to thought disturbance, including medications, impaired vision or hearing, underlying metabolic abnormalities, dehydration, psychiatric diagnoses, and notify attending LIP  2. [place] high risk fall precautions, as indicated  3. Provide frequent short contacts to provide reality reorientation, refocusing and direction  4. Decrease environmental stimuli, including noise as appropriate  5. Monitor and intervene to maintain adequate nutrition, hydration, elimination, sleep and activity  6. If unable to ensure

## 2024-11-12 NOTE — BRIEF OP NOTE
[facility]  Department of Interventional Radiology  [phone]        Interventional Radiology Brief Procedure Note    Patient: Meli Blancas MRN: [id number]  SSN: [id number]    YOB: 1955  Age: 69 y.o.  Sex: female      Date of Procedure: 11/12/2024    Pre-Procedure Diagnosis: endocarditis    Post-Procedure Diagnosis: SAME    Procedure(s): Tunneled Central Venous Catheter    Brief Description of Procedure: as above    Performed By: Noemy Antunez PA-C     Assistants: None    Anesthesia:Lidocaine    Estimated Blood Loss: Less than 10ml    Specimens:  None    Implants:  Tunneled Central Venous Catheter    Findings: catheter tip in right atrium     Complications: None    Recommendations: ok to use catheter     Follow Up: prn    Signed By: Noemy Antunez PA-C     November 12, 2024

## 2024-11-12 NOTE — OP NOTE
Title: Tunneled central venous catheter placement.      Indication:   Endocarditis    A tunneled central venous catheter is placed for this patient through the right  internal jugular vein using ultrasound and fluoroscopic guidance with maximum  sterile barrier appropriately documented and fluoroscopy time and images  documented in the report.    :  Noemy Antunez PA-C    Supervising Physician: Feliciano Espinal MD.  The physician attests to supervising  this procedure and agrees with the report as written.      Consent: Informed written and oral consent was obtained from the patient after  explanation of benefits and risks (including, but not limited to: Infection,  hemorrhage, pneumothorax).  The patient's questions were answered to their  satisfaction.  The patient stated understanding and requested that we proceed.    Procedure: This central venous catheter was inserted with all elements of  maximal sterile barrier technique, and cap and mask and sterile gown and sterile  gloves and sterile full-body drape and hand hygiene and 2% chlorhexidine for  cutaneous antisepsis and sterile ultrasound gel and sterile ultrasound probe  cover.    After the patient was prepped and draped, a local field block with lidocaine was  achieved.  Ultrasound evaluation of all potential access sites was performed due  to lack of a palpable vein.   No veins were palpable due to overlying adipose  tissue.  Using real-time ultrasound guidance, with appropriate image recording  and visualization of vascular needle entry, the patent right internal jugular  vein was accessed using micropuncture technique.  Using fluoroscopy, a peel-away  sheath was placed over a wire.    A subcutaneous tunnel was anesthetized on the right chest wall.  The new  catheter was pulled through the subcutaneous tunnel and passed down the  peel-away sheath.      All lumens aspirated easily and were filled with heparinized saline.  The  venotomy

## 2024-11-12 NOTE — DISCHARGE INSTRUCTIONS
[facility]  [address] [place] [address]   [phone]    The pulmonary office will call you in 1-2 business days to arrange follow up in the pulmonary office. If you have not heard from the office after 2 full business days, please call the office to arrange follow up.

## 2024-11-13 VITALS
WEIGHT: 163.8 LBS | HEIGHT: 60 IN | HEART RATE: 87 BPM | RESPIRATION RATE: 20 BRPM | TEMPERATURE: 97.9 F | BODY MASS INDEX: 32.16 KG/M2 | SYSTOLIC BLOOD PRESSURE: 130 MMHG | DIASTOLIC BLOOD PRESSURE: 61 MMHG | OXYGEN SATURATION: 92 %

## 2024-11-13 LAB
ANION GAP SERPL CALC-SCNC: 9 MMOL/L (ref 7–16)
BASOPHILS # BLD: 0.1 K/UL (ref 0–0.2)
BASOPHILS NFR BLD: 1 % (ref 0–2)
BUN SERPL-MCNC: 36 MG/DL (ref 8–23)
CALCIUM SERPL-MCNC: 9.4 MG/DL (ref 8.8–10.2)
CHLORIDE SERPL-SCNC: 104 MMOL/L (ref 98–107)
CO2 SERPL-SCNC: 28 MMOL/L (ref 20–29)
CREAT SERPL-MCNC: 0.88 MG/DL (ref 0.6–1.1)
DIFFERENTIAL METHOD BLD: ABNORMAL
EOSINOPHIL # BLD: 1.2 K/UL (ref 0–0.8)
EOSINOPHIL NFR BLD: 8 % (ref 0.5–7.8)
ERYTHROCYTE [DISTWIDTH] IN BLOOD BY AUTOMATED COUNT: 19.3 % (ref 11.9–14.6)
GLUCOSE SERPL-MCNC: 96 MG/DL (ref 70–99)
HCT VFR BLD AUTO: 34.9 % (ref 35.8–46.3)
HGB BLD-MCNC: 10.1 G/DL (ref 11.7–15.4)
IMM GRANULOCYTES # BLD AUTO: 0.1 K/UL (ref 0–0.5)
IMM GRANULOCYTES NFR BLD AUTO: 1 % (ref 0–5)
LYMPHOCYTES # BLD: 2.3 K/UL (ref 0.5–4.6)
LYMPHOCYTES NFR BLD: 15 % (ref 13–44)
MCH RBC QN AUTO: 26.2 PG (ref 26.1–32.9)
MCHC RBC AUTO-ENTMCNC: 28.9 G/DL (ref 31.4–35)
MCV RBC AUTO: 90.4 FL (ref 82–102)
MONOCYTES # BLD: 1.3 K/UL (ref 0.1–1.3)
MONOCYTES NFR BLD: 9 % (ref 4–12)
NEUTS SEG # BLD: 10.2 K/UL (ref 1.7–8.2)
NEUTS SEG NFR BLD: 67 % (ref 43–78)
NRBC # BLD: 0 K/UL (ref 0–0.2)
PLATELET # BLD AUTO: 268 K/UL (ref 150–450)
PMV BLD AUTO: 10.1 FL (ref 9.4–12.3)
POTASSIUM SERPL-SCNC: 3.7 MMOL/L (ref 3.5–5.1)
RBC # BLD AUTO: 3.86 M/UL (ref 4.05–5.2)
SODIUM SERPL-SCNC: 140 MMOL/L (ref 136–145)
WBC # BLD AUTO: 15.1 K/UL (ref 4.3–11.1)

## 2024-11-13 PROCEDURE — 6360000002 HC RX W HCPCS: Performed by: FAMILY MEDICINE

## 2024-11-13 PROCEDURE — 2700000000 HC OXYGEN THERAPY PER DAY

## 2024-11-13 PROCEDURE — 94760 N-INVAS EAR/PLS OXIMETRY 1: CPT

## 2024-11-13 PROCEDURE — 36415 COLL VENOUS BLD VENIPUNCTURE: CPT

## 2024-11-13 PROCEDURE — 85025 COMPLETE CBC W/AUTO DIFF WBC: CPT

## 2024-11-13 PROCEDURE — 6370000000 HC RX 637 (ALT 250 FOR IP): Performed by: FAMILY MEDICINE

## 2024-11-13 PROCEDURE — 6370000000 HC RX 637 (ALT 250 FOR IP): Performed by: HOSPITALIST

## 2024-11-13 PROCEDURE — 2580000003 HC RX 258

## 2024-11-13 PROCEDURE — 94640 AIRWAY INHALATION TREATMENT: CPT

## 2024-11-13 PROCEDURE — 6360000002 HC RX W HCPCS

## 2024-11-13 PROCEDURE — 6370000000 HC RX 637 (ALT 250 FOR IP)

## 2024-11-13 PROCEDURE — 6370000000 HC RX 637 (ALT 250 FOR IP): Performed by: NURSE PRACTITIONER

## 2024-11-13 PROCEDURE — 80048 BASIC METABOLIC PNL TOTAL CA: CPT

## 2024-11-13 PROCEDURE — 2580000003 HC RX 258: Performed by: INTERNAL MEDICINE

## 2024-11-13 PROCEDURE — 6360000002 HC RX W HCPCS: Performed by: INTERNAL MEDICINE

## 2024-11-13 RX ORDER — SULFAMETHOXAZOLE AND TRIMETHOPRIM 800; 160 MG/1; MG/1
1 TABLET ORAL
Qty: 14 TABLET | Refills: 0 | Status: SHIPPED | OUTPATIENT
Start: 2024-11-13 | End: 2024-12-16

## 2024-11-13 RX ORDER — SODIUM CHLORIDE FOR INHALATION 3 %
4 VIAL, NEBULIZER (ML) INHALATION
Qty: 30 ML | Refills: 0 | Status: SHIPPED | OUTPATIENT
Start: 2024-11-13

## 2024-11-13 RX ORDER — PANTOPRAZOLE SODIUM 40 MG/1
40 TABLET, DELAYED RELEASE ORAL
Qty: 30 TABLET | Refills: 3 | Status: SHIPPED | OUTPATIENT
Start: 2024-11-13

## 2024-11-13 RX ORDER — PREDNISONE 10 MG/1
30 TABLET ORAL DAILY
Qty: 30 TABLET | Refills: 0 | Status: SHIPPED | OUTPATIENT
Start: 2024-11-14 | End: 2024-11-24

## 2024-11-13 RX ORDER — GUAIFENESIN 600 MG/1
1200 TABLET, EXTENDED RELEASE ORAL 2 TIMES DAILY
Qty: 28 TABLET | Refills: 0 | Status: SHIPPED | OUTPATIENT
Start: 2024-11-13 | End: 2024-11-20

## 2024-11-13 RX ORDER — IPRATROPIUM BROMIDE AND ALBUTEROL SULFATE 2.5; .5 MG/3ML; MG/3ML
3 SOLUTION RESPIRATORY (INHALATION)
Qty: 360 ML | Refills: 0 | Status: SHIPPED | OUTPATIENT
Start: 2024-11-13 | End: 2024-12-13

## 2024-11-13 RX ADMIN — PREDNISONE 30 MG: 10 TABLET ORAL at 09:21

## 2024-11-13 RX ADMIN — TRIAMCINOLONE ACETONIDE: 1 CREAM TOPICAL at 09:24

## 2024-11-13 RX ADMIN — IPRATROPIUM BROMIDE 0.5 MG: 0.5 SOLUTION RESPIRATORY (INHALATION) at 07:08

## 2024-11-13 RX ADMIN — GUAIFENESIN 1200 MG: 600 TABLET ORAL at 09:21

## 2024-11-13 RX ADMIN — FUROSEMIDE 20 MG: 20 TABLET ORAL at 09:22

## 2024-11-13 RX ADMIN — PREGABALIN 200 MG: 50 CAPSULE ORAL at 09:21

## 2024-11-13 RX ADMIN — ENOXAPARIN SODIUM 40 MG: 100 INJECTION SUBCUTANEOUS at 09:24

## 2024-11-13 RX ADMIN — LISINOPRIL 20 MG: 20 TABLET ORAL at 09:21

## 2024-11-13 RX ADMIN — SODIUM CHLORIDE, PRESERVATIVE FREE 10 ML: 5 INJECTION INTRAVENOUS at 09:24

## 2024-11-13 RX ADMIN — ARFORMOTEROL TARTRATE: 15 SOLUTION RESPIRATORY (INHALATION) at 07:08

## 2024-11-13 RX ADMIN — HYDROCODONE BITARTRATE AND ACETAMINOPHEN 1 TABLET: 7.5; 325 TABLET ORAL at 09:22

## 2024-11-13 RX ADMIN — DULOXETINE HYDROCHLORIDE 60 MG: 60 CAPSULE, DELAYED RELEASE ORAL at 09:21

## 2024-11-13 RX ADMIN — IPRATROPIUM BROMIDE 0.5 MG: 0.5 SOLUTION RESPIRATORY (INHALATION) at 14:28

## 2024-11-13 RX ADMIN — DAPTOMYCIN 750 MG: 500 INJECTION, POWDER, LYOPHILIZED, FOR SOLUTION INTRAVENOUS at 13:20

## 2024-11-13 ASSESSMENT — PAIN DESCRIPTION - ORIENTATION: ORIENTATION: RIGHT

## 2024-11-13 ASSESSMENT — PAIN DESCRIPTION - LOCATION: LOCATION: CHEST

## 2024-11-13 ASSESSMENT — PAIN DESCRIPTION - DESCRIPTORS: DESCRIPTORS: DISCOMFORT;ACHING

## 2024-11-13 ASSESSMENT — PAIN - FUNCTIONAL ASSESSMENT: PAIN_FUNCTIONAL_ASSESSMENT: ACTIVITIES ARE NOT PREVENTED

## 2024-11-13 ASSESSMENT — PAIN SCALES - GENERAL: PAINLEVEL_OUTOF10: 6

## 2024-11-13 NOTE — CARE COORDINATION
Pt to d/c home today.  Her friend/aide will provide transportation.  PT/OT do not recommend any further skilled therapy at d/c.  Home IV ABX (EOT 12/11) ordered through [facility].  Consents and education completed.  HH SN ordered through [facility] per pt's choice.  RT conducted a repeat 6MWT and pt required 8L with ambulation and 4L at rest.  Pt currently receives home O2 through [facility].  CM sent new O2 orders to [facility] for a 10L home concentrator.  No other supportive care needs identified.  Pt agrees with d/c plan.  Milestones met.  LOS = 14 days    Per pt her aide is unable to provide transportation today.  Roundtrip scheduled for 1530.  CM received permission from [facility] to provide a portable tank to pt for transport.     10/31/24 1141   Service Assessment   Patient Orientation Alert and Oriented;Person;Place;Self   Cognition Alert   History Provided By Patient;Medical Record   Primary Caregiver Self   Accompanied By/Relationship N/A   Support Systems Friends/Neighbors   Patient's Healthcare Decision Maker is: Legal Next of Kin   PCP Verified by CM Yes  (Florencio Banegas MD)   Last Visit to PCP Within last 3 months   Prior Functional Level Assistance with the following:;Mobility;Shopping;Housework   Current Functional Level Assistance with the following:;Mobility;Shopping;Housework   Can patient return to prior living arrangement Yes   Ability to make needs known: Good   Family able to assist with home care needs: No   Would you like for me to discuss the discharge plan with any other family members/significant others, and if so, who? No   Financial Resources Medicaid;Medicare  ([facility] Medicare)   Community Resources None   CM/SW Referral Other (see comment)  (None)   Social/Functional History   Lives With Alone   Type of Home House   Home Layout One level   Home Access Stairs to enter with rails   Entrance Stairs - Number of Steps 6   Entrance Stairs - Rails Both   Bathroom Toilet

## 2024-11-13 NOTE — DISCHARGE SUMMARY
and was to start rituximab on 10/31.  Oncology consulted and recommend to hold off on rituximab at this time due to patient not stable and recommend outpatient follow-up.  Currently on prednisone.  Dapsone discontinued due to concern for Methemoglobinemia on 11/5 Co-oximetry.  G6PD within Normal Limit.     Patient also with recent MRSA aortic valve endocarditis.  Documented on TTE and SILVA 10/16/2024 at [facility] but patient left AMA from [facility] on 10/17.  Blood culture 11/1/2024 here with 1/4 diphtheroids, no MRSA, likely contaminated.  ID consulted and recommended to continue daptomycin for MRSA AV endocarditis, EOT 12/11/2024.  OPAT orders were sent to case management.  ID also recommend patient will need PJP prophylaxis on discharge, Bactrim DS 3 times a week.      PICC line ordered for long-term IV antibiotics and PICC team notified patient is not a candidate for PICC placement due to right partial mastectomy with sentinel lymph node biopsy in 2021 and her left arm with pemphigus foliaceous.  IR consulted for tunneled central line placement 11/12.      Patient is medically stable for discharge today.  Patient was walked and was noted to require 8 L of oxygen to maintain saturation greater than 90% on ambulation.  She will require 4 L of nasal cannula at rest.  Patient has been instructed to continue with home medications and inhaler therapy.  Patient will have new home oxygen machine/concentrator delivered today.  Her IV antibiotics have already been set up for home infusion services.  Patient will be discharged on Bactrim prophylaxis that she will need to continue taking prednisone 30 mg daily for now.  She will need to follow-up with her doctor regarding tapering of her steroids in relation to her current skin issues.  All questions were answered at bedside.  Patient is in agreement plan.  Medications were sent to her preferred pharmacy.  Patient to follow-up with a dermatologist, pulmonologist as instructed.

## 2024-11-13 NOTE — CARE COORDINATION
Chart screened by CM for d/c planning.  On 11/12 pt had CVC placed.  James with [facility] met with pt and signed consents.  Per pt her friend/aide is a retired nurse and has no concern regarding administering home IV ABX.  PT/OT do not recommend any further skilled therapy at d/c.  CM has ordered [facility]: SN for home labs and CVC care.  HH agency per pt's choice.  Pt currently requiring 4L O2 NC.  Pt will require a repeat 6MWT prior to d/c as last test pt required 10L.  CM will continue to follow.  LOS = 14 days

## 2024-11-13 NOTE — PROGRESS NOTES
Meli Blancas  Admission Date: 10/30/2024         Daily Progress Note: 11/1/2024    The patient's chart is reviewed and the patient is discussed with the staff.    Background: 69 y.o.  female seen and evaluated at the request of Hospitalist for acute hypoxic respiratory failure. Significant PMH of CAD, COPD, hypertension, Sjogren syndome on chronic steroids, and tobacco abuse. We have previously seen her in consult in April of this year for worsening respiratory failure. Hx of COPD and on 2 L oxygen as needed at home at that time. She was also not on inhalers at that time reporting she could not tolerate them. Has 40 year smoking hx 1 PPD. History of noncompliance with treatment and aggressive behavior in the past.       She did have recent admission at [facility] for bacteremia (proteus and MRSA) and respiratory failure. TTE at that time with vegetation on the aortic valve and she was diagnosed with infectious endocarditis. Left hospital AMA on 10/17 and ID suggested oral cipro 750mg bid and rifampin 300mg bid EOT 11/21. She is followed by oncology for pemphigus foliaceous and was to start rituximab on 10/31 admission. She has not seen pulmonary outpatient for follow up on Emphysema. 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  She reported foul smelling drainage from the lesions. During her ER course she became hypoxic with saturations in the 80s on room air. She was then requiring 5 L oxygen to maintain saturations >90%.  WBC 9.8.     Was on airvo and has weaned.  Her breathing is shallow and she is mainly breathing through her mouth. She denies any sob, cough, or fever at home prior to going to the ER. Denies being sob presently. She does report having 2 L oxygen at home that she uses as needed. Denies have any noticeable swelling in her lower extremities. She does have purulent rash around neck and under left armpit which she reports is 
               Meli Blancas  Admission Date: 10/30/2024         Daily Progress Note: 11/10/2024    The patient's chart is reviewed and the patient is discussed with the staff.    Background: 69 y.o.  female seen and evaluated at the request of Hospitalist for acute hypoxic respiratory failure. Significant PMH of CAD, COPD, hypertension, Sjogren syndome on chronic steroids, and tobacco abuse. We have previously seen her in consult in April of this year for worsening respiratory failure. Hx of COPD and on 2 L oxygen as needed at home at that time. She was also not on inhalers at that time reporting she could not tolerate them. Has 40 year smoking hx 1 PPD. History of noncompliance with treatment and aggressive behavior in the past.       She did have recent admission at [facility] for bacteremia (proteus and MRSA) and respiratory failure. TTE at that time with vegetation on the aortic valve and she was diagnosed with infectious endocarditis. Left hospital AMA on 10/17 and ID suggested oral cipro 750mg bid and rifampin 300mg bid EOT 11/21. She is followed by oncology for pemphigus foliaceous and was to start rituximab on 10/31 admission. She has not seen pulmonary outpatient for follow up on Emphysema. 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  She reported foul smelling drainage from the lesions. Hypoxic in ED requiring 5 L oxygen to maintain saturations >90%.      Was on airvo and has weaned.  Her breathing is shallow and she is mainly breathing through her mouth. She denies any sob, cough, or fever at home prior to going to the ER. Denies being sob presently. She does report having 2 L oxygen at home that she uses as needed. Denies have any noticeable swelling in her lower extremities. She does have purulent rash around neck and under left armpit which she reports is very painful and she does seem very sensitive to any touch with stethoscope. Anticipate 
               Meli Blancas  Admission Date: 10/30/2024         Daily Progress Note: 11/12/2024    The patient's chart is reviewed and the patient is discussed with the staff.    Background:   69 y.o.  female seen and evaluated at the request of Hospitalist for acute hypoxic respiratory failure. Significant PMH of CAD, COPD, hypertension, Sjogren syndome on chronic steroids, and tobacco abuse. We have previously seen her in consult in April of this year for worsening respiratory failure. Hx of COPD and on 2 L oxygen as needed at home at that time. She was also not on inhalers at that time reporting she could not tolerate them. Has 40 year smoking hx 1 PPD. History of noncompliance with treatment and aggressive behavior in the past.       She had a recent admission at [facility] for bacteremia (proteus and MRSA) and respiratory failure. TTE at that time with vegetation on the aortic valve and she was diagnosed with infectious endocarditis. Left hospital AMA on 10/17 and ID suggested oral cipro 750mg bid and rifampin 300mg bid EOT 11/21. She is followed by oncology for pemphigus foliaceous and was to start rituximab on 10/31 admission. She has not seen pulmonary outpatient for follow up on emphysema. 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  She reported foul smelling drainage from the lesions. Hypoxic in ED requiring 5 L oxygen to maintain saturations >90%.      Was on airvo and has weaned.  Her breathing is shallow and she is mainly breathing through her mouth. She denies any sob, cough, or fever at home prior to going to the ER. Denies being sob presently. She does report having 2 L oxygen at home that she uses as needed. Denies have any noticeable swelling in her lower extremities. She does have purulent rash around neck and under left armpit which she reports is very painful and she does seem very sensitive to any touch with stethoscope. Anticipate this 
               Meli Blancas  Admission Date: 10/30/2024         Daily Progress Note: 11/2/2024    The patient's chart is reviewed and the patient is discussed with the staff.    Background: 69 y.o.  female seen and evaluated at the request of Hospitalist for acute hypoxic respiratory failure. Significant PMH of CAD, COPD, hypertension, Sjogren syndome on chronic steroids, and tobacco abuse. We have previously seen her in consult in April of this year for worsening respiratory failure. Hx of COPD and on 2 L oxygen as needed at home at that time. She was also not on inhalers at that time reporting she could not tolerate them. Has 40 year smoking hx 1 PPD. History of noncompliance with treatment and aggressive behavior in the past.       She did have recent admission at [facility] for bacteremia (proteus and MRSA) and respiratory failure. TTE at that time with vegetation on the aortic valve and she was diagnosed with infectious endocarditis. Left hospital AMA on 10/17 and ID suggested oral cipro 750mg bid and rifampin 300mg bid EOT 11/21. She is followed by oncology for pemphigus foliaceous and was to start rituximab on 10/31 admission. She has not seen pulmonary outpatient for follow up on Emphysema. 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  She reported foul smelling drainage from the lesions. During her ER course she became hypoxic with saturations in the 80s on room air. She was then requiring 5 L oxygen to maintain saturations >90%.  WBC 9.8.     Was on airvo and has weaned.  Her breathing is shallow and she is mainly breathing through her mouth. She denies any sob, cough, or fever at home prior to going to the ER. Denies being sob presently. She does report having 2 L oxygen at home that she uses as needed. Denies have any noticeable swelling in her lower extremities. She does have purulent rash around neck and under left armpit which she reports is 
               Meli Blancas  Admission Date: 10/30/2024         Daily Progress Note: 11/3/2024    The patient's chart is reviewed and the patient is discussed with the staff.    Background: 69 y.o.  female seen and evaluated at the request of Hospitalist for acute hypoxic respiratory failure. Significant PMH of CAD, COPD, hypertension, Sjogren syndome on chronic steroids, and tobacco abuse. We have previously seen her in consult in April of this year for worsening respiratory failure. Hx of COPD and on 2 L oxygen as needed at home at that time. She was also not on inhalers at that time reporting she could not tolerate them. Has 40 year smoking hx 1 PPD. History of noncompliance with treatment and aggressive behavior in the past.       She did have recent admission at [facility] for bacteremia (proteus and MRSA) and respiratory failure. TTE at that time with vegetation on the aortic valve and she was diagnosed with infectious endocarditis. Left hospital AMA on 10/17 and ID suggested oral cipro 750mg bid and rifampin 300mg bid EOT 11/21. She is followed by oncology for pemphigus foliaceous and was to start rituximab on 10/31 admission. She has not seen pulmonary outpatient for follow up on Emphysema. 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  She reported foul smelling drainage from the lesions. During her ER course she became hypoxic with saturations in the 80s on room air. She was then requiring 5 L oxygen to maintain saturations >90%.  WBC 9.8.     Was on airvo and has weaned.  Her breathing is shallow and she is mainly breathing through her mouth. She denies any sob, cough, or fever at home prior to going to the ER. Denies being sob presently. She does report having 2 L oxygen at home that she uses as needed. Denies have any noticeable swelling in her lower extremities. She does have purulent rash around neck and under left armpit which she reports is 
               Meli Blancas  Admission Date: 10/30/2024         Daily Progress Note: 11/6/2024    The patient's chart is reviewed and the patient is discussed with the staff.    Background: 69 y.o.  female seen and evaluated at the request of Hospitalist for acute hypoxic respiratory failure. Significant PMH of CAD, COPD, hypertension, Sjogren syndome on chronic steroids, and tobacco abuse. We have previously seen her in consult in April of this year for worsening respiratory failure. Hx of COPD and on 2 L oxygen as needed at home at that time. She was also not on inhalers at that time reporting she could not tolerate them. Has 40 year smoking hx 1 PPD. History of noncompliance with treatment and aggressive behavior in the past.       She did have recent admission at [facility] for bacteremia (proteus and MRSA) and respiratory failure. TTE at that time with vegetation on the aortic valve and she was diagnosed with infectious endocarditis. Left hospital AMA on 10/17 and ID suggested oral cipro 750mg bid and rifampin 300mg bid EOT 11/21. She is followed by oncology for pemphigus foliaceous and was to start rituximab on 10/31 admission. She has not seen pulmonary outpatient for follow up on Emphysema. 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  She reported foul smelling drainage from the lesions. During her ER course she became hypoxic with saturations in the 80s on room air. She was then requiring 5 L oxygen to maintain saturations >90%.  WBC 9.8.     Was on airvo and has weaned.  Her breathing is shallow and she is mainly breathing through her mouth. She denies any sob, cough, or fever at home prior to going to the ER. Denies being sob presently. She does report having 2 L oxygen at home that she uses as needed. Denies have any noticeable swelling in her lower extremities. She does have purulent rash around neck and under left armpit which she reports is 
               Meli Blancas  Admission Date: 10/30/2024         Daily Progress Note: 11/7/2024    The patient's chart is reviewed and the patient is discussed with the staff.    Background: 69 y.o.  female seen and evaluated at the request of Hospitalist for acute hypoxic respiratory failure. Significant PMH of CAD, COPD, hypertension, Sjogren syndome on chronic steroids, and tobacco abuse. We have previously seen her in consult in April of this year for worsening respiratory failure. Hx of COPD and on 2 L oxygen as needed at home at that time. She was also not on inhalers at that time reporting she could not tolerate them. Has 40 year smoking hx 1 PPD. History of noncompliance with treatment and aggressive behavior in the past.       She did have recent admission at [facility] for bacteremia (proteus and MRSA) and respiratory failure. TTE at that time with vegetation on the aortic valve and she was diagnosed with infectious endocarditis. Left hospital AMA on 10/17 and ID suggested oral cipro 750mg bid and rifampin 300mg bid EOT 11/21. She is followed by oncology for pemphigus foliaceous and was to start rituximab on 10/31 admission. She has not seen pulmonary outpatient for follow up on Emphysema. 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  She reported foul smelling drainage from the lesions. During her ER course she became hypoxic with saturations in the 80s on room air. She was then requiring 5 L oxygen to maintain saturations >90%.  WBC 9.8.     Was on airvo and has weaned.  Her breathing is shallow and she is mainly breathing through her mouth. She denies any sob, cough, or fever at home prior to going to the ER. Denies being sob presently. She does report having 2 L oxygen at home that she uses as needed. Denies have any noticeable swelling in her lower extremities. She does have purulent rash around neck and under left armpit which she reports is 
               Meli Blancas  Admission Date: 10/30/2024         Daily Progress Note: 11/8/2024    The patient's chart is reviewed and the patient is discussed with the staff.    Background: 69 y.o.  female seen and evaluated at the request of Hospitalist for acute hypoxic respiratory failure. Significant PMH of CAD, COPD, hypertension, Sjogren syndome on chronic steroids, and tobacco abuse. We have previously seen her in consult in April of this year for worsening respiratory failure. Hx of COPD and on 2 L oxygen as needed at home at that time. She was also not on inhalers at that time reporting she could not tolerate them. Has 40 year smoking hx 1 PPD. History of noncompliance with treatment and aggressive behavior in the past.       She did have recent admission at [facility] for bacteremia (proteus and MRSA) and respiratory failure. TTE at that time with vegetation on the aortic valve and she was diagnosed with infectious endocarditis. Left hospital AMA on 10/17 and ID suggested oral cipro 750mg bid and rifampin 300mg bid EOT 11/21. She is followed by oncology for pemphigus foliaceous and was to start rituximab on 10/31 admission. She has not seen pulmonary outpatient for follow up on Emphysema. 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  She reported foul smelling drainage from the lesions. During her ER course she became hypoxic with saturations in the 80s on room air. She was then requiring 5 L oxygen to maintain saturations >90%.  WBC 9.8.     Was on airvo and has weaned.  Her breathing is shallow and she is mainly breathing through her mouth. She denies any sob, cough, or fever at home prior to going to the ER. Denies being sob presently. She does report having 2 L oxygen at home that she uses as needed. Denies have any noticeable swelling in her lower extremities. She does have purulent rash around neck and under left armpit which she reports is 
               Meli Blancas  Admission Date: 10/30/2024         Daily Progress Note: 11/9/2024    The patient's chart is reviewed and the patient is discussed with the staff.    Background: 69 y.o.  female seen and evaluated at the request of Hospitalist for acute hypoxic respiratory failure. Significant PMH of CAD, COPD, hypertension, Sjogren syndome on chronic steroids, and tobacco abuse. We have previously seen her in consult in April of this year for worsening respiratory failure. Hx of COPD and on 2 L oxygen as needed at home at that time. She was also not on inhalers at that time reporting she could not tolerate them. Has 40 year smoking hx 1 PPD. History of noncompliance with treatment and aggressive behavior in the past.       She did have recent admission at [facility] for bacteremia (proteus and MRSA) and respiratory failure. TTE at that time with vegetation on the aortic valve and she was diagnosed with infectious endocarditis. Left hospital AMA on 10/17 and ID suggested oral cipro 750mg bid and rifampin 300mg bid EOT 11/21. She is followed by oncology for pemphigus foliaceous and was to start rituximab on 10/31 admission. She has not seen pulmonary outpatient for follow up on Emphysema. 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  She reported foul smelling drainage from the lesions. Hypoxic in ED requiring 5 L oxygen to maintain saturations >90%.      Was on airvo and has weaned.  Her breathing is shallow and she is mainly breathing through her mouth. She denies any sob, cough, or fever at home prior to going to the ER. Denies being sob presently. She does report having 2 L oxygen at home that she uses as needed. Denies have any noticeable swelling in her lower extremities. She does have purulent rash around neck and under left armpit which she reports is very painful and she does seem very sensitive to any touch with stethoscope. Anticipate this 
       Hospitalist Progress Note   Admit Date:  10/30/2024  4:54 PM   Name:  Meli Blancas   Age:  69 y.o.  Sex:  female  :  1955   MRN:  [id number]   Room:  [facility] [place]    Presenting/Chief Complaint: Rash     Reason(s) for Admission: Cellulitis of skin [L03.90]  Acute hypoxic respiratory failure [J96.01]  Acute on chronic hypoxic respiratory failure [J96.21]     Hospital Course:   Meli Blancas is a 69 y.o. female with medical history of pemphigus foliaceous, Sjogren syndrome on chronic steroids, COPD with prior tobacco use, hypertension who presented with significant pain in the setting of skin rash secondary to pemphigus fallacious flare with significantly worsening blistering lesions.  Patient was recently evaluated by oncology will plan to admit her to the hospital on 10/31 for maintenance therapy with rituximab.  Due to his symptoms oncology will hold off on rituximab for now.  On presentation patient was hypoxic with low 80s on room air.  She was placed on Airvo with improvement in saturation noted.  Labs were relatively WNL on presentation.  CT PE was negative for acute PE but showed emphysematous changes.  Patient admitted for further management.      Subjective & 24hr Events:   Patient is seen and examined at the bedside.  Alert and oriented.  Reports she is feeling much better today.  Oxygen requirement gradually improving, currently on 10 L high flow nasal cannula.  Denies nausea, vomiting, chest pain or palpitation.    Infectious disease following, considering daptomycin outpatient, EOT 2024.  Case management to arrange outpatient IV antibiotics.    ROS:  10 point review of system is negative except for what mentioned above.    Assessment & Plan:     AV infective endocarditis  Diagnosed on SILVA on 10/16/2024 at [facility]  Recent blood cultures from  positive for gram-positive rods (diphtheroids that could be indicative of culture contamination).  TTE done on  did not show any 
       Hospitalist Progress Note   Admit Date:  10/30/2024  4:54 PM   Name:  Meli Blancas   Age:  69 y.o.  Sex:  female  :  1955   MRN:  [id number]   [facility]    Presenting/Chief Complaint: Rash     Reason(s) for Admission: Cellulitis of skin [L03.90]  Acute hypoxic respiratory failure [J96.01]  Acute on chronic hypoxic respiratory failure [J96.21]     Hospital Course:   Meli Blancas is a 69 y.o. female with medical history of pemphigus foliaceous, Sjogren syndrome on chronic steroids, COPD with prior tobacco use, hypertension who presented with significant pain in the setting of skin rash secondary to pemphigus fallacious flare with significantly worsening blistering lesions.  Patient was recently evaluated by oncology will plan to admit her to the hospital on 10/31 for maintenance therapy with rituximab.  Due to his symptoms oncology will hold off on rituximab for now.  On presentation patient was hypoxic with low 80s on room air.  She was placed on Airvo with improvement in saturation noted.  Labs were relatively WNL on presentation.  CT PE was negative for acute PE but showed emphysematous changes.  Patient admitted for further management.      Subjective & 24hr Events:   Seen and examined at bedside this morning.  No acute events overnight.  She denies any complaints at this time, patient states her oxygen saturation is chronically low, and she does not believe this is an issue of concern.  She states that O2 saturation at home can be as low as in the 70s.  Also notes patient was diagnosed with AV endocarditis on SILVA on 10/16/24 at [facility].  She left AMA but was unable to be treated appropriately.  She was also noncompliant with the antibiotics recommended when she signed out AMA.  She was also seen previously by ID for polymicrobial bacteremia [MRSA and Proteus] on 10/10/2024.  Appreciate pulmonology's help in figuring this out.  Upon further questioning patient states that she signed 
       Hospitalist Progress Note   Admit Date:  10/30/2024  4:54 PM   Name:  Meli Blancas   Age:  69 y.o.  Sex:  female  :  1955   MRN:  [id number]   Room:  [facility]    Presenting/Chief Complaint: Rash     Reason(s) for Admission: Cellulitis of skin [L03.90]  Acute hypoxic respiratory failure [J96.01]  Acute on chronic hypoxic respiratory failure [J96.21]     Hospital Course:   Meli Blancas is a 69 y.o. female with medical history of pemphigus foliaceous, Sjogren syndrome on chronic steroids, COPD with prior tobacco use, hypertension who presented with significant pain in the setting of skin rash secondary to pemphigus fallacious flare with significantly worsening blistering lesions.  Patient was recently evaluated by oncology will plan to admit her to the hospital on 10/31 for maintenance therapy with rituximab.  Due to his symptoms oncology will hold off on rituximab for now.  On presentation patient was hypoxic with low 80s on room air.  She was placed on Airvo with improvement in saturation noted.  Labs were relatively WNL on presentation.  CT PE was negative for acute PE but showed emphysematous changes.  Patient admitted for further management.    Subjective & 24hr Events:   Patient is seen and examined at the bedside.  No acute event reported overnight.  Reports she is feeling much better today.  Denies chest pain, palpitation, nausea, vomiting or shortness of breath.    On 9 L high flow nasal cannula, saturating 97-98%.  Decreased to 6 L HFNC at bedside and patient oxygen saturation remains above 90s.  Continue to wean as able.    ROS:  10 point review of system is negative except for what mentioned above.    Assessment & Plan:     AV infective endocarditis  Diagnosed on SILVA on 10/16/2024 at [facility]  Recent blood cultures from  positive for gram-positive rods (diphtheroids that could be indicative of culture contamination).  TTE done on  did not show any obvious vegetation.  ID 
       Hospitalist Progress Note   Admit Date:  10/30/2024  4:54 PM   Name:  Meli Blancas   Age:  69 y.o.  Sex:  female  :  1955   MRN:  [id number]   Room:  [facility]    Presenting/Chief Complaint: Rash     Reason(s) for Admission: Cellulitis of skin [L03.90]  Acute hypoxic respiratory failure [J96.01]  Acute on chronic hypoxic respiratory failure [J96.21]     Hospital Course:   Meli Blancas is a 69 y.o. female with medical history of pemphigus foliaceous, Sjogren syndrome on chronic steroids, COPD with prior tobacco use, hypertension who presented with significant pain in the setting of skin rash secondary to pemphigus fallacious flare with significantly worsening blistering lesions.  Patient was recently evaluated by oncology will plan to admit her to the hospital on 10/31 for maintenance therapy with rituximab.  Due to his symptoms oncology will hold off on rituximab for now.  On presentation patient was hypoxic with low 80s on room air.  She was placed on Airvo with improvement in saturation noted.  Labs were relatively WNL on presentation.  CT PE was negative for acute PE but showed emphysematous changes.  Patient admitted for further management.    Subjective & 24hr Events:   Patient is seen and examined at the bedside.  Reports she is feeling much better today.  Chest pain has been improved.  Shortness of breath better today as well.  Denies nausea, vomiting.    Patient noted with acute worsening of leukocytosis.  UA benign.  Chest x-ray with improved edema pattern.  Patient remains afebrile.  On antibiotics for infective endocarditis.  I will discuss with infectious disease if they have any concerns.    PICC line ordered for long-term IV antibiotics and PICC team notified patient is not a candidate for PICC placement due to right partial mastectomy with sentinel lymph node biopsy in  and her left arm with pemphigus foliaceous.    Will consult IR for tunneled central line placement.  
       Hospitalist Progress Note   Admit Date:  10/30/2024  4:54 PM   Name:  Meli Blancas   Age:  69 y.o.  Sex:  female  :  1955   MRN:  [id number]   Room:  [facility]    Presenting/Chief Complaint: Rash     Reason(s) for Admission: Cellulitis of skin [L03.90]  Acute hypoxic respiratory failure [J96.01]  Acute on chronic hypoxic respiratory failure [J96.21]     Hospital Course:   Meli Blancas is a 69 y.o. female with medical history of pemphigus foliaceous, Sjogren syndrome on chronic steroids, COPD with prior tobacco use, hypertension who presented with significant pain in the setting of skin rash secondary to pemphigus fallacious flare with significantly worsening blistering lesions.  Patient was recently evaluated by oncology will plan to admit her to the hospital on 10/31 for maintenance therapy with rituximab.  Due to his symptoms oncology will hold off on rituximab for now.  On presentation patient was hypoxic with low 80s on room air.  She was placed on Airvo with improvement in saturation noted.  Labs were relatively WNL on presentation.  CT PE was negative for acute PE but showed emphysematous changes.  Patient admitted for further management.      Subjective & 24hr Events:   Seen and examined at bedside this morning.  No acute events overnight.  Per nursing patient was screaming and refusing care upon admission.  She is seen sleeping comfortably in bed on Airvo.      Assessment & Plan:   Acute hypoxic respiratory failure  COPD   CT chest with emphysematous changes  On Airvo, continue weaning as tolerated  Continue breathing treatment  Pulmonology consulted: Appreciate recs  Continue prednisone 40 mg daily    Hypertension  BP stable  Continue lisinopril    Pemphigus foliaceous  Cellulitis of chest wall  Current chronic use of systemic steroids  Continue with doxycycline  Wound care consulted  Continue prednisone 40 mg daily  Oncology consulted: Appreciate recs      Sjogren syndrome with 
       Hospitalist Progress Note   Admit Date:  10/30/2024  4:54 PM   Name:  Meli Blancas   Age:  69 y.o.  Sex:  female  :  1955   MRN:  [id number]   Room:  [facility] [place]    Presenting/Chief Complaint: Rash     Reason(s) for Admission: Cellulitis of skin [L03.90]  Acute hypoxic respiratory failure [J96.01]  Acute on chronic hypoxic respiratory failure [J96.21]     Hospital Course:   Meli Blancas is a 69 y.o. female with medical history of pemphigus foliaceous, Sjogren syndrome on chronic steroids, COPD with prior tobacco use, hypertension who presented with significant pain in the setting of skin rash secondary to pemphigus fallacious flare with significantly worsening blistering lesions.  Patient was recently evaluated by oncology will plan to admit her to the hospital on 10/31 for maintenance therapy with rituximab.  Due to his symptoms oncology will hold off on rituximab for now.  On presentation patient was hypoxic with low 80s on room air.  She was placed on Airvo with improvement in saturation noted.  Labs were relatively WNL on presentation.  CT PE was negative for acute PE but showed emphysematous changes.  Patient admitted for further management.    Subjective & 24hr Events:   Patient is seen and examined at the bedside.  Reports she is feeling okay.  Denies any shortness of breath.  Denies nausea, chest pain or palpitation.    Remains on 10 L high flow nasal cannula.  Repeat chest x-ray showed bilateral pleural effusion and pulmonary edema.  Will start patient on Lasix.     ROS:  10 point review of system is negative except for what mentioned above.    Assessment & Plan:     AV infective endocarditis  Diagnosed on SILVA on 10/16/2024 at [facility]  Recent blood cultures from  positive for gram-positive rods (diphtheroids that could be indicative of culture contamination).  TTE done on  did not show any obvious vegetation.  ID following, appreciate recommendations  Continue ciprofloxacin, EOT 
       Hospitalist Progress Note   Admit Date:  10/30/2024  4:54 PM   Name:  Meli Blancas   Age:  69 y.o.  Sex:  female  :  1955   MRN:  [id number]   Room:  [facility]    Presenting/Chief Complaint: Rash     Reason(s) for Admission: Cellulitis of skin [L03.90]  Acute hypoxic respiratory failure [J96.01]  Acute on chronic hypoxic respiratory failure [J96.21]     Hospital Course:   Meli Blancas is a 69 y.o. female with medical history of pemphigus foliaceous, Sjogren syndrome on chronic steroids, COPD with prior tobacco use, hypertension who presented with significant pain in the setting of skin rash secondary to pemphigus fallacious flare with significantly worsening blistering lesions.  Patient was recently evaluated by oncology will plan to admit her to the hospital on 10/31 for maintenance therapy with rituximab.  Due to his symptoms oncology will hold off on rituximab for now.  On presentation patient was hypoxic with low 80s on room air.  She was placed on Airvo with improvement in saturation noted.  Labs were relatively WNL on presentation.  CT PE was negative for acute PE but showed emphysematous changes.  Patient admitted for further management.      Subjective & 24hr Events:   Patient is seen and examined at the bedside.  Tearful and wants to go home.  Fear to lose her aides.  Does not have any family here.  Reports she is feeling better.  Denies shortness of breath.  Remains on 11 L high flow nasal cannula.  VBG showed pH 7.3, CO2 60.8.    ROS:  10 point review of system is negative except for what mentioned above.    Assessment & Plan:     AV infective endocarditis  Diagnosed on SILVA on 10/16/2024 at [facility]  Recent blood cultures from  positive for gram-positive rods (diphtheroids that could be indicative of culture contamination).  TTE done on  did not show any obvious vegetation.  ID following, appreciate evaluation  Continue ciprofloxacin, EOT   ID switched vancomycin to 
       Hospitalist Progress Note   Admit Date:  10/30/2024  4:54 PM   Name:  Meli Blancas   Age:  69 y.o.  Sex:  female  :  1955   MRN:  [id number]   Room:  [facility]    Presenting/Chief Complaint: Rash     Reason(s) for Admission: Cellulitis of skin [L03.90]  Acute hypoxic respiratory failure [J96.01]  Acute on chronic hypoxic respiratory failure [J96.21]     Hospital Course:   Meli Blancas is a 69 y.o. female with medical history of pemphigus foliaceous, Sjogren syndrome on chronic steroids, COPD with prior tobacco use, hypertension who presented with significant pain in the setting of skin rash secondary to pemphigus fallacious flare with significantly worsening blistering lesions.  Patient was recently evaluated by oncology will plan to admit her to the hospital on 10/31 for maintenance therapy with rituximab.  Due to his symptoms oncology will hold off on rituximab for now.  On presentation patient was hypoxic with low 80s on room air.  She was placed on Airvo with improvement in saturation noted.  Labs were relatively WNL on presentation.  CT PE was negative for acute PE but showed emphysematous changes.  Patient admitted for further management.    Subjective & 24hr Events:   Patient is seen and examined at the bedside.  Reports she is not feeling well today.  Complaining of chest congestion and cough.  Unable to clear up secretions.  Denies nausea, vomiting or palpitation.    On 4 L nasal cannula and saturating 93 to 97%.  Will order walk test today for oxygen qualifier.    ROS:  10 point review of system is negative except for what mentioned above.    Assessment & Plan:     AV infective endocarditis  Diagnosed on SILVA on 10/16/2024 at [facility]  Recent blood cultures from  positive for gram-positive rods (diphtheroids that could be indicative of culture contamination).  TTE done on  did not show any obvious vegetation.  ID following, appreciate recommendations  Continue ciprofloxacin, EOT 
   11/11/24 1533   Resting (Room Air)   SpO2 84   HR 82   Resting (On O2)   SpO2 91   HR 83   O2 Device Nasal cannula   O2 Flow Rate (l/min) 2 l/min   During Walk (On O2)   SpO2 82      O2 Device Nasal cannula   O2 Flow Rate (l/min) 2 l/min   Need Additional O2 Flow Rate Rows Yes   O2 Flow Rate (l/min) 4 l/min   O2 Saturation 84   O2 Flow Rate (l/min) 6 l/min   O2 Saturation 85   O2 Flow Rate (l/min) 8 l/min   O2 Saturation 87   O2 Flow Rate (l/min) 10 l/min   O2 Saturation 91   After Walk   SpO2 90   HR 88   O2 Device Nasal cannula   O2 Flow Rate (l/min) 3 l/min   Does the Patient Need Portable Oxygen Tanks Yes       
   11/13/24 1140   Resting (Room Air)   SpO2 83   Resting (On O2)   SpO2 92   O2 Flow Rate (l/min) 4 l/min   During Walk (Room Air)   SpO2 72   Walk/Assistance Device Walker   Rate of Dyspnea 0   During Walk (On O2)   SpO2 92   O2 Device Nasal cannula   O2 Flow Rate (l/min) 8 l/min   Need Additional O2 Flow Rate Rows No   O2 Flow Rate (l/min) 4 l/min   O2 Saturation 81   O2 Flow Rate (l/min) 6 l/min   O2 Saturation 88   O2 Flow Rate (l/min) 8 l/min   O2 Saturation 92   Walk/Assistance Device Walker   Rate of Dyspnea 0   Comments patient needs 4 liters at rest and 8 liters with ambulation   After Walk   SpO2 92   O2 Device Nasal cannula   O2 Flow Rate (l/min) 4 l/min   Rate of Dyspnea 0   Does the Patient Qualify for Home O2 Yes   Does the Patient Need Portable Oxygen Tanks Yes       
  TRANSFER - IN REPORT:    Verbal report received from SALENA Pimentel on Meli Blancas  being received from ED for routine progression of patient care      Report consisted of patient’s Situation, Background, Assessment and   Recommendations(SBAR).     Information from the following report(s) Nurse Handoff Report and Index was reviewed with the receiving nurse.    Opportunity for questions and clarification was provided.      Assessment completed upon patient’s arrival to unit and care assume        
0700 The pt refused her morning neb tx    1035 offered pt her neb tx again , she refused   I ask pt. To do a walk test with me and she refused, stated she just felt bad.  
1606- This nurse took over primary care of patient from previous nurse.   
4 Eyes Skin Assessment     NAME:  Meli Blancas  YOB: 1955  MEDICAL RECORD NUMBER:  [id number]    The patient is being assessed for  Admission    I agree that at least one RN has performed a thorough Head to Toe Skin Assessment on the patient. ALL assessment sites listed below have been assessed.      Areas assessed by both nurses:    Head, Face, Ears, Shoulders, Back, Chest, Arms, Elbows, Hands, Sacrum. Buttock, Coccyx, Ischium, and Legs. Feet and Heels        Does the Patient have a Wound? Yes wound(s) were present on assessment. LDA wound assessment was Initiated and completed by RN  Pt has blisters all over body: neck, BLE, BUE, L arm pit. Pt has redness on sacrum that is blanchable. Pt has redness and drainage coming from under breasts. Pt has fungus noted on toenails. WC consulted       David Prevention initiated by RN: Yes  Wound Care Orders initiated by RN: Yes    Pressure Injury (Stage 3,4, Unstageable, DTI, NWPT, and Complex wounds) if present, place Wound referral order by RN under : No    New Ostomies, if present place, Ostomy referral order under : No     Nurse 1 eSignature: Electronically signed by Randa Silveira RN on 10/31/24 at 5:52 AM EDT    **SHARE this note so that the co-signing nurse can place an eSignature**    Nurse 2 eSignature: Electronically signed by Hailey Daigle RN on 10/31/24 at 6:12 AM EDT    
ACUTE OCCUPATIONAL THERAPY GOALS:   (Developed with and agreed upon by patient and/or caregiver.)  1) Patient will complete lower body bathing and dressing with INDEPENDENCE and adaptive equipment as needed.   2) Patient will complete toileting with INDEPENDENCE.   3) Patient will complete functional transfers with INDEPENDENCE and adaptive equipment as needed.   4) Patient will tolerate at least 25 minutes of OT activity with 1-2 rest breaks while maintaining O2 sats >90%.   5) Patient will verbalize at least 3 energy conservation technique to utilize during ADL/IADL.      Timeframe: 7 visits      OCCUPATIONAL THERAPY: Daily Note   OT Visit Days: 5   Time In/Out  OT Charge Capture  Rehab Caseload Tracker  OT Orders    Meli Blancas is a 69 y.o. female   PRIMARY DIAGNOSIS: Acute on chronic hypoxic respiratory failure  Cellulitis of skin [L03.90]  Acute hypoxic respiratory failure [J96.01]  Acute on chronic hypoxic respiratory failure [J96.21]       Inpatient: Payor: [facility] MEDICARE / Plan: [facility] MEDICARE COMPLETE / Product Type: *No Product type* /     ASSESSMENT:     REHAB RECOMMENDATIONS:   Recommendation to date pending progress:  Setting:  No further skilled occupational therapy after discharge from hospital    Equipment:    None--pt has rolling walker, SC, quad cane at home     ASSESSMENT:  Ms. Blancas is progressing well towards OT goals. Today, pt was received sitting in the chair on 8L. Pt was sleeping very soundly--woken from a dead sleep. Completed LB dressing, functional transfers, and ambulation with rolling walker with overall SBA. Pt noted to be very confused (likely due to being woken from deep sleep). Perseverating on needing to \"fix something in the kitchen\" and \"fix her acid reflux\". Confusion noted to decrease as session progressed--appeared back to her baseline mentation at the end of the session. Recommend pt return home at baseline. Ms. Blancas continues to demonstrate overall deficits in 
ACUTE OCCUPATIONAL THERAPY GOALS:   (Developed with and agreed upon by patient and/or caregiver.)  1) Patient will complete lower body bathing and dressing with INDEPENDENCE and adaptive equipment as needed.   2) Patient will complete toileting with INDEPENDENCE.   3) Patient will complete functional transfers with INDEPENDENCE and adaptive equipment as needed.   4) Patient will tolerate at least 25 minutes of OT activity with 1-2 rest breaks while maintaining O2 sats >90%.   5) Patient will verbalize at least 3 energy conservation technique to utilize during ADL/IADL.      Timeframe: 7 visits      OCCUPATIONAL THERAPY: Daily Note   OT Visit Days: 6   Time In/Out  OT Charge Capture  Rehab Caseload Tracker  OT Orders    Meli Blancas is a 69 y.o. female   PRIMARY DIAGNOSIS: Acute on chronic hypoxic respiratory failure  Cellulitis of skin [L03.90]  Acute hypoxic respiratory failure [J96.01]  Acute on chronic hypoxic respiratory failure [J96.21]       Inpatient: Payor: [facility] MEDICARE / Plan: [facility] MEDICARE COMPLETE / Product Type: *No Product type* /     ASSESSMENT:     REHAB RECOMMENDATIONS:   Recommendation to date pending progress:  Setting:  No further skilled occupational therapy after discharge from hospital    Equipment:    None--pt has rolling walker, SC, quad cane at home     ASSESSMENT:  Ms. Blancas presents with decreased activity tolerance, balance, strength and mobility impacting ADLs. Pt is currently on 6L HF nasal cannula, overall supervision rolling walker for functional transfers and mobility of community distances in hallway, no rest breaks needed today. Pt O2 sats ranged 86-92% during ambulation. Pt then completed LE dressing and grooming ADLs standing at sink with supervision and good dynamic balance. Pt did not desat with in-room ADLs. Pt appears to need less and less O2 with every session, increasing activity tolerance well. Pt is progressing toward goals, continue POC.        SUBJECTIVE: 
ACUTE OCCUPATIONAL THERAPY GOALS:   (Developed with and agreed upon by patient and/or caregiver.)  1) Patient will complete lower body bathing and dressing with INDEPENDENCE and adaptive equipment as needed.   2) Patient will complete toileting with INDEPENDENCE.   3) Patient will complete functional transfers with INDEPENDENCE and adaptive equipment as needed.   4) Patient will tolerate at least 25 minutes of OT activity with 1-2 rest breaks while maintaining O2 sats >90%.   5) Patient will verbalize at least 3 energy conservation technique to utilize during ADL/IADL.      Timeframe: 7 visits      OCCUPATIONAL THERAPY: Daily Note PM   OT Visit Days: 2   Time In/Out  OT Charge Capture  Rehab Caseload Tracker  OT Orders    Meli Blancas is a 69 y.o. female   PRIMARY DIAGNOSIS: Acute on chronic hypoxic respiratory failure  Cellulitis of skin [L03.90]  Acute hypoxic respiratory failure [J96.01]  Acute on chronic hypoxic respiratory failure [J96.21]       Inpatient: Payor: [facility] MEDICARE / Plan: [facility] MEDICARE COMPLETE / Product Type: *No Product type* /     ASSESSMENT:     REHAB RECOMMENDATIONS:   Recommendation to date pending progress:  Setting:  Home Health Therapy    Equipment:    None--pt has rolling walker, SC, quad cane at home     ASSESSMENT:  Ms. Blancas presents with decreased activity tolerance, balance, strength and mobility impacting ADLs. Pt is currently resting in bedside chair on 8L HF nasal cannula, currently satting 87%. Pt overall SBA-CGA rolling walker for functional transfers and mobility of household distances in hallway, chair follow for rest breaks. Pt tolerated prolonged standing balance at sink for grooming ADLs with fair+ dynamic balance. Pt required therapist directed rest breaks and pursed lip breathing-- sats ranged 86-91% throughout. Pt with decreased insight/safety, but is progressing well toward OT goals. Continue POC.       SUBJECTIVE:     Ms. Blancas states, \"My oxygen ranges from 
ACUTE PHYSICAL THERAPY GOALS:   (Developed with and agreed upon by patient and/or caregiver.)    (1.) Meli Blancas  will move from supine to sit and sit to supine  with INDEPENDENCE within 7 treatment day(s).    (2.) Meli Blancas will transfer from bed to chair and chair to bed with INDEPENDENCE using the least restrictive device within 7 treatment day(s).    (3.) Meli Blancas will ambulate with MODIFIED INDEPENDENCE for 300 feet with the least restrictive device within 7 treatment day(s).   (4.) Meli Blancas will perform standing static and dynamic balance activities x 10 minutes with SUPERVISION to improve safety within 7 treatment day(s).  (5.) Meli Blancas will ascend and descend 5 stairs using 1 hand rail(s) with SUPERVISION to improve functional mobility and safety within 7 treatment day(s).  (6.) Meli Blancas will perform therapeutic exercises x 20 min for HEP with INDEPENDENCE to improve strength, endurance, and functional mobility within 7 treatment day(s).       PHYSICAL THERAPY Initial Assessment, Daily Note, and AM  (Link to Caseload Tracking: PT Visit Days : 1  Acknowledge Orders  Time In/Out  PT Charge Capture  Rehab Caseload Tracker    Meli Blancas is a 69 y.o. female   PRIMARY DIAGNOSIS: Acute hypoxic respiratory failure  Cellulitis of skin [L03.90]  Acute hypoxic respiratory failure [J96.01]  Acute on chronic hypoxic respiratory failure [J96.21]       Reason for Referral: Generalized Muscle Weakness (M62.81)  Difficulty in walking, Not elsewhere classified (R26.2)  Inpatient: Payor: [facility] MEDICARE / Plan: [facility] MEDICARE COMPLETE / Product Type: *No Product type* /     ASSESSMENT:     REHAB RECOMMENDATIONS:   Recommendation to date pending progress:  Setting:  Home Health Therapy    Equipment:    To Be Determined     ASSESSMENT:  Ms. Blancas  is a 69 year old F who presents with respiratory failure and worsening blistering lesions. PMHx includes pemphigus 
ACUTE PHYSICAL THERAPY GOALS:   (Developed with and agreed upon by patient and/or caregiver.)  (1.) Meli Blancas  will move from supine to sit and sit to supine  with INDEPENDENCE within 7 treatment day(s).    (2.) Meli Blancas will transfer from bed to chair and chair to bed with INDEPENDENCE using the least restrictive device within 7 treatment day(s).    (3.) Meli Blancas will ambulate with MODIFIED INDEPENDENCE for 300 feet with the least restrictive device within 7 treatment day(s).   (4.) Meli Blancas will perform standing static and dynamic balance activities x 10 minutes with SUPERVISION to improve safety within 7 treatment day(s).  (5.) Meli Blancas will ascend and descend 5 stairs using 1 hand rail(s) with SUPERVISION to improve functional mobility and safety within 7 treatment day(s).  (6.) Meli Blancas will perform therapeutic exercises x 20 min for HEP with INDEPENDENCE to improve strength, endurance, and functional mobility within 7 treatment day(s).     PHYSICAL THERAPY: Daily Note AM   (Link to Caseload Tracking: PT Visit Days : 3  Time In/Out PT Charge Capture  Rehab Caseload Tracker  Orders    Meli Blancas is a 69 y.o. female   PRIMARY DIAGNOSIS: Acute on chronic hypoxic respiratory failure  Cellulitis of skin [L03.90]  Acute hypoxic respiratory failure [J96.01]  Acute on chronic hypoxic respiratory failure [J96.21]       Inpatient: Payor: [facility] MEDICARE / Plan: [facility] MEDICARE COMPLETE / Product Type: *No Product type* /     ASSESSMENT:     REHAB RECOMMENDATIONS:   Recommendation to date pending progress:  Setting:  Home Health Therapy    Equipment:    To Be Determined     ASSESSMENT:  Ms. Blancas was supine in bed on 10 L/min upon arrival today.  She was agreeable to therapy and came to sitting on EOB today with SBA.  Pt performed several STS transfers today with SBA/RW.  Pt also ambulated in estrada today with CGA//RW and accelerated santy.  Pt returned to 
ACUTE PHYSICAL THERAPY GOALS:   (Developed with and agreed upon by patient and/or caregiver.)  (1.) Meli Blancas  will move from supine to sit and sit to supine  with INDEPENDENCE within 7 treatment day(s).    (2.) Meli Blancas will transfer from bed to chair and chair to bed with INDEPENDENCE using the least restrictive device within 7 treatment day(s).    (3.) Meli Blancas will ambulate with MODIFIED INDEPENDENCE for 300 feet with the least restrictive device within 7 treatment day(s).   (4.) Meli Blancas will perform standing static and dynamic balance activities x 10 minutes with SUPERVISION to improve safety within 7 treatment day(s).  (5.) Meli Blancas will ascend and descend 5 stairs using 1 hand rail(s) with SUPERVISION to improve functional mobility and safety within 7 treatment day(s).  (6.) Meli Blancas will perform therapeutic exercises x 20 min for HEP with INDEPENDENCE to improve strength, endurance, and functional mobility within 7 treatment day(s).     PHYSICAL THERAPY: Daily Note AM   (Link to Caseload Tracking: PT Visit Days : 4  Time In/Out PT Charge Capture  Rehab Caseload Tracker  Orders    Meli Blancas is a 69 y.o. female   PRIMARY DIAGNOSIS: Acute on chronic hypoxic respiratory failure  Cellulitis of skin [L03.90]  Acute hypoxic respiratory failure [J96.01]  Acute on chronic hypoxic respiratory failure [J96.21]       Inpatient: Payor: [facility] MEDICARE / Plan: [facility] MEDICARE COMPLETE / Product Type: *No Product type* /     ASSESSMENT:     REHAB RECOMMENDATIONS:   Recommendation to date pending progress:  Setting:  Home Health Therapy    Equipment:    To Be Determined     ASSESSMENT:  Ms. Blancas was sitting up in chair, asleep, on 8 L/min O2 upon arrival today.  She was difficult to rouse and was confused upon initiation of mobility. Eventually, as she became more alert, she was conversational and oriented fully. She transferred sit to stand with RW and 
CH attempted to visit PT twice, but PT was asleep or with Staff. CH prayed silently for PT, PT's Family, and Staff as chart states PT is Non-Anabaptism Faith.     Rev. ALBERTO FreemanDiv.    
Comprehensive Nutrition Assessment    Type and Reason for Visit: Initial, LOS  Length of Stay    Nutrition Recommendations/Plan:   Meals and Snacks:  Diet: Continue current order  Oral Nutriton Supplement Therapy:   Medical food supplement therapy:  None Not applicable     Malnutrition Assessment:  Malnutrition Status: No malnutrition  NFPE unremarkable     Nutrition Assessment:  Nutrition History: Patient states baseline intake is 3-5 small meals per day. She reports poor PO for 3-4 days prior to admission. No other changes.     Do You Have Any Cultural, Hindu, or Ethnic Food Preferences?: No   Weight History: 11/13/23 142# (IM), 5/17/24 146# (IM), 10/23/24 164# (onc)  Nutrition Background:       PMH significant for pemphigus foliaceous, Sjogren syndrome on chronic steroids, COPD with prior tobacco use, hypertension who presented with significant pain in the setting of skin rash. Admitted with COPD exacerbation, pemphigus foliaceous flare. Additional findings of endocarditis.   Nutrition Interval:  Patient up to recliner. She states her appetite is good and she has been eating well. She wants some chocolate cake. Denies any needs.    Current Nutrition Therapies:  ADULT DIET; Regular    Current Intake:   Average Meal Intake: 51-75%        Anthropometric Measures:  Height: 152.4 cm (5')  Current Body Wt: 75.4 kg (166 lb 3.6 oz) (11/5), Weight source: Bed scale  BMI: 32.5, Obese Class 1 (BMI 30.0-34.9)  Admission Body Weight: 71.6 kg (157 lb 13.6 oz) (10/31 bed scale)  Ideal Body Weight (Kg) (Calculated): 45 kg (100 lbs), 166.2 %  BMI Category Obese Class 1 (BMI 30.0-34.9)  Estimated Daily Nutrient Needs:  Energy (kcal/day): 9974-1597 (20-25 kcal/kg) (Kcal/kg Weight used: 71.6 kg Admission  Protein (g/day): 57-72 (0.8-1 g/kg) Weight Used: (Admission) 71.6 kg  Fluid (ml/day):   (1 ml/kcal)    Nutrition Diagnosis:   No nutrition diagnosis at this time     Nutrition Interventions:   Food and/or Nutrient Delivery: 
IP Consult to Vascular Access Team  Consult performed by: Gunner Ortega RN  Consult ordered by: Hank Amado MD       US Guided PIV access-    Ultrasound was used to find the vein which was compressible and without any ultrasound features of an artery or nerve bundle. Skin was cleaned and disinfected prior to IV puncture.  Under real-time ultrasound guidance peripheral access was obtained in the right forearm using 22 G 1.75\" Peripheral IV catheter after 1 attempt(s). Blood return was present and IV flushed without difficulty with no clinical signs of infiltration. IV dressing applied and no immediate complications noted. Patient tolerated the procedure well.       
Infectious Disease Chart Review Note      Today's Date: 2024   Admit Date: 10/30/2024      Pt not seen today, chart reviewed.    OPAT orders were sent past week, and CM is assisting.  Will inform ID office of OPAT plans  ID will sign off, please call with questions or concerns      Objective:   Blood pressure (!) 131/55, pulse 82, temperature 98.2 °F (36.8 °C), temperature source Oral, resp. rate 18, height 1.524 m (5'), weight 75.3 kg (166 lb), SpO2 91%.  Visit Vitals  BP (!) 131/55   Pulse 82   Temp 98.2 °F (36.8 °C) (Oral)   Resp 18   Ht 1.524 m (5')   Wt 75.3 kg (166 lb)   SpO2 91%   BMI 32.42 kg/m²     Temp (24hrs), Av.8 °F (36.6 °C), Min:97.5 °F (36.4 °C), Max:98.2 °F (36.8 °C)         CBC:  Recent Labs     24  0452 24  0528 24  0606   WBC 8.3 10.6 10.7   HGB 9.9* 10.3* 10.5*   HCT 34.7* 36.3 37.3    248 255       BMP:  Recent Labs     24  0452 24  0528 24  0606   BUN 21 22 24*    140 141   K 4.0 4.2 4.2    98 97*   CO2 34* 37* 38*       LFTS:  No results for input(s): \"ALT\", \"TP\" in the last 72 hours.    Invalid input(s): \"TBILI\", \"SGOT\", \"AP\", \"ALB\"    Data Review:   Recent Results (from the past 24 hour(s))   CBC with Auto Differential    Collection Time: 24  6:06 AM   Result Value Ref Range    WBC 10.7 4.3 - 11.1 K/uL    RBC 4.10 4.05 - 5.2 M/uL    Hemoglobin 10.5 (L) 11.7 - 15.4 g/dL    Hematocrit 37.3 35.8 - 46.3 %    MCV 91.0 82 - 102 FL    MCH 25.6 (L) 26.1 - 32.9 PG    MCHC 28.2 (L) 31.4 - 35.0 g/dL    RDW 19.5 (H) 11.9 - 14.6 %    Platelets 255 150 - 450 K/uL    MPV 10.5 9.4 - 12.3 FL    nRBC 0.00 0.0 - 0.2 K/uL    Differential Type AUTOMATED      Neutrophils % 66 43 - 78 %    Lymphocytes % 18 13 - 44 %    Monocytes % 11 4.0 - 12.0 %    Eosinophils % 5 0.5 - 7.8 %    Basophils % 0 0.0 - 2.0 %    Immature Granulocytes % 1 0.0 - 5.0 %    Neutrophils Absolute 7.0 1.7 - 8.2 K/UL    Lymphocytes Absolute 1.9 0.5 - 4.6 K/UL    Monocytes 
Infectious Diseases Follow up    Today's Date: 11/3/2024   Admit Date: 10/30/2024  : 1955    Impression:   Recent MRSA aortic valve endocarditis-did not stay for treatment, unclear if oral antibiotics were called in  Diphtheroid from blood culture, -suspect into  Polymicrobial bacteremia, incompletely treated  Pemphigus foliaceous-has been considered for rituximab, would need to hold off until patient has endocarditis treated.  Currently on dapsone, Plaquenil and prednisone  Acute on chronic hypoxic respiratory failure  Left axilla and central sternal pain    Plan:   Continue vancomycin, duration to be determined.  Follow-up blood cultures pending, if those have cleared regarding MRSA, will just target 6 weeks of therapy.   Continue ciprofloxacin, aim to treat for 5 days  She meets updated modified [facility] diagnostic criteria for MRSA infective endocarditis; however it is possible she cleared this with very little treatment.  Still, given her ongoing skin issues that would be high risk for recurrent or true infection, and planned immunosuppressive therapy in near future, I think the balance of benefits and risks is firmly in the treating more aggressively category.  If possible, would recommend dalbavancin x 2 at discharge.  This would ensure she receives treatment (albeit not gold , and would not be as likely to fail versus her prior discharge which was more of a natural history treatment strategy  If patient doing well 2 to 3 weeks into her therapy with dalbavancin without any obvious ongoing infections, at that point would be reasonable to consider rituximab    ID will follow along    Anti-infectives:   Vancomycin 10/10 - 10/16; -  Ciprofloxacin -  Chronic Plaquenil/dapsone    Subjective:     Interval updates:  Since last being seen, patient is now complaining of worsening pain.  She is in her armpit as well as along her sternum.  I am unable to palpate any of her sternum that would be causing her 
Infectious Diseases Follow up    Today's Date: 2024   Admit Date: 10/30/2024  : 1955    Impression:   Recent MRSA aortic valve endocarditis.   Documented on TTE and SILVA 10/16/24 at [facility]. She did not stay for treatment, unclear if oral antibiotics were called in  Repeat blood cx 24 with 1/4 diphtheroids; no MRSA.   Polymicrobial bacteremia 10/3/24: [facility], : MRSA and P. Mirabilis, incompletely treated earlier due leaving AMA  Pemphigus foliaceous-has been considered for rituximab, would need to hold off until patient has endocarditis treated.  Currently on dapsone, Plaquenil and prednisone.   Acute on chronic hypoxic respiratory failure  Left axilla and central sternal pain    Plan:     Stop Cipro  Continue daptomycin for MRSA AV endocarditis, EOT 24  Check weekly CK while on Daptomycin   OPAT orders were sent to CM yesterday, intra-med following. Patient agreeable to home IV abx and PICC line placement   Patient with increased O2 demands and dapsone stopped due to concern for methemoglobinemia  She will need PJP prophylaxis  G6PD being checked; if normal favor Bactrim DS 3 x a week.   Will wait to place PICC until closer to discharge   ID will follow    Anti-infectives:   Vancomycin 10/10 - 10/16; -   Ciprofloxacin -  Daptomycin  -  Chronic Plaquenil/dapsone    Subjective:     Interval updates:  Afebrile, WBC normal. Remains on high flow oxygen. Pulmonary have seen: Note methemoglobinemia and stopping Dapsone. Suspect undcumented COPD and LYDIA. She was up with PT/OT when I came in; looks well; no complaints.       HPI:  Patient is a 69 y.o. female with a past medical history of pemphigus foliaceous/erythematous, recurrent skin infections, and a recent history of MRSA positive history of infection in the setting of a new aortic valve vegetation.  She was found to be bacteremic had a ED evaluation on 10/3 blood cultures were obtained.  Eventually one of the stool came back 
Occupational Therapy Note:    Attempted to see patient this AM for occupational therapy treatment  session. Patient refusing at this time despite education. Will follow and re-attempt as schedule permits/patient available. Thank you,    Lauryn Gonzales, OT    Rehab Caseload Tracker    
Patient has remained A&O x 4.   -VS stable  -Norco x1   Patient rounded on hourly by myself or patient assistant and encouraged to call with any needs. No signs of distress noted. Bed low, locked, call bell within reach.     Discharge education completed with patient and family. Daptomycin given early at 1330 per MD order prior to discharge. Home health set up. Pt made aware of upcoming appointment and educated on new med medications. Pt educated on when to seek medical attention if needed. All questions answered.      Pt discharged home via round trip.  Pt given oxygen tank for ride home. Taken out to car via wheelchair.     
Patient removed her oxygen and would not allow staff to place it back on her despite educationg.  Pt yelled aggressively at [facility] when attempted to place back on oxygen. Pt sheard on phone stating that wer didn't care about her as we wee making her wear her oxygen when she '''Didnt need it\" oxygen 79%  on room air.  Pt eventually allowed staff to place her back on oxygen 11 L. Pt now 89-91%. Pt still talking on phone.    
Physical Therapy Note:    Attempted to see patient this AM for physical therapy treatment  session. Patient refused due to \"having a rough morning.\" Will follow and re-attempt as schedule permits/patient available. Thank you,    Rhonda Vargas, PT     Rehab Caseload Tracker    
Physical Therapy Note:    Attempted to see patient this PM for physical therapy treatment  session. Patient refused at time of attempt due to \"walking earlier\" and as she was \"in the middle of her movie.\" Will follow and re-attempt as schedule permits/patient available. Thank you,    Rhonda Vargas, PT     Rehab Caseload Tracker    
Picc ordered for long term IV antibiotics. Unfortunately, pt not a picc candidate due to right partial mastectomy with sentinel lymph node biopsy(3 nodes removed) in 2021 and her left arm pit pemphigus foliaceous.    PerfectServed Dr. Amado and notified pt not a picc candidate. Recommended IR intervention for tunneled central line.    
Pt was asleep during the time of PM med pass. Attempted to wake pt to have her take medication and pt stated she would take medication later. Attempted to wake pt multiple times to take medicine, which she stated she would take when she wakes up. When pt was awake around this time, this RN asked pt if she took her medication to which pt stated \"I don't want to take that medicine because it makes me sleepy and I take that at night time.\" Pt refused to take any of the medication.  
Results for Arterial Co-oximetry:    Hb   11.5  sO2   90.4  FO2Hb  81.7  FCO2Hb  0.4  FMetHb  9.1  FHHb  8.7      ISAMAR LopezRT    
TRANSFER - IN REPORT:    Verbal report received from SALENA Infante on Meli Blancas  being received from ED for routine progression of patient care      Report consisted of patient's Situation, Background, Assessment and   Recommendations(SBAR).     Information from the following report(s) Nurse Handoff Report, ED SBAR, and MAR was reviewed with the receiving nurse.    Opportunity for questions and clarification was provided.      Assessment completed upon patient's arrival to unit and care assumed.    
VANCO DAILY FOLLOW UP NOTE  [facility]   Pharmacy Pharmacokinetic Monitoring Service - Vancomycin    Consulting Provider: Raissa Hoover NP   Indication: Infective endocarditis  Target Concentration: Goal AUC/AVTAR 400-600 mg*hr/L  Day of Therapy: 3  Additional Antimicrobials: cipro    Pertinent Laboratory Values:   Wt Readings from Last 1 Encounters:   11/01/24 72.6 kg (160 lb 0.9 oz)     Temp Readings from Last 1 Encounters:   11/03/24 98.1 °F (36.7 °C)     Recent Labs     11/01/24  0329 11/02/24  0535 11/02/24  0719 11/03/24  0331   BUN 21 20  --   --    CREATININE 0.95 0.62  --  0.58*   WBC 8.6  --  8.7 7.9     Estimated Creatinine Clearance: 79 mL/min (A) (based on SCr of 0.58 mg/dL (L)).    Lab Results   Component Value Date/Time    VANCORANDOM 17.8 11/03/2024 08:52 AM       MRSA Nasal Swab: N/A. Non-respiratory infection    Assessment:  Date/Time Dose Concentration AUC   11/2 0719 750 mg Q12H 17.8 335   11/3 0852 750 mg Q8H 17.8 522   Note: Serum concentrations collected for AUC dosing may appear elevated if collected in close proximity to the dose administered, this is not necessarily an indication of toxicity    Plan:  Dosing recommendations based on Bayesian software  Continue vancomycin 750 mg Q8H as regimen therapeutic.  Renal labs as indicated   Vancomycin concentrations as clinically appropriate.  Pharmacy will continue to monitor patient and adjust therapy as indicated    Thank you for the consult,  Feliciano Anderson [facility]       
VANCO DAILY FOLLOW UP NOTE  [facility]   Pharmacy Pharmacokinetic Monitoring Service - Vancomycin    Consulting Provider: Raissa Hoover NP   Indication: IE  Target Concentration: Goal AUC/AVTAR 400-600 mg*hr/L  Day of Therapy: 1  Additional Antimicrobials: cipro    Pertinent Laboratory Values:   Wt Readings from Last 1 Encounters:   10/31/24 71.6 kg (157 lb 13.6 oz)     Temp Readings from Last 1 Encounters:   11/01/24 98.2 °F (36.8 °C) (Oral)     Recent Labs     10/30/24  1730 10/31/24  0607 11/01/24  0329   BUN 8 11 21   CREATININE 0.76 0.75 0.95   WBC 9.8 10.9 8.6   LACTA 1.4  --   --      Estimated Creatinine Clearance: 48 mL/min (based on SCr of 0.95 mg/dL).    Lab Results   Component Value Date/Time    VANCORANDOM 12.5 01/28/2024 03:49 AM       MRSA Nasal Swab: N/A. Non-respiratory infection    Assessment:  Date/Time Dose Concentration AUC         Note: Serum concentrations collected for AUC dosing may appear elevated if collected in close proximity to the dose administered, this is not necessarily an indication of toxicity    Plan:  Dosing recommendations based on Bayesian software  Start vancomycin IV 1750 mg x 1, followed by 750 mg q12h.   Anticipated AUC of 588 and trough concentration of 16.4 at steady state  Renal labs as indicated   Vancomycin concentration ordered for 11/02 @ 1000  Pharmacy will continue to monitor patient and adjust therapy as indicated    Thank you for the consult,  Marsha Adames, PharmD   PGY1 Pharmacy Resident    
VANCO DAILY FOLLOW UP NOTE  [facility]   Pharmacy Pharmacokinetic Monitoring Service - Vancomycin    Consulting Provider: Raissa Hoover NP   Indication: Infective endocarditis  Target Concentration: Goal AUC/AVTAR 400-600 mg*hr/L  Day of Therapy: 4  Additional Antimicrobials: cipro    Pertinent Laboratory Values:   Wt Readings from Last 1 Encounters:   11/03/24 74.8 kg (164 lb 14.5 oz)     Temp Readings from Last 1 Encounters:   11/04/24 98.4 °F (36.9 °C) (Oral)     Recent Labs     11/02/24  0535 11/02/24  0719 11/03/24  0331 11/04/24  0357   BUN 20  --   --  22   CREATININE 0.62  --  0.58* 0.58*   WBC  --  8.7 7.9 6.1     Estimated Creatinine Clearance: 80 mL/min (A) (based on SCr of 0.58 mg/dL (L)).    Lab Results   Component Value Date/Time    VANCORANDOM 17.8 11/03/2024 08:52 AM       MRSA Nasal Swab: N/A. Non-respiratory infection    Assessment:  Date/Time Dose Concentration AUC   11/2 0719 750 mg Q12H 17.8 335   11/3 0852 750 mg Q8H 17.8 522   Note: Serum concentrations collected for AUC dosing may appear elevated if collected in close proximity to the dose administered, this is not necessarily an indication of toxicity    Plan:  Dosing recommendations based on Bayesian software  Continue vancomycin 750 mg Q8H.  Renal labs as indicated   Vancomycin concentrations as clinically appropriate.  Pharmacy will continue to monitor patient and adjust therapy as indicated    Thank you for the consult,  Jesse Ferrell, PharmD, BCOP  Clinical Pharmacist  Contact Via [employer]          
VANCO DAILY FOLLOW UP NOTE  [facility]   Pharmacy Pharmacokinetic Monitoring Service - Vancomycin    Consulting Provider: Raissa Hoover NP   Indication: Infective endocarditis  Target Concentration: Goal AUC/AVTAR 400-600 mg*hr/L  Day of Therapy: 2  Additional Antimicrobials: cipro    Pertinent Laboratory Values:   Wt Readings from Last 1 Encounters:   11/01/24 72.6 kg (160 lb 0.9 oz)     Temp Readings from Last 1 Encounters:   11/02/24 97.9 °F (36.6 °C)     Recent Labs     10/30/24  1730 10/31/24  0607 11/01/24  0329 11/02/24  0535 11/02/24  0719   BUN 8 11 21 20  --    CREATININE 0.76 0.75 0.95 0.62  --    WBC 9.8 10.9 8.6  --  8.7   LACTA 1.4  --   --   --   --      Estimated Creatinine Clearance: 74 mL/min (based on SCr of 0.62 mg/dL).    Lab Results   Component Value Date/Time    VANCORANDOM 17.8 11/02/2024 07:19 AM       MRSA Nasal Swab: N/A. Non-respiratory infection    Assessment:  Date/Time Dose Concentration AUC   11/2 0719 750 mg Q12H 17.8 335   Note: Serum concentrations collected for AUC dosing may appear elevated if collected in close proximity to the dose administered, this is not necessarily an indication of toxicity    Plan:  Dosing recommendations based on Bayesian software  Regimen subtherapeutic, so adjusted to 750 mg Q8H.   Anticipated AUC of 467 and trough concentration of 13.3 at steady state  Renal labs as indicated   Vancomycin concentration ordered for 11/03 @ 0900  Pharmacy will continue to monitor patient and adjust therapy as indicated    Thank you for the consult,  Feliciano Anderson [facility]     
L/min.  She demonstrated decreased santy but no loss of balance.  Pt took one seated rest break while walking and SpO2 recorded at 84%.  Pt was able to walk back to room with CGA/RW and transferred to recliner.  Pt progressed in ambulation today.     SUBJECTIVE:   Ms. Blancas states, \"Thanks for telling me that\"     Social/Functional Lives With: Alone  Type of Home: House  Home Layout: One level  Home Access: Stairs to enter with rails  Entrance Stairs - Number of Steps: 6  Entrance Stairs - Rails: Both  Bathroom Toilet: Standard  Bathroom Equipment: Commode, Shower chair  Bathroom Accessibility: Accessible  Home Equipment: Walker - Rolling, Oxygen, Cane - Quad  Receives Help From: Friend(s), Other (comment) ([facility]  ADL Assistance: Independent  Ambulation Assistance: Needs assistance  Transfer Assistance: Independent  Active : No  Patient's  Info: Friends  Mode of Transportation: Friends  Occupation: Retired  OBJECTIVE:     PAIN: VITALS / O2: PRECAUTION / LINES / DRAINS:   Pre Treatment: 0         Post Treatment: 0 Vitals        Oxygen   8 L/min at rest but 10 L/min for walking None    RESTRICTIONS/PRECAUTIONS:  Restrictions/Precautions  Restrictions/Precautions: Fall Risk  Restrictions/Precautions: Fall Risk     MOBILITY: I Mod I S SBA CGA Min Mod Max Total  NT x2 Comments:   Bed Mobility    Rolling [] [] [] [] [] [] [] [] [] [] []    Supine to Sit [] [] [] [] [x] [] [] [] [] [] []    Scooting [] [] [] [] [] [] [] [] [] [] []    Sit to Supine [] [] [] [] [] [] [] [] [] [] []    Transfers    Sit to Stand [] [] [] [] [x] [] [] [] [] [] []    Bed to Chair [] [] [] [x] [x] [] [] [] [] [] []    Stand to Sit [] [] [] [] [x] [] [] [] [] [] []     [] [] [] [] [] [] [] [] [] [] []    I=Independent, Mod I=Modified Independent, S=Supervision, SBA=Standby Assistance, CGA=Contact Guard Assistance,   Min=Minimal Assistance, Mod=Moderate Assistance, Max=Maximal Assistance, 
is making good progress with functional mobility, however activity tolerance and O2 sats with activity, on O2 are limited.  Will continue PT services. HHPT recommended at d/c.     SUBJECTIVE:   Ms. Blancas states, \"I'm not going to let them cut on my neck.\"    Social/Functional Lives With: Alone  Type of Home: House  Home Layout: One level  Home Access: Stairs to enter with rails  Entrance Stairs - Number of Steps: 6  Entrance Stairs - Rails: Both  Bathroom Toilet: Standard  Bathroom Equipment: Commode, Shower chair  Bathroom Accessibility: Accessible  Home Equipment: Walker - Rolling, Oxygen, Cane - Quad  Receives Help From: Friend(s), Other (comment) ([facility]  ADL Assistance: Independent  Ambulation Assistance: Needs assistance  Transfer Assistance: Independent  Active : No  Patient's  Info: Friends  Mode of Transportation: Friends  Occupation: Retired  OBJECTIVE:     PAIN: VITALS / O2: PRECAUTION / LINES / DRAINS:   Pre Treatment: 0/10         Post Treatment: 0/10 Vitals   85-94% with ambulation     Oxygen   2 L/min  Continuous Pulse Oximetry and IV    RESTRICTIONS/PRECAUTIONS:  Restrictions/Precautions  Restrictions/Precautions: Fall Risk  Restrictions/Precautions: Fall Risk     MOBILITY: I Mod I S SBA CGA Min Mod Max Total  NT x2 Comments:   Bed Mobility    Rolling [] [] [] [] [] [] [] [] [] [x] [] Up in chair   Supine to Sit [] [] [] [] [] [] [] [] [] [x] []    Scooting [] [] [] [] [] [] [] [] [] [x] []    Sit to Supine [] [] [] [] [] [] [] [] [] [x] []    Transfers    Sit to Stand [] [] [] [x] [] [] [] [] [] [] [] W/ RW   Bed to Chair [] [] [] [] [] [] [] [] [] [x] []    Stand to Sit [] [] [] [x] [] [] [] [] [] [] []    Commode transfer [] [] [] [x] [] [] [] [] [] [] []    I=Independent, Mod I=Modified Independent, S=Supervision, SBA=Standby Assistance, CGA=Contact Guard Assistance,   Min=Minimal Assistance, Mod=Moderate Assistance, Max=Maximal Assistance, 
progressing toward goals, limited by O2 needs, continue POC.        SUBJECTIVE:     Ms. Blancas states, \"I could stir the pot, but I won't\"     Social/Functional Lives With: Alone  Type of Home: House  Home Layout: One level  Home Access: Stairs to enter with rails  Entrance Stairs - Number of Steps: 6  Entrance Stairs - Rails: Both  Bathroom Toilet: Standard  Bathroom Equipment: Commode, Shower chair  Bathroom Accessibility: Accessible  Home Equipment: Walker - Rolling, Oxygen, Cane - Quad  Receives Help From: Friend(s), Other (comment) ([facility]))  ADL Assistance: Independent  Ambulation Assistance: Needs assistance  Transfer Assistance: Independent  Active : No  Patient's  Info: Friends  Mode of Transportation: Friends  Occupation: Retired    OBJECTIVE:     LINES / DRAINS / AIRWAY: Continuous Pulse Oximetry    RESTRICTIONS/PRECAUTIONS:  Restrictions/Precautions  Restrictions/Precautions: Fall Risk    PAIN: VITALS / O2:   Pre Treatment:   Pain Assessment: None - Denies Pain        Post Treatment: no c/o pain, resting comfortably in bedside chair Vitals          Oxygen  O2 Therapy: Oxygen humidified  O2 Device: High flow nasal cannula  O2 Flow Rate (L/min): 8 L/min  SpO2:  (sats ranged 88-90 when walking)     MOBILITY: I Mod I S SBA CGA Min Mod Max Total  NT x2 Comments: pt received in bedside chair   Bed Mobility    Rolling [] [] [] [] [] [] [] [] [] [x] []    Supine to Sit [] [] [] [] [] [] [] [] [] [x] []    Scooting [] [] [] [] [] [] [] [] [] [x] []    Sit to Supine [] [] [] [] [] [] [] [] [] [x] []    Transfers    Sit to Stand [] [] [x] [] [] [] [] [] [] [] [] RW   Bed to Chair [] [] [] [x] [] [] [] [] [] [] [] RW   Stand to Sit [] [] [x] [] [] [] [] [] [] [] [] RW   Tub/Shower [] [] [] [] [] [] [] [] [] [x] []     Toilet [] [] [] [] [] [] [] [] [] [x] []      [] [] [] [] [] [] [] [] [] [x] []    I=Independent, Mod I=Modified Independent, S=Supervision/Setup, SBA=Standby 
recommendations  Continue ciprofloxacin, EOT 11/8  ID switched vancomycin to daptomycin on 11/4, EOT 12/11/2024  ID recommended Bactrim for PJP prophylaxis when patient more stable  Case management consulted to arrange outpatient IV antibiotics    Acute on chronic hypoxic respiratory failure  COPD   Mild pulmonary edema  Pleural effusion  As per the patient she uses 2 L via NC continuously at home.  Off of Airvo, oxygen requirement gradually improving, weaned to 3-4 L high flow nasal cannula today  CT chest with emphysematous changes  Continue breathing treatment  Continue Lasix 20 mg IV twice daily  Continue prednisone 30 mg p.o. daily  Pulmonology following, appreciate comanagement  Continue supportive care  Incentive spirometry    Hypertension  BP stable  Continue lisinopril    Pemphigus foliaceous  Cellulitis of chest wall  Current chronic use of systemic steroids  Wound care consulted  Continue prednisone 30 mg daily  Oncology consulted: Chase gibson  Topical Aquaphor Kenalog ordered.    Sjogren syndrome with dental involvement (HCC)  On Plaquenil, dapsone, prednisone    Methemoglobinemia  Noted with methemoglobinemia on 11/5 by co-oximetry.  Patient has been on dapsone since summer 2023.  Dapsone held   G6PD 382, within normal limit    Anticipated Discharge Arrangements:   Home Health.  High oxygen needs delaying patient's discharge    PT/OT evals ordered?  Therapy evals ordered  Diet:  ADULT DIET; Regular  VTE prophylaxis: Lovenox  Code status: DNR      Non-peripheral Lines and Tubes (if present):          Telemetry (if present):           Hospital Problems:  Principal Problem:    Acute on chronic hypoxic respiratory failure  Active Problems:    Hypertension    Cellulitis of skin    Pemphigus foliaceous    Cellulitis of chest wall    Centrilobular emphysema (HCC)    Sjogren syndrome with dental involvement (HCC)    Current chronic use of systemic steroids    Noncompliance    Infective 
very painful and she does seem very sensitive to any touch with stethoscope. Anticipate this is contributing to her shallow breathing as well.      Subjective/24 Hr events   11/5  Pt continues to be on 11L. Net even again over past 24hrs. Methemoglobin level noted to be 7.5 per RT - noted it will be scanned in but does not upload with the VBG result.   11/4  On 8L NC this morning. Fluid balance net even over past 24hrs  11/3  This morning she is down to 10L. She denies any acute events overnight.     Current Facility-Administered Medications   Medication Dose Route Frequency    sodium chloride (Inhalant) 3 % nebulizer solution 4 mL  4 mL Nebulization BID RT    ipratropium (ATROVENT) 0.02 % nebulizer solution 0.5 mg  0.5 mg Nebulization 4x Daily RT    albuterol (PROVENTIL) (2.5 MG/3ML) 0.083% nebulizer solution 2.5 mg  2.5 mg Nebulization Q4H PRN    DAPTOmycin (CUBICIN) 750 mg in sodium chloride (PF) 0.9 % 15 mL IV syringe  10 mg/kg IntraVENous Q24H    pantoprazole (PROTONIX) tablet 40 mg  40 mg Oral BID AC    triamcinolone (KENALOG) 0.1 % cream   Topical BID    mineral oil-hydrophilic petrolatum (AQUAPHOR) ointment   Topical BID PRN    hydrOXYzine HCl (ATARAX) tablet 10 mg  10 mg Oral TID PRN    predniSONE (DELTASONE) tablet 30 mg  30 mg Oral Daily    acetaminophen (TYLENOL) tablet 650 mg  650 mg Oral Q4H PRN    HYDROcodone-acetaminophen (NORCO) 7.5-325 MG per tablet 1 tablet  1 tablet Oral Q6H PRN    sodium chloride (OCEAN, BABY AYR) 0.65 % nasal spray 1 spray  1 spray Each Nostril PRN    ciprofloxacin (CIPRO) tablet 750 mg  750 mg Oral Q12H    dapsone tablet 100 mg  100 mg Oral Daily    DULoxetine (CYMBALTA) extended release capsule 60 mg  60 mg Oral Daily    ferrous sulfate (IRON 325) tablet 325 mg  325 mg Oral BID WC    arformoterol 15 mcg-budesonide 0.5 mg neb solution   Nebulization BID RT    hydroxychloroquine (PLAQUENIL) tablet 200 mg  200 mg Oral Daily    lisinopril (PRINIVIL;ZESTRIL) tablet 20 mg  20 mg 
  Upper Body   Bathing [] [] [] [] [] [] [] [] [] [x]     Lower Body Bathing [] [] [] [] [] [] [] [] [] [x]     Toileting [] [] [] [] [] [] [] [] [] [x]    Upper Body Dressing [] [] [] [x] [] [] [] [] [] [] gown   Lower Body Dressing [] [] [] [x] [] [] [] [] [] [] Socks    Feeding [] [] [] [] [] [] [] [] [] [x]    Grooming [] [] [] [x] [] [] [] [] [] [] Brushing teeth, combing hair standing at sink   Personal Device Care [] [] [] [] [] [] [] [] [] [x]    Functional Mobility [] [] [] [x] [] [] [] [] [] [] RW   I=Independent, Mod I=Modified Independent, S=Supervision/Setup, SBA=Standby Assistance, CGA=Contact Guard Assistance, Min=Minimal Assistance, Mod=Moderate Assistance, Max=Maximal Assistance, Total=Total Assistance, NT=Not Tested    BALANCE: Good Fair+ Fair Fair- Poor NT Comments   Sitting Static [x] [] [] [] [] []    Sitting Dynamic [x] [] [] [] [] []              Standing Static [] [x] [] [] [] []    Standing Dynamic [] [x] [] [] [] []        PLAN:     FREQUENCY/DURATION   OT Plan of Care: 3 times/week for duration of hospital stay or until stated goals are met, whichever comes first.    TREATMENT:     TREATMENT:   Co-Treatment PT/OT necessary due to patient's decreased overall endurance/tolerance levels, as well as need for high level skilled assistance to complete functional transfers/mobility and functional tasks  Neuromuscular Re-education (20 Minutes): Patient participated in neuromuscular re-education including functional reaching, weight shifting, postural training, midline training, standing tolerance activity , and sitting balance activity   with minimal assistance, verbal cues, tactile cues, education, and adaptive equipment to improve sitting balance, standing balance, proprioception, posture, coordination, static balance, and dynamic balance in order to prepare for functional task, prepare for seated ADLs, prepare for standing ADLs, prepare for functional transfer, increase safety awareness, 
PRN    0.9 % sodium chloride infusion   IntraVENous PRN    potassium chloride (KLOR-CON M) extended release tablet 40 mEq  40 mEq Oral PRN    Or    potassium bicarb-citric acid (EFFER-K) effervescent tablet 40 mEq  40 mEq Oral PRN    Or    potassium chloride 10 mEq/100 mL IVPB (Peripheral Line)  10 mEq IntraVENous PRN    magnesium sulfate 2000 mg in 50 mL IVPB premix  2,000 mg IntraVENous PRN    ondansetron (ZOFRAN-ODT) disintegrating tablet 4 mg  4 mg Oral Q8H PRN    Or    ondansetron (ZOFRAN) injection 4 mg  4 mg IntraVENous Q6H PRN    polyethylene glycol (GLYCOLAX) packet 17 g  17 g Oral Daily PRN    bisacodyl (DULCOLAX) suppository 10 mg  10 mg Rectal Daily PRN    aluminum & magnesium hydroxide-simethicone (MAALOX) 200-200-20 MG/5ML suspension 30 mL  30 mL Oral Q6H PRN    melatonin tablet 3 mg  3 mg Oral Nightly    enoxaparin (LOVENOX) injection 40 mg  40 mg SubCUTAneous Daily     Review of Systems    Constitutional: negative for fever, chills, sweats  Cardiovascular: negative for chest pain, palpitations, syncope, edema  Gastrointestinal:  negative for dysphagia, reflux, vomiting, diarrhea, abdominal pain, or melena  Neurologic:  negative for focal weakness, numbness, headache  Objective:     Vitals:    11/11/24 0723 11/11/24 0748 11/11/24 0831 11/11/24 1230   BP:  (!) 89/65 128/71 128/61   Pulse:  89  90   Resp:  18  17   Temp:  98.4 °F (36.9 °C)  98.6 °F (37 °C)   TempSrc:  Oral     SpO2: 92% 96%  (!) 89%   Weight:       Height:         [unfilled]  Physical Exam:   Constitution:  the patient is well developed and in no acute distress  HEENT:  Sclera clear, pupils equal, oral mucosa moist  Respiratory: crackles R>L  Cardiovascular:  RRR without M,G,R  Gastrointestinal: soft and non-tender; with positive bowel sounds.  Musculoskeletal: warm without cyanosis. There is no lower extremity edema.  Skin:  no jaundice or rashes, no wounds   Neurologic: no gross neuro deficits     Psychiatric:  alert and 
chronically unwell  HEENT:  NCAT, Sclerae anicteric, PERRL, MMM  CV:   RRR, no M/R/G  Lungs:  No W/R/R. Symmetric expansion, on 11 L NC, lung sounds diminished   Abdomen:  Appears soft, NT, ND  Extremities: No cyanosis or clubbing, no edema  Skin:   Multiple erosive lesions of bilateral lower extremities.  Pustules in the intertriginous areas of left neck, and left axilla.  Psych:  Normal mood and affect      Data Review:   I have personally reviewed labs, micro, and other relevant tests:    Labs: Results:   BMP, Mg, Phos Recent Labs     11/05/24  0407 11/06/24  0454 11/07/24  0452    140 141   K 3.9 3.7 4.0    100 101   CO2 33* 34* 34*   ANIONGAP 6* 6* 6*   BUN 20 18 21   CREATININE 0.75 0.62 0.67   LABGLOM 86 >90 >90   CALCIUM 9.2 8.9 9.0   GLUCOSE 132* 125* 114*      CBC w/ diff Recent Labs     11/05/24  0407 11/05/24  1116 11/06/24 0454 11/07/24 0452   WBC 6.7  --  6.8 8.3   RBC 3.92* 4.01 3.94* 3.83*   HGB 10.2*  --  10.2* 9.9*   HCT 36.1  --  35.9 34.7*   MCV 92.1  --  91.1 90.6   MCH 26.0*  --  25.9* 25.8*   MCHC 28.3*  --  28.4* 28.5*   RDW 19.6*  --  19.5* 19.8*     --  233 236   MPV 10.3  --  10.2 11.0   NRBC 0.00  --  0.00 0.00   LYMPHOPCT 19  --  19 17   MONOPCT 11  --  12 10   BASOPCT 0  --  0 0   IMMGRAN 0  --  0 1   LYMPHSABS 1.2  --  1.3 1.4   EOSABS 0.2  --  0.2 0.1   MONOSABS 0.8  --  0.8 0.9   BASOSABS 0.0  --  0.0 0.0   ABSIMMGRAN 0.0  --  0.0 0.1      LFT No results for input(s): \"BILITOT\", \"BILIDIR\", \"ALKPHOS\", \"AST\", \"ALT\", \"LABALBU\", \"GLOB\" in the last 72 hours.    Invalid input(s): \"PROT\"     A1c No results found for: \"LABA1C\", \"EAG\"     Microbiology:     Results       Procedure Component Value Units Date/Time    Culture, Blood 2 [[id number]]  (Abnormal) Collected: 11/01/24 1640    Order Status: Completed Specimen: Blood Updated: 11/04/24 1922     Special Requests --        RIGHT  HAND       Gram Stain GRAM POSITIVE RODS               AEROBIC AND ANAEROBIC BOTTLES       
participated in lower body dressing ADLs in unsupported sitting and standing with no   cueing to increase independence and increase activity tolerance. Patient also participated in bed mobility, functional mobility, functional transfer, energy conservation, and assistive device training to increase independence and increase activity tolerance. The patient was educated on proper use of assistive device, recommended equipment, transfer training and safety, and energy conservation techniques during ADL/IADLS and patient verbalized understanding.     TREATMENT GRID:  N/A    AFTER TREATMENT PRECAUTIONS: Bed, Call light within reach, Needs within reach, and RN notified    INTERDISCIPLINARY COLLABORATION:  RN/ PCT    EDUCATION:       TOTAL TREATMENT DURATION AND TIME:  Time In: 1344  Time Out: 1410  Minutes: 26    AVANI Meredith, OT              
underlying destructive emphysema, some thickened airways and segmental atelectasis.     LAB:  Recent Labs     11/02/24  0719 11/03/24  0331 11/04/24  0357   WBC 8.7 7.9 6.1   HGB 10.7* 10.1* 10.1*   HCT 37.2 35.7* 36.1    202 216     Recent Labs     11/02/24  0535 11/03/24  0331 11/04/24  0357     --  142   K 4.4  --  4.4     --  105   CO2 27  --  33*   BUN 20  --  22   CREATININE 0.62 0.58* 0.58*   BILITOT 0.5  --   --    AST 20  --   --    ALT 8  --   --    ALKPHOS 83  --   --      No results for input(s): \"TROPHS\", \"NTPROBNP\", \"CRP\", \"ESR\" in the last 72 hours.  Recent Labs     11/02/24  0535 11/04/24  0357   GLUCOSE 122* 127*      Microbiology:   [unfilled]  Recent Labs     11/01/24  1547 11/01/24  1640   CULTURE NO GROWTH 2 DAYS DIPHTHEROIDS This organism may be indicative of culture contamination. Clinical correlation needs to be evaluated as each case is unique.*     No results for input(s): \"COVID19\" in the last 72 hours.  ECHO: 10/30/24    ECHO (TTE) COMPLETE (PRN CONTRAST/BUBBLE/STRAIN/3D) 11/02/2024  5:09 PM (Final)    Interpretation Summary    Left Ventricle: Normal left ventricular systolic function with a visually estimated EF of 55 - 60%. Left ventricle size is normal. Moderately increased wall thickness. Moderately increased ventricular mass. Findings consistent with moderate concentric hypertrophy. Normal wall motion.    Aortic Valve: Mildly thickened cusps. Mildly calcified cusps. Mild sclerosis of the aortic valve cusps.    Mitral Valve: Mild annular calcification.    Image quality is adequate.    Signed by: Pierce Aragon MD on 11/2/2024  5:09 PM    Assessment and Plan:  (Medical Decision Making)   Impression: 70 yo F w/ a hx of chronic steroid use due to pemphigus foliaceous, undocumented COPD, CHRF 2L home o2, active tobacco abuse, probable untreated LYDIA, admitted for AoCHRF     Acute on chronic hypoxic respiratory failure - CTA negative for PE; suspected LYDIA with 
(Restricted: peds pts or suitable admitted adults)    Collection Time: 10/31/24  9:15 AM    Specimen: Nasopharyngeal   Result Value Ref Range    Adenovirus by PCR NOT DETECTED NOTDET      Coronavirus 229E by PCR NOT DETECTED NOTDET      Coronavirus HKU1 by PCR NOT DETECTED NOTDET      Coronavirus NL63 by PCR NOT DETECTED NOTDET      Coronavirus OC43 by PCR NOT DETECTED NOTDET      SARS-CoV-2, PCR NOT DETECTED NOTDET      Human Metapneumovirus by PCR NOT DETECTED NOTDET      Rhinovirus Enterovirus PCR NOT DETECTED NOTDET      Influenza A by PCR NOT DETECTED NOTDET      Influenza B PCR NOT DETECTED NOTDET      Parainfluenza 1 PCR NOT DETECTED NOTDET      Parainfluenza 2 PCR NOT DETECTED NOTDET      Parainfluenza 3 PCR NOT DETECTED NOTDET      Parainfluenza 4 PCR NOT DETECTED NOTDET      Respiratory Syncytial Virus by PCR NOT DETECTED NOTDET      Bordetella parapertussis by PCR NOT DETECTED NOTDET      Bordetella pertussis by PCR NOT DETECTED NOTDET      Chlamydophila Pneumonia PCR NOT DETECTED NOTDET      Mycoplasma pneumo by PCR NOT DETECTED NOTDET     CBC with Auto Differential    Collection Time: 11/01/24  3:29 AM   Result Value Ref Range    WBC 8.6 4.3 - 11.1 K/uL    RBC 4.46 4.05 - 5.2 M/uL    Hemoglobin 12.0 11.7 - 15.4 g/dL    Hematocrit 41.8 35.8 - 46.3 %    MCV 93.7 82 - 102 FL    MCH 26.9 26.1 - 32.9 PG    MCHC 28.7 (L) 31.4 - 35.0 g/dL    RDW 19.8 (H) 11.9 - 14.6 %    Platelets 220 150 - 450 K/uL    MPV 10.6 9.4 - 12.3 FL    nRBC 0.03 0.0 - 0.2 K/uL    Differential Type AUTOMATED      Neutrophils % 69 43 - 78 %    Lymphocytes % 21 13 - 44 %    Monocytes % 9 4.0 - 12.0 %    Eosinophils % 0 (L) 0.5 - 7.8 %    Basophils % 0 0.0 - 2.0 %    Immature Granulocytes % 1 0.0 - 5.0 %    Neutrophils Absolute 5.9 1.7 - 8.2 K/UL    Lymphocytes Absolute 1.8 0.5 - 4.6 K/UL    Monocytes Absolute 0.8 0.1 - 1.3 K/UL    Eosinophils Absolute 0.0 0.0 - 0.8 K/UL    Basophils Absolute 0.0 0.0 - 0.2 K/UL    Immature Granulocytes 
32.9 PG    MCHC 28.4 (L) 31.4 - 35.0 g/dL    RDW 19.5 (H) 11.9 - 14.6 %    Platelets 233 150 - 450 K/uL    MPV 10.2 9.4 - 12.3 FL    nRBC 0.00 0.0 - 0.2 K/uL    Differential Type AUTOMATED      Neutrophils % 66 43 - 78 %    Lymphocytes % 19 13 - 44 %    Monocytes % 12 4.0 - 12.0 %    Eosinophils % 2 0.5 - 7.8 %    Basophils % 0 0.0 - 2.0 %    Immature Granulocytes % 0 0.0 - 5.0 %    Neutrophils Absolute 4.5 1.7 - 8.2 K/UL    Lymphocytes Absolute 1.3 0.5 - 4.6 K/UL    Monocytes Absolute 0.8 0.1 - 1.3 K/UL    Eosinophils Absolute 0.2 0.0 - 0.8 K/UL    Basophils Absolute 0.0 0.0 - 0.2 K/UL    Immature Granulocytes Absolute 0.0 0.0 - 0.5 K/UL   Basic Metabolic Panel w/ Reflex to MG    Collection Time: 11/06/24  4:54 AM   Result Value Ref Range    Sodium 140 136 - 145 mmol/L    Potassium 3.7 3.5 - 5.1 mmol/L    Chloride 100 98 - 107 mmol/L    CO2 34 (H) 20 - 29 mmol/L    Anion Gap 6 (L) 7 - 16 mmol/L    Glucose 125 (H) 70 - 99 mg/dL    BUN 18 8 - 23 MG/DL    Creatinine 0.62 0.60 - 1.10 MG/DL    Est, Glom Filt Rate >90 >60 ml/min/1.73m2    Calcium 8.9 8.8 - 10.2 MG/DL       No results for input(s): \"COVID19\" in the last 72 hours.      Current Meds:  Current Facility-Administered Medications   Medication Dose Route Frequency    ipratropium (ATROVENT) 0.02 % nebulizer solution 0.5 mg  0.5 mg Nebulization Q6H WA RT    sodium chloride (Inhalant) 3 % nebulizer solution 4 mL  4 mL Nebulization BID RT    albuterol (PROVENTIL) (2.5 MG/3ML) 0.083% nebulizer solution 2.5 mg  2.5 mg Nebulization Q4H PRN    DAPTOmycin (CUBICIN) 750 mg in sodium chloride (PF) 0.9 % 15 mL IV syringe  10 mg/kg IntraVENous Q24H    pantoprazole (PROTONIX) tablet 40 mg  40 mg Oral BID AC    triamcinolone (KENALOG) 0.1 % cream   Topical BID    mineral oil-hydrophilic petrolatum (AQUAPHOR) ointment   Topical BID PRN    hydrOXYzine HCl (ATARAX) tablet 10 mg  10 mg Oral TID PRN    predniSONE (DELTASONE) tablet 30 mg  30 mg Oral Daily    acetaminophen (TYLENOL) 
DETECTED        Coronavirus 229E by PCR NOT DETECTED        Coronavirus HKU1 by PCR NOT DETECTED        Coronavirus NL63 by PCR NOT DETECTED        Coronavirus OC43 by PCR NOT DETECTED        SARS-CoV-2, PCR NOT DETECTED        Human Metapneumovirus by PCR NOT DETECTED        Rhinovirus Enterovirus PCR NOT DETECTED        Influenza A by PCR NOT DETECTED        Influenza B PCR NOT DETECTED        Parainfluenza 1 PCR NOT DETECTED        Parainfluenza 2 PCR NOT DETECTED        Parainfluenza 3 PCR NOT DETECTED        Parainfluenza 4 PCR NOT DETECTED        Respiratory Syncytial Virus by PCR NOT DETECTED        Bordetella parapertussis by PCR NOT DETECTED        Bordetella pertussis by PCR NOT DETECTED        Chlamydophila Pneumonia PCR NOT DETECTED        Mycoplasma pneumo by PCR NOT DETECTED               Imaging:     I have personally reviewed imaging studies showing:  No results found.     Echocardiogram:  06/21/23    TRANSTHORACIC ECHOCARDIOGRAM (TTE) COMPLETE (CONTRAST/BUBBLE/3D PRN) 06/22/2023  4:05 PM (Final)    Interpretation Summary    Left Ventricle: Normal left ventricular systolic function with a visually estimated EF of 60 - 65%. Left ventricle size is normal. Normal wall thickness. Normal wall motion. Indeterminate diastolic function.    Aortic Valve: Moderate sclerosis of the aortic valve cusp. Trace regurgitation.    Mitral Valve: Mild to moderate regurgitation.    Tricuspid Valve: Mild regurgitation. Moderately elevated RVSP. The estimated RVSP is 53 mmHg.    Signed by: Abrahan Damon MD on 6/22/2023  4:05 PM      Signed By: YOLETTE CUI MD     November 4, 2024      Part of this note may have been written by using a voice dictation software.  The note has been proof read but may still contain some grammatical/other typographical errors.   
diastolic function.    Aortic Valve: Moderate sclerosis of the aortic valve cusp. Trace regurgitation.    Mitral Valve: Mild to moderate regurgitation.    Tricuspid Valve: Mild regurgitation. Moderately elevated RVSP. The estimated RVSP is 53 mmHg.    Signed by: Abrahan Damon MD on 6/22/2023  4:05 PM      Signed By: YOLETTE CUI MD     November 8, 2024      Part of this note may have been written by using a voice dictation software.  The note has been proof read but may still contain some grammatical/other typographical errors.   
estimated RVSP is 53 mmHg.    Signed by: Abrahan Damon MD on 6/22/2023  4:05 PM      Signed By: BRENNA Castanon     November 5, 2024      Part of this note may have been written by using a voice dictation software.  The note has been proof read but may still contain some grammatical/other typographical errors.   
extended release capsule 60 mg  60 mg Oral Daily    ferrous sulfate (IRON 325) tablet 325 mg  325 mg Oral BID WC    arformoterol 15 mcg-budesonide 0.5 mg neb solution   Nebulization BID RT    hydroxychloroquine (PLAQUENIL) tablet 200 mg  200 mg Oral Daily    lisinopril (PRINIVIL;ZESTRIL) tablet 20 mg  20 mg Oral Daily    pregabalin (LYRICA) capsule 200 mg  200 mg Oral BID    sodium chloride flush 0.9 % injection 5-40 mL  5-40 mL IntraVENous 2 times per day    sodium chloride flush 0.9 % injection 5-40 mL  5-40 mL IntraVENous PRN    0.9 % sodium chloride infusion   IntraVENous PRN    potassium chloride (KLOR-CON M) extended release tablet 40 mEq  40 mEq Oral PRN    Or    potassium bicarb-citric acid (EFFER-K) effervescent tablet 40 mEq  40 mEq Oral PRN    Or    potassium chloride 10 mEq/100 mL IVPB (Peripheral Line)  10 mEq IntraVENous PRN    magnesium sulfate 2000 mg in 50 mL IVPB premix  2,000 mg IntraVENous PRN    ondansetron (ZOFRAN-ODT) disintegrating tablet 4 mg  4 mg Oral Q8H PRN    Or    ondansetron (ZOFRAN) injection 4 mg  4 mg IntraVENous Q6H PRN    polyethylene glycol (GLYCOLAX) packet 17 g  17 g Oral Daily PRN    bisacodyl (DULCOLAX) suppository 10 mg  10 mg Rectal Daily PRN    aluminum & magnesium hydroxide-simethicone (MAALOX) 200-200-20 MG/5ML suspension 30 mL  30 mL Oral Q6H PRN    melatonin tablet 3 mg  3 mg Oral Nightly    enoxaparin (LOVENOX) injection 40 mg  40 mg SubCUTAneous Daily       Signed:  JOEY SWANSON MD    Part of this note may have been written by using a voice dictation software.  The note has been proof read but may still contain some grammatical/other typographical errors.    
injection 4 mg  4 mg IntraVENous Q6H PRN    polyethylene glycol (GLYCOLAX) packet 17 g  17 g Oral Daily PRN    bisacodyl (DULCOLAX) suppository 10 mg  10 mg Rectal Daily PRN    aluminum & magnesium hydroxide-simethicone (MAALOX) 200-200-20 MG/5ML suspension 30 mL  30 mL Oral Q6H PRN    melatonin tablet 3 mg  3 mg Oral Nightly    enoxaparin (LOVENOX) injection 40 mg  40 mg SubCUTAneous Daily       Signed:  Roberto Carlos Wagner MD    Part of this note may have been written by using a voice dictation software.  The note has been proof read but may still contain some grammatical/other typographical errors.

## 2024-11-22 PROBLEM — Z12.31 ENCOUNTER FOR SCREENING MAMMOGRAM FOR MALIGNANT NEOPLASM OF BREAST: Status: RESOLVED | Noted: 2024-10-23 | Resolved: 2024-11-22

## 2024-11-25 ENCOUNTER — TELEPHONE (OUTPATIENT)
Dept: ONCOLOGY | Age: 69
End: 2024-11-25

## 2024-11-25 ENCOUNTER — CLINICAL DOCUMENTATION (OUTPATIENT)
Dept: ONCOLOGY | Age: 69
End: 2024-11-25

## 2024-11-25 DIAGNOSIS — I33.0 INFECTIVE ENDOCARDITIS, DUE TO UNSPECIFIED ORGANISM, UNSPECIFIED CHRONICITY: Primary | ICD-10-CM

## 2024-11-25 RX ORDER — HYDROCORTISONE SODIUM SUCCINATE 100 MG/2ML
100 INJECTION INTRAMUSCULAR; INTRAVENOUS
OUTPATIENT
Start: 2024-11-25

## 2024-11-25 RX ORDER — ALBUTEROL SULFATE 90 UG/1
4 INHALANT RESPIRATORY (INHALATION) PRN
OUTPATIENT
Start: 2024-11-25

## 2024-11-25 RX ORDER — EPINEPHRINE 1 MG/ML
0.3 INJECTION, SOLUTION, CONCENTRATE INTRAVENOUS PRN
OUTPATIENT
Start: 2024-11-25

## 2024-11-25 RX ORDER — ALBUTEROL SULFATE 0.83 MG/ML
2.5 SOLUTION RESPIRATORY (INHALATION)
OUTPATIENT
Start: 2024-11-25

## 2024-11-25 RX ORDER — DIPHENHYDRAMINE HYDROCHLORIDE 50 MG/ML
50 INJECTION INTRAMUSCULAR; INTRAVENOUS
OUTPATIENT
Start: 2024-11-25

## 2024-11-25 RX ORDER — ACETAMINOPHEN 325 MG/1
650 TABLET ORAL
OUTPATIENT
Start: 2024-11-25

## 2024-11-25 RX ORDER — ONDANSETRON 2 MG/ML
8 INJECTION INTRAMUSCULAR; INTRAVENOUS
OUTPATIENT
Start: 2024-11-25

## 2024-11-25 RX ORDER — SODIUM CHLORIDE 9 MG/ML
INJECTION, SOLUTION INTRAVENOUS CONTINUOUS
OUTPATIENT
Start: 2024-11-25

## 2024-11-26 ENCOUNTER — APPOINTMENT (OUTPATIENT)
Dept: GENERAL RADIOLOGY | Age: 69
End: 2024-11-26
Payer: MEDICARE

## 2024-11-26 ENCOUNTER — HOSPITAL ENCOUNTER (EMERGENCY)
Age: 69
Discharge: HOME | End: 2024-11-26
Attending: EMERGENCY MEDICINE
Payer: MEDICARE

## 2024-11-26 ENCOUNTER — TELEPHONE (OUTPATIENT)
Dept: INFUSION THERAPY | Age: 69
End: 2024-11-26

## 2024-11-26 ENCOUNTER — APPOINTMENT (OUTPATIENT)
Dept: INTERVENTIONAL RADIOLOGY/VASCULAR | Age: 69
End: 2024-11-26
Attending: EMERGENCY MEDICINE
Payer: MEDICARE

## 2024-11-26 VITALS
RESPIRATION RATE: 16 BRPM | OXYGEN SATURATION: 92 % | HEIGHT: 60 IN | BODY MASS INDEX: 32.39 KG/M2 | TEMPERATURE: 98.2 F | DIASTOLIC BLOOD PRESSURE: 65 MMHG | SYSTOLIC BLOOD PRESSURE: 147 MMHG | WEIGHT: 165 LBS | HEART RATE: 82 BPM

## 2024-11-26 DIAGNOSIS — T82.9XXA VASCULAR PORT COMPLICATION, INITIAL ENCOUNTER: Primary | ICD-10-CM

## 2024-11-26 PROCEDURE — 77001 FLUOROGUIDE FOR VEIN DEVICE: CPT | Performed by: RADIOLOGY

## 2024-11-26 PROCEDURE — 6360000002 HC RX W HCPCS: Performed by: RADIOLOGY

## 2024-11-26 PROCEDURE — 36581 REPLACE TUNNELED CV CATH: CPT

## 2024-11-26 PROCEDURE — 71046 X-RAY EXAM CHEST 2 VIEWS: CPT

## 2024-11-26 PROCEDURE — 36581 REPLACE TUNNELED CV CATH: CPT | Performed by: RADIOLOGY

## 2024-11-26 PROCEDURE — 99284 EMERGENCY DEPT VISIT MOD MDM: CPT

## 2024-11-26 PROCEDURE — 77001 FLUOROGUIDE FOR VEIN DEVICE: CPT

## 2024-11-26 RX ORDER — LIDOCAINE HYDROCHLORIDE 20 MG/ML
INJECTION, SOLUTION INFILTRATION; PERINEURAL PRN
Status: DISCONTINUED | OUTPATIENT
Start: 2024-11-26 | End: 2024-11-26 | Stop reason: HOSPADM

## 2024-11-26 RX ADMIN — LIDOCAINE HYDROCHLORIDE 10 ML: 20 INJECTION, SOLUTION INFILTRATION; PERINEURAL at 15:51

## 2024-11-26 ASSESSMENT — PAIN SCALES - GENERAL
PAINLEVEL_OUTOF10: 7
PAINLEVEL_OUTOF10: 0

## 2024-11-26 ASSESSMENT — PAIN - FUNCTIONAL ASSESSMENT
PAIN_FUNCTIONAL_ASSESSMENT: NONE - DENIES PAIN
PAIN_FUNCTIONAL_ASSESSMENT: 0-10

## 2024-11-26 NOTE — OR NURSING
Attempted to call report to primary RN. No answer from [facility] staff. IR can be reached with questions at ext [phone].

## 2024-11-26 NOTE — ED PROVIDER NOTES
Emergency Department Provider Note                   PCP:                Florencio Banegas MD               Age: 69 y.o.      Sex: female   Final diagnosis/impression:  1. Vascular port complication, initial encounter       Disposition: Discharged    MDM/Clinical Course:  Patient seen by myself at the [facility] emergency department. Patient had signs symptoms and clinical history most consistent with port/vascular access complaint as previously discussed, overall well-appearing. Radiology shows appropriate position of previous central venous catheter, questionable areas of possible atypical pneumonia, patient without any increased cough, fever/chills or similar so do not feel patient needing on antibiotics or similar.  I did reach out to interventional radiology after speaking with pharmacy regarding daptomycin, the situation, there is a strong need for replacement to ensure effective IV antibiotic therapy given short half-life of daptomycin and significant history of infective endocarditis.  Patient to go to IR for catheter replacement.  This was placed successfully, recommended follow-up as needed with primary care provider, infectious disease.  I recommended close monitoring of symptoms, low threshold to return as needed.  Patient/family given instructions to return as needed for any questions, concerns or worsening symptoms, particularly those as outlined in the disposition section / discharge section of patient discharge paperwork. Patient/family verbalizes understanding and agreement with ED course/plan in shared medical decision making. Questions answered.    Complexity of Problems Addressed:  1 or more acute illnesses that pose a threat to life or bodily function.     Data Reviewed and Analyzed:    Category 1:   I reviewed external records: Reviewed hospital discharge summary from 11/13/2024 for COPD, infective endocarditis  I ordered each unique test.  I reviewed the results of each unique

## 2024-11-26 NOTE — ED TRIAGE NOTES
Pt arrives via ems from home c/o vascular access problem. Per pt she has been instructed administer antibiotics through her central line at home. Pt states she was visited by her HH nurse when \"she pulled her central line out\" and then got in her car and drove off according to patient. Pt states pain around central line. Sutures and dressing not intact time of triage.

## 2024-11-26 NOTE — OR NURSING
TRANSFER - OUT REPORT:           Verbal report given to SALENA Stoddard(name) on Meli Blancas  being transferred to  Recovery 5(unit) for  routine post-op            Report consisted of patient’s Situation, Background, Assessment and      Recommendations(SBAR).          Information from the following report(s) SBAR and Procedure Summary was reviewed with the receiving nurse.       Opportunity for questions and clarification was provided.      Pt tolerated procedure well.

## 2024-11-26 NOTE — DISCHARGE INSTRUCTIONS
Please return for any questions, concerns or worsening symptoms, particularly increasing/unremitting pain, rapidly spreading redness, development of fever, development of nausea/vomiting, lethargy, ill appearance, additional difficulty or complications associated with the report/central venous catheter.

## 2024-11-26 NOTE — ED NOTES
Central line dressing changed. The one placed in IR was saturated in blood due to patient being on blood thinners.      Valerie Parikh, RN  11/26/24 1785

## 2024-11-26 NOTE — ED NOTES
TRANSFER - OUT REPORT:    Verbal report given to Karen MARTINO on Meli Blancas  being transferred to IT for ordered procedure       Report consisted of patient's Situation, Background, Assessment and   Recommendations(SBAR).     Information from the following report(s) ED SBAR was reviewed with the receiving nurse.    [place] Fall Assessment:    Presents to emergency department  because of falls (Syncope, seizure, or loss of consciousness): No  Age > 70: Yes  Altered Mental Status, Intoxication with alcohol or substance confusion (Disorientation, impaired judgment, poor safety awaremess, or inability to follow instructions): No  Impaired Mobility: Ambulates or transfers with assistive devices or assistance; Unable to ambulate or transer.: No             Lines:   CVC  11/12/24 Right Subclavian (Active)        Opportunity for questions and clarification was provided.      Patient transported with:  O2 @ 6lpm and Janee Deleon RN  11/26/24 9423

## 2024-11-26 NOTE — BRIEF OP NOTE
[facility]  Department of Interventional Radiology  [phone]        Interventional Radiology Brief Procedure Note    Patient: Meli Blancas MRN: [id number]  SSN: [id number]    YOB: 1955  Age: 69 y.o.  Sex: female      Date of Procedure: 11/26/2024    Pre-Procedure Diagnosis: fractured IV access device    Post-Procedure Diagnosis: SAME    Procedure(s): Tunneled Central Venous Catheter exchange       Performed By: Feliciano Espinal MD     Assistants: None    Anesthesia:None    Estimated Blood Loss: None    Specimens:  None    Implants:  Tunneled Central Venous Catheter       Complications: None    Recommendations: may use catheter        Signed By: Feliciano Espinal MD     November 26, 2024

## 2024-12-05 ENCOUNTER — CLINICAL DOCUMENTATION (OUTPATIENT)
Dept: ONCOLOGY | Age: 69
End: 2024-12-05

## 2024-12-06 ENCOUNTER — HOSPITAL ENCOUNTER (INPATIENT)
Age: 69
LOS: 48 days | Discharge: HOME HEALTH | DRG: 288 | End: 2025-01-23
Attending: EMERGENCY MEDICINE | Admitting: STUDENT IN AN ORGANIZED HEALTH CARE EDUCATION/TRAINING PROGRAM
Payer: MEDICARE

## 2024-12-06 ENCOUNTER — APPOINTMENT (OUTPATIENT)
Dept: GENERAL RADIOLOGY | Age: 69
DRG: 288 | End: 2024-12-06
Payer: MEDICARE

## 2024-12-06 ENCOUNTER — APPOINTMENT (OUTPATIENT)
Dept: CT IMAGING | Age: 69
DRG: 288 | End: 2024-12-06
Payer: MEDICARE

## 2024-12-06 DIAGNOSIS — R91.8 PULMONARY INFILTRATES: ICD-10-CM

## 2024-12-06 DIAGNOSIS — M79.89 LEFT LEG SWELLING: ICD-10-CM

## 2024-12-06 DIAGNOSIS — B95.62 MRSA BACTEREMIA: ICD-10-CM

## 2024-12-06 DIAGNOSIS — L03.313 CELLULITIS OF CHEST WALL: ICD-10-CM

## 2024-12-06 DIAGNOSIS — R78.81 BACTEREMIA: ICD-10-CM

## 2024-12-06 DIAGNOSIS — R78.81 MRSA BACTEREMIA: ICD-10-CM

## 2024-12-06 DIAGNOSIS — R41.82 ALTERED MENTAL STATUS, UNSPECIFIED ALTERED MENTAL STATUS TYPE: Primary | ICD-10-CM

## 2024-12-06 PROBLEM — G93.41 ACUTE METABOLIC ENCEPHALOPATHY: Status: ACTIVE | Noted: 2024-12-06

## 2024-12-06 LAB
ALBUMIN SERPL-MCNC: 2.7 G/DL (ref 3.2–4.6)
ALBUMIN/GLOB SERPL: 0.7 (ref 1–1.9)
ALP SERPL-CCNC: 94 U/L (ref 35–104)
ALT SERPL-CCNC: 16 U/L (ref 8–45)
ANION GAP SERPL CALC-SCNC: 14 MMOL/L (ref 7–16)
APPEARANCE UR: ABNORMAL
AST SERPL-CCNC: 33 U/L (ref 15–37)
BACTERIA URNS QL MICRO: ABNORMAL /HPF
BASOPHILS # BLD: 0 K/UL (ref 0–0.2)
BASOPHILS NFR BLD: 1 % (ref 0–2)
BILIRUB SERPL-MCNC: 0.4 MG/DL (ref 0–1.2)
BILIRUB UR QL: NEGATIVE
BUN SERPL-MCNC: 15 MG/DL (ref 8–23)
CALCIUM SERPL-MCNC: 9.6 MG/DL (ref 8.8–10.2)
CASTS URNS QL MICRO: 0 /LPF
CHLORIDE SERPL-SCNC: 103 MMOL/L (ref 98–107)
CK SERPL-CCNC: 243 U/L (ref 21–215)
CK SERPL-CCNC: 259 U/L (ref 21–215)
CO2 SERPL-SCNC: 26 MMOL/L (ref 20–29)
COLOR UR: ABNORMAL
CREAT SERPL-MCNC: 0.95 MG/DL (ref 0.6–1.1)
CRYSTALS URNS QL MICRO: 0 /LPF
DIFFERENTIAL METHOD BLD: ABNORMAL
EKG ATRIAL RATE: 109 BPM
EKG DIAGNOSIS: NORMAL
EKG P AXIS: 63 DEGREES
EKG P-R INTERVAL: 122 MS
EKG Q-T INTERVAL: 341 MS
EKG QRS DURATION: 92 MS
EKG QTC CALCULATION (BAZETT): 457 MS
EKG R AXIS: 79 DEGREES
EKG T AXIS: 35 DEGREES
EKG VENTRICULAR RATE: 108 BPM
EOSINOPHIL # BLD: 0.3 K/UL (ref 0–0.8)
EOSINOPHIL NFR BLD: 4 % (ref 0.5–7.8)
EPI CELLS #/AREA URNS HPF: ABNORMAL /HPF
ERYTHROCYTE [DISTWIDTH] IN BLOOD BY AUTOMATED COUNT: 19.9 % (ref 11.9–14.6)
GLOBULIN SER CALC-MCNC: 4 G/DL (ref 2.3–3.5)
GLUCOSE SERPL-MCNC: 157 MG/DL (ref 70–99)
GLUCOSE UR STRIP.AUTO-MCNC: NEGATIVE MG/DL
HCT VFR BLD AUTO: 45.4 % (ref 35.8–46.3)
HGB BLD-MCNC: 13.3 G/DL (ref 11.7–15.4)
HGB UR QL STRIP: NEGATIVE
IMM GRANULOCYTES # BLD AUTO: 0 K/UL (ref 0–0.5)
IMM GRANULOCYTES NFR BLD AUTO: 0 % (ref 0–5)
KETONES UR QL STRIP.AUTO: 15 MG/DL
LACTATE SERPL-SCNC: 2.4 MMOL/L (ref 0.5–2)
LEUKOCYTE ESTERASE UR QL STRIP.AUTO: ABNORMAL
LYMPHOCYTES # BLD: 1.7 K/UL (ref 0.5–4.6)
LYMPHOCYTES NFR BLD: 20 % (ref 13–44)
MCH RBC QN AUTO: 25.1 PG (ref 26.1–32.9)
MCHC RBC AUTO-ENTMCNC: 29.3 G/DL (ref 31.4–35)
MCV RBC AUTO: 85.7 FL (ref 82–102)
MONOCYTES # BLD: 0.8 K/UL (ref 0.1–1.3)
MONOCYTES NFR BLD: 9 % (ref 4–12)
MUCOUS THREADS URNS QL MICRO: 0 /LPF
NEUTS SEG # BLD: 5.8 K/UL (ref 1.7–8.2)
NEUTS SEG NFR BLD: 67 % (ref 43–78)
NITRITE UR QL STRIP.AUTO: NEGATIVE
NRBC # BLD: 0 K/UL (ref 0–0.2)
NT PRO BNP: 518 PG/ML (ref 0–125)
OTHER OBSERVATIONS: ABNORMAL
PH UR STRIP: 5.5 (ref 5–9)
PLATELET # BLD AUTO: 336 K/UL (ref 150–450)
PMV BLD AUTO: 10.6 FL (ref 9.4–12.3)
POTASSIUM SERPL-SCNC: 4 MMOL/L (ref 3.5–5.1)
PROT SERPL-MCNC: 6.8 G/DL (ref 6.3–8.2)
PROT UR STRIP-MCNC: 30 MG/DL
RBC # BLD AUTO: 5.3 M/UL (ref 4.05–5.2)
RBC #/AREA URNS HPF: ABNORMAL /HPF
SODIUM SERPL-SCNC: 144 MMOL/L (ref 136–145)
SP GR UR REFRACTOMETRY: 1.03 (ref 1–1.02)
TROPONIN T SERPL HS-MCNC: 54 NG/L (ref 0–14)
TROPONIN T SERPL HS-MCNC: 63 NG/L (ref 0–14)
URINE CULTURE IF INDICATED: ABNORMAL
UROBILINOGEN UR QL STRIP.AUTO: 1 EU/DL (ref 0.2–1)
WBC # BLD AUTO: 8.7 K/UL (ref 4.3–11.1)
WBC URNS QL MICRO: ABNORMAL /HPF

## 2024-12-06 PROCEDURE — 84484 ASSAY OF TROPONIN QUANT: CPT

## 2024-12-06 PROCEDURE — 2500000003 HC RX 250 WO HCPCS: Performed by: STUDENT IN AN ORGANIZED HEALTH CARE EDUCATION/TRAINING PROGRAM

## 2024-12-06 PROCEDURE — 93005 ELECTROCARDIOGRAM TRACING: CPT | Performed by: EMERGENCY MEDICINE

## 2024-12-06 PROCEDURE — 80053 COMPREHEN METABOLIC PANEL: CPT

## 2024-12-06 PROCEDURE — 6370000000 HC RX 637 (ALT 250 FOR IP): Performed by: STUDENT IN AN ORGANIZED HEALTH CARE EDUCATION/TRAINING PROGRAM

## 2024-12-06 PROCEDURE — 93010 ELECTROCARDIOGRAM REPORT: CPT | Performed by: INTERNAL MEDICINE

## 2024-12-06 PROCEDURE — 6360000002 HC RX W HCPCS: Performed by: EMERGENCY MEDICINE

## 2024-12-06 PROCEDURE — 87205 SMEAR GRAM STAIN: CPT

## 2024-12-06 PROCEDURE — 87040 BLOOD CULTURE FOR BACTERIA: CPT

## 2024-12-06 PROCEDURE — 71045 X-RAY EXAM CHEST 1 VIEW: CPT

## 2024-12-06 PROCEDURE — 99285 EMERGENCY DEPT VISIT HI MDM: CPT

## 2024-12-06 PROCEDURE — 2580000003 HC RX 258: Performed by: STUDENT IN AN ORGANIZED HEALTH CARE EDUCATION/TRAINING PROGRAM

## 2024-12-06 PROCEDURE — 2580000003 HC RX 258: Performed by: EMERGENCY MEDICINE

## 2024-12-06 PROCEDURE — 81001 URINALYSIS AUTO W/SCOPE: CPT

## 2024-12-06 PROCEDURE — 36415 COLL VENOUS BLD VENIPUNCTURE: CPT

## 2024-12-06 PROCEDURE — 87154 CUL TYP ID BLD PTHGN 6+ TRGT: CPT

## 2024-12-06 PROCEDURE — 85025 COMPLETE CBC W/AUTO DIFF WBC: CPT

## 2024-12-06 PROCEDURE — 1100000000 HC RM PRIVATE

## 2024-12-06 PROCEDURE — 70450 CT HEAD/BRAIN W/O DYE: CPT

## 2024-12-06 PROCEDURE — 96374 THER/PROPH/DIAG INJ IV PUSH: CPT

## 2024-12-06 PROCEDURE — 6360000002 HC RX W HCPCS: Performed by: STUDENT IN AN ORGANIZED HEALTH CARE EDUCATION/TRAINING PROGRAM

## 2024-12-06 PROCEDURE — 96375 TX/PRO/DX INJ NEW DRUG ADDON: CPT

## 2024-12-06 PROCEDURE — 82550 ASSAY OF CK (CPK): CPT

## 2024-12-06 PROCEDURE — 83880 ASSAY OF NATRIURETIC PEPTIDE: CPT

## 2024-12-06 PROCEDURE — 83605 ASSAY OF LACTIC ACID: CPT

## 2024-12-06 RX ORDER — SODIUM CHLORIDE 0.9 % (FLUSH) 0.9 %
5-40 SYRINGE (ML) INJECTION PRN
Status: DISCONTINUED | OUTPATIENT
Start: 2024-12-06 | End: 2025-01-23 | Stop reason: HOSPADM

## 2024-12-06 RX ORDER — PANTOPRAZOLE SODIUM 40 MG/1
40 TABLET, DELAYED RELEASE ORAL
Status: DISCONTINUED | OUTPATIENT
Start: 2024-12-07 | End: 2025-01-23 | Stop reason: HOSPADM

## 2024-12-06 RX ORDER — HALOPERIDOL 5 MG/ML
2 INJECTION INTRAMUSCULAR
Status: COMPLETED | OUTPATIENT
Start: 2024-12-06 | End: 2024-12-06

## 2024-12-06 RX ORDER — BENZOCAINE/MENTHOL 6 MG-10 MG
LOZENGE MUCOUS MEMBRANE 4 TIMES DAILY
Status: DISCONTINUED | OUTPATIENT
Start: 2024-12-07 | End: 2025-01-23 | Stop reason: HOSPADM

## 2024-12-06 RX ORDER — LISINOPRIL 20 MG/1
20 TABLET ORAL DAILY
Status: DISCONTINUED | OUTPATIENT
Start: 2024-12-07 | End: 2025-01-23 | Stop reason: HOSPADM

## 2024-12-06 RX ORDER — HYDROMORPHONE HYDROCHLORIDE 1 MG/ML
0.25 INJECTION, SOLUTION INTRAMUSCULAR; INTRAVENOUS; SUBCUTANEOUS EVERY 4 HOURS PRN
Status: DISCONTINUED | OUTPATIENT
Start: 2024-12-06 | End: 2025-01-23 | Stop reason: HOSPADM

## 2024-12-06 RX ORDER — SODIUM CHLORIDE 9 MG/ML
INJECTION, SOLUTION INTRAVENOUS PRN
Status: DISCONTINUED | OUTPATIENT
Start: 2024-12-06 | End: 2025-01-23 | Stop reason: HOSPADM

## 2024-12-06 RX ORDER — POLYETHYLENE GLYCOL 3350 17 G/17G
17 POWDER, FOR SOLUTION ORAL DAILY PRN
Status: DISCONTINUED | OUTPATIENT
Start: 2024-12-06 | End: 2025-01-23 | Stop reason: HOSPADM

## 2024-12-06 RX ORDER — POTASSIUM CHLORIDE 7.45 MG/ML
10 INJECTION INTRAVENOUS PRN
Status: DISCONTINUED | OUTPATIENT
Start: 2024-12-06 | End: 2025-01-23 | Stop reason: HOSPADM

## 2024-12-06 RX ORDER — ONDANSETRON 2 MG/ML
4 INJECTION INTRAMUSCULAR; INTRAVENOUS EVERY 6 HOURS PRN
Status: DISCONTINUED | OUTPATIENT
Start: 2024-12-06 | End: 2025-01-23 | Stop reason: HOSPADM

## 2024-12-06 RX ORDER — POTASSIUM CHLORIDE 1500 MG/1
40 TABLET, EXTENDED RELEASE ORAL PRN
Status: DISCONTINUED | OUTPATIENT
Start: 2024-12-06 | End: 2025-01-23 | Stop reason: HOSPADM

## 2024-12-06 RX ORDER — ACETAMINOPHEN 650 MG/1
650 SUPPOSITORY RECTAL EVERY 6 HOURS PRN
Status: DISCONTINUED | OUTPATIENT
Start: 2024-12-06 | End: 2025-01-23 | Stop reason: HOSPADM

## 2024-12-06 RX ORDER — SODIUM CHLORIDE 0.9 % (FLUSH) 0.9 %
5-40 SYRINGE (ML) INJECTION EVERY 12 HOURS SCHEDULED
Status: DISCONTINUED | OUTPATIENT
Start: 2024-12-06 | End: 2025-01-23 | Stop reason: HOSPADM

## 2024-12-06 RX ORDER — ACETAMINOPHEN 325 MG/1
650 TABLET ORAL EVERY 6 HOURS PRN
Status: DISCONTINUED | OUTPATIENT
Start: 2024-12-06 | End: 2025-01-23 | Stop reason: HOSPADM

## 2024-12-06 RX ORDER — MAGNESIUM SULFATE IN WATER 40 MG/ML
2000 INJECTION, SOLUTION INTRAVENOUS PRN
Status: DISCONTINUED | OUTPATIENT
Start: 2024-12-06 | End: 2025-01-23 | Stop reason: HOSPADM

## 2024-12-06 RX ORDER — ENOXAPARIN SODIUM 100 MG/ML
40 INJECTION SUBCUTANEOUS DAILY
Status: DISCONTINUED | OUTPATIENT
Start: 2024-12-07 | End: 2025-01-23 | Stop reason: HOSPADM

## 2024-12-06 RX ORDER — HYDROMORPHONE HYDROCHLORIDE 1 MG/ML
0.5 INJECTION, SOLUTION INTRAMUSCULAR; INTRAVENOUS; SUBCUTANEOUS EVERY 4 HOURS PRN
Status: DISCONTINUED | OUTPATIENT
Start: 2024-12-06 | End: 2025-01-23 | Stop reason: HOSPADM

## 2024-12-06 RX ORDER — HYDROXYCHLOROQUINE SULFATE 200 MG/1
200 TABLET, FILM COATED ORAL DAILY
Status: DISCONTINUED | OUTPATIENT
Start: 2024-12-07 | End: 2025-01-23 | Stop reason: HOSPADM

## 2024-12-06 RX ORDER — DULOXETIN HYDROCHLORIDE 60 MG/1
60 CAPSULE, DELAYED RELEASE ORAL DAILY
Status: DISCONTINUED | OUTPATIENT
Start: 2024-12-07 | End: 2025-01-23 | Stop reason: HOSPADM

## 2024-12-06 RX ORDER — ONDANSETRON 4 MG/1
4 TABLET, ORALLY DISINTEGRATING ORAL EVERY 8 HOURS PRN
Status: DISCONTINUED | OUTPATIENT
Start: 2024-12-06 | End: 2025-01-23 | Stop reason: HOSPADM

## 2024-12-06 RX ORDER — 0.9 % SODIUM CHLORIDE 0.9 %
1000 INTRAVENOUS SOLUTION INTRAVENOUS ONCE
Status: COMPLETED | OUTPATIENT
Start: 2024-12-06 | End: 2024-12-06

## 2024-12-06 RX ORDER — PREGABALIN 75 MG/1
150 CAPSULE ORAL 2 TIMES DAILY
Status: DISCONTINUED | OUTPATIENT
Start: 2024-12-06 | End: 2025-01-23 | Stop reason: HOSPADM

## 2024-12-06 RX ADMIN — SODIUM CHLORIDE, PRESERVATIVE FREE 10 ML: 5 INJECTION INTRAVENOUS at 21:42

## 2024-12-06 RX ADMIN — WATER 40 MG: 1 INJECTION INTRAMUSCULAR; INTRAVENOUS; SUBCUTANEOUS at 21:47

## 2024-12-06 RX ADMIN — DAPTOMYCIN 750 MG: 500 INJECTION, POWDER, LYOPHILIZED, FOR SOLUTION INTRAVENOUS at 23:20

## 2024-12-06 RX ADMIN — SODIUM CHLORIDE 1000 ML: 9 INJECTION, SOLUTION INTRAVENOUS at 18:20

## 2024-12-06 RX ADMIN — WATER 1000 MG: 1 INJECTION INTRAMUSCULAR; INTRAVENOUS; SUBCUTANEOUS at 23:20

## 2024-12-06 RX ADMIN — HYDROMORPHONE HYDROCHLORIDE 0.5 MG: 1 INJECTION, SOLUTION INTRAMUSCULAR; INTRAVENOUS; SUBCUTANEOUS at 20:28

## 2024-12-06 RX ADMIN — SODIUM CHLORIDE 1 MG: 9 INJECTION INTRAMUSCULAR; INTRAVENOUS; SUBCUTANEOUS at 16:25

## 2024-12-06 RX ADMIN — PREGABALIN 150 MG: 75 CAPSULE ORAL at 21:39

## 2024-12-06 RX ADMIN — HALOPERIDOL LACTATE 2 MG: 5 INJECTION, SOLUTION INTRAMUSCULAR at 18:59

## 2024-12-06 NOTE — ED TRIAGE NOTES
Was found on the floor by ems after a friend called when she tried to visit the pt at her house.pt was found on the ground and had soiled herself, also found with broken glass and her meds and cat litter  surrounding her. Unknown downtime. Has skin infection and baseline hx of a skin disorder. Has a central line placed on R side of chest. Has a

## 2024-12-06 NOTE — ED PROVIDER NOTES
Emergency Department Provider Note       PCP: Florencio Banegas MD   Age: 69 y.o.   Sex: female     DISPOSITION Decision To Admit 12/06/2024 07:31:12 PM    ICD-10-CM    1. Altered mental status, unspecified altered mental status type  R41.82           Medical Decision Making     Patient is being evaluated for fall, altered mental status.  Concern for the possibility of intracranial injury or process, rhabdomyolysis, infection, metabolic or electrolyte abnormalities, dehydration, kidney dysfunction, as well as multiple other disease processes.  Broad workup has been initiated.  Patient was very anxious on arrival so was ordered 1 mg IV Ativan.  ED Course as of 12/06/24 1931   Fri Dec 06, 2024   1629 The infectious disease IGGY called to discuss the patient.  She states that the tunneled catheter in the right chest should not be used and should be removed whenever possible.  They apparently had issues with the patient being compliant and willing to undergo therapy for MRSA bacteremia and endocarditis.  They report that she has been seen at [facility] as well as our facility and has signed out AMA in the past. [ROBYN]   1732 Patient's initial troponin is 63.  CK is only 259.  Lactic acid is 2.4 with no leukocytosis or left shift.  Patient's been ordered IV fluids. [ROBYN]   1734 Reading radiologist suspects lacunar infarct.  No mention of chronicity of the infarct.  Neurology happy to be in the department seeing another patient.  They reviewed the CT imaging.  They stated that it was a chronic infarct. [ROBYN]   1857 Patient keeps removing her oxygen.  She has been very agitated.  Ordered low-dose of Haldol to hopefully calm her down so that she will leave her oxygen in place. [ROBYN]      ED Course User Index  [ROBYN] Jori Shields, DO     1 or more acute illnesses that pose a threat to life or bodily function.   Discussion with external consultants.  I independently ordered and reviewed each unique test.       The patients

## 2024-12-07 ENCOUNTER — APPOINTMENT (OUTPATIENT)
Dept: INTERVENTIONAL RADIOLOGY/VASCULAR | Age: 69
DRG: 288 | End: 2024-12-07
Payer: MEDICARE

## 2024-12-07 PROBLEM — E86.0 DEHYDRATION: Status: ACTIVE | Noted: 2024-12-07

## 2024-12-07 PROBLEM — A49.02 MRSA INFECTION: Status: ACTIVE | Noted: 2024-12-07

## 2024-12-07 PROBLEM — B95.8 ENDOCARDITIS DUE TO STAPHYLOCOCCUS SPECIES: Status: ACTIVE | Noted: 2024-11-01

## 2024-12-07 LAB
25(OH)D3 SERPL-MCNC: 6.3 NG/ML (ref 30–100)
AMMONIA PLAS-SCNC: 22 UMOL/L (ref 11–51)
ANION GAP SERPL CALC-SCNC: 12 MMOL/L (ref 7–16)
BASOPHILS # BLD: 0 K/UL (ref 0–0.2)
BASOPHILS NFR BLD: 1 % (ref 0–2)
BUN SERPL-MCNC: 18 MG/DL (ref 8–23)
CALCIUM SERPL-MCNC: 8.6 MG/DL (ref 8.8–10.2)
CHLORIDE SERPL-SCNC: 107 MMOL/L (ref 98–107)
CK SERPL-CCNC: 100 U/L (ref 21–215)
CO2 SERPL-SCNC: 21 MMOL/L (ref 20–29)
CREAT SERPL-MCNC: 0.86 MG/DL (ref 0.6–1.1)
CRP SERPL-MCNC: 13.4 MG/DL (ref 0–0.4)
DIFFERENTIAL METHOD BLD: ABNORMAL
EOSINOPHIL # BLD: 0 K/UL (ref 0–0.8)
EOSINOPHIL NFR BLD: 0 % (ref 0.5–7.8)
ERYTHROCYTE [DISTWIDTH] IN BLOOD BY AUTOMATED COUNT: 19.2 % (ref 11.9–14.6)
GLUCOSE SERPL-MCNC: 151 MG/DL (ref 70–99)
HCT VFR BLD AUTO: 39.2 % (ref 35.8–46.3)
HGB BLD-MCNC: 11.4 G/DL (ref 11.7–15.4)
IMM GRANULOCYTES # BLD AUTO: 0 K/UL (ref 0–0.5)
IMM GRANULOCYTES NFR BLD AUTO: 0 % (ref 0–5)
LACTATE SERPL-SCNC: 0.6 MMOL/L (ref 0.5–2)
LYMPHOCYTES # BLD: 0.6 K/UL (ref 0.5–4.6)
LYMPHOCYTES NFR BLD: 11 % (ref 13–44)
MCH RBC QN AUTO: 25.9 PG (ref 26.1–32.9)
MCHC RBC AUTO-ENTMCNC: 29.1 G/DL (ref 31.4–35)
MCV RBC AUTO: 88.9 FL (ref 82–102)
MONOCYTES # BLD: 0 K/UL (ref 0.1–1.3)
MONOCYTES NFR BLD: 1 % (ref 4–12)
NEUTS SEG # BLD: 5.1 K/UL (ref 1.7–8.2)
NEUTS SEG NFR BLD: 88 % (ref 43–78)
NRBC # BLD: 0 K/UL (ref 0–0.2)
PLATELET # BLD AUTO: 239 K/UL (ref 150–450)
PMV BLD AUTO: 10.5 FL (ref 9.4–12.3)
POTASSIUM SERPL-SCNC: 4.6 MMOL/L (ref 3.5–5.1)
RBC # BLD AUTO: 4.41 M/UL (ref 4.05–5.2)
SODIUM SERPL-SCNC: 140 MMOL/L (ref 136–145)
T4 FREE SERPL-MCNC: 1.1 NG/DL (ref 0.9–1.7)
TSH, 3RD GENERATION: 0.76 UIU/ML (ref 0.27–4.2)
WBC # BLD AUTO: 5.8 K/UL (ref 4.3–11.1)

## 2024-12-07 PROCEDURE — 84443 ASSAY THYROID STIM HORMONE: CPT

## 2024-12-07 PROCEDURE — 1100000000 HC RM PRIVATE

## 2024-12-07 PROCEDURE — 36589 REMOVAL TUNNELED CV CATH: CPT

## 2024-12-07 PROCEDURE — 36589 REMOVAL TUNNELED CV CATH: CPT | Performed by: RADIOLOGY

## 2024-12-07 PROCEDURE — 84439 ASSAY OF FREE THYROXINE: CPT

## 2024-12-07 PROCEDURE — 83605 ASSAY OF LACTIC ACID: CPT

## 2024-12-07 PROCEDURE — 94760 N-INVAS EAR/PLS OXIMETRY 1: CPT

## 2024-12-07 PROCEDURE — 82140 ASSAY OF AMMONIA: CPT

## 2024-12-07 PROCEDURE — 82306 VITAMIN D 25 HYDROXY: CPT

## 2024-12-07 PROCEDURE — 6370000000 HC RX 637 (ALT 250 FOR IP): Performed by: INTERNAL MEDICINE

## 2024-12-07 PROCEDURE — 80048 BASIC METABOLIC PNL TOTAL CA: CPT

## 2024-12-07 PROCEDURE — 0JPT3XZ REMOVAL OF TUNNELED VASCULAR ACCESS DEVICE FROM TRUNK SUBCUTANEOUS TISSUE AND FASCIA, PERCUTANEOUS APPROACH: ICD-10-PCS | Performed by: RADIOLOGY

## 2024-12-07 PROCEDURE — 6360000002 HC RX W HCPCS: Performed by: INTERNAL MEDICINE

## 2024-12-07 PROCEDURE — 6360000002 HC RX W HCPCS: Performed by: RADIOLOGY

## 2024-12-07 PROCEDURE — 2500000003 HC RX 250 WO HCPCS: Performed by: STUDENT IN AN ORGANIZED HEALTH CARE EDUCATION/TRAINING PROGRAM

## 2024-12-07 PROCEDURE — 85025 COMPLETE CBC W/AUTO DIFF WBC: CPT

## 2024-12-07 PROCEDURE — 6370000000 HC RX 637 (ALT 250 FOR IP): Performed by: NURSE PRACTITIONER

## 2024-12-07 PROCEDURE — 82550 ASSAY OF CK (CPK): CPT

## 2024-12-07 PROCEDURE — 6370000000 HC RX 637 (ALT 250 FOR IP): Performed by: STUDENT IN AN ORGANIZED HEALTH CARE EDUCATION/TRAINING PROGRAM

## 2024-12-07 PROCEDURE — 2580000003 HC RX 258: Performed by: INTERNAL MEDICINE

## 2024-12-07 PROCEDURE — 36415 COLL VENOUS BLD VENIPUNCTURE: CPT

## 2024-12-07 PROCEDURE — 86140 C-REACTIVE PROTEIN: CPT

## 2024-12-07 PROCEDURE — 2580000003 HC RX 258: Performed by: STUDENT IN AN ORGANIZED HEALTH CARE EDUCATION/TRAINING PROGRAM

## 2024-12-07 PROCEDURE — 6360000002 HC RX W HCPCS: Performed by: STUDENT IN AN ORGANIZED HEALTH CARE EDUCATION/TRAINING PROGRAM

## 2024-12-07 PROCEDURE — 02PYX3Z REMOVAL OF INFUSION DEVICE FROM GREAT VESSEL, EXTERNAL APPROACH: ICD-10-PCS | Performed by: RADIOLOGY

## 2024-12-07 PROCEDURE — 87070 CULTURE OTHR SPECIMN AEROBIC: CPT

## 2024-12-07 PROCEDURE — 2700000000 HC OXYGEN THERAPY PER DAY

## 2024-12-07 RX ORDER — 0.9 % SODIUM CHLORIDE 0.9 %
500 INTRAVENOUS SOLUTION INTRAVENOUS ONCE
Status: COMPLETED | OUTPATIENT
Start: 2024-12-07 | End: 2024-12-07

## 2024-12-07 RX ORDER — SODIUM CHLORIDE 9 MG/ML
INJECTION, SOLUTION INTRAVENOUS CONTINUOUS
Status: DISCONTINUED | OUTPATIENT
Start: 2024-12-07 | End: 2024-12-08

## 2024-12-07 RX ORDER — DIPHENHYDRAMINE HYDROCHLORIDE 50 MG/ML
25 INJECTION INTRAMUSCULAR; INTRAVENOUS EVERY 6 HOURS PRN
Status: DISCONTINUED | OUTPATIENT
Start: 2024-12-07 | End: 2025-01-23 | Stop reason: HOSPADM

## 2024-12-07 RX ORDER — LIDOCAINE HYDROCHLORIDE 20 MG/ML
INJECTION, SOLUTION INFILTRATION; PERINEURAL PRN
Status: COMPLETED | OUTPATIENT
Start: 2024-12-07 | End: 2024-12-07

## 2024-12-07 RX ORDER — QUETIAPINE FUMARATE 100 MG/1
50 TABLET, FILM COATED ORAL NIGHTLY
Status: DISCONTINUED | OUTPATIENT
Start: 2024-12-07 | End: 2025-01-23 | Stop reason: HOSPADM

## 2024-12-07 RX ADMIN — SODIUM CHLORIDE, PRESERVATIVE FREE 10 ML: 5 INJECTION INTRAVENOUS at 23:14

## 2024-12-07 RX ADMIN — DIPHENHYDRAMINE HYDROCHLORIDE 25 MG: 50 INJECTION INTRAMUSCULAR; INTRAVENOUS at 14:10

## 2024-12-07 RX ADMIN — HYDROMORPHONE HYDROCHLORIDE 0.5 MG: 1 INJECTION, SOLUTION INTRAMUSCULAR; INTRAVENOUS; SUBCUTANEOUS at 00:23

## 2024-12-07 RX ADMIN — WATER 40 MG: 1 INJECTION INTRAMUSCULAR; INTRAVENOUS; SUBCUTANEOUS at 11:53

## 2024-12-07 RX ADMIN — PREGABALIN 150 MG: 75 CAPSULE ORAL at 23:15

## 2024-12-07 RX ADMIN — SODIUM CHLORIDE: 9 INJECTION, SOLUTION INTRAVENOUS at 17:45

## 2024-12-07 RX ADMIN — WATER 1000 MG: 1 INJECTION INTRAMUSCULAR; INTRAVENOUS; SUBCUTANEOUS at 23:14

## 2024-12-07 RX ADMIN — HYDROCORTISONE ACETATE: 1 CREAM TOPICAL at 00:10

## 2024-12-07 RX ADMIN — WATER 1000 MG: 1 INJECTION INTRAMUSCULAR; INTRAVENOUS; SUBCUTANEOUS at 11:53

## 2024-12-07 RX ADMIN — SODIUM CHLORIDE: 9 INJECTION, SOLUTION INTRAVENOUS at 09:47

## 2024-12-07 RX ADMIN — HYDROMORPHONE HYDROCHLORIDE 0.5 MG: 1 INJECTION, SOLUTION INTRAMUSCULAR; INTRAVENOUS; SUBCUTANEOUS at 23:23

## 2024-12-07 RX ADMIN — ENOXAPARIN SODIUM 40 MG: 100 INJECTION SUBCUTANEOUS at 08:36

## 2024-12-07 RX ADMIN — SODIUM CHLORIDE 500 ML: 9 INJECTION, SOLUTION INTRAVENOUS at 07:29

## 2024-12-07 RX ADMIN — LIDOCAINE HYDROCHLORIDE 10 ML: 20 INJECTION, SOLUTION INFILTRATION; PERINEURAL at 12:45

## 2024-12-07 RX ADMIN — WATER 40 MG: 1 INJECTION INTRAMUSCULAR; INTRAVENOUS; SUBCUTANEOUS at 04:14

## 2024-12-07 RX ADMIN — HYDROCORTISONE ACETATE: 1 CREAM TOPICAL at 23:16

## 2024-12-07 RX ADMIN — WATER 40 MG: 1 INJECTION INTRAMUSCULAR; INTRAVENOUS; SUBCUTANEOUS at 23:15

## 2024-12-07 RX ADMIN — QUETIAPINE FUMARATE 50 MG: 100 TABLET ORAL at 23:15

## 2024-12-07 RX ADMIN — DAPTOMYCIN 750 MG: 500 INJECTION, POWDER, LYOPHILIZED, FOR SOLUTION INTRAVENOUS at 23:14

## 2024-12-07 ASSESSMENT — PAIN DESCRIPTION - LOCATION
LOCATION: GENERALIZED
LOCATION: GENERALIZED

## 2024-12-07 ASSESSMENT — PAIN DESCRIPTION - PAIN TYPE: TYPE: CHRONIC PAIN

## 2024-12-07 ASSESSMENT — PAIN SCALES - GENERAL
PAINLEVEL_OUTOF10: 8
PAINLEVEL_OUTOF10: 9
PAINLEVEL_OUTOF10: 2
PAINLEVEL_OUTOF10: 0

## 2024-12-07 ASSESSMENT — PAIN - FUNCTIONAL ASSESSMENT
PAIN_FUNCTIONAL_ASSESSMENT: PREVENTS OR INTERFERES SOME ACTIVE ACTIVITIES AND ADLS
PAIN_FUNCTIONAL_ASSESSMENT: PREVENTS OR INTERFERES SOME ACTIVE ACTIVITIES AND ADLS

## 2024-12-07 NOTE — PLAN OF CARE
Problem: Discharge Planning  Goal: Discharge to home or other facility with appropriate resources  Outcome: Progressing  Flowsheets (Taken 12/6/2024 2052)  Discharge to home or other facility with appropriate resources:   Identify barriers to discharge with patient and caregiver   Arrange for needed discharge resources and transportation as appropriate   Identify discharge learning needs (meds, wound care, etc)     Problem: Safety - Adult  Goal: Free from fall injury  Outcome: Progressing

## 2024-12-07 NOTE — ED NOTES
Pt refusing to leave 02 in place. Dr. Herrera at bedside and is aware     Janee Richey RN  12/06/24 2008

## 2024-12-07 NOTE — ED NOTES
TRANSFER - OUT REPORT:    Verbal report given to Braeden MARTINO on Meli Blancas  being transferred to 6th floor for routine progression of patient care       Report consisted of patient's Situation, Background, Assessment and   Recommendations(SBAR).     Information from the following report(s) ED SBAR was reviewed with the receiving nurse.    [place] Fall Assessment:                           Lines:   Peripheral IV 12/06/24 Left Antecubital (Active)        Opportunity for questions and clarification was provided.      Patient transported with:  O2 @ 3lpm and Janee Deleon RN  12/06/24 2024

## 2024-12-07 NOTE — CONSULTS
Infectious Disease Consult    Today's Date: 12/7/2024   Admit Date: 12/6/2024    Patient YOB: 1955    Impression:   Acute metabolic encephalopathy  Possible tunneled catheter sepsis  MRSA aortic valve endocarditis  Pemphigus foliaceous  Chronic hypoxic respiratory failure  Sjogren's disease  Immunosuppression due to chronic steroids    Plan:   Difficult to know to what extent infection is playing a role at this point in time.  Clearly we have concern that she hasn't completed an adequate course for MRSA endocarditis.  Line superinfection also a concern.  She will not be a candidate for home IV antibiotics at this point.  I am comfortable continuing daptomycin and cefepime for now pending further info.    Anti-infectives:   IV daptomycin  IV cefepime    Subjective:   Date of Consultation:  December 7, 2024  Referring Physician: Laith Clarke DO for: assistance in management of sepsis    Patient is a 69 y.o. female admitted with acute encephalopathy.  She is known to ID for treatment of MRSA aortic valve endocarditis.  She has been on daptomycin with planned EOT 12/11/24.   On 11/25, ID took a call from home health indicating that pt had been non-adherent to antibiotics and they were dismissing her case.  We made plans for removal of tunneled line and ultimately referred her to the ER on 11/26.  Her line was replaced in IR and she was discharged to home. On 11/27 ID sent SW to the house for a well check, and they expressed concern and called the police.  By 12/6 she was found to be covered in feces at home and tunneled cath site had erythema.  She was started on daptomycin and cefepime on admission.  Today her tunneled line was removed.  ID is consulted for further recs.  She is alert and responsive.  When asked about the events of the past few days, she states \"that's bullshit.\"      Patient Active Problem List   Diagnosis    COPD with acute exacerbation (HCC)    Rash/skin eruption

## 2024-12-07 NOTE — BRIEF OP NOTE
[facility]  Department of Interventional Radiology  [phone]        Brief Procedure Note    Patient: Meli Blancas MRN: [id number]  SSN: [id number]    YOB: 1955  Age: 69 y.o.  Sex: female      Date of Procedure: 12/7/2024     Pre-Procedure Diagnosis:   Tunnelled CVC no longer needed.     Post-Procedure Diagnosis:  SAME    Procedure(s):  Tunneled Central Venous Catheter removal    Brief Description of Procedure:  as above    Performed By:  AMILCAR HORTON MD     Assistants:  None    Anesthesia:  Lidocaine    Estimated Blood Loss:  None    Specimens:  Tip sent to Microbiology    Implants:  None    Findings:  Unremarkable.  No cellulitis.  Numerous pemphigus blisters everywhere but at the catheter site.      Complications:  None    Recommendations:  Remove dressing tomorrow.      Follow Up:  PRN    Signed By: AMILCAR HORTON MD     December 7, 2024

## 2024-12-07 NOTE — OR NURSING
.TRANSFER - OUT REPORT:           Verbal report given to SALENA Leger on Meli Blancas  being transferred to 6th Floor for routine progression of patient care      Report consisted of patient’s Situation, Background, Assessment and Recommendations(SBAR).          Information from the following report(s) SBAR and Procedure Summary was reviewed with the receiving nurse.       Opportunity for questions and clarification was provided.        Pt tolerated procedure well.           Other: bordered gauze clean, dry, intact, and nontender    Patient back to room with 2 Rad Techs in stable condition.

## 2024-12-08 LAB
ACB COMPLEX DNA BLD POS QL NAA+NON-PROBE: NOT DETECTED
ACCESSION NUMBER, LLC1M: NORMAL
B FRAGILIS DNA BLD POS QL NAA+NON-PROBE: NOT DETECTED
BIOFIRE TEST COMMENT: NORMAL
C ALBICANS DNA BLD POS QL NAA+NON-PROBE: NOT DETECTED
C AURIS DNA BLD POS QL NAA+NON-PROBE: NOT DETECTED
C GATTII+NEOFOR DNA BLD POS QL NAA+N-PRB: NOT DETECTED
C GLABRATA DNA BLD POS QL NAA+NON-PROBE: NOT DETECTED
C KRUSEI DNA BLD POS QL NAA+NON-PROBE: NOT DETECTED
C PARAP DNA BLD POS QL NAA+NON-PROBE: NOT DETECTED
C TROPICLS DNA BLD POS QL NAA+NON-PROBE: NOT DETECTED
E CLOAC COMP DNA BLD POS NAA+NON-PROBE: NOT DETECTED
E COLI DNA BLD POS QL NAA+NON-PROBE: NOT DETECTED
E FAECALIS DNA BLD POS QL NAA+NON-PROBE: NOT DETECTED
E FAECIUM DNA BLD POS QL NAA+NON-PROBE: NOT DETECTED
ENTEROBACTERALES DNA BLD POS NAA+N-PRB: NOT DETECTED
GP B STREP DNA BLD POS QL NAA+NON-PROBE: NOT DETECTED
HAEM INFLU DNA BLD POS QL NAA+NON-PROBE: NOT DETECTED
K OXYTOCA DNA BLD POS QL NAA+NON-PROBE: NOT DETECTED
KLEBSIELLA SP DNA BLD POS QL NAA+NON-PRB: NOT DETECTED
KLEBSIELLA SP DNA BLD POS QL NAA+NON-PRB: NOT DETECTED
L MONOCYTOG DNA BLD POS QL NAA+NON-PROBE: NOT DETECTED
N MEN DNA BLD POS QL NAA+NON-PROBE: NOT DETECTED
P AERUGINOSA DNA BLD POS NAA+NON-PROBE: NOT DETECTED
PROTEUS SP DNA BLD POS QL NAA+NON-PROBE: NOT DETECTED
RESISTANT GENE TARGETS: NORMAL
S AUREUS DNA BLD POS QL NAA+NON-PROBE: NOT DETECTED
S AUREUS+CONS DNA BLD POS NAA+NON-PROBE: NOT DETECTED
S EPIDERMIDIS DNA BLD POS QL NAA+NON-PRB: NOT DETECTED
S LUGDUNENSIS DNA BLD POS QL NAA+NON-PRB: NOT DETECTED
S MALTOPHILIA DNA BLD POS QL NAA+NON-PRB: NOT DETECTED
S MARCESCENS DNA BLD POS NAA+NON-PROBE: NOT DETECTED
S PNEUM DNA BLD POS QL NAA+NON-PROBE: NOT DETECTED
S PYO DNA BLD POS QL NAA+NON-PROBE: NOT DETECTED
SALMONELLA DNA BLD POS QL NAA+NON-PROBE: NOT DETECTED
STREPTOCOCCUS DNA BLD POS NAA+NON-PROBE: NOT DETECTED

## 2024-12-08 PROCEDURE — 1100000000 HC RM PRIVATE

## 2024-12-08 PROCEDURE — 2700000000 HC OXYGEN THERAPY PER DAY

## 2024-12-08 PROCEDURE — 6370000000 HC RX 637 (ALT 250 FOR IP): Performed by: NURSE PRACTITIONER

## 2024-12-08 PROCEDURE — 6370000000 HC RX 637 (ALT 250 FOR IP): Performed by: INTERNAL MEDICINE

## 2024-12-08 PROCEDURE — 2580000003 HC RX 258: Performed by: STUDENT IN AN ORGANIZED HEALTH CARE EDUCATION/TRAINING PROGRAM

## 2024-12-08 PROCEDURE — 6360000002 HC RX W HCPCS: Performed by: STUDENT IN AN ORGANIZED HEALTH CARE EDUCATION/TRAINING PROGRAM

## 2024-12-08 PROCEDURE — 2500000003 HC RX 250 WO HCPCS: Performed by: STUDENT IN AN ORGANIZED HEALTH CARE EDUCATION/TRAINING PROGRAM

## 2024-12-08 PROCEDURE — 6370000000 HC RX 637 (ALT 250 FOR IP): Performed by: STUDENT IN AN ORGANIZED HEALTH CARE EDUCATION/TRAINING PROGRAM

## 2024-12-08 RX ORDER — PREDNISONE 20 MG/1
20 TABLET ORAL DAILY
Status: DISCONTINUED | OUTPATIENT
Start: 2024-12-09 | End: 2025-01-03

## 2024-12-08 RX ADMIN — DULOXETINE HYDROCHLORIDE 60 MG: 60 CAPSULE, DELAYED RELEASE ORAL at 09:13

## 2024-12-08 RX ADMIN — HYDROMORPHONE HYDROCHLORIDE 0.25 MG: 1 INJECTION, SOLUTION INTRAMUSCULAR; INTRAVENOUS; SUBCUTANEOUS at 11:52

## 2024-12-08 RX ADMIN — HYDROMORPHONE HYDROCHLORIDE 0.25 MG: 1 INJECTION, SOLUTION INTRAMUSCULAR; INTRAVENOUS; SUBCUTANEOUS at 15:20

## 2024-12-08 RX ADMIN — HYDROMORPHONE HYDROCHLORIDE 0.25 MG: 1 INJECTION, SOLUTION INTRAMUSCULAR; INTRAVENOUS; SUBCUTANEOUS at 15:48

## 2024-12-08 RX ADMIN — SODIUM CHLORIDE, PRESERVATIVE FREE 10 ML: 5 INJECTION INTRAVENOUS at 20:40

## 2024-12-08 RX ADMIN — HYDROMORPHONE HYDROCHLORIDE 0.25 MG: 1 INJECTION, SOLUTION INTRAMUSCULAR; INTRAVENOUS; SUBCUTANEOUS at 09:09

## 2024-12-08 RX ADMIN — HYDROCORTISONE ACETATE: 1 CREAM TOPICAL at 15:21

## 2024-12-08 RX ADMIN — QUETIAPINE FUMARATE 50 MG: 100 TABLET ORAL at 20:37

## 2024-12-08 RX ADMIN — ENOXAPARIN SODIUM 40 MG: 100 INJECTION SUBCUTANEOUS at 09:12

## 2024-12-08 RX ADMIN — HYDROXYCHLOROQUINE SULFATE 200 MG: 200 TABLET ORAL at 09:13

## 2024-12-08 RX ADMIN — WATER 40 MG: 1 INJECTION INTRAMUSCULAR; INTRAVENOUS; SUBCUTANEOUS at 05:48

## 2024-12-08 RX ADMIN — LISINOPRIL 20 MG: 20 TABLET ORAL at 09:12

## 2024-12-08 RX ADMIN — SODIUM CHLORIDE, PRESERVATIVE FREE 10 ML: 5 INJECTION INTRAVENOUS at 09:18

## 2024-12-08 RX ADMIN — PREGABALIN 150 MG: 75 CAPSULE ORAL at 09:12

## 2024-12-08 RX ADMIN — WATER 40 MG: 1 INJECTION INTRAMUSCULAR; INTRAVENOUS; SUBCUTANEOUS at 11:39

## 2024-12-08 RX ADMIN — HYDROCORTISONE ACETATE: 1 CREAM TOPICAL at 20:40

## 2024-12-08 RX ADMIN — HYDROCORTISONE ACETATE: 1 CREAM TOPICAL at 17:25

## 2024-12-08 RX ADMIN — WATER 1000 MG: 1 INJECTION INTRAMUSCULAR; INTRAVENOUS; SUBCUTANEOUS at 22:41

## 2024-12-08 RX ADMIN — DAPTOMYCIN 750 MG: 500 INJECTION, POWDER, LYOPHILIZED, FOR SOLUTION INTRAVENOUS at 17:25

## 2024-12-08 RX ADMIN — HYDROCORTISONE ACETATE: 1 CREAM TOPICAL at 11:40

## 2024-12-08 RX ADMIN — WATER 1000 MG: 1 INJECTION INTRAMUSCULAR; INTRAVENOUS; SUBCUTANEOUS at 11:39

## 2024-12-08 RX ADMIN — PREGABALIN 150 MG: 75 CAPSULE ORAL at 20:39

## 2024-12-08 RX ADMIN — HYDROMORPHONE HYDROCHLORIDE 0.25 MG: 1 INJECTION, SOLUTION INTRAMUSCULAR; INTRAVENOUS; SUBCUTANEOUS at 19:01

## 2024-12-08 ASSESSMENT — PAIN DESCRIPTION - LOCATION
LOCATION: CHEST
LOCATION: GENERALIZED

## 2024-12-08 ASSESSMENT — PAIN SCALES - GENERAL
PAINLEVEL_OUTOF10: 7
PAINLEVEL_OUTOF10: 8
PAINLEVEL_OUTOF10: 4
PAINLEVEL_OUTOF10: 0
PAINLEVEL_OUTOF10: 8
PAINLEVEL_OUTOF10: 7
PAINLEVEL_OUTOF10: 8

## 2024-12-09 ENCOUNTER — APPOINTMENT (OUTPATIENT)
Dept: MRI IMAGING | Age: 69
DRG: 288 | End: 2024-12-09
Payer: MEDICARE

## 2024-12-09 LAB
ANION GAP SERPL CALC-SCNC: 7 MMOL/L (ref 7–16)
BACTERIA SPEC CULT: NORMAL
BUN SERPL-MCNC: 26 MG/DL (ref 8–23)
CALCIUM SERPL-MCNC: 9.2 MG/DL (ref 8.8–10.2)
CHLORIDE SERPL-SCNC: 108 MMOL/L (ref 98–107)
CO2 SERPL-SCNC: 27 MMOL/L (ref 20–29)
CREAT SERPL-MCNC: 0.61 MG/DL (ref 0.6–1.1)
ERYTHROCYTE [DISTWIDTH] IN BLOOD BY AUTOMATED COUNT: 18.5 % (ref 11.9–14.6)
GLUCOSE SERPL-MCNC: 137 MG/DL (ref 70–99)
HCT VFR BLD AUTO: 37.2 % (ref 35.8–46.3)
HGB BLD-MCNC: 10.3 G/DL (ref 11.7–15.4)
MCH RBC QN AUTO: 25.1 PG (ref 26.1–32.9)
MCHC RBC AUTO-ENTMCNC: 27.7 G/DL (ref 31.4–35)
MCV RBC AUTO: 90.7 FL (ref 82–102)
NRBC # BLD: 0 K/UL (ref 0–0.2)
PLATELET # BLD AUTO: 263 K/UL (ref 150–450)
PMV BLD AUTO: 10 FL (ref 9.4–12.3)
POTASSIUM SERPL-SCNC: 4.6 MMOL/L (ref 3.5–5.1)
RBC # BLD AUTO: 4.1 M/UL (ref 4.05–5.2)
SERVICE CMNT-IMP: NORMAL
SODIUM SERPL-SCNC: 142 MMOL/L (ref 136–145)
WBC # BLD AUTO: 8.2 K/UL (ref 4.3–11.1)

## 2024-12-09 PROCEDURE — 87040 BLOOD CULTURE FOR BACTERIA: CPT

## 2024-12-09 PROCEDURE — 85027 COMPLETE CBC AUTOMATED: CPT

## 2024-12-09 PROCEDURE — 36415 COLL VENOUS BLD VENIPUNCTURE: CPT

## 2024-12-09 PROCEDURE — 6370000000 HC RX 637 (ALT 250 FOR IP): Performed by: INTERNAL MEDICINE

## 2024-12-09 PROCEDURE — 2580000003 HC RX 258: Performed by: STUDENT IN AN ORGANIZED HEALTH CARE EDUCATION/TRAINING PROGRAM

## 2024-12-09 PROCEDURE — 80048 BASIC METABOLIC PNL TOTAL CA: CPT

## 2024-12-09 PROCEDURE — 87077 CULTURE AEROBIC IDENTIFY: CPT

## 2024-12-09 PROCEDURE — 6360000002 HC RX W HCPCS: Performed by: STUDENT IN AN ORGANIZED HEALTH CARE EDUCATION/TRAINING PROGRAM

## 2024-12-09 PROCEDURE — 87154 CUL TYP ID BLD PTHGN 6+ TRGT: CPT

## 2024-12-09 PROCEDURE — 6370000000 HC RX 637 (ALT 250 FOR IP): Performed by: FAMILY MEDICINE

## 2024-12-09 PROCEDURE — 2500000003 HC RX 250 WO HCPCS: Performed by: STUDENT IN AN ORGANIZED HEALTH CARE EDUCATION/TRAINING PROGRAM

## 2024-12-09 PROCEDURE — 1100000000 HC RM PRIVATE

## 2024-12-09 PROCEDURE — 6370000000 HC RX 637 (ALT 250 FOR IP): Performed by: STUDENT IN AN ORGANIZED HEALTH CARE EDUCATION/TRAINING PROGRAM

## 2024-12-09 PROCEDURE — 87205 SMEAR GRAM STAIN: CPT

## 2024-12-09 PROCEDURE — 70551 MRI BRAIN STEM W/O DYE: CPT

## 2024-12-09 PROCEDURE — 87186 SC STD MICRODIL/AGAR DIL: CPT

## 2024-12-09 RX ORDER — MINERAL OIL/HYDROPHIL PETROLAT
OINTMENT (GRAM) TOPICAL 2 TIMES DAILY PRN
Status: DISCONTINUED | OUTPATIENT
Start: 2024-12-09 | End: 2025-01-23 | Stop reason: HOSPADM

## 2024-12-09 RX ORDER — TRIAMCINOLONE ACETONIDE 1 MG/G
CREAM TOPICAL 2 TIMES DAILY
Status: DISCONTINUED | OUTPATIENT
Start: 2024-12-09 | End: 2025-01-23 | Stop reason: HOSPADM

## 2024-12-09 RX ADMIN — TRIAMCINOLONE ACETONIDE: 1 CREAM TOPICAL at 15:23

## 2024-12-09 RX ADMIN — HYDROXYCHLOROQUINE SULFATE 200 MG: 200 TABLET ORAL at 08:11

## 2024-12-09 RX ADMIN — QUETIAPINE FUMARATE 50 MG: 100 TABLET ORAL at 20:50

## 2024-12-09 RX ADMIN — DAPTOMYCIN 750 MG: 500 INJECTION, POWDER, LYOPHILIZED, FOR SOLUTION INTRAVENOUS at 17:57

## 2024-12-09 RX ADMIN — PREDNISONE 20 MG: 20 TABLET ORAL at 08:11

## 2024-12-09 RX ADMIN — PREGABALIN 150 MG: 75 CAPSULE ORAL at 08:12

## 2024-12-09 RX ADMIN — HYDROCORTISONE ACETATE: 1 CREAM TOPICAL at 08:14

## 2024-12-09 RX ADMIN — HYDROCORTISONE ACETATE: 1 CREAM TOPICAL at 20:55

## 2024-12-09 RX ADMIN — TRIAMCINOLONE ACETONIDE: 1 CREAM TOPICAL at 20:52

## 2024-12-09 RX ADMIN — HYDROCORTISONE ACETATE: 1 CREAM TOPICAL at 17:57

## 2024-12-09 RX ADMIN — HYDROMORPHONE HYDROCHLORIDE 0.5 MG: 1 INJECTION, SOLUTION INTRAMUSCULAR; INTRAVENOUS; SUBCUTANEOUS at 15:26

## 2024-12-09 RX ADMIN — PANTOPRAZOLE SODIUM 40 MG: 40 TABLET, DELAYED RELEASE ORAL at 15:24

## 2024-12-09 RX ADMIN — HYDROMORPHONE HYDROCHLORIDE 0.25 MG: 1 INJECTION, SOLUTION INTRAMUSCULAR; INTRAVENOUS; SUBCUTANEOUS at 12:06

## 2024-12-09 RX ADMIN — SODIUM CHLORIDE, PRESERVATIVE FREE 10 ML: 5 INJECTION INTRAVENOUS at 08:12

## 2024-12-09 RX ADMIN — HYDROMORPHONE HYDROCHLORIDE 0.25 MG: 1 INJECTION, SOLUTION INTRAMUSCULAR; INTRAVENOUS; SUBCUTANEOUS at 11:47

## 2024-12-09 RX ADMIN — DULOXETINE HYDROCHLORIDE 60 MG: 60 CAPSULE, DELAYED RELEASE ORAL at 08:11

## 2024-12-09 RX ADMIN — ENOXAPARIN SODIUM 40 MG: 100 INJECTION SUBCUTANEOUS at 08:12

## 2024-12-09 RX ADMIN — PREGABALIN 150 MG: 75 CAPSULE ORAL at 20:50

## 2024-12-09 RX ADMIN — HYDROMORPHONE HYDROCHLORIDE 0.5 MG: 1 INJECTION, SOLUTION INTRAMUSCULAR; INTRAVENOUS; SUBCUTANEOUS at 21:00

## 2024-12-09 RX ADMIN — HYDROCORTISONE ACETATE: 1 CREAM TOPICAL at 15:24

## 2024-12-09 RX ADMIN — HYDROMORPHONE HYDROCHLORIDE 0.25 MG: 1 INJECTION, SOLUTION INTRAMUSCULAR; INTRAVENOUS; SUBCUTANEOUS at 08:08

## 2024-12-09 RX ADMIN — LISINOPRIL 20 MG: 20 TABLET ORAL at 08:14

## 2024-12-09 RX ADMIN — PANTOPRAZOLE SODIUM 40 MG: 40 TABLET, DELAYED RELEASE ORAL at 05:34

## 2024-12-09 RX ADMIN — SODIUM CHLORIDE, PRESERVATIVE FREE 10 ML: 5 INJECTION INTRAVENOUS at 20:51

## 2024-12-09 ASSESSMENT — PAIN SCALES - GENERAL
PAINLEVEL_OUTOF10: 7
PAINLEVEL_OUTOF10: 8
PAINLEVEL_OUTOF10: 8

## 2024-12-09 ASSESSMENT — PAIN DESCRIPTION - DESCRIPTORS: DESCRIPTORS: BURNING

## 2024-12-09 ASSESSMENT — PAIN DESCRIPTION - LOCATION
LOCATION: BUTTOCKS
LOCATION: BUTTOCKS
LOCATION: BUTTOCKS;GENERALIZED

## 2024-12-09 ASSESSMENT — PAIN SCALES - WONG BAKER: WONGBAKER_NUMERICALRESPONSE: NO HURT

## 2024-12-09 NOTE — WOUND CARE
Patient would not let me assess sacral area today. Nurse reports shallow open areas on buttocks. She does have known Pemphigus Foliaceous and  Sjogren's syndrome. Last admission rash was on neck/ trunk. Today neck trunk/ improved, however legs and arms have new lesions. Recommend to continue kenalog and aquaphor as she was at home. Dr. Amado notified by perfect serve.

## 2024-12-09 NOTE — CARE COORDINATION
12/09/24 1431   Service Assessment   Patient Orientation Alert and Oriented   Cognition Alert   History Provided By Patient   Primary Caregiver Self   Support Systems Friends/Neighbors   Patient's Healthcare Decision Maker is: Legal Next of Kin   PCP Verified by CM Yes   Last Visit to PCP Within last 6 months   Prior Functional Level Assistance with the following:   Current Functional Level Assistance with the following:   Can patient return to prior living arrangement Unknown at present   Ability to make needs known: Good   Family able to assist with home care needs: No   Would you like for me to discuss the discharge plan with any other family members/significant others, and if so, who? No   Financial Resources Medicaid;Medicare   Community Resources Other (Comment)  (home health)   Social/Functional History   Lives With Alone   Type of Home House   Condition of Participation: Discharge Planning   The Plan for Transition of Care is related to the following treatment goals: return home   The Patient and/or Patient Representative was provided with a Choice of Provider? Patient   The Patient and/Or Patient Representative agree with the Discharge Plan? Yes   Freedom of Choice list was provided with basic dialogue that supports the patient's individualized plan of care/goals, treatment preferences, and shares the quality data associated with the providers?  Yes     Assessment completed with patient at chairside. Patient lives alone. Her neighbor friend Kaitlin has arranged a caregiver to stay with patient at discharge. Patient has a walker she uses with ambulation. She does not . She reports she is independent with adls at baseline. Therapy ordered to evaluate mobility. Patient had home infusions arranged through [facility] and home health arranged through [facility]. CM reached out to [facility] to see if patient can return home with their services. Awaiting blood cultures and final ID plan. Patient may not be

## 2024-12-09 NOTE — PLAN OF CARE
Problem: Discharge Planning  Goal: Discharge to home or other facility with appropriate resources  Outcome: Progressing     Problem: Safety - Adult  Goal: Free from fall injury  Outcome: Progressing     Problem: Pain  Goal: Verbalizes/displays adequate comfort level or baseline comfort level  Outcome: Progressing     Problem: Risk for Elopement  Goal: Patient will not exit the unit/facility without proper excort  Outcome: Progressing     Problem: Skin/Tissue Integrity  Goal: Absence of new skin breakdown  Description: 1.  Monitor for areas of redness and/or skin breakdown  2.  Assess vascular access sites hourly  3.  Every 4-6 hours minimum:  Change oxygen saturation probe site  4.  Every 4-6 hours:  If on nasal continuous positive airway pressure, respiratory therapy assess nares and determine need for appliance change or resting period.  Outcome: Progressing

## 2024-12-10 LAB
ACB COMPLEX DNA BLD POS QL NAA+NON-PROBE: NOT DETECTED
ACCESSION NUMBER, LLC1M: ABNORMAL
B FRAGILIS DNA BLD POS QL NAA+NON-PROBE: NOT DETECTED
BIOFIRE TEST COMMENT: ABNORMAL
C ALBICANS DNA BLD POS QL NAA+NON-PROBE: NOT DETECTED
C AURIS DNA BLD POS QL NAA+NON-PROBE: NOT DETECTED
C GATTII+NEOFOR DNA BLD POS QL NAA+N-PRB: NOT DETECTED
C GLABRATA DNA BLD POS QL NAA+NON-PROBE: NOT DETECTED
C KRUSEI DNA BLD POS QL NAA+NON-PROBE: NOT DETECTED
C PARAP DNA BLD POS QL NAA+NON-PROBE: NOT DETECTED
C TROPICLS DNA BLD POS QL NAA+NON-PROBE: NOT DETECTED
E CLOAC COMP DNA BLD POS NAA+NON-PROBE: NOT DETECTED
E COLI DNA BLD POS QL NAA+NON-PROBE: NOT DETECTED
E FAECALIS DNA BLD POS QL NAA+NON-PROBE: NOT DETECTED
E FAECIUM DNA BLD POS QL NAA+NON-PROBE: NOT DETECTED
ENTEROBACTERALES DNA BLD POS NAA+N-PRB: NOT DETECTED
GP B STREP DNA BLD POS QL NAA+NON-PROBE: NOT DETECTED
HAEM INFLU DNA BLD POS QL NAA+NON-PROBE: NOT DETECTED
K OXYTOCA DNA BLD POS QL NAA+NON-PROBE: NOT DETECTED
KLEBSIELLA SP DNA BLD POS QL NAA+NON-PRB: NOT DETECTED
KLEBSIELLA SP DNA BLD POS QL NAA+NON-PRB: NOT DETECTED
L MONOCYTOG DNA BLD POS QL NAA+NON-PROBE: NOT DETECTED
MECA+MECC+MREJ ISLT/SPM QL: DETECTED
N MEN DNA BLD POS QL NAA+NON-PROBE: NOT DETECTED
P AERUGINOSA DNA BLD POS NAA+NON-PROBE: NOT DETECTED
PROTEUS SP DNA BLD POS QL NAA+NON-PROBE: NOT DETECTED
RESISTANT GENE TARGETS: ABNORMAL
S AUREUS DNA BLD POS QL NAA+NON-PROBE: DETECTED
S AUREUS+CONS DNA BLD POS NAA+NON-PROBE: DETECTED
S EPIDERMIDIS DNA BLD POS QL NAA+NON-PRB: NOT DETECTED
S LUGDUNENSIS DNA BLD POS QL NAA+NON-PRB: NOT DETECTED
S MALTOPHILIA DNA BLD POS QL NAA+NON-PRB: NOT DETECTED
S MARCESCENS DNA BLD POS NAA+NON-PROBE: NOT DETECTED
S PNEUM DNA BLD POS QL NAA+NON-PROBE: NOT DETECTED
S PYO DNA BLD POS QL NAA+NON-PROBE: NOT DETECTED
SALMONELLA DNA BLD POS QL NAA+NON-PROBE: NOT DETECTED
STREPTOCOCCUS DNA BLD POS NAA+NON-PROBE: NOT DETECTED

## 2024-12-10 PROCEDURE — 97161 PT EVAL LOW COMPLEX 20 MIN: CPT

## 2024-12-10 PROCEDURE — 2580000003 HC RX 258: Performed by: STUDENT IN AN ORGANIZED HEALTH CARE EDUCATION/TRAINING PROGRAM

## 2024-12-10 PROCEDURE — 6370000000 HC RX 637 (ALT 250 FOR IP): Performed by: STUDENT IN AN ORGANIZED HEALTH CARE EDUCATION/TRAINING PROGRAM

## 2024-12-10 PROCEDURE — 1100000000 HC RM PRIVATE

## 2024-12-10 PROCEDURE — 6360000002 HC RX W HCPCS: Performed by: STUDENT IN AN ORGANIZED HEALTH CARE EDUCATION/TRAINING PROGRAM

## 2024-12-10 PROCEDURE — 97165 OT EVAL LOW COMPLEX 30 MIN: CPT

## 2024-12-10 PROCEDURE — 6370000000 HC RX 637 (ALT 250 FOR IP): Performed by: INTERNAL MEDICINE

## 2024-12-10 PROCEDURE — 2500000003 HC RX 250 WO HCPCS: Performed by: STUDENT IN AN ORGANIZED HEALTH CARE EDUCATION/TRAINING PROGRAM

## 2024-12-10 PROCEDURE — 76937 US GUIDE VASCULAR ACCESS: CPT

## 2024-12-10 PROCEDURE — 6370000000 HC RX 637 (ALT 250 FOR IP): Performed by: FAMILY MEDICINE

## 2024-12-10 RX ORDER — OXYCODONE HYDROCHLORIDE 5 MG/1
5 TABLET ORAL EVERY 4 HOURS PRN
Status: DISCONTINUED | OUTPATIENT
Start: 2024-12-10 | End: 2024-12-11

## 2024-12-10 RX ADMIN — QUETIAPINE FUMARATE 50 MG: 100 TABLET ORAL at 20:28

## 2024-12-10 RX ADMIN — SODIUM CHLORIDE, PRESERVATIVE FREE 10 ML: 5 INJECTION INTRAVENOUS at 20:31

## 2024-12-10 RX ADMIN — HYDROCORTISONE ACETATE: 1 CREAM TOPICAL at 20:30

## 2024-12-10 RX ADMIN — PANTOPRAZOLE SODIUM 40 MG: 40 TABLET, DELAYED RELEASE ORAL at 05:29

## 2024-12-10 RX ADMIN — SODIUM CHLORIDE, PRESERVATIVE FREE 10 ML: 5 INJECTION INTRAVENOUS at 08:51

## 2024-12-10 RX ADMIN — HYDROMORPHONE HYDROCHLORIDE 0.5 MG: 1 INJECTION, SOLUTION INTRAMUSCULAR; INTRAVENOUS; SUBCUTANEOUS at 20:49

## 2024-12-10 RX ADMIN — DAPTOMYCIN 750 MG: 500 INJECTION, POWDER, LYOPHILIZED, FOR SOLUTION INTRAVENOUS at 17:34

## 2024-12-10 RX ADMIN — OXYCODONE 5 MG: 5 TABLET ORAL at 17:15

## 2024-12-10 RX ADMIN — TRIAMCINOLONE ACETONIDE: 1 CREAM TOPICAL at 20:30

## 2024-12-10 RX ADMIN — PANTOPRAZOLE SODIUM 40 MG: 40 TABLET, DELAYED RELEASE ORAL at 16:31

## 2024-12-10 RX ADMIN — ENOXAPARIN SODIUM 40 MG: 100 INJECTION SUBCUTANEOUS at 08:50

## 2024-12-10 RX ADMIN — PREGABALIN 150 MG: 75 CAPSULE ORAL at 20:28

## 2024-12-10 ASSESSMENT — PAIN DESCRIPTION - DESCRIPTORS: DESCRIPTORS: ACHING

## 2024-12-10 ASSESSMENT — PAIN SCALES - GENERAL
PAINLEVEL_OUTOF10: 7
PAINLEVEL_OUTOF10: 7

## 2024-12-10 ASSESSMENT — PAIN DESCRIPTION - LOCATION: LOCATION: GENERALIZED

## 2024-12-10 NOTE — MANAGEMENT PLAN
Mrs. Blancas was seen today with charge nurse as well as nurse manager in the room.  She was refusing care and demanded to see Dr. Mendoza.  It was explained to her that Dr. Mendoza was no longer in the hospital but that there was coverage from her partners.  I did go out to see her and she had 2 infiltrated IVs that she refused to let nursing remove.  She is also due for IV antibiotics but was refusing this as well. Earlier today a code Maxim/Kasey was called and she was very agitated and yelling at staff in swatted and swung her arms at the nurse and aide.  Security is at the bedside as well to make sure that this is not happen again.  She refused to speak to her primary nurse today or to really do any thing with her medical care and was cussing at them earlier.  I was able to speak to her about her continued care.  She did allow us to remove her 2 IVs that infiltrated and was in agreement with her IV antibiotics.  She would not let any nurse on the floor place an IV but did agree to the PICC team placing vascular access.  She reported that \"I do not care about my heart infection I would not do anything with them\".  I did explain that she had to have her IV antibiotics as her endocarditis could cause major issues for her even death.  She reported that they had not been being nice to her, they being the nursing staff.  I did explain that she also needed to be nice to them and that they could at least be cordial to each other.  She reported that she would agree to medical treatment but she did not need to talk to them.  I again explained that we are just trying to do her job and she does need to allow them to do vital signs, take blood work, and administer medications without her being hostile or violent towards them whether it is physical or verbal.  She reports in agreement as long as they are nice to her.  I have assured her that we are just trying to help her and certainly we will be nice to her.  While I was

## 2024-12-11 ENCOUNTER — APPOINTMENT (OUTPATIENT)
Dept: NON INVASIVE DIAGNOSTICS | Age: 69
DRG: 288 | End: 2024-12-11
Attending: INTERNAL MEDICINE
Payer: MEDICARE

## 2024-12-11 LAB
ANION GAP SERPL CALC-SCNC: 8 MMOL/L (ref 7–16)
BACTERIA SPEC CULT: ABNORMAL
BACTERIA SPEC CULT: ABNORMAL
BACTERIA SPEC CULT: NORMAL
BUN SERPL-MCNC: 17 MG/DL (ref 8–23)
CALCIUM SERPL-MCNC: 8.4 MG/DL (ref 8.8–10.2)
CHLORIDE SERPL-SCNC: 102 MMOL/L (ref 98–107)
CO2 SERPL-SCNC: 30 MMOL/L (ref 20–29)
CREAT SERPL-MCNC: 0.64 MG/DL (ref 0.6–1.1)
ERYTHROCYTE [DISTWIDTH] IN BLOOD BY AUTOMATED COUNT: 18.3 % (ref 11.9–14.6)
GLUCOSE SERPL-MCNC: 102 MG/DL (ref 70–99)
GRAM STN SPEC: ABNORMAL
HCT VFR BLD AUTO: 39.5 % (ref 35.8–46.3)
HGB BLD-MCNC: 11.2 G/DL (ref 11.7–15.4)
MCH RBC QN AUTO: 25.3 PG (ref 26.1–32.9)
MCHC RBC AUTO-ENTMCNC: 28.4 G/DL (ref 31.4–35)
MCV RBC AUTO: 89.2 FL (ref 82–102)
NRBC # BLD: 0 K/UL (ref 0–0.2)
PLATELET # BLD AUTO: 244 K/UL (ref 150–450)
PMV BLD AUTO: 9.5 FL (ref 9.4–12.3)
POTASSIUM SERPL-SCNC: 4.1 MMOL/L (ref 3.5–5.1)
RBC # BLD AUTO: 4.43 M/UL (ref 4.05–5.2)
SERVICE CMNT-IMP: ABNORMAL
SERVICE CMNT-IMP: NORMAL
SODIUM SERPL-SCNC: 139 MMOL/L (ref 136–145)
WBC # BLD AUTO: 8.6 K/UL (ref 4.3–11.1)

## 2024-12-11 PROCEDURE — 6370000000 HC RX 637 (ALT 250 FOR IP): Performed by: FAMILY MEDICINE

## 2024-12-11 PROCEDURE — 2500000003 HC RX 250 WO HCPCS: Performed by: STUDENT IN AN ORGANIZED HEALTH CARE EDUCATION/TRAINING PROGRAM

## 2024-12-11 PROCEDURE — 2700000000 HC OXYGEN THERAPY PER DAY

## 2024-12-11 PROCEDURE — 6360000002 HC RX W HCPCS: Performed by: STUDENT IN AN ORGANIZED HEALTH CARE EDUCATION/TRAINING PROGRAM

## 2024-12-11 PROCEDURE — 85027 COMPLETE CBC AUTOMATED: CPT

## 2024-12-11 PROCEDURE — 36415 COLL VENOUS BLD VENIPUNCTURE: CPT

## 2024-12-11 PROCEDURE — 2580000003 HC RX 258: Performed by: STUDENT IN AN ORGANIZED HEALTH CARE EDUCATION/TRAINING PROGRAM

## 2024-12-11 PROCEDURE — 6370000000 HC RX 637 (ALT 250 FOR IP): Performed by: INTERNAL MEDICINE

## 2024-12-11 PROCEDURE — 87040 BLOOD CULTURE FOR BACTERIA: CPT

## 2024-12-11 PROCEDURE — 93321 DOPPLER ECHO F-UP/LMTD STD: CPT

## 2024-12-11 PROCEDURE — 6370000000 HC RX 637 (ALT 250 FOR IP): Performed by: STUDENT IN AN ORGANIZED HEALTH CARE EDUCATION/TRAINING PROGRAM

## 2024-12-11 PROCEDURE — 1100000000 HC RM PRIVATE

## 2024-12-11 PROCEDURE — 80048 BASIC METABOLIC PNL TOTAL CA: CPT

## 2024-12-11 RX ORDER — GUAIFENESIN 200 MG/10ML
LIQUID ORAL 4 TIMES DAILY
Status: DISCONTINUED | OUTPATIENT
Start: 2024-12-11 | End: 2025-01-23 | Stop reason: HOSPADM

## 2024-12-11 RX ORDER — HYDROCODONE BITARTRATE AND ACETAMINOPHEN 7.5; 325 MG/1; MG/1
1 TABLET ORAL EVERY 6 HOURS PRN
Status: DISCONTINUED | OUTPATIENT
Start: 2024-12-11 | End: 2025-01-23 | Stop reason: HOSPADM

## 2024-12-11 RX ADMIN — SODIUM CHLORIDE, PRESERVATIVE FREE 10 ML: 5 INJECTION INTRAVENOUS at 21:37

## 2024-12-11 RX ADMIN — HYDROCODONE BITARTRATE AND ACETAMINOPHEN 1 TABLET: 7.5; 325 TABLET ORAL at 09:07

## 2024-12-11 RX ADMIN — PANTOPRAZOLE SODIUM 40 MG: 40 TABLET, DELAYED RELEASE ORAL at 16:11

## 2024-12-11 RX ADMIN — QUETIAPINE FUMARATE 50 MG: 100 TABLET ORAL at 20:23

## 2024-12-11 RX ADMIN — HYDROCORTISONE ACETATE: 1 CREAM TOPICAL at 08:34

## 2024-12-11 RX ADMIN — TRIAMCINOLONE ACETONIDE: 1 CREAM TOPICAL at 08:33

## 2024-12-11 RX ADMIN — DULOXETINE HYDROCHLORIDE 60 MG: 60 CAPSULE, DELAYED RELEASE ORAL at 08:31

## 2024-12-11 RX ADMIN — Medication: at 16:12

## 2024-12-11 RX ADMIN — PREGABALIN 150 MG: 75 CAPSULE ORAL at 20:23

## 2024-12-11 RX ADMIN — HYDROXYCHLOROQUINE SULFATE 200 MG: 200 TABLET ORAL at 08:32

## 2024-12-11 RX ADMIN — DAPTOMYCIN 750 MG: 500 INJECTION, POWDER, LYOPHILIZED, FOR SOLUTION INTRAVENOUS at 17:47

## 2024-12-11 RX ADMIN — PANTOPRAZOLE SODIUM 40 MG: 40 TABLET, DELAYED RELEASE ORAL at 05:11

## 2024-12-11 RX ADMIN — ENOXAPARIN SODIUM 40 MG: 100 INJECTION SUBCUTANEOUS at 08:33

## 2024-12-11 RX ADMIN — PREDNISONE 20 MG: 20 TABLET ORAL at 08:32

## 2024-12-11 RX ADMIN — HYDROCODONE BITARTRATE AND ACETAMINOPHEN 1 TABLET: 7.5; 325 TABLET ORAL at 16:11

## 2024-12-11 RX ADMIN — LISINOPRIL 20 MG: 20 TABLET ORAL at 08:32

## 2024-12-11 RX ADMIN — SODIUM CHLORIDE, PRESERVATIVE FREE 10 ML: 5 INJECTION INTRAVENOUS at 08:34

## 2024-12-11 RX ADMIN — PREGABALIN 150 MG: 75 CAPSULE ORAL at 08:32

## 2024-12-11 RX ADMIN — HYDROCORTISONE ACETATE: 1 CREAM TOPICAL at 16:12

## 2024-12-11 RX ADMIN — HYDROCORTISONE ACETATE: 1 CREAM TOPICAL at 20:30

## 2024-12-11 RX ADMIN — HYDROMORPHONE HYDROCHLORIDE 0.25 MG: 1 INJECTION, SOLUTION INTRAMUSCULAR; INTRAVENOUS; SUBCUTANEOUS at 13:44

## 2024-12-11 RX ADMIN — Medication: at 20:29

## 2024-12-11 RX ADMIN — TRIAMCINOLONE ACETONIDE: 1 CREAM TOPICAL at 20:29

## 2024-12-11 RX ADMIN — HYDROMORPHONE HYDROCHLORIDE 0.5 MG: 1 INJECTION, SOLUTION INTRAMUSCULAR; INTRAVENOUS; SUBCUTANEOUS at 21:24

## 2024-12-11 ASSESSMENT — PAIN DESCRIPTION - LOCATION
LOCATION: LEG;BUTTOCKS
LOCATION: SACRUM
LOCATION: BUTTOCKS;LEG
LOCATION: GENERALIZED

## 2024-12-11 ASSESSMENT — PAIN SCALES - GENERAL
PAINLEVEL_OUTOF10: 7
PAINLEVEL_OUTOF10: 0
PAINLEVEL_OUTOF10: 6
PAINLEVEL_OUTOF10: 7
PAINLEVEL_OUTOF10: 6
PAINLEVEL_OUTOF10: 0

## 2024-12-11 ASSESSMENT — PAIN DESCRIPTION - ORIENTATION
ORIENTATION: POSTERIOR

## 2024-12-11 ASSESSMENT — PAIN DESCRIPTION - DESCRIPTORS
DESCRIPTORS: ACHING

## 2024-12-11 ASSESSMENT — PAIN SCALES - WONG BAKER
WONGBAKER_NUMERICALRESPONSE: HURTS EVEN MORE
WONGBAKER_NUMERICALRESPONSE: NO HURT

## 2024-12-11 NOTE — CARE COORDINATION
Chart reviewed and patient discussed in IDT rounds this AM. Awaiting final ID antibiotic plan. Patient is not a OPAT candidate. May be able to go to a STR for IV antibiotics if she is agreeable.     Regino LUCIO, ACM  [place]

## 2024-12-12 PROBLEM — B95.62 MRSA BACTEREMIA: Status: ACTIVE | Noted: 2024-12-12

## 2024-12-12 PROBLEM — R78.81 MRSA BACTEREMIA: Status: ACTIVE | Noted: 2024-12-12

## 2024-12-12 LAB
ECHO EST RA PRESSURE: 3 MMHG
ECHO LV EF PHYSICIAN: 60 %
ECHO LV FRACTIONAL SHORTENING: 31 % (ref 28–44)
ECHO LV INTERNAL DIMENSION DIASTOLIC: 5.2 CM (ref 3.9–5.3)
ECHO LV INTERNAL DIMENSION SYSTOLIC: 3.6 CM
ECHO LV IVSD: 1.1 CM (ref 0.6–0.9)
ECHO LV MASS 2D: 220.8 G (ref 67–162)
ECHO LV POSTERIOR WALL DIASTOLIC: 1.1 CM (ref 0.6–0.9)
ECHO LV RELATIVE WALL THICKNESS RATIO: 0.42
ECHO RIGHT VENTRICULAR SYSTOLIC PRESSURE (RVSP): 35 MMHG
ECHO TV REGURGITANT MAX VELOCITY: 2.85 M/S
ECHO TV REGURGITANT PEAK GRADIENT: 32 MMHG

## 2024-12-12 PROCEDURE — 6360000002 HC RX W HCPCS: Performed by: STUDENT IN AN ORGANIZED HEALTH CARE EDUCATION/TRAINING PROGRAM

## 2024-12-12 PROCEDURE — 2700000000 HC OXYGEN THERAPY PER DAY

## 2024-12-12 PROCEDURE — 1100000000 HC RM PRIVATE

## 2024-12-12 PROCEDURE — 6370000000 HC RX 637 (ALT 250 FOR IP): Performed by: INTERNAL MEDICINE

## 2024-12-12 PROCEDURE — 93325 DOPPLER ECHO COLOR FLOW MAPG: CPT | Performed by: INTERNAL MEDICINE

## 2024-12-12 PROCEDURE — 2580000003 HC RX 258: Performed by: STUDENT IN AN ORGANIZED HEALTH CARE EDUCATION/TRAINING PROGRAM

## 2024-12-12 PROCEDURE — 6370000000 HC RX 637 (ALT 250 FOR IP): Performed by: FAMILY MEDICINE

## 2024-12-12 PROCEDURE — 6370000000 HC RX 637 (ALT 250 FOR IP): Performed by: NURSE PRACTITIONER

## 2024-12-12 PROCEDURE — 99222 1ST HOSP IP/OBS MODERATE 55: CPT | Performed by: INTERNAL MEDICINE

## 2024-12-12 PROCEDURE — 94760 N-INVAS EAR/PLS OXIMETRY 1: CPT

## 2024-12-12 PROCEDURE — 93308 TTE F-UP OR LMTD: CPT | Performed by: INTERNAL MEDICINE

## 2024-12-12 PROCEDURE — 2500000003 HC RX 250 WO HCPCS: Performed by: STUDENT IN AN ORGANIZED HEALTH CARE EDUCATION/TRAINING PROGRAM

## 2024-12-12 PROCEDURE — 6370000000 HC RX 637 (ALT 250 FOR IP): Performed by: STUDENT IN AN ORGANIZED HEALTH CARE EDUCATION/TRAINING PROGRAM

## 2024-12-12 PROCEDURE — 93321 DOPPLER ECHO F-UP/LMTD STD: CPT | Performed by: INTERNAL MEDICINE

## 2024-12-12 RX ADMIN — LISINOPRIL 20 MG: 20 TABLET ORAL at 09:14

## 2024-12-12 RX ADMIN — HYDROMORPHONE HYDROCHLORIDE 0.5 MG: 1 INJECTION, SOLUTION INTRAMUSCULAR; INTRAVENOUS; SUBCUTANEOUS at 20:23

## 2024-12-12 RX ADMIN — PREGABALIN 150 MG: 75 CAPSULE ORAL at 09:14

## 2024-12-12 RX ADMIN — DULOXETINE HYDROCHLORIDE 60 MG: 60 CAPSULE, DELAYED RELEASE ORAL at 09:14

## 2024-12-12 RX ADMIN — PREGABALIN 150 MG: 75 CAPSULE ORAL at 20:11

## 2024-12-12 RX ADMIN — HYDROCODONE BITARTRATE AND ACETAMINOPHEN 1 TABLET: 7.5; 325 TABLET ORAL at 23:49

## 2024-12-12 RX ADMIN — DAPTOMYCIN 750 MG: 500 INJECTION, POWDER, LYOPHILIZED, FOR SOLUTION INTRAVENOUS at 15:58

## 2024-12-12 RX ADMIN — QUETIAPINE FUMARATE 50 MG: 100 TABLET ORAL at 20:11

## 2024-12-12 RX ADMIN — HYDROXYCHLOROQUINE SULFATE 200 MG: 200 TABLET ORAL at 09:14

## 2024-12-12 RX ADMIN — Medication: at 14:00

## 2024-12-12 RX ADMIN — HYDROCORTISONE ACETATE: 1 CREAM TOPICAL at 14:00

## 2024-12-12 RX ADMIN — SODIUM CHLORIDE, PRESERVATIVE FREE 10 ML: 5 INJECTION INTRAVENOUS at 09:14

## 2024-12-12 RX ADMIN — PANTOPRAZOLE SODIUM 40 MG: 40 TABLET, DELAYED RELEASE ORAL at 15:58

## 2024-12-12 RX ADMIN — SODIUM CHLORIDE, PRESERVATIVE FREE 10 ML: 5 INJECTION INTRAVENOUS at 20:12

## 2024-12-12 RX ADMIN — PREDNISONE 20 MG: 20 TABLET ORAL at 09:14

## 2024-12-12 ASSESSMENT — PAIN DESCRIPTION - DESCRIPTORS
DESCRIPTORS: ACHING;STABBING
DESCRIPTORS: ACHING;STABBING;SHARP

## 2024-12-12 ASSESSMENT — PAIN SCALES - GENERAL
PAINLEVEL_OUTOF10: 9
PAINLEVEL_OUTOF10: 0
PAINLEVEL_OUTOF10: 0
PAINLEVEL_OUTOF10: 8

## 2024-12-12 ASSESSMENT — PAIN DESCRIPTION - ORIENTATION
ORIENTATION: LOWER
ORIENTATION: LOWER

## 2024-12-12 ASSESSMENT — PAIN DESCRIPTION - LOCATION
LOCATION: BACK;GENERALIZED
LOCATION: GENERALIZED;BACK

## 2024-12-12 NOTE — CONSULTS
[facility] Cardiology Evaluation                 Date of  Admission: 12/6/2024  3:59 PM     Primary Care Physician:Florencio Banegas MD  Primary Cardiologist: Saw [facility] Oct inpatient   Referring Physician: ID  Supervising Cardiologist: Dr. [place]     Reason for cardiac evaluation: MRSA ?SILVA needed.       Meli Blancas is a 69 y.o. female with prior h/o pemphigus foliaceous, Sjogren's syndrome on chronic steroids, COPD with prior tobacco use, hypertension, and recent diagnosis of MRSA aortic valve endocarditis who presented with altered mental status and was found down at her house.     Of note patient was recently hospitalized for COPD exacerbation as well as pemphigus foliaceous flare and during that hospitalization found to have MRSA aortic valve endocarditis. Patient was discharged on daptomycin to be complete through 12/11/2024.   Also prior admission at [facility] for bacteremia and respiratory failure. Left hospital AMA on 10/17. During that admission SILVA showed AV with mobile vegetation present on noncoronary cusp.     In ED, showed lactic acid 2.4, CK2 59, troponin 63 downtrend to 54, urinalysis positive for leukocyte esterase and 2+ bacteria, nitrate negative. Patient on chronic prednisone. She was placed on IV cefepime with concern for UTI/possible cellulitis given pemphigus foliaceous flare. Patient admitted with acute metabolic encephalopathy, MRSA AV endocarditis, pemphigus foliaceous flare. Hospitalist ordred SILVA. ID saw patient and remains on IV daptomycin.  Her line was removed on 12/7.   Echo yesterday showed mildly thickened left and noncoronary cusps with mobile echodensity, consider endocarditis. SILVA if clinically indicated.   Cardiology was consulted for MRSA.       Patient Active Problem List   Diagnosis    COPD with acute exacerbation (HCC)    Rash/skin eruption    Hypertension    Dry mouth    Dry eyes    Disorder of lipid metabolism    Coagulation test abnormality    Chronic coronary artery  No

## 2024-12-13 ENCOUNTER — APPOINTMENT (OUTPATIENT)
Dept: CARDIAC CATH/INVASIVE PROCEDURES | Age: 69
DRG: 288 | End: 2024-12-13
Payer: MEDICARE

## 2024-12-13 ENCOUNTER — APPOINTMENT (OUTPATIENT)
Dept: CARDIAC CATH/INVASIVE PROCEDURES | Age: 69
DRG: 288 | End: 2024-12-13
Attending: INTERNAL MEDICINE
Payer: MEDICARE

## 2024-12-13 LAB
ANION GAP SERPL CALC-SCNC: 4 MMOL/L (ref 7–16)
BACTERIA SPEC CULT: ABNORMAL
BUN SERPL-MCNC: 12 MG/DL (ref 8–23)
CALCIUM SERPL-MCNC: 9.2 MG/DL (ref 8.8–10.2)
CHLORIDE SERPL-SCNC: 99 MMOL/L (ref 98–107)
CO2 SERPL-SCNC: 38 MMOL/L (ref 20–29)
CREAT SERPL-MCNC: 0.59 MG/DL (ref 0.6–1.1)
ECHO BSA: 1.78 M2
ERYTHROCYTE [DISTWIDTH] IN BLOOD BY AUTOMATED COUNT: 18.1 % (ref 11.9–14.6)
GLUCOSE SERPL-MCNC: 102 MG/DL (ref 70–99)
GRAM STN SPEC: ABNORMAL
HCT VFR BLD AUTO: 37.6 % (ref 35.8–46.3)
HGB BLD-MCNC: 10.8 G/DL (ref 11.7–15.4)
MCH RBC QN AUTO: 25.3 PG (ref 26.1–32.9)
MCHC RBC AUTO-ENTMCNC: 28.7 G/DL (ref 31.4–35)
MCV RBC AUTO: 88.1 FL (ref 82–102)
NRBC # BLD: 0 K/UL (ref 0–0.2)
PLATELET # BLD AUTO: 301 K/UL (ref 150–450)
PMV BLD AUTO: 9.5 FL (ref 9.4–12.3)
POTASSIUM SERPL-SCNC: 4.2 MMOL/L (ref 3.5–5.1)
RBC # BLD AUTO: 4.27 M/UL (ref 4.05–5.2)
SERVICE CMNT-IMP: ABNORMAL
SODIUM SERPL-SCNC: 141 MMOL/L (ref 136–145)
WBC # BLD AUTO: 8.7 K/UL (ref 4.3–11.1)

## 2024-12-13 PROCEDURE — 6370000000 HC RX 637 (ALT 250 FOR IP): Performed by: INTERNAL MEDICINE

## 2024-12-13 PROCEDURE — 6370000000 HC RX 637 (ALT 250 FOR IP): Performed by: STUDENT IN AN ORGANIZED HEALTH CARE EDUCATION/TRAINING PROGRAM

## 2024-12-13 PROCEDURE — 80048 BASIC METABOLIC PNL TOTAL CA: CPT

## 2024-12-13 PROCEDURE — 99152 MOD SED SAME PHYS/QHP 5/>YRS: CPT

## 2024-12-13 PROCEDURE — 99152 MOD SED SAME PHYS/QHP 5/>YRS: CPT | Performed by: INTERNAL MEDICINE

## 2024-12-13 PROCEDURE — 93312 ECHO TRANSESOPHAGEAL: CPT | Performed by: INTERNAL MEDICINE

## 2024-12-13 PROCEDURE — 85027 COMPLETE CBC AUTOMATED: CPT

## 2024-12-13 PROCEDURE — 93325 DOPPLER ECHO COLOR FLOW MAPG: CPT

## 2024-12-13 PROCEDURE — 2580000003 HC RX 258: Performed by: STUDENT IN AN ORGANIZED HEALTH CARE EDUCATION/TRAINING PROGRAM

## 2024-12-13 PROCEDURE — 36415 COLL VENOUS BLD VENIPUNCTURE: CPT

## 2024-12-13 PROCEDURE — 93320 DOPPLER ECHO COMPLETE: CPT | Performed by: INTERNAL MEDICINE

## 2024-12-13 PROCEDURE — 93325 DOPPLER ECHO COLOR FLOW MAPG: CPT | Performed by: INTERNAL MEDICINE

## 2024-12-13 PROCEDURE — 1100000000 HC RM PRIVATE

## 2024-12-13 PROCEDURE — 6370000000 HC RX 637 (ALT 250 FOR IP): Performed by: FAMILY MEDICINE

## 2024-12-13 PROCEDURE — 2700000000 HC OXYGEN THERAPY PER DAY

## 2024-12-13 PROCEDURE — 6360000002 HC RX W HCPCS: Performed by: INTERNAL MEDICINE

## 2024-12-13 PROCEDURE — 94760 N-INVAS EAR/PLS OXIMETRY 1: CPT

## 2024-12-13 PROCEDURE — B24BZZ4 ULTRASONOGRAPHY OF HEART WITH AORTA, TRANSESOPHAGEAL: ICD-10-PCS | Performed by: INTERNAL MEDICINE

## 2024-12-13 PROCEDURE — 6360000002 HC RX W HCPCS: Performed by: STUDENT IN AN ORGANIZED HEALTH CARE EDUCATION/TRAINING PROGRAM

## 2024-12-13 RX ORDER — MIDAZOLAM HYDROCHLORIDE 1 MG/ML
INJECTION, SOLUTION INTRAMUSCULAR; INTRAVENOUS PRN
Status: COMPLETED | OUTPATIENT
Start: 2024-12-13 | End: 2024-12-13

## 2024-12-13 RX ORDER — FENTANYL CITRATE 50 UG/ML
INJECTION, SOLUTION INTRAMUSCULAR; INTRAVENOUS PRN
Status: COMPLETED | OUTPATIENT
Start: 2024-12-13 | End: 2024-12-13

## 2024-12-13 RX ORDER — LIDOCAINE HYDROCHLORIDE 20 MG/ML
SOLUTION OROPHARYNGEAL PRN
Status: COMPLETED | OUTPATIENT
Start: 2024-12-13 | End: 2024-12-13

## 2024-12-13 RX ADMIN — HYDROCORTISONE ACETATE: 1 CREAM TOPICAL at 09:48

## 2024-12-13 RX ADMIN — DAPTOMYCIN 750 MG: 500 INJECTION, POWDER, LYOPHILIZED, FOR SOLUTION INTRAVENOUS at 16:43

## 2024-12-13 RX ADMIN — FENTANYL CITRATE 50 MCG: 50 INJECTION, SOLUTION INTRAMUSCULAR; INTRAVENOUS at 08:24

## 2024-12-13 RX ADMIN — PREDNISONE 20 MG: 20 TABLET ORAL at 09:45

## 2024-12-13 RX ADMIN — Medication 1 AMPULE: at 16:43

## 2024-12-13 RX ADMIN — QUETIAPINE FUMARATE 50 MG: 100 TABLET ORAL at 21:53

## 2024-12-13 RX ADMIN — HYDROCODONE BITARTRATE AND ACETAMINOPHEN 1 TABLET: 7.5; 325 TABLET ORAL at 14:07

## 2024-12-13 RX ADMIN — SODIUM CHLORIDE, PRESERVATIVE FREE 10 ML: 5 INJECTION INTRAVENOUS at 09:46

## 2024-12-13 RX ADMIN — PREGABALIN 150 MG: 75 CAPSULE ORAL at 21:53

## 2024-12-13 RX ADMIN — DULOXETINE HYDROCHLORIDE 60 MG: 60 CAPSULE, DELAYED RELEASE ORAL at 09:46

## 2024-12-13 RX ADMIN — ENOXAPARIN SODIUM 40 MG: 100 INJECTION SUBCUTANEOUS at 09:48

## 2024-12-13 RX ADMIN — LIDOCAINE HYDROCHLORIDE 15 ML: 20 SOLUTION ORAL at 08:15

## 2024-12-13 RX ADMIN — TRIAMCINOLONE ACETONIDE: 1 CREAM TOPICAL at 09:47

## 2024-12-13 RX ADMIN — PANTOPRAZOLE SODIUM 40 MG: 40 TABLET, DELAYED RELEASE ORAL at 16:43

## 2024-12-13 RX ADMIN — HYDROXYCHLOROQUINE SULFATE 200 MG: 200 TABLET ORAL at 09:46

## 2024-12-13 RX ADMIN — Medication: at 09:48

## 2024-12-13 RX ADMIN — MIDAZOLAM 2 MG: 1 INJECTION INTRAMUSCULAR; INTRAVENOUS at 08:25

## 2024-12-13 RX ADMIN — PREGABALIN 150 MG: 75 CAPSULE ORAL at 09:45

## 2024-12-13 ASSESSMENT — PAIN SCALES - GENERAL
PAINLEVEL_OUTOF10: 5
PAINLEVEL_OUTOF10: 0

## 2024-12-13 ASSESSMENT — PAIN DESCRIPTION - LOCATION: LOCATION: ARM

## 2024-12-13 ASSESSMENT — PAIN DESCRIPTION - ORIENTATION: ORIENTATION: LEFT;RIGHT

## 2024-12-13 ASSESSMENT — PAIN DESCRIPTION - DESCRIPTORS: DESCRIPTORS: BURNING;ACHING

## 2024-12-13 NOTE — CARE COORDINATION
Chart screened by CM for d/c planning.  Pt to undergo SILVA today.  Pending final ID recs.  Per most recent ID note:  Non-compliant with taking IV medications at home by at least 11/25  She was terminated by [facility] and was sent to ED to have line removed but was replaced and sent home with it.   Continue Daptomycin given repeat blood cx 12/9 with MRSA  Follow 12/11 blood cx  SILVA today: follow results  IV daptomycin 12/6-     PT/OT recommend HH with caregiver assistance.    Given that both ID and hospialist have charted that pt is not a candidate for OP IV ABX anticipated extended IP stay.  CM will continue to follow.  LOS = 7 days

## 2024-12-13 NOTE — PROCEDURES
PROCEDURE NOTE  Date: 12/13/2024   Name: Meli Blancas  YOB: 1955    Procedures SILVA    Preserved left ventricular systolic function.  Small mobile echodensity noted on the ventricular aspect of noncoronary cusp which is likely a vegetation in the setting of incomplete therapy previously with noncompliance.  No significant regurgitation.  No evidence to suggest abscess.  Defer antibiotics to ID.  No current class I surgical indications .  Please see full report for more details.    Not much further to add from a cardiac standpoint.  Will sign off.  Please call if questions.

## 2024-12-14 LAB
BACTERIA SPEC CULT: NORMAL
SERVICE CMNT-IMP: NORMAL

## 2024-12-14 PROCEDURE — 6370000000 HC RX 637 (ALT 250 FOR IP): Performed by: INTERNAL MEDICINE

## 2024-12-14 PROCEDURE — 2580000003 HC RX 258: Performed by: STUDENT IN AN ORGANIZED HEALTH CARE EDUCATION/TRAINING PROGRAM

## 2024-12-14 PROCEDURE — 1100000000 HC RM PRIVATE

## 2024-12-14 PROCEDURE — 6360000002 HC RX W HCPCS: Performed by: STUDENT IN AN ORGANIZED HEALTH CARE EDUCATION/TRAINING PROGRAM

## 2024-12-14 PROCEDURE — 6370000000 HC RX 637 (ALT 250 FOR IP): Performed by: FAMILY MEDICINE

## 2024-12-14 PROCEDURE — 2500000003 HC RX 250 WO HCPCS: Performed by: STUDENT IN AN ORGANIZED HEALTH CARE EDUCATION/TRAINING PROGRAM

## 2024-12-14 PROCEDURE — 6370000000 HC RX 637 (ALT 250 FOR IP): Performed by: STUDENT IN AN ORGANIZED HEALTH CARE EDUCATION/TRAINING PROGRAM

## 2024-12-14 RX ADMIN — PREDNISONE 20 MG: 20 TABLET ORAL at 08:40

## 2024-12-14 RX ADMIN — QUETIAPINE FUMARATE 50 MG: 100 TABLET ORAL at 20:31

## 2024-12-14 RX ADMIN — Medication: at 15:02

## 2024-12-14 RX ADMIN — DAPTOMYCIN 750 MG: 500 INJECTION, POWDER, LYOPHILIZED, FOR SOLUTION INTRAVENOUS at 17:45

## 2024-12-14 RX ADMIN — ENOXAPARIN SODIUM 40 MG: 100 INJECTION SUBCUTANEOUS at 08:40

## 2024-12-14 RX ADMIN — HYDROXYCHLOROQUINE SULFATE 200 MG: 200 TABLET ORAL at 08:40

## 2024-12-14 RX ADMIN — ONDANSETRON 4 MG: 2 INJECTION, SOLUTION INTRAMUSCULAR; INTRAVENOUS at 20:31

## 2024-12-14 RX ADMIN — Medication: at 17:46

## 2024-12-14 RX ADMIN — HYDROCORTISONE ACETATE: 1 CREAM TOPICAL at 15:01

## 2024-12-14 RX ADMIN — HYDROCODONE BITARTRATE AND ACETAMINOPHEN 1 TABLET: 7.5; 325 TABLET ORAL at 17:46

## 2024-12-14 RX ADMIN — DULOXETINE HYDROCHLORIDE 60 MG: 60 CAPSULE, DELAYED RELEASE ORAL at 08:40

## 2024-12-14 RX ADMIN — PANTOPRAZOLE SODIUM 40 MG: 40 TABLET, DELAYED RELEASE ORAL at 17:48

## 2024-12-14 RX ADMIN — Medication: at 08:41

## 2024-12-14 RX ADMIN — PREGABALIN 150 MG: 75 CAPSULE ORAL at 20:31

## 2024-12-14 RX ADMIN — PREGABALIN 150 MG: 75 CAPSULE ORAL at 08:40

## 2024-12-14 RX ADMIN — HYDROCORTISONE ACETATE: 1 CREAM TOPICAL at 08:42

## 2024-12-14 RX ADMIN — TRIAMCINOLONE ACETONIDE: 1 CREAM TOPICAL at 08:41

## 2024-12-14 RX ADMIN — HYDROMORPHONE HYDROCHLORIDE 0.5 MG: 1 INJECTION, SOLUTION INTRAMUSCULAR; INTRAVENOUS; SUBCUTANEOUS at 20:32

## 2024-12-14 RX ADMIN — LISINOPRIL 20 MG: 20 TABLET ORAL at 08:40

## 2024-12-14 RX ADMIN — SODIUM CHLORIDE, PRESERVATIVE FREE 10 ML: 5 INJECTION INTRAVENOUS at 08:42

## 2024-12-14 RX ADMIN — Medication 1 AMPULE: at 17:48

## 2024-12-14 RX ADMIN — HYDROCORTISONE ACETATE: 1 CREAM TOPICAL at 17:49

## 2024-12-14 RX ADMIN — SODIUM CHLORIDE, PRESERVATIVE FREE 10 ML: 5 INJECTION INTRAVENOUS at 20:32

## 2024-12-14 ASSESSMENT — PAIN DESCRIPTION - LOCATION
LOCATION: SHOULDER;ARM
LOCATION: ABDOMEN

## 2024-12-14 ASSESSMENT — PAIN DESCRIPTION - DESCRIPTORS
DESCRIPTORS: ACHING;DISCOMFORT;PRESSURE
DESCRIPTORS: DISCOMFORT

## 2024-12-14 ASSESSMENT — PAIN SCALES - GENERAL
PAINLEVEL_OUTOF10: 9
PAINLEVEL_OUTOF10: 0
PAINLEVEL_OUTOF10: 8

## 2024-12-15 PROCEDURE — 6360000002 HC RX W HCPCS: Performed by: STUDENT IN AN ORGANIZED HEALTH CARE EDUCATION/TRAINING PROGRAM

## 2024-12-15 PROCEDURE — 2580000003 HC RX 258: Performed by: STUDENT IN AN ORGANIZED HEALTH CARE EDUCATION/TRAINING PROGRAM

## 2024-12-15 PROCEDURE — 6370000000 HC RX 637 (ALT 250 FOR IP): Performed by: INTERNAL MEDICINE

## 2024-12-15 PROCEDURE — 6370000000 HC RX 637 (ALT 250 FOR IP): Performed by: STUDENT IN AN ORGANIZED HEALTH CARE EDUCATION/TRAINING PROGRAM

## 2024-12-15 PROCEDURE — 6370000000 HC RX 637 (ALT 250 FOR IP): Performed by: FAMILY MEDICINE

## 2024-12-15 PROCEDURE — 1100000000 HC RM PRIVATE

## 2024-12-15 PROCEDURE — 2500000003 HC RX 250 WO HCPCS: Performed by: STUDENT IN AN ORGANIZED HEALTH CARE EDUCATION/TRAINING PROGRAM

## 2024-12-15 RX ORDER — LACTOBACILLUS RHAMNOSUS GG 10B CELL
1 CAPSULE ORAL 2 TIMES DAILY WITH MEALS
Status: DISCONTINUED | OUTPATIENT
Start: 2024-12-15 | End: 2025-01-23 | Stop reason: HOSPADM

## 2024-12-15 RX ADMIN — HYDROMORPHONE HYDROCHLORIDE 0.5 MG: 1 INJECTION, SOLUTION INTRAMUSCULAR; INTRAVENOUS; SUBCUTANEOUS at 21:42

## 2024-12-15 RX ADMIN — Medication: at 16:51

## 2024-12-15 RX ADMIN — SODIUM CHLORIDE, PRESERVATIVE FREE 10 ML: 5 INJECTION INTRAVENOUS at 21:51

## 2024-12-15 RX ADMIN — DAPTOMYCIN 750 MG: 500 INJECTION, POWDER, LYOPHILIZED, FOR SOLUTION INTRAVENOUS at 16:50

## 2024-12-15 RX ADMIN — Medication 1 CAPSULE: at 16:50

## 2024-12-15 RX ADMIN — Medication: at 21:44

## 2024-12-15 RX ADMIN — HYDROCORTISONE ACETATE: 1 CREAM TOPICAL at 09:22

## 2024-12-15 RX ADMIN — QUETIAPINE FUMARATE 50 MG: 100 TABLET ORAL at 21:33

## 2024-12-15 RX ADMIN — Medication: at 09:21

## 2024-12-15 RX ADMIN — HYDROCORTISONE ACETATE: 1 CREAM TOPICAL at 11:57

## 2024-12-15 RX ADMIN — SODIUM CHLORIDE, PRESERVATIVE FREE 10 ML: 5 INJECTION INTRAVENOUS at 09:25

## 2024-12-15 RX ADMIN — Medication: at 11:56

## 2024-12-15 RX ADMIN — Medication 1 CAPSULE: at 09:21

## 2024-12-15 RX ADMIN — PREDNISONE 20 MG: 20 TABLET ORAL at 09:21

## 2024-12-15 RX ADMIN — TRIAMCINOLONE ACETONIDE: 1 CREAM TOPICAL at 09:22

## 2024-12-15 RX ADMIN — PREGABALIN 150 MG: 75 CAPSULE ORAL at 09:20

## 2024-12-15 RX ADMIN — HYDROXYCHLOROQUINE SULFATE 200 MG: 200 TABLET ORAL at 09:21

## 2024-12-15 RX ADMIN — PREGABALIN 150 MG: 75 CAPSULE ORAL at 21:32

## 2024-12-15 RX ADMIN — HYDROCODONE BITARTRATE AND ACETAMINOPHEN 1 TABLET: 7.5; 325 TABLET ORAL at 17:04

## 2024-12-15 RX ADMIN — PANTOPRAZOLE SODIUM 40 MG: 40 TABLET, DELAYED RELEASE ORAL at 16:50

## 2024-12-15 RX ADMIN — TRIAMCINOLONE ACETONIDE: 1 CREAM TOPICAL at 21:44

## 2024-12-15 RX ADMIN — ENOXAPARIN SODIUM 40 MG: 100 INJECTION SUBCUTANEOUS at 09:20

## 2024-12-15 RX ADMIN — HYDROCORTISONE ACETATE: 1 CREAM TOPICAL at 21:45

## 2024-12-15 RX ADMIN — HYDROCORTISONE ACETATE: 1 CREAM TOPICAL at 16:51

## 2024-12-15 RX ADMIN — LISINOPRIL 20 MG: 20 TABLET ORAL at 09:21

## 2024-12-15 RX ADMIN — Medication 1 AMPULE: at 16:50

## 2024-12-15 RX ADMIN — DULOXETINE HYDROCHLORIDE 60 MG: 60 CAPSULE, DELAYED RELEASE ORAL at 09:20

## 2024-12-15 ASSESSMENT — PAIN DESCRIPTION - LOCATION
LOCATION: ARM;SHOULDER
LOCATION: CHEST;ABDOMEN

## 2024-12-15 ASSESSMENT — PAIN SCALES - GENERAL
PAINLEVEL_OUTOF10: 8
PAINLEVEL_OUTOF10: 8
PAINLEVEL_OUTOF10: 0

## 2024-12-15 ASSESSMENT — PAIN DESCRIPTION - DESCRIPTORS
DESCRIPTORS: DISCOMFORT;SORE
DESCRIPTORS: DISCOMFORT

## 2024-12-15 ASSESSMENT — PAIN DESCRIPTION - ORIENTATION: ORIENTATION: RIGHT;LEFT

## 2024-12-16 DIAGNOSIS — J96.01 ACUTE HYPOXIC RESPIRATORY FAILURE: Primary | ICD-10-CM

## 2024-12-16 LAB
ANION GAP SERPL CALC-SCNC: 4 MMOL/L (ref 7–16)
BACTERIA SPEC CULT: NORMAL
BACTERIA SPEC CULT: NORMAL
BUN SERPL-MCNC: 18 MG/DL (ref 8–23)
CALCIUM SERPL-MCNC: 9.7 MG/DL (ref 8.8–10.2)
CHLORIDE SERPL-SCNC: 101 MMOL/L (ref 98–107)
CO2 SERPL-SCNC: 39 MMOL/L (ref 20–29)
CREAT SERPL-MCNC: 0.56 MG/DL (ref 0.6–1.1)
ERYTHROCYTE [DISTWIDTH] IN BLOOD BY AUTOMATED COUNT: 18.5 % (ref 11.9–14.6)
GLUCOSE SERPL-MCNC: 90 MG/DL (ref 70–99)
HCT VFR BLD AUTO: 38.4 % (ref 35.8–46.3)
HGB BLD-MCNC: 11.2 G/DL (ref 11.7–15.4)
MCH RBC QN AUTO: 25.3 PG (ref 26.1–32.9)
MCHC RBC AUTO-ENTMCNC: 29.2 G/DL (ref 31.4–35)
MCV RBC AUTO: 86.7 FL (ref 82–102)
NRBC # BLD: 0 K/UL (ref 0–0.2)
PLATELET # BLD AUTO: 290 K/UL (ref 150–450)
PMV BLD AUTO: 9.4 FL (ref 9.4–12.3)
POTASSIUM SERPL-SCNC: 4.3 MMOL/L (ref 3.5–5.1)
RBC # BLD AUTO: 4.43 M/UL (ref 4.05–5.2)
SERVICE CMNT-IMP: NORMAL
SERVICE CMNT-IMP: NORMAL
SODIUM SERPL-SCNC: 143 MMOL/L (ref 136–145)
WBC # BLD AUTO: 7.5 K/UL (ref 4.3–11.1)

## 2024-12-16 PROCEDURE — 6360000002 HC RX W HCPCS: Performed by: NURSE PRACTITIONER

## 2024-12-16 PROCEDURE — 6370000000 HC RX 637 (ALT 250 FOR IP): Performed by: FAMILY MEDICINE

## 2024-12-16 PROCEDURE — 36415 COLL VENOUS BLD VENIPUNCTURE: CPT

## 2024-12-16 PROCEDURE — 6370000000 HC RX 637 (ALT 250 FOR IP): Performed by: STUDENT IN AN ORGANIZED HEALTH CARE EDUCATION/TRAINING PROGRAM

## 2024-12-16 PROCEDURE — 1100000000 HC RM PRIVATE

## 2024-12-16 PROCEDURE — 2580000003 HC RX 258: Performed by: STUDENT IN AN ORGANIZED HEALTH CARE EDUCATION/TRAINING PROGRAM

## 2024-12-16 PROCEDURE — 85027 COMPLETE CBC AUTOMATED: CPT

## 2024-12-16 PROCEDURE — 2580000003 HC RX 258: Performed by: NURSE PRACTITIONER

## 2024-12-16 PROCEDURE — 80048 BASIC METABOLIC PNL TOTAL CA: CPT

## 2024-12-16 PROCEDURE — 2700000000 HC OXYGEN THERAPY PER DAY

## 2024-12-16 PROCEDURE — 94760 N-INVAS EAR/PLS OXIMETRY 1: CPT

## 2024-12-16 PROCEDURE — 6370000000 HC RX 637 (ALT 250 FOR IP): Performed by: INTERNAL MEDICINE

## 2024-12-16 RX ADMIN — Medication: at 20:48

## 2024-12-16 RX ADMIN — PANTOPRAZOLE SODIUM 40 MG: 40 TABLET, DELAYED RELEASE ORAL at 14:53

## 2024-12-16 RX ADMIN — Medication 1 CAPSULE: at 15:58

## 2024-12-16 RX ADMIN — TRIAMCINOLONE ACETONIDE: 1 CREAM TOPICAL at 20:48

## 2024-12-16 RX ADMIN — QUETIAPINE FUMARATE 50 MG: 100 TABLET ORAL at 20:44

## 2024-12-16 RX ADMIN — HYDROCODONE BITARTRATE AND ACETAMINOPHEN 1 TABLET: 7.5; 325 TABLET ORAL at 16:01

## 2024-12-16 RX ADMIN — SODIUM CHLORIDE, PRESERVATIVE FREE 10 ML: 5 INJECTION INTRAVENOUS at 20:49

## 2024-12-16 RX ADMIN — SODIUM CHLORIDE 1500 MG: 9 INJECTION, SOLUTION INTRAVENOUS at 16:04

## 2024-12-16 RX ADMIN — PREGABALIN 150 MG: 75 CAPSULE ORAL at 20:44

## 2024-12-16 RX ADMIN — Medication 1 AMPULE: at 14:50

## 2024-12-16 RX ADMIN — HYDROCORTISONE ACETATE: 1 CREAM TOPICAL at 16:06

## 2024-12-16 RX ADMIN — HYDROCORTISONE ACETATE: 1 CREAM TOPICAL at 20:48

## 2024-12-16 RX ADMIN — Medication: at 16:06

## 2024-12-16 ASSESSMENT — PAIN DESCRIPTION - LOCATION: LOCATION: CHEST;BUTTOCKS

## 2024-12-16 ASSESSMENT — PAIN SCALES - GENERAL: PAINLEVEL_OUTOF10: 8

## 2024-12-16 ASSESSMENT — PAIN DESCRIPTION - DESCRIPTORS: DESCRIPTORS: ACHING

## 2024-12-17 LAB — CREAT SERPL-MCNC: 0.89 MG/DL (ref 0.6–1.1)

## 2024-12-17 PROCEDURE — 6370000000 HC RX 637 (ALT 250 FOR IP): Performed by: STUDENT IN AN ORGANIZED HEALTH CARE EDUCATION/TRAINING PROGRAM

## 2024-12-17 PROCEDURE — 6360000002 HC RX W HCPCS: Performed by: STUDENT IN AN ORGANIZED HEALTH CARE EDUCATION/TRAINING PROGRAM

## 2024-12-17 PROCEDURE — 2580000003 HC RX 258: Performed by: FAMILY MEDICINE

## 2024-12-17 PROCEDURE — 2580000003 HC RX 258: Performed by: STUDENT IN AN ORGANIZED HEALTH CARE EDUCATION/TRAINING PROGRAM

## 2024-12-17 PROCEDURE — 6360000002 HC RX W HCPCS: Performed by: FAMILY MEDICINE

## 2024-12-17 PROCEDURE — 82565 ASSAY OF CREATININE: CPT

## 2024-12-17 PROCEDURE — 6370000000 HC RX 637 (ALT 250 FOR IP): Performed by: FAMILY MEDICINE

## 2024-12-17 PROCEDURE — 6370000000 HC RX 637 (ALT 250 FOR IP): Performed by: INTERNAL MEDICINE

## 2024-12-17 PROCEDURE — 94760 N-INVAS EAR/PLS OXIMETRY 1: CPT

## 2024-12-17 PROCEDURE — 1100000000 HC RM PRIVATE

## 2024-12-17 PROCEDURE — 2700000000 HC OXYGEN THERAPY PER DAY

## 2024-12-17 PROCEDURE — 2500000003 HC RX 250 WO HCPCS: Performed by: STUDENT IN AN ORGANIZED HEALTH CARE EDUCATION/TRAINING PROGRAM

## 2024-12-17 PROCEDURE — 36415 COLL VENOUS BLD VENIPUNCTURE: CPT

## 2024-12-17 PROCEDURE — 6360000002 HC RX W HCPCS: Performed by: INTERNAL MEDICINE

## 2024-12-17 PROCEDURE — 2580000003 HC RX 258: Performed by: INTERNAL MEDICINE

## 2024-12-17 RX ADMIN — DULOXETINE HYDROCHLORIDE 60 MG: 60 CAPSULE, DELAYED RELEASE ORAL at 08:13

## 2024-12-17 RX ADMIN — HYDROMORPHONE HYDROCHLORIDE 0.5 MG: 1 INJECTION, SOLUTION INTRAMUSCULAR; INTRAVENOUS; SUBCUTANEOUS at 21:45

## 2024-12-17 RX ADMIN — PREGABALIN 150 MG: 75 CAPSULE ORAL at 08:12

## 2024-12-17 RX ADMIN — PANTOPRAZOLE SODIUM 40 MG: 40 TABLET, DELAYED RELEASE ORAL at 16:48

## 2024-12-17 RX ADMIN — PREDNISONE 20 MG: 20 TABLET ORAL at 08:12

## 2024-12-17 RX ADMIN — QUETIAPINE FUMARATE 50 MG: 100 TABLET ORAL at 20:33

## 2024-12-17 RX ADMIN — TRIAMCINOLONE ACETONIDE: 1 CREAM TOPICAL at 09:23

## 2024-12-17 RX ADMIN — Medication: at 08:18

## 2024-12-17 RX ADMIN — Medication 1 AMPULE: at 05:23

## 2024-12-17 RX ADMIN — PANTOPRAZOLE SODIUM 40 MG: 40 TABLET, DELAYED RELEASE ORAL at 05:22

## 2024-12-17 RX ADMIN — VANCOMYCIN HYDROCHLORIDE 750 MG: 750 INJECTION, POWDER, LYOPHILIZED, FOR SOLUTION INTRAVENOUS at 20:35

## 2024-12-17 RX ADMIN — ENOXAPARIN SODIUM 40 MG: 100 INJECTION SUBCUTANEOUS at 08:12

## 2024-12-17 RX ADMIN — VANCOMYCIN HYDROCHLORIDE 750 MG: 750 INJECTION, POWDER, LYOPHILIZED, FOR SOLUTION INTRAVENOUS at 00:22

## 2024-12-17 RX ADMIN — LISINOPRIL 20 MG: 20 TABLET ORAL at 08:13

## 2024-12-17 RX ADMIN — PREGABALIN 150 MG: 75 CAPSULE ORAL at 20:29

## 2024-12-17 RX ADMIN — SODIUM CHLORIDE, PRESERVATIVE FREE 10 ML: 5 INJECTION INTRAVENOUS at 08:12

## 2024-12-17 RX ADMIN — VANCOMYCIN HYDROCHLORIDE 750 MG: 750 INJECTION, POWDER, LYOPHILIZED, FOR SOLUTION INTRAVENOUS at 08:17

## 2024-12-17 RX ADMIN — SODIUM CHLORIDE, PRESERVATIVE FREE 10 ML: 5 INJECTION INTRAVENOUS at 20:33

## 2024-12-17 RX ADMIN — HYDROCORTISONE ACETATE: 1 CREAM TOPICAL at 08:18

## 2024-12-17 RX ADMIN — Medication 1 CAPSULE: at 08:12

## 2024-12-17 RX ADMIN — Medication 1 CAPSULE: at 16:48

## 2024-12-17 RX ADMIN — HYDROXYCHLOROQUINE SULFATE 200 MG: 200 TABLET ORAL at 08:13

## 2024-12-17 RX ADMIN — HYDROCODONE BITARTRATE AND ACETAMINOPHEN 1 TABLET: 7.5; 325 TABLET ORAL at 08:12

## 2024-12-17 ASSESSMENT — PAIN SCALES - GENERAL
PAINLEVEL_OUTOF10: 8
PAINLEVEL_OUTOF10: 8
PAINLEVEL_OUTOF10: 5
PAINLEVEL_OUTOF10: 8
PAINLEVEL_OUTOF10: 1

## 2024-12-17 ASSESSMENT — PAIN DESCRIPTION - ORIENTATION
ORIENTATION: LEFT;MID
ORIENTATION: ANTERIOR

## 2024-12-17 ASSESSMENT — PAIN DESCRIPTION - DESCRIPTORS
DESCRIPTORS: ACHING
DESCRIPTORS: ACHING

## 2024-12-17 ASSESSMENT — PAIN DESCRIPTION - FREQUENCY: FREQUENCY: CONTINUOUS

## 2024-12-17 ASSESSMENT — PAIN - FUNCTIONAL ASSESSMENT: PAIN_FUNCTIONAL_ASSESSMENT: ACTIVITIES ARE NOT PREVENTED

## 2024-12-17 ASSESSMENT — PAIN DESCRIPTION - LOCATION
LOCATION: CHEST
LOCATION: CHEST

## 2024-12-17 ASSESSMENT — PAIN DESCRIPTION - ONSET: ONSET: ON-GOING

## 2024-12-17 ASSESSMENT — PAIN DESCRIPTION - PAIN TYPE: TYPE: ACUTE PAIN;CHRONIC PAIN

## 2024-12-17 NOTE — CARE COORDINATION
Referrals sent to multiple skilled nursing facilities for IV antibiotics.     UPDATE: Spoke with patient regarding SNF bed offers. Patient become very upset and does not agree with going to a SNF. Patient reports wanting to return home with IV antibiotics. CM attempted to educate patient, that is not an option as she is not a candidate for home or outpatient. CM will try to discuss options at a later date.     Regino AVALOS, ACM  [place]

## 2024-12-18 LAB
ANION GAP SERPL CALC-SCNC: 6 MMOL/L (ref 7–16)
BUN SERPL-MCNC: 20 MG/DL (ref 8–23)
CALCIUM SERPL-MCNC: 8.9 MG/DL (ref 8.8–10.2)
CHLORIDE SERPL-SCNC: 102 MMOL/L (ref 98–107)
CO2 SERPL-SCNC: 35 MMOL/L (ref 20–29)
CREAT SERPL-MCNC: 0.76 MG/DL (ref 0.6–1.1)
ERYTHROCYTE [DISTWIDTH] IN BLOOD BY AUTOMATED COUNT: 18.4 % (ref 11.9–14.6)
GLUCOSE SERPL-MCNC: 100 MG/DL (ref 70–99)
HCT VFR BLD AUTO: 35.7 % (ref 35.8–46.3)
HGB BLD-MCNC: 10.6 G/DL (ref 11.7–15.4)
MCH RBC QN AUTO: 25.4 PG (ref 26.1–32.9)
MCHC RBC AUTO-ENTMCNC: 29.7 G/DL (ref 31.4–35)
MCV RBC AUTO: 85.4 FL (ref 82–102)
NRBC # BLD: 0 K/UL (ref 0–0.2)
PLATELET # BLD AUTO: 268 K/UL (ref 150–450)
PMV BLD AUTO: 9.8 FL (ref 9.4–12.3)
POTASSIUM SERPL-SCNC: 4.1 MMOL/L (ref 3.5–5.1)
RBC # BLD AUTO: 4.18 M/UL (ref 4.05–5.2)
SODIUM SERPL-SCNC: 142 MMOL/L (ref 136–145)
VANCOMYCIN SERPL-MCNC: 12.9 UG/ML
VANCOMYCIN SERPL-MCNC: 16.7 UG/ML
WBC # BLD AUTO: 8 K/UL (ref 4.3–11.1)

## 2024-12-18 PROCEDURE — 94760 N-INVAS EAR/PLS OXIMETRY 1: CPT

## 2024-12-18 PROCEDURE — 2700000000 HC OXYGEN THERAPY PER DAY

## 2024-12-18 PROCEDURE — 6370000000 HC RX 637 (ALT 250 FOR IP): Performed by: FAMILY MEDICINE

## 2024-12-18 PROCEDURE — 80048 BASIC METABOLIC PNL TOTAL CA: CPT

## 2024-12-18 PROCEDURE — 36415 COLL VENOUS BLD VENIPUNCTURE: CPT

## 2024-12-18 PROCEDURE — 85027 COMPLETE CBC AUTOMATED: CPT

## 2024-12-18 PROCEDURE — 80202 ASSAY OF VANCOMYCIN: CPT

## 2024-12-18 PROCEDURE — 6360000002 HC RX W HCPCS: Performed by: FAMILY MEDICINE

## 2024-12-18 PROCEDURE — 6370000000 HC RX 637 (ALT 250 FOR IP): Performed by: INTERNAL MEDICINE

## 2024-12-18 PROCEDURE — 2580000003 HC RX 258: Performed by: FAMILY MEDICINE

## 2024-12-18 PROCEDURE — 6360000002 HC RX W HCPCS: Performed by: INTERNAL MEDICINE

## 2024-12-18 PROCEDURE — 1100000000 HC RM PRIVATE

## 2024-12-18 PROCEDURE — 6370000000 HC RX 637 (ALT 250 FOR IP): Performed by: STUDENT IN AN ORGANIZED HEALTH CARE EDUCATION/TRAINING PROGRAM

## 2024-12-18 PROCEDURE — 2580000003 HC RX 258: Performed by: INTERNAL MEDICINE

## 2024-12-18 PROCEDURE — 2500000003 HC RX 250 WO HCPCS: Performed by: STUDENT IN AN ORGANIZED HEALTH CARE EDUCATION/TRAINING PROGRAM

## 2024-12-18 RX ADMIN — SODIUM CHLORIDE, PRESERVATIVE FREE 10 ML: 5 INJECTION INTRAVENOUS at 20:34

## 2024-12-18 RX ADMIN — Medication 1 CAPSULE: at 15:35

## 2024-12-18 RX ADMIN — PREGABALIN 150 MG: 75 CAPSULE ORAL at 20:33

## 2024-12-18 RX ADMIN — HYDROMORPHONE HYDROCHLORIDE 0.5 MG: 1 INJECTION, SOLUTION INTRAMUSCULAR; INTRAVENOUS; SUBCUTANEOUS at 20:37

## 2024-12-18 RX ADMIN — LISINOPRIL 20 MG: 20 TABLET ORAL at 09:17

## 2024-12-18 RX ADMIN — DULOXETINE HYDROCHLORIDE 60 MG: 60 CAPSULE, DELAYED RELEASE ORAL at 09:17

## 2024-12-18 RX ADMIN — QUETIAPINE FUMARATE 50 MG: 100 TABLET ORAL at 20:33

## 2024-12-18 RX ADMIN — PREDNISONE 20 MG: 20 TABLET ORAL at 09:17

## 2024-12-18 RX ADMIN — PANTOPRAZOLE SODIUM 40 MG: 40 TABLET, DELAYED RELEASE ORAL at 15:35

## 2024-12-18 RX ADMIN — Medication 1 AMPULE: at 03:19

## 2024-12-18 RX ADMIN — PANTOPRAZOLE SODIUM 40 MG: 40 TABLET, DELAYED RELEASE ORAL at 04:16

## 2024-12-18 RX ADMIN — SODIUM CHLORIDE, PRESERVATIVE FREE 10 ML: 5 INJECTION INTRAVENOUS at 08:20

## 2024-12-18 RX ADMIN — Medication 1 CAPSULE: at 09:17

## 2024-12-18 RX ADMIN — PREGABALIN 150 MG: 75 CAPSULE ORAL at 09:17

## 2024-12-18 RX ADMIN — VANCOMYCIN HYDROCHLORIDE 750 MG: 750 INJECTION, POWDER, LYOPHILIZED, FOR SOLUTION INTRAVENOUS at 08:23

## 2024-12-18 RX ADMIN — SODIUM CHLORIDE 1500 MG: 9 INJECTION, SOLUTION INTRAVENOUS at 15:34

## 2024-12-18 RX ADMIN — HYDROXYCHLOROQUINE SULFATE 200 MG: 200 TABLET ORAL at 09:17

## 2024-12-18 ASSESSMENT — PAIN DESCRIPTION - LOCATION: LOCATION: LEG

## 2024-12-18 ASSESSMENT — PAIN DESCRIPTION - DESCRIPTORS: DESCRIPTORS: SORE;THROBBING

## 2024-12-18 ASSESSMENT — PAIN SCALES - GENERAL
PAINLEVEL_OUTOF10: 0
PAINLEVEL_OUTOF10: 7
PAINLEVEL_OUTOF10: 0

## 2024-12-18 NOTE — CARE COORDINATION
Chart reviewed and patient discussed in IDT rounds this AM. Patient needing IV antibiotics until 1/22. Patient currently not agreeable to SNF and not  OPAT candidate.      Regino LUCIOSW, ACM  [place]

## 2024-12-19 LAB — VANCOMYCIN SERPL-MCNC: 34 UG/ML

## 2024-12-19 PROCEDURE — 6370000000 HC RX 637 (ALT 250 FOR IP): Performed by: STUDENT IN AN ORGANIZED HEALTH CARE EDUCATION/TRAINING PROGRAM

## 2024-12-19 PROCEDURE — 2580000003 HC RX 258: Performed by: FAMILY MEDICINE

## 2024-12-19 PROCEDURE — 1100000000 HC RM PRIVATE

## 2024-12-19 PROCEDURE — 80202 ASSAY OF VANCOMYCIN: CPT

## 2024-12-19 PROCEDURE — 2700000000 HC OXYGEN THERAPY PER DAY

## 2024-12-19 PROCEDURE — 2500000003 HC RX 250 WO HCPCS: Performed by: STUDENT IN AN ORGANIZED HEALTH CARE EDUCATION/TRAINING PROGRAM

## 2024-12-19 PROCEDURE — 36415 COLL VENOUS BLD VENIPUNCTURE: CPT

## 2024-12-19 PROCEDURE — 6360000002 HC RX W HCPCS: Performed by: FAMILY MEDICINE

## 2024-12-19 PROCEDURE — 6370000000 HC RX 637 (ALT 250 FOR IP): Performed by: FAMILY MEDICINE

## 2024-12-19 PROCEDURE — 6370000000 HC RX 637 (ALT 250 FOR IP): Performed by: INTERNAL MEDICINE

## 2024-12-19 PROCEDURE — 2580000003 HC RX 258: Performed by: INTERNAL MEDICINE

## 2024-12-19 PROCEDURE — 6360000002 HC RX W HCPCS: Performed by: INTERNAL MEDICINE

## 2024-12-19 PROCEDURE — 94760 N-INVAS EAR/PLS OXIMETRY 1: CPT

## 2024-12-19 PROCEDURE — 6360000002 HC RX W HCPCS: Performed by: STUDENT IN AN ORGANIZED HEALTH CARE EDUCATION/TRAINING PROGRAM

## 2024-12-19 RX ADMIN — ENOXAPARIN SODIUM 40 MG: 100 INJECTION SUBCUTANEOUS at 08:28

## 2024-12-19 RX ADMIN — PANTOPRAZOLE SODIUM 40 MG: 40 TABLET, DELAYED RELEASE ORAL at 06:17

## 2024-12-19 RX ADMIN — QUETIAPINE FUMARATE 50 MG: 100 TABLET ORAL at 20:27

## 2024-12-19 RX ADMIN — PREGABALIN 150 MG: 75 CAPSULE ORAL at 20:28

## 2024-12-19 RX ADMIN — TRIAMCINOLONE ACETONIDE: 1 CREAM TOPICAL at 08:32

## 2024-12-19 RX ADMIN — PANTOPRAZOLE SODIUM 40 MG: 40 TABLET, DELAYED RELEASE ORAL at 16:51

## 2024-12-19 RX ADMIN — SODIUM CHLORIDE 1500 MG: 9 INJECTION, SOLUTION INTRAVENOUS at 02:40

## 2024-12-19 RX ADMIN — SODIUM CHLORIDE, PRESERVATIVE FREE 10 ML: 5 INJECTION INTRAVENOUS at 08:29

## 2024-12-19 RX ADMIN — Medication 1 CAPSULE: at 08:28

## 2024-12-19 RX ADMIN — HYDROMORPHONE HYDROCHLORIDE 0.5 MG: 1 INJECTION, SOLUTION INTRAMUSCULAR; INTRAVENOUS; SUBCUTANEOUS at 21:51

## 2024-12-19 RX ADMIN — TRIAMCINOLONE ACETONIDE: 1 CREAM TOPICAL at 20:31

## 2024-12-19 RX ADMIN — HYDROMORPHONE HYDROCHLORIDE 0.5 MG: 1 INJECTION, SOLUTION INTRAMUSCULAR; INTRAVENOUS; SUBCUTANEOUS at 07:36

## 2024-12-19 RX ADMIN — HYDROMORPHONE HYDROCHLORIDE 0.5 MG: 1 INJECTION, SOLUTION INTRAMUSCULAR; INTRAVENOUS; SUBCUTANEOUS at 16:51

## 2024-12-19 RX ADMIN — SODIUM CHLORIDE, PRESERVATIVE FREE 10 ML: 5 INJECTION INTRAVENOUS at 20:29

## 2024-12-19 RX ADMIN — VANCOMYCIN HYDROCHLORIDE 1000 MG: 1 INJECTION, POWDER, LYOPHILIZED, FOR SOLUTION INTRAVENOUS at 21:42

## 2024-12-19 RX ADMIN — Medication: at 08:33

## 2024-12-19 RX ADMIN — PREDNISONE 20 MG: 20 TABLET ORAL at 08:28

## 2024-12-19 RX ADMIN — PREGABALIN 150 MG: 75 CAPSULE ORAL at 08:28

## 2024-12-19 RX ADMIN — DULOXETINE HYDROCHLORIDE 60 MG: 60 CAPSULE, DELAYED RELEASE ORAL at 08:28

## 2024-12-19 RX ADMIN — HYDROCORTISONE ACETATE: 1 CREAM TOPICAL at 20:31

## 2024-12-19 RX ADMIN — LISINOPRIL 20 MG: 20 TABLET ORAL at 08:28

## 2024-12-19 RX ADMIN — HYDROXYCHLOROQUINE SULFATE 200 MG: 200 TABLET ORAL at 08:28

## 2024-12-19 RX ADMIN — Medication: at 20:30

## 2024-12-19 RX ADMIN — Medication 1 CAPSULE: at 16:51

## 2024-12-19 RX ADMIN — HYDROCORTISONE ACETATE: 1 CREAM TOPICAL at 08:33

## 2024-12-19 ASSESSMENT — PAIN DESCRIPTION - ORIENTATION
ORIENTATION: RIGHT
ORIENTATION: RIGHT

## 2024-12-19 ASSESSMENT — PAIN SCALES - GENERAL
PAINLEVEL_OUTOF10: 9
PAINLEVEL_OUTOF10: 8
PAINLEVEL_OUTOF10: 9
PAINLEVEL_OUTOF10: 0

## 2024-12-19 ASSESSMENT — PAIN DESCRIPTION - DESCRIPTORS
DESCRIPTORS: ACHING
DESCRIPTORS: ACHING;NAGGING
DESCRIPTORS: ACHING

## 2024-12-19 ASSESSMENT — PAIN DESCRIPTION - LOCATION
LOCATION: LEG
LOCATION: LEG
LOCATION: GENERALIZED

## 2024-12-19 NOTE — PLAN OF CARE
Problem: Discharge Planning  Goal: Discharge to home or other facility with appropriate resources  Outcome: Progressing     Problem: Safety - Adult  Goal: Free from fall injury  Outcome: Progressing     Problem: Pain  Goal: Verbalizes/displays adequate comfort level or baseline comfort level  Outcome: Progressing  Flowsheets (Taken 12/18/2024 1929)  Verbalizes/displays adequate comfort level or baseline comfort level:   Encourage patient to monitor pain and request assistance   Administer analgesics based on type and severity of pain and evaluate response   Assess pain using appropriate pain scale     Problem: Risk for Elopement  Goal: Patient will not exit the unit/facility without proper excort  Outcome: Progressing     Problem: Skin/Tissue Integrity  Goal: Absence of new skin breakdown  Description: 1.  Monitor for areas of redness and/or skin breakdown  2.  Assess vascular access sites hourly  3.  Every 4-6 hours minimum:  Change oxygen saturation probe site  4.  Every 4-6 hours:  If on nasal continuous positive airway pressure, respiratory therapy assess nares and determine need for appliance change or resting period.  Outcome: Progressing     Problem: ABCDS Injury Assessment  Goal: Absence of physical injury  Outcome: Progressing      6

## 2024-12-20 LAB
CREAT SERPL-MCNC: 0.83 MG/DL (ref 0.6–1.1)
VANCOMYCIN SERPL-MCNC: 26.6 UG/ML

## 2024-12-20 PROCEDURE — 36415 COLL VENOUS BLD VENIPUNCTURE: CPT

## 2024-12-20 PROCEDURE — 6370000000 HC RX 637 (ALT 250 FOR IP): Performed by: STUDENT IN AN ORGANIZED HEALTH CARE EDUCATION/TRAINING PROGRAM

## 2024-12-20 PROCEDURE — 2700000000 HC OXYGEN THERAPY PER DAY

## 2024-12-20 PROCEDURE — 6360000002 HC RX W HCPCS: Performed by: STUDENT IN AN ORGANIZED HEALTH CARE EDUCATION/TRAINING PROGRAM

## 2024-12-20 PROCEDURE — 6360000002 HC RX W HCPCS: Performed by: INTERNAL MEDICINE

## 2024-12-20 PROCEDURE — 6370000000 HC RX 637 (ALT 250 FOR IP): Performed by: INTERNAL MEDICINE

## 2024-12-20 PROCEDURE — 2500000003 HC RX 250 WO HCPCS: Performed by: STUDENT IN AN ORGANIZED HEALTH CARE EDUCATION/TRAINING PROGRAM

## 2024-12-20 PROCEDURE — 2580000003 HC RX 258: Performed by: INTERNAL MEDICINE

## 2024-12-20 PROCEDURE — 1100000000 HC RM PRIVATE

## 2024-12-20 PROCEDURE — 82565 ASSAY OF CREATININE: CPT

## 2024-12-20 PROCEDURE — 94760 N-INVAS EAR/PLS OXIMETRY 1: CPT

## 2024-12-20 PROCEDURE — 6370000000 HC RX 637 (ALT 250 FOR IP): Performed by: FAMILY MEDICINE

## 2024-12-20 PROCEDURE — 80202 ASSAY OF VANCOMYCIN: CPT

## 2024-12-20 RX ADMIN — ENOXAPARIN SODIUM 40 MG: 100 INJECTION SUBCUTANEOUS at 10:21

## 2024-12-20 RX ADMIN — Medication 1 AMPULE: at 20:31

## 2024-12-20 RX ADMIN — PREGABALIN 150 MG: 75 CAPSULE ORAL at 10:21

## 2024-12-20 RX ADMIN — LISINOPRIL 20 MG: 20 TABLET ORAL at 10:22

## 2024-12-20 RX ADMIN — Medication: at 12:34

## 2024-12-20 RX ADMIN — SODIUM CHLORIDE, PRESERVATIVE FREE 10 ML: 5 INJECTION INTRAVENOUS at 10:22

## 2024-12-20 RX ADMIN — Medication: at 17:05

## 2024-12-20 RX ADMIN — PREGABALIN 150 MG: 75 CAPSULE ORAL at 20:32

## 2024-12-20 RX ADMIN — DULOXETINE HYDROCHLORIDE 60 MG: 60 CAPSULE, DELAYED RELEASE ORAL at 10:21

## 2024-12-20 RX ADMIN — Medication 1 CAPSULE: at 10:21

## 2024-12-20 RX ADMIN — Medication 1 AMPULE: at 12:33

## 2024-12-20 RX ADMIN — HYDROMORPHONE HYDROCHLORIDE 0.5 MG: 1 INJECTION, SOLUTION INTRAMUSCULAR; INTRAVENOUS; SUBCUTANEOUS at 20:28

## 2024-12-20 RX ADMIN — PANTOPRAZOLE SODIUM 40 MG: 40 TABLET, DELAYED RELEASE ORAL at 17:03

## 2024-12-20 RX ADMIN — HYDROCORTISONE ACETATE: 1 CREAM TOPICAL at 12:34

## 2024-12-20 RX ADMIN — PREDNISONE 20 MG: 20 TABLET ORAL at 10:21

## 2024-12-20 RX ADMIN — HYDROXYCHLOROQUINE SULFATE 200 MG: 200 TABLET ORAL at 10:21

## 2024-12-20 RX ADMIN — VANCOMYCIN HYDROCHLORIDE 750 MG: 750 INJECTION, POWDER, LYOPHILIZED, FOR SOLUTION INTRAVENOUS at 22:22

## 2024-12-20 RX ADMIN — TRIAMCINOLONE ACETONIDE: 1 CREAM TOPICAL at 12:35

## 2024-12-20 RX ADMIN — Medication 1 CAPSULE: at 17:03

## 2024-12-20 RX ADMIN — HYDROMORPHONE HYDROCHLORIDE 0.25 MG: 1 INJECTION, SOLUTION INTRAMUSCULAR; INTRAVENOUS; SUBCUTANEOUS at 12:33

## 2024-12-20 RX ADMIN — QUETIAPINE FUMARATE 50 MG: 100 TABLET ORAL at 20:32

## 2024-12-20 RX ADMIN — SODIUM CHLORIDE, PRESERVATIVE FREE 10 ML: 5 INJECTION INTRAVENOUS at 20:29

## 2024-12-20 RX ADMIN — VANCOMYCIN HYDROCHLORIDE 750 MG: 750 INJECTION, POWDER, LYOPHILIZED, FOR SOLUTION INTRAVENOUS at 10:25

## 2024-12-20 ASSESSMENT — PAIN DESCRIPTION - ORIENTATION
ORIENTATION: RIGHT
ORIENTATION: MID

## 2024-12-20 ASSESSMENT — PAIN DESCRIPTION - LOCATION
LOCATION: LEG
LOCATION: BUTTOCKS

## 2024-12-20 ASSESSMENT — PAIN DESCRIPTION - DESCRIPTORS
DESCRIPTORS: ACHING;DISCOMFORT
DESCRIPTORS: ACHING;SORE

## 2024-12-20 ASSESSMENT — PAIN SCALES - GENERAL
PAINLEVEL_OUTOF10: 6
PAINLEVEL_OUTOF10: 8
PAINLEVEL_OUTOF10: 0

## 2024-12-20 ASSESSMENT — PAIN - FUNCTIONAL ASSESSMENT: PAIN_FUNCTIONAL_ASSESSMENT: ACTIVITIES ARE NOT PREVENTED

## 2024-12-20 NOTE — CARE COORDINATION
CM met with the patient to discuss discharge plans. Patient can't do outpatient infusion therapy or home therapy. Patient has been accepted to [facility] if she chooses a snf.

## 2024-12-21 LAB
CREAT SERPL-MCNC: 0.96 MG/DL (ref 0.6–1.1)
VANCOMYCIN SERPL-MCNC: 22.9 UG/ML

## 2024-12-21 PROCEDURE — 6370000000 HC RX 637 (ALT 250 FOR IP): Performed by: NURSE PRACTITIONER

## 2024-12-21 PROCEDURE — 6360000002 HC RX W HCPCS: Performed by: INTERNAL MEDICINE

## 2024-12-21 PROCEDURE — 6370000000 HC RX 637 (ALT 250 FOR IP): Performed by: FAMILY MEDICINE

## 2024-12-21 PROCEDURE — 82565 ASSAY OF CREATININE: CPT

## 2024-12-21 PROCEDURE — 6360000002 HC RX W HCPCS: Performed by: STUDENT IN AN ORGANIZED HEALTH CARE EDUCATION/TRAINING PROGRAM

## 2024-12-21 PROCEDURE — 2500000003 HC RX 250 WO HCPCS: Performed by: STUDENT IN AN ORGANIZED HEALTH CARE EDUCATION/TRAINING PROGRAM

## 2024-12-21 PROCEDURE — 36415 COLL VENOUS BLD VENIPUNCTURE: CPT

## 2024-12-21 PROCEDURE — 1100000000 HC RM PRIVATE

## 2024-12-21 PROCEDURE — 80202 ASSAY OF VANCOMYCIN: CPT

## 2024-12-21 PROCEDURE — 6370000000 HC RX 637 (ALT 250 FOR IP): Performed by: STUDENT IN AN ORGANIZED HEALTH CARE EDUCATION/TRAINING PROGRAM

## 2024-12-21 PROCEDURE — 6370000000 HC RX 637 (ALT 250 FOR IP): Performed by: INTERNAL MEDICINE

## 2024-12-21 PROCEDURE — 2580000003 HC RX 258: Performed by: INTERNAL MEDICINE

## 2024-12-21 RX ADMIN — PANTOPRAZOLE SODIUM 40 MG: 40 TABLET, DELAYED RELEASE ORAL at 04:12

## 2024-12-21 RX ADMIN — Medication: at 21:33

## 2024-12-21 RX ADMIN — VANCOMYCIN HYDROCHLORIDE 500 MG: 500 INJECTION, POWDER, LYOPHILIZED, FOR SOLUTION INTRAVENOUS at 22:33

## 2024-12-21 RX ADMIN — PREGABALIN 150 MG: 75 CAPSULE ORAL at 08:41

## 2024-12-21 RX ADMIN — SODIUM CHLORIDE, PRESERVATIVE FREE 10 ML: 5 INJECTION INTRAVENOUS at 08:44

## 2024-12-21 RX ADMIN — HYDROMORPHONE HYDROCHLORIDE 0.5 MG: 1 INJECTION, SOLUTION INTRAMUSCULAR; INTRAVENOUS; SUBCUTANEOUS at 21:26

## 2024-12-21 RX ADMIN — HYDROCORTISONE ACETATE: 1 CREAM TOPICAL at 08:43

## 2024-12-21 RX ADMIN — PANTOPRAZOLE SODIUM 40 MG: 40 TABLET, DELAYED RELEASE ORAL at 17:56

## 2024-12-21 RX ADMIN — Medication 1 AMPULE: at 08:37

## 2024-12-21 RX ADMIN — HYDROCORTISONE ACETATE: 1 CREAM TOPICAL at 21:33

## 2024-12-21 RX ADMIN — HYDROCORTISONE ACETATE: 1 CREAM TOPICAL at 12:22

## 2024-12-21 RX ADMIN — TRIAMCINOLONE ACETONIDE: 1 CREAM TOPICAL at 08:43

## 2024-12-21 RX ADMIN — DULOXETINE HYDROCHLORIDE 60 MG: 60 CAPSULE, DELAYED RELEASE ORAL at 08:41

## 2024-12-21 RX ADMIN — Medication: at 17:55

## 2024-12-21 RX ADMIN — Medication: at 12:21

## 2024-12-21 RX ADMIN — VANCOMYCIN HYDROCHLORIDE 750 MG: 750 INJECTION, POWDER, LYOPHILIZED, FOR SOLUTION INTRAVENOUS at 09:02

## 2024-12-21 RX ADMIN — Medication: at 08:43

## 2024-12-21 RX ADMIN — QUETIAPINE FUMARATE 50 MG: 100 TABLET ORAL at 21:26

## 2024-12-21 RX ADMIN — Medication 1 AMPULE: at 21:29

## 2024-12-21 RX ADMIN — SODIUM CHLORIDE, PRESERVATIVE FREE 10 ML: 5 INJECTION INTRAVENOUS at 21:29

## 2024-12-21 RX ADMIN — HYDROCORTISONE ACETATE: 1 CREAM TOPICAL at 17:56

## 2024-12-21 RX ADMIN — HYDROCODONE BITARTRATE AND ACETAMINOPHEN 1 TABLET: 7.5; 325 TABLET ORAL at 17:58

## 2024-12-21 RX ADMIN — HYDROXYCHLOROQUINE SULFATE 200 MG: 200 TABLET ORAL at 08:41

## 2024-12-21 RX ADMIN — LISINOPRIL 20 MG: 20 TABLET ORAL at 08:42

## 2024-12-21 RX ADMIN — Medication 1 CAPSULE: at 08:42

## 2024-12-21 RX ADMIN — ENOXAPARIN SODIUM 40 MG: 100 INJECTION SUBCUTANEOUS at 08:41

## 2024-12-21 RX ADMIN — TRIAMCINOLONE ACETONIDE: 1 CREAM TOPICAL at 21:33

## 2024-12-21 RX ADMIN — PREGABALIN 150 MG: 75 CAPSULE ORAL at 21:26

## 2024-12-21 RX ADMIN — PREDNISONE 20 MG: 20 TABLET ORAL at 08:42

## 2024-12-21 ASSESSMENT — PAIN SCALES - GENERAL
PAINLEVEL_OUTOF10: 9
PAINLEVEL_OUTOF10: 8

## 2024-12-21 ASSESSMENT — PAIN DESCRIPTION - DESCRIPTORS
DESCRIPTORS: DISCOMFORT
DESCRIPTORS: ACHING;DISCOMFORT

## 2024-12-21 ASSESSMENT — PAIN DESCRIPTION - LOCATION
LOCATION: BACK
LOCATION: BACK

## 2024-12-21 ASSESSMENT — PAIN DESCRIPTION - ORIENTATION: ORIENTATION: MID;LOWER

## 2024-12-22 ENCOUNTER — APPOINTMENT (OUTPATIENT)
Dept: GENERAL RADIOLOGY | Age: 69
DRG: 288 | End: 2024-12-22
Payer: MEDICARE

## 2024-12-22 LAB — CREAT SERPL-MCNC: 0.99 MG/DL (ref 0.6–1.1)

## 2024-12-22 PROCEDURE — 36569 INSJ PICC 5 YR+ W/O IMAGING: CPT

## 2024-12-22 PROCEDURE — 2500000003 HC RX 250 WO HCPCS: Performed by: STUDENT IN AN ORGANIZED HEALTH CARE EDUCATION/TRAINING PROGRAM

## 2024-12-22 PROCEDURE — 6370000000 HC RX 637 (ALT 250 FOR IP): Performed by: FAMILY MEDICINE

## 2024-12-22 PROCEDURE — 1100000000 HC RM PRIVATE

## 2024-12-22 PROCEDURE — 6370000000 HC RX 637 (ALT 250 FOR IP): Performed by: INTERNAL MEDICINE

## 2024-12-22 PROCEDURE — 02HV33Z INSERTION OF INFUSION DEVICE INTO SUPERIOR VENA CAVA, PERCUTANEOUS APPROACH: ICD-10-PCS

## 2024-12-22 PROCEDURE — 36415 COLL VENOUS BLD VENIPUNCTURE: CPT

## 2024-12-22 PROCEDURE — 6360000002 HC RX W HCPCS: Performed by: STUDENT IN AN ORGANIZED HEALTH CARE EDUCATION/TRAINING PROGRAM

## 2024-12-22 PROCEDURE — C1751 CATH, INF, PER/CENT/MIDLINE: HCPCS

## 2024-12-22 PROCEDURE — 6370000000 HC RX 637 (ALT 250 FOR IP): Performed by: STUDENT IN AN ORGANIZED HEALTH CARE EDUCATION/TRAINING PROGRAM

## 2024-12-22 PROCEDURE — 2580000003 HC RX 258: Performed by: INTERNAL MEDICINE

## 2024-12-22 PROCEDURE — 82565 ASSAY OF CREATININE: CPT

## 2024-12-22 PROCEDURE — 6360000002 HC RX W HCPCS: Performed by: INTERNAL MEDICINE

## 2024-12-22 RX ORDER — SODIUM CHLORIDE 9 MG/ML
INJECTION, SOLUTION INTRAVENOUS PRN
Status: DISCONTINUED | OUTPATIENT
Start: 2024-12-22 | End: 2025-01-23 | Stop reason: HOSPADM

## 2024-12-22 RX ORDER — SODIUM CHLORIDE 0.9 % (FLUSH) 0.9 %
5-40 SYRINGE (ML) INJECTION PRN
Status: DISCONTINUED | OUTPATIENT
Start: 2024-12-22 | End: 2025-01-23 | Stop reason: HOSPADM

## 2024-12-22 RX ORDER — LIDOCAINE HYDROCHLORIDE 10 MG/ML
50 INJECTION, SOLUTION EPIDURAL; INFILTRATION; INTRACAUDAL; PERINEURAL ONCE
Status: DISCONTINUED | OUTPATIENT
Start: 2024-12-22 | End: 2025-01-23 | Stop reason: HOSPADM

## 2024-12-22 RX ORDER — SODIUM CHLORIDE 0.9 % (FLUSH) 0.9 %
5-40 SYRINGE (ML) INJECTION EVERY 12 HOURS SCHEDULED
Status: DISCONTINUED | OUTPATIENT
Start: 2024-12-22 | End: 2025-01-23 | Stop reason: HOSPADM

## 2024-12-22 RX ADMIN — SODIUM CHLORIDE, PRESERVATIVE FREE 10 ML: 5 INJECTION INTRAVENOUS at 09:13

## 2024-12-22 RX ADMIN — PREDNISONE 20 MG: 20 TABLET ORAL at 09:12

## 2024-12-22 RX ADMIN — Medication: at 09:15

## 2024-12-22 RX ADMIN — TRIAMCINOLONE ACETONIDE: 1 CREAM TOPICAL at 20:58

## 2024-12-22 RX ADMIN — DULOXETINE HYDROCHLORIDE 60 MG: 60 CAPSULE, DELAYED RELEASE ORAL at 09:12

## 2024-12-22 RX ADMIN — PREGABALIN 150 MG: 75 CAPSULE ORAL at 09:12

## 2024-12-22 RX ADMIN — HYDROMORPHONE HYDROCHLORIDE 0.5 MG: 1 INJECTION, SOLUTION INTRAMUSCULAR; INTRAVENOUS; SUBCUTANEOUS at 21:35

## 2024-12-22 RX ADMIN — TRIAMCINOLONE ACETONIDE: 1 CREAM TOPICAL at 09:15

## 2024-12-22 RX ADMIN — VANCOMYCIN HYDROCHLORIDE 500 MG: 500 INJECTION, POWDER, LYOPHILIZED, FOR SOLUTION INTRAVENOUS at 10:50

## 2024-12-22 RX ADMIN — LISINOPRIL 20 MG: 20 TABLET ORAL at 09:12

## 2024-12-22 RX ADMIN — Medication 1 CAPSULE: at 09:11

## 2024-12-22 RX ADMIN — VANCOMYCIN HYDROCHLORIDE 500 MG: 500 INJECTION, POWDER, LYOPHILIZED, FOR SOLUTION INTRAVENOUS at 23:47

## 2024-12-22 RX ADMIN — QUETIAPINE FUMARATE 50 MG: 100 TABLET ORAL at 20:57

## 2024-12-22 RX ADMIN — HYDROCORTISONE ACETATE: 1 CREAM TOPICAL at 17:05

## 2024-12-22 RX ADMIN — Medication 1 AMPULE: at 10:40

## 2024-12-22 RX ADMIN — Medication: at 12:13

## 2024-12-22 RX ADMIN — PREGABALIN 150 MG: 75 CAPSULE ORAL at 20:57

## 2024-12-22 RX ADMIN — Medication: at 17:05

## 2024-12-22 RX ADMIN — Medication 1 CAPSULE: at 17:06

## 2024-12-22 RX ADMIN — HYDROCORTISONE ACETATE: 1 CREAM TOPICAL at 09:16

## 2024-12-22 RX ADMIN — HYDROCORTISONE ACETATE: 1 CREAM TOPICAL at 21:00

## 2024-12-22 RX ADMIN — PANTOPRAZOLE SODIUM 40 MG: 40 TABLET, DELAYED RELEASE ORAL at 17:06

## 2024-12-22 RX ADMIN — HYDROCORTISONE ACETATE: 1 CREAM TOPICAL at 12:13

## 2024-12-22 RX ADMIN — Medication: at 21:15

## 2024-12-22 RX ADMIN — HYDROXYCHLOROQUINE SULFATE 200 MG: 200 TABLET ORAL at 09:13

## 2024-12-22 RX ADMIN — ENOXAPARIN SODIUM 40 MG: 100 INJECTION SUBCUTANEOUS at 09:12

## 2024-12-22 RX ADMIN — Medication 1 AMPULE: at 21:19

## 2024-12-22 ASSESSMENT — PAIN DESCRIPTION - LOCATION: LOCATION: BACK

## 2024-12-22 ASSESSMENT — PAIN SCALES - GENERAL
PAINLEVEL_OUTOF10: 0
PAINLEVEL_OUTOF10: 7
PAINLEVEL_OUTOF10: 0

## 2024-12-23 LAB
CREAT SERPL-MCNC: 0.9 MG/DL (ref 0.6–1.1)
VANCOMYCIN SERPL-MCNC: 16.6 UG/ML

## 2024-12-23 PROCEDURE — 94760 N-INVAS EAR/PLS OXIMETRY 1: CPT

## 2024-12-23 PROCEDURE — 6370000000 HC RX 637 (ALT 250 FOR IP): Performed by: STUDENT IN AN ORGANIZED HEALTH CARE EDUCATION/TRAINING PROGRAM

## 2024-12-23 PROCEDURE — 80202 ASSAY OF VANCOMYCIN: CPT

## 2024-12-23 PROCEDURE — 6360000002 HC RX W HCPCS: Performed by: STUDENT IN AN ORGANIZED HEALTH CARE EDUCATION/TRAINING PROGRAM

## 2024-12-23 PROCEDURE — 36415 COLL VENOUS BLD VENIPUNCTURE: CPT

## 2024-12-23 PROCEDURE — 82565 ASSAY OF CREATININE: CPT

## 2024-12-23 PROCEDURE — 6370000000 HC RX 637 (ALT 250 FOR IP): Performed by: FAMILY MEDICINE

## 2024-12-23 PROCEDURE — 1100000000 HC RM PRIVATE

## 2024-12-23 PROCEDURE — 2500000003 HC RX 250 WO HCPCS: Performed by: STUDENT IN AN ORGANIZED HEALTH CARE EDUCATION/TRAINING PROGRAM

## 2024-12-23 PROCEDURE — 6370000000 HC RX 637 (ALT 250 FOR IP): Performed by: INTERNAL MEDICINE

## 2024-12-23 PROCEDURE — 2700000000 HC OXYGEN THERAPY PER DAY

## 2024-12-23 PROCEDURE — 2580000003 HC RX 258: Performed by: STUDENT IN AN ORGANIZED HEALTH CARE EDUCATION/TRAINING PROGRAM

## 2024-12-23 RX ADMIN — Medication 1 CAPSULE: at 08:36

## 2024-12-23 RX ADMIN — Medication 1 CAPSULE: at 16:30

## 2024-12-23 RX ADMIN — PREDNISONE 20 MG: 20 TABLET ORAL at 08:36

## 2024-12-23 RX ADMIN — SODIUM CHLORIDE, PRESERVATIVE FREE 10 ML: 5 INJECTION INTRAVENOUS at 21:13

## 2024-12-23 RX ADMIN — Medication: at 21:14

## 2024-12-23 RX ADMIN — LISINOPRIL 20 MG: 20 TABLET ORAL at 08:36

## 2024-12-23 RX ADMIN — QUETIAPINE FUMARATE 50 MG: 100 TABLET ORAL at 21:10

## 2024-12-23 RX ADMIN — VANCOMYCIN HYDROCHLORIDE 1250 MG: 5 INJECTION, POWDER, LYOPHILIZED, FOR SOLUTION INTRAVENOUS at 10:39

## 2024-12-23 RX ADMIN — TRIAMCINOLONE ACETONIDE: 1 CREAM TOPICAL at 21:16

## 2024-12-23 RX ADMIN — DULOXETINE HYDROCHLORIDE 60 MG: 60 CAPSULE, DELAYED RELEASE ORAL at 08:36

## 2024-12-23 RX ADMIN — Medication 1 AMPULE: at 21:57

## 2024-12-23 RX ADMIN — PREGABALIN 150 MG: 75 CAPSULE ORAL at 08:36

## 2024-12-23 RX ADMIN — HYDROXYCHLOROQUINE SULFATE 200 MG: 200 TABLET ORAL at 08:36

## 2024-12-23 RX ADMIN — HYDROMORPHONE HYDROCHLORIDE 0.5 MG: 1 INJECTION, SOLUTION INTRAMUSCULAR; INTRAVENOUS; SUBCUTANEOUS at 22:32

## 2024-12-23 RX ADMIN — HYDROCORTISONE ACETATE: 1 CREAM TOPICAL at 21:16

## 2024-12-23 RX ADMIN — HYDROCODONE BITARTRATE AND ACETAMINOPHEN 1 TABLET: 7.5; 325 TABLET ORAL at 16:34

## 2024-12-23 RX ADMIN — SODIUM CHLORIDE, PRESERVATIVE FREE 10 ML: 5 INJECTION INTRAVENOUS at 08:42

## 2024-12-23 RX ADMIN — ENOXAPARIN SODIUM 40 MG: 100 INJECTION SUBCUTANEOUS at 08:35

## 2024-12-23 RX ADMIN — SODIUM CHLORIDE, PRESERVATIVE FREE 10 ML: 5 INJECTION INTRAVENOUS at 21:12

## 2024-12-23 RX ADMIN — PANTOPRAZOLE SODIUM 40 MG: 40 TABLET, DELAYED RELEASE ORAL at 16:30

## 2024-12-23 RX ADMIN — PREGABALIN 150 MG: 75 CAPSULE ORAL at 21:10

## 2024-12-23 ASSESSMENT — PAIN DESCRIPTION - LOCATION
LOCATION: BACK;LEG
LOCATION: BACK

## 2024-12-23 ASSESSMENT — PAIN DESCRIPTION - ORIENTATION: ORIENTATION: RIGHT;LEFT;POSTERIOR

## 2024-12-23 ASSESSMENT — PAIN SCALES - GENERAL
PAINLEVEL_OUTOF10: 2
PAINLEVEL_OUTOF10: 8
PAINLEVEL_OUTOF10: 8

## 2024-12-24 LAB
BASOPHILS # BLD: 0.1 K/UL (ref 0–0.2)
BASOPHILS NFR BLD: 1 % (ref 0–2)
CREAT SERPL-MCNC: 1.18 MG/DL (ref 0.6–1.1)
DIFFERENTIAL METHOD BLD: ABNORMAL
EOSINOPHIL # BLD: 0.7 K/UL (ref 0–0.8)
EOSINOPHIL NFR BLD: 8 % (ref 0.5–7.8)
ERYTHROCYTE [DISTWIDTH] IN BLOOD BY AUTOMATED COUNT: 18.4 % (ref 11.9–14.6)
HCT VFR BLD AUTO: 35.4 % (ref 35.8–46.3)
HGB BLD-MCNC: 10.3 G/DL (ref 11.7–15.4)
IMM GRANULOCYTES # BLD AUTO: 0.1 K/UL (ref 0–0.5)
IMM GRANULOCYTES NFR BLD AUTO: 1 % (ref 0–5)
LYMPHOCYTES # BLD: 2 K/UL (ref 0.5–4.6)
LYMPHOCYTES NFR BLD: 21 % (ref 13–44)
MCH RBC QN AUTO: 25.5 PG (ref 26.1–32.9)
MCHC RBC AUTO-ENTMCNC: 29.1 G/DL (ref 31.4–35)
MCV RBC AUTO: 87.6 FL (ref 82–102)
MONOCYTES # BLD: 1.1 K/UL (ref 0.1–1.3)
MONOCYTES NFR BLD: 11 % (ref 4–12)
NEUTS SEG # BLD: 5.7 K/UL (ref 1.7–8.2)
NEUTS SEG NFR BLD: 59 % (ref 43–78)
NRBC # BLD: 0 K/UL (ref 0–0.2)
PLATELET # BLD AUTO: 233 K/UL (ref 150–450)
PMV BLD AUTO: 10 FL (ref 9.4–12.3)
RBC # BLD AUTO: 4.04 M/UL (ref 4.05–5.2)
VANCOMYCIN SERPL-MCNC: 15.7 UG/ML
WBC # BLD AUTO: 9.7 K/UL (ref 4.3–11.1)

## 2024-12-24 PROCEDURE — 2580000003 HC RX 258: Performed by: STUDENT IN AN ORGANIZED HEALTH CARE EDUCATION/TRAINING PROGRAM

## 2024-12-24 PROCEDURE — 82565 ASSAY OF CREATININE: CPT

## 2024-12-24 PROCEDURE — 2700000000 HC OXYGEN THERAPY PER DAY

## 2024-12-24 PROCEDURE — 36415 COLL VENOUS BLD VENIPUNCTURE: CPT

## 2024-12-24 PROCEDURE — 1100000000 HC RM PRIVATE

## 2024-12-24 PROCEDURE — 6370000000 HC RX 637 (ALT 250 FOR IP): Performed by: FAMILY MEDICINE

## 2024-12-24 PROCEDURE — 6360000002 HC RX W HCPCS: Performed by: STUDENT IN AN ORGANIZED HEALTH CARE EDUCATION/TRAINING PROGRAM

## 2024-12-24 PROCEDURE — 6370000000 HC RX 637 (ALT 250 FOR IP): Performed by: INTERNAL MEDICINE

## 2024-12-24 PROCEDURE — 2500000003 HC RX 250 WO HCPCS: Performed by: STUDENT IN AN ORGANIZED HEALTH CARE EDUCATION/TRAINING PROGRAM

## 2024-12-24 PROCEDURE — 6370000000 HC RX 637 (ALT 250 FOR IP): Performed by: STUDENT IN AN ORGANIZED HEALTH CARE EDUCATION/TRAINING PROGRAM

## 2024-12-24 PROCEDURE — 94760 N-INVAS EAR/PLS OXIMETRY 1: CPT

## 2024-12-24 PROCEDURE — 80202 ASSAY OF VANCOMYCIN: CPT

## 2024-12-24 PROCEDURE — 85025 COMPLETE CBC W/AUTO DIFF WBC: CPT

## 2024-12-24 RX ADMIN — Medication 1 AMPULE: at 21:53

## 2024-12-24 RX ADMIN — Medication: at 14:21

## 2024-12-24 RX ADMIN — HYDROCORTISONE ACETATE: 1 CREAM TOPICAL at 14:21

## 2024-12-24 RX ADMIN — Medication: at 16:22

## 2024-12-24 RX ADMIN — HYDROCORTISONE ACETATE: 1 CREAM TOPICAL at 09:50

## 2024-12-24 RX ADMIN — SODIUM CHLORIDE, PRESERVATIVE FREE 10 ML: 5 INJECTION INTRAVENOUS at 21:44

## 2024-12-24 RX ADMIN — HYDROCODONE BITARTRATE AND ACETAMINOPHEN 1 TABLET: 7.5; 325 TABLET ORAL at 09:49

## 2024-12-24 RX ADMIN — DULOXETINE HYDROCHLORIDE 60 MG: 60 CAPSULE, DELAYED RELEASE ORAL at 09:40

## 2024-12-24 RX ADMIN — Medication: at 09:46

## 2024-12-24 RX ADMIN — PREGABALIN 150 MG: 75 CAPSULE ORAL at 09:40

## 2024-12-24 RX ADMIN — HYDROMORPHONE HYDROCHLORIDE 0.5 MG: 1 INJECTION, SOLUTION INTRAMUSCULAR; INTRAVENOUS; SUBCUTANEOUS at 21:41

## 2024-12-24 RX ADMIN — Medication 1 CAPSULE: at 09:40

## 2024-12-24 RX ADMIN — Medication: at 21:43

## 2024-12-24 RX ADMIN — PREGABALIN 150 MG: 75 CAPSULE ORAL at 21:42

## 2024-12-24 RX ADMIN — TRIAMCINOLONE ACETONIDE: 1 CREAM TOPICAL at 09:50

## 2024-12-24 RX ADMIN — PREDNISONE 20 MG: 20 TABLET ORAL at 09:40

## 2024-12-24 RX ADMIN — SODIUM CHLORIDE, PRESERVATIVE FREE 10 ML: 5 INJECTION INTRAVENOUS at 09:46

## 2024-12-24 RX ADMIN — TRIAMCINOLONE ACETONIDE: 1 CREAM TOPICAL at 21:49

## 2024-12-24 RX ADMIN — SODIUM CHLORIDE, PRESERVATIVE FREE 10 ML: 5 INJECTION INTRAVENOUS at 21:45

## 2024-12-24 RX ADMIN — SODIUM CHLORIDE, PRESERVATIVE FREE 10 ML: 5 INJECTION INTRAVENOUS at 21:43

## 2024-12-24 RX ADMIN — HYDROCORTISONE ACETATE: 1 CREAM TOPICAL at 16:23

## 2024-12-24 RX ADMIN — ENOXAPARIN SODIUM 40 MG: 100 INJECTION SUBCUTANEOUS at 09:40

## 2024-12-24 RX ADMIN — PANTOPRAZOLE SODIUM 40 MG: 40 TABLET, DELAYED RELEASE ORAL at 16:20

## 2024-12-24 RX ADMIN — VANCOMYCIN HYDROCHLORIDE 1250 MG: 5 INJECTION, POWDER, LYOPHILIZED, FOR SOLUTION INTRAVENOUS at 11:23

## 2024-12-24 RX ADMIN — HYDROCORTISONE ACETATE: 1 CREAM TOPICAL at 21:44

## 2024-12-24 RX ADMIN — LISINOPRIL 20 MG: 20 TABLET ORAL at 09:40

## 2024-12-24 RX ADMIN — Medication 1 AMPULE: at 09:47

## 2024-12-24 RX ADMIN — SODIUM CHLORIDE, PRESERVATIVE FREE 10 ML: 5 INJECTION INTRAVENOUS at 09:45

## 2024-12-24 RX ADMIN — Medication 1 CAPSULE: at 16:20

## 2024-12-24 RX ADMIN — HYDROXYCHLOROQUINE SULFATE 200 MG: 200 TABLET ORAL at 09:40

## 2024-12-24 RX ADMIN — QUETIAPINE FUMARATE 50 MG: 100 TABLET ORAL at 21:47

## 2024-12-24 ASSESSMENT — PAIN DESCRIPTION - LOCATION: LOCATION: BACK;ABDOMEN

## 2024-12-24 ASSESSMENT — PAIN SCALES - GENERAL
PAINLEVEL_OUTOF10: 3
PAINLEVEL_OUTOF10: 3
PAINLEVEL_OUTOF10: 2
PAINLEVEL_OUTOF10: 7
PAINLEVEL_OUTOF10: 5

## 2024-12-24 NOTE — PLAN OF CARE
Problem: Safety - Adult  Goal: Free from fall injury  Outcome: Progressing     Problem: Pain  Goal: Verbalizes/displays adequate comfort level or baseline comfort level  Outcome: Progressing      Self

## 2024-12-24 NOTE — CARE COORDINATION
Chart reviewed and patient discussed in IDT rounds this AM. Patient to receive iv antibiotics until 1/22. Patient refuses a STR for iv antibiotic treatment, and is not a OPAT candidate. Discharge plan is to return home with home health after IV treatment is completed.    Regino AVALOS, ACM  [place]

## 2024-12-25 ENCOUNTER — APPOINTMENT (OUTPATIENT)
Dept: ULTRASOUND IMAGING | Age: 69
DRG: 288 | End: 2024-12-25
Payer: MEDICARE

## 2024-12-25 LAB — CREAT SERPL-MCNC: 0.93 MG/DL (ref 0.6–1.1)

## 2024-12-25 PROCEDURE — 93971 EXTREMITY STUDY: CPT

## 2024-12-25 PROCEDURE — 82565 ASSAY OF CREATININE: CPT

## 2024-12-25 PROCEDURE — 2580000003 HC RX 258: Performed by: INTERNAL MEDICINE

## 2024-12-25 PROCEDURE — 6370000000 HC RX 637 (ALT 250 FOR IP): Performed by: FAMILY MEDICINE

## 2024-12-25 PROCEDURE — 2500000003 HC RX 250 WO HCPCS: Performed by: STUDENT IN AN ORGANIZED HEALTH CARE EDUCATION/TRAINING PROGRAM

## 2024-12-25 PROCEDURE — 6360000002 HC RX W HCPCS: Performed by: INTERNAL MEDICINE

## 2024-12-25 PROCEDURE — 6360000002 HC RX W HCPCS: Performed by: STUDENT IN AN ORGANIZED HEALTH CARE EDUCATION/TRAINING PROGRAM

## 2024-12-25 PROCEDURE — 36415 COLL VENOUS BLD VENIPUNCTURE: CPT

## 2024-12-25 PROCEDURE — 1100000000 HC RM PRIVATE

## 2024-12-25 PROCEDURE — 6370000000 HC RX 637 (ALT 250 FOR IP): Performed by: INTERNAL MEDICINE

## 2024-12-25 PROCEDURE — 6370000000 HC RX 637 (ALT 250 FOR IP): Performed by: STUDENT IN AN ORGANIZED HEALTH CARE EDUCATION/TRAINING PROGRAM

## 2024-12-25 RX ADMIN — SODIUM CHLORIDE, PRESERVATIVE FREE 10 ML: 5 INJECTION INTRAVENOUS at 21:02

## 2024-12-25 RX ADMIN — PREDNISONE 20 MG: 20 TABLET ORAL at 10:58

## 2024-12-25 RX ADMIN — HYDROCODONE BITARTRATE AND ACETAMINOPHEN 1 TABLET: 7.5; 325 TABLET ORAL at 14:08

## 2024-12-25 RX ADMIN — TRIAMCINOLONE ACETONIDE: 1 CREAM TOPICAL at 11:01

## 2024-12-25 RX ADMIN — HYDROCORTISONE ACETATE: 1 CREAM TOPICAL at 17:23

## 2024-12-25 RX ADMIN — PREGABALIN 150 MG: 75 CAPSULE ORAL at 10:57

## 2024-12-25 RX ADMIN — HYDROCORTISONE ACETATE: 1 CREAM TOPICAL at 11:02

## 2024-12-25 RX ADMIN — Medication: at 11:01

## 2024-12-25 RX ADMIN — Medication 1 AMPULE: at 11:00

## 2024-12-25 RX ADMIN — LISINOPRIL 20 MG: 20 TABLET ORAL at 10:58

## 2024-12-25 RX ADMIN — Medication: at 17:23

## 2024-12-25 RX ADMIN — PREGABALIN 150 MG: 75 CAPSULE ORAL at 21:04

## 2024-12-25 RX ADMIN — HYDROXYCHLOROQUINE SULFATE 200 MG: 200 TABLET ORAL at 10:58

## 2024-12-25 RX ADMIN — Medication 1 CAPSULE: at 10:58

## 2024-12-25 RX ADMIN — HYDROMORPHONE HYDROCHLORIDE 0.5 MG: 1 INJECTION, SOLUTION INTRAMUSCULAR; INTRAVENOUS; SUBCUTANEOUS at 21:13

## 2024-12-25 RX ADMIN — VANCOMYCIN HYDROCHLORIDE 1000 MG: 1 INJECTION, POWDER, LYOPHILIZED, FOR SOLUTION INTRAVENOUS at 14:12

## 2024-12-25 RX ADMIN — SODIUM CHLORIDE, PRESERVATIVE FREE 10 ML: 5 INJECTION INTRAVENOUS at 10:59

## 2024-12-25 RX ADMIN — PANTOPRAZOLE SODIUM 40 MG: 40 TABLET, DELAYED RELEASE ORAL at 17:22

## 2024-12-25 RX ADMIN — ENOXAPARIN SODIUM 40 MG: 100 INJECTION SUBCUTANEOUS at 10:57

## 2024-12-25 RX ADMIN — Medication 1 AMPULE: at 21:03

## 2024-12-25 RX ADMIN — DULOXETINE HYDROCHLORIDE 60 MG: 60 CAPSULE, DELAYED RELEASE ORAL at 10:58

## 2024-12-25 RX ADMIN — QUETIAPINE FUMARATE 50 MG: 100 TABLET ORAL at 21:04

## 2024-12-25 RX ADMIN — Medication 1 CAPSULE: at 17:22

## 2024-12-25 ASSESSMENT — PAIN SCALES - GENERAL
PAINLEVEL_OUTOF10: 5
PAINLEVEL_OUTOF10: 0
PAINLEVEL_OUTOF10: 7
PAINLEVEL_OUTOF10: 8

## 2024-12-25 ASSESSMENT — PAIN DESCRIPTION - DESCRIPTORS: DESCRIPTORS: ACHING;DISCOMFORT

## 2024-12-25 ASSESSMENT — PAIN DESCRIPTION - ORIENTATION: ORIENTATION: RIGHT

## 2024-12-25 ASSESSMENT — PAIN - FUNCTIONAL ASSESSMENT: PAIN_FUNCTIONAL_ASSESSMENT: ACTIVITIES ARE NOT PREVENTED

## 2024-12-25 ASSESSMENT — PAIN DESCRIPTION - LOCATION: LOCATION: ARM

## 2024-12-26 LAB
BASOPHILS # BLD: 0 K/UL (ref 0–0.2)
BASOPHILS NFR BLD: 1 % (ref 0–2)
DIFFERENTIAL METHOD BLD: ABNORMAL
EOSINOPHIL # BLD: 0.5 K/UL (ref 0–0.8)
EOSINOPHIL NFR BLD: 6 % (ref 0.5–7.8)
ERYTHROCYTE [DISTWIDTH] IN BLOOD BY AUTOMATED COUNT: 18.2 % (ref 11.9–14.6)
HCT VFR BLD AUTO: 36.5 % (ref 35.8–46.3)
HGB BLD-MCNC: 10.4 G/DL (ref 11.7–15.4)
IMM GRANULOCYTES # BLD AUTO: 0.1 K/UL (ref 0–0.5)
IMM GRANULOCYTES NFR BLD AUTO: 1 % (ref 0–5)
LYMPHOCYTES # BLD: 1.8 K/UL (ref 0.5–4.6)
LYMPHOCYTES NFR BLD: 22 % (ref 13–44)
MCH RBC QN AUTO: 24.9 PG (ref 26.1–32.9)
MCHC RBC AUTO-ENTMCNC: 28.5 G/DL (ref 31.4–35)
MCV RBC AUTO: 87.3 FL (ref 82–102)
MONOCYTES # BLD: 0.9 K/UL (ref 0.1–1.3)
MONOCYTES NFR BLD: 11 % (ref 4–12)
NEUTS SEG # BLD: 4.9 K/UL (ref 1.7–8.2)
NEUTS SEG NFR BLD: 60 % (ref 43–78)
NRBC # BLD: 0 K/UL (ref 0–0.2)
PLATELET # BLD AUTO: 211 K/UL (ref 150–450)
PMV BLD AUTO: 10.3 FL (ref 9.4–12.3)
RBC # BLD AUTO: 4.18 M/UL (ref 4.05–5.2)
VANCOMYCIN SERPL-MCNC: 15.4 UG/ML
WBC # BLD AUTO: 8.1 K/UL (ref 4.3–11.1)

## 2024-12-26 PROCEDURE — 2500000003 HC RX 250 WO HCPCS: Performed by: STUDENT IN AN ORGANIZED HEALTH CARE EDUCATION/TRAINING PROGRAM

## 2024-12-26 PROCEDURE — 6370000000 HC RX 637 (ALT 250 FOR IP): Performed by: FAMILY MEDICINE

## 2024-12-26 PROCEDURE — 2580000003 HC RX 258: Performed by: INTERNAL MEDICINE

## 2024-12-26 PROCEDURE — 94760 N-INVAS EAR/PLS OXIMETRY 1: CPT

## 2024-12-26 PROCEDURE — 85025 COMPLETE CBC W/AUTO DIFF WBC: CPT

## 2024-12-26 PROCEDURE — 1100000000 HC RM PRIVATE

## 2024-12-26 PROCEDURE — 93971 EXTREMITY STUDY: CPT | Performed by: RADIOLOGY

## 2024-12-26 PROCEDURE — 6360000002 HC RX W HCPCS: Performed by: INTERNAL MEDICINE

## 2024-12-26 PROCEDURE — 6370000000 HC RX 637 (ALT 250 FOR IP): Performed by: INTERNAL MEDICINE

## 2024-12-26 PROCEDURE — 2700000000 HC OXYGEN THERAPY PER DAY

## 2024-12-26 PROCEDURE — 80202 ASSAY OF VANCOMYCIN: CPT

## 2024-12-26 PROCEDURE — 6370000000 HC RX 637 (ALT 250 FOR IP): Performed by: NURSE PRACTITIONER

## 2024-12-26 PROCEDURE — 6360000002 HC RX W HCPCS: Performed by: STUDENT IN AN ORGANIZED HEALTH CARE EDUCATION/TRAINING PROGRAM

## 2024-12-26 PROCEDURE — 36415 COLL VENOUS BLD VENIPUNCTURE: CPT

## 2024-12-26 PROCEDURE — 6370000000 HC RX 637 (ALT 250 FOR IP): Performed by: STUDENT IN AN ORGANIZED HEALTH CARE EDUCATION/TRAINING PROGRAM

## 2024-12-26 RX ADMIN — Medication 1 CAPSULE: at 17:15

## 2024-12-26 RX ADMIN — PREDNISONE 20 MG: 20 TABLET ORAL at 08:41

## 2024-12-26 RX ADMIN — HYDROCORTISONE ACETATE: 1 CREAM TOPICAL at 17:16

## 2024-12-26 RX ADMIN — SODIUM CHLORIDE, PRESERVATIVE FREE 10 ML: 5 INJECTION INTRAVENOUS at 20:17

## 2024-12-26 RX ADMIN — LISINOPRIL 20 MG: 20 TABLET ORAL at 08:41

## 2024-12-26 RX ADMIN — Medication: at 08:42

## 2024-12-26 RX ADMIN — SODIUM CHLORIDE, PRESERVATIVE FREE 10 ML: 5 INJECTION INTRAVENOUS at 20:16

## 2024-12-26 RX ADMIN — VANCOMYCIN HYDROCHLORIDE 1250 MG: 10 INJECTION, POWDER, LYOPHILIZED, FOR SOLUTION INTRAVENOUS at 10:53

## 2024-12-26 RX ADMIN — HYDROCORTISONE ACETATE: 1 CREAM TOPICAL at 08:43

## 2024-12-26 RX ADMIN — Medication 1 AMPULE: at 08:42

## 2024-12-26 RX ADMIN — Medication: at 17:16

## 2024-12-26 RX ADMIN — TRIAMCINOLONE ACETONIDE: 1 CREAM TOPICAL at 08:43

## 2024-12-26 RX ADMIN — Medication 1 CAPSULE: at 08:41

## 2024-12-26 RX ADMIN — HYDROXYCHLOROQUINE SULFATE 200 MG: 200 TABLET ORAL at 08:41

## 2024-12-26 RX ADMIN — SODIUM CHLORIDE, PRESERVATIVE FREE 10 ML: 5 INJECTION INTRAVENOUS at 08:42

## 2024-12-26 RX ADMIN — HYDROCORTISONE ACETATE: 1 CREAM TOPICAL at 20:21

## 2024-12-26 RX ADMIN — ENOXAPARIN SODIUM 40 MG: 100 INJECTION SUBCUTANEOUS at 08:41

## 2024-12-26 RX ADMIN — PREGABALIN 150 MG: 75 CAPSULE ORAL at 08:41

## 2024-12-26 RX ADMIN — TRIAMCINOLONE ACETONIDE: 1 CREAM TOPICAL at 20:16

## 2024-12-26 RX ADMIN — HYDROCODONE BITARTRATE AND ACETAMINOPHEN 1 TABLET: 7.5; 325 TABLET ORAL at 10:56

## 2024-12-26 RX ADMIN — QUETIAPINE FUMARATE 50 MG: 100 TABLET ORAL at 20:15

## 2024-12-26 RX ADMIN — DULOXETINE HYDROCHLORIDE 60 MG: 60 CAPSULE, DELAYED RELEASE ORAL at 08:41

## 2024-12-26 RX ADMIN — Medication 1 AMPULE: at 20:20

## 2024-12-26 RX ADMIN — Medication: at 20:17

## 2024-12-26 RX ADMIN — PREGABALIN 150 MG: 75 CAPSULE ORAL at 20:14

## 2024-12-26 RX ADMIN — HYDROMORPHONE HYDROCHLORIDE 0.5 MG: 1 INJECTION, SOLUTION INTRAMUSCULAR; INTRAVENOUS; SUBCUTANEOUS at 21:19

## 2024-12-26 RX ADMIN — PANTOPRAZOLE SODIUM 40 MG: 40 TABLET, DELAYED RELEASE ORAL at 17:15

## 2024-12-26 ASSESSMENT — PAIN DESCRIPTION - LOCATION
LOCATION: ARM
LOCATION: GENERALIZED

## 2024-12-26 ASSESSMENT — PAIN SCALES - GENERAL
PAINLEVEL_OUTOF10: 0
PAINLEVEL_OUTOF10: 0
PAINLEVEL_OUTOF10: 7
PAINLEVEL_OUTOF10: 8

## 2024-12-26 ASSESSMENT — PAIN DESCRIPTION - DESCRIPTORS
DESCRIPTORS: BURNING
DESCRIPTORS: ACHING

## 2024-12-26 ASSESSMENT — PAIN DESCRIPTION - ORIENTATION: ORIENTATION: RIGHT

## 2024-12-27 LAB
ALBUMIN SERPL-MCNC: 2.5 G/DL (ref 3.2–4.6)
ALBUMIN/GLOB SERPL: 0.8 (ref 1–1.9)
ALP SERPL-CCNC: 74 U/L (ref 35–104)
ALT SERPL-CCNC: 12 U/L (ref 8–45)
ANION GAP SERPL CALC-SCNC: 7 MMOL/L (ref 7–16)
AST SERPL-CCNC: 16 U/L (ref 15–37)
BILIRUB SERPL-MCNC: <0.2 MG/DL (ref 0–1.2)
BUN SERPL-MCNC: 18 MG/DL (ref 8–23)
CALCIUM SERPL-MCNC: 9.6 MG/DL (ref 8.8–10.2)
CHLORIDE SERPL-SCNC: 101 MMOL/L (ref 98–107)
CO2 SERPL-SCNC: 35 MMOL/L (ref 20–29)
CREAT SERPL-MCNC: 0.91 MG/DL (ref 0.6–1.1)
GLOBULIN SER CALC-MCNC: 3.2 G/DL (ref 2.3–3.5)
GLUCOSE BLD STRIP.AUTO-MCNC: 85 MG/DL (ref 65–100)
GLUCOSE SERPL-MCNC: 97 MG/DL (ref 70–99)
POTASSIUM SERPL-SCNC: 4.3 MMOL/L (ref 3.5–5.1)
PROT SERPL-MCNC: 5.6 G/DL (ref 6.3–8.2)
SERVICE CMNT-IMP: NORMAL
SODIUM SERPL-SCNC: 143 MMOL/L (ref 136–145)
VANCOMYCIN SERPL-MCNC: 13.4 UG/ML

## 2024-12-27 PROCEDURE — 6360000002 HC RX W HCPCS: Performed by: INTERNAL MEDICINE

## 2024-12-27 PROCEDURE — 6370000000 HC RX 637 (ALT 250 FOR IP): Performed by: INTERNAL MEDICINE

## 2024-12-27 PROCEDURE — 1100000000 HC RM PRIVATE

## 2024-12-27 PROCEDURE — 2580000003 HC RX 258: Performed by: INTERNAL MEDICINE

## 2024-12-27 PROCEDURE — 6360000002 HC RX W HCPCS: Performed by: STUDENT IN AN ORGANIZED HEALTH CARE EDUCATION/TRAINING PROGRAM

## 2024-12-27 PROCEDURE — 2500000003 HC RX 250 WO HCPCS: Performed by: STUDENT IN AN ORGANIZED HEALTH CARE EDUCATION/TRAINING PROGRAM

## 2024-12-27 PROCEDURE — 82962 GLUCOSE BLOOD TEST: CPT

## 2024-12-27 PROCEDURE — 6370000000 HC RX 637 (ALT 250 FOR IP): Performed by: STUDENT IN AN ORGANIZED HEALTH CARE EDUCATION/TRAINING PROGRAM

## 2024-12-27 PROCEDURE — 80053 COMPREHEN METABOLIC PANEL: CPT

## 2024-12-27 PROCEDURE — 6370000000 HC RX 637 (ALT 250 FOR IP): Performed by: FAMILY MEDICINE

## 2024-12-27 PROCEDURE — 80202 ASSAY OF VANCOMYCIN: CPT

## 2024-12-27 RX ADMIN — PANTOPRAZOLE SODIUM 40 MG: 40 TABLET, DELAYED RELEASE ORAL at 17:33

## 2024-12-27 RX ADMIN — Medication 1 CAPSULE: at 17:33

## 2024-12-27 RX ADMIN — TRIAMCINOLONE ACETONIDE: 1 CREAM TOPICAL at 21:03

## 2024-12-27 RX ADMIN — TRIAMCINOLONE ACETONIDE: 1 CREAM TOPICAL at 08:45

## 2024-12-27 RX ADMIN — HYDROCORTISONE ACETATE: 1 CREAM TOPICAL at 17:34

## 2024-12-27 RX ADMIN — PREGABALIN 150 MG: 75 CAPSULE ORAL at 08:40

## 2024-12-27 RX ADMIN — SODIUM CHLORIDE, PRESERVATIVE FREE 10 ML: 5 INJECTION INTRAVENOUS at 21:01

## 2024-12-27 RX ADMIN — DULOXETINE HYDROCHLORIDE 60 MG: 60 CAPSULE, DELAYED RELEASE ORAL at 08:40

## 2024-12-27 RX ADMIN — Medication: at 17:33

## 2024-12-27 RX ADMIN — HYDROCORTISONE ACETATE: 1 CREAM TOPICAL at 08:45

## 2024-12-27 RX ADMIN — SODIUM CHLORIDE, PRESERVATIVE FREE 10 ML: 5 INJECTION INTRAVENOUS at 08:46

## 2024-12-27 RX ADMIN — SODIUM CHLORIDE, PRESERVATIVE FREE 10 ML: 5 INJECTION INTRAVENOUS at 08:45

## 2024-12-27 RX ADMIN — ENOXAPARIN SODIUM 40 MG: 100 INJECTION SUBCUTANEOUS at 08:40

## 2024-12-27 RX ADMIN — Medication: at 21:02

## 2024-12-27 RX ADMIN — PREGABALIN 150 MG: 75 CAPSULE ORAL at 21:00

## 2024-12-27 RX ADMIN — HYDROCORTISONE ACETATE: 1 CREAM TOPICAL at 21:02

## 2024-12-27 RX ADMIN — Medication 1 CAPSULE: at 08:40

## 2024-12-27 RX ADMIN — Medication 1 AMPULE: at 08:50

## 2024-12-27 RX ADMIN — Medication 1 AMPULE: at 21:00

## 2024-12-27 RX ADMIN — HYDROXYCHLOROQUINE SULFATE 200 MG: 200 TABLET ORAL at 08:40

## 2024-12-27 RX ADMIN — QUETIAPINE FUMARATE 50 MG: 100 TABLET ORAL at 21:00

## 2024-12-27 RX ADMIN — PREDNISONE 20 MG: 20 TABLET ORAL at 08:40

## 2024-12-27 RX ADMIN — HYDROMORPHONE HYDROCHLORIDE 0.5 MG: 1 INJECTION, SOLUTION INTRAMUSCULAR; INTRAVENOUS; SUBCUTANEOUS at 20:59

## 2024-12-27 RX ADMIN — Medication: at 08:44

## 2024-12-27 RX ADMIN — LISINOPRIL 20 MG: 20 TABLET ORAL at 08:40

## 2024-12-27 RX ADMIN — VANCOMYCIN HYDROCHLORIDE 1250 MG: 10 INJECTION, POWDER, LYOPHILIZED, FOR SOLUTION INTRAVENOUS at 12:02

## 2024-12-27 ASSESSMENT — PAIN DESCRIPTION - LOCATION: LOCATION: GENERALIZED

## 2024-12-27 ASSESSMENT — PAIN SCALES - GENERAL
PAINLEVEL_OUTOF10: 0
PAINLEVEL_OUTOF10: 7

## 2024-12-27 ASSESSMENT — PAIN DESCRIPTION - DESCRIPTORS: DESCRIPTORS: ACHING

## 2024-12-27 NOTE — PLAN OF CARE
Problem: Discharge Planning  Goal: Discharge to home or other facility with appropriate resources  Outcome: Progressing     Problem: Safety - Adult  Goal: Free from fall injury  Outcome: Progressing     Problem: Pain  Goal: Verbalizes/displays adequate comfort level or baseline comfort level  Outcome: Progressing  Flowsheets (Taken 12/26/2024 1955)  Verbalizes/displays adequate comfort level or baseline comfort level:   Encourage patient to monitor pain and request assistance   Assess pain using appropriate pain scale   Administer analgesics based on type and severity of pain and evaluate response     Problem: Risk for Elopement  Goal: Patient will not exit the unit/facility without proper excort  Outcome: Progressing     Problem: Skin/Tissue Integrity  Goal: Absence of new skin breakdown  Description: 1.  Monitor for areas of redness and/or skin breakdown  2.  Assess vascular access sites hourly  3.  Every 4-6 hours minimum:  Change oxygen saturation probe site  4.  Every 4-6 hours:  If on nasal continuous positive airway pressure, respiratory therapy assess nares and determine need for appliance change or resting period.  Outcome: Progressing     Problem: ABCDS Injury Assessment  Goal: Absence of physical injury  Outcome: Progressing

## 2024-12-27 NOTE — CARE COORDINATION
Chart reviewed and patient discussed in IDT rounds this AM. Patient needing IV antibiotics until 1/22. Patient declines going to a SNF for antibiotic treatment. Discharge plan is to return home once treatment is completed on 1/22/25.    Regino AVALOS, ACM  [place]

## 2024-12-28 PROCEDURE — 6360000002 HC RX W HCPCS: Performed by: STUDENT IN AN ORGANIZED HEALTH CARE EDUCATION/TRAINING PROGRAM

## 2024-12-28 PROCEDURE — 2500000003 HC RX 250 WO HCPCS: Performed by: STUDENT IN AN ORGANIZED HEALTH CARE EDUCATION/TRAINING PROGRAM

## 2024-12-28 PROCEDURE — 2580000003 HC RX 258: Performed by: INTERNAL MEDICINE

## 2024-12-28 PROCEDURE — 6370000000 HC RX 637 (ALT 250 FOR IP): Performed by: STUDENT IN AN ORGANIZED HEALTH CARE EDUCATION/TRAINING PROGRAM

## 2024-12-28 PROCEDURE — 1100000000 HC RM PRIVATE

## 2024-12-28 PROCEDURE — 6360000002 HC RX W HCPCS: Performed by: INTERNAL MEDICINE

## 2024-12-28 PROCEDURE — 6370000000 HC RX 637 (ALT 250 FOR IP): Performed by: INTERNAL MEDICINE

## 2024-12-28 PROCEDURE — 6370000000 HC RX 637 (ALT 250 FOR IP): Performed by: FAMILY MEDICINE

## 2024-12-28 RX ADMIN — Medication 1 AMPULE: at 20:53

## 2024-12-28 RX ADMIN — PREGABALIN 150 MG: 75 CAPSULE ORAL at 20:45

## 2024-12-28 RX ADMIN — QUETIAPINE FUMARATE 50 MG: 100 TABLET ORAL at 20:45

## 2024-12-28 RX ADMIN — SODIUM CHLORIDE, PRESERVATIVE FREE 10 ML: 5 INJECTION INTRAVENOUS at 20:47

## 2024-12-28 RX ADMIN — HYDROMORPHONE HYDROCHLORIDE 0.5 MG: 1 INJECTION, SOLUTION INTRAMUSCULAR; INTRAVENOUS; SUBCUTANEOUS at 20:53

## 2024-12-28 RX ADMIN — Medication: at 09:03

## 2024-12-28 RX ADMIN — TRIAMCINOLONE ACETONIDE: 1 CREAM TOPICAL at 09:03

## 2024-12-28 RX ADMIN — PANTOPRAZOLE SODIUM 40 MG: 40 TABLET, DELAYED RELEASE ORAL at 06:57

## 2024-12-28 RX ADMIN — PANTOPRAZOLE SODIUM 40 MG: 40 TABLET, DELAYED RELEASE ORAL at 16:42

## 2024-12-28 RX ADMIN — HYDROXYCHLOROQUINE SULFATE 200 MG: 200 TABLET ORAL at 09:01

## 2024-12-28 RX ADMIN — Medication: at 16:43

## 2024-12-28 RX ADMIN — HYDROCORTISONE ACETATE: 1 CREAM TOPICAL at 16:43

## 2024-12-28 RX ADMIN — PREGABALIN 150 MG: 75 CAPSULE ORAL at 09:01

## 2024-12-28 RX ADMIN — HYDROCORTISONE ACETATE: 1 CREAM TOPICAL at 09:03

## 2024-12-28 RX ADMIN — Medication 1 CAPSULE: at 09:01

## 2024-12-28 RX ADMIN — VANCOMYCIN HYDROCHLORIDE 1250 MG: 10 INJECTION, POWDER, LYOPHILIZED, FOR SOLUTION INTRAVENOUS at 11:11

## 2024-12-28 RX ADMIN — HYDROCODONE BITARTRATE AND ACETAMINOPHEN 1 TABLET: 7.5; 325 TABLET ORAL at 09:11

## 2024-12-28 RX ADMIN — SODIUM CHLORIDE, PRESERVATIVE FREE 10 ML: 5 INJECTION INTRAVENOUS at 08:59

## 2024-12-28 RX ADMIN — HYDROCORTISONE ACETATE: 1 CREAM TOPICAL at 12:17

## 2024-12-28 RX ADMIN — Medication 1 CAPSULE: at 16:42

## 2024-12-28 RX ADMIN — SODIUM CHLORIDE, PRESERVATIVE FREE 10 ML: 5 INJECTION INTRAVENOUS at 20:46

## 2024-12-28 RX ADMIN — PREDNISONE 20 MG: 20 TABLET ORAL at 09:01

## 2024-12-28 RX ADMIN — DULOXETINE HYDROCHLORIDE 60 MG: 60 CAPSULE, DELAYED RELEASE ORAL at 09:01

## 2024-12-28 RX ADMIN — ENOXAPARIN SODIUM 40 MG: 100 INJECTION SUBCUTANEOUS at 09:00

## 2024-12-28 RX ADMIN — Medication: at 12:18

## 2024-12-28 RX ADMIN — LISINOPRIL 20 MG: 20 TABLET ORAL at 09:01

## 2024-12-28 ASSESSMENT — PAIN DESCRIPTION - LOCATION
LOCATION: ARM
LOCATION: GENERALIZED

## 2024-12-28 ASSESSMENT — PAIN DESCRIPTION - DESCRIPTORS: DESCRIPTORS: ACHING;BURNING

## 2024-12-28 ASSESSMENT — PAIN DESCRIPTION - FREQUENCY: FREQUENCY: INTERMITTENT

## 2024-12-28 ASSESSMENT — PAIN SCALES - GENERAL
PAINLEVEL_OUTOF10: 6
PAINLEVEL_OUTOF10: 8
PAINLEVEL_OUTOF10: 0
PAINLEVEL_OUTOF10: 4

## 2024-12-28 ASSESSMENT — PAIN - FUNCTIONAL ASSESSMENT: PAIN_FUNCTIONAL_ASSESSMENT: PREVENTS OR INTERFERES SOME ACTIVE ACTIVITIES AND ADLS

## 2024-12-28 ASSESSMENT — PAIN DESCRIPTION - ONSET: ONSET: GRADUAL

## 2024-12-28 ASSESSMENT — PAIN DESCRIPTION - ORIENTATION: ORIENTATION: ANTERIOR

## 2024-12-29 PROCEDURE — 2500000003 HC RX 250 WO HCPCS: Performed by: STUDENT IN AN ORGANIZED HEALTH CARE EDUCATION/TRAINING PROGRAM

## 2024-12-29 PROCEDURE — 1100000000 HC RM PRIVATE

## 2024-12-29 PROCEDURE — 2580000003 HC RX 258: Performed by: INTERNAL MEDICINE

## 2024-12-29 PROCEDURE — 6370000000 HC RX 637 (ALT 250 FOR IP): Performed by: NURSE PRACTITIONER

## 2024-12-29 PROCEDURE — 6360000002 HC RX W HCPCS: Performed by: INTERNAL MEDICINE

## 2024-12-29 PROCEDURE — 6370000000 HC RX 637 (ALT 250 FOR IP): Performed by: STUDENT IN AN ORGANIZED HEALTH CARE EDUCATION/TRAINING PROGRAM

## 2024-12-29 PROCEDURE — 6370000000 HC RX 637 (ALT 250 FOR IP): Performed by: INTERNAL MEDICINE

## 2024-12-29 PROCEDURE — 6360000002 HC RX W HCPCS: Performed by: STUDENT IN AN ORGANIZED HEALTH CARE EDUCATION/TRAINING PROGRAM

## 2024-12-29 PROCEDURE — 6370000000 HC RX 637 (ALT 250 FOR IP): Performed by: FAMILY MEDICINE

## 2024-12-29 RX ADMIN — SODIUM CHLORIDE, PRESERVATIVE FREE 10 ML: 5 INJECTION INTRAVENOUS at 09:40

## 2024-12-29 RX ADMIN — HYDROMORPHONE HYDROCHLORIDE 0.25 MG: 1 INJECTION, SOLUTION INTRAMUSCULAR; INTRAVENOUS; SUBCUTANEOUS at 13:05

## 2024-12-29 RX ADMIN — Medication 1 CAPSULE: at 09:40

## 2024-12-29 RX ADMIN — HYDROCORTISONE ACETATE: 1 CREAM TOPICAL at 09:55

## 2024-12-29 RX ADMIN — Medication 1 AMPULE: at 10:06

## 2024-12-29 RX ADMIN — HYDROCORTISONE ACETATE: 1 CREAM TOPICAL at 11:59

## 2024-12-29 RX ADMIN — PANTOPRAZOLE SODIUM 40 MG: 40 TABLET, DELAYED RELEASE ORAL at 16:10

## 2024-12-29 RX ADMIN — HYDROCODONE BITARTRATE AND ACETAMINOPHEN 1 TABLET: 7.5; 325 TABLET ORAL at 21:15

## 2024-12-29 RX ADMIN — Medication: at 16:12

## 2024-12-29 RX ADMIN — LISINOPRIL 20 MG: 20 TABLET ORAL at 09:40

## 2024-12-29 RX ADMIN — Medication 1 AMPULE: at 21:16

## 2024-12-29 RX ADMIN — HYDROMORPHONE HYDROCHLORIDE 0.5 MG: 1 INJECTION, SOLUTION INTRAMUSCULAR; INTRAVENOUS; SUBCUTANEOUS at 16:09

## 2024-12-29 RX ADMIN — DULOXETINE HYDROCHLORIDE 60 MG: 60 CAPSULE, DELAYED RELEASE ORAL at 09:40

## 2024-12-29 RX ADMIN — PREGABALIN 150 MG: 75 CAPSULE ORAL at 09:39

## 2024-12-29 RX ADMIN — PANTOPRAZOLE SODIUM 40 MG: 40 TABLET, DELAYED RELEASE ORAL at 05:17

## 2024-12-29 RX ADMIN — PREDNISONE 20 MG: 20 TABLET ORAL at 09:39

## 2024-12-29 RX ADMIN — Medication: at 11:57

## 2024-12-29 RX ADMIN — TRIAMCINOLONE ACETONIDE: 1 CREAM TOPICAL at 09:54

## 2024-12-29 RX ADMIN — SODIUM CHLORIDE, PRESERVATIVE FREE 10 ML: 5 INJECTION INTRAVENOUS at 09:41

## 2024-12-29 RX ADMIN — PREGABALIN 150 MG: 75 CAPSULE ORAL at 21:16

## 2024-12-29 RX ADMIN — QUETIAPINE FUMARATE 50 MG: 100 TABLET ORAL at 21:15

## 2024-12-29 RX ADMIN — HYDROCORTISONE ACETATE: 1 CREAM TOPICAL at 21:18

## 2024-12-29 RX ADMIN — TRIAMCINOLONE ACETONIDE: 1 CREAM TOPICAL at 21:19

## 2024-12-29 RX ADMIN — ENOXAPARIN SODIUM 40 MG: 100 INJECTION SUBCUTANEOUS at 09:38

## 2024-12-29 RX ADMIN — VANCOMYCIN HYDROCHLORIDE 1250 MG: 10 INJECTION, POWDER, LYOPHILIZED, FOR SOLUTION INTRAVENOUS at 12:07

## 2024-12-29 RX ADMIN — HYDROCODONE BITARTRATE AND ACETAMINOPHEN 1 TABLET: 7.5; 325 TABLET ORAL at 12:17

## 2024-12-29 RX ADMIN — HYDROXYCHLOROQUINE SULFATE 200 MG: 200 TABLET ORAL at 09:40

## 2024-12-29 RX ADMIN — Medication: at 09:55

## 2024-12-29 RX ADMIN — HYDROCORTISONE ACETATE: 1 CREAM TOPICAL at 16:13

## 2024-12-29 RX ADMIN — Medication: at 21:18

## 2024-12-29 RX ADMIN — SODIUM CHLORIDE, PRESERVATIVE FREE 10 ML: 5 INJECTION INTRAVENOUS at 21:16

## 2024-12-29 ASSESSMENT — PAIN DESCRIPTION - LOCATION
LOCATION: FLANK
LOCATION: BACK
LOCATION: FLANK

## 2024-12-29 ASSESSMENT — PAIN DESCRIPTION - DESCRIPTORS
DESCRIPTORS: ACHING
DESCRIPTORS: ACHING;SORE
DESCRIPTORS: ACHING

## 2024-12-29 ASSESSMENT — PAIN DESCRIPTION - ORIENTATION
ORIENTATION: RIGHT
ORIENTATION: LOWER
ORIENTATION: RIGHT;LEFT

## 2024-12-29 ASSESSMENT — PAIN SCALES - GENERAL
PAINLEVEL_OUTOF10: 0
PAINLEVEL_OUTOF10: 2
PAINLEVEL_OUTOF10: 6
PAINLEVEL_OUTOF10: 8
PAINLEVEL_OUTOF10: 0
PAINLEVEL_OUTOF10: 8
PAINLEVEL_OUTOF10: 7

## 2024-12-29 NOTE — PLAN OF CARE
Problem: Discharge Planning  Goal: Discharge to home or other facility with appropriate resources  Outcome: Progressing     Problem: Safety - Adult  Goal: Free from fall injury  Outcome: Progressing     Problem: Pain  Goal: Verbalizes/displays adequate comfort level or baseline comfort level  Outcome: Progressing     Problem: Risk for Elopement  Goal: Patient will not exit the unit/facility without proper excort  Outcome: Progressing     Problem: Skin/Tissue Integrity  Goal: Absence of new skin breakdown  Description: 1.  Monitor for areas of redness and/or skin breakdown  2.  Assess vascular access sites hourly  3.  Every 4-6 hours minimum:  Change oxygen saturation probe site  4.  Every 4-6 hours:  If on nasal continuous positive airway pressure, respiratory therapy assess nares and determine need for appliance change or resting period.  Outcome: Progressing     Problem: ABCDS Injury Assessment  Goal: Absence of physical injury  Outcome: Progressing

## 2024-12-30 LAB — VANCOMYCIN SERPL-MCNC: 12.2 UG/ML

## 2024-12-30 PROCEDURE — 1100000000 HC RM PRIVATE

## 2024-12-30 PROCEDURE — 2580000003 HC RX 258: Performed by: INTERNAL MEDICINE

## 2024-12-30 PROCEDURE — 6360000002 HC RX W HCPCS: Performed by: INTERNAL MEDICINE

## 2024-12-30 PROCEDURE — 80202 ASSAY OF VANCOMYCIN: CPT

## 2024-12-30 PROCEDURE — 2500000003 HC RX 250 WO HCPCS: Performed by: STUDENT IN AN ORGANIZED HEALTH CARE EDUCATION/TRAINING PROGRAM

## 2024-12-30 PROCEDURE — 6370000000 HC RX 637 (ALT 250 FOR IP): Performed by: STUDENT IN AN ORGANIZED HEALTH CARE EDUCATION/TRAINING PROGRAM

## 2024-12-30 PROCEDURE — 6360000002 HC RX W HCPCS: Performed by: STUDENT IN AN ORGANIZED HEALTH CARE EDUCATION/TRAINING PROGRAM

## 2024-12-30 PROCEDURE — 6370000000 HC RX 637 (ALT 250 FOR IP): Performed by: FAMILY MEDICINE

## 2024-12-30 PROCEDURE — 6370000000 HC RX 637 (ALT 250 FOR IP): Performed by: INTERNAL MEDICINE

## 2024-12-30 PROCEDURE — 36415 COLL VENOUS BLD VENIPUNCTURE: CPT

## 2024-12-30 RX ADMIN — Medication 1 AMPULE: at 21:40

## 2024-12-30 RX ADMIN — HYDROCORTISONE ACETATE: 1 CREAM TOPICAL at 16:27

## 2024-12-30 RX ADMIN — VANCOMYCIN HYDROCHLORIDE 1250 MG: 10 INJECTION, POWDER, LYOPHILIZED, FOR SOLUTION INTRAVENOUS at 11:25

## 2024-12-30 RX ADMIN — HYDROMORPHONE HYDROCHLORIDE 0.5 MG: 1 INJECTION, SOLUTION INTRAMUSCULAR; INTRAVENOUS; SUBCUTANEOUS at 21:38

## 2024-12-30 RX ADMIN — HYDROXYCHLOROQUINE SULFATE 200 MG: 200 TABLET ORAL at 09:56

## 2024-12-30 RX ADMIN — DULOXETINE HYDROCHLORIDE 60 MG: 60 CAPSULE, DELAYED RELEASE ORAL at 09:55

## 2024-12-30 RX ADMIN — PANTOPRAZOLE SODIUM 40 MG: 40 TABLET, DELAYED RELEASE ORAL at 05:36

## 2024-12-30 RX ADMIN — SODIUM CHLORIDE, PRESERVATIVE FREE 10 ML: 5 INJECTION INTRAVENOUS at 22:00

## 2024-12-30 RX ADMIN — SODIUM CHLORIDE, PRESERVATIVE FREE 10 ML: 5 INJECTION INTRAVENOUS at 21:59

## 2024-12-30 RX ADMIN — HYDROCORTISONE ACETATE: 1 CREAM TOPICAL at 21:58

## 2024-12-30 RX ADMIN — ENOXAPARIN SODIUM 40 MG: 100 INJECTION SUBCUTANEOUS at 09:56

## 2024-12-30 RX ADMIN — Medication: at 21:52

## 2024-12-30 RX ADMIN — HYDROCODONE BITARTRATE AND ACETAMINOPHEN 1 TABLET: 7.5; 325 TABLET ORAL at 14:56

## 2024-12-30 RX ADMIN — PREGABALIN 150 MG: 75 CAPSULE ORAL at 09:55

## 2024-12-30 RX ADMIN — PANTOPRAZOLE SODIUM 40 MG: 40 TABLET, DELAYED RELEASE ORAL at 16:25

## 2024-12-30 RX ADMIN — PREDNISONE 20 MG: 20 TABLET ORAL at 09:56

## 2024-12-30 RX ADMIN — SODIUM CHLORIDE, PRESERVATIVE FREE 10 ML: 5 INJECTION INTRAVENOUS at 21:58

## 2024-12-30 RX ADMIN — Medication 1 CAPSULE: at 09:55

## 2024-12-30 RX ADMIN — LISINOPRIL 20 MG: 20 TABLET ORAL at 09:55

## 2024-12-30 RX ADMIN — SODIUM CHLORIDE, PRESERVATIVE FREE 10 ML: 5 INJECTION INTRAVENOUS at 09:56

## 2024-12-30 RX ADMIN — Medication: at 16:26

## 2024-12-30 RX ADMIN — TRIAMCINOLONE ACETONIDE: 1 CREAM TOPICAL at 21:55

## 2024-12-30 RX ADMIN — QUETIAPINE FUMARATE 50 MG: 100 TABLET ORAL at 21:25

## 2024-12-30 RX ADMIN — PREGABALIN 150 MG: 75 CAPSULE ORAL at 21:25

## 2024-12-30 ASSESSMENT — PAIN DESCRIPTION - DESCRIPTORS: DESCRIPTORS: ACHING

## 2024-12-30 ASSESSMENT — PAIN SCALES - GENERAL
PAINLEVEL_OUTOF10: 0
PAINLEVEL_OUTOF10: 7
PAINLEVEL_OUTOF10: 0
PAINLEVEL_OUTOF10: 0
PAINLEVEL_OUTOF10: 6
PAINLEVEL_OUTOF10: 3
PAINLEVEL_OUTOF10: 0

## 2024-12-30 ASSESSMENT — PAIN DESCRIPTION - ORIENTATION: ORIENTATION: RIGHT

## 2024-12-30 ASSESSMENT — PAIN DESCRIPTION - LOCATION
LOCATION: FLANK
LOCATION: ARM

## 2024-12-30 NOTE — PLAN OF CARE
Problem: Discharge Planning  Goal: Discharge to home or other facility with appropriate resources  12/29/2024 2243 by Sinai Gill RN  Outcome: Progressing  Flowsheets (Taken 12/29/2024 1927)  Discharge to home or other facility with appropriate resources:   Identify barriers to discharge with patient and caregiver   Arrange for needed discharge resources and transportation as appropriate   Identify discharge learning needs (meds, wound care, etc)   Refer to discharge planning if patient needs post-hospital services based on physician order or complex needs related to functional status, cognitive ability or social support system  12/29/2024 1633 by Landy Ngo RN  Outcome: Progressing     Problem: Safety - Adult  Goal: Free from fall injury  12/29/2024 2243 by Sinai Gill RN  Outcome: Progressing  Flowsheets (Taken 12/29/2024 1927)  Free From Fall Injury: Instruct family/caregiver on patient safety  12/29/2024 1633 by Landy Ngo RN  Outcome: Progressing     Problem: Pain  Goal: Verbalizes/displays adequate comfort level or baseline comfort level  12/29/2024 2243 by Sinai Gill RN  Outcome: Progressing  Flowsheets (Taken 12/29/2024 2009)  Verbalizes/displays adequate comfort level or baseline comfort level: Encourage patient to monitor pain and request assistance  12/29/2024 1633 by Landy Ngo RN  Outcome: Progressing     Problem: Risk for Elopement  Goal: Patient will not exit the unit/facility without proper excort  12/29/2024 2243 by Sinai Gill RN  Outcome: Progressing  Flowsheets (Taken 12/29/2024 1927)  Nursing Interventions for Elopement Risk: Assist with personal care needs such as toileting, eating, dressing, as needed to reduce the risk of wandering  12/29/2024 1633 by Landy Ngo RN  Outcome: Progressing     Problem: Skin/Tissue Integrity  Goal: Absence of new skin breakdown  Description: 1.  Monitor for areas of redness and/or skin breakdown  2.  Assess vascular access sites

## 2024-12-31 LAB
APPEARANCE UR: CLEAR
BACTERIA URNS QL MICRO: 0 /HPF
BILIRUB UR QL: NEGATIVE
CASTS URNS QL MICRO: 0 /LPF
COLOR UR: ABNORMAL
CRYSTALS URNS QL MICRO: 0 /LPF
EPI CELLS #/AREA URNS HPF: ABNORMAL /HPF
GLUCOSE UR STRIP.AUTO-MCNC: NEGATIVE MG/DL
HGB UR QL STRIP: NEGATIVE
KETONES UR QL STRIP.AUTO: NEGATIVE MG/DL
LEUKOCYTE ESTERASE UR QL STRIP.AUTO: ABNORMAL
MUCOUS THREADS URNS QL MICRO: 0 /LPF
NITRITE UR QL STRIP.AUTO: NEGATIVE
OTHER OBSERVATIONS: ABNORMAL
PH UR STRIP: 5.5 (ref 5–9)
PROT UR STRIP-MCNC: NEGATIVE MG/DL
RBC #/AREA URNS HPF: ABNORMAL /HPF
SP GR UR REFRACTOMETRY: 1.01 (ref 1–1.02)
URINE CULTURE IF INDICATED: ABNORMAL
UROBILINOGEN UR QL STRIP.AUTO: 0.2 EU/DL (ref 0.2–1)
WBC URNS QL MICRO: ABNORMAL /HPF

## 2024-12-31 PROCEDURE — 2700000000 HC OXYGEN THERAPY PER DAY

## 2024-12-31 PROCEDURE — 2580000003 HC RX 258: Performed by: INTERNAL MEDICINE

## 2024-12-31 PROCEDURE — 6360000002 HC RX W HCPCS: Performed by: INTERNAL MEDICINE

## 2024-12-31 PROCEDURE — 6370000000 HC RX 637 (ALT 250 FOR IP): Performed by: INTERNAL MEDICINE

## 2024-12-31 PROCEDURE — 2500000003 HC RX 250 WO HCPCS: Performed by: STUDENT IN AN ORGANIZED HEALTH CARE EDUCATION/TRAINING PROGRAM

## 2024-12-31 PROCEDURE — 94760 N-INVAS EAR/PLS OXIMETRY 1: CPT

## 2024-12-31 PROCEDURE — 81001 URINALYSIS AUTO W/SCOPE: CPT

## 2024-12-31 PROCEDURE — 6370000000 HC RX 637 (ALT 250 FOR IP): Performed by: STUDENT IN AN ORGANIZED HEALTH CARE EDUCATION/TRAINING PROGRAM

## 2024-12-31 PROCEDURE — 6360000002 HC RX W HCPCS: Performed by: STUDENT IN AN ORGANIZED HEALTH CARE EDUCATION/TRAINING PROGRAM

## 2024-12-31 PROCEDURE — 6370000000 HC RX 637 (ALT 250 FOR IP): Performed by: FAMILY MEDICINE

## 2024-12-31 PROCEDURE — 1100000000 HC RM PRIVATE

## 2024-12-31 RX ADMIN — Medication: at 16:42

## 2024-12-31 RX ADMIN — Medication: at 21:20

## 2024-12-31 RX ADMIN — HYDROCODONE BITARTRATE AND ACETAMINOPHEN 1 TABLET: 7.5; 325 TABLET ORAL at 08:53

## 2024-12-31 RX ADMIN — HYDROCORTISONE ACETATE: 1 CREAM TOPICAL at 16:42

## 2024-12-31 RX ADMIN — HYDROCORTISONE ACETATE: 1 CREAM TOPICAL at 09:00

## 2024-12-31 RX ADMIN — HYDROMORPHONE HYDROCHLORIDE 0.5 MG: 1 INJECTION, SOLUTION INTRAMUSCULAR; INTRAVENOUS; SUBCUTANEOUS at 18:45

## 2024-12-31 RX ADMIN — PANTOPRAZOLE SODIUM 40 MG: 40 TABLET, DELAYED RELEASE ORAL at 16:41

## 2024-12-31 RX ADMIN — SODIUM CHLORIDE, PRESERVATIVE FREE 10 ML: 5 INJECTION INTRAVENOUS at 21:23

## 2024-12-31 RX ADMIN — Medication: at 08:59

## 2024-12-31 RX ADMIN — DULOXETINE HYDROCHLORIDE 60 MG: 60 CAPSULE, DELAYED RELEASE ORAL at 08:54

## 2024-12-31 RX ADMIN — PREDNISONE 20 MG: 20 TABLET ORAL at 08:54

## 2024-12-31 RX ADMIN — QUETIAPINE FUMARATE 50 MG: 100 TABLET ORAL at 21:17

## 2024-12-31 RX ADMIN — ENOXAPARIN SODIUM 40 MG: 100 INJECTION SUBCUTANEOUS at 08:53

## 2024-12-31 RX ADMIN — TRIAMCINOLONE ACETONIDE: 1 CREAM TOPICAL at 21:21

## 2024-12-31 RX ADMIN — TRIAMCINOLONE ACETONIDE: 1 CREAM TOPICAL at 09:00

## 2024-12-31 RX ADMIN — LISINOPRIL 20 MG: 20 TABLET ORAL at 08:54

## 2024-12-31 RX ADMIN — SODIUM CHLORIDE, PRESERVATIVE FREE 10 ML: 5 INJECTION INTRAVENOUS at 08:54

## 2024-12-31 RX ADMIN — HYDROCORTISONE ACETATE: 1 CREAM TOPICAL at 21:22

## 2024-12-31 RX ADMIN — Medication 1 AMPULE: at 21:19

## 2024-12-31 RX ADMIN — PREGABALIN 150 MG: 75 CAPSULE ORAL at 08:54

## 2024-12-31 RX ADMIN — VANCOMYCIN HYDROCHLORIDE 1250 MG: 10 INJECTION, POWDER, LYOPHILIZED, FOR SOLUTION INTRAVENOUS at 10:55

## 2024-12-31 RX ADMIN — PREGABALIN 150 MG: 75 CAPSULE ORAL at 21:19

## 2024-12-31 RX ADMIN — HYDROXYCHLOROQUINE SULFATE 200 MG: 200 TABLET ORAL at 08:54

## 2024-12-31 RX ADMIN — PANTOPRAZOLE SODIUM 40 MG: 40 TABLET, DELAYED RELEASE ORAL at 05:58

## 2024-12-31 RX ADMIN — SODIUM CHLORIDE, PRESERVATIVE FREE 10 ML: 5 INJECTION INTRAVENOUS at 21:19

## 2024-12-31 ASSESSMENT — PAIN SCALES - GENERAL
PAINLEVEL_OUTOF10: 0
PAINLEVEL_OUTOF10: 7
PAINLEVEL_OUTOF10: 10
PAINLEVEL_OUTOF10: 3
PAINLEVEL_OUTOF10: 7

## 2024-12-31 ASSESSMENT — PAIN DESCRIPTION - ORIENTATION
ORIENTATION: RIGHT

## 2024-12-31 ASSESSMENT — PAIN DESCRIPTION - DESCRIPTORS
DESCRIPTORS: SHARP

## 2024-12-31 ASSESSMENT — PAIN DESCRIPTION - FREQUENCY: FREQUENCY: INTERMITTENT

## 2024-12-31 ASSESSMENT — PAIN SCALES - WONG BAKER: WONGBAKER_NUMERICALRESPONSE: HURTS A LITTLE BIT

## 2024-12-31 ASSESSMENT — PAIN DESCRIPTION - LOCATION
LOCATION: ABDOMEN
LOCATION: FLANK
LOCATION: ABDOMEN

## 2024-12-31 ASSESSMENT — PAIN DESCRIPTION - PAIN TYPE: TYPE: ACUTE PAIN

## 2024-12-31 ASSESSMENT — PAIN DESCRIPTION - ONSET: ONSET: PROGRESSIVE

## 2024-12-31 NOTE — CARE COORDINATION
Chart reviewed and patient discussed in IDT rounds this AM. Patient is staying inpatient to complete iv antibiotics until EOT 1/22.    Discharge plan is to return home.     Regino AVALOS, ACM  [place]

## 2025-01-01 LAB — CREAT SERPL-MCNC: 0.96 MG/DL (ref 0.6–1.1)

## 2025-01-01 PROCEDURE — 6370000000 HC RX 637 (ALT 250 FOR IP): Performed by: NURSE PRACTITIONER

## 2025-01-01 PROCEDURE — 2580000003 HC RX 258: Performed by: INTERNAL MEDICINE

## 2025-01-01 PROCEDURE — 2500000003 HC RX 250 WO HCPCS: Performed by: STUDENT IN AN ORGANIZED HEALTH CARE EDUCATION/TRAINING PROGRAM

## 2025-01-01 PROCEDURE — 6370000000 HC RX 637 (ALT 250 FOR IP): Performed by: STUDENT IN AN ORGANIZED HEALTH CARE EDUCATION/TRAINING PROGRAM

## 2025-01-01 PROCEDURE — 6370000000 HC RX 637 (ALT 250 FOR IP): Performed by: FAMILY MEDICINE

## 2025-01-01 PROCEDURE — 6370000000 HC RX 637 (ALT 250 FOR IP): Performed by: INTERNAL MEDICINE

## 2025-01-01 PROCEDURE — 94760 N-INVAS EAR/PLS OXIMETRY 1: CPT

## 2025-01-01 PROCEDURE — 2700000000 HC OXYGEN THERAPY PER DAY

## 2025-01-01 PROCEDURE — 6360000002 HC RX W HCPCS: Performed by: STUDENT IN AN ORGANIZED HEALTH CARE EDUCATION/TRAINING PROGRAM

## 2025-01-01 PROCEDURE — 36415 COLL VENOUS BLD VENIPUNCTURE: CPT

## 2025-01-01 PROCEDURE — 6360000002 HC RX W HCPCS: Performed by: INTERNAL MEDICINE

## 2025-01-01 PROCEDURE — 1100000000 HC RM PRIVATE

## 2025-01-01 PROCEDURE — 82565 ASSAY OF CREATININE: CPT

## 2025-01-01 RX ADMIN — Medication: at 16:37

## 2025-01-01 RX ADMIN — SODIUM CHLORIDE, PRESERVATIVE FREE 10 ML: 5 INJECTION INTRAVENOUS at 21:04

## 2025-01-01 RX ADMIN — Medication 1 CAPSULE: at 08:04

## 2025-01-01 RX ADMIN — TRIAMCINOLONE ACETONIDE: 1 CREAM TOPICAL at 08:09

## 2025-01-01 RX ADMIN — HYDROMORPHONE HYDROCHLORIDE 0.5 MG: 1 INJECTION, SOLUTION INTRAMUSCULAR; INTRAVENOUS; SUBCUTANEOUS at 21:01

## 2025-01-01 RX ADMIN — PREGABALIN 150 MG: 75 CAPSULE ORAL at 21:02

## 2025-01-01 RX ADMIN — HYDROCORTISONE ACETATE: 1 CREAM TOPICAL at 21:07

## 2025-01-01 RX ADMIN — Medication 1 AMPULE: at 16:35

## 2025-01-01 RX ADMIN — TRIAMCINOLONE ACETONIDE: 1 CREAM TOPICAL at 21:07

## 2025-01-01 RX ADMIN — Medication 1 AMPULE: at 21:02

## 2025-01-01 RX ADMIN — Medication: at 08:10

## 2025-01-01 RX ADMIN — VANCOMYCIN HYDROCHLORIDE 1250 MG: 10 INJECTION, POWDER, LYOPHILIZED, FOR SOLUTION INTRAVENOUS at 11:47

## 2025-01-01 RX ADMIN — SODIUM CHLORIDE, PRESERVATIVE FREE 10 ML: 5 INJECTION INTRAVENOUS at 08:07

## 2025-01-01 RX ADMIN — PREDNISONE 20 MG: 20 TABLET ORAL at 08:04

## 2025-01-01 RX ADMIN — DULOXETINE HYDROCHLORIDE 60 MG: 60 CAPSULE, DELAYED RELEASE ORAL at 08:03

## 2025-01-01 RX ADMIN — TRIAMCINOLONE ACETONIDE: 1 CREAM TOPICAL at 16:36

## 2025-01-01 RX ADMIN — QUETIAPINE FUMARATE 50 MG: 100 TABLET ORAL at 21:02

## 2025-01-01 RX ADMIN — Medication 1 CAPSULE: at 16:35

## 2025-01-01 RX ADMIN — HYDROCODONE BITARTRATE AND ACETAMINOPHEN 1 TABLET: 7.5; 325 TABLET ORAL at 08:03

## 2025-01-01 RX ADMIN — SODIUM CHLORIDE, PRESERVATIVE FREE 10 ML: 5 INJECTION INTRAVENOUS at 21:03

## 2025-01-01 RX ADMIN — Medication: at 21:06

## 2025-01-01 RX ADMIN — HYDROXYCHLOROQUINE SULFATE 200 MG: 200 TABLET ORAL at 08:03

## 2025-01-01 RX ADMIN — HYDROCODONE BITARTRATE AND ACETAMINOPHEN 1 TABLET: 7.5; 325 TABLET ORAL at 16:40

## 2025-01-01 RX ADMIN — HYDROCORTISONE ACETATE: 1 CREAM TOPICAL at 16:37

## 2025-01-01 RX ADMIN — ENOXAPARIN SODIUM 40 MG: 100 INJECTION SUBCUTANEOUS at 08:04

## 2025-01-01 RX ADMIN — HYDROCORTISONE ACETATE: 1 CREAM TOPICAL at 08:09

## 2025-01-01 RX ADMIN — PANTOPRAZOLE SODIUM 40 MG: 40 TABLET, DELAYED RELEASE ORAL at 16:35

## 2025-01-01 RX ADMIN — PREGABALIN 150 MG: 75 CAPSULE ORAL at 08:04

## 2025-01-01 RX ADMIN — LISINOPRIL 20 MG: 20 TABLET ORAL at 08:04

## 2025-01-01 ASSESSMENT — PAIN DESCRIPTION - DESCRIPTORS
DESCRIPTORS: BURNING
DESCRIPTORS: ACHING
DESCRIPTORS: BURNING

## 2025-01-01 ASSESSMENT — PAIN SCALES - GENERAL
PAINLEVEL_OUTOF10: 10
PAINLEVEL_OUTOF10: 7
PAINLEVEL_OUTOF10: 0
PAINLEVEL_OUTOF10: 7
PAINLEVEL_OUTOF10: 0

## 2025-01-01 ASSESSMENT — PAIN DESCRIPTION - ORIENTATION
ORIENTATION: POSTERIOR
ORIENTATION: POSTERIOR
ORIENTATION: RIGHT;LEFT

## 2025-01-01 ASSESSMENT — PAIN DESCRIPTION - LOCATION
LOCATION: KNEE
LOCATION: KNEE
LOCATION: LEG

## 2025-01-01 ASSESSMENT — PAIN SCALES - WONG BAKER: WONGBAKER_NUMERICALRESPONSE: NO HURT

## 2025-01-02 PROCEDURE — 2500000003 HC RX 250 WO HCPCS: Performed by: STUDENT IN AN ORGANIZED HEALTH CARE EDUCATION/TRAINING PROGRAM

## 2025-01-02 PROCEDURE — 2580000003 HC RX 258: Performed by: INTERNAL MEDICINE

## 2025-01-02 PROCEDURE — 6370000000 HC RX 637 (ALT 250 FOR IP): Performed by: FAMILY MEDICINE

## 2025-01-02 PROCEDURE — 6360000002 HC RX W HCPCS: Performed by: STUDENT IN AN ORGANIZED HEALTH CARE EDUCATION/TRAINING PROGRAM

## 2025-01-02 PROCEDURE — 6370000000 HC RX 637 (ALT 250 FOR IP): Performed by: INTERNAL MEDICINE

## 2025-01-02 PROCEDURE — 6360000002 HC RX W HCPCS: Performed by: INTERNAL MEDICINE

## 2025-01-02 PROCEDURE — 1100000000 HC RM PRIVATE

## 2025-01-02 PROCEDURE — 6370000000 HC RX 637 (ALT 250 FOR IP): Performed by: STUDENT IN AN ORGANIZED HEALTH CARE EDUCATION/TRAINING PROGRAM

## 2025-01-02 PROCEDURE — 6370000000 HC RX 637 (ALT 250 FOR IP): Performed by: NURSE PRACTITIONER

## 2025-01-02 RX ADMIN — VANCOMYCIN HYDROCHLORIDE 1250 MG: 10 INJECTION, POWDER, LYOPHILIZED, FOR SOLUTION INTRAVENOUS at 10:29

## 2025-01-02 RX ADMIN — PREGABALIN 150 MG: 75 CAPSULE ORAL at 20:21

## 2025-01-02 RX ADMIN — HYDROXYCHLOROQUINE SULFATE 200 MG: 200 TABLET ORAL at 08:19

## 2025-01-02 RX ADMIN — PANTOPRAZOLE SODIUM 40 MG: 40 TABLET, DELAYED RELEASE ORAL at 05:27

## 2025-01-02 RX ADMIN — HYDROMORPHONE HYDROCHLORIDE 0.5 MG: 1 INJECTION, SOLUTION INTRAMUSCULAR; INTRAVENOUS; SUBCUTANEOUS at 20:23

## 2025-01-02 RX ADMIN — HYDROCORTISONE ACETATE: 1 CREAM TOPICAL at 08:19

## 2025-01-02 RX ADMIN — PANTOPRAZOLE SODIUM 40 MG: 40 TABLET, DELAYED RELEASE ORAL at 17:04

## 2025-01-02 RX ADMIN — HYDROCORTISONE ACETATE: 1 CREAM TOPICAL at 20:24

## 2025-01-02 RX ADMIN — Medication 1 CAPSULE: at 17:04

## 2025-01-02 RX ADMIN — Medication 1 AMPULE: at 20:22

## 2025-01-02 RX ADMIN — SODIUM CHLORIDE, PRESERVATIVE FREE 10 ML: 5 INJECTION INTRAVENOUS at 20:22

## 2025-01-02 RX ADMIN — SODIUM CHLORIDE, PRESERVATIVE FREE 10 ML: 5 INJECTION INTRAVENOUS at 10:30

## 2025-01-02 RX ADMIN — Medication 1 CAPSULE: at 08:18

## 2025-01-02 RX ADMIN — ENOXAPARIN SODIUM 40 MG: 100 INJECTION SUBCUTANEOUS at 08:18

## 2025-01-02 RX ADMIN — HYDROCORTISONE ACETATE: 1 CREAM TOPICAL at 17:05

## 2025-01-02 RX ADMIN — Medication: at 17:06

## 2025-01-02 RX ADMIN — LISINOPRIL 20 MG: 20 TABLET ORAL at 08:18

## 2025-01-02 RX ADMIN — TRIAMCINOLONE ACETONIDE: 1 CREAM TOPICAL at 20:24

## 2025-01-02 RX ADMIN — Medication: at 20:26

## 2025-01-02 RX ADMIN — QUETIAPINE FUMARATE 50 MG: 100 TABLET ORAL at 20:21

## 2025-01-02 RX ADMIN — Medication 1 AMPULE: at 08:18

## 2025-01-02 RX ADMIN — HYDROCODONE BITARTRATE AND ACETAMINOPHEN 1 TABLET: 7.5; 325 TABLET ORAL at 17:04

## 2025-01-02 RX ADMIN — TRIAMCINOLONE ACETONIDE: 1 CREAM TOPICAL at 08:19

## 2025-01-02 RX ADMIN — DULOXETINE HYDROCHLORIDE 60 MG: 60 CAPSULE, DELAYED RELEASE ORAL at 08:18

## 2025-01-02 RX ADMIN — Medication: at 08:19

## 2025-01-02 RX ADMIN — PREGABALIN 150 MG: 75 CAPSULE ORAL at 08:18

## 2025-01-02 RX ADMIN — PREDNISONE 20 MG: 20 TABLET ORAL at 08:18

## 2025-01-02 ASSESSMENT — PAIN DESCRIPTION - LOCATION
LOCATION: GENERALIZED
LOCATION: KNEE

## 2025-01-02 ASSESSMENT — PAIN SCALES - GENERAL
PAINLEVEL_OUTOF10: 0
PAINLEVEL_OUTOF10: 9
PAINLEVEL_OUTOF10: 7
PAINLEVEL_OUTOF10: 0

## 2025-01-02 ASSESSMENT — PAIN DESCRIPTION - ORIENTATION: ORIENTATION: POSTERIOR;LEFT

## 2025-01-02 ASSESSMENT — PAIN DESCRIPTION - DESCRIPTORS
DESCRIPTORS: ACHING
DESCRIPTORS: ACHING;BURNING

## 2025-01-02 NOTE — WOUND CARE
Patient states she tore the back of left knee on the recliner, the skin flap is removed partially down to subcutaneous tissue, the remaining flap has rolled and adhered, concern the rolled skin may necrose at some point, has bruising. Patient has Pemphigus foliaceous, may be more difficult to heal skin tear secondary to this autoimmune disease. Recommend xeroform and dry dressing every other day. (Daily if she desires).

## 2025-01-03 LAB
ANION GAP SERPL CALC-SCNC: 10 MMOL/L (ref 7–16)
BUN SERPL-MCNC: 17 MG/DL (ref 8–23)
CALCIUM SERPL-MCNC: 8.8 MG/DL (ref 8.8–10.2)
CHLORIDE SERPL-SCNC: 104 MMOL/L (ref 98–107)
CO2 SERPL-SCNC: 28 MMOL/L (ref 20–29)
CREAT SERPL-MCNC: 0.86 MG/DL (ref 0.6–1.1)
CREAT SERPL-MCNC: 0.95 MG/DL (ref 0.6–1.1)
GLUCOSE SERPL-MCNC: 206 MG/DL (ref 70–99)
POTASSIUM SERPL-SCNC: 4.7 MMOL/L (ref 3.5–5.1)
SODIUM SERPL-SCNC: 141 MMOL/L (ref 136–145)

## 2025-01-03 PROCEDURE — 94760 N-INVAS EAR/PLS OXIMETRY 1: CPT

## 2025-01-03 PROCEDURE — 80048 BASIC METABOLIC PNL TOTAL CA: CPT

## 2025-01-03 PROCEDURE — 2580000003 HC RX 258: Performed by: INTERNAL MEDICINE

## 2025-01-03 PROCEDURE — 2700000000 HC OXYGEN THERAPY PER DAY

## 2025-01-03 PROCEDURE — 2500000003 HC RX 250 WO HCPCS: Performed by: STUDENT IN AN ORGANIZED HEALTH CARE EDUCATION/TRAINING PROGRAM

## 2025-01-03 PROCEDURE — 6370000000 HC RX 637 (ALT 250 FOR IP): Performed by: FAMILY MEDICINE

## 2025-01-03 PROCEDURE — 6360000002 HC RX W HCPCS: Performed by: INTERNAL MEDICINE

## 2025-01-03 PROCEDURE — 36415 COLL VENOUS BLD VENIPUNCTURE: CPT

## 2025-01-03 PROCEDURE — 6370000000 HC RX 637 (ALT 250 FOR IP): Performed by: INTERNAL MEDICINE

## 2025-01-03 PROCEDURE — 6370000000 HC RX 637 (ALT 250 FOR IP): Performed by: STUDENT IN AN ORGANIZED HEALTH CARE EDUCATION/TRAINING PROGRAM

## 2025-01-03 PROCEDURE — 1100000000 HC RM PRIVATE

## 2025-01-03 PROCEDURE — 6360000002 HC RX W HCPCS: Performed by: STUDENT IN AN ORGANIZED HEALTH CARE EDUCATION/TRAINING PROGRAM

## 2025-01-03 RX ADMIN — ENOXAPARIN SODIUM 40 MG: 100 INJECTION SUBCUTANEOUS at 08:42

## 2025-01-03 RX ADMIN — Medication 1 CAPSULE: at 16:51

## 2025-01-03 RX ADMIN — HYDROCODONE BITARTRATE AND ACETAMINOPHEN 1 TABLET: 7.5; 325 TABLET ORAL at 14:06

## 2025-01-03 RX ADMIN — PREDNISONE 20 MG: 20 TABLET ORAL at 08:41

## 2025-01-03 RX ADMIN — HYDROCORTISONE ACETATE: 1 CREAM TOPICAL at 21:12

## 2025-01-03 RX ADMIN — Medication 1 CAPSULE: at 08:41

## 2025-01-03 RX ADMIN — LISINOPRIL 20 MG: 20 TABLET ORAL at 08:41

## 2025-01-03 RX ADMIN — TRIAMCINOLONE ACETONIDE: 1 CREAM TOPICAL at 08:49

## 2025-01-03 RX ADMIN — Medication: at 21:10

## 2025-01-03 RX ADMIN — SODIUM CHLORIDE, PRESERVATIVE FREE 10 ML: 5 INJECTION INTRAVENOUS at 08:44

## 2025-01-03 RX ADMIN — TRIAMCINOLONE ACETONIDE: 1 CREAM TOPICAL at 21:11

## 2025-01-03 RX ADMIN — SODIUM CHLORIDE, PRESERVATIVE FREE 10 ML: 5 INJECTION INTRAVENOUS at 21:03

## 2025-01-03 RX ADMIN — PANTOPRAZOLE SODIUM 40 MG: 40 TABLET, DELAYED RELEASE ORAL at 05:11

## 2025-01-03 RX ADMIN — Medication: at 08:47

## 2025-01-03 RX ADMIN — Medication: at 16:52

## 2025-01-03 RX ADMIN — PREGABALIN 150 MG: 75 CAPSULE ORAL at 08:41

## 2025-01-03 RX ADMIN — VANCOMYCIN HYDROCHLORIDE 1250 MG: 10 INJECTION, POWDER, LYOPHILIZED, FOR SOLUTION INTRAVENOUS at 10:37

## 2025-01-03 RX ADMIN — DULOXETINE HYDROCHLORIDE 60 MG: 60 CAPSULE, DELAYED RELEASE ORAL at 08:41

## 2025-01-03 RX ADMIN — PREGABALIN 150 MG: 75 CAPSULE ORAL at 20:56

## 2025-01-03 RX ADMIN — PANTOPRAZOLE SODIUM 40 MG: 40 TABLET, DELAYED RELEASE ORAL at 16:51

## 2025-01-03 RX ADMIN — HYDROMORPHONE HYDROCHLORIDE 0.5 MG: 1 INJECTION, SOLUTION INTRAMUSCULAR; INTRAVENOUS; SUBCUTANEOUS at 20:56

## 2025-01-03 RX ADMIN — QUETIAPINE FUMARATE 50 MG: 100 TABLET ORAL at 20:56

## 2025-01-03 RX ADMIN — HYDROCORTISONE ACETATE: 1 CREAM TOPICAL at 16:52

## 2025-01-03 RX ADMIN — HYDROCORTISONE ACETATE: 1 CREAM TOPICAL at 08:46

## 2025-01-03 RX ADMIN — HYDROXYCHLOROQUINE SULFATE 200 MG: 200 TABLET ORAL at 08:41

## 2025-01-03 RX ADMIN — SODIUM CHLORIDE, PRESERVATIVE FREE 10 ML: 5 INJECTION INTRAVENOUS at 08:43

## 2025-01-03 ASSESSMENT — PAIN SCALES - GENERAL
PAINLEVEL_OUTOF10: 7
PAINLEVEL_OUTOF10: 0
PAINLEVEL_OUTOF10: 7

## 2025-01-03 ASSESSMENT — PAIN DESCRIPTION - DESCRIPTORS: DESCRIPTORS: ACHING

## 2025-01-03 ASSESSMENT — PAIN DESCRIPTION - LOCATION
LOCATION: GENERALIZED
LOCATION: LEG

## 2025-01-03 ASSESSMENT — PAIN DESCRIPTION - ORIENTATION: ORIENTATION: LEFT;RIGHT

## 2025-01-03 NOTE — CARE COORDINATION
Chart screened by CM for d/c planning.  Pt to remain IP through 1/22 to complete IV ABX.  CM will continue to follow.  LOS = 28 days

## 2025-01-04 LAB
BASOPHILS # BLD: 0.1 K/UL (ref 0–0.2)
BASOPHILS NFR BLD: 1 % (ref 0–2)
DIFFERENTIAL METHOD BLD: ABNORMAL
EOSINOPHIL # BLD: 0.4 K/UL (ref 0–0.8)
EOSINOPHIL NFR BLD: 4 % (ref 0.5–7.8)
ERYTHROCYTE [DISTWIDTH] IN BLOOD BY AUTOMATED COUNT: 18.3 % (ref 11.9–14.6)
HCT VFR BLD AUTO: 38 % (ref 35.8–46.3)
HGB BLD-MCNC: 11.1 G/DL (ref 11.7–15.4)
IMM GRANULOCYTES # BLD AUTO: 0 K/UL (ref 0–0.5)
IMM GRANULOCYTES NFR BLD AUTO: 0 % (ref 0–5)
LYMPHOCYTES # BLD: 1.3 K/UL (ref 0.5–4.6)
LYMPHOCYTES NFR BLD: 13 % (ref 13–44)
MCH RBC QN AUTO: 25.1 PG (ref 26.1–32.9)
MCHC RBC AUTO-ENTMCNC: 29.2 G/DL (ref 31.4–35)
MCV RBC AUTO: 86 FL (ref 82–102)
MONOCYTES # BLD: 0.4 K/UL (ref 0.1–1.3)
MONOCYTES NFR BLD: 4 % (ref 4–12)
NEUTS SEG # BLD: 8 K/UL (ref 1.7–8.2)
NEUTS SEG NFR BLD: 79 % (ref 43–78)
NRBC # BLD: 0 K/UL (ref 0–0.2)
PLATELET # BLD AUTO: 236 K/UL (ref 150–450)
PMV BLD AUTO: 10 FL (ref 9.4–12.3)
RBC # BLD AUTO: 4.42 M/UL (ref 4.05–5.2)
WBC # BLD AUTO: 10.2 K/UL (ref 4.3–11.1)

## 2025-01-04 PROCEDURE — 6360000002 HC RX W HCPCS: Performed by: STUDENT IN AN ORGANIZED HEALTH CARE EDUCATION/TRAINING PROGRAM

## 2025-01-04 PROCEDURE — 6360000002 HC RX W HCPCS: Performed by: INTERNAL MEDICINE

## 2025-01-04 PROCEDURE — 6370000000 HC RX 637 (ALT 250 FOR IP): Performed by: FAMILY MEDICINE

## 2025-01-04 PROCEDURE — 94760 N-INVAS EAR/PLS OXIMETRY 1: CPT

## 2025-01-04 PROCEDURE — 2700000000 HC OXYGEN THERAPY PER DAY

## 2025-01-04 PROCEDURE — 2580000003 HC RX 258: Performed by: INTERNAL MEDICINE

## 2025-01-04 PROCEDURE — 6370000000 HC RX 637 (ALT 250 FOR IP): Performed by: INTERNAL MEDICINE

## 2025-01-04 PROCEDURE — 1100000000 HC RM PRIVATE

## 2025-01-04 PROCEDURE — 2500000003 HC RX 250 WO HCPCS: Performed by: STUDENT IN AN ORGANIZED HEALTH CARE EDUCATION/TRAINING PROGRAM

## 2025-01-04 PROCEDURE — 6370000000 HC RX 637 (ALT 250 FOR IP): Performed by: STUDENT IN AN ORGANIZED HEALTH CARE EDUCATION/TRAINING PROGRAM

## 2025-01-04 PROCEDURE — 36415 COLL VENOUS BLD VENIPUNCTURE: CPT

## 2025-01-04 PROCEDURE — 85025 COMPLETE CBC W/AUTO DIFF WBC: CPT

## 2025-01-04 RX ADMIN — PREDNISONE 15 MG: 10 TABLET ORAL at 08:37

## 2025-01-04 RX ADMIN — QUETIAPINE FUMARATE 50 MG: 100 TABLET ORAL at 21:11

## 2025-01-04 RX ADMIN — TRIAMCINOLONE ACETONIDE: 1 CREAM TOPICAL at 21:21

## 2025-01-04 RX ADMIN — TRIAMCINOLONE ACETONIDE: 1 CREAM TOPICAL at 08:40

## 2025-01-04 RX ADMIN — Medication 1 CAPSULE: at 16:43

## 2025-01-04 RX ADMIN — PANTOPRAZOLE SODIUM 40 MG: 40 TABLET, DELAYED RELEASE ORAL at 08:36

## 2025-01-04 RX ADMIN — SODIUM CHLORIDE, PRESERVATIVE FREE 10 ML: 5 INJECTION INTRAVENOUS at 08:41

## 2025-01-04 RX ADMIN — SODIUM CHLORIDE, PRESERVATIVE FREE 10 ML: 5 INJECTION INTRAVENOUS at 21:15

## 2025-01-04 RX ADMIN — HYDROMORPHONE HYDROCHLORIDE 0.5 MG: 1 INJECTION, SOLUTION INTRAMUSCULAR; INTRAVENOUS; SUBCUTANEOUS at 21:10

## 2025-01-04 RX ADMIN — SODIUM CHLORIDE, PRESERVATIVE FREE 10 ML: 5 INJECTION INTRAVENOUS at 08:42

## 2025-01-04 RX ADMIN — PREGABALIN 150 MG: 75 CAPSULE ORAL at 08:37

## 2025-01-04 RX ADMIN — PREGABALIN 150 MG: 75 CAPSULE ORAL at 21:11

## 2025-01-04 RX ADMIN — DULOXETINE HYDROCHLORIDE 60 MG: 60 CAPSULE, DELAYED RELEASE ORAL at 08:36

## 2025-01-04 RX ADMIN — Medication: at 21:21

## 2025-01-04 RX ADMIN — Medication 1 CAPSULE: at 08:36

## 2025-01-04 RX ADMIN — HYDROXYCHLOROQUINE SULFATE 200 MG: 200 TABLET ORAL at 08:36

## 2025-01-04 RX ADMIN — PANTOPRAZOLE SODIUM 40 MG: 40 TABLET, DELAYED RELEASE ORAL at 16:43

## 2025-01-04 RX ADMIN — HYDROCODONE BITARTRATE AND ACETAMINOPHEN 1 TABLET: 7.5; 325 TABLET ORAL at 10:11

## 2025-01-04 RX ADMIN — VANCOMYCIN HYDROCHLORIDE 1250 MG: 10 INJECTION, POWDER, LYOPHILIZED, FOR SOLUTION INTRAVENOUS at 10:59

## 2025-01-04 RX ADMIN — HYDROCORTISONE ACETATE: 1 CREAM TOPICAL at 08:39

## 2025-01-04 RX ADMIN — LISINOPRIL 20 MG: 20 TABLET ORAL at 16:44

## 2025-01-04 RX ADMIN — HYDROCORTISONE ACETATE: 1 CREAM TOPICAL at 21:22

## 2025-01-04 RX ADMIN — ENOXAPARIN SODIUM 40 MG: 100 INJECTION SUBCUTANEOUS at 08:37

## 2025-01-04 RX ADMIN — Medication: at 08:38

## 2025-01-04 ASSESSMENT — PAIN DESCRIPTION - ORIENTATION
ORIENTATION: RIGHT
ORIENTATION: RIGHT

## 2025-01-04 ASSESSMENT — PAIN DESCRIPTION - DESCRIPTORS
DESCRIPTORS: ACHING;CRAMPING
DESCRIPTORS: SHARP

## 2025-01-04 ASSESSMENT — PAIN DESCRIPTION - LOCATION
LOCATION: ABDOMEN
LOCATION: ABDOMEN

## 2025-01-04 ASSESSMENT — PAIN SCALES - GENERAL
PAINLEVEL_OUTOF10: 0
PAINLEVEL_OUTOF10: 7
PAINLEVEL_OUTOF10: 0

## 2025-01-05 LAB
EST. AVERAGE GLUCOSE BLD GHB EST-MCNC: 160 MG/DL
GLUCOSE BLD STRIP.AUTO-MCNC: 112 MG/DL (ref 65–100)
GLUCOSE BLD STRIP.AUTO-MCNC: 169 MG/DL (ref 65–100)
HBA1C MFR BLD: 7.2 % (ref 0–5.6)
SERVICE CMNT-IMP: ABNORMAL
SERVICE CMNT-IMP: ABNORMAL

## 2025-01-05 PROCEDURE — 2500000003 HC RX 250 WO HCPCS: Performed by: STUDENT IN AN ORGANIZED HEALTH CARE EDUCATION/TRAINING PROGRAM

## 2025-01-05 PROCEDURE — 1100000000 HC RM PRIVATE

## 2025-01-05 PROCEDURE — 82962 GLUCOSE BLOOD TEST: CPT

## 2025-01-05 PROCEDURE — 6360000002 HC RX W HCPCS: Performed by: STUDENT IN AN ORGANIZED HEALTH CARE EDUCATION/TRAINING PROGRAM

## 2025-01-05 PROCEDURE — 36415 COLL VENOUS BLD VENIPUNCTURE: CPT

## 2025-01-05 PROCEDURE — 6370000000 HC RX 637 (ALT 250 FOR IP): Performed by: INTERNAL MEDICINE

## 2025-01-05 PROCEDURE — 83036 HEMOGLOBIN GLYCOSYLATED A1C: CPT

## 2025-01-05 PROCEDURE — 6370000000 HC RX 637 (ALT 250 FOR IP): Performed by: STUDENT IN AN ORGANIZED HEALTH CARE EDUCATION/TRAINING PROGRAM

## 2025-01-05 PROCEDURE — 6360000002 HC RX W HCPCS: Performed by: INTERNAL MEDICINE

## 2025-01-05 PROCEDURE — 2580000003 HC RX 258: Performed by: INTERNAL MEDICINE

## 2025-01-05 PROCEDURE — 6370000000 HC RX 637 (ALT 250 FOR IP): Performed by: FAMILY MEDICINE

## 2025-01-05 RX ORDER — INSULIN LISPRO 100 [IU]/ML
0-8 INJECTION, SOLUTION INTRAVENOUS; SUBCUTANEOUS
Status: DISCONTINUED | OUTPATIENT
Start: 2025-01-05 | End: 2025-01-08

## 2025-01-05 RX ORDER — DEXTROSE MONOHYDRATE 100 MG/ML
INJECTION, SOLUTION INTRAVENOUS CONTINUOUS PRN
Status: DISCONTINUED | OUTPATIENT
Start: 2025-01-05 | End: 2025-01-23 | Stop reason: HOSPADM

## 2025-01-05 RX ORDER — IBUPROFEN 600 MG/1
1 TABLET ORAL PRN
Status: DISCONTINUED | OUTPATIENT
Start: 2025-01-05 | End: 2025-01-23 | Stop reason: HOSPADM

## 2025-01-05 RX ADMIN — SODIUM CHLORIDE, PRESERVATIVE FREE 10 ML: 5 INJECTION INTRAVENOUS at 08:36

## 2025-01-05 RX ADMIN — PREDNISONE 15 MG: 10 TABLET ORAL at 08:34

## 2025-01-05 RX ADMIN — VANCOMYCIN HYDROCHLORIDE 1250 MG: 10 INJECTION, POWDER, LYOPHILIZED, FOR SOLUTION INTRAVENOUS at 10:56

## 2025-01-05 RX ADMIN — PREGABALIN 150 MG: 75 CAPSULE ORAL at 21:26

## 2025-01-05 RX ADMIN — HYDROMORPHONE HYDROCHLORIDE 0.5 MG: 1 INJECTION, SOLUTION INTRAMUSCULAR; INTRAVENOUS; SUBCUTANEOUS at 21:24

## 2025-01-05 RX ADMIN — HYDROCORTISONE ACETATE: 1 CREAM TOPICAL at 21:32

## 2025-01-05 RX ADMIN — Medication 1 CAPSULE: at 08:33

## 2025-01-05 RX ADMIN — SODIUM CHLORIDE, PRESERVATIVE FREE 10 ML: 5 INJECTION INTRAVENOUS at 21:46

## 2025-01-05 RX ADMIN — Medication 1 CAPSULE: at 17:18

## 2025-01-05 RX ADMIN — PANTOPRAZOLE SODIUM 40 MG: 40 TABLET, DELAYED RELEASE ORAL at 08:33

## 2025-01-05 RX ADMIN — HYDROXYCHLOROQUINE SULFATE 200 MG: 200 TABLET ORAL at 08:34

## 2025-01-05 RX ADMIN — DULOXETINE HYDROCHLORIDE 60 MG: 60 CAPSULE, DELAYED RELEASE ORAL at 08:34

## 2025-01-05 RX ADMIN — PANTOPRAZOLE SODIUM 40 MG: 40 TABLET, DELAYED RELEASE ORAL at 17:18

## 2025-01-05 RX ADMIN — ENOXAPARIN SODIUM 40 MG: 100 INJECTION SUBCUTANEOUS at 08:34

## 2025-01-05 RX ADMIN — PREGABALIN 150 MG: 75 CAPSULE ORAL at 08:33

## 2025-01-05 RX ADMIN — TRIAMCINOLONE ACETONIDE: 1 CREAM TOPICAL at 21:32

## 2025-01-05 RX ADMIN — HYDROCORTISONE ACETATE: 1 CREAM TOPICAL at 12:53

## 2025-01-05 RX ADMIN — HYDROCODONE BITARTRATE AND ACETAMINOPHEN 1 TABLET: 7.5; 325 TABLET ORAL at 09:38

## 2025-01-05 RX ADMIN — Medication: at 21:33

## 2025-01-05 RX ADMIN — Medication: at 12:52

## 2025-01-05 RX ADMIN — QUETIAPINE FUMARATE 50 MG: 100 TABLET ORAL at 21:26

## 2025-01-05 RX ADMIN — LISINOPRIL 20 MG: 20 TABLET ORAL at 08:34

## 2025-01-05 ASSESSMENT — PAIN SCALES - GENERAL
PAINLEVEL_OUTOF10: 0
PAINLEVEL_OUTOF10: 6
PAINLEVEL_OUTOF10: 0
PAINLEVEL_OUTOF10: 7

## 2025-01-05 ASSESSMENT — PAIN DESCRIPTION - LOCATION
LOCATION: ARM
LOCATION: ABDOMEN

## 2025-01-05 ASSESSMENT — PAIN DESCRIPTION - ORIENTATION: ORIENTATION: LEFT

## 2025-01-05 ASSESSMENT — PAIN DESCRIPTION - DESCRIPTORS: DESCRIPTORS: DISCOMFORT

## 2025-01-05 ASSESSMENT — PAIN - FUNCTIONAL ASSESSMENT: PAIN_FUNCTIONAL_ASSESSMENT: ACTIVITIES ARE NOT PREVENTED

## 2025-01-06 PROBLEM — E86.0 DEHYDRATION: Status: RESOLVED | Noted: 2024-12-07 | Resolved: 2025-01-06

## 2025-01-06 LAB
CREAT SERPL-MCNC: 0.79 MG/DL (ref 0.6–1.1)
GLUCOSE BLD STRIP.AUTO-MCNC: 116 MG/DL (ref 65–100)
GLUCOSE BLD STRIP.AUTO-MCNC: 150 MG/DL (ref 65–100)
GLUCOSE BLD STRIP.AUTO-MCNC: 91 MG/DL (ref 65–100)
SERVICE CMNT-IMP: ABNORMAL
SERVICE CMNT-IMP: ABNORMAL
SERVICE CMNT-IMP: NORMAL
VANCOMYCIN SERPL-MCNC: 13.2 UG/ML

## 2025-01-06 PROCEDURE — 2500000003 HC RX 250 WO HCPCS: Performed by: STUDENT IN AN ORGANIZED HEALTH CARE EDUCATION/TRAINING PROGRAM

## 2025-01-06 PROCEDURE — 6370000000 HC RX 637 (ALT 250 FOR IP): Performed by: FAMILY MEDICINE

## 2025-01-06 PROCEDURE — 1100000000 HC RM PRIVATE

## 2025-01-06 PROCEDURE — 6370000000 HC RX 637 (ALT 250 FOR IP): Performed by: STUDENT IN AN ORGANIZED HEALTH CARE EDUCATION/TRAINING PROGRAM

## 2025-01-06 PROCEDURE — 6360000002 HC RX W HCPCS: Performed by: INTERNAL MEDICINE

## 2025-01-06 PROCEDURE — 82565 ASSAY OF CREATININE: CPT

## 2025-01-06 PROCEDURE — 82962 GLUCOSE BLOOD TEST: CPT

## 2025-01-06 PROCEDURE — 6370000000 HC RX 637 (ALT 250 FOR IP): Performed by: INTERNAL MEDICINE

## 2025-01-06 PROCEDURE — 6360000002 HC RX W HCPCS: Performed by: STUDENT IN AN ORGANIZED HEALTH CARE EDUCATION/TRAINING PROGRAM

## 2025-01-06 PROCEDURE — 80202 ASSAY OF VANCOMYCIN: CPT

## 2025-01-06 PROCEDURE — 2580000003 HC RX 258: Performed by: INTERNAL MEDICINE

## 2025-01-06 PROCEDURE — 36415 COLL VENOUS BLD VENIPUNCTURE: CPT

## 2025-01-06 RX ADMIN — SODIUM CHLORIDE, PRESERVATIVE FREE 10 ML: 5 INJECTION INTRAVENOUS at 20:20

## 2025-01-06 RX ADMIN — PANTOPRAZOLE SODIUM 40 MG: 40 TABLET, DELAYED RELEASE ORAL at 16:59

## 2025-01-06 RX ADMIN — SODIUM CHLORIDE, PRESERVATIVE FREE 10 ML: 5 INJECTION INTRAVENOUS at 08:54

## 2025-01-06 RX ADMIN — DULOXETINE HYDROCHLORIDE 60 MG: 60 CAPSULE, DELAYED RELEASE ORAL at 08:52

## 2025-01-06 RX ADMIN — Medication: at 20:26

## 2025-01-06 RX ADMIN — PANTOPRAZOLE SODIUM 40 MG: 40 TABLET, DELAYED RELEASE ORAL at 08:53

## 2025-01-06 RX ADMIN — Medication: at 15:37

## 2025-01-06 RX ADMIN — HYDROMORPHONE HYDROCHLORIDE 0.5 MG: 1 INJECTION, SOLUTION INTRAMUSCULAR; INTRAVENOUS; SUBCUTANEOUS at 20:22

## 2025-01-06 RX ADMIN — HYDROCORTISONE ACETATE: 1 CREAM TOPICAL at 15:39

## 2025-01-06 RX ADMIN — HYDROXYCHLOROQUINE SULFATE 200 MG: 200 TABLET ORAL at 08:52

## 2025-01-06 RX ADMIN — LISINOPRIL 20 MG: 20 TABLET ORAL at 17:04

## 2025-01-06 RX ADMIN — TRIAMCINOLONE ACETONIDE: 1 CREAM TOPICAL at 20:27

## 2025-01-06 RX ADMIN — QUETIAPINE FUMARATE 50 MG: 100 TABLET ORAL at 20:23

## 2025-01-06 RX ADMIN — VANCOMYCIN HYDROCHLORIDE 1250 MG: 10 INJECTION, POWDER, LYOPHILIZED, FOR SOLUTION INTRAVENOUS at 11:35

## 2025-01-06 RX ADMIN — PREGABALIN 150 MG: 75 CAPSULE ORAL at 20:23

## 2025-01-06 RX ADMIN — ENOXAPARIN SODIUM 40 MG: 100 INJECTION SUBCUTANEOUS at 08:53

## 2025-01-06 RX ADMIN — HYDROCODONE BITARTRATE AND ACETAMINOPHEN 1 TABLET: 7.5; 325 TABLET ORAL at 11:47

## 2025-01-06 RX ADMIN — Medication 1 CAPSULE: at 16:59

## 2025-01-06 RX ADMIN — PREGABALIN 150 MG: 75 CAPSULE ORAL at 08:52

## 2025-01-06 RX ADMIN — Medication 1 CAPSULE: at 08:53

## 2025-01-06 RX ADMIN — PREDNISONE 15 MG: 10 TABLET ORAL at 08:52

## 2025-01-06 RX ADMIN — HYDROCORTISONE ACETATE: 1 CREAM TOPICAL at 20:26

## 2025-01-06 ASSESSMENT — PAIN SCALES - GENERAL
PAINLEVEL_OUTOF10: 0
PAINLEVEL_OUTOF10: 7
PAINLEVEL_OUTOF10: 7

## 2025-01-06 ASSESSMENT — PAIN DESCRIPTION - DESCRIPTORS
DESCRIPTORS: ACHING
DESCRIPTORS: ACHING;PRESSURE

## 2025-01-06 ASSESSMENT — PAIN DESCRIPTION - LOCATION: LOCATION: GENERALIZED

## 2025-01-06 ASSESSMENT — PAIN DESCRIPTION - ORIENTATION: ORIENTATION: RIGHT

## 2025-01-06 NOTE — CARE COORDINATION
RN CM in room, patient sitting up in bed, alert and oriented, aware of long term abx with extended hospital stay. Gave list for HH options incase it is still needed at discharge. CM will continue to follow.

## 2025-01-07 ENCOUNTER — APPOINTMENT (OUTPATIENT)
Dept: GENERAL RADIOLOGY | Age: 70
DRG: 288 | End: 2025-01-07
Payer: MEDICARE

## 2025-01-07 LAB
GLUCOSE BLD STRIP.AUTO-MCNC: 115 MG/DL (ref 65–100)
GLUCOSE BLD STRIP.AUTO-MCNC: 128 MG/DL (ref 65–100)
GLUCOSE BLD STRIP.AUTO-MCNC: 164 MG/DL (ref 65–100)
GLUCOSE BLD STRIP.AUTO-MCNC: 172 MG/DL (ref 65–100)
SERVICE CMNT-IMP: ABNORMAL

## 2025-01-07 PROCEDURE — 6360000002 HC RX W HCPCS: Performed by: STUDENT IN AN ORGANIZED HEALTH CARE EDUCATION/TRAINING PROGRAM

## 2025-01-07 PROCEDURE — 2500000003 HC RX 250 WO HCPCS: Performed by: STUDENT IN AN ORGANIZED HEALTH CARE EDUCATION/TRAINING PROGRAM

## 2025-01-07 PROCEDURE — 6370000000 HC RX 637 (ALT 250 FOR IP): Performed by: STUDENT IN AN ORGANIZED HEALTH CARE EDUCATION/TRAINING PROGRAM

## 2025-01-07 PROCEDURE — 1100000000 HC RM PRIVATE

## 2025-01-07 PROCEDURE — 82962 GLUCOSE BLOOD TEST: CPT

## 2025-01-07 PROCEDURE — 71101 X-RAY EXAM UNILAT RIBS/CHEST: CPT

## 2025-01-07 PROCEDURE — 2580000003 HC RX 258: Performed by: INTERNAL MEDICINE

## 2025-01-07 PROCEDURE — 6370000000 HC RX 637 (ALT 250 FOR IP): Performed by: INTERNAL MEDICINE

## 2025-01-07 PROCEDURE — 6360000002 HC RX W HCPCS: Performed by: INTERNAL MEDICINE

## 2025-01-07 PROCEDURE — 6370000000 HC RX 637 (ALT 250 FOR IP): Performed by: FAMILY MEDICINE

## 2025-01-07 RX ADMIN — HYDROMORPHONE HYDROCHLORIDE 0.5 MG: 1 INJECTION, SOLUTION INTRAMUSCULAR; INTRAVENOUS; SUBCUTANEOUS at 20:18

## 2025-01-07 RX ADMIN — TRIAMCINOLONE ACETONIDE: 1 CREAM TOPICAL at 14:07

## 2025-01-07 RX ADMIN — HYDROXYCHLOROQUINE SULFATE 200 MG: 200 TABLET ORAL at 08:40

## 2025-01-07 RX ADMIN — LISINOPRIL 20 MG: 20 TABLET ORAL at 08:40

## 2025-01-07 RX ADMIN — ENOXAPARIN SODIUM 40 MG: 100 INJECTION SUBCUTANEOUS at 08:43

## 2025-01-07 RX ADMIN — Medication: at 20:16

## 2025-01-07 RX ADMIN — HYDROCORTISONE ACETATE: 1 CREAM TOPICAL at 20:16

## 2025-01-07 RX ADMIN — SODIUM CHLORIDE, PRESERVATIVE FREE 10 ML: 5 INJECTION INTRAVENOUS at 20:20

## 2025-01-07 RX ADMIN — PREGABALIN 150 MG: 75 CAPSULE ORAL at 08:41

## 2025-01-07 RX ADMIN — HYDROCODONE BITARTRATE AND ACETAMINOPHEN 1 TABLET: 7.5; 325 TABLET ORAL at 14:00

## 2025-01-07 RX ADMIN — PANTOPRAZOLE SODIUM 40 MG: 40 TABLET, DELAYED RELEASE ORAL at 08:41

## 2025-01-07 RX ADMIN — QUETIAPINE FUMARATE 50 MG: 100 TABLET ORAL at 20:17

## 2025-01-07 RX ADMIN — VANCOMYCIN HYDROCHLORIDE 1250 MG: 10 INJECTION, POWDER, LYOPHILIZED, FOR SOLUTION INTRAVENOUS at 11:28

## 2025-01-07 RX ADMIN — TRIAMCINOLONE ACETONIDE: 1 CREAM TOPICAL at 20:16

## 2025-01-07 RX ADMIN — HYDROCORTISONE ACETATE: 1 CREAM TOPICAL at 14:08

## 2025-01-07 RX ADMIN — SODIUM CHLORIDE, PRESERVATIVE FREE 10 ML: 5 INJECTION INTRAVENOUS at 08:44

## 2025-01-07 RX ADMIN — PREGABALIN 150 MG: 75 CAPSULE ORAL at 20:17

## 2025-01-07 RX ADMIN — PREDNISONE 15 MG: 10 TABLET ORAL at 08:40

## 2025-01-07 RX ADMIN — DULOXETINE HYDROCHLORIDE 60 MG: 60 CAPSULE, DELAYED RELEASE ORAL at 08:41

## 2025-01-07 RX ADMIN — Medication 1 CAPSULE: at 08:41

## 2025-01-07 RX ADMIN — Medication: at 14:07

## 2025-01-07 ASSESSMENT — PAIN DESCRIPTION - ORIENTATION: ORIENTATION: RIGHT

## 2025-01-07 ASSESSMENT — PAIN SCALES - GENERAL
PAINLEVEL_OUTOF10: 0
PAINLEVEL_OUTOF10: 9
PAINLEVEL_OUTOF10: 7
PAINLEVEL_OUTOF10: 0

## 2025-01-07 ASSESSMENT — PAIN DESCRIPTION - LOCATION
LOCATION: RIB CAGE
LOCATION: ABDOMEN

## 2025-01-07 ASSESSMENT — PAIN DESCRIPTION - DESCRIPTORS
DESCRIPTORS: ACHING
DESCRIPTORS: ACHING;CRAMPING

## 2025-01-07 NOTE — PLAN OF CARE
Problem: Discharge Planning  Goal: Discharge to home or other facility with appropriate resources  Outcome: Progressing     Problem: Safety - Adult  Goal: Free from fall injury  Outcome: Progressing     Problem: Pain  Goal: Verbalizes/displays adequate comfort level or baseline comfort level  Outcome: Progressing  Flowsheets (Taken 1/6/2025 1930)  Verbalizes/displays adequate comfort level or baseline comfort level:   Encourage patient to monitor pain and request assistance   Assess pain using appropriate pain scale   Administer analgesics based on type and severity of pain and evaluate response     Problem: Risk for Elopement  Goal: Patient will not exit the unit/facility without proper excort  Outcome: Progressing     Problem: Skin/Tissue Integrity  Goal: Absence of new skin breakdown  Description: 1.  Monitor for areas of redness and/or skin breakdown  2.  Assess vascular access sites hourly  3.  Every 4-6 hours minimum:  Change oxygen saturation probe site  4.  Every 4-6 hours:  If on nasal continuous positive airway pressure, respiratory therapy assess nares and determine need for appliance change or resting period.  Outcome: Progressing     Problem: ABCDS Injury Assessment  Goal: Absence of physical injury  Outcome: Progressing

## 2025-01-07 NOTE — PLAN OF CARE
Problem: Discharge Planning  Goal: Discharge to home or other facility with appropriate resources  1/7/2025 0751 by Marlene Munguia RN  Outcome: Progressing  1/6/2025 2331 by Annette Morris RN  Outcome: Progressing     Problem: Safety - Adult  Goal: Free from fall injury  1/7/2025 0751 by Marlene Munguia RN  Outcome: Progressing  1/6/2025 2331 by Annette Morris RN  Outcome: Progressing     Problem: Pain  Goal: Verbalizes/displays adequate comfort level or baseline comfort level  1/7/2025 0751 by Marlene Munguia RN  Outcome: Progressing  1/6/2025 2331 by Annette Morris RN  Outcome: Progressing  Flowsheets (Taken 1/6/2025 1930)  Verbalizes/displays adequate comfort level or baseline comfort level:   Encourage patient to monitor pain and request assistance   Assess pain using appropriate pain scale   Administer analgesics based on type and severity of pain and evaluate response     Problem: Risk for Elopement  Goal: Patient will not exit the unit/facility without proper excort  1/6/2025 2331 by Annette Morris RN  Outcome: Progressing     Problem: Skin/Tissue Integrity  Goal: Absence of new skin breakdown  Description: 1.  Monitor for areas of redness and/or skin breakdown  2.  Assess vascular access sites hourly  3.  Every 4-6 hours minimum:  Change oxygen saturation probe site  4.  Every 4-6 hours:  If on nasal continuous positive airway pressure, respiratory therapy assess nares and determine need for appliance change or resting period.  1/7/2025 0751 by Marlene Munguia RN  Outcome: Progressing  1/6/2025 2331 by Annette Morris RN  Outcome: Progressing     Problem: ABCDS Injury Assessment  Goal: Absence of physical injury  1/7/2025 0751 by Marlene Munguia RN  Outcome: Progressing  1/6/2025 2331 by Annette Morris RN  Outcome: Progressing

## 2025-01-08 LAB
CREAT SERPL-MCNC: 0.81 MG/DL (ref 0.6–1.1)
GLUCOSE BLD STRIP.AUTO-MCNC: 137 MG/DL (ref 65–100)
SERVICE CMNT-IMP: ABNORMAL

## 2025-01-08 PROCEDURE — 82565 ASSAY OF CREATININE: CPT

## 2025-01-08 PROCEDURE — 2500000003 HC RX 250 WO HCPCS: Performed by: STUDENT IN AN ORGANIZED HEALTH CARE EDUCATION/TRAINING PROGRAM

## 2025-01-08 PROCEDURE — 6370000000 HC RX 637 (ALT 250 FOR IP): Performed by: STUDENT IN AN ORGANIZED HEALTH CARE EDUCATION/TRAINING PROGRAM

## 2025-01-08 PROCEDURE — 6370000000 HC RX 637 (ALT 250 FOR IP): Performed by: FAMILY MEDICINE

## 2025-01-08 PROCEDURE — 36415 COLL VENOUS BLD VENIPUNCTURE: CPT

## 2025-01-08 PROCEDURE — 2580000003 HC RX 258: Performed by: INTERNAL MEDICINE

## 2025-01-08 PROCEDURE — 1100000000 HC RM PRIVATE

## 2025-01-08 PROCEDURE — 6370000000 HC RX 637 (ALT 250 FOR IP): Performed by: INTERNAL MEDICINE

## 2025-01-08 PROCEDURE — 6370000000 HC RX 637 (ALT 250 FOR IP): Performed by: NURSE PRACTITIONER

## 2025-01-08 PROCEDURE — 94760 N-INVAS EAR/PLS OXIMETRY 1: CPT

## 2025-01-08 PROCEDURE — 6360000002 HC RX W HCPCS: Performed by: STUDENT IN AN ORGANIZED HEALTH CARE EDUCATION/TRAINING PROGRAM

## 2025-01-08 PROCEDURE — 6360000002 HC RX W HCPCS: Performed by: INTERNAL MEDICINE

## 2025-01-08 PROCEDURE — 82962 GLUCOSE BLOOD TEST: CPT

## 2025-01-08 PROCEDURE — 2700000000 HC OXYGEN THERAPY PER DAY

## 2025-01-08 RX ADMIN — TRIAMCINOLONE ACETONIDE: 1 CREAM TOPICAL at 20:14

## 2025-01-08 RX ADMIN — SODIUM CHLORIDE, PRESERVATIVE FREE 10 ML: 5 INJECTION INTRAVENOUS at 20:17

## 2025-01-08 RX ADMIN — HYDROXYCHLOROQUINE SULFATE 200 MG: 200 TABLET ORAL at 08:56

## 2025-01-08 RX ADMIN — HYDROCORTISONE ACETATE: 1 CREAM TOPICAL at 18:11

## 2025-01-08 RX ADMIN — Medication: at 18:11

## 2025-01-08 RX ADMIN — TRIAMCINOLONE ACETONIDE: 1 CREAM TOPICAL at 08:55

## 2025-01-08 RX ADMIN — Medication 1 CAPSULE: at 08:56

## 2025-01-08 RX ADMIN — QUETIAPINE FUMARATE 50 MG: 100 TABLET ORAL at 20:15

## 2025-01-08 RX ADMIN — SODIUM CHLORIDE, PRESERVATIVE FREE 10 ML: 5 INJECTION INTRAVENOUS at 08:56

## 2025-01-08 RX ADMIN — Medication: at 20:14

## 2025-01-08 RX ADMIN — PANTOPRAZOLE SODIUM 40 MG: 40 TABLET, DELAYED RELEASE ORAL at 08:54

## 2025-01-08 RX ADMIN — HYDROCORTISONE ACETATE: 1 CREAM TOPICAL at 20:14

## 2025-01-08 RX ADMIN — HYDROMORPHONE HYDROCHLORIDE 0.5 MG: 1 INJECTION, SOLUTION INTRAMUSCULAR; INTRAVENOUS; SUBCUTANEOUS at 20:15

## 2025-01-08 RX ADMIN — Medication: at 08:55

## 2025-01-08 RX ADMIN — PREDNISONE 15 MG: 10 TABLET ORAL at 08:53

## 2025-01-08 RX ADMIN — LISINOPRIL 20 MG: 20 TABLET ORAL at 08:54

## 2025-01-08 RX ADMIN — HYDROCORTISONE ACETATE: 1 CREAM TOPICAL at 08:55

## 2025-01-08 RX ADMIN — SODIUM CHLORIDE, PRESERVATIVE FREE 10 ML: 5 INJECTION INTRAVENOUS at 20:18

## 2025-01-08 RX ADMIN — ENOXAPARIN SODIUM 40 MG: 100 INJECTION SUBCUTANEOUS at 08:54

## 2025-01-08 RX ADMIN — VANCOMYCIN HYDROCHLORIDE 1250 MG: 10 INJECTION, POWDER, LYOPHILIZED, FOR SOLUTION INTRAVENOUS at 11:19

## 2025-01-08 RX ADMIN — HYDROCODONE BITARTRATE AND ACETAMINOPHEN 1 TABLET: 7.5; 325 TABLET ORAL at 06:01

## 2025-01-08 RX ADMIN — Medication 1 CAPSULE: at 18:11

## 2025-01-08 RX ADMIN — HYDROCODONE BITARTRATE AND ACETAMINOPHEN 1 TABLET: 7.5; 325 TABLET ORAL at 11:45

## 2025-01-08 RX ADMIN — PREGABALIN 150 MG: 75 CAPSULE ORAL at 08:53

## 2025-01-08 RX ADMIN — PREGABALIN 150 MG: 75 CAPSULE ORAL at 20:15

## 2025-01-08 RX ADMIN — PANTOPRAZOLE SODIUM 40 MG: 40 TABLET, DELAYED RELEASE ORAL at 18:11

## 2025-01-08 RX ADMIN — DULOXETINE HYDROCHLORIDE 60 MG: 60 CAPSULE, DELAYED RELEASE ORAL at 08:54

## 2025-01-08 ASSESSMENT — PAIN DESCRIPTION - ORIENTATION
ORIENTATION: LEFT

## 2025-01-08 ASSESSMENT — PAIN DESCRIPTION - DESCRIPTORS
DESCRIPTORS: ACHING;STABBING
DESCRIPTORS: ACHING;STABBING
DESCRIPTORS: ACHING;SHARP

## 2025-01-08 ASSESSMENT — PAIN SCALES - GENERAL
PAINLEVEL_OUTOF10: 10
PAINLEVEL_OUTOF10: 5
PAINLEVEL_OUTOF10: 0
PAINLEVEL_OUTOF10: 7
PAINLEVEL_OUTOF10: 0
PAINLEVEL_OUTOF10: 0
PAINLEVEL_OUTOF10: 8

## 2025-01-08 ASSESSMENT — PAIN DESCRIPTION - LOCATION
LOCATION: GENERALIZED;LEG
LOCATION: KNEE
LOCATION: LEG

## 2025-01-08 NOTE — CARE COORDINATION
Chart reviewed and patient discussed in IDT rounds this AM. Patient is receiving IV antibiotics until 1/22. She is not a OPAT candidate and is refusing STR. Patient to discharge home after iv antibiotics treatment .    Regino AVALOS, ACM  [place]

## 2025-01-08 NOTE — PLAN OF CARE
Problem: Respiratory - Adult  Goal: Achieves optimal ventilation and oxygenation  Outcome: Progressing  Flowsheets (Taken 1/8/2025 1130)  Achieves optimal ventilation and oxygenation:   Assess for changes in respiratory status   Position to facilitate oxygenation and minimize respiratory effort   Respiratory therapy support as indicated   Assess for changes in mentation and behavior   Oxygen supplementation based on oxygen saturation or arterial blood gases   Encourage broncho-pulmonary hygiene including cough, deep breathe, incentive spirometry   Assess and instruct to report shortness of breath or any respiratory difficulty

## 2025-01-09 PROCEDURE — 6360000002 HC RX W HCPCS: Performed by: INTERNAL MEDICINE

## 2025-01-09 PROCEDURE — 2500000003 HC RX 250 WO HCPCS: Performed by: STUDENT IN AN ORGANIZED HEALTH CARE EDUCATION/TRAINING PROGRAM

## 2025-01-09 PROCEDURE — 2500000003 HC RX 250 WO HCPCS: Performed by: INTERNAL MEDICINE

## 2025-01-09 PROCEDURE — 1100000000 HC RM PRIVATE

## 2025-01-09 PROCEDURE — 6370000000 HC RX 637 (ALT 250 FOR IP): Performed by: INTERNAL MEDICINE

## 2025-01-09 PROCEDURE — 6360000002 HC RX W HCPCS: Performed by: STUDENT IN AN ORGANIZED HEALTH CARE EDUCATION/TRAINING PROGRAM

## 2025-01-09 PROCEDURE — 6370000000 HC RX 637 (ALT 250 FOR IP): Performed by: STUDENT IN AN ORGANIZED HEALTH CARE EDUCATION/TRAINING PROGRAM

## 2025-01-09 PROCEDURE — 2580000003 HC RX 258: Performed by: INTERNAL MEDICINE

## 2025-01-09 PROCEDURE — 6370000000 HC RX 637 (ALT 250 FOR IP): Performed by: FAMILY MEDICINE

## 2025-01-09 RX ADMIN — PANTOPRAZOLE SODIUM 40 MG: 40 TABLET, DELAYED RELEASE ORAL at 17:21

## 2025-01-09 RX ADMIN — TRIAMCINOLONE ACETONIDE: 1 CREAM TOPICAL at 09:13

## 2025-01-09 RX ADMIN — HYDROMORPHONE HYDROCHLORIDE 0.5 MG: 1 INJECTION, SOLUTION INTRAMUSCULAR; INTRAVENOUS; SUBCUTANEOUS at 13:18

## 2025-01-09 RX ADMIN — ENOXAPARIN SODIUM 40 MG: 100 INJECTION SUBCUTANEOUS at 09:06

## 2025-01-09 RX ADMIN — WATER 30 MG: 1 INJECTION INTRAMUSCULAR; INTRAVENOUS; SUBCUTANEOUS at 21:22

## 2025-01-09 RX ADMIN — HYDROMORPHONE HYDROCHLORIDE 0.5 MG: 1 INJECTION, SOLUTION INTRAMUSCULAR; INTRAVENOUS; SUBCUTANEOUS at 04:58

## 2025-01-09 RX ADMIN — Medication: at 17:24

## 2025-01-09 RX ADMIN — HYDROCORTISONE ACETATE: 1 CREAM TOPICAL at 17:25

## 2025-01-09 RX ADMIN — HYDROMORPHONE HYDROCHLORIDE 0.5 MG: 1 INJECTION, SOLUTION INTRAMUSCULAR; INTRAVENOUS; SUBCUTANEOUS at 09:02

## 2025-01-09 RX ADMIN — HYDROCORTISONE ACETATE: 1 CREAM TOPICAL at 09:12

## 2025-01-09 RX ADMIN — SODIUM CHLORIDE, PRESERVATIVE FREE 10 ML: 5 INJECTION INTRAVENOUS at 09:07

## 2025-01-09 RX ADMIN — PREGABALIN 150 MG: 75 CAPSULE ORAL at 09:06

## 2025-01-09 RX ADMIN — HYDROCORTISONE ACETATE: 1 CREAM TOPICAL at 21:30

## 2025-01-09 RX ADMIN — PREDNISONE 15 MG: 10 TABLET ORAL at 09:05

## 2025-01-09 RX ADMIN — Medication 1 CAPSULE: at 17:21

## 2025-01-09 RX ADMIN — VANCOMYCIN HYDROCHLORIDE 1250 MG: 10 INJECTION, POWDER, LYOPHILIZED, FOR SOLUTION INTRAVENOUS at 11:18

## 2025-01-09 RX ADMIN — QUETIAPINE FUMARATE 50 MG: 100 TABLET ORAL at 21:21

## 2025-01-09 RX ADMIN — PANTOPRAZOLE SODIUM 40 MG: 40 TABLET, DELAYED RELEASE ORAL at 09:06

## 2025-01-09 RX ADMIN — Medication: at 21:31

## 2025-01-09 RX ADMIN — SODIUM CHLORIDE, PRESERVATIVE FREE 10 ML: 5 INJECTION INTRAVENOUS at 09:06

## 2025-01-09 RX ADMIN — Medication: at 09:12

## 2025-01-09 RX ADMIN — PREGABALIN 150 MG: 75 CAPSULE ORAL at 21:21

## 2025-01-09 RX ADMIN — HYDROMORPHONE HYDROCHLORIDE 0.5 MG: 1 INJECTION, SOLUTION INTRAMUSCULAR; INTRAVENOUS; SUBCUTANEOUS at 21:21

## 2025-01-09 RX ADMIN — Medication 1 CAPSULE: at 09:06

## 2025-01-09 RX ADMIN — HYDROMORPHONE HYDROCHLORIDE 0.5 MG: 1 INJECTION, SOLUTION INTRAMUSCULAR; INTRAVENOUS; SUBCUTANEOUS at 17:21

## 2025-01-09 RX ADMIN — SODIUM CHLORIDE, PRESERVATIVE FREE 10 ML: 5 INJECTION INTRAVENOUS at 21:32

## 2025-01-09 RX ADMIN — HYDROCORTISONE ACETATE: 1 CREAM TOPICAL at 13:22

## 2025-01-09 RX ADMIN — TRIAMCINOLONE ACETONIDE: 1 CREAM TOPICAL at 21:31

## 2025-01-09 RX ADMIN — LISINOPRIL 20 MG: 20 TABLET ORAL at 09:05

## 2025-01-09 RX ADMIN — Medication: at 13:22

## 2025-01-09 RX ADMIN — HYDROXYCHLOROQUINE SULFATE 200 MG: 200 TABLET ORAL at 09:05

## 2025-01-09 RX ADMIN — DULOXETINE HYDROCHLORIDE 60 MG: 60 CAPSULE, DELAYED RELEASE ORAL at 09:05

## 2025-01-09 RX ADMIN — SODIUM CHLORIDE, PRESERVATIVE FREE 10 ML: 5 INJECTION INTRAVENOUS at 21:33

## 2025-01-09 ASSESSMENT — PAIN DESCRIPTION - DESCRIPTORS
DESCRIPTORS: ACHING;STABBING
DESCRIPTORS: ACHING
DESCRIPTORS: STABBING;ACHING
DESCRIPTORS: ACHING;THROBBING
DESCRIPTORS: ACHING

## 2025-01-09 ASSESSMENT — PAIN SCALES - GENERAL
PAINLEVEL_OUTOF10: 9
PAINLEVEL_OUTOF10: 0
PAINLEVEL_OUTOF10: 2
PAINLEVEL_OUTOF10: 3
PAINLEVEL_OUTOF10: 9
PAINLEVEL_OUTOF10: 8
PAINLEVEL_OUTOF10: 7
PAINLEVEL_OUTOF10: 9

## 2025-01-09 ASSESSMENT — PAIN DESCRIPTION - LOCATION
LOCATION: LEG
LOCATION: KNEE
LOCATION: GENERALIZED;LEG

## 2025-01-09 ASSESSMENT — PAIN DESCRIPTION - ORIENTATION
ORIENTATION: LEFT
ORIENTATION: LEFT;POSTERIOR
ORIENTATION: LEFT
ORIENTATION: LEFT
ORIENTATION: LEFT;POSTERIOR

## 2025-01-10 PROCEDURE — 2500000003 HC RX 250 WO HCPCS: Performed by: STUDENT IN AN ORGANIZED HEALTH CARE EDUCATION/TRAINING PROGRAM

## 2025-01-10 PROCEDURE — 6370000000 HC RX 637 (ALT 250 FOR IP): Performed by: STUDENT IN AN ORGANIZED HEALTH CARE EDUCATION/TRAINING PROGRAM

## 2025-01-10 PROCEDURE — 1100000000 HC RM PRIVATE

## 2025-01-10 PROCEDURE — 2580000003 HC RX 258: Performed by: INTERNAL MEDICINE

## 2025-01-10 PROCEDURE — 6360000002 HC RX W HCPCS: Performed by: INTERNAL MEDICINE

## 2025-01-10 PROCEDURE — 6370000000 HC RX 637 (ALT 250 FOR IP): Performed by: INTERNAL MEDICINE

## 2025-01-10 PROCEDURE — 6370000000 HC RX 637 (ALT 250 FOR IP): Performed by: FAMILY MEDICINE

## 2025-01-10 PROCEDURE — 6360000002 HC RX W HCPCS: Performed by: STUDENT IN AN ORGANIZED HEALTH CARE EDUCATION/TRAINING PROGRAM

## 2025-01-10 PROCEDURE — 2500000003 HC RX 250 WO HCPCS: Performed by: INTERNAL MEDICINE

## 2025-01-10 RX ADMIN — SODIUM CHLORIDE, PRESERVATIVE FREE 10 ML: 5 INJECTION INTRAVENOUS at 08:56

## 2025-01-10 RX ADMIN — HYDROCORTISONE ACETATE: 1 CREAM TOPICAL at 13:00

## 2025-01-10 RX ADMIN — Medication: at 09:25

## 2025-01-10 RX ADMIN — PREGABALIN 150 MG: 75 CAPSULE ORAL at 20:29

## 2025-01-10 RX ADMIN — Medication: at 17:52

## 2025-01-10 RX ADMIN — ENOXAPARIN SODIUM 40 MG: 100 INJECTION SUBCUTANEOUS at 08:52

## 2025-01-10 RX ADMIN — HYDROMORPHONE HYDROCHLORIDE 0.5 MG: 1 INJECTION, SOLUTION INTRAMUSCULAR; INTRAVENOUS; SUBCUTANEOUS at 11:00

## 2025-01-10 RX ADMIN — SODIUM CHLORIDE, PRESERVATIVE FREE 10 ML: 5 INJECTION INTRAVENOUS at 08:57

## 2025-01-10 RX ADMIN — QUETIAPINE FUMARATE 50 MG: 100 TABLET ORAL at 20:29

## 2025-01-10 RX ADMIN — HYDROCORTISONE ACETATE: 1 CREAM TOPICAL at 20:34

## 2025-01-10 RX ADMIN — PANTOPRAZOLE SODIUM 40 MG: 40 TABLET, DELAYED RELEASE ORAL at 17:51

## 2025-01-10 RX ADMIN — LISINOPRIL 20 MG: 20 TABLET ORAL at 08:53

## 2025-01-10 RX ADMIN — TRIAMCINOLONE ACETONIDE: 1 CREAM TOPICAL at 09:25

## 2025-01-10 RX ADMIN — HYDROMORPHONE HYDROCHLORIDE 0.5 MG: 1 INJECTION, SOLUTION INTRAMUSCULAR; INTRAVENOUS; SUBCUTANEOUS at 20:29

## 2025-01-10 RX ADMIN — PREGABALIN 150 MG: 75 CAPSULE ORAL at 08:52

## 2025-01-10 RX ADMIN — Medication: at 20:34

## 2025-01-10 RX ADMIN — SODIUM CHLORIDE, PRESERVATIVE FREE 10 ML: 5 INJECTION INTRAVENOUS at 20:35

## 2025-01-10 RX ADMIN — HYDROCORTISONE ACETATE: 1 CREAM TOPICAL at 17:52

## 2025-01-10 RX ADMIN — WATER 30 MG: 1 INJECTION INTRAMUSCULAR; INTRAVENOUS; SUBCUTANEOUS at 20:29

## 2025-01-10 RX ADMIN — Medication 1 CAPSULE: at 08:53

## 2025-01-10 RX ADMIN — Medication: at 13:00

## 2025-01-10 RX ADMIN — VANCOMYCIN HYDROCHLORIDE 1250 MG: 10 INJECTION, POWDER, LYOPHILIZED, FOR SOLUTION INTRAVENOUS at 10:48

## 2025-01-10 RX ADMIN — Medication 1 CAPSULE: at 17:51

## 2025-01-10 RX ADMIN — WATER 30 MG: 1 INJECTION INTRAMUSCULAR; INTRAVENOUS; SUBCUTANEOUS at 08:53

## 2025-01-10 RX ADMIN — HYDROCORTISONE ACETATE: 1 CREAM TOPICAL at 09:25

## 2025-01-10 RX ADMIN — TRIAMCINOLONE ACETONIDE: 1 CREAM TOPICAL at 20:35

## 2025-01-10 RX ADMIN — DULOXETINE HYDROCHLORIDE 60 MG: 60 CAPSULE, DELAYED RELEASE ORAL at 08:52

## 2025-01-10 RX ADMIN — HYDROMORPHONE HYDROCHLORIDE 0.5 MG: 1 INJECTION, SOLUTION INTRAMUSCULAR; INTRAVENOUS; SUBCUTANEOUS at 15:10

## 2025-01-10 RX ADMIN — HYDROXYCHLOROQUINE SULFATE 200 MG: 200 TABLET ORAL at 08:53

## 2025-01-10 RX ADMIN — PANTOPRAZOLE SODIUM 40 MG: 40 TABLET, DELAYED RELEASE ORAL at 08:53

## 2025-01-10 ASSESSMENT — PAIN SCALES - GENERAL
PAINLEVEL_OUTOF10: 0
PAINLEVEL_OUTOF10: 8
PAINLEVEL_OUTOF10: 7
PAINLEVEL_OUTOF10: 7
PAINLEVEL_OUTOF10: 1
PAINLEVEL_OUTOF10: 1
PAINLEVEL_OUTOF10: 0

## 2025-01-10 ASSESSMENT — PAIN DESCRIPTION - LOCATION
LOCATION: LEG

## 2025-01-10 ASSESSMENT — PAIN DESCRIPTION - ORIENTATION
ORIENTATION: LEFT

## 2025-01-10 ASSESSMENT — PAIN DESCRIPTION - DESCRIPTORS
DESCRIPTORS: ACHING;THROBBING
DESCRIPTORS: SORE;ACHING
DESCRIPTORS: ACHING;THROBBING

## 2025-01-10 ASSESSMENT — PAIN - FUNCTIONAL ASSESSMENT: PAIN_FUNCTIONAL_ASSESSMENT: ACTIVITIES ARE NOT PREVENTED

## 2025-01-11 LAB
BASOPHILS # BLD: 0.04 K/UL (ref 0–0.2)
BASOPHILS NFR BLD: 0.4 % (ref 0–2)
DIFFERENTIAL METHOD BLD: ABNORMAL
EOSINOPHIL # BLD: 0.01 K/UL (ref 0–0.8)
EOSINOPHIL NFR BLD: 0.1 % (ref 0.5–7.8)
ERYTHROCYTE [DISTWIDTH] IN BLOOD BY AUTOMATED COUNT: 18.1 % (ref 11.9–14.6)
HCT VFR BLD AUTO: 36.7 % (ref 35.8–46.3)
HGB BLD-MCNC: 10.8 G/DL (ref 11.7–15.4)
IMM GRANULOCYTES # BLD AUTO: 0.08 K/UL (ref 0–0.5)
IMM GRANULOCYTES NFR BLD AUTO: 0.8 % (ref 0–5)
LYMPHOCYTES # BLD: 2.33 K/UL (ref 0.5–4.6)
LYMPHOCYTES NFR BLD: 23.1 % (ref 13–44)
MCH RBC QN AUTO: 25 PG (ref 26.1–32.9)
MCHC RBC AUTO-ENTMCNC: 29.4 G/DL (ref 31.4–35)
MCV RBC AUTO: 85 FL (ref 82–102)
MONOCYTES # BLD: 0.7 K/UL (ref 0.1–1.3)
MONOCYTES NFR BLD: 7 % (ref 4–12)
NEUTS SEG # BLD: 6.91 K/UL (ref 1.7–8.2)
NEUTS SEG NFR BLD: 68.6 % (ref 43–78)
NRBC # BLD: 0 K/UL (ref 0–0.2)
PLATELET # BLD AUTO: 245 K/UL (ref 150–450)
PMV BLD AUTO: 10.3 FL (ref 9.4–12.3)
RBC # BLD AUTO: 4.32 M/UL (ref 4.05–5.2)
WBC # BLD AUTO: 10.1 K/UL (ref 4.3–11.1)

## 2025-01-11 PROCEDURE — 2580000003 HC RX 258: Performed by: INTERNAL MEDICINE

## 2025-01-11 PROCEDURE — 6370000000 HC RX 637 (ALT 250 FOR IP): Performed by: INTERNAL MEDICINE

## 2025-01-11 PROCEDURE — 2500000003 HC RX 250 WO HCPCS: Performed by: STUDENT IN AN ORGANIZED HEALTH CARE EDUCATION/TRAINING PROGRAM

## 2025-01-11 PROCEDURE — 85025 COMPLETE CBC W/AUTO DIFF WBC: CPT

## 2025-01-11 PROCEDURE — 6360000002 HC RX W HCPCS: Performed by: STUDENT IN AN ORGANIZED HEALTH CARE EDUCATION/TRAINING PROGRAM

## 2025-01-11 PROCEDURE — 36415 COLL VENOUS BLD VENIPUNCTURE: CPT

## 2025-01-11 PROCEDURE — 6370000000 HC RX 637 (ALT 250 FOR IP): Performed by: STUDENT IN AN ORGANIZED HEALTH CARE EDUCATION/TRAINING PROGRAM

## 2025-01-11 PROCEDURE — 1100000000 HC RM PRIVATE

## 2025-01-11 PROCEDURE — 2500000003 HC RX 250 WO HCPCS: Performed by: INTERNAL MEDICINE

## 2025-01-11 PROCEDURE — 94761 N-INVAS EAR/PLS OXIMETRY MLT: CPT

## 2025-01-11 PROCEDURE — 6360000002 HC RX W HCPCS: Performed by: INTERNAL MEDICINE

## 2025-01-11 PROCEDURE — 94760 N-INVAS EAR/PLS OXIMETRY 1: CPT

## 2025-01-11 PROCEDURE — 2700000000 HC OXYGEN THERAPY PER DAY

## 2025-01-11 PROCEDURE — 6370000000 HC RX 637 (ALT 250 FOR IP): Performed by: FAMILY MEDICINE

## 2025-01-11 RX ADMIN — SODIUM CHLORIDE, PRESERVATIVE FREE 10 ML: 5 INJECTION INTRAVENOUS at 20:59

## 2025-01-11 RX ADMIN — PANTOPRAZOLE SODIUM 40 MG: 40 TABLET, DELAYED RELEASE ORAL at 15:08

## 2025-01-11 RX ADMIN — LISINOPRIL 20 MG: 20 TABLET ORAL at 09:37

## 2025-01-11 RX ADMIN — HYDROCORTISONE ACETATE: 1 CREAM TOPICAL at 21:05

## 2025-01-11 RX ADMIN — HYDROXYCHLOROQUINE SULFATE 200 MG: 200 TABLET ORAL at 09:35

## 2025-01-11 RX ADMIN — HYDROCORTISONE ACETATE: 1 CREAM TOPICAL at 18:11

## 2025-01-11 RX ADMIN — HYDROMORPHONE HYDROCHLORIDE 0.5 MG: 1 INJECTION, SOLUTION INTRAMUSCULAR; INTRAVENOUS; SUBCUTANEOUS at 15:04

## 2025-01-11 RX ADMIN — HYDROMORPHONE HYDROCHLORIDE 0.5 MG: 1 INJECTION, SOLUTION INTRAMUSCULAR; INTRAVENOUS; SUBCUTANEOUS at 09:39

## 2025-01-11 RX ADMIN — PREGABALIN 150 MG: 75 CAPSULE ORAL at 09:34

## 2025-01-11 RX ADMIN — Medication 1 CAPSULE: at 15:08

## 2025-01-11 RX ADMIN — ENOXAPARIN SODIUM 40 MG: 100 INJECTION SUBCUTANEOUS at 09:34

## 2025-01-11 RX ADMIN — SODIUM CHLORIDE, PRESERVATIVE FREE 10 ML: 5 INJECTION INTRAVENOUS at 09:48

## 2025-01-11 RX ADMIN — HYDROCORTISONE ACETATE: 1 CREAM TOPICAL at 09:38

## 2025-01-11 RX ADMIN — HYDROMORPHONE HYDROCHLORIDE 0.5 MG: 1 INJECTION, SOLUTION INTRAMUSCULAR; INTRAVENOUS; SUBCUTANEOUS at 20:57

## 2025-01-11 RX ADMIN — PREGABALIN 150 MG: 75 CAPSULE ORAL at 20:58

## 2025-01-11 RX ADMIN — HYDROCORTISONE ACETATE: 1 CREAM TOPICAL at 15:07

## 2025-01-11 RX ADMIN — Medication: at 09:38

## 2025-01-11 RX ADMIN — Medication: at 21:04

## 2025-01-11 RX ADMIN — HYDROCODONE BITARTRATE AND ACETAMINOPHEN 1 TABLET: 7.5; 325 TABLET ORAL at 18:11

## 2025-01-11 RX ADMIN — Medication: at 18:10

## 2025-01-11 RX ADMIN — QUETIAPINE FUMARATE 50 MG: 100 TABLET ORAL at 20:58

## 2025-01-11 RX ADMIN — Medication 1 CAPSULE: at 09:34

## 2025-01-11 RX ADMIN — Medication 1 AMPULE: at 09:34

## 2025-01-11 RX ADMIN — VANCOMYCIN HYDROCHLORIDE 1250 MG: 10 INJECTION, POWDER, LYOPHILIZED, FOR SOLUTION INTRAVENOUS at 11:16

## 2025-01-11 RX ADMIN — Medication: at 15:07

## 2025-01-11 RX ADMIN — WATER 30 MG: 1 INJECTION INTRAMUSCULAR; INTRAVENOUS; SUBCUTANEOUS at 20:54

## 2025-01-11 RX ADMIN — PANTOPRAZOLE SODIUM 40 MG: 40 TABLET, DELAYED RELEASE ORAL at 09:35

## 2025-01-11 RX ADMIN — WATER 40 MG: 1 INJECTION INTRAMUSCULAR; INTRAVENOUS; SUBCUTANEOUS at 09:40

## 2025-01-11 RX ADMIN — DULOXETINE HYDROCHLORIDE 60 MG: 60 CAPSULE, DELAYED RELEASE ORAL at 09:34

## 2025-01-11 RX ADMIN — TRIAMCINOLONE ACETONIDE: 1 CREAM TOPICAL at 21:05

## 2025-01-11 ASSESSMENT — PAIN DESCRIPTION - LOCATION
LOCATION: LEG
LOCATION: GENERALIZED
LOCATION: LEG
LOCATION: GENERALIZED

## 2025-01-11 ASSESSMENT — PAIN DESCRIPTION - DESCRIPTORS
DESCRIPTORS: ACHING

## 2025-01-11 ASSESSMENT — PAIN DESCRIPTION - ORIENTATION: ORIENTATION: LEFT

## 2025-01-11 ASSESSMENT — PAIN SCALES - GENERAL
PAINLEVEL_OUTOF10: 7
PAINLEVEL_OUTOF10: 8
PAINLEVEL_OUTOF10: 0
PAINLEVEL_OUTOF10: 8
PAINLEVEL_OUTOF10: 6

## 2025-01-11 NOTE — PLAN OF CARE
Problem: Discharge Planning  Goal: Discharge to home or other facility with appropriate resources  Outcome: Progressing     Problem: Safety - Adult  Goal: Free from fall injury  Outcome: Progressing     Problem: Pain  Goal: Verbalizes/displays adequate comfort level or baseline comfort level  Outcome: Progressing     Problem: Risk for Elopement  Goal: Patient will not exit the unit/facility without proper excort  Outcome: Progressing     Problem: Skin/Tissue Integrity  Goal: Absence of new skin breakdown  Description: 1.  Monitor for areas of redness and/or skin breakdown  2.  Assess vascular access sites hourly  3.  Every 4-6 hours minimum:  Change oxygen saturation probe site  4.  Every 4-6 hours:  If on nasal continuous positive airway pressure, respiratory therapy assess nares and determine need for appliance change or resting period.  Outcome: Progressing     Problem: ABCDS Injury Assessment  Goal: Absence of physical injury  Outcome: Progressing     Problem: Respiratory - Adult  Goal: Achieves optimal ventilation and oxygenation  Outcome: Progressing  Flowsheets (Taken 1/11/2025 6175)  Achieves optimal ventilation and oxygenation: Assess for changes in respiratory status

## 2025-01-11 NOTE — PLAN OF CARE
Problem: Discharge Planning  Goal: Discharge to home or other facility with appropriate resources  Outcome: Progressing  Flowsheets (Taken 1/10/2025 1913)  Discharge to home or other facility with appropriate resources:   Identify barriers to discharge with patient and caregiver   Arrange for needed discharge resources and transportation as appropriate   Identify discharge learning needs (meds, wound care, etc)   Refer to discharge planning if patient needs post-hospital services based on physician order or complex needs related to functional status, cognitive ability or social support system     Problem: Safety - Adult  Goal: Free from fall injury  Outcome: Progressing     Problem: Pain  Goal: Verbalizes/displays adequate comfort level or baseline comfort level  Outcome: Progressing  Flowsheets (Taken 1/10/2025 2029)  Verbalizes/displays adequate comfort level or baseline comfort level: Encourage patient to monitor pain and request assistance     Problem: Risk for Elopement  Goal: Patient will not exit the unit/facility without proper excort  Outcome: Progressing     Problem: Skin/Tissue Integrity  Goal: Absence of new skin breakdown  Description: 1.  Monitor for areas of redness and/or skin breakdown  2.  Assess vascular access sites hourly  3.  Every 4-6 hours minimum:  Change oxygen saturation probe site  4.  Every 4-6 hours:  If on nasal continuous positive airway pressure, respiratory therapy assess nares and determine need for appliance change or resting period.  Outcome: Progressing     Problem: ABCDS Injury Assessment  Goal: Absence of physical injury  Outcome: Progressing  Flowsheets (Taken 1/10/2025 1913)  Absence of Physical Injury: Implement safety measures based on patient assessment     Problem: Respiratory - Adult  Goal: Achieves optimal ventilation and oxygenation  Outcome: Progressing  Flowsheets (Taken 1/10/2025 1913)  Achieves optimal ventilation and oxygenation: Assess for changes in

## 2025-01-12 PROCEDURE — 2700000000 HC OXYGEN THERAPY PER DAY

## 2025-01-12 PROCEDURE — 2500000003 HC RX 250 WO HCPCS: Performed by: STUDENT IN AN ORGANIZED HEALTH CARE EDUCATION/TRAINING PROGRAM

## 2025-01-12 PROCEDURE — 6370000000 HC RX 637 (ALT 250 FOR IP): Performed by: INTERNAL MEDICINE

## 2025-01-12 PROCEDURE — 6360000002 HC RX W HCPCS: Performed by: STUDENT IN AN ORGANIZED HEALTH CARE EDUCATION/TRAINING PROGRAM

## 2025-01-12 PROCEDURE — 6360000002 HC RX W HCPCS: Performed by: INTERNAL MEDICINE

## 2025-01-12 PROCEDURE — 2500000003 HC RX 250 WO HCPCS: Performed by: INTERNAL MEDICINE

## 2025-01-12 PROCEDURE — 6370000000 HC RX 637 (ALT 250 FOR IP): Performed by: STUDENT IN AN ORGANIZED HEALTH CARE EDUCATION/TRAINING PROGRAM

## 2025-01-12 PROCEDURE — 6370000000 HC RX 637 (ALT 250 FOR IP): Performed by: FAMILY MEDICINE

## 2025-01-12 PROCEDURE — 94760 N-INVAS EAR/PLS OXIMETRY 1: CPT

## 2025-01-12 PROCEDURE — 2580000003 HC RX 258: Performed by: INTERNAL MEDICINE

## 2025-01-12 PROCEDURE — 1100000000 HC RM PRIVATE

## 2025-01-12 RX ORDER — PREDNISONE 20 MG/1
40 TABLET ORAL DAILY
Status: DISCONTINUED | OUTPATIENT
Start: 2025-01-20 | End: 2025-01-21

## 2025-01-12 RX ADMIN — Medication: at 11:29

## 2025-01-12 RX ADMIN — LISINOPRIL 20 MG: 20 TABLET ORAL at 07:25

## 2025-01-12 RX ADMIN — ENOXAPARIN SODIUM 40 MG: 100 INJECTION SUBCUTANEOUS at 07:24

## 2025-01-12 RX ADMIN — SODIUM CHLORIDE, PRESERVATIVE FREE 10 ML: 5 INJECTION INTRAVENOUS at 07:16

## 2025-01-12 RX ADMIN — HYDROCORTISONE ACETATE: 1 CREAM TOPICAL at 11:30

## 2025-01-12 RX ADMIN — HYDROCORTISONE ACETATE: 1 CREAM TOPICAL at 07:29

## 2025-01-12 RX ADMIN — Medication: at 17:06

## 2025-01-12 RX ADMIN — Medication 1 CAPSULE: at 17:04

## 2025-01-12 RX ADMIN — HYDROMORPHONE HYDROCHLORIDE 0.5 MG: 1 INJECTION, SOLUTION INTRAMUSCULAR; INTRAVENOUS; SUBCUTANEOUS at 11:25

## 2025-01-12 RX ADMIN — Medication: at 07:28

## 2025-01-12 RX ADMIN — HYDROXYCHLOROQUINE SULFATE 200 MG: 200 TABLET ORAL at 07:25

## 2025-01-12 RX ADMIN — HYDROMORPHONE HYDROCHLORIDE 0.5 MG: 1 INJECTION, SOLUTION INTRAMUSCULAR; INTRAVENOUS; SUBCUTANEOUS at 18:43

## 2025-01-12 RX ADMIN — DULOXETINE HYDROCHLORIDE 60 MG: 60 CAPSULE, DELAYED RELEASE ORAL at 07:25

## 2025-01-12 RX ADMIN — HYDROCODONE BITARTRATE AND ACETAMINOPHEN 1 TABLET: 7.5; 325 TABLET ORAL at 10:09

## 2025-01-12 RX ADMIN — PANTOPRAZOLE SODIUM 40 MG: 40 TABLET, DELAYED RELEASE ORAL at 17:04

## 2025-01-12 RX ADMIN — PANTOPRAZOLE SODIUM 40 MG: 40 TABLET, DELAYED RELEASE ORAL at 07:06

## 2025-01-12 RX ADMIN — TRIAMCINOLONE ACETONIDE: 1 CREAM TOPICAL at 07:29

## 2025-01-12 RX ADMIN — WATER 30 MG: 1 INJECTION INTRAMUSCULAR; INTRAVENOUS; SUBCUTANEOUS at 07:02

## 2025-01-12 RX ADMIN — SODIUM CHLORIDE, PRESERVATIVE FREE 10 ML: 5 INJECTION INTRAVENOUS at 07:15

## 2025-01-12 RX ADMIN — PREGABALIN 150 MG: 75 CAPSULE ORAL at 20:50

## 2025-01-12 RX ADMIN — VANCOMYCIN HYDROCHLORIDE 1250 MG: 10 INJECTION, POWDER, LYOPHILIZED, FOR SOLUTION INTRAVENOUS at 11:28

## 2025-01-12 RX ADMIN — WATER 30 MG: 1 INJECTION INTRAMUSCULAR; INTRAVENOUS; SUBCUTANEOUS at 20:50

## 2025-01-12 RX ADMIN — HYDROMORPHONE HYDROCHLORIDE 0.5 MG: 1 INJECTION, SOLUTION INTRAMUSCULAR; INTRAVENOUS; SUBCUTANEOUS at 06:55

## 2025-01-12 RX ADMIN — HYDROCODONE BITARTRATE AND ACETAMINOPHEN 1 TABLET: 7.5; 325 TABLET ORAL at 17:04

## 2025-01-12 RX ADMIN — SODIUM CHLORIDE, PRESERVATIVE FREE 10 ML: 5 INJECTION INTRAVENOUS at 20:54

## 2025-01-12 RX ADMIN — Medication 1 CAPSULE: at 07:15

## 2025-01-12 RX ADMIN — PREGABALIN 150 MG: 75 CAPSULE ORAL at 07:25

## 2025-01-12 RX ADMIN — HYDROCORTISONE ACETATE: 1 CREAM TOPICAL at 17:06

## 2025-01-12 RX ADMIN — QUETIAPINE FUMARATE 50 MG: 100 TABLET ORAL at 20:50

## 2025-01-12 ASSESSMENT — PAIN DESCRIPTION - LOCATION
LOCATION: GENERALIZED
LOCATION: GENERALIZED
LOCATION: BACK
LOCATION: GENERALIZED
LOCATION: GENERALIZED

## 2025-01-12 ASSESSMENT — PAIN SCALES - GENERAL
PAINLEVEL_OUTOF10: 6
PAINLEVEL_OUTOF10: 8
PAINLEVEL_OUTOF10: 8
PAINLEVEL_OUTOF10: 9
PAINLEVEL_OUTOF10: 7
PAINLEVEL_OUTOF10: 0
PAINLEVEL_OUTOF10: 0

## 2025-01-12 ASSESSMENT — PAIN DESCRIPTION - DESCRIPTORS
DESCRIPTORS: ACHING

## 2025-01-12 NOTE — PLAN OF CARE
Problem: Discharge Planning  Goal: Discharge to home or other facility with appropriate resources  Outcome: Progressing     Problem: Safety - Adult  Goal: Free from fall injury  Outcome: Progressing     Problem: Pain  Goal: Verbalizes/displays adequate comfort level or baseline comfort level  Outcome: Progressing     Problem: Risk for Elopement  Goal: Patient will not exit the unit/facility without proper excort  Outcome: Progressing

## 2025-01-13 LAB
CREAT SERPL-MCNC: 0.91 MG/DL (ref 0.6–1.1)
VANCOMYCIN SERPL-MCNC: 13 UG/ML

## 2025-01-13 PROCEDURE — 82565 ASSAY OF CREATININE: CPT

## 2025-01-13 PROCEDURE — 1100000000 HC RM PRIVATE

## 2025-01-13 PROCEDURE — 80202 ASSAY OF VANCOMYCIN: CPT

## 2025-01-13 PROCEDURE — 6360000002 HC RX W HCPCS: Performed by: INTERNAL MEDICINE

## 2025-01-13 PROCEDURE — 6370000000 HC RX 637 (ALT 250 FOR IP): Performed by: INTERNAL MEDICINE

## 2025-01-13 PROCEDURE — 2500000003 HC RX 250 WO HCPCS: Performed by: INTERNAL MEDICINE

## 2025-01-13 PROCEDURE — 2580000003 HC RX 258: Performed by: INTERNAL MEDICINE

## 2025-01-13 PROCEDURE — 6360000002 HC RX W HCPCS: Performed by: STUDENT IN AN ORGANIZED HEALTH CARE EDUCATION/TRAINING PROGRAM

## 2025-01-13 PROCEDURE — 2500000003 HC RX 250 WO HCPCS: Performed by: STUDENT IN AN ORGANIZED HEALTH CARE EDUCATION/TRAINING PROGRAM

## 2025-01-13 PROCEDURE — 6370000000 HC RX 637 (ALT 250 FOR IP): Performed by: NURSE PRACTITIONER

## 2025-01-13 PROCEDURE — 6370000000 HC RX 637 (ALT 250 FOR IP): Performed by: STUDENT IN AN ORGANIZED HEALTH CARE EDUCATION/TRAINING PROGRAM

## 2025-01-13 PROCEDURE — 6370000000 HC RX 637 (ALT 250 FOR IP): Performed by: FAMILY MEDICINE

## 2025-01-13 PROCEDURE — 36415 COLL VENOUS BLD VENIPUNCTURE: CPT

## 2025-01-13 RX ORDER — AMLODIPINE BESYLATE 5 MG/1
5 TABLET ORAL DAILY
Status: DISCONTINUED | OUTPATIENT
Start: 2025-01-13 | End: 2025-01-23 | Stop reason: HOSPADM

## 2025-01-13 RX ADMIN — HYDROCODONE BITARTRATE AND ACETAMINOPHEN 1 TABLET: 7.5; 325 TABLET ORAL at 13:12

## 2025-01-13 RX ADMIN — HYDROMORPHONE HYDROCHLORIDE 0.5 MG: 1 INJECTION, SOLUTION INTRAMUSCULAR; INTRAVENOUS; SUBCUTANEOUS at 21:44

## 2025-01-13 RX ADMIN — PREGABALIN 150 MG: 75 CAPSULE ORAL at 08:17

## 2025-01-13 RX ADMIN — Medication 1 CAPSULE: at 07:05

## 2025-01-13 RX ADMIN — TRIAMCINOLONE ACETONIDE: 1 CREAM TOPICAL at 21:47

## 2025-01-13 RX ADMIN — SODIUM CHLORIDE, PRESERVATIVE FREE 10 ML: 5 INJECTION INTRAVENOUS at 21:50

## 2025-01-13 RX ADMIN — LISINOPRIL 20 MG: 20 TABLET ORAL at 08:19

## 2025-01-13 RX ADMIN — HYDROXYCHLOROQUINE SULFATE 200 MG: 200 TABLET ORAL at 08:18

## 2025-01-13 RX ADMIN — SODIUM CHLORIDE, PRESERVATIVE FREE 10 ML: 5 INJECTION INTRAVENOUS at 08:33

## 2025-01-13 RX ADMIN — HYDROCODONE BITARTRATE AND ACETAMINOPHEN 1 TABLET: 7.5; 325 TABLET ORAL at 07:05

## 2025-01-13 RX ADMIN — PREGABALIN 150 MG: 75 CAPSULE ORAL at 21:37

## 2025-01-13 RX ADMIN — DULOXETINE HYDROCHLORIDE 60 MG: 60 CAPSULE, DELAYED RELEASE ORAL at 08:56

## 2025-01-13 RX ADMIN — PANTOPRAZOLE SODIUM 40 MG: 40 TABLET, DELAYED RELEASE ORAL at 07:06

## 2025-01-13 RX ADMIN — SODIUM CHLORIDE, PRESERVATIVE FREE 10 ML: 5 INJECTION INTRAVENOUS at 08:34

## 2025-01-13 RX ADMIN — WATER 30 MG: 1 INJECTION INTRAMUSCULAR; INTRAVENOUS; SUBCUTANEOUS at 21:38

## 2025-01-13 RX ADMIN — Medication: at 13:15

## 2025-01-13 RX ADMIN — HYDROMORPHONE HYDROCHLORIDE 0.5 MG: 1 INJECTION, SOLUTION INTRAMUSCULAR; INTRAVENOUS; SUBCUTANEOUS at 15:04

## 2025-01-13 RX ADMIN — WATER 30 MG: 1 INJECTION INTRAMUSCULAR; INTRAVENOUS; SUBCUTANEOUS at 08:19

## 2025-01-13 RX ADMIN — Medication: at 21:47

## 2025-01-13 RX ADMIN — QUETIAPINE FUMARATE 50 MG: 100 TABLET ORAL at 21:37

## 2025-01-13 RX ADMIN — HYDROCORTISONE ACETATE: 1 CREAM TOPICAL at 21:47

## 2025-01-13 RX ADMIN — HYDROCORTISONE ACETATE: 1 CREAM TOPICAL at 13:16

## 2025-01-13 RX ADMIN — TRIAMCINOLONE ACETONIDE: 1 CREAM TOPICAL at 13:16

## 2025-01-13 RX ADMIN — AMLODIPINE BESYLATE 5 MG: 5 TABLET ORAL at 18:28

## 2025-01-13 RX ADMIN — SODIUM CHLORIDE, PRESERVATIVE FREE 10 ML: 5 INJECTION INTRAVENOUS at 21:49

## 2025-01-13 RX ADMIN — ENOXAPARIN SODIUM 40 MG: 100 INJECTION SUBCUTANEOUS at 08:19

## 2025-01-13 RX ADMIN — HYDROMORPHONE HYDROCHLORIDE 0.5 MG: 1 INJECTION, SOLUTION INTRAMUSCULAR; INTRAVENOUS; SUBCUTANEOUS at 08:46

## 2025-01-13 RX ADMIN — VANCOMYCIN HYDROCHLORIDE 1250 MG: 10 INJECTION, POWDER, LYOPHILIZED, FOR SOLUTION INTRAVENOUS at 11:37

## 2025-01-13 RX ADMIN — PANTOPRAZOLE SODIUM 40 MG: 40 TABLET, DELAYED RELEASE ORAL at 16:58

## 2025-01-13 RX ADMIN — Medication 1 CAPSULE: at 16:57

## 2025-01-13 RX ADMIN — HYDROCODONE BITARTRATE AND ACETAMINOPHEN 1 TABLET: 7.5; 325 TABLET ORAL at 19:47

## 2025-01-13 ASSESSMENT — PAIN DESCRIPTION - LOCATION
LOCATION: LEG

## 2025-01-13 ASSESSMENT — PAIN DESCRIPTION - DESCRIPTORS
DESCRIPTORS: ACHING;THROBBING
DESCRIPTORS: ACHING;STABBING
DESCRIPTORS: ACHING

## 2025-01-13 ASSESSMENT — PAIN SCALES - GENERAL
PAINLEVEL_OUTOF10: 3
PAINLEVEL_OUTOF10: 2
PAINLEVEL_OUTOF10: 8
PAINLEVEL_OUTOF10: 3
PAINLEVEL_OUTOF10: 7
PAINLEVEL_OUTOF10: 7
PAINLEVEL_OUTOF10: 9
PAINLEVEL_OUTOF10: 7
PAINLEVEL_OUTOF10: 8
PAINLEVEL_OUTOF10: 4

## 2025-01-13 ASSESSMENT — PAIN DESCRIPTION - ORIENTATION
ORIENTATION: LEFT

## 2025-01-13 NOTE — CARE COORDINATION
Chart reviewed and patient discussed in IDT rounds this AM. Patient here until 1/22 to receive  IV antibiotics.     Regino AVALOS, ACM  [place]

## 2025-01-14 PROCEDURE — 6370000000 HC RX 637 (ALT 250 FOR IP): Performed by: INTERNAL MEDICINE

## 2025-01-14 PROCEDURE — 6360000002 HC RX W HCPCS: Performed by: INTERNAL MEDICINE

## 2025-01-14 PROCEDURE — 1100000000 HC RM PRIVATE

## 2025-01-14 PROCEDURE — 36415 COLL VENOUS BLD VENIPUNCTURE: CPT

## 2025-01-14 PROCEDURE — 2500000003 HC RX 250 WO HCPCS: Performed by: STUDENT IN AN ORGANIZED HEALTH CARE EDUCATION/TRAINING PROGRAM

## 2025-01-14 PROCEDURE — 2580000003 HC RX 258: Performed by: INTERNAL MEDICINE

## 2025-01-14 PROCEDURE — 6370000000 HC RX 637 (ALT 250 FOR IP): Performed by: NURSE PRACTITIONER

## 2025-01-14 PROCEDURE — 85041 AUTOMATED RBC COUNT: CPT

## 2025-01-14 PROCEDURE — 94760 N-INVAS EAR/PLS OXIMETRY 1: CPT

## 2025-01-14 PROCEDURE — 6370000000 HC RX 637 (ALT 250 FOR IP): Performed by: STUDENT IN AN ORGANIZED HEALTH CARE EDUCATION/TRAINING PROGRAM

## 2025-01-14 PROCEDURE — 6360000002 HC RX W HCPCS: Performed by: STUDENT IN AN ORGANIZED HEALTH CARE EDUCATION/TRAINING PROGRAM

## 2025-01-14 PROCEDURE — 2700000000 HC OXYGEN THERAPY PER DAY

## 2025-01-14 PROCEDURE — 82955 ASSAY OF G6PD ENZYME: CPT

## 2025-01-14 PROCEDURE — 2500000003 HC RX 250 WO HCPCS: Performed by: INTERNAL MEDICINE

## 2025-01-14 PROCEDURE — 6370000000 HC RX 637 (ALT 250 FOR IP): Performed by: FAMILY MEDICINE

## 2025-01-14 RX ORDER — SULFAMETHOXAZOLE AND TRIMETHOPRIM 800; 160 MG/1; MG/1
1 TABLET ORAL
Status: DISCONTINUED | OUTPATIENT
Start: 2025-01-15 | End: 2025-01-23 | Stop reason: HOSPADM

## 2025-01-14 RX ADMIN — Medication 1 CAPSULE: at 17:45

## 2025-01-14 RX ADMIN — SODIUM CHLORIDE, PRESERVATIVE FREE 10 ML: 5 INJECTION INTRAVENOUS at 09:28

## 2025-01-14 RX ADMIN — TRIAMCINOLONE ACETONIDE: 1 CREAM TOPICAL at 20:40

## 2025-01-14 RX ADMIN — AMLODIPINE BESYLATE 5 MG: 5 TABLET ORAL at 09:24

## 2025-01-14 RX ADMIN — HYDROXYCHLOROQUINE SULFATE 200 MG: 200 TABLET ORAL at 09:26

## 2025-01-14 RX ADMIN — SODIUM CHLORIDE, PRESERVATIVE FREE 20 ML: 5 INJECTION INTRAVENOUS at 09:27

## 2025-01-14 RX ADMIN — HYDROCORTISONE ACETATE: 1 CREAM TOPICAL at 20:40

## 2025-01-14 RX ADMIN — HYDROMORPHONE HYDROCHLORIDE 0.5 MG: 1 INJECTION, SOLUTION INTRAMUSCULAR; INTRAVENOUS; SUBCUTANEOUS at 20:54

## 2025-01-14 RX ADMIN — QUETIAPINE FUMARATE 50 MG: 100 TABLET ORAL at 20:36

## 2025-01-14 RX ADMIN — ENOXAPARIN SODIUM 40 MG: 100 INJECTION SUBCUTANEOUS at 09:26

## 2025-01-14 RX ADMIN — WATER 30 MG: 1 INJECTION INTRAMUSCULAR; INTRAVENOUS; SUBCUTANEOUS at 09:26

## 2025-01-14 RX ADMIN — HYDROCODONE BITARTRATE AND ACETAMINOPHEN 1 TABLET: 7.5; 325 TABLET ORAL at 12:49

## 2025-01-14 RX ADMIN — PREGABALIN 150 MG: 75 CAPSULE ORAL at 20:36

## 2025-01-14 RX ADMIN — VANCOMYCIN HYDROCHLORIDE 1250 MG: 10 INJECTION, POWDER, LYOPHILIZED, FOR SOLUTION INTRAVENOUS at 11:12

## 2025-01-14 RX ADMIN — Medication: at 20:40

## 2025-01-14 RX ADMIN — DULOXETINE HYDROCHLORIDE 60 MG: 60 CAPSULE, DELAYED RELEASE ORAL at 09:26

## 2025-01-14 RX ADMIN — HYDROCODONE BITARTRATE AND ACETAMINOPHEN 1 TABLET: 7.5; 325 TABLET ORAL at 19:34

## 2025-01-14 RX ADMIN — SODIUM CHLORIDE, PRESERVATIVE FREE 10 ML: 5 INJECTION INTRAVENOUS at 20:38

## 2025-01-14 RX ADMIN — BENZOCAINE AND MENTHOL 1 LOZENGE: 15; 2.6 LOZENGE ORAL at 19:36

## 2025-01-14 RX ADMIN — Medication 1 CAPSULE: at 09:25

## 2025-01-14 RX ADMIN — SODIUM CHLORIDE, PRESERVATIVE FREE 10 ML: 5 INJECTION INTRAVENOUS at 20:39

## 2025-01-14 RX ADMIN — PREGABALIN 150 MG: 75 CAPSULE ORAL at 09:25

## 2025-01-14 RX ADMIN — PANTOPRAZOLE SODIUM 40 MG: 40 TABLET, DELAYED RELEASE ORAL at 17:45

## 2025-01-14 RX ADMIN — HYDROMORPHONE HYDROCHLORIDE 0.5 MG: 1 INJECTION, SOLUTION INTRAMUSCULAR; INTRAVENOUS; SUBCUTANEOUS at 15:52

## 2025-01-14 RX ADMIN — PANTOPRAZOLE SODIUM 40 MG: 40 TABLET, DELAYED RELEASE ORAL at 09:25

## 2025-01-14 RX ADMIN — LISINOPRIL 20 MG: 20 TABLET ORAL at 09:25

## 2025-01-14 RX ADMIN — SODIUM CHLORIDE, PRESERVATIVE FREE 10 ML: 5 INJECTION INTRAVENOUS at 09:27

## 2025-01-14 ASSESSMENT — PAIN DESCRIPTION - ORIENTATION
ORIENTATION: LEFT
ORIENTATION: LEFT;LOWER
ORIENTATION: LEFT
ORIENTATION: LEFT

## 2025-01-14 ASSESSMENT — PAIN DESCRIPTION - LOCATION
LOCATION: LEG

## 2025-01-14 ASSESSMENT — PAIN SCALES - GENERAL
PAINLEVEL_OUTOF10: 2
PAINLEVEL_OUTOF10: 3
PAINLEVEL_OUTOF10: 6
PAINLEVEL_OUTOF10: 9
PAINLEVEL_OUTOF10: 6
PAINLEVEL_OUTOF10: 0
PAINLEVEL_OUTOF10: 7

## 2025-01-14 ASSESSMENT — PAIN DESCRIPTION - DESCRIPTORS
DESCRIPTORS: ACHING;THROBBING
DESCRIPTORS: ACHING;STABBING
DESCRIPTORS: ACHING
DESCRIPTORS: ACHING;THROBBING

## 2025-01-14 NOTE — PLAN OF CARE
Problem: Discharge Planning  Goal: Discharge to home or other facility with appropriate resources  1/14/2025 1055 by Marlene Munguia RN  Outcome: Progressing  1/13/2025 2253 by Daljit Nye RN  Outcome: Progressing     Problem: Safety - Adult  Goal: Free from fall injury  1/13/2025 2253 by Daljit Nye RN  Outcome: Progressing     Problem: Pain  Goal: Verbalizes/displays adequate comfort level or baseline comfort level  1/14/2025 1055 by Marlene Munguia RN  Outcome: Progressing  1/13/2025 2253 by Daljit Nye RN  Outcome: Progressing     Problem: Risk for Elopement  Goal: Patient will not exit the unit/facility without proper excort  1/13/2025 2253 by Daljit Nye RN  Outcome: Progressing     Problem: Skin/Tissue Integrity  Goal: Absence of new skin breakdown  Description: 1.  Monitor for areas of redness and/or skin breakdown  2.  Assess vascular access sites hourly  3.  Every 4-6 hours minimum:  Change oxygen saturation probe site  4.  Every 4-6 hours:  If on nasal continuous positive airway pressure, respiratory therapy assess nares and determine need for appliance change or resting period.  1/14/2025 1055 by Marlene Munguia RN  Outcome: Progressing  1/13/2025 2253 by Daljit Nye RN  Outcome: Progressing     Problem: ABCDS Injury Assessment  Goal: Absence of physical injury  1/14/2025 1055 by Marlene Munguia RN  Outcome: Progressing  1/13/2025 2253 by Daljit Ney RN  Outcome: Progressing     Problem: Respiratory - Adult  Goal: Achieves optimal ventilation and oxygenation  1/14/2025 0803 by Jacob Freed RCP  Outcome: Progressing  Flowsheets (Taken 1/14/2025 0803)  Achieves optimal ventilation and oxygenation:   Assess for changes in respiratory status   Position to facilitate oxygenation and minimize respiratory effort   Respiratory therapy support as indicated   Assess for changes in mentation and behavior   Oxygen supplementation based on oxygen saturation or

## 2025-01-14 NOTE — PLAN OF CARE
Problem: Respiratory - Adult  Goal: Achieves optimal ventilation and oxygenation  1/14/2025 0803 by Jacob Freed, RCSHERYL  Outcome: Progressing  Flowsheets (Taken 1/14/2025 0803)  Achieves optimal ventilation and oxygenation:   Assess for changes in respiratory status   Position to facilitate oxygenation and minimize respiratory effort   Respiratory therapy support as indicated   Assess for changes in mentation and behavior   Oxygen supplementation based on oxygen saturation or arterial blood gases   Encourage broncho-pulmonary hygiene including cough, deep breathe, incentive spirometry   Assess and instruct to report shortness of breath or any respiratory difficulty

## 2025-01-14 NOTE — PLAN OF CARE
Problem: Discharge Planning  Goal: Discharge to home or other facility with appropriate resources  Outcome: Progressing     Problem: Safety - Adult  Goal: Free from fall injury  Outcome: Progressing     Problem: Pain  Goal: Verbalizes/displays adequate comfort level or baseline comfort level  Outcome: Progressing     Problem: Risk for Elopement  Goal: Patient will not exit the unit/facility without proper excort  Outcome: Progressing     Problem: Skin/Tissue Integrity  Goal: Absence of new skin breakdown  Description: 1.  Monitor for areas of redness and/or skin breakdown  2.  Assess vascular access sites hourly  3.  Every 4-6 hours minimum:  Change oxygen saturation probe site  4.  Every 4-6 hours:  If on nasal continuous positive airway pressure, respiratory therapy assess nares and determine need for appliance change or resting period.  Outcome: Progressing     Problem: ABCDS Injury Assessment  Goal: Absence of physical injury  Outcome: Progressing     Problem: Respiratory - Adult  Goal: Achieves optimal ventilation and oxygenation  Outcome: Progressing

## 2025-01-15 LAB
B PERT DNA SPEC QL NAA+PROBE: NOT DETECTED
BORDETELLA PARAPERTUSSIS BY PCR: NOT DETECTED
C PNEUM DNA SPEC QL NAA+PROBE: NOT DETECTED
CREAT SERPL-MCNC: 0.91 MG/DL (ref 0.6–1.1)
FLUAV SUBTYP SPEC NAA+PROBE: NOT DETECTED
FLUBV RNA SPEC QL NAA+PROBE: NOT DETECTED
G6PD BLD QN: 352 U/10E12 RBC (ref 127–427)
HADV DNA SPEC QL NAA+PROBE: NOT DETECTED
HCOV 229E RNA SPEC QL NAA+PROBE: NOT DETECTED
HCOV HKU1 RNA SPEC QL NAA+PROBE: NOT DETECTED
HCOV NL63 RNA SPEC QL NAA+PROBE: NOT DETECTED
HCOV OC43 RNA SPEC QL NAA+PROBE: NOT DETECTED
HMPV RNA SPEC QL NAA+PROBE: NOT DETECTED
HPIV1 RNA SPEC QL NAA+PROBE: NOT DETECTED
HPIV2 RNA SPEC QL NAA+PROBE: NOT DETECTED
HPIV3 RNA SPEC QL NAA+PROBE: NOT DETECTED
HPIV4 RNA SPEC QL NAA+PROBE: NOT DETECTED
M PNEUMO DNA SPEC QL NAA+PROBE: NOT DETECTED
RBC # BLD AUTO: 4.08 X10E6/UL (ref 3.77–5.28)
RSV RNA SPEC QL NAA+PROBE: DETECTED
RV+EV RNA SPEC QL NAA+PROBE: NOT DETECTED
SARS-COV-2 RNA RESP QL NAA+PROBE: NOT DETECTED

## 2025-01-15 PROCEDURE — 82565 ASSAY OF CREATININE: CPT

## 2025-01-15 PROCEDURE — 2500000003 HC RX 250 WO HCPCS: Performed by: STUDENT IN AN ORGANIZED HEALTH CARE EDUCATION/TRAINING PROGRAM

## 2025-01-15 PROCEDURE — 2500000003 HC RX 250 WO HCPCS: Performed by: INTERNAL MEDICINE

## 2025-01-15 PROCEDURE — 6370000000 HC RX 637 (ALT 250 FOR IP)

## 2025-01-15 PROCEDURE — 36415 COLL VENOUS BLD VENIPUNCTURE: CPT

## 2025-01-15 PROCEDURE — 6370000000 HC RX 637 (ALT 250 FOR IP): Performed by: NURSE PRACTITIONER

## 2025-01-15 PROCEDURE — 6360000002 HC RX W HCPCS: Performed by: INTERNAL MEDICINE

## 2025-01-15 PROCEDURE — 0202U NFCT DS 22 TRGT SARS-COV-2: CPT

## 2025-01-15 PROCEDURE — 2580000003 HC RX 258: Performed by: INTERNAL MEDICINE

## 2025-01-15 PROCEDURE — 6360000002 HC RX W HCPCS: Performed by: STUDENT IN AN ORGANIZED HEALTH CARE EDUCATION/TRAINING PROGRAM

## 2025-01-15 PROCEDURE — 6370000000 HC RX 637 (ALT 250 FOR IP): Performed by: STUDENT IN AN ORGANIZED HEALTH CARE EDUCATION/TRAINING PROGRAM

## 2025-01-15 PROCEDURE — 6370000000 HC RX 637 (ALT 250 FOR IP): Performed by: FAMILY MEDICINE

## 2025-01-15 PROCEDURE — 1100000000 HC RM PRIVATE

## 2025-01-15 PROCEDURE — 6370000000 HC RX 637 (ALT 250 FOR IP): Performed by: INTERNAL MEDICINE

## 2025-01-15 RX ADMIN — PREGABALIN 150 MG: 75 CAPSULE ORAL at 20:31

## 2025-01-15 RX ADMIN — HYDROMORPHONE HYDROCHLORIDE 0.5 MG: 1 INJECTION, SOLUTION INTRAMUSCULAR; INTRAVENOUS; SUBCUTANEOUS at 20:31

## 2025-01-15 RX ADMIN — VANCOMYCIN HYDROCHLORIDE 1250 MG: 10 INJECTION, POWDER, LYOPHILIZED, FOR SOLUTION INTRAVENOUS at 10:57

## 2025-01-15 RX ADMIN — TRIAMCINOLONE ACETONIDE: 1 CREAM TOPICAL at 08:40

## 2025-01-15 RX ADMIN — PREGABALIN 150 MG: 75 CAPSULE ORAL at 08:38

## 2025-01-15 RX ADMIN — HYDROMORPHONE HYDROCHLORIDE 0.5 MG: 1 INJECTION, SOLUTION INTRAMUSCULAR; INTRAVENOUS; SUBCUTANEOUS at 14:12

## 2025-01-15 RX ADMIN — HYDROCORTISONE ACETATE: 1 CREAM TOPICAL at 08:40

## 2025-01-15 RX ADMIN — SODIUM CHLORIDE, PRESERVATIVE FREE 10 ML: 5 INJECTION INTRAVENOUS at 08:39

## 2025-01-15 RX ADMIN — HYDROCODONE BITARTRATE AND ACETAMINOPHEN 1 TABLET: 7.5; 325 TABLET ORAL at 12:20

## 2025-01-15 RX ADMIN — SODIUM CHLORIDE, PRESERVATIVE FREE 10 ML: 5 INJECTION INTRAVENOUS at 20:38

## 2025-01-15 RX ADMIN — PANTOPRAZOLE SODIUM 40 MG: 40 TABLET, DELAYED RELEASE ORAL at 08:38

## 2025-01-15 RX ADMIN — WATER 30 MG: 1 INJECTION INTRAMUSCULAR; INTRAVENOUS; SUBCUTANEOUS at 08:37

## 2025-01-15 RX ADMIN — ENOXAPARIN SODIUM 40 MG: 100 INJECTION SUBCUTANEOUS at 08:38

## 2025-01-15 RX ADMIN — Medication: at 20:37

## 2025-01-15 RX ADMIN — HYDROXYCHLOROQUINE SULFATE 200 MG: 200 TABLET ORAL at 08:38

## 2025-01-15 RX ADMIN — SODIUM CHLORIDE, PRESERVATIVE FREE 10 ML: 5 INJECTION INTRAVENOUS at 20:39

## 2025-01-15 RX ADMIN — QUETIAPINE FUMARATE 50 MG: 100 TABLET ORAL at 20:31

## 2025-01-15 RX ADMIN — TRIAMCINOLONE ACETONIDE: 1 CREAM TOPICAL at 20:38

## 2025-01-15 RX ADMIN — HYDROCORTISONE ACETATE: 1 CREAM TOPICAL at 20:37

## 2025-01-15 RX ADMIN — Medication: at 16:44

## 2025-01-15 RX ADMIN — PANTOPRAZOLE SODIUM 40 MG: 40 TABLET, DELAYED RELEASE ORAL at 14:12

## 2025-01-15 RX ADMIN — HYDROCODONE BITARTRATE AND ACETAMINOPHEN 1 TABLET: 7.5; 325 TABLET ORAL at 19:01

## 2025-01-15 RX ADMIN — Medication 1 CAPSULE: at 08:38

## 2025-01-15 RX ADMIN — HYDROMORPHONE HYDROCHLORIDE 0.5 MG: 1 INJECTION, SOLUTION INTRAMUSCULAR; INTRAVENOUS; SUBCUTANEOUS at 05:34

## 2025-01-15 RX ADMIN — SULFAMETHOXAZOLE AND TRIMETHOPRIM 1 TABLET: 800; 160 TABLET ORAL at 08:38

## 2025-01-15 RX ADMIN — Medication 1 CAPSULE: at 16:43

## 2025-01-15 RX ADMIN — AMLODIPINE BESYLATE 5 MG: 5 TABLET ORAL at 08:38

## 2025-01-15 RX ADMIN — DULOXETINE HYDROCHLORIDE 60 MG: 60 CAPSULE, DELAYED RELEASE ORAL at 08:38

## 2025-01-15 RX ADMIN — Medication: at 08:39

## 2025-01-15 RX ADMIN — HYDROCORTISONE ACETATE: 1 CREAM TOPICAL at 16:44

## 2025-01-15 RX ADMIN — HYDROCODONE BITARTRATE AND ACETAMINOPHEN 1 TABLET: 7.5; 325 TABLET ORAL at 04:35

## 2025-01-15 RX ADMIN — LISINOPRIL 20 MG: 20 TABLET ORAL at 08:38

## 2025-01-15 ASSESSMENT — PAIN DESCRIPTION - DESCRIPTORS
DESCRIPTORS: ACHING;STABBING
DESCRIPTORS: ACHING
DESCRIPTORS: ACHING;STABBING

## 2025-01-15 ASSESSMENT — PAIN SCALES - GENERAL
PAINLEVEL_OUTOF10: 6
PAINLEVEL_OUTOF10: 8
PAINLEVEL_OUTOF10: 9
PAINLEVEL_OUTOF10: 0
PAINLEVEL_OUTOF10: 6
PAINLEVEL_OUTOF10: 8
PAINLEVEL_OUTOF10: 9
PAINLEVEL_OUTOF10: 3
PAINLEVEL_OUTOF10: 3

## 2025-01-15 ASSESSMENT — PAIN DESCRIPTION - LOCATION
LOCATION: LEG
LOCATION: GENERALIZED
LOCATION: LEG
LOCATION: GENERALIZED

## 2025-01-15 ASSESSMENT — PAIN DESCRIPTION - ORIENTATION
ORIENTATION: LEFT

## 2025-01-15 NOTE — CARE COORDINATION
Chart reviewed and patient discussed in IDT rounds this AM.   Patient here until the completion for her IV antibiotics, EOT 1/22. Home health discussed with patient and list given. [facility]. Referral sent     Regino AVALOS, ACM  [place]

## 2025-01-16 PROBLEM — J21.0 RSV (ACUTE BRONCHIOLITIS DUE TO RESPIRATORY SYNCYTIAL VIRUS): Status: ACTIVE | Noted: 2025-01-16

## 2025-01-16 PROCEDURE — 2500000003 HC RX 250 WO HCPCS: Performed by: INTERNAL MEDICINE

## 2025-01-16 PROCEDURE — 6360000002 HC RX W HCPCS: Performed by: INTERNAL MEDICINE

## 2025-01-16 PROCEDURE — 6360000002 HC RX W HCPCS: Performed by: STUDENT IN AN ORGANIZED HEALTH CARE EDUCATION/TRAINING PROGRAM

## 2025-01-16 PROCEDURE — 6370000000 HC RX 637 (ALT 250 FOR IP): Performed by: INTERNAL MEDICINE

## 2025-01-16 PROCEDURE — 94761 N-INVAS EAR/PLS OXIMETRY MLT: CPT

## 2025-01-16 PROCEDURE — 1100000000 HC RM PRIVATE

## 2025-01-16 PROCEDURE — 2500000003 HC RX 250 WO HCPCS: Performed by: STUDENT IN AN ORGANIZED HEALTH CARE EDUCATION/TRAINING PROGRAM

## 2025-01-16 PROCEDURE — 94640 AIRWAY INHALATION TREATMENT: CPT

## 2025-01-16 PROCEDURE — 2580000003 HC RX 258: Performed by: INTERNAL MEDICINE

## 2025-01-16 PROCEDURE — 6370000000 HC RX 637 (ALT 250 FOR IP): Performed by: STUDENT IN AN ORGANIZED HEALTH CARE EDUCATION/TRAINING PROGRAM

## 2025-01-16 PROCEDURE — 6370000000 HC RX 637 (ALT 250 FOR IP): Performed by: FAMILY MEDICINE

## 2025-01-16 PROCEDURE — 2700000000 HC OXYGEN THERAPY PER DAY

## 2025-01-16 PROCEDURE — 6370000000 HC RX 637 (ALT 250 FOR IP): Performed by: NURSE PRACTITIONER

## 2025-01-16 RX ORDER — GUAIFENESIN 600 MG/1
600 TABLET, EXTENDED RELEASE ORAL 2 TIMES DAILY
Status: DISCONTINUED | OUTPATIENT
Start: 2025-01-16 | End: 2025-01-23 | Stop reason: HOSPADM

## 2025-01-16 RX ORDER — IPRATROPIUM BROMIDE AND ALBUTEROL SULFATE 2.5; .5 MG/3ML; MG/3ML
1 SOLUTION RESPIRATORY (INHALATION) 3 TIMES DAILY
Status: DISCONTINUED | OUTPATIENT
Start: 2025-01-16 | End: 2025-01-19

## 2025-01-16 RX ORDER — ASCORBIC ACID 500 MG
1500 TABLET ORAL DAILY
Status: DISCONTINUED | OUTPATIENT
Start: 2025-01-16 | End: 2025-01-23 | Stop reason: HOSPADM

## 2025-01-16 RX ADMIN — PREGABALIN 150 MG: 75 CAPSULE ORAL at 20:41

## 2025-01-16 RX ADMIN — HYDROCODONE BITARTRATE AND ACETAMINOPHEN 1 TABLET: 7.5; 325 TABLET ORAL at 08:45

## 2025-01-16 RX ADMIN — Medication: at 13:00

## 2025-01-16 RX ADMIN — SODIUM CHLORIDE, PRESERVATIVE FREE 10 ML: 5 INJECTION INTRAVENOUS at 09:11

## 2025-01-16 RX ADMIN — HYDROXYCHLOROQUINE SULFATE 200 MG: 200 TABLET ORAL at 08:47

## 2025-01-16 RX ADMIN — HYDROMORPHONE HYDROCHLORIDE 0.5 MG: 1 INJECTION, SOLUTION INTRAMUSCULAR; INTRAVENOUS; SUBCUTANEOUS at 21:03

## 2025-01-16 RX ADMIN — HYDROCORTISONE ACETATE: 1 CREAM TOPICAL at 20:45

## 2025-01-16 RX ADMIN — TRIAMCINOLONE ACETONIDE: 1 CREAM TOPICAL at 20:45

## 2025-01-16 RX ADMIN — LISINOPRIL 20 MG: 20 TABLET ORAL at 09:11

## 2025-01-16 RX ADMIN — PREGABALIN 150 MG: 75 CAPSULE ORAL at 09:11

## 2025-01-16 RX ADMIN — PANTOPRAZOLE SODIUM 40 MG: 40 TABLET, DELAYED RELEASE ORAL at 20:41

## 2025-01-16 RX ADMIN — QUETIAPINE FUMARATE 50 MG: 100 TABLET ORAL at 20:42

## 2025-01-16 RX ADMIN — SODIUM CHLORIDE, PRESERVATIVE FREE 10 ML: 5 INJECTION INTRAVENOUS at 21:03

## 2025-01-16 RX ADMIN — IPRATROPIUM BROMIDE AND ALBUTEROL SULFATE 1 DOSE: 2.5; .5 SOLUTION RESPIRATORY (INHALATION) at 20:28

## 2025-01-16 RX ADMIN — BENZOCAINE AND MENTHOL 1 LOZENGE: 15; 2.6 LOZENGE ORAL at 11:39

## 2025-01-16 RX ADMIN — ENOXAPARIN SODIUM 40 MG: 100 INJECTION SUBCUTANEOUS at 08:47

## 2025-01-16 RX ADMIN — VANCOMYCIN HYDROCHLORIDE 1250 MG: 10 INJECTION, POWDER, LYOPHILIZED, FOR SOLUTION INTRAVENOUS at 11:34

## 2025-01-16 RX ADMIN — GUAIFENESIN 600 MG: 600 TABLET ORAL at 08:45

## 2025-01-16 RX ADMIN — Medication: at 20:44

## 2025-01-16 RX ADMIN — PANTOPRAZOLE SODIUM 40 MG: 40 TABLET, DELAYED RELEASE ORAL at 08:45

## 2025-01-16 RX ADMIN — OXYCODONE HYDROCHLORIDE AND ACETAMINOPHEN 1500 MG: 500 TABLET ORAL at 08:46

## 2025-01-16 RX ADMIN — DULOXETINE HYDROCHLORIDE 60 MG: 60 CAPSULE, DELAYED RELEASE ORAL at 09:11

## 2025-01-16 RX ADMIN — AMLODIPINE BESYLATE 5 MG: 5 TABLET ORAL at 08:47

## 2025-01-16 RX ADMIN — HYDROCORTISONE ACETATE: 1 CREAM TOPICAL at 13:00

## 2025-01-16 RX ADMIN — Medication 1 AMPULE: at 09:12

## 2025-01-16 RX ADMIN — Medication 1 CAPSULE: at 08:46

## 2025-01-16 RX ADMIN — GUAIFENESIN 600 MG: 600 TABLET ORAL at 20:42

## 2025-01-16 RX ADMIN — HYDROMORPHONE HYDROCHLORIDE 0.5 MG: 1 INJECTION, SOLUTION INTRAMUSCULAR; INTRAVENOUS; SUBCUTANEOUS at 11:35

## 2025-01-16 RX ADMIN — HYDROCODONE BITARTRATE AND ACETAMINOPHEN 1 TABLET: 7.5; 325 TABLET ORAL at 19:20

## 2025-01-16 RX ADMIN — WATER 30 MG: 1 INJECTION INTRAMUSCULAR; INTRAVENOUS; SUBCUTANEOUS at 09:11

## 2025-01-16 ASSESSMENT — PAIN DESCRIPTION - LOCATION
LOCATION: HEAD
LOCATION: CHEST

## 2025-01-16 ASSESSMENT — PAIN SCALES - GENERAL
PAINLEVEL_OUTOF10: 7
PAINLEVEL_OUTOF10: 8
PAINLEVEL_OUTOF10: 3
PAINLEVEL_OUTOF10: 7
PAINLEVEL_OUTOF10: 0
PAINLEVEL_OUTOF10: 3
PAINLEVEL_OUTOF10: 5
PAINLEVEL_OUTOF10: 4
PAINLEVEL_OUTOF10: 8

## 2025-01-16 ASSESSMENT — PAIN DESCRIPTION - DESCRIPTORS
DESCRIPTORS: ACHING
DESCRIPTORS: BURNING
DESCRIPTORS: ACHING

## 2025-01-16 ASSESSMENT — PAIN DESCRIPTION - ORIENTATION
ORIENTATION: LEFT
ORIENTATION: ANTERIOR

## 2025-01-16 ASSESSMENT — PAIN DESCRIPTION - PAIN TYPE
TYPE: ACUTE PAIN
TYPE: ACUTE PAIN

## 2025-01-16 NOTE — FLOWSHEET NOTE
Weekly dressing change performed by VAT. Duoderm placed under skin to help with pt's blistering. Please contact VAT for weekly dressing changes, or any prn dressing changes.        01/16/25 1715   PICC 12/22/24 Left Cephalic   Placement Date/Time: 12/22/24 1515   Inserted by: Marci WALKER RN  Insertion Practices: Chlorohexadine skin antisepsis;Hand hygiene;Maximal barrier precautions;Optimal catheter site selection;Sterile ultrasound technique  Lumen Type: Single Lumen PICC  Loc...   Site Assessment Red;Drainage;Other (Comment)  (blisters; old and new drainage)   Extremity Circumference (cm) 33 cm   External Catheter Length (cm) 0 cm   Lumen #1 Color/Status Purple;Brisk blood return;Flushed;Needleless connector port changed;Alcohol cap applied   Line Care Cap changed;Connections checked and tightened;Other (Comment)  (dressing changed)   Alcohol Cap Used Yes   Date of Last Dressing Change 01/16/25   Dressing Type Transparent w/CHG gel;Securing device  (duoderm)   Dressing Status New dressing applied;Clean, dry & intact   Dressing Intervention New;Dressing changed

## 2025-01-17 LAB — CREAT SERPL-MCNC: 1.06 MG/DL (ref 0.6–1.1)

## 2025-01-17 PROCEDURE — 6360000002 HC RX W HCPCS: Performed by: INTERNAL MEDICINE

## 2025-01-17 PROCEDURE — 2500000003 HC RX 250 WO HCPCS: Performed by: INTERNAL MEDICINE

## 2025-01-17 PROCEDURE — 6370000000 HC RX 637 (ALT 250 FOR IP): Performed by: FAMILY MEDICINE

## 2025-01-17 PROCEDURE — 2500000003 HC RX 250 WO HCPCS: Performed by: STUDENT IN AN ORGANIZED HEALTH CARE EDUCATION/TRAINING PROGRAM

## 2025-01-17 PROCEDURE — 6360000002 HC RX W HCPCS: Performed by: STUDENT IN AN ORGANIZED HEALTH CARE EDUCATION/TRAINING PROGRAM

## 2025-01-17 PROCEDURE — 6370000000 HC RX 637 (ALT 250 FOR IP): Performed by: STUDENT IN AN ORGANIZED HEALTH CARE EDUCATION/TRAINING PROGRAM

## 2025-01-17 PROCEDURE — 94640 AIRWAY INHALATION TREATMENT: CPT

## 2025-01-17 PROCEDURE — 6370000000 HC RX 637 (ALT 250 FOR IP)

## 2025-01-17 PROCEDURE — 6370000000 HC RX 637 (ALT 250 FOR IP): Performed by: INTERNAL MEDICINE

## 2025-01-17 PROCEDURE — 1100000000 HC RM PRIVATE

## 2025-01-17 PROCEDURE — 82565 ASSAY OF CREATININE: CPT

## 2025-01-17 PROCEDURE — 94761 N-INVAS EAR/PLS OXIMETRY MLT: CPT

## 2025-01-17 PROCEDURE — 36415 COLL VENOUS BLD VENIPUNCTURE: CPT

## 2025-01-17 PROCEDURE — 2580000003 HC RX 258: Performed by: INTERNAL MEDICINE

## 2025-01-17 PROCEDURE — 6370000000 HC RX 637 (ALT 250 FOR IP): Performed by: NURSE PRACTITIONER

## 2025-01-17 RX ADMIN — GUAIFENESIN 600 MG: 600 TABLET ORAL at 08:28

## 2025-01-17 RX ADMIN — Medication: at 19:33

## 2025-01-17 RX ADMIN — ENOXAPARIN SODIUM 40 MG: 100 INJECTION SUBCUTANEOUS at 08:29

## 2025-01-17 RX ADMIN — TRIAMCINOLONE ACETONIDE: 1 CREAM TOPICAL at 19:33

## 2025-01-17 RX ADMIN — IPRATROPIUM BROMIDE AND ALBUTEROL SULFATE 1 DOSE: 2.5; .5 SOLUTION RESPIRATORY (INHALATION) at 09:15

## 2025-01-17 RX ADMIN — HYDROCODONE BITARTRATE AND ACETAMINOPHEN 1 TABLET: 7.5; 325 TABLET ORAL at 04:38

## 2025-01-17 RX ADMIN — IPRATROPIUM BROMIDE AND ALBUTEROL SULFATE 1 DOSE: 2.5; .5 SOLUTION RESPIRATORY (INHALATION) at 12:28

## 2025-01-17 RX ADMIN — SODIUM CHLORIDE, PRESERVATIVE FREE 10 ML: 5 INJECTION INTRAVENOUS at 19:30

## 2025-01-17 RX ADMIN — SODIUM CHLORIDE, PRESERVATIVE FREE 10 ML: 5 INJECTION INTRAVENOUS at 08:30

## 2025-01-17 RX ADMIN — PREGABALIN 150 MG: 75 CAPSULE ORAL at 08:28

## 2025-01-17 RX ADMIN — AMLODIPINE BESYLATE 5 MG: 5 TABLET ORAL at 08:28

## 2025-01-17 RX ADMIN — GUAIFENESIN 600 MG: 600 TABLET ORAL at 19:28

## 2025-01-17 RX ADMIN — Medication: at 08:33

## 2025-01-17 RX ADMIN — HYDROMORPHONE HYDROCHLORIDE 0.5 MG: 1 INJECTION, SOLUTION INTRAMUSCULAR; INTRAVENOUS; SUBCUTANEOUS at 06:26

## 2025-01-17 RX ADMIN — VANCOMYCIN HYDROCHLORIDE 1250 MG: 10 INJECTION, POWDER, LYOPHILIZED, FOR SOLUTION INTRAVENOUS at 11:38

## 2025-01-17 RX ADMIN — DULOXETINE HYDROCHLORIDE 60 MG: 60 CAPSULE, DELAYED RELEASE ORAL at 08:28

## 2025-01-17 RX ADMIN — HYDROCODONE BITARTRATE AND ACETAMINOPHEN 1 TABLET: 7.5; 325 TABLET ORAL at 11:35

## 2025-01-17 RX ADMIN — LISINOPRIL 20 MG: 20 TABLET ORAL at 08:28

## 2025-01-17 RX ADMIN — HYDROCORTISONE ACETATE: 1 CREAM TOPICAL at 08:34

## 2025-01-17 RX ADMIN — Medication 1 CAPSULE: at 16:52

## 2025-01-17 RX ADMIN — Medication 1 CAPSULE: at 08:28

## 2025-01-17 RX ADMIN — PREGABALIN 150 MG: 75 CAPSULE ORAL at 19:28

## 2025-01-17 RX ADMIN — HYDROXYCHLOROQUINE SULFATE 200 MG: 200 TABLET ORAL at 08:28

## 2025-01-17 RX ADMIN — OXYCODONE HYDROCHLORIDE AND ACETAMINOPHEN 1500 MG: 500 TABLET ORAL at 08:28

## 2025-01-17 RX ADMIN — WATER 30 MG: 1 INJECTION INTRAMUSCULAR; INTRAVENOUS; SUBCUTANEOUS at 08:29

## 2025-01-17 RX ADMIN — SULFAMETHOXAZOLE AND TRIMETHOPRIM 1 TABLET: 800; 160 TABLET ORAL at 08:28

## 2025-01-17 RX ADMIN — HYDROMORPHONE HYDROCHLORIDE 0.5 MG: 1 INJECTION, SOLUTION INTRAMUSCULAR; INTRAVENOUS; SUBCUTANEOUS at 19:29

## 2025-01-17 RX ADMIN — PANTOPRAZOLE SODIUM 40 MG: 40 TABLET, DELAYED RELEASE ORAL at 16:52

## 2025-01-17 RX ADMIN — TRIAMCINOLONE ACETONIDE: 1 CREAM TOPICAL at 08:35

## 2025-01-17 RX ADMIN — PANTOPRAZOLE SODIUM 40 MG: 40 TABLET, DELAYED RELEASE ORAL at 08:28

## 2025-01-17 RX ADMIN — HYDROCODONE BITARTRATE AND ACETAMINOPHEN 1 TABLET: 7.5; 325 TABLET ORAL at 17:39

## 2025-01-17 RX ADMIN — QUETIAPINE FUMARATE 50 MG: 100 TABLET ORAL at 19:28

## 2025-01-17 RX ADMIN — HYDROCORTISONE ACETATE: 1 CREAM TOPICAL at 19:33

## 2025-01-17 ASSESSMENT — PAIN DESCRIPTION - ORIENTATION
ORIENTATION: UPPER
ORIENTATION: LEFT;UPPER

## 2025-01-17 ASSESSMENT — PAIN SCALES - GENERAL
PAINLEVEL_OUTOF10: 7
PAINLEVEL_OUTOF10: 8
PAINLEVEL_OUTOF10: 7
PAINLEVEL_OUTOF10: 0
PAINLEVEL_OUTOF10: 8
PAINLEVEL_OUTOF10: 8
PAINLEVEL_OUTOF10: 2

## 2025-01-17 ASSESSMENT — PAIN - FUNCTIONAL ASSESSMENT: PAIN_FUNCTIONAL_ASSESSMENT: ACTIVITIES ARE NOT PREVENTED

## 2025-01-17 ASSESSMENT — PAIN DESCRIPTION - DESCRIPTORS
DESCRIPTORS: BURNING;ACHING;SORE
DESCRIPTORS: ACHING

## 2025-01-17 ASSESSMENT — PAIN DESCRIPTION - LOCATION
LOCATION: ARM
LOCATION: GENERALIZED
LOCATION: BACK
LOCATION: CHEST

## 2025-01-17 ASSESSMENT — PAIN DESCRIPTION - PAIN TYPE: TYPE: ACUTE PAIN

## 2025-01-17 NOTE — CARE COORDINATION
Chart reviewed and patient discussed in IDT rounds this AM. Patient here for IV antibiotic treat. Patient will discharge home with [facility] at EOT 1/22/25.     Regino AVALOS, ACM  [place]

## 2025-01-18 LAB
BASOPHILS # BLD: 0.07 K/UL (ref 0–0.2)
BASOPHILS NFR BLD: 0.6 % (ref 0–2)
CREAT SERPL-MCNC: 0.99 MG/DL (ref 0.6–1.1)
DIFFERENTIAL METHOD BLD: ABNORMAL
EOSINOPHIL # BLD: 1.47 K/UL (ref 0–0.8)
EOSINOPHIL NFR BLD: 12.6 % (ref 0.5–7.8)
ERYTHROCYTE [DISTWIDTH] IN BLOOD BY AUTOMATED COUNT: 18.8 % (ref 11.9–14.6)
HCT VFR BLD AUTO: 35.7 % (ref 35.8–46.3)
HGB BLD-MCNC: 10.5 G/DL (ref 11.7–15.4)
IMM GRANULOCYTES # BLD AUTO: 0.07 K/UL (ref 0–0.5)
IMM GRANULOCYTES NFR BLD AUTO: 0.6 % (ref 0–5)
LYMPHOCYTES # BLD: 2.14 K/UL (ref 0.5–4.6)
LYMPHOCYTES NFR BLD: 18.4 % (ref 13–44)
MCH RBC QN AUTO: 24.9 PG (ref 26.1–32.9)
MCHC RBC AUTO-ENTMCNC: 29.4 G/DL (ref 31.4–35)
MCV RBC AUTO: 84.8 FL (ref 82–102)
MONOCYTES # BLD: 1.23 K/UL (ref 0.1–1.3)
MONOCYTES NFR BLD: 10.6 % (ref 4–12)
NEUTS SEG # BLD: 6.66 K/UL (ref 1.7–8.2)
NEUTS SEG NFR BLD: 57.2 % (ref 43–78)
NRBC # BLD: 0 K/UL (ref 0–0.2)
PLATELET # BLD AUTO: 223 K/UL (ref 150–450)
PMV BLD AUTO: 9.9 FL (ref 9.4–12.3)
RBC # BLD AUTO: 4.21 M/UL (ref 4.05–5.2)
VANCOMYCIN SERPL-MCNC: 14 UG/ML
WBC # BLD AUTO: 11.6 K/UL (ref 4.3–11.1)

## 2025-01-18 PROCEDURE — 36415 COLL VENOUS BLD VENIPUNCTURE: CPT

## 2025-01-18 PROCEDURE — 2500000003 HC RX 250 WO HCPCS: Performed by: INTERNAL MEDICINE

## 2025-01-18 PROCEDURE — 1100000000 HC RM PRIVATE

## 2025-01-18 PROCEDURE — 6370000000 HC RX 637 (ALT 250 FOR IP): Performed by: INTERNAL MEDICINE

## 2025-01-18 PROCEDURE — 94640 AIRWAY INHALATION TREATMENT: CPT

## 2025-01-18 PROCEDURE — 94760 N-INVAS EAR/PLS OXIMETRY 1: CPT

## 2025-01-18 PROCEDURE — 85025 COMPLETE CBC W/AUTO DIFF WBC: CPT

## 2025-01-18 PROCEDURE — 6370000000 HC RX 637 (ALT 250 FOR IP): Performed by: STUDENT IN AN ORGANIZED HEALTH CARE EDUCATION/TRAINING PROGRAM

## 2025-01-18 PROCEDURE — 82565 ASSAY OF CREATININE: CPT

## 2025-01-18 PROCEDURE — 6360000002 HC RX W HCPCS: Performed by: INTERNAL MEDICINE

## 2025-01-18 PROCEDURE — 6360000002 HC RX W HCPCS: Performed by: STUDENT IN AN ORGANIZED HEALTH CARE EDUCATION/TRAINING PROGRAM

## 2025-01-18 PROCEDURE — 6370000000 HC RX 637 (ALT 250 FOR IP): Performed by: FAMILY MEDICINE

## 2025-01-18 PROCEDURE — 2700000000 HC OXYGEN THERAPY PER DAY

## 2025-01-18 PROCEDURE — 80202 ASSAY OF VANCOMYCIN: CPT

## 2025-01-18 PROCEDURE — 2500000003 HC RX 250 WO HCPCS: Performed by: STUDENT IN AN ORGANIZED HEALTH CARE EDUCATION/TRAINING PROGRAM

## 2025-01-18 PROCEDURE — 2580000003 HC RX 258: Performed by: INTERNAL MEDICINE

## 2025-01-18 RX ADMIN — Medication: at 20:39

## 2025-01-18 RX ADMIN — PANTOPRAZOLE SODIUM 40 MG: 40 TABLET, DELAYED RELEASE ORAL at 16:47

## 2025-01-18 RX ADMIN — QUETIAPINE FUMARATE 50 MG: 100 TABLET ORAL at 20:37

## 2025-01-18 RX ADMIN — DULOXETINE HYDROCHLORIDE 60 MG: 60 CAPSULE, DELAYED RELEASE ORAL at 08:34

## 2025-01-18 RX ADMIN — HYDROXYCHLOROQUINE SULFATE 200 MG: 200 TABLET ORAL at 08:35

## 2025-01-18 RX ADMIN — SODIUM CHLORIDE, PRESERVATIVE FREE 10 ML: 5 INJECTION INTRAVENOUS at 20:41

## 2025-01-18 RX ADMIN — SODIUM CHLORIDE, PRESERVATIVE FREE 10 ML: 5 INJECTION INTRAVENOUS at 20:40

## 2025-01-18 RX ADMIN — ENOXAPARIN SODIUM 40 MG: 100 INJECTION SUBCUTANEOUS at 08:35

## 2025-01-18 RX ADMIN — Medication 1 CAPSULE: at 08:34

## 2025-01-18 RX ADMIN — IPRATROPIUM BROMIDE AND ALBUTEROL SULFATE 1 DOSE: 2.5; .5 SOLUTION RESPIRATORY (INHALATION) at 21:18

## 2025-01-18 RX ADMIN — SODIUM CHLORIDE, PRESERVATIVE FREE 10 ML: 5 INJECTION INTRAVENOUS at 08:36

## 2025-01-18 RX ADMIN — WATER 30 MG: 1 INJECTION INTRAMUSCULAR; INTRAVENOUS; SUBCUTANEOUS at 08:34

## 2025-01-18 RX ADMIN — VANCOMYCIN HYDROCHLORIDE 1250 MG: 10 INJECTION, POWDER, LYOPHILIZED, FOR SOLUTION INTRAVENOUS at 10:58

## 2025-01-18 RX ADMIN — IPRATROPIUM BROMIDE AND ALBUTEROL SULFATE 1 DOSE: 2.5; .5 SOLUTION RESPIRATORY (INHALATION) at 15:11

## 2025-01-18 RX ADMIN — HYDROCORTISONE ACETATE: 1 CREAM TOPICAL at 20:40

## 2025-01-18 RX ADMIN — PREGABALIN 150 MG: 75 CAPSULE ORAL at 08:35

## 2025-01-18 RX ADMIN — TRIAMCINOLONE ACETONIDE: 1 CREAM TOPICAL at 20:40

## 2025-01-18 RX ADMIN — OXYCODONE HYDROCHLORIDE AND ACETAMINOPHEN 1500 MG: 500 TABLET ORAL at 08:34

## 2025-01-18 RX ADMIN — PREGABALIN 150 MG: 75 CAPSULE ORAL at 20:37

## 2025-01-18 RX ADMIN — SODIUM CHLORIDE, PRESERVATIVE FREE 10 ML: 5 INJECTION INTRAVENOUS at 08:37

## 2025-01-18 RX ADMIN — IPRATROPIUM BROMIDE AND ALBUTEROL SULFATE 1 DOSE: 2.5; .5 SOLUTION RESPIRATORY (INHALATION) at 08:22

## 2025-01-18 RX ADMIN — PANTOPRAZOLE SODIUM 40 MG: 40 TABLET, DELAYED RELEASE ORAL at 08:35

## 2025-01-18 RX ADMIN — GUAIFENESIN 600 MG: 600 TABLET ORAL at 08:35

## 2025-01-18 RX ADMIN — HYDROMORPHONE HYDROCHLORIDE 0.5 MG: 1 INJECTION, SOLUTION INTRAMUSCULAR; INTRAVENOUS; SUBCUTANEOUS at 04:33

## 2025-01-18 RX ADMIN — HYDROMORPHONE HYDROCHLORIDE 0.5 MG: 1 INJECTION, SOLUTION INTRAMUSCULAR; INTRAVENOUS; SUBCUTANEOUS at 19:23

## 2025-01-18 RX ADMIN — HYDROCODONE BITARTRATE AND ACETAMINOPHEN 1 TABLET: 7.5; 325 TABLET ORAL at 10:48

## 2025-01-18 RX ADMIN — GUAIFENESIN 600 MG: 600 TABLET ORAL at 20:37

## 2025-01-18 RX ADMIN — Medication 1 CAPSULE: at 16:47

## 2025-01-18 RX ADMIN — HYDROCODONE BITARTRATE AND ACETAMINOPHEN 1 TABLET: 7.5; 325 TABLET ORAL at 16:47

## 2025-01-18 ASSESSMENT — PAIN SCALES - GENERAL
PAINLEVEL_OUTOF10: 7
PAINLEVEL_OUTOF10: 9
PAINLEVEL_OUTOF10: 3
PAINLEVEL_OUTOF10: 8
PAINLEVEL_OUTOF10: 0
PAINLEVEL_OUTOF10: 6
PAINLEVEL_OUTOF10: 0
PAINLEVEL_OUTOF10: 2

## 2025-01-18 ASSESSMENT — PAIN DESCRIPTION - DESCRIPTORS
DESCRIPTORS: ACHING

## 2025-01-18 ASSESSMENT — PAIN DESCRIPTION - ORIENTATION
ORIENTATION: UPPER
ORIENTATION: UPPER

## 2025-01-18 ASSESSMENT — PAIN DESCRIPTION - LOCATION
LOCATION: BACK
LOCATION: BACK
LOCATION: BACK;GENERALIZED

## 2025-01-18 NOTE — PLAN OF CARE
Problem: Respiratory - Adult  Goal: Achieves optimal ventilation and oxygenation  1/18/2025 0825 by Annmarie Hoskins RCP  Outcome: Progressing  1/18/2025 0426 by Annette Morris RN  Outcome: Progressing

## 2025-01-18 NOTE — PLAN OF CARE
Problem: Discharge Planning  Goal: Discharge to home or other facility with appropriate resources  1/18/2025 0807 by Marlene Munguia RN  Outcome: Progressing  1/18/2025 0426 by Annette Morris RN  Outcome: Progressing     Problem: Safety - Adult  Goal: Free from fall injury  1/18/2025 0807 by Marlene Munguia RN  Outcome: Progressing  1/18/2025 0426 by Annette Morris RN  Outcome: Progressing     Problem: Pain  Goal: Verbalizes/displays adequate comfort level or baseline comfort level  1/18/2025 0807 by Marlene Munguia RN  Outcome: Progressing  1/18/2025 0426 by Annette Morris RN  Outcome: Progressing     Problem: Risk for Elopement  Goal: Patient will not exit the unit/facility without proper excort  1/18/2025 0426 by Annette Morris RN  Outcome: Progressing     Problem: Skin/Tissue Integrity  Goal: Absence of new skin breakdown  Description: 1.  Monitor for areas of redness and/or skin breakdown  2.  Assess vascular access sites hourly  3.  Every 4-6 hours minimum:  Change oxygen saturation probe site  4.  Every 4-6 hours:  If on nasal continuous positive airway pressure, respiratory therapy assess nares and determine need for appliance change or resting period.  1/18/2025 0426 by Annette Morris RN  Outcome: Progressing     Problem: ABCDS Injury Assessment  Goal: Absence of physical injury  1/18/2025 0426 by Annette Morris RN  Outcome: Progressing     Problem: Respiratory - Adult  Goal: Achieves optimal ventilation and oxygenation  1/18/2025 0426 by Annette Morris RN  Outcome: Progressing

## 2025-01-19 ENCOUNTER — APPOINTMENT (OUTPATIENT)
Dept: GENERAL RADIOLOGY | Age: 70
DRG: 288 | End: 2025-01-19
Payer: MEDICARE

## 2025-01-19 LAB
ANION GAP SERPL CALC-SCNC: 12 MMOL/L (ref 7–16)
BASOPHILS # BLD: 0.09 K/UL (ref 0–0.2)
BASOPHILS NFR BLD: 1 % (ref 0–2)
BUN SERPL-MCNC: 17 MG/DL (ref 8–23)
CALCIUM SERPL-MCNC: 8.9 MG/DL (ref 8.8–10.2)
CHLORIDE SERPL-SCNC: 102 MMOL/L (ref 98–107)
CO2 SERPL-SCNC: 29 MMOL/L (ref 20–29)
CREAT SERPL-MCNC: 1.08 MG/DL (ref 0.6–1.1)
DIFFERENTIAL METHOD BLD: ABNORMAL
EOSINOPHIL # BLD: 0.34 K/UL (ref 0–0.8)
EOSINOPHIL NFR BLD: 3.6 % (ref 0.5–7.8)
ERYTHROCYTE [DISTWIDTH] IN BLOOD BY AUTOMATED COUNT: 19 % (ref 11.9–14.6)
GLUCOSE SERPL-MCNC: 133 MG/DL (ref 70–99)
HCT VFR BLD AUTO: 33.9 % (ref 35.8–46.3)
HGB BLD-MCNC: 10.2 G/DL (ref 11.7–15.4)
IMM GRANULOCYTES # BLD AUTO: 0.24 K/UL (ref 0–0.5)
IMM GRANULOCYTES NFR BLD AUTO: 2.6 % (ref 0–5)
LYMPHOCYTES # BLD: 1.77 K/UL (ref 0.5–4.6)
LYMPHOCYTES NFR BLD: 18.8 % (ref 13–44)
MCH RBC QN AUTO: 25 PG (ref 26.1–32.9)
MCHC RBC AUTO-ENTMCNC: 30.1 G/DL (ref 31.4–35)
MCV RBC AUTO: 83.1 FL (ref 82–102)
MONOCYTES # BLD: 1.07 K/UL (ref 0.1–1.3)
MONOCYTES NFR BLD: 11.4 % (ref 4–12)
NEUTS SEG # BLD: 5.89 K/UL (ref 1.7–8.2)
NEUTS SEG NFR BLD: 62.6 % (ref 43–78)
NRBC # BLD: 0 K/UL (ref 0–0.2)
PLATELET # BLD AUTO: 190 K/UL (ref 150–450)
PMV BLD AUTO: 10.1 FL (ref 9.4–12.3)
POTASSIUM SERPL-SCNC: 3.9 MMOL/L (ref 3.5–5.1)
RBC # BLD AUTO: 4.08 M/UL (ref 4.05–5.2)
SODIUM SERPL-SCNC: 143 MMOL/L (ref 136–145)
WBC # BLD AUTO: 9.4 K/UL (ref 4.3–11.1)

## 2025-01-19 PROCEDURE — 1100000000 HC RM PRIVATE

## 2025-01-19 PROCEDURE — 6360000002 HC RX W HCPCS: Performed by: INTERNAL MEDICINE

## 2025-01-19 PROCEDURE — 2580000003 HC RX 258: Performed by: INTERNAL MEDICINE

## 2025-01-19 PROCEDURE — 2500000003 HC RX 250 WO HCPCS: Performed by: STUDENT IN AN ORGANIZED HEALTH CARE EDUCATION/TRAINING PROGRAM

## 2025-01-19 PROCEDURE — 36415 COLL VENOUS BLD VENIPUNCTURE: CPT

## 2025-01-19 PROCEDURE — 2700000000 HC OXYGEN THERAPY PER DAY

## 2025-01-19 PROCEDURE — 6360000002 HC RX W HCPCS: Performed by: STUDENT IN AN ORGANIZED HEALTH CARE EDUCATION/TRAINING PROGRAM

## 2025-01-19 PROCEDURE — 94761 N-INVAS EAR/PLS OXIMETRY MLT: CPT

## 2025-01-19 PROCEDURE — 71046 X-RAY EXAM CHEST 2 VIEWS: CPT

## 2025-01-19 PROCEDURE — 6370000000 HC RX 637 (ALT 250 FOR IP): Performed by: STUDENT IN AN ORGANIZED HEALTH CARE EDUCATION/TRAINING PROGRAM

## 2025-01-19 PROCEDURE — 2500000003 HC RX 250 WO HCPCS: Performed by: INTERNAL MEDICINE

## 2025-01-19 PROCEDURE — 6370000000 HC RX 637 (ALT 250 FOR IP): Performed by: NURSE PRACTITIONER

## 2025-01-19 PROCEDURE — 2500000003 HC RX 250 WO HCPCS: Performed by: FAMILY MEDICINE

## 2025-01-19 PROCEDURE — 6370000000 HC RX 637 (ALT 250 FOR IP): Performed by: FAMILY MEDICINE

## 2025-01-19 PROCEDURE — 94640 AIRWAY INHALATION TREATMENT: CPT

## 2025-01-19 PROCEDURE — 80048 BASIC METABOLIC PNL TOTAL CA: CPT

## 2025-01-19 PROCEDURE — 85025 COMPLETE CBC W/AUTO DIFF WBC: CPT

## 2025-01-19 PROCEDURE — 6370000000 HC RX 637 (ALT 250 FOR IP): Performed by: INTERNAL MEDICINE

## 2025-01-19 PROCEDURE — 94760 N-INVAS EAR/PLS OXIMETRY 1: CPT

## 2025-01-19 PROCEDURE — 6360000002 HC RX W HCPCS: Performed by: FAMILY MEDICINE

## 2025-01-19 RX ORDER — IPRATROPIUM BROMIDE AND ALBUTEROL SULFATE 2.5; .5 MG/3ML; MG/3ML
1 SOLUTION RESPIRATORY (INHALATION)
Status: DISCONTINUED | OUTPATIENT
Start: 2025-01-19 | End: 2025-01-20

## 2025-01-19 RX ADMIN — PANTOPRAZOLE SODIUM 40 MG: 40 TABLET, DELAYED RELEASE ORAL at 17:06

## 2025-01-19 RX ADMIN — SODIUM CHLORIDE, PRESERVATIVE FREE 10 ML: 5 INJECTION INTRAVENOUS at 09:40

## 2025-01-19 RX ADMIN — WATER 40 MG: 1 INJECTION INTRAMUSCULAR; INTRAVENOUS; SUBCUTANEOUS at 17:13

## 2025-01-19 RX ADMIN — HYDROXYCHLOROQUINE SULFATE 200 MG: 200 TABLET ORAL at 09:28

## 2025-01-19 RX ADMIN — DULOXETINE HYDROCHLORIDE 60 MG: 60 CAPSULE, DELAYED RELEASE ORAL at 09:29

## 2025-01-19 RX ADMIN — Medication 1 CAPSULE: at 09:29

## 2025-01-19 RX ADMIN — ENOXAPARIN SODIUM 40 MG: 100 INJECTION SUBCUTANEOUS at 09:29

## 2025-01-19 RX ADMIN — GUAIFENESIN 600 MG: 600 TABLET ORAL at 19:32

## 2025-01-19 RX ADMIN — HYDROCORTISONE ACETATE: 1 CREAM TOPICAL at 21:40

## 2025-01-19 RX ADMIN — WATER 40 MG: 1 INJECTION INTRAMUSCULAR; INTRAVENOUS; SUBCUTANEOUS at 22:00

## 2025-01-19 RX ADMIN — SODIUM CHLORIDE, PRESERVATIVE FREE 10 ML: 5 INJECTION INTRAVENOUS at 17:14

## 2025-01-19 RX ADMIN — Medication: at 21:39

## 2025-01-19 RX ADMIN — HYDROMORPHONE HYDROCHLORIDE 0.5 MG: 1 INJECTION, SOLUTION INTRAMUSCULAR; INTRAVENOUS; SUBCUTANEOUS at 07:58

## 2025-01-19 RX ADMIN — LISINOPRIL 20 MG: 20 TABLET ORAL at 09:28

## 2025-01-19 RX ADMIN — SODIUM CHLORIDE, PRESERVATIVE FREE 10 ML: 5 INJECTION INTRAVENOUS at 21:00

## 2025-01-19 RX ADMIN — HYDROMORPHONE HYDROCHLORIDE 0.5 MG: 1 INJECTION, SOLUTION INTRAMUSCULAR; INTRAVENOUS; SUBCUTANEOUS at 19:31

## 2025-01-19 RX ADMIN — PREGABALIN 150 MG: 75 CAPSULE ORAL at 09:28

## 2025-01-19 RX ADMIN — AMLODIPINE BESYLATE 5 MG: 5 TABLET ORAL at 09:27

## 2025-01-19 RX ADMIN — PANTOPRAZOLE SODIUM 40 MG: 40 TABLET, DELAYED RELEASE ORAL at 09:28

## 2025-01-19 RX ADMIN — VANCOMYCIN HYDROCHLORIDE 1250 MG: 10 INJECTION, POWDER, LYOPHILIZED, FOR SOLUTION INTRAVENOUS at 11:51

## 2025-01-19 RX ADMIN — OXYCODONE HYDROCHLORIDE AND ACETAMINOPHEN 1500 MG: 500 TABLET ORAL at 09:29

## 2025-01-19 RX ADMIN — HYDROCODONE BITARTRATE AND ACETAMINOPHEN 1 TABLET: 7.5; 325 TABLET ORAL at 05:58

## 2025-01-19 RX ADMIN — IPRATROPIUM BROMIDE AND ALBUTEROL SULFATE 1 DOSE: .5; 3 SOLUTION RESPIRATORY (INHALATION) at 15:22

## 2025-01-19 RX ADMIN — IPRATROPIUM BROMIDE AND ALBUTEROL SULFATE 1 DOSE: 2.5; .5 SOLUTION RESPIRATORY (INHALATION) at 07:54

## 2025-01-19 RX ADMIN — WATER 30 MG: 1 INJECTION INTRAMUSCULAR; INTRAVENOUS; SUBCUTANEOUS at 09:29

## 2025-01-19 RX ADMIN — TRIAMCINOLONE ACETONIDE: 1 CREAM TOPICAL at 21:40

## 2025-01-19 RX ADMIN — IPRATROPIUM BROMIDE AND ALBUTEROL SULFATE 1 DOSE: .5; 3 SOLUTION RESPIRATORY (INHALATION) at 11:18

## 2025-01-19 RX ADMIN — PREGABALIN 150 MG: 75 CAPSULE ORAL at 19:32

## 2025-01-19 RX ADMIN — Medication 1 CAPSULE: at 17:06

## 2025-01-19 RX ADMIN — QUETIAPINE FUMARATE 50 MG: 100 TABLET ORAL at 19:42

## 2025-01-19 RX ADMIN — GUAIFENESIN 600 MG: 600 TABLET ORAL at 09:28

## 2025-01-19 RX ADMIN — SODIUM CHLORIDE, PRESERVATIVE FREE 10 ML: 5 INJECTION INTRAVENOUS at 17:15

## 2025-01-19 RX ADMIN — HYDROCODONE BITARTRATE AND ACETAMINOPHEN 1 TABLET: 7.5; 325 TABLET ORAL at 17:13

## 2025-01-19 ASSESSMENT — PAIN SCALES - GENERAL
PAINLEVEL_OUTOF10: 8
PAINLEVEL_OUTOF10: 4
PAINLEVEL_OUTOF10: 0
PAINLEVEL_OUTOF10: 2
PAINLEVEL_OUTOF10: 7
PAINLEVEL_OUTOF10: 8
PAINLEVEL_OUTOF10: 8

## 2025-01-19 ASSESSMENT — PAIN DESCRIPTION - ORIENTATION
ORIENTATION: UPPER
ORIENTATION: UPPER

## 2025-01-19 ASSESSMENT — PAIN DESCRIPTION - LOCATION
LOCATION: BACK
LOCATION: BACK;GENERALIZED
LOCATION: BACK;GENERALIZED
LOCATION: BACK

## 2025-01-19 ASSESSMENT — PAIN DESCRIPTION - DESCRIPTORS
DESCRIPTORS: ACHING

## 2025-01-19 NOTE — PLAN OF CARE
Problem: Respiratory - Adult  Goal: Achieves optimal ventilation and oxygenation  Outcome: Progressing  Flowsheets (Taken 1/19/2025 7597)  Achieves optimal ventilation and oxygenation:   Assess for changes in respiratory status   Position to facilitate oxygenation and minimize respiratory effort   Respiratory therapy support as indicated   Assess for changes in mentation and behavior   Encourage broncho-pulmonary hygiene including cough, deep breathe, incentive spirometry   Assess and instruct to report shortness of breath or any respiratory difficulty   Oxygen supplementation based on oxygen saturation or arterial blood gases

## 2025-01-20 LAB
ANION GAP SERPL CALC-SCNC: 13 MMOL/L (ref 7–16)
BUN SERPL-MCNC: 19 MG/DL (ref 8–23)
CALCIUM SERPL-MCNC: 9.4 MG/DL (ref 8.8–10.2)
CHLORIDE SERPL-SCNC: 102 MMOL/L (ref 98–107)
CO2 SERPL-SCNC: 27 MMOL/L (ref 20–29)
CREAT SERPL-MCNC: 0.95 MG/DL (ref 0.6–1.1)
GLUCOSE SERPL-MCNC: 273 MG/DL (ref 70–99)
POTASSIUM SERPL-SCNC: 4.3 MMOL/L (ref 3.5–5.1)
SODIUM SERPL-SCNC: 142 MMOL/L (ref 136–145)

## 2025-01-20 PROCEDURE — 1100000000 HC RM PRIVATE

## 2025-01-20 PROCEDURE — 6370000000 HC RX 637 (ALT 250 FOR IP): Performed by: STUDENT IN AN ORGANIZED HEALTH CARE EDUCATION/TRAINING PROGRAM

## 2025-01-20 PROCEDURE — 2700000000 HC OXYGEN THERAPY PER DAY

## 2025-01-20 PROCEDURE — 2500000003 HC RX 250 WO HCPCS: Performed by: STUDENT IN AN ORGANIZED HEALTH CARE EDUCATION/TRAINING PROGRAM

## 2025-01-20 PROCEDURE — 94760 N-INVAS EAR/PLS OXIMETRY 1: CPT

## 2025-01-20 PROCEDURE — 6370000000 HC RX 637 (ALT 250 FOR IP): Performed by: FAMILY MEDICINE

## 2025-01-20 PROCEDURE — 6370000000 HC RX 637 (ALT 250 FOR IP): Performed by: NURSE PRACTITIONER

## 2025-01-20 PROCEDURE — 94640 AIRWAY INHALATION TREATMENT: CPT

## 2025-01-20 PROCEDURE — 80048 BASIC METABOLIC PNL TOTAL CA: CPT

## 2025-01-20 PROCEDURE — 6360000002 HC RX W HCPCS: Performed by: INTERNAL MEDICINE

## 2025-01-20 PROCEDURE — 6360000002 HC RX W HCPCS: Performed by: FAMILY MEDICINE

## 2025-01-20 PROCEDURE — 2580000003 HC RX 258: Performed by: INTERNAL MEDICINE

## 2025-01-20 PROCEDURE — 5A0935A ASSISTANCE WITH RESPIRATORY VENTILATION, LESS THAN 24 CONSECUTIVE HOURS, HIGH NASAL FLOW/VELOCITY: ICD-10-PCS

## 2025-01-20 PROCEDURE — 36415 COLL VENOUS BLD VENIPUNCTURE: CPT

## 2025-01-20 PROCEDURE — 6370000000 HC RX 637 (ALT 250 FOR IP): Performed by: INTERNAL MEDICINE

## 2025-01-20 PROCEDURE — 2500000003 HC RX 250 WO HCPCS: Performed by: FAMILY MEDICINE

## 2025-01-20 PROCEDURE — 6370000000 HC RX 637 (ALT 250 FOR IP)

## 2025-01-20 PROCEDURE — 6360000002 HC RX W HCPCS: Performed by: STUDENT IN AN ORGANIZED HEALTH CARE EDUCATION/TRAINING PROGRAM

## 2025-01-20 PROCEDURE — 94761 N-INVAS EAR/PLS OXIMETRY MLT: CPT

## 2025-01-20 RX ORDER — LEVALBUTEROL INHALATION SOLUTION 0.63 MG/3ML
0.63 SOLUTION RESPIRATORY (INHALATION)
Status: DISCONTINUED | OUTPATIENT
Start: 2025-01-20 | End: 2025-01-22

## 2025-01-20 RX ADMIN — SULFAMETHOXAZOLE AND TRIMETHOPRIM 1 TABLET: 800; 160 TABLET ORAL at 08:46

## 2025-01-20 RX ADMIN — PANTOPRAZOLE SODIUM 40 MG: 40 TABLET, DELAYED RELEASE ORAL at 08:46

## 2025-01-20 RX ADMIN — HYDROMORPHONE HYDROCHLORIDE 0.5 MG: 1 INJECTION, SOLUTION INTRAMUSCULAR; INTRAVENOUS; SUBCUTANEOUS at 05:34

## 2025-01-20 RX ADMIN — HYDROXYCHLOROQUINE SULFATE 200 MG: 200 TABLET ORAL at 08:46

## 2025-01-20 RX ADMIN — QUETIAPINE FUMARATE 50 MG: 100 TABLET ORAL at 19:53

## 2025-01-20 RX ADMIN — TRIAMCINOLONE ACETONIDE: 1 CREAM TOPICAL at 17:23

## 2025-01-20 RX ADMIN — LISINOPRIL 20 MG: 20 TABLET ORAL at 08:46

## 2025-01-20 RX ADMIN — Medication 1 CAPSULE: at 08:46

## 2025-01-20 RX ADMIN — HYDROCODONE BITARTRATE AND ACETAMINOPHEN 1 TABLET: 7.5; 325 TABLET ORAL at 19:53

## 2025-01-20 RX ADMIN — VANCOMYCIN HYDROCHLORIDE 1250 MG: 10 INJECTION, POWDER, LYOPHILIZED, FOR SOLUTION INTRAVENOUS at 11:29

## 2025-01-20 RX ADMIN — SODIUM CHLORIDE, PRESERVATIVE FREE 10 ML: 5 INJECTION INTRAVENOUS at 19:57

## 2025-01-20 RX ADMIN — SODIUM CHLORIDE, PRESERVATIVE FREE 10 ML: 5 INJECTION INTRAVENOUS at 08:48

## 2025-01-20 RX ADMIN — SODIUM CHLORIDE, PRESERVATIVE FREE 10 ML: 5 INJECTION INTRAVENOUS at 13:32

## 2025-01-20 RX ADMIN — Medication: at 17:22

## 2025-01-20 RX ADMIN — HYDROCODONE BITARTRATE AND ACETAMINOPHEN 1 TABLET: 7.5; 325 TABLET ORAL at 03:37

## 2025-01-20 RX ADMIN — LEVALBUTEROL HYDROCHLORIDE 0.63 MG: 0.63 SOLUTION RESPIRATORY (INHALATION) at 08:00

## 2025-01-20 RX ADMIN — WATER 40 MG: 1 INJECTION INTRAMUSCULAR; INTRAVENOUS; SUBCUTANEOUS at 19:53

## 2025-01-20 RX ADMIN — SODIUM CHLORIDE, PRESERVATIVE FREE 10 ML: 5 INJECTION INTRAVENOUS at 13:25

## 2025-01-20 RX ADMIN — GUAIFENESIN 600 MG: 600 TABLET ORAL at 08:46

## 2025-01-20 RX ADMIN — HYDROCODONE BITARTRATE AND ACETAMINOPHEN 1 TABLET: 7.5; 325 TABLET ORAL at 11:16

## 2025-01-20 RX ADMIN — PREGABALIN 150 MG: 75 CAPSULE ORAL at 19:53

## 2025-01-20 RX ADMIN — WATER 40 MG: 1 INJECTION INTRAMUSCULAR; INTRAVENOUS; SUBCUTANEOUS at 05:34

## 2025-01-20 RX ADMIN — PANTOPRAZOLE SODIUM 40 MG: 40 TABLET, DELAYED RELEASE ORAL at 17:20

## 2025-01-20 RX ADMIN — Medication 1 CAPSULE: at 17:19

## 2025-01-20 RX ADMIN — LEVALBUTEROL HYDROCHLORIDE 0.63 MG: 0.63 SOLUTION RESPIRATORY (INHALATION) at 14:23

## 2025-01-20 RX ADMIN — DULOXETINE HYDROCHLORIDE 60 MG: 60 CAPSULE, DELAYED RELEASE ORAL at 08:46

## 2025-01-20 RX ADMIN — SODIUM CHLORIDE, PRESERVATIVE FREE 10 ML: 5 INJECTION INTRAVENOUS at 11:28

## 2025-01-20 RX ADMIN — SODIUM CHLORIDE, PRESERVATIVE FREE 10 ML: 5 INJECTION INTRAVENOUS at 19:58

## 2025-01-20 RX ADMIN — Medication: at 19:58

## 2025-01-20 RX ADMIN — HYDROMORPHONE HYDROCHLORIDE 0.5 MG: 1 INJECTION, SOLUTION INTRAMUSCULAR; INTRAVENOUS; SUBCUTANEOUS at 21:09

## 2025-01-20 RX ADMIN — HYDROCORTISONE ACETATE: 1 CREAM TOPICAL at 19:59

## 2025-01-20 RX ADMIN — ENOXAPARIN SODIUM 40 MG: 100 INJECTION SUBCUTANEOUS at 08:47

## 2025-01-20 RX ADMIN — TRIAMCINOLONE ACETONIDE: 1 CREAM TOPICAL at 19:59

## 2025-01-20 RX ADMIN — GUAIFENESIN 600 MG: 600 TABLET ORAL at 19:53

## 2025-01-20 RX ADMIN — LEVALBUTEROL HYDROCHLORIDE 0.63 MG: 0.63 SOLUTION RESPIRATORY (INHALATION) at 20:40

## 2025-01-20 RX ADMIN — WATER 40 MG: 1 INJECTION INTRAMUSCULAR; INTRAVENOUS; SUBCUTANEOUS at 13:31

## 2025-01-20 RX ADMIN — OXYCODONE HYDROCHLORIDE AND ACETAMINOPHEN 1500 MG: 500 TABLET ORAL at 08:45

## 2025-01-20 RX ADMIN — IPRATROPIUM BROMIDE AND ALBUTEROL SULFATE 1 DOSE: .5; 3 SOLUTION RESPIRATORY (INHALATION) at 03:49

## 2025-01-20 RX ADMIN — AMLODIPINE BESYLATE 5 MG: 5 TABLET ORAL at 08:46

## 2025-01-20 RX ADMIN — HYDROMORPHONE HYDROCHLORIDE 0.5 MG: 1 INJECTION, SOLUTION INTRAMUSCULAR; INTRAVENOUS; SUBCUTANEOUS at 13:30

## 2025-01-20 RX ADMIN — SODIUM CHLORIDE, PRESERVATIVE FREE 10 ML: 5 INJECTION INTRAVENOUS at 08:49

## 2025-01-20 RX ADMIN — HYDROCORTISONE ACETATE: 1 CREAM TOPICAL at 17:24

## 2025-01-20 RX ADMIN — PREGABALIN 150 MG: 75 CAPSULE ORAL at 08:46

## 2025-01-20 ASSESSMENT — PAIN SCALES - GENERAL
PAINLEVEL_OUTOF10: 8
PAINLEVEL_OUTOF10: 7
PAINLEVEL_OUTOF10: 6
PAINLEVEL_OUTOF10: 7
PAINLEVEL_OUTOF10: 0
PAINLEVEL_OUTOF10: 7
PAINLEVEL_OUTOF10: 0
PAINLEVEL_OUTOF10: 3
PAINLEVEL_OUTOF10: 8
PAINLEVEL_OUTOF10: 0

## 2025-01-20 ASSESSMENT — PAIN DESCRIPTION - LOCATION
LOCATION: BACK

## 2025-01-20 ASSESSMENT — PAIN DESCRIPTION - ORIENTATION
ORIENTATION: MID
ORIENTATION: UPPER
ORIENTATION: UPPER
ORIENTATION: MID

## 2025-01-20 ASSESSMENT — PAIN DESCRIPTION - DESCRIPTORS
DESCRIPTORS: ACHING

## 2025-01-20 NOTE — PLAN OF CARE
Problem: Discharge Planning  Goal: Discharge to home or other facility with appropriate resources  Outcome: Progressing     Problem: Pain  Goal: Verbalizes/displays adequate comfort level or baseline comfort level  Outcome: Progressing     Problem: Skin/Tissue Integrity  Goal: Absence of new skin breakdown  Description: 1.  Monitor for areas of redness and/or skin breakdown  2.  Assess vascular access sites hourly  3.  Every 4-6 hours minimum:  Change oxygen saturation probe site  4.  Every 4-6 hours:  If on nasal continuous positive airway pressure, respiratory therapy assess nares and determine need for appliance change or resting period.  Outcome: Progressing     Problem: Respiratory - Adult  Goal: Achieves optimal ventilation and oxygenation  Outcome: Progressing

## 2025-01-21 LAB
ANION GAP SERPL CALC-SCNC: 12 MMOL/L (ref 7–16)
BASOPHILS # BLD: 0.03 K/UL (ref 0–0.2)
BASOPHILS NFR BLD: 0.2 % (ref 0–2)
BUN SERPL-MCNC: 21 MG/DL (ref 8–23)
CALCIUM SERPL-MCNC: 10 MG/DL (ref 8.8–10.2)
CHLORIDE SERPL-SCNC: 100 MMOL/L (ref 98–107)
CO2 SERPL-SCNC: 29 MMOL/L (ref 20–29)
CREAT SERPL-MCNC: 1 MG/DL (ref 0.6–1.1)
DIFFERENTIAL METHOD BLD: ABNORMAL
EOSINOPHIL # BLD: 0 K/UL (ref 0–0.8)
EOSINOPHIL NFR BLD: 0 % (ref 0.5–7.8)
ERYTHROCYTE [DISTWIDTH] IN BLOOD BY AUTOMATED COUNT: 18.9 % (ref 11.9–14.6)
GLUCOSE SERPL-MCNC: 122 MG/DL (ref 70–99)
HCT VFR BLD AUTO: 36 % (ref 35.8–46.3)
HGB BLD-MCNC: 10.7 G/DL (ref 11.7–15.4)
IMM GRANULOCYTES # BLD AUTO: 0.28 K/UL (ref 0–0.5)
IMM GRANULOCYTES NFR BLD AUTO: 1.9 % (ref 0–5)
LYMPHOCYTES # BLD: 1.23 K/UL (ref 0.5–4.6)
LYMPHOCYTES NFR BLD: 8.6 % (ref 13–44)
MCH RBC QN AUTO: 24.7 PG (ref 26.1–32.9)
MCHC RBC AUTO-ENTMCNC: 29.7 G/DL (ref 31.4–35)
MCV RBC AUTO: 82.9 FL (ref 82–102)
MONOCYTES # BLD: 1.13 K/UL (ref 0.1–1.3)
MONOCYTES NFR BLD: 7.9 % (ref 4–12)
NEUTS SEG # BLD: 11.69 K/UL (ref 1.7–8.2)
NEUTS SEG NFR BLD: 81.4 % (ref 43–78)
NRBC # BLD: 0 K/UL (ref 0–0.2)
PLATELET # BLD AUTO: 255 K/UL (ref 150–450)
PMV BLD AUTO: 9.8 FL (ref 9.4–12.3)
POTASSIUM SERPL-SCNC: 4.8 MMOL/L (ref 3.5–5.1)
RBC # BLD AUTO: 4.34 M/UL (ref 4.05–5.2)
SODIUM SERPL-SCNC: 140 MMOL/L (ref 136–145)
WBC # BLD AUTO: 14.4 K/UL (ref 4.3–11.1)

## 2025-01-21 PROCEDURE — 85025 COMPLETE CBC W/AUTO DIFF WBC: CPT

## 2025-01-21 PROCEDURE — 2580000003 HC RX 258: Performed by: INTERNAL MEDICINE

## 2025-01-21 PROCEDURE — 6370000000 HC RX 637 (ALT 250 FOR IP): Performed by: STUDENT IN AN ORGANIZED HEALTH CARE EDUCATION/TRAINING PROGRAM

## 2025-01-21 PROCEDURE — 94640 AIRWAY INHALATION TREATMENT: CPT

## 2025-01-21 PROCEDURE — 80048 BASIC METABOLIC PNL TOTAL CA: CPT

## 2025-01-21 PROCEDURE — 6370000000 HC RX 637 (ALT 250 FOR IP): Performed by: INTERNAL MEDICINE

## 2025-01-21 PROCEDURE — 6360000002 HC RX W HCPCS: Performed by: FAMILY MEDICINE

## 2025-01-21 PROCEDURE — 6360000002 HC RX W HCPCS: Performed by: STUDENT IN AN ORGANIZED HEALTH CARE EDUCATION/TRAINING PROGRAM

## 2025-01-21 PROCEDURE — 94760 N-INVAS EAR/PLS OXIMETRY 1: CPT

## 2025-01-21 PROCEDURE — 2500000003 HC RX 250 WO HCPCS: Performed by: FAMILY MEDICINE

## 2025-01-21 PROCEDURE — 2500000003 HC RX 250 WO HCPCS

## 2025-01-21 PROCEDURE — 2500000003 HC RX 250 WO HCPCS: Performed by: STUDENT IN AN ORGANIZED HEALTH CARE EDUCATION/TRAINING PROGRAM

## 2025-01-21 PROCEDURE — 6360000002 HC RX W HCPCS

## 2025-01-21 PROCEDURE — 1100000000 HC RM PRIVATE

## 2025-01-21 PROCEDURE — 2700000000 HC OXYGEN THERAPY PER DAY

## 2025-01-21 PROCEDURE — 36415 COLL VENOUS BLD VENIPUNCTURE: CPT

## 2025-01-21 PROCEDURE — 6370000000 HC RX 637 (ALT 250 FOR IP): Performed by: FAMILY MEDICINE

## 2025-01-21 PROCEDURE — 6360000002 HC RX W HCPCS: Performed by: INTERNAL MEDICINE

## 2025-01-21 RX ORDER — PREDNISONE 20 MG/1
40 TABLET ORAL DAILY
Status: DISCONTINUED | OUTPATIENT
Start: 2025-01-22 | End: 2025-01-23 | Stop reason: HOSPADM

## 2025-01-21 RX ADMIN — SODIUM CHLORIDE, PRESERVATIVE FREE 10 ML: 5 INJECTION INTRAVENOUS at 20:32

## 2025-01-21 RX ADMIN — WATER 40 MG: 1 INJECTION INTRAMUSCULAR; INTRAVENOUS; SUBCUTANEOUS at 06:21

## 2025-01-21 RX ADMIN — HYDROMORPHONE HYDROCHLORIDE 0.5 MG: 1 INJECTION, SOLUTION INTRAMUSCULAR; INTRAVENOUS; SUBCUTANEOUS at 02:16

## 2025-01-21 RX ADMIN — SODIUM CHLORIDE, PRESERVATIVE FREE 10 ML: 5 INJECTION INTRAVENOUS at 09:45

## 2025-01-21 RX ADMIN — DULOXETINE HYDROCHLORIDE 60 MG: 60 CAPSULE, DELAYED RELEASE ORAL at 09:48

## 2025-01-21 RX ADMIN — SODIUM CHLORIDE, PRESERVATIVE FREE 10 ML: 5 INJECTION INTRAVENOUS at 09:46

## 2025-01-21 RX ADMIN — HYDROXYCHLOROQUINE SULFATE 200 MG: 200 TABLET ORAL at 11:34

## 2025-01-21 RX ADMIN — Medication 1 CAPSULE: at 11:35

## 2025-01-21 RX ADMIN — HYDROMORPHONE HYDROCHLORIDE 0.5 MG: 1 INJECTION, SOLUTION INTRAMUSCULAR; INTRAVENOUS; SUBCUTANEOUS at 11:50

## 2025-01-21 RX ADMIN — PREGABALIN 150 MG: 75 CAPSULE ORAL at 09:47

## 2025-01-21 RX ADMIN — HYDROMORPHONE HYDROCHLORIDE 0.5 MG: 1 INJECTION, SOLUTION INTRAMUSCULAR; INTRAVENOUS; SUBCUTANEOUS at 07:10

## 2025-01-21 RX ADMIN — SODIUM CHLORIDE, PRESERVATIVE FREE 10 ML: 5 INJECTION INTRAVENOUS at 20:33

## 2025-01-21 RX ADMIN — PANTOPRAZOLE SODIUM 40 MG: 40 TABLET, DELAYED RELEASE ORAL at 11:35

## 2025-01-21 RX ADMIN — WATER 40 MG: 1 INJECTION INTRAMUSCULAR; INTRAVENOUS; SUBCUTANEOUS at 16:55

## 2025-01-21 RX ADMIN — LEVALBUTEROL HYDROCHLORIDE 0.63 MG: 0.63 SOLUTION RESPIRATORY (INHALATION) at 19:58

## 2025-01-21 RX ADMIN — Medication: at 17:08

## 2025-01-21 RX ADMIN — GUAIFENESIN 600 MG: 600 TABLET ORAL at 11:34

## 2025-01-21 RX ADMIN — LEVALBUTEROL HYDROCHLORIDE 0.63 MG: 0.63 SOLUTION RESPIRATORY (INHALATION) at 07:41

## 2025-01-21 RX ADMIN — GUAIFENESIN 600 MG: 600 TABLET ORAL at 20:32

## 2025-01-21 RX ADMIN — VANCOMYCIN HYDROCHLORIDE 1250 MG: 10 INJECTION, POWDER, LYOPHILIZED, FOR SOLUTION INTRAVENOUS at 12:05

## 2025-01-21 RX ADMIN — ENOXAPARIN SODIUM 40 MG: 100 INJECTION SUBCUTANEOUS at 09:51

## 2025-01-21 RX ADMIN — OXYCODONE HYDROCHLORIDE AND ACETAMINOPHEN 1500 MG: 500 TABLET ORAL at 11:33

## 2025-01-21 RX ADMIN — PANTOPRAZOLE SODIUM 40 MG: 40 TABLET, DELAYED RELEASE ORAL at 16:54

## 2025-01-21 RX ADMIN — PREGABALIN 150 MG: 75 CAPSULE ORAL at 20:32

## 2025-01-21 RX ADMIN — TRIAMCINOLONE ACETONIDE: 1 CREAM TOPICAL at 17:08

## 2025-01-21 RX ADMIN — HYDROMORPHONE HYDROCHLORIDE 0.5 MG: 1 INJECTION, SOLUTION INTRAMUSCULAR; INTRAVENOUS; SUBCUTANEOUS at 16:54

## 2025-01-21 RX ADMIN — Medication 1 CAPSULE: at 16:54

## 2025-01-21 RX ADMIN — QUETIAPINE FUMARATE 50 MG: 100 TABLET ORAL at 20:31

## 2025-01-21 RX ADMIN — HYDROCORTISONE ACETATE: 1 CREAM TOPICAL at 17:08

## 2025-01-21 RX ADMIN — HYDROMORPHONE HYDROCHLORIDE 0.5 MG: 1 INJECTION, SOLUTION INTRAMUSCULAR; INTRAVENOUS; SUBCUTANEOUS at 21:01

## 2025-01-21 ASSESSMENT — PAIN SCALES - GENERAL
PAINLEVEL_OUTOF10: 7
PAINLEVEL_OUTOF10: 9
PAINLEVEL_OUTOF10: 8
PAINLEVEL_OUTOF10: 0
PAINLEVEL_OUTOF10: 7
PAINLEVEL_OUTOF10: 0
PAINLEVEL_OUTOF10: 0

## 2025-01-21 ASSESSMENT — PAIN DESCRIPTION - DESCRIPTORS
DESCRIPTORS: ACHING;STABBING
DESCRIPTORS: ACHING

## 2025-01-21 ASSESSMENT — PAIN DESCRIPTION - ORIENTATION
ORIENTATION: UPPER
ORIENTATION: UPPER
ORIENTATION: MID
ORIENTATION: MID
ORIENTATION: LEFT

## 2025-01-21 ASSESSMENT — PAIN DESCRIPTION - LOCATION
LOCATION: GENERALIZED;LEG
LOCATION: BACK

## 2025-01-21 NOTE — NURSE NAVIGATOR
This RN Navigator introduced myself to patient.  Patient informed that this RN Navigator will be following them at least 30 days post discharge to act as a resource for any needs that may arise in relation to their COPD diagnosis and treatment.      Patient states understanding of COPD disease process.  COPD action plan discussed.  Patient states understanding. Discussed triggers, precautions, managing stress and breathing techniques with patient.  Patient states she will be possibly getting discharged in the next day or so. Patient states she lives alone, but has a friend staying with her for now.  She usually has a home health aid that visits her through her insurance.  As of right now patient states she does not have a PCP, but uses [facility] for her primary care.    Upon speaking with patient she seems very knowledgeable with managing her COPD.  She states she has an inhaler at home and does not like to use it because it feels like it takes her breath.  Discussed possibility of patient using a spacer with her inhalers so she can be compliant with taking her inhaled medication.  This RN Navigator spoke with Clayton YOST by phone to inquire about a spacer for the patient.  Patient verbalized understanding of importance of taking her medication as required and using her COPD zone tool.  Patient encouraged to take all medications as prescribed and to finish any medications as prescribed.    COPD educational booklet given to patient along with this RN Navigator's contact information. Patient informed that this RN Navigator will see her again prior to her discharge and that I will be contacting them within 48-72 hours of discharge. Contact information verified by patient.  RN Navigator will continue to follow.

## 2025-01-22 ENCOUNTER — APPOINTMENT (OUTPATIENT)
Dept: GENERAL RADIOLOGY | Age: 70
DRG: 288 | End: 2025-01-22
Payer: MEDICARE

## 2025-01-22 LAB — CREAT SERPL-MCNC: 0.98 MG/DL (ref 0.6–1.1)

## 2025-01-22 PROCEDURE — 6360000002 HC RX W HCPCS

## 2025-01-22 PROCEDURE — 2580000003 HC RX 258: Performed by: INTERNAL MEDICINE

## 2025-01-22 PROCEDURE — 94664 DEMO&/EVAL PT USE INHALER: CPT

## 2025-01-22 PROCEDURE — 76937 US GUIDE VASCULAR ACCESS: CPT

## 2025-01-22 PROCEDURE — 36415 COLL VENOUS BLD VENIPUNCTURE: CPT

## 2025-01-22 PROCEDURE — 6360000002 HC RX W HCPCS: Performed by: FAMILY MEDICINE

## 2025-01-22 PROCEDURE — 71045 X-RAY EXAM CHEST 1 VIEW: CPT

## 2025-01-22 PROCEDURE — 82565 ASSAY OF CREATININE: CPT

## 2025-01-22 PROCEDURE — 6370000000 HC RX 637 (ALT 250 FOR IP): Performed by: INTERNAL MEDICINE

## 2025-01-22 PROCEDURE — 1100000000 HC RM PRIVATE

## 2025-01-22 PROCEDURE — 6370000000 HC RX 637 (ALT 250 FOR IP): Performed by: FAMILY MEDICINE

## 2025-01-22 PROCEDURE — 94640 AIRWAY INHALATION TREATMENT: CPT

## 2025-01-22 PROCEDURE — 6370000000 HC RX 637 (ALT 250 FOR IP)

## 2025-01-22 PROCEDURE — 2700000000 HC OXYGEN THERAPY PER DAY

## 2025-01-22 PROCEDURE — 6360000002 HC RX W HCPCS: Performed by: INTERNAL MEDICINE

## 2025-01-22 PROCEDURE — 94760 N-INVAS EAR/PLS OXIMETRY 1: CPT

## 2025-01-22 PROCEDURE — 6370000000 HC RX 637 (ALT 250 FOR IP): Performed by: STUDENT IN AN ORGANIZED HEALTH CARE EDUCATION/TRAINING PROGRAM

## 2025-01-22 PROCEDURE — 2500000003 HC RX 250 WO HCPCS: Performed by: STUDENT IN AN ORGANIZED HEALTH CARE EDUCATION/TRAINING PROGRAM

## 2025-01-22 PROCEDURE — 94761 N-INVAS EAR/PLS OXIMETRY MLT: CPT

## 2025-01-22 PROCEDURE — 6360000002 HC RX W HCPCS: Performed by: STUDENT IN AN ORGANIZED HEALTH CARE EDUCATION/TRAINING PROGRAM

## 2025-01-22 PROCEDURE — 6370000000 HC RX 637 (ALT 250 FOR IP): Performed by: NURSE PRACTITIONER

## 2025-01-22 RX ORDER — BUDESONIDE 0.5 MG/2ML
0.5 INHALANT ORAL
Status: DISCONTINUED | OUTPATIENT
Start: 2025-01-22 | End: 2025-01-23 | Stop reason: HOSPADM

## 2025-01-22 RX ORDER — IPRATROPIUM BROMIDE AND ALBUTEROL SULFATE 2.5; .5 MG/3ML; MG/3ML
1 SOLUTION RESPIRATORY (INHALATION)
Status: DISCONTINUED | OUTPATIENT
Start: 2025-01-22 | End: 2025-01-23 | Stop reason: HOSPADM

## 2025-01-22 RX ORDER — ARFORMOTEROL TARTRATE 15 UG/2ML
15 SOLUTION RESPIRATORY (INHALATION)
Status: DISCONTINUED | OUTPATIENT
Start: 2025-01-22 | End: 2025-01-23 | Stop reason: HOSPADM

## 2025-01-22 RX ADMIN — SULFAMETHOXAZOLE AND TRIMETHOPRIM 1 TABLET: 800; 160 TABLET ORAL at 08:39

## 2025-01-22 RX ADMIN — GUAIFENESIN 600 MG: 600 TABLET ORAL at 20:11

## 2025-01-22 RX ADMIN — HYDROCORTISONE ACETATE: 1 CREAM TOPICAL at 20:09

## 2025-01-22 RX ADMIN — Medication: at 20:08

## 2025-01-22 RX ADMIN — SODIUM CHLORIDE, PRESERVATIVE FREE 10 ML: 5 INJECTION INTRAVENOUS at 20:12

## 2025-01-22 RX ADMIN — HYDROXYCHLOROQUINE SULFATE 200 MG: 200 TABLET ORAL at 08:39

## 2025-01-22 RX ADMIN — BUDESONIDE 500 MCG: 0.5 INHALANT RESPIRATORY (INHALATION) at 21:22

## 2025-01-22 RX ADMIN — VANCOMYCIN HYDROCHLORIDE 1250 MG: 10 INJECTION, POWDER, LYOPHILIZED, FOR SOLUTION INTRAVENOUS at 10:41

## 2025-01-22 RX ADMIN — Medication 1 CAPSULE: at 08:39

## 2025-01-22 RX ADMIN — HYDROMORPHONE HYDROCHLORIDE 0.5 MG: 1 INJECTION, SOLUTION INTRAMUSCULAR; INTRAVENOUS; SUBCUTANEOUS at 06:45

## 2025-01-22 RX ADMIN — IPRATROPIUM BROMIDE AND ALBUTEROL SULFATE 1 DOSE: .5; 3 SOLUTION RESPIRATORY (INHALATION) at 12:17

## 2025-01-22 RX ADMIN — PANTOPRAZOLE SODIUM 40 MG: 40 TABLET, DELAYED RELEASE ORAL at 17:23

## 2025-01-22 RX ADMIN — Medication 1 CAPSULE: at 17:23

## 2025-01-22 RX ADMIN — PREDNISONE 40 MG: 20 TABLET ORAL at 08:39

## 2025-01-22 RX ADMIN — LEVALBUTEROL HYDROCHLORIDE 0.63 MG: 0.63 SOLUTION RESPIRATORY (INHALATION) at 08:29

## 2025-01-22 RX ADMIN — OXYCODONE HYDROCHLORIDE AND ACETAMINOPHEN 1500 MG: 500 TABLET ORAL at 08:39

## 2025-01-22 RX ADMIN — Medication: at 10:45

## 2025-01-22 RX ADMIN — SODIUM CHLORIDE, PRESERVATIVE FREE 10 ML: 5 INJECTION INTRAVENOUS at 20:13

## 2025-01-22 RX ADMIN — QUETIAPINE FUMARATE 50 MG: 100 TABLET ORAL at 20:10

## 2025-01-22 RX ADMIN — BUDESONIDE 500 MCG: 0.5 INHALANT RESPIRATORY (INHALATION) at 12:17

## 2025-01-22 RX ADMIN — DULOXETINE HYDROCHLORIDE 60 MG: 60 CAPSULE, DELAYED RELEASE ORAL at 08:39

## 2025-01-22 RX ADMIN — LISINOPRIL 20 MG: 20 TABLET ORAL at 08:39

## 2025-01-22 RX ADMIN — PREGABALIN 150 MG: 75 CAPSULE ORAL at 08:39

## 2025-01-22 RX ADMIN — ARFORMOTEROL TARTRATE 15 MCG: 15 SOLUTION RESPIRATORY (INHALATION) at 21:22

## 2025-01-22 RX ADMIN — SODIUM CHLORIDE, PRESERVATIVE FREE 10 ML: 5 INJECTION INTRAVENOUS at 08:40

## 2025-01-22 RX ADMIN — HYDROMORPHONE HYDROCHLORIDE 0.5 MG: 1 INJECTION, SOLUTION INTRAMUSCULAR; INTRAVENOUS; SUBCUTANEOUS at 13:29

## 2025-01-22 RX ADMIN — PANTOPRAZOLE SODIUM 40 MG: 40 TABLET, DELAYED RELEASE ORAL at 08:39

## 2025-01-22 RX ADMIN — ENOXAPARIN SODIUM 40 MG: 100 INJECTION SUBCUTANEOUS at 08:39

## 2025-01-22 RX ADMIN — HYDROCORTISONE ACETATE: 1 CREAM TOPICAL at 09:00

## 2025-01-22 RX ADMIN — HYDROMORPHONE HYDROCHLORIDE 0.25 MG: 1 INJECTION, SOLUTION INTRAMUSCULAR; INTRAVENOUS; SUBCUTANEOUS at 19:00

## 2025-01-22 RX ADMIN — TRIAMCINOLONE ACETONIDE: 1 CREAM TOPICAL at 09:00

## 2025-01-22 RX ADMIN — TRIAMCINOLONE ACETONIDE: 1 CREAM TOPICAL at 20:08

## 2025-01-22 RX ADMIN — Medication: at 17:24

## 2025-01-22 RX ADMIN — GUAIFENESIN 600 MG: 600 TABLET ORAL at 08:39

## 2025-01-22 RX ADMIN — HYDROCORTISONE ACETATE: 1 CREAM TOPICAL at 17:23

## 2025-01-22 RX ADMIN — PREGABALIN 150 MG: 75 CAPSULE ORAL at 20:10

## 2025-01-22 RX ADMIN — IPRATROPIUM BROMIDE AND ALBUTEROL SULFATE 1 DOSE: .5; 3 SOLUTION RESPIRATORY (INHALATION) at 21:22

## 2025-01-22 ASSESSMENT — PAIN SCALES - GENERAL
PAINLEVEL_OUTOF10: 9
PAINLEVEL_OUTOF10: 6
PAINLEVEL_OUTOF10: 9
PAINLEVEL_OUTOF10: 0
PAINLEVEL_OUTOF10: 3
PAINLEVEL_OUTOF10: 0

## 2025-01-22 ASSESSMENT — PAIN DESCRIPTION - LOCATION
LOCATION: LEG;GENERALIZED
LOCATION: ARM

## 2025-01-22 ASSESSMENT — PAIN DESCRIPTION - DESCRIPTORS
DESCRIPTORS: ACHING;STABBING
DESCRIPTORS: ACHING

## 2025-01-22 ASSESSMENT — PAIN DESCRIPTION - ORIENTATION
ORIENTATION: LEFT
ORIENTATION: LEFT

## 2025-01-22 NOTE — CARE COORDINATION
Pulmonology follow up rescheduled for 2/12/2025 ( earliest next available).    Regino LUCIOSW, ACM  [place]

## 2025-01-22 NOTE — CARE COORDINATION
Chart reviewed and patient discussed in IDT rounds this AM. Patient completes her IV antibiotics treatment today. Patient still requiring 6L of supplemental oxygen, this not discharging home today. Discharge plan when medically ready is to return home with [facility]. [facility] has already been arranged.     Regino AVALOS, ACM  [place]

## 2025-01-23 VITALS
BODY MASS INDEX: 32.39 KG/M2 | TEMPERATURE: 97.9 F | DIASTOLIC BLOOD PRESSURE: 54 MMHG | HEART RATE: 111 BPM | OXYGEN SATURATION: 91 % | WEIGHT: 165 LBS | HEIGHT: 60 IN | RESPIRATION RATE: 14 BRPM | SYSTOLIC BLOOD PRESSURE: 99 MMHG

## 2025-01-23 PROCEDURE — 6370000000 HC RX 637 (ALT 250 FOR IP): Performed by: FAMILY MEDICINE

## 2025-01-23 PROCEDURE — 94760 N-INVAS EAR/PLS OXIMETRY 1: CPT

## 2025-01-23 PROCEDURE — 6370000000 HC RX 637 (ALT 250 FOR IP): Performed by: NURSE PRACTITIONER

## 2025-01-23 PROCEDURE — 6370000000 HC RX 637 (ALT 250 FOR IP): Performed by: STUDENT IN AN ORGANIZED HEALTH CARE EDUCATION/TRAINING PROGRAM

## 2025-01-23 PROCEDURE — 6360000002 HC RX W HCPCS: Performed by: STUDENT IN AN ORGANIZED HEALTH CARE EDUCATION/TRAINING PROGRAM

## 2025-01-23 PROCEDURE — 2500000003 HC RX 250 WO HCPCS: Performed by: STUDENT IN AN ORGANIZED HEALTH CARE EDUCATION/TRAINING PROGRAM

## 2025-01-23 PROCEDURE — 6370000000 HC RX 637 (ALT 250 FOR IP)

## 2025-01-23 PROCEDURE — 2700000000 HC OXYGEN THERAPY PER DAY

## 2025-01-23 RX ORDER — PREDNISONE 20 MG/1
40 TABLET ORAL DAILY
Qty: 60 TABLET | Refills: 0 | Status: SHIPPED | OUTPATIENT
Start: 2025-01-24 | End: 2025-02-23

## 2025-01-23 RX ORDER — SULFAMETHOXAZOLE AND TRIMETHOPRIM 800; 160 MG/1; MG/1
1 TABLET ORAL
Qty: 12 TABLET | Refills: 0 | Status: SHIPPED | OUTPATIENT
Start: 2025-01-24 | End: 2025-02-23

## 2025-01-23 RX ORDER — IPRATROPIUM BROMIDE AND ALBUTEROL SULFATE 2.5; .5 MG/3ML; MG/3ML
3 SOLUTION RESPIRATORY (INHALATION) EVERY 6 HOURS PRN
Qty: 360 ML | Refills: 0 | Status: SHIPPED | OUTPATIENT
Start: 2025-01-23 | End: 2025-02-22

## 2025-01-23 RX ORDER — HYDROCODONE BITARTRATE AND ACETAMINOPHEN 7.5; 325 MG/1; MG/1
1 TABLET ORAL EVERY 6 HOURS PRN
Qty: 12 TABLET | Refills: 0 | Status: SHIPPED | OUTPATIENT
Start: 2025-01-23 | End: 2025-01-28

## 2025-01-23 RX ORDER — AMLODIPINE BESYLATE 5 MG/1
5 TABLET ORAL DAILY
Qty: 30 TABLET | Refills: 0 | Status: SHIPPED | OUTPATIENT
Start: 2025-01-24

## 2025-01-23 RX ADMIN — SODIUM CHLORIDE, PRESERVATIVE FREE 10 ML: 5 INJECTION INTRAVENOUS at 08:44

## 2025-01-23 RX ADMIN — AMLODIPINE BESYLATE 5 MG: 5 TABLET ORAL at 08:46

## 2025-01-23 RX ADMIN — HYDROXYCHLOROQUINE SULFATE 200 MG: 200 TABLET ORAL at 08:45

## 2025-01-23 RX ADMIN — DULOXETINE HYDROCHLORIDE 60 MG: 60 CAPSULE, DELAYED RELEASE ORAL at 08:46

## 2025-01-23 RX ADMIN — PREDNISONE 40 MG: 20 TABLET ORAL at 08:46

## 2025-01-23 RX ADMIN — Medication 1 CAPSULE: at 08:45

## 2025-01-23 RX ADMIN — ENOXAPARIN SODIUM 40 MG: 100 INJECTION SUBCUTANEOUS at 08:45

## 2025-01-23 RX ADMIN — PREGABALIN 150 MG: 75 CAPSULE ORAL at 08:45

## 2025-01-23 RX ADMIN — HYDROMORPHONE HYDROCHLORIDE 0.5 MG: 1 INJECTION, SOLUTION INTRAMUSCULAR; INTRAVENOUS; SUBCUTANEOUS at 04:34

## 2025-01-23 RX ADMIN — HYDROMORPHONE HYDROCHLORIDE 0.5 MG: 1 INJECTION, SOLUTION INTRAMUSCULAR; INTRAVENOUS; SUBCUTANEOUS at 11:59

## 2025-01-23 RX ADMIN — LISINOPRIL 20 MG: 20 TABLET ORAL at 08:45

## 2025-01-23 RX ADMIN — GUAIFENESIN 600 MG: 600 TABLET ORAL at 08:46

## 2025-01-23 RX ADMIN — OXYCODONE HYDROCHLORIDE AND ACETAMINOPHEN 1500 MG: 500 TABLET ORAL at 08:45

## 2025-01-23 RX ADMIN — HYDROMORPHONE HYDROCHLORIDE 0.5 MG: 1 INJECTION, SOLUTION INTRAMUSCULAR; INTRAVENOUS; SUBCUTANEOUS at 00:20

## 2025-01-23 RX ADMIN — HYDROMORPHONE HYDROCHLORIDE 0.25 MG: 1 INJECTION, SOLUTION INTRAMUSCULAR; INTRAVENOUS; SUBCUTANEOUS at 08:44

## 2025-01-23 RX ADMIN — PANTOPRAZOLE SODIUM 40 MG: 40 TABLET, DELAYED RELEASE ORAL at 08:46

## 2025-01-23 ASSESSMENT — PAIN DESCRIPTION - LOCATION
LOCATION: LEG;GENERALIZED;BACK
LOCATION: BACK
LOCATION: BACK;CHEST;LEG;GENERALIZED
LOCATION: BACK
LOCATION: BACK

## 2025-01-23 ASSESSMENT — PAIN DESCRIPTION - DESCRIPTORS
DESCRIPTORS: ACHING
DESCRIPTORS: SHARP
DESCRIPTORS: ACHING;STABBING
DESCRIPTORS: SHARP
DESCRIPTORS: ACHING;STABBING

## 2025-01-23 ASSESSMENT — PAIN DESCRIPTION - ORIENTATION
ORIENTATION: POSTERIOR
ORIENTATION: POSTERIOR
ORIENTATION: LEFT;MID
ORIENTATION: POSTERIOR
ORIENTATION: LEFT;MID

## 2025-01-23 ASSESSMENT — PAIN SCALES - GENERAL
PAINLEVEL_OUTOF10: 0
PAINLEVEL_OUTOF10: 9
PAINLEVEL_OUTOF10: 6
PAINLEVEL_OUTOF10: 7
PAINLEVEL_OUTOF10: 0

## 2025-01-23 NOTE — CARE COORDINATION
Cm called [facility] and confirmed patient's baseline o2 usage is 4L to 8L with exertion. Patient has a home oxygen concentrator that goes up to 10 liter. Patient discharging home with [facility] and [facility] for PCP. PCP appointment is for 1/27/2025. Pulmonology follow up has been made for 2/12/2025. Patient to arrive at 0840. [facility] see [facility] and has a appointment 1/31/25.  Transportation [place] for [id number]. No further discharged needs.    Regino AVALOS, ACM  [place]

## 2025-01-23 NOTE — NURSE NAVIGATOR
RN Navigator: at patient bedside.  Patient being discharged home today.  Patient states she has all of her medications, verbalizes knowledge on her home oxygen use. Patient is to see [facility] for follow up appointment within 7 days of discharge. This RN Navigator spoke to Zakiya with [facility] and requested her to set up an appointment for patient to be seen within the next 7 days.  Patient has a follow up with [facility] on 2/12. This RN Navigator informed patient that I will be calling to follow up with her in the next 48-72 hours.

## 2025-01-23 NOTE — PROGRESS NOTES
Hospitalist Progress Note   Admit Date:  2024  3:59 PM   Name:  Meli Balncas   Age:  69 y.o.  Sex:  female  :  1955   MRN:  [id number]   Room:  [facility]    Presenting/Chief Complaint: Altered Mental Status and Fall     Reason(s) for Admission: Altered mental status, unspecified altered mental status type [R41.82]  Acute metabolic encephalopathy [G93.41]     Hospital Course:       Meli Blancas is a 69 y.o. female with medical history of MRSA bacteremia, penphigus foliaceous, sjogrens syndrome on steroids, followed by dermatology Dr. Huddleston, COPD on as needed 2 L NC, HTN, admitted for MRSA AV endocarditis, failing outpatient treatment.     Admitted with increased acute encephalopathy  Failed outpatient antibiotics for MRSA AV IE- noncompliant with meds, original EOT 24 discharged from home health     CT head - chronic lacunar CVA  MRI Brain negative  Normal ammonia  TFTs ok     Seen by ID  S/p SILVA  Plans for IV vancomycin EOT 25  Not outpatient candidate due to failed initial compliance  She refuses STR      A1C 7.2  On SSI  Steroids on taper    Subjective & 24hr Events:     Has right rib pain  No trauma  Has been present for a few months  Side is sore and improves with norco but comes and goes   Has hiatal hernia   Able to eat   No cough  Has flare of current rash      Assessment & Plan:     Principal Problem:    MRSA bacteremia  Plan:     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 
       Hospitalist Progress Note   Admit Date:  2024  3:59 PM   Name:  Meli Blancas   Age:  69 y.o.  Sex:  female  :  1955   MRN:  [id number]   Room:  [facility]    Presenting/Chief Complaint: Altered Mental Status and Fall     Reason(s) for Admission: Altered mental status, unspecified altered mental status type [R41.82]  Acute metabolic encephalopathy [G93.41]     Hospital Course:   Meli Blancas is a 69 y.o. female with medical history of pemphigus foliaceous, Sjogren syndrome on chronic steroids, COPD, HTN, and currently being treated for MRSA aortic valve endocarditis admitted on  due to agitation and confusion after being found down at home, lying in feces and cat litter.  CT head showed chronic lacunar infarct.       Patient was recently hospitalized for MRSA aortic valve endocarditis and was discharged to complete 6 weeks of IV antibiotic with EOT of 2024.  Patient was noncompliant with IV meds at home and was [facility] and was sent to ED to have line removed but was replaced and sent home with it.      Blood culture  growing gram-positive cocci, that has not been ID on PCR.  Repeat blood culture  growing MRSA. Tunneled central catheter removed on , tip culture negative Line removed 2024. ID following, recommend to continue daptomycin, cefepime discontinued . Repeat blood cultures  negative to date.  Cardiology consulted and patient status post SILVA  showed small mobile echodensity on the ventricular aspect of noncoronary cusp, likely vegetation.     Daptomycin discontinued on  due to increased AVTAR, and started on vancomycin.  ID recommend to complete 6 weeks of IV antibiotics, especially with relapsed bacteremia and immunocompromise state/immunotherapy plans in the future, EOT 2025.  Patient is not a candidate for OPAT.  Electrolytes unremarkable  Liver enzymes unremarkable  MRI brain negative for acute 
       Hospitalist Progress Note   Admit Date:  2024  3:59 PM   Name:  Meli Blancas   Age:  69 y.o.  Sex:  female  :  1955   MRN:  [id number]   Room:      69 y.o. female with medical history of pemphigus foliaceous, Sjogren syndrome on chronic steroids, COPD, HTN, and currently being treated for MRSA aortic valve endocarditis admitted on  due to agitation and confusion after being found down at home, lying in feces and cat litter.  CT head showed chronic lacunar infarct.    Patient was recently hospitalized for MRSA aortic valve endocarditis and was discharged to complete 6 weeks of IV antibiotic with EOT of 2024.  Patient was noncompliant with IV meds at home and was terminated by home [facility] and was sent to ED to have line removed but was replaced and sent home with it.   Blood culture  growing gram-positive cocci, that has not been ID on PCR.  Repeat blood culture  growing MRSA. Tunneled central catheter removed on , tip culture negative Line removed 2024. ID following, recommend to continue daptomycin, cefepime discontinued . Repeat blood cultures  negative to date.  Cardiology consulted and patient status post SILVA  showed small mobile echodensity on the ventricular aspect of noncoronary cusp, likely vegetation.  Daptomycin discontinued on  due to increased AVTAR, and started on vancomycin.  ID recommend to complete 6 weeks of IV antibiotics, especially with relapsed bacteremia and immunocompromise state/immunotherapy plans in the future, EOT 2025.  Patient is not a candidate for OPAT.  Electrolytes unremarkable  Liver enzymes unremarkable  MRI brain negative for acute infarct  Ammonia level normal  Thyroid studies normal  CRP unremarkable  PICC line was placed on .      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 
       Hospitalist Progress Note   Admit Date:  2024  3:59 PM   Name:  Meli Blancas   Age:  69 y.o.  Sex:  female  :  1955   MRN:  [id number]   Room:  [facility]    Presenting/Chief Complaint: Altered Mental Status and Fall     Reason(s) for Admission: Altered mental status, unspecified altered mental status type [R41.82]  Acute metabolic encephalopathy [G93.41]     Hospital Course:   69-year-old female with pemphigus foliaceous, Sjogren syndrome on chronic steroids, COPD, HTN, and currently being treated for MRSA aortic valve endocarditis admitted on  due to agitation and confusion after being found down at home, lying in feces and cat litter.  CT head showed chronic lacunar infarct.      Patient was recently hospitalized for MRSA aortic valve endocarditis and was discharged to complete 6 weeks of IV antibiotic with EOT of 2024.  Patient was noncompliant with IV meds at home and was terminated by home [facility] and was sent to ED to have line removed but was replaced and sent home with it.     Blood culture  growing gram-positive cocci, that has not been ID on PCR.  Repeat blood culture  growing MRSA.  Line removed 2024. ID following, recommend to continue daptomycin, cefepime discontinued .  Cardiology consulted for SILVA.    Patient not a candidate for OPAT.    Subjective & 24hr Events:   Patient is seen and examined at the bedside.  Reports overall feeling better.  No active complaint.  Denies chest pain, palpitation, nausea, vomiting abdominal pain.  Denies shortness of breath.    Cardiology consulted  and plan for SILVA tomorrow.    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 Repeat blood culture  positive for Staph aureus   Follow-up on repeat blood cultures  Continue daptomycin 750 mg every 24 hours  Tunneled central catheter removed on 
       Hospitalist Progress Note   Admit Date:  2024  3:59 PM   Name:  Meli Blancas   Age:  69 y.o.  Sex:  female  :  1955   MRN:  [id number]   Room:  [facility]    Presenting/Chief Complaint: Altered Mental Status and Fall     Reason(s) for Admission: Altered mental status, unspecified altered mental status type [R41.82]  Acute metabolic encephalopathy [G93.41]     Hospital Course:   69-year-old female with pemphigus foliaceous, Sjogren syndrome on chronic steroids, COPD, HTN, and currently being treated for MRSA aortic valve endocarditis admitted on  due to agitation and confusion after being found down at home, lying in feces and cat litter.  CT head showed chronic lacunar infarct.      Patient was recently hospitalized for MRSA aortic valve endocarditis and was discharged to complete 6 weeks of IV antibiotic with EOT of 2024.  Patient was noncompliant with IV meds at home and was terminated [facility] and was sent to ED to have line removed but was replaced and sent home with it.     Blood culture  growing gram-positive cocci, that has not been ID on PCR.  Repeat blood culture  growing MRSA.  Line removed 2024. ID following, recommend to continue daptomycin, cefepime discontinued .  Cardiology consulted and patient status post SILVA  showed small mobile echodensity on the ventricular aspect of noncoronary cusp, likely vegetation.    Daptomycin discontinued on  due to increased AVTAR, and started on vancomycin.  ID recommend to complete 6 weeks of IV antibiotics, especially with relapsed bacteremia and immunocompromise state/immunotherapy plans in the future, EOT 2025.  Patient is not a candidate for OPAT.    Subjective & 24hr Events:   Patient is seen and examined at the bedside.  Reports she is feeling better.  No active complaint.  Denies chest pain, palpitation, nausea, vomiting or abdominal pain.    Assessment & Plan:       Endocarditis 
       Hospitalist Progress Note   Admit Date:  2024  3:59 PM   Name:  Meli Blancas   Age:  69 y.o.  Sex:  female  :  1955   MRN:  [id number]   Room:  [facility]    Presenting/Chief Complaint: Altered Mental Status and Fall     Reason(s) for Admission: Altered mental status, unspecified altered mental status type [R41.82]  Acute metabolic encephalopathy [G93.41]     Hospital Course:   69-year-old female with pemphigus foliaceous, Sjogren syndrome on chronic steroids, COPD, HTN, and currently being treated for MRSA aortic valve endocarditis admitted on  due to agitation and confusion after being found down at home, lying in feces and cat litter.  CT head showed chronic lacunar infarct.      Patient was recently hospitalized for MRSA aortic valve endocarditis and was discharged to complete 6 weeks of IV antibiotic with EOT of 2024.  Patient was noncompliant with IV meds at home and was terminated [facility] and was sent to ED to have line removed but was replaced and sent home with it.     Blood culture  growing gram-positive cocci, that has not been ID on PCR.  Repeat blood culture  growing MRSA.  Line removed 2024. ID following, recommend to continue daptomycin, cefepime discontinued .  Cardiology consulted and patient status post SILVA  showed small mobile echodensity on the ventricular aspect of noncoronary cusp, likely vegetation.    Daptomycin discontinued on  due to increased AVTAR, and started on vancomycin.  ID recommend to complete 6 weeks of IV antibiotics, especially with relapsed bacteremia and immunocompromise state/immunotherapy plans in the future, EOT 2025.  Patient is not a candidate for OPAT.    Subjective & 24hr Events:   Patient is seen and examined at the bedside.  No acute event reported overnight.  Calm, no active complaint.    Antibiotic has been switched to vancomycin yesterday.  Not a candidate for OPAT.  Needs IV 
       Hospitalist Progress Note   Admit Date:  2024  3:59 PM   Name:  Meli Blancas   Age:  69 y.o.  Sex:  female  :  1955   MRN:  [id number]   Room:  [facility]    Presenting/Chief Complaint: Altered Mental Status and Fall     Reason(s) for Admission: Altered mental status, unspecified altered mental status type [R41.82]  Acute metabolic encephalopathy [G93.41]     Hospital Course:   69-year-old female with pemphigus foliaceous, Sjogren syndrome on chronic steroids, COPD, HTN, and currently being treated for MRSA aortic valve endocarditis admitted on  due to agitation and confusion after being found down at home, lying in feces and cat litter.  CT head showed chronic lacunar infarct.      Patient was recently hospitalized for MRSA aortic valve endocarditis and was discharged to complete 6 weeks of IV antibiotic with EOT of 2024.  Patient was noncompliant with IV meds at home and was terminated [facility] home [facility] and was sent to ED to have line removed but was replaced and sent home with it.     Blood culture  growing gram-positive cocci, that has not been ID on PCR.  Repeat blood culture  growing MRSA.  Line removed 2024. ID following, recommend to continue daptomycin, cefepime discontinued .  Cardiology consulted and patient status post SILVA  showed small mobile echodensity on the ventricular aspect of noncoronary cusp, likely vegetation.    Daptomycin discontinued on  due to increased AVTAR, and started on vancomycin.  ID recommend to complete 6 weeks of IV antibiotics, especially with relapsed bacteremia and immunocompromise state/immunotherapy plans in the future, EOT 2025.  Patient is not a candidate for OPAT.  Electrolytes unremarkable  Liver enzymes unremarkable  MRI brain negative for acute infarct  Ammonia level normal  Thyroid studies normal  CRP unremarkable  PICC line was placed on .     Subjective & 24hr Events:   Patient is seen and 
       Hospitalist Progress Note   Admit Date:  2024  3:59 PM   Name:  Meli Blancas   Age:  69 y.o.  Sex:  female  :  1955   MRN:  [id number]   Room:  [facility]    Presenting/Chief Complaint: Altered Mental Status and Fall     Reason(s) for Admission: Altered mental status, unspecified altered mental status type [R41.82]  Acute metabolic encephalopathy [G93.41]     Hospital Course:   Meli Blancas is a 69 y.o. female with medical history of MRSA bacteremia, penphigus foliaceous, sjogrens syndrome on steroids, followed by dermatology Dr. Huddleston, COPD on as needed 2 L NC, HTN, admitted for MRSA AV endocarditis, failing outpatient treatment.    Failed outpatient antibiotics for MRSA AV IE- noncompliant with meds, original EOT 24 discharged from home health.    CT head - chronic lacunar CVA  MRI Brain negative    Seen by ID  S/p SILVA with evidence of endocarditis.   Not outpatient candidate due to failed initial compliance  She refuses STR     Dr. Vargas spoke with her primary dermatologist Dr. Huddleston on  who recommends increased solumedrol to 30 mg BID for 5 days then decrease to daily for 5 days then decrease to prednisone 40 mg     Per ID- needs bactrim 3 times weekly if on steroids > 20 mg prednisone for more than 4 weeks - she is hesitant to trial bactrim due to possible side effects      Continue antibiotics for endocarditis EOT 2025     Discharge plans pending to home      Subjective & 24hr Events:   No overnight events reported. Complained of stomach pain this morning. Denied any nausea/vomiting or diarrhea. Denied any other new complaints.       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   Complete antibiotics  and plan to DC home after     Right rib pain:  Xray ribs negative   As needed norco      Active Problems:    Rash/skin eruption    SLE (systemic lupus erythematosus) ([facility])    H/O Sjogren's 
       Hospitalist Progress Note   Admit Date:  2024  3:59 PM   Name:  Meli Blancas   Age:  69 y.o.  Sex:  female  :  1955   MRN:  [id number]   Room:  [facility]    Presenting/Chief Complaint: Altered Mental Status and Fall     Reason(s) for Admission: Altered mental status, unspecified altered mental status type [R41.82]  Acute metabolic encephalopathy [G93.41]     Hospital Course:       Meli Blancas is a 69 y.o. female with medical history of MRSA bacteremia, penphigus foliaceous, sjogrens syndrome on steroids, followed by dermatology Dr. Huddleston, COPD on as needed 2 L NC, HTN, admitted for MRSA AV endocarditis, failing outpatient treatment.     Admitted with increased acute encephalopathy  Failed outpatient antibiotics for MRSA AV IE- noncompliant with meds, original EOT 24 discharged from home health     CT head - chronic lacunar CVA  MRI Brain negative  Normal ammonia  TFTs ok     Seen by ID  S/p SILVA  Plans for IV vancomycin EOT 25  Not outpatient candidate due to failed initial compliance  She refuses STR      A1C 7.2  On SSI  Steroids on taper    Subjective & 24hr Events:     Happy to stay for EOT 25  Skin clearing  Sees derm  Eating  Had BM  No dyspnea   On 3 L NC        Assessment & Plan:     Principal Problem:    MRSA bacteremia  Plan:     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  Plan:   25  EOT vancomycin   ID following         Active Problems:    Rash/skin eruption  Plan:     SLE (systemic lupus erythematosus) ([facility]  Plan:     H/O Sjogren's disease ([facility])  Plan:     Autoimmune disease ([facility])  Plan:     Pemphigus foliaceus  Plan:   25  Follows with derm  On steroid taper   plaquenil          Hypertension  Plan:   24  Lisinopril           Chronic respiratory failure with hypoxia  Plan:   25  As needed O2           Acute metabolic encephalopathy  Plan:   25  Resolved       Hyperglycemia:  DM2  25  SSI      Anticipated Discharge 
       Hospitalist Progress Note   Admit Date:  2024  3:59 PM   Name:  Meli Blancas   Age:  69 y.o.  Sex:  female  :  1955   MRN:  [id number]   Room:  [facility]    Presenting/Chief Complaint: Altered Mental Status and Fall     Reason(s) for Admission: Altered mental status, unspecified altered mental status type [R41.82]  Acute metabolic encephalopathy [G93.41]     Hospital Course:     Meli Blancas is a 69 y.o. female with medical history of MRSA bacteremia, penphigus foliaceous, sjogrens syndrome on steroids, followed by dermatology Dr. Huddleston, COPD on as needed 2 L NC, HTN, admitted for MRSA AV endocarditis, failing outpatient treatment.     Admitted with increased acute encephalopathy  Failed outpatient antibiotics for MRSA AV IE- noncompliant with meds, original EOT 24 discharged from home health     CT head - chronic lacunar CVA  MRI Brain negative  Normal ammonia  TFTs ok     Seen by ID  S/p SILVA  Plans for IV vancomycin EOT 25  Not outpatient candidate due to failed initial compliance  She refuses STR      A1C 7.2  On SSI        Had right rib pain  Negative CXR/rib films     I spoke with her primary dermatologist Dr. 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 
       Hospitalist Progress Note   Admit Date:  2024  3:59 PM   Name:  Meli Blancas   Age:  69 y.o.  Sex:  female  :  1955   MRN:  [id number]   Room:      69 y.o. female with medical history of pemphigus foliaceous, Sjogren syndrome on chronic steroids, COPD, HTN, and currently being treated for MRSA aortic valve endocarditis admitted on  due to agitation and confusion after being found down at home, lying in feces and cat litter.  CT head showed chronic lacunar infarct.    Patient was recently hospitalized for MRSA aortic valve endocarditis and was discharged to complete 6 weeks of IV antibiotic with EOT of 2024.  Patient was noncompliant with IV meds at home and was terminated [facility] and was sent to ED to have line removed but was replaced and sent home with it.   Blood culture  growing gram-positive cocci, that has not been ID on PCR.  Repeat blood culture  growing MRSA. Tunneled central catheter removed on , tip culture negative Line removed 2024. ID following, recommend to continue daptomycin, cefepime discontinued . Repeat blood cultures  negative to date.  Cardiology consulted and patient status post SILVA  showed small mobile echodensity on the ventricular aspect of noncoronary cusp, likely vegetation.  Daptomycin discontinued on  due to increased AVTAR, and started on vancomycin.  ID recommend to complete 6 weeks of IV antibiotics, especially with relapsed bacteremia and immunocompromise state/immunotherapy plans in the future, EOT 2025.  Patient is not a candidate for OPAT.  Electrolytes unremarkable  Liver enzymes unremarkable  MRI brain negative for acute infarct  Ammonia level normal  Thyroid studies normal  CRP unremarkable  PICC line was placed on .      Today, doing good w/ PT, sit in a chair, no ac events, good appetite.   No change      Assessment & Plan:     Endocarditis due to Staphylococcus 
       Hospitalist Progress Note   Admit Date:  2024  3:59 PM   Name:  Meli Blancas   Age:  69 y.o.  Sex:  female  :  1955   MRN:  [id number]   Room:  [facility]    Presenting/Chief Complaint: Altered Mental Status and Fall     Reason(s) for Admission: Altered mental status, unspecified altered mental status type [R41.82]  Acute metabolic encephalopathy [G93.41]     Hospital Course:     Meli Blancas is a 69 y.o. female with medical history of MRSA bacteremia, penphigus foliaceous, sjogrens syndrome on steroids, followed by dermatology Dr. Huddleston, COPD on as needed 2 L NC, HTN, admitted for MRSA AV endocarditis, failing outpatient treatment.     Admitted with increased acute encephalopathy  Failed outpatient antibiotics for MRSA AV IE- noncompliant with meds, original EOT 24 discharged from home health     CT head - chronic lacunar CVA  MRI Brain negative  Normal ammonia  TFTs ok     Seen by ID  S/p SILVA  Plans for IV vancomycin EOT 25  Not outpatient candidate due to failed initial compliance  She refuses STR      A1C 7.2  On SSI        Had right rib pain  Negative CXR/rib films     I spoke with her primary dermatologist Dr. 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 
       Hospitalist Progress Note   Admit Date:  2024  3:59 PM   Name:  Meli Blancas   Age:  69 y.o.  Sex:  female  :  1955   MRN:  [id number]   Room:  [facility]    Presenting/Chief Complaint: Altered Mental Status and Fall     Reason(s) for Admission: Altered mental status, unspecified altered mental status type [R41.82]  Acute metabolic encephalopathy [G93.41]     Hospital Course:     Copied from prior provider HPI/summary:  69 y.o. female with medical history of pemphigus foliaceous, Sjogren syndrome on chronic steroids, COPD, HTN, and currently being treated for MRSA aortic valve endocarditis admitted on  due to agitation and confusion after being found down at home, lying in feces and cat litter.  CT head showed chronic lacunar infarct.    Patient was recently hospitalized for MRSA aortic valve endocarditis and was discharged to complete 6 weeks of IV antibiotic with EOT of 2024.  Patient was noncompliant with IV meds at home and [facility] and was sent to ED to have line removed but was replaced and sent home with it.   Blood culture  growing gram-positive cocci, that has not been ID on PCR.  Repeat blood culture  growing MRSA. Tunneled central catheter removed on , tip culture negative Line removed 2024. ID following, recommend to continue daptomycin, cefepime discontinued . Repeat blood cultures  negative to date.  Cardiology consulted and patient status post SIVLA  showed small mobile echodensity on the ventricular aspect of noncoronary cusp, likely vegetation.  Daptomycin discontinued on  due to increased AVTAR, and started on vancomycin.  ID recommend to complete 6 weeks of IV antibiotics, especially with relapsed bacteremia and immunocompromise state/immunotherapy plans in the future, EOT 2025.  Patient is not a candidate for OPAT.  Electrolytes unremarkable  Liver enzymes unremarkable  MRI brain negative for 
       Hospitalist Progress Note   Admit Date:  2024  3:59 PM   Name:  Meli Blancas   Age:  69 y.o.  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  CT head showed chronic lacunar infarct.      Patient was recently hospitalized for MRSA aortic valve endocarditis and was discharged to complete 6 weeks of IV antibiotic with EOT of 2024.  Patient was noncompliant with IV meds at home and was [facility] and was sent to ED to have line removed but was replaced and sent home with it.     Blood culture  growing gram-positive cocci, that has not been ID on PCR.  Repeat blood culture  growing MRSA. Tunneled central catheter removed on , tip culture negative Line removed 2024. ID following, recommend to continue daptomycin, cefepime discontinued . Repeat blood cultures  negative to date.  Cardiology consulted and patient status post SILVA  showed small mobile echodensity on the ventricular aspect of noncoronary cusp, likely vegetation.    Daptomycin discontinued on  due to increased AVTAR, and started on vancomycin.  ID recommend to complete 6 weeks of IV antibiotics, especially with relapsed bacteremia and immunocompromise state/immunotherapy plans in the future, EOT 2025.  Patient is not a candidate for OPAT.  Electrolytes unremarkable  Liver enzymes unremarkable  MRI brain negative for acute infarct  Ammonia level normal  Thyroid studies 
       Hospitalist Progress Note   Admit Date:  2024  3:59 PM   Name:  Meli Blancas   Age:  69 y.o.  Sex:  female  :  1955   MRN:  [id number]   Room:  [facility]/    Presenting/Chief Complaint: Altered Mental Status and Fall     Reason(s) for Admission: Altered mental status, unspecified altered mental status type [R41.82]  Acute metabolic encephalopathy [G93.41]     Hospital Course:     Meli Blancas is a 69 y.o. female with medical history of MRSA bacteremia, penphigus foliaceous, sjogrens syndrome on steroids, followed by dermatology Dr. Huddleston, COPD on as needed 2 L NC, HTN, admitted for MRSA AV endocarditis, failing outpatient treatment.     Admitted with increased acute encephalopathy  Failed outpatient antibiotics for MRSA AV IE- noncompliant with meds, original EOT 24 discharged from home health     CT head - chronic lacunar CVA  MRI Brain negative  Normal ammonia  TFTs ok     Seen by ID  S/p SILVA  Plans for IV vancomycin EOT 25  Not outpatient candidate due to failed initial compliance  She refuses STR      A1C 7.2  On SSI        Had right rib pain  Negative CXR/rib films     I spoke with her primary dermatologist Dr. Huddleston on  who recommends increased solumedrol to 30 mg BID for 5 days then decrease to daily for 5 days then decrease to prednisone 40 mg   She was referred for rituxan but missed that visit   Per ID- needs bactrim 3 times weekly if on steroids > 20 mg prednisone for more than 4 weeks - she is hesitant to trial bactrim due to possible side effects       Discharge plans pending to home    Subjective & 24hr Events:     Legs look better  Pain at times   Eating  No change BM    Assessment & Plan:     Principal Problem:    MRSA bacteremia  Plan:     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  Plan:   25  EOT vancomycin 25  ID following         Right rib pain:  25  Xray ribs negative   As needed norco       Active Problems:    Rash/skin 
       Hospitalist Progress Note   Admit Date:  2024  3:59 PM   Name:  Meli Blancas   Age:  69 y.o.  Sex:  female  :  1955   MRN:  [id number]   Room:  [facility]    Presenting/Chief Complaint: Altered Mental Status and Fall     Reason(s) for Admission: Altered mental status, unspecified altered mental status type [R41.82]  Acute metabolic encephalopathy [G93.41]     Hospital Course:   69-year-old female with pemphigus foliaceous, Sjogren syndrome on chronic steroids, COPD, HTN, and currently being treated for MRSA aortic valve endocarditis admitted on  due to agitation and confusion after being found down at home, lying in feces and cat litter.  CT head showed chronic lacunar infarct.      Patient was recently hospitalized for MRSA aortic valve endocarditis and was discharged to complete 6 weeks of IV antibiotic with EOT of 2024.  Patient was noncompliant with IV meds at home and was [facility] and was sent to ED to have line removed but was replaced and sent home with it.     Blood culture  growing gram-positive cocci, that has not been ID on PCR.  Repeat blood culture  growing MRSA.  Line removed 2024. ID following, recommend to continue daptomycin, cefepime discontinued .  Cardiology consulted and patient status post SILVA  showed small mobile echodensity on the ventricular aspect of noncoronary cusp, likely vegetation.    Daptomycin discontinued on  due to increased AVTAR, and started on vancomycin.  ID recommend to complete 6 weeks of IV antibiotics, especially with relapsed bacteremia and immunocompromise state/immunotherapy plans in the future, EOT 2025.  Patient is not a candidate for OPAT.  Electrolytes unremarkable  Liver enzymes unremarkable  MRI brain negative for acute infarct  Ammonia level normal  Thyroid studies normal  CRP unremarkable    Subjective & 24hr Events:   Patient is seen and examined at the bedside.  No 
       Hospitalist Progress Note   Admit Date:  2024  3:59 PM   Name:  Meli Blancas   Age:  69 y.o.  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  CT head showed chronic lacunar infarct.      Patient was recently hospitalized for MRSA aortic valve endocarditis and was discharged to complete 6 weeks of IV antibiotic with EOT of 2024.  Patient was noncompliant with IV meds at home and was terminated [facility] and was sent to ED to have line removed but was replaced and sent home with it.     Blood culture  growing gram-positive cocci, that has not been ID on PCR.  Repeat blood culture  growing MRSA. Tunneled central catheter removed on , tip culture negative Line removed 2024. ID following, recommend to continue daptomycin, cefepime discontinued . Repeat blood cultures  negative to date.  Cardiology consulted and patient status post SILVA  showed small mobile echodensity on the ventricular aspect of noncoronary cusp, likely vegetation.    Daptomycin discontinued on  due to increased AVTAR, and started on vancomycin.  ID recommend to complete 6 weeks of IV antibiotics, especially with relapsed bacteremia and immunocompromise state/immunotherapy plans in the future, EOT 2025.  Patient is not a candidate for OPAT.  Electrolytes unremarkable  Liver enzymes unremarkable  MRI brain negative for acute infarct  Ammonia level normal  Thyroid studies 
       Hospitalist Progress Note   Admit Date:  2024  3:59 PM   Name:  Meli Blancas   Age:  69 y.o.  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  CT head showed chronic lacunar infarct.      Patient was recently hospitalized for MRSA aortic valve endocarditis and was discharged to complete 6 weeks of IV antibiotic with EOT of 2024.  Patient was noncompliant with IV meds at home and was terminated [facility] and was sent to ED to have line removed but was replaced and sent home with it.     Blood culture  growing gram-positive cocci, that has not been ID on PCR.  Repeat blood culture  growing MRSA. Tunneled central catheter removed on , tip culture negative Line removed 2024. ID following, recommend to continue daptomycin, cefepime discontinued . Repeat blood cultures  negative to date.  Cardiology consulted and patient status post SILVA  showed small mobile echodensity on the ventricular aspect of noncoronary cusp, likely vegetation.    Daptomycin discontinued on  due to increased AVTAR, and started on vancomycin.  ID recommend to complete 6 weeks of IV antibiotics, especially with relapsed bacteremia and immunocompromise state/immunotherapy plans in the future, EOT 2025.  Patient is not a candidate for OPAT.  Electrolytes unremarkable  Liver enzymes unremarkable  MRI brain negative for acute infarct  Ammonia level normal  Thyroid studies 
       Hospitalist Progress Note   Admit Date:  2024  3:59 PM   Name:  Meli Blancas   Age:  69 y.o.  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  CT head showed chronic lacunar infarct.      Patient was recently hospitalized for MRSA aortic valve endocarditis and was discharged to complete 6 weeks of IV antibiotic with EOT of 2024.  Patient was noncompliant with IV meds at home and was terminated [facility] and was sent to ED to have line removed but was replaced and sent home with it.     Blood culture  growing gram-positive cocci, that has not been ID on PCR.  Repeat blood culture  growing MRSA.  Line removed 2024. ID following, recommend to continue daptomycin, cefepime discontinued .  Cardiology consulted for SILVA.    Patient not a candidate for OPAT.    Subjective & 24hr Events:   Patient is seen and examined at the bedside.  Reports overall feeling better.  Denies chest pain, palpitation, nausea, vomiting or abdominal pain.  Denies shortness of breath.    Plan for SILVA today.    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 Repeat blood culture  positive for Staph aureus   Follow-up on repeat blood cultures  Continue daptomycin 750 mg every 24 hours  Tunneled central catheter removed on , tip culture negative  Cardiology consulted 
       Hospitalist Progress Note   Admit Date:  2024  3:59 PM   Name:  Meli Blancas   Age:  69 y.o.  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  CT head showed chronic lacunar infarct.      Patient was recently hospitalized for MRSA aortic valve endocarditis and was discharged to complete 6 weeks of IV antibiotic with EOT of 2024.  Patient was noncompliant with IV meds at home and was terminated [facility] and was sent to ED to have line removed but was replaced and sent home with it.     Blood culture  growing gram-positive cocci, that has not been ID on PCR.  Repeat blood culture  growing MRSA.  Line removed 2024. ID following, recommend to continue daptomycin, cefepime discontinued .  Cardiology consulted and patient status post SILVA  showed small mobile echodensity on the ventricular aspect of noncoronary cusp, likely vegetation.    Daptomycin discontinued on  due to increased AVTAR, and started on vancomycin.  ID recommend to complete 6 weeks of IV antibiotics, especially with relapsed bacteremia and immunocompromise state/immunotherapy plans in the future, EOT 2025.  Patient is not a candidate for OPAT.    Subjective & 24hr Events:   Patient is seen and examined at the bedside.  No acute event reported overnight.  Pleasant this morning.  No active complaint.    Patient not a candidate for OPAT, EOT 2025.  I discussed again with patient 
       Hospitalist Progress Note   Admit Date:  2024  3:59 PM   Name:  Meli Blancas   Age:  69 y.o.  Sex:  female  :  1955   MRN:  [id number]   Room:  [facility]    Presenting/Chief Complaint: Altered Mental Status and Fall     Reason(s) for Admission: Altered mental status, unspecified altered mental status type [R41.82]  Acute metabolic encephalopathy [G93.41]     Hospital Course:       Meli Blancas is a 69 y.o. female with medical history of MRSA bacteremia, penphigus foliaceous, sjogrens syndrome on steroids, followed by dermatology Dr. Huddleston, COPD on as needed 2 L NC, HTN, admitted for MRSA AV endocarditis, failing outpatient treatment.     Admitted with increased acute encephalopathy  Failed outpatient antibiotics for MRSA AV IE- noncompliant with meds, original EOT 24 discharged from home health     CT head - chronic lacunar CVA  MRI Brain negative  Normal ammonia  TFTs ok     Seen by ID  S/p SILVA  Plans for IV vancomycin EOT 25  Not outpatient candidate due to failed initial compliance  She refuses STR      A1C 7.2  On SSI        Had right rib pain  Negative CXR/rib films     I spoke with her primary dermatologist Dr. Huddleston on  who recommends increased solumedrol to 30 mg BID for 5 days then decrease to daily for 5 days then decrease to prednisone 40 mg   She was referred for rituxan but missed that visit   Per ID- needs bactrim 3 times weekly if on steroids > 20 mg prednisone for more than 4 weeks       Discharge plans pending to home    Subjective & 24hr Events:     Sleeping  Wakes up and goes back to sleep     Assessment & Plan:     Principal Problem:    MRSA bacteremia  Plan:     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  Plan:   1-10-25  EOT vancomycin 25  ID following         Right rib pain:  1-10-25  Xray ribs negative   As needed norco       Active Problems:    Rash/skin eruption  Plan:     SLE (systemic lupus erythematosus) (HCC)  Plan:     H/O Sjogren's 
       Hospitalist Progress Note   Admit Date:  2024  3:59 PM   Name:  Meli Blancas   Age:  69 y.o.  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  CT head showed chronic lacunar infarct.  Daptomycin was continued for endocarditis.  UA had some bacteria but no pyuria, so cefepime was started.    Subjective & 24hr Events:   She is agitated and confused.  Unable to obtain ROS.      Assessment & Plan:     Principal Problem:    Acute metabolic encephalopathy  Unsure etiology at this point  Electrolytes unremarkable  Liver enzymes unremarkable  MRI brain pending  Ammonia level normal  Thyroid studies normal  CRP unremarkable  Vitamin D level pending  QTc 457 => continue Seroquel 50 mg nightly  Continue Zyprexa IM as needed for agitation    Active Problems:    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    MRSA infection  Continue daptomycin 750 mg every 24 hours  Appreciate infectious diseases input and assistance  Tunneled central catheter removed on       Hypertension  Currently stable  Continue lisinopril 20 mg daily      SLE (systemic lupus erythematosus) (HCC)  Continue Solu-Medrol 40 mg every 8 hours      Pemphigus foliaceus      Chronic respiratory failure with hypoxia    Centrilobular emphysema (HCC)  No current wheezing or shortness of breath  Continue albuterol as needed      Dehydration  Patient appears dehydrated  UA consistent with dehydration  Start normal saline at 100 mL/h for 20 
       Hospitalist Progress Note   Admit Date:  2024  3:59 PM   Name:  Meli Blancas   Age:  69 y.o.  Sex:  female  :  1955   MRN:  [id number]   Room:  [facility]    Presenting/Chief Complaint: Altered Mental Status and Fall     Reason(s) for Admission: Altered mental status, unspecified altered mental status type [R41.82]  Acute metabolic encephalopathy [G93.41]     Hospital Course:     Meli Blancas is a 69 y.o. female with medical history of MRSA bacteremia, penphigus foliaceous, sjogrens syndrome on steroids, followed by dermatology Dr. Huddleston, COPD on as needed 2 L NC, HTN, admitted for MRSA AV endocarditis, failing outpatient treatment.     Admitted with increased acute encephalopathy  Failed outpatient antibiotics for MRSA AV IE- noncompliant with meds, original EOT 24 discharged from home health     CT head - chronic lacunar CVA  MRI Brain negative  Normal ammonia  TFTs ok     Seen by ID  S/p SILVA  Plans for IV vancomycin EOT 25  Not outpatient candidate due to failed initial compliance  She refuses STR      A1C 7.2  On SSI        Had right rib pain  Negative CXR/rib films     I spoke with her primary dermatologist Dr. Huddleston on  who recommends increased solumedrol to 30 mg BID for 5 days then decrease to daily for 5 days then decrease to prednisone 40 mg   She was referred for rituxan but missed that visit   Per ID- needs bactrim 3 times weekly if on steroids > 20 mg prednisone for more than 4 weeks - she is hesitant to trial bactrim due to possible side effects       Discharge plans pending to home    Subjective & 24hr Events:     No acute overnight events.    Still having cough with phlegm production.  No fever or chills.    Assessment & Plan:     Principal Problem:    MRSA bacteremia  Plan:     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  Plan:   1-15-25  EOT vancomycin 25  ID following         Right rib pain:  1-15-25  Xray ribs negative   As needed norco 
       Hospitalist Progress Note   Admit Date:  2024  3:59 PM   Name:  Meli Blancas   Age:  69 y.o.  Sex:  female  :  1955   MRN:  [id number]   Room:  [facility]    Presenting/Chief Complaint: Altered Mental Status and Fall     Reason(s) for Admission: Altered mental status, unspecified altered mental status type [R41.82]  Acute metabolic encephalopathy [G93.41]     Hospital Course:       Meli Blancas is a 69 y.o. female with medical history of MRSA bacteremia, penphigus foliaceous, sjogrens syndrome on steroids, followed by dermatology Dr. Huddleston, COPD on as needed 2 L NC, HTN, admitted for MRSA AV endocarditis, failing outpatient treatment.     Admitted with increased acute encephalopathy  Failed outpatient antibiotics for MRSA AV IE- noncompliant with meds, original EOT 24 discharged from home health     CT head - chronic lacunar CVA  MRI Brain negative  Normal ammonia  TFTs ok     Seen by ID  S/p SILVA  Plans for IV vancomycin EOT 25  Not outpatient candidate due to failed initial compliance  She refuses STR      A1C 7.2  On SSI  Steroids on taper      Had right rib pain  Negative CXR/rib films       Discharge plans pending to home    Subjective & 24hr Events:     Right rib pain intermittent   Tapering steroids per derm by 5 mg every 3 weeks from 40 mg   Has more lesions   Thinks rib pain is her hernia pain  Follows with surgery and pending workup/ repair  Able to eat  Some  troubel swallowing foods  No nausea  Taking gabapentin   Muscle relaxants not helpful       Assessment & Plan:     Principal Problem:    MRSA bacteremia  Plan:     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  Plan:   25  EOT vancomycin 25  ID following         Right rib pain:  25  Xray ribs negative   As needed norco       Active Problems:    Rash/skin eruption  Plan:     SLE (systemic lupus erythematosus) ([facility])  Plan:     H/O Sjogren's disease ([facility])  Plan:     Autoimmune disease ([facility])  Plan:     
       Hospitalist Progress Note   Admit Date:  2024  3:59 PM   Name:  Meli Blancas   Age:  69 y.o.  Sex:  female  :  1955   MRN:  [id number]   Room:  [facility]    Presenting/Chief Complaint: Altered Mental Status and Fall     Reason(s) for Admission: Altered mental status, unspecified altered mental status type [R41.82]  Acute metabolic encephalopathy [G93.41]     Hospital Course:     Copied from prior provider HPI/summary:  69 y.o. female with medical history of pemphigus foliaceous, Sjogren syndrome on chronic steroids, COPD, HTN, and currently being treated for MRSA aortic valve endocarditis admitted on  due to agitation and confusion after being found down at home, lying in feces and cat litter.  CT head showed chronic lacunar infarct.    Patient was recently hospitalized for MRSA aortic valve endocarditis and was discharged to complete 6 weeks of IV antibiotic with EOT of 2024.  Patient was noncompliant with IV meds at home and was terminated [facility] and was sent to ED to have line removed but was replaced and sent home with it.   Blood culture  growing gram-positive cocci, that has not been ID on PCR.  Repeat blood culture  growing MRSA. Tunneled central catheter removed on , tip culture negative Line removed 2024. ID following, recommend to continue daptomycin, cefepime discontinued . Repeat blood cultures  negative to date.  Cardiology consulted and patient status post SILVA  showed small mobile echodensity on the ventricular aspect of noncoronary cusp, likely vegetation.  Daptomycin discontinued on  due to increased AVTAR, and started on vancomycin.  ID recommend to complete 6 weeks of IV antibiotics, especially with relapsed bacteremia and immunocompromise state/immunotherapy plans in the future, EOT 2025.  Patient is not a candidate for OPAT.  Electrolytes unremarkable  Liver enzymes unremarkable  MRI brain negative for 
       Hospitalist Progress Note   Admit Date:  2024  3:59 PM   Name:  Meli Blancas   Age:  69 y.o.  Sex:  female  :  1955   MRN:  [id number]   Room:  [facility]    Presenting/Chief Complaint: Altered Mental Status and Fall     Reason(s) for Admission: Altered mental status, unspecified altered mental status type [R41.82]  Acute metabolic encephalopathy [G93.41]     Hospital Course:       Meli Blancas is a 69 y.o. female with medical history of MRSA bacteremia, penphigus foliaceous, sjogrens syndrome on steroids, followed by dermatology Dr. Huddleston, COPD on as needed 2 L NC, HTN, admitted for MRSA AV endocarditis, failing outpatient treatment.     Admitted with increased acute encephalopathy  Failed outpatient antibiotics for MRSA AV IE- noncompliant with meds, original EOT 24 discharged from home health     CT head - chronic lacunar CVA  MRI Brain negative  Normal ammonia  TFTs ok     Seen by ID  S/p SILVA  Plans for IV vancomycin EOT 25  Not outpatient candidate due to failed initial compliance  She refuses STR      A1C 7.2  On SSI  Steroids on taper      Had right rib pain  Negative CXR/rib films       Discharge plans pending to home    Subjective & 24hr Events:     LLE skin lesions look worse  Bubble, become purulent and then rupture  Some pain  Eating       Assessment & Plan:     Principal Problem:    MRSA bacteremia  Plan:     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 Huddleston her dermatologist who advises IV solumedrol 30 mg every 12 hours for 5 days, then decrease to 30 mg IV daily for 5 days 
       Hospitalist Progress Note   Admit Date:  2024  3:59 PM   Name:  Meli Blancas   Age:  69 y.o.  Sex:  female  :  1955   MRN:  [id number]   [place]  [facility]    Presenting/Chief Complaint: Altered Mental Status and Fall     Reason(s) for Admission: Altered mental status, unspecified altered mental status type [R41.82]  Acute metabolic encephalopathy [G93.41]     Hospital Course:   69-year-old female with pemphigus foliaceous, Sjogren syndrome on chronic steroids, COPD, HTN, and currently being treated for MRSA aortic valve endocarditis admitted on  due to agitation and confusion after being found down at home, lying in feces and cat litter.  CT head showed chronic lacunar infarct.      Patient was recently hospitalized for MRSA aortic valve endocarditis and was discharged to complete 6 weeks of IV antibiotic with EOT of 2024.  Patient was noncompliant with IV meds at home and was [facility] and was sent to ED to have line removed but was replaced and sent home with it.     Blood culture  growing gram-positive cocci, that has not been ID on PCR.  Line removed 2024, blood cultures negative to date.  ID following, recommend to continue daptomycin, cefepime discontinued .  ID anticipating 2 weeks of antibiotic for  blood culture pending ID.    Subjective & 24hr Events:   Patient is seen and examined at the bedside.  Reports feeling okay and no active complaint.  Asking when she can go home.    Code violet called on patient.  Patient was agitated and yelling on staff, swat at the RN.  Refusing for morning medicine and does not want to wear oxygen.    Patient is seen again at the bedside.  More calm.  Sitting in the chair.  Discussed in detail hospital protocols and informed nurses are following hospital protocols and doing their jobs.  Patient does not want bed alarms and reports if she does fall that will be on her, hospitalist will not be 
       Hospitalist Progress Note   Admit Date:  2024  3:59 PM   Name:  Meli Blancas   Age:  69 y.o.  Sex:  female  :  1955   MRN:  [id number]   Room:  [facility]    Presenting/Chief Complaint: Altered Mental Status and Fall     Reason(s) for Admission: Altered mental status, unspecified altered mental status type [R41.82]  Acute metabolic encephalopathy [G93.41]     Hospital Course:   69-year-old female with pemphigus foliaceous, Sjogren syndrome on chronic steroids, COPD, HTN, and currently being treated for MRSA aortic valve endocarditis admitted on  due to agitation and confusion after being found down at home, lying in feces and cat litter.  CT head showed chronic lacunar infarct.      Patient was recently hospitalized for MRSA aortic valve endocarditis and was discharged to complete 6 weeks of IV antibiotic with EOT of 2024.  Patient was noncompliant with IV meds at home and was terminated by [facility] and was sent to ED to have line removed but was replaced and sent home with it.     Blood culture  growing gram-positive cocci, that has not been ID on PCR.  Repeat blood culture  growing MRSA. Tunneled central catheter removed on , tip culture negative Line removed 2024. ID following, recommend to continue daptomycin, cefepime discontinued . Repeat blood cultures  negative to date.  Cardiology consulted and patient status post SILVA  showed small mobile echodensity on the ventricular aspect of noncoronary cusp, likely vegetation.    Daptomycin discontinued on  due to increased AVTAR, and started on vancomycin.  ID recommend to complete 6 weeks of IV antibiotics, especially with relapsed bacteremia and immunocompromise state/immunotherapy plans in the future, EOT 2025.  Patient is not a candidate for OPAT.  Electrolytes unremarkable  Liver enzymes unremarkable  MRI brain negative for acute infarct  Ammonia level normal  Thyroid studies 
       Hospitalist Progress Note   Admit Date:  2024  3:59 PM   Name:  Meli Blancas   Age:  69 y.o.  Sex:  female  :  1955   MRN:  [id number]   Room:  [facility]/    Presenting/Chief Complaint: Altered Mental Status and Fall     Reason(s) for Admission: Altered mental status, unspecified altered mental status type [R41.82]  Acute metabolic encephalopathy [G93.41]     Hospital Course:     Meli Blancas is a 69 y.o. female with medical history of MRSA bacteremia, penphigus foliaceous, sjogrens syndrome on steroids, followed by dermatology Dr. Huddleston, COPD on as needed 2 L NC, HTN, admitted for MRSA AV endocarditis, failing outpatient treatment.     Admitted with increased acute encephalopathy  Failed outpatient antibiotics for MRSA AV IE- noncompliant with meds, original EOT 24 discharged from home health     CT head - chronic lacunar CVA  MRI Brain negative  Normal ammonia  TFTs ok     Seen by ID  S/p SILVA  Plans for IV vancomycin EOT 25  Not outpatient candidate due to failed initial compliance  She refuses STR      A1C 7.2  On SSI        Had right rib pain  Negative CXR/rib films     I spoke with her primary dermatologist Dr. 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 
       Hospitalist Progress Note   Admit Date:  2024  3:59 PM   Name:  Meli Blancas   Age:  69 y.o.  Sex:  female  :  1955   MRN:  [id number]   Room:  [facility]    Presenting/Chief Complaint: Altered Mental Status and Fall     Reason(s) for Admission: Altered mental status, unspecified altered mental status type [R41.82]  Acute metabolic encephalopathy [G93.41]     Hospital Course:   69-year-old female with pemphigus foliaceous, Sjogren syndrome on chronic steroids, COPD, HTN, and currently being treated for MRSA aortic valve endocarditis admitted on  due to agitation and confusion after being found down at home, lying in feces and cat litter.  CT head showed chronic lacunar infarct.      Patient was recently hospitalized for MRSA aortic valve endocarditis and was discharged to complete 6 weeks of IV antibiotic with EOT of 2024.  Patient was noncompliant with IV meds at home and was [facility] and was sent to ED to have line removed but was replaced and sent home with it.     Blood culture  growing gram-positive cocci, that has not been ID on PCR.  Repeat blood culture  growing MRSA.  Line removed 2024. ID following, recommend to continue daptomycin, cefepime discontinued .  Cardiology consulted and patient status post SILVA  showed small mobile echodensity on the ventricular aspect of noncoronary cusp, likely vegetation.    Patient not a candidate for OPAT.    Subjective & 24hr Events:   Patient is seen and examined at the bedside.  Would like to sleep at this time.  No acute event reported overnight.    SILVA yesterday concerning for vegetation.  Patient likely needs to have IV antibiotic for 6 weeks.  Pending final ID recommendation.  Patient is not an OPAT candidate.    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 Repeat 
       Hospitalist Progress Note   Admit Date:  2024  3:59 PM   Name:  Meli Blancas   Age:  69 y.o.  Sex:  female  :  1955   MRN:  [id number]   Room:  [facility]    Presenting/Chief Complaint: Altered Mental Status and Fall     Reason(s) for Admission: Altered mental status, unspecified altered mental status type [R41.82]  Acute metabolic encephalopathy [G93.41]     Hospital Course:   69-year-old female with pemphigus foliaceous, Sjogren syndrome on chronic steroids, COPD, HTN, and currently being treated for MRSA aortic valve endocarditis admitted on  due to agitation and confusion after being found down at home, lying in feces and cat litter.  CT head showed chronic lacunar infarct.      Patient was recently hospitalized for MRSA aortic valve endocarditis and was discharged to complete 6 weeks of IV antibiotic with EOT of 2024.  Patient was noncompliant with IV meds at home and was [facility] and was sent to ED to have line removed but was replaced and sent home with it.     Blood culture  growing gram-positive cocci, that has not been ID on PCR.  Repeat blood culture  growing MRSA.  Line removed 2024. ID following, recommend to continue daptomycin, cefepime discontinued .  Cardiology consulted and patient status post SILVA  showed small mobile echodensity on the ventricular aspect of noncoronary cusp, likely vegetation.    Patient not a candidate for OPAT.    Subjective & 24hr Events:   Patient is seen and examined at the bedside.  No acute event reported overnight.  Would like to sleep at this time.  No active complaint.    Infectious disease recommending possible IV antibiotics for 2 weeks, EOT 2024 and then change to oral.  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 Repeat blood culture  positive for Staph 
       Hospitalist Progress Note   Admit Date:  2024  3:59 PM   Name:  Meli Blancas   Age:  69 y.o.  Sex:  female  :  1955   MRN:  [id number]   Room:  [facility]    Presenting/Chief Complaint: Altered Mental Status and Fall     Reason(s) for Admission: Altered mental status, unspecified altered mental status type [R41.82]  Acute metabolic encephalopathy [G93.41]     Hospital Course:     Copied from prior provider HPI/summary:  69 y.o. female with medical history of pemphigus foliaceous, Sjogren syndrome on chronic steroids, COPD, HTN, and currently being treated for MRSA aortic valve endocarditis admitted on  due to agitation and confusion after being found down at home, lying in feces and cat litter.  CT head showed chronic lacunar infarct.    Patient was recently hospitalized for MRSA aortic valve endocarditis and was discharged to complete 6 weeks of IV antibiotic with EOT of 2024.  Patient was noncompliant with IV meds at home and was terminated [facility] and was sent to ED to have line removed but was replaced and sent home with it.   Blood culture  growing gram-positive cocci, that has not been ID on PCR.  Repeat blood culture  growing MRSA. Tunneled central catheter removed on , tip culture negative Line removed 2024. ID following, recommend to continue daptomycin, cefepime discontinued . Repeat blood cultures  negative to date.  Cardiology consulted and patient status post SILVA  showed small mobile echodensity on the ventricular aspect of noncoronary cusp, likely vegetation.  Daptomycin discontinued on  due to increased AVTAR, and started on vancomycin.  ID recommend to complete 6 weeks of IV antibiotics, especially with relapsed bacteremia and immunocompromise state/immunotherapy plans in the future, EOT 2025.  Patient is not a candidate for OPAT.  Electrolytes unremarkable  Liver enzymes unremarkable  MRI brain negative for 
       Hospitalist Progress Note   Admit Date:  2024  3:59 PM   Name:  Meli Blancas   Age:  69 y.o.  Sex:  female  :  1955   MRN:  [id number]   Room:  [facility]    Presenting/Chief Complaint: Altered Mental Status and Fall     Reason(s) for Admission: Altered mental status, unspecified altered mental status type [R41.82]  Acute metabolic encephalopathy [G93.41]     Hospital Course:   69-year-old female with pemphigus foliaceous, Sjogren syndrome on chronic steroids, COPD, HTN, and currently being treated for MRSA aortic valve endocarditis admitted on  due to agitation and confusion after being found down at home, lying in feces and cat litter.  CT head showed chronic lacunar infarct.      Patient was recently hospitalized for MRSA aortic valve endocarditis and was discharged to complete 6 weeks of IV antibiotic with EOT of 2024.  Patient was noncompliant with IV meds at home and was terminated by [facility] and was sent to ED to have line removed but was replaced and sent home with it.     Blood culture  growing gram-positive cocci, that has not been ID on PCR.  Repeat blood culture  growing MRSA.  Line removed 2024. ID following, recommend to continue daptomycin, cefepime discontinued .  Cardiology consulted and patient status post SILVA  showed small mobile echodensity on the ventricular aspect of noncoronary cusp, likely vegetation.    Daptomycin discontinued on  due to increased AVTAR, and started on vancomycin.  ID recommend to complete 6 weeks of IV antibiotics, especially with relapsed bacteremia and immunocompromise state/immunotherapy plans in the future, EOT 2025.  Patient is not a candidate for OPAT.    Subjective & 24hr Events:   Patient is seen and examined at the bedside.  No acute event reported overnight.  No active complaint.  Patient reports he is feeling okay.  Denies chest pain, palpitation, nausea, vomiting or abdominal 
       Hospitalist Progress Note   Admit Date:  2024  3:59 PM   Name:  Meli Blancas   Age:  69 y.o.  Sex:  female  :  1955   MRN:  [id number]   Room:  [facility]    Presenting/Chief Complaint: Altered Mental Status and Fall     Reason(s) for Admission: Altered mental status, unspecified altered mental status type [R41.82]  Acute metabolic encephalopathy [G93.41]     Hospital Course:   69-year-old female with pemphigus foliaceous, Sjogren syndrome on chronic steroids, COPD, HTN, and currently being treated for MRSA aortic valve endocarditis admitted on  due to agitation and confusion after being found down at home, lying in feces and cat litter.  CT head showed chronic lacunar infarct.      Patient was recently hospitalized for MRSA aortic valve endocarditis and was discharged to complete 6 weeks of IV antibiotic with EOT of 2024.  Patient was noncompliant with IV meds at home and was terminated by home [facility] and was sent to ED to have line removed but was replaced and sent home with it.     Blood culture  growing gram-positive cocci, that has not been ID on PCR.  Repeat blood culture  growing MRSA.  Line removed 2024. ID following, recommend to continue daptomycin, cefepime discontinued .  Cardiology consulted and patient status post SILVA  showed small mobile echodensity on the ventricular aspect of noncoronary cusp, likely vegetation.    Daptomycin discontinued on  due to increased AVTAR, and started on vancomycin.  ID recommend to complete 6 weeks of IV antibiotics, especially with relapsed bacteremia and immunocompromise state/immunotherapy plans in the future, EOT 2025.  Patient is not a candidate for OPAT.    Subjective & 24hr Events:   Seen and examined at the bedside.  No acute event reported overnight.  No active complaint.  Would like to sleep at this time.    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 
    OCCUPATIONAL THERAPY INITIAL ASSESSMENT AND DISCHARGE       OT Visit Days: 1  Acknowledge Orders  Time  OT Charge Capture  Rehab Caseload Tracker       Meli Blancas is a 69 y.o. female          PRIMARY DIAGNOSIS: Acute metabolic encephalopathy  Altered mental status, unspecified altered mental status type [R41.82]  Acute metabolic encephalopathy [G93.41]     Reason for Referral: Generalized Muscle Weakness (M62.81)  Inpatient: Payor: [facility] MEDICARE / Plan: [facility] MEDICARE COMPLETE / Product Type: *No Product type* /      ASSESSMENT:      REHAB RECOMMENDATIONS:   Recommendation to date pending progress:  Setting:  Home Health Therapy     Equipment:    None      ASSESSMENT:  Ms. Blancas presents with acute metabolic encephalopathy. At baseline pt lives alone and is independent. Per chart review, pt will have caregiver assistance upon discharge.     At time of evaluation, therapist observed pt ambulating to/from bathroom and perform sit > supine transfers with mod independence. Pt able to complete toileting tasks with independence. Pt demonstrates low frustration tolerance, was easily agitated and began ignoring questions that therapist asked. She declined therapy needs. RN attempted to replace pt's nasal cannula and she began swatting at the nurse. She was unwilling to listen to any education that therapist attempted to provide. Pt appears to be functioning at her baseline and does not need further OT services during acute care stay or at d/c. Will d/c from caseload.        [facility] AM-PAC™ “6 Clicks” Daily Activity Inpatient Short Form:                                                SUBJECTIVE:      Ms. Blancas states, \"I know what I need so don't try to tell me anything\"      Social/Functional Lives With: Alone  Type of Home: House     OBJECTIVE:      LINES / DRAINS / AIRWAY: IV     RESTRICTIONS/PRECAUTIONS:     PAIN: VITALS / O2:   Pre Treatment: unrated        Post Treatment: same          Vitals 
    PHYSICAL THERAPY Initial Assessment, Discharge, and AM  (Link to Caseload Tracking: PT Visit Days : 1  Acknowledge Orders  Time In/Out  PT Charge Capture  Rehab Caseload Tracker    Meli Blancas is a 69 y.o. female   PRIMARY DIAGNOSIS: Acute metabolic encephalopathy  Altered mental status, unspecified altered mental status type [R41.82]  Acute metabolic encephalopathy [G93.41]       Reason for Referral: Generalized Muscle Weakness (M62.81)  Difficulty in walking, Not elsewhere classified (R26.2)  Inpatient: Payor: [facility] MEDICARE / Plan: [facility] MEDICARE COMPLETE / Product Type: *No Product type* /     ASSESSMENT:     REHAB RECOMMENDATIONS:   Recommendation to date pending progress:  Setting:  Home Health Therapy   Caregiver assistance    Equipment:    None     ASSESSMENT:  Ms. Blancas is a 69 year old female admitted to the hospital on 12/6/24 with acute metabolic encephalopathy after being found down at home. Pt with medical history of pemphigus foliaceous, Sjogren syndrome, COPD, and HTN.     Prior to admission, pt living at home alone. At baseline, mod I in mobility with use of RW. Per case management note, caregiver will be staying with pt at discharge.    At time of evaluation, therapist observed pt ambulating from bathroom back to bed with supervision without assistive device and completed sit to supine transition with mod independence. Pt was easily agitated and began not responding to questions asked to her. Pt declined any additional functional mobility and declined any therapy needs. Pt ambulated without her O2. When RN tried to place pt's O2 back on, pt began swatting at the RN.     Will DC pt for PT caseload at this time. Recommend discharge home with caregiver assistance and  PT services once medically ready for discharge.     [facility] AM-PAC™ “6 Clicks” Basic Mobility Inpatient Short Form  AM-PAC Basic Mobility - Inpatient   How much help is needed turning from your back to your side 
   01/23/25 1116   Resting (Room Air)   SpO2 82      Resting (On O2)   SpO2 90      O2 Device Nasal cannula   O2 Flow Rate (l/min) 4 l/min   During Walk (On O2)   SpO2 83      O2 Device Nasal cannula   O2 Flow Rate (l/min) 4 l/min   Need Additional O2 Flow Rate Rows Yes   O2 Flow Rate (l/min) 6 l/min   O2 Saturation 85   O2 Flow Rate (l/min) 8 l/min   O2 Saturation 89   Walk/Assistance Device Walker   Rate of Dyspnea 0   After Walk   SpO2 89      O2 Device Nasal cannula   O2 Flow Rate (l/min) 8 l/min   Rate of Dyspnea 0   Does the Patient Qualify for Home O2 Yes   Liter Flow at Rest 4   Liter Flow on Exertion 8       
4 Eyes Skin Assessment     NAME:  Meli Blancas  YOB: 1955  MEDICAL RECORD NUMBER:  [id number]    The patient is being assessed for  Admission    I agree that at least one RN has performed a thorough Head to Toe Skin Assessment on the patient. ALL assessment sites listed below have been assessed.      Areas assessed by both nurses:    Head, Face, Ears, Shoulders, Back, Chest, Arms, Elbows, Hands, Sacrum. Buttock, Coccyx, Ischium, and Legs. Feet and Heels        Does the Patient have a Wound? Yes wound(s) were present on assessment. LDA wound assessment was Initiated and completed by RN       David Prevention initiated by RN: Yes  Wound Care Orders initiated by RN: Yes - Ingrid Stoner NP ordered for Wound care RN eval    Pressure Injury (Stage 3,4, Unstageable, DTI, NWPT, and Complex wounds) if present, place Wound referral order by RN under : Yes    New Ostomies, if present place, Ostomy referral order under : No     Nurse 1 eSignature: Electronically signed by Merlene Ross RN on 12/6/24 at 10:03 PM EST    **SHARE this note so that the co-signing nurse can place an eSignature**    Nurse 2 eSignature: Electronically signed by Jessica Morris RN on 12/7/24 at 10:56 PM EST    
Alert, oriented, cooperative. Up ad dario to bathroom.   Vanc administered.  IV pain med at bedtime per request.  Call light in reach.  
Ambulated In estrada, jorge well. Medicated x 1 for pain d/t skin tear back of left knee. Assessed by Wound care, orders received.   
Ambulated in estrada. Medicated twice for pain due to posterior left wound r/t popped blister.  After Hibiclens bath, dressing changed using xeroform and Kerlix. Pt tolerated well.   
At the beginning of the shift, Pt c/o diarrhea and requesting for PRN imodium. Made on call PA aware. Orders were placed for C. Diff testing, however, patient did not produce any stool to collect and test.   
Attempted to deliver and instruct patient on the use of spacer with an MDI.  Pt states that she refuses to ever use and MDI and did not want the spacer. Assigned RT (Clayton) notified.  
Did not require pain medication today. Lotion/creams only applied once per pt request. Up in chair. Aware she is NPO after MN for procedure in am.   
Discharge instructions reviewed. D/c with transport to home.   
Dressing to left leg/knee changed per order. Patient tolerated well.  
Hourly rounding completed on this shift. All needs met. PRN pain medication given per MAR. Pt is currently resting in bed with unlabored breathing. Call light in reach.   
Hourly rounding completed on this shift. All needs met. PRN pain medication given per MAR. Pt is currently resting in bed with unlabored breathing. Call light in reach.   
IP Consult to Vascular Access Team  Consult performed by: Manny Hwang RN  Consult ordered by: Dallas Dinh DO  Reason for consult: Evaluation of dressing  Assessment/Recommendations: Patient has blister that is draining under dressing. Patient has pemphigus foliaceus which causes blistering, and is not due to a reaction to dressing, cleaning, or CHG. Dressing was loose due to drainage. Dressing was changed via sterile technique, Cavilon skin prep applied in two layers, and duoderm placed under dressing to absorb drainage and allow for skin healing. Dressing should only be changed every 7 days by VAT, unless loose or soiled.           
Infectious Disease Progress Note    Today's Date: 1/13/2025   Admit Date: 12/6/2024    Patient YOB: 1955    Impression:   MRSA aortic valve endocarditis, recurrent bacteremia  BC positive (10/3-Sherry) Discharged to complete 6 weeks IV ABX with EOT of 12/11/24  Non-compliant with taking IV medications at home   [facility] and was sent to ED to have line removed but was replaced and sent home with it.   Blood cx 12/6: Diphtheroids-contaminant  Line removed 12/7/24: tip cx negative  Repeat blood cx 12/9 with 1 of 4 MRSA, second diphtheroid--Dapto AVTAR increased to 3, form 1 in Oct  Repeat blood cx 12/11: negative  SILVA 12/13 with small density on ventral aspect of the noncoronary cusp of the AV without regurgitation.   Pemphigus foliaceous  Biologic on hold while on IE treatment  Chronic hypoxic respiratory failure  Sjogren's disease  Immunosuppression due to chronic steroids   DM type 2: A1c 7.2%, impacts infection healing     Plan:   Continue Vancomycin 1250mg VI q24h, pharmacy to dose.   Labs reviewed and continue as recommended with EOT 1/22/25 especially with relapsed bacteremia and immunocompromised state/immunotherapy plans in the future.  PO therapy is no longer a recommended plan  She is NOT a candidate for home infusion, as noted above and would need placement but pt refusing SNF at this time.   ID will follow intermittently     Recommend weekly CBC with diff, and bi-weekly Creatinine/VT    Anti-infectives:   IV Vanc 12/16-  IV daptomycin 12/6-12/16  IV cefepime 12/6-12/8    Subjective:   Interval History  Patient sitting up in chair, in good spirits today. Labs reviewed and stable. No new concerns    Allergies   Allergen Reactions    Codeine Swelling    Aspirin Nausea And Vomiting and Other (See Comments)     Joint pain    Gabapentin Nausea And Vomiting        Objective:     Visit Vitals  /86   Pulse 85   Temp 97.2 °F (36.2 °C) (Oral)   Resp 20   Ht 1.52 m 
Infectious Disease Progress Note    Today's Date: 1/14/2025   Admit Date: 12/6/2024    Patient YOB: 1955    Impression:   MRSA aortic valve endocarditis, recurrent bacteremia  BC positive (10/3-Sherry) Discharged to complete 6 weeks IV ABX with EOT of 12/11/24  Non-compliant with taking IV medications at home   [facility] and was sent to ED to have line removed but was replaced and sent home with it.   Blood cx 12/6: Diphtheroids-contaminant  Line removed 12/7/24: tip cx negative  Repeat blood cx 12/9 with 1 of 4 MRSA, second diphtheroid--Dapto AVTAR increased to 3, form 1 in Oct  Repeat blood cx 12/11: negative  SILVA 12/13 with small density on ventral aspect of the noncoronary cusp of the AV without regurgitation.   Pemphigus foliaceous  Biologic on hold while on IE treatment  Chronic hypoxic respiratory failure  Sjogren's disease  Immunosuppression due to chronic steroids  DM type 2: A1c 7.2%, impacts infection healing     Plan:   Continue Vancomycin 1250mg VI q24h, pharmacy to dose.   Labs reviewed and continue as recommended with EOT 1/22/25 especially with relapsed bacteremia and immunocompromised state/immunotherapy plans in the future.  PO therapy is no longer a recommended plan  She is NOT a candidate for home infusion, as noted above and would need placement but pt refusing SNF at this time.   ID will follow intermittently     While patient on high dose steroids recommend bactrim 3 times weekly, patient agreeable to trial Bactrim. Will start 1 DS tablet three times weekly     Recommend weekly CBC with diff, and bi-weekly Creatinine/VT    Anti-infectives:   IV Vanc 12/16-  IV daptomycin 12/6-12/16  IV cefepime 12/6-12/8    Subjective:   Interval History  Discussed prophylaxis with bactrim d/t high dose steroids. Patient hesitant to try Bactrim, she is agreeable to start while being monitoring in hospital to see if she can tolerate    Allergies   Allergen Reactions 
Infectious Disease Progress Note    Today's Date: 1/2/2025   Admit Date: 12/6/2024    Patient YOB: 1955    Impression:   MRSA aortic valve endocarditis, recurrent bacteremia  BC positive (10/3-Sherry) Discharged to complete 6 weeks IV ABX with EOT of 12/11/24  Non-compliant with taking IV medications at home   [facility] and was sent to ED to have line removed but was replaced and sent home with it.   Blood cx 12/6: Diphtheroids-contaminant  Line removed 12/7/24: tip cx negative  Repeat blood cx 12/9 with 1 of 4 MRSA, second diphtheroid--Dapto AVTAR increased to 3, form 1 in Oct  Repeat blood cx 12/11: negative  SILVA 12/13 with small density on ventral aspect of the noncoronary cusp of the AV without regurgitation.   Pemphigus foliaceous  Biologic on hold, agree while on IE treatment  Chronic hypoxic respiratory failure  Sjogren's disease  Immunosuppression due to chronic steroids   Acute metabolic encephalopathy  Resolved     Plan:   No change to ID plan, given tolerance:  Continue Vancomycin pharmacy to dose  She needs to complete 6 weeks of IV antbx, especially with relapsed bacteremia and immunocompromised state/immunotherapy plans in the future: EOT  1/22/25  PO therapy is no longer a recommended plan  She is NOT a candidate for home infusion, as noted above and would need placement - per CM note referrals sent to multiple SNF's  ID will follow intermittently while she is admitted    Recommend weekly CBC with diff, and bi-weekly Creatinine/VT    Anti-infectives:   IV Vanc 12/16-  IV daptomycin 12/6-12/16  IV cefepime 12/6-12/8    Subjective:   Interval History  Sitting up in the chair, tolerating antbx. No N/V/F    Allergies   Allergen Reactions    Codeine Swelling    Aspirin Nausea And Vomiting and Other (See Comments)     Joint pain    Gabapentin Nausea And Vomiting        Objective:     Visit Vitals  /85   Pulse 99   Temp 97.5 °F (36.4 °C) (Oral)   Resp 20   Ht 
Infectious Disease Progress Note    Today's Date: 1/6/2025   Admit Date: 12/6/2024    Patient YOB: 1955    Impression:   MRSA aortic valve endocarditis, recurrent bacteremia  BC positive (10/3-Sherry) Discharged to complete 6 weeks IV ABX with EOT of 12/11/24  Non-compliant with taking IV medications at home   [facility] and was sent to ED to have line removed but was replaced and sent home with it.   Blood cx 12/6: Diphtheroids-contaminant  Line removed 12/7/24: tip cx negative  Repeat blood cx 12/9 with 1 of 4 MRSA, second diphtheroid--Dapto AVTAR increased to 3, form 1 in Oct  Repeat blood cx 12/11: negative  SILVA 12/13 with small density on ventral aspect of the noncoronary cusp of the AV without regurgitation.   Pemphigus foliaceous  Biologic on hold while on IE treatment  Chronic hypoxic respiratory failure  Sjogren's disease  Immunosuppression due to chronic steroids   DM type 2: A1c 7.2%, impacts infection healing     Plan:   Continue Vancomycin 1250mg VI q24h, pharmacy to dose.   Labs reviewed and continue as recommended with EOT 1/22/25 especially with relapsed bacteremia and immunocompromised state/immunotherapy plans in the future.  PO therapy is no longer a recommended plan  She is NOT a candidate for home infusion, as noted above and would need placement but pt refusing SNF at this time.   ID will follow intermittently     Recommend weekly CBC with diff, and bi-weekly Creatinine/VT    Anti-infectives:   IV Vanc 12/16-  IV daptomycin 12/6-12/16  IV cefepime 12/6-12/8    Subjective:   Interval History  WBC wnl.   Renal function stable. Labs reviewed. Recent CBC with diff on 1/4 stable. Has no complaints. Skin is okay per pt.     Allergies   Allergen Reactions    Codeine Swelling    Aspirin Nausea And Vomiting and Other (See Comments)     Joint pain    Gabapentin Nausea And Vomiting        Objective:     Visit Vitals  BP (!) 95/58   Pulse 87   Temp 97.2 °F (36.2 
Infectious Disease Progress Note    Today's Date: 12/11/2024   Admit Date: 12/6/2024    Patient YOB: 1955    Impression:   Acute metabolic encephalopathy  MRSA aortic valve endocarditis  Discharged to complete 6 weeks IV ABX with EOT of 12/11/24  Non-compliant with taking IV medications at home by at least 11/25  [facility] and was sent to ED to have line removed but was replaced and sent home with it.   Blood cx 12/6 here with 1/4 gram +  cocci or rods (both noted) that has not been id on PCR so not staph, GAS, GBS, S. Pneumonia.   Line removed 12/7/24: tip cx NGTD  Repeat blood cx 12/9 with GPC in 1/4 and GPC in 1/4; PCR only id is MRSA  Pemphigus foliaceous  Chronic hypoxic respiratory failure  Sjogren's disease  Immunosuppression due to chronic steroids    Plan:   Continue Daptomycin given repeat blood cx 12/9 with MRSA  Follow id on GPC/R from 12/6 blood cx  She now needs a repeat TTE at least and further repeat blood cx.  Since she has poor condition of her skin, I wonder if she is colonized and we are picking this up from skin but that is her RF for endocarditis.  Will see if we can start Hibiclens bathing  Add Eucerin for dry skin  Repeat blood cx in day or two to let skin/veins recover  I am ok with midline    ID following    Anti-infectives:   IV daptomycin 12/6-  IV cefepime 12/6-12/8    Subjective:   Interval History  Afebrile, Events overnight and yesterday noted. Did eventually agree to have IV replaced and to have dose of Daptomycin due. She is pleasant with me. Frustrated by not being able to get up to bathroom on own    Patient is a 69 y.o. female admitted with acute encephalopathy.  She is known to ID for treatment of MRSA aortic valve endocarditis.  She has been on daptomycin with planned EOT 12/11/24.   On 11/25, ID took a call from home health indicating that pt had been non-adherent to antibiotics and they were dismissing her case.  We made plans for 
Infectious Disease Progress Note    Today's Date: 12/12/2024   Admit Date: 12/6/2024    Patient YOB: 1955    Impression:   Acute metabolic encephalopathy  MRSA aortic valve endocarditis  Discharged to complete 6 weeks IV ABX with EOT of 12/11/24  Non-compliant with taking IV medications at home by at least 11/25  [facility] and was sent to ED to have line removed but was replaced and sent home with it.   Blood cx 12/6 here with 1/4 gram +  cocci or rods : cx with Diptheroids  Line removed 12/7/24: tip cx NGTD  Repeat blood cx 12/9 with GPC in 1/4 and GPR in 1/4; PCR only id is MRSA  Repeat blood cx 12/11  Repeat TTE 12/11/24:  Pemphigus foliaceous  Chronic hypoxic respiratory failure  Sjogren's disease  Immunosuppression due to chronic steroids    Plan:   Continue Daptomycin given repeat blood cx 12/9 with MRSA  Follow 12/11 blood cx  TTE 12/11 notes \"thickened AV noncoronary cusp with mobile echodensity; consider endocarditis\".   This would fit with earlier + SILVA 10/16/24 at [facility] originally, but her case has been unusual with an initially + SILVA followed by an abnormal but not frankly + TTE on 11/1/24.   She has clear risk factor for IE with her ongoing skin condition and mild immunosuppression.   Case further complicated by her non-compliance with treatments which would represent now, the major risk for non-clearance or even progressive IE.   A repeat SILVA now, might be very helpful.  IF it was actually negative, that might allow for a plan involving fewer/shorter duration ABX; otherwise the current TTE would push us to 6 more weeks from when/if we can clear blood.  I am honestly not sure how we would accomplish that given her other ongoing issues.   IF SILVA is + and we cannot clear blood then would shift discussions to surgical intervention.   Since she has poor condition of her skin, I wonder if she is colonized and we are picking this up from skin but that is her RF 
Infectious Disease Progress Note    Today's Date: 12/13/2024   Admit Date: 12/6/2024    Patient YOB: 1955    Impression:   Acute metabolic encephalopathy  MRSA aortic valve endocarditis  Discharged to complete 6 weeks IV ABX with EOT of 12/11/24  Non-compliant with taking IV medications at home by at least 11/25  [facility] and was sent to ED to have line removed but was replaced and sent home with it.   Blood cx 12/6 here with 1/4 gram +  cocci or rods : cx with Diptheroids  Line removed 12/7/24: tip cx NGTD  Repeat blood cx 12/9 with GPC (MRSA) in 1/4 and GPR in 1/4 (Diptheroids) ; PCR only id is MRSA  Repeat blood cx 12/11: NGTD  Repeat TTE 12/11/24: mildly thickened L and noncoronary cusps with mobile echodensity; consider endocarditis  SILVA today  Pemphigus foliaceous  Chronic hypoxic respiratory failure  Sjogren's disease  Immunosuppression due to chronic steroids    Plan:   Continue Daptomycin given repeat blood cx 12/9 with MRSA  Follow 12/11 blood cx  SILVA today: follow results   ID following    Anti-infectives:   IV daptomycin 12/6-  IV cefepime 12/6-12/8    Subjective:   Interval History  Afebrile, off floor for SILVA     Patient is a 69 y.o. female admitted with acute encephalopathy.  She is known to ID for treatment of MRSA aortic valve endocarditis.  She has been on daptomycin with planned EOT 12/11/24.   On 11/25, ID took a call from home health indicating that pt had been non-adherent to antibiotics and they were dismissing her case.  We made plans for removal of tunneled line and ultimately referred her to the ER on 11/26.  Her line was replaced in IR and she was discharged to home. On 11/27 ID sent SW to the house for a well check, and they expressed concern and called the police.  By 12/6 she was found to be covered in feces at home and tunneled cath site had erythema.  She was started on daptomycin and cefepime on admission.  Today her tunneled line was 
Infectious Disease Progress Note    Today's Date: 12/16/2024   Admit Date: 12/6/2024    Patient YOB: 1955    Impression:   MRSA aortic valve endocarditis  BC positive (10/3-Sherry) Discharged to complete 6 weeks IV ABX with EOT of 12/11/24  Non-compliant with taking IV medications at home   [facility] and was sent to ED to have line removed but was replaced and sent home with it.   Blood cx 12/6: Diphtheroids-contaminant  Line removed 12/7/24: tip cx negative  Repeat blood cx 12/9 with 1 of 4 MRSA, second diphtheroid--Dapto AVTAR increased to 3, form 1 in Oct  Repeat blood cx 12/11: negative  SILVA 12/13 with small density on ventral aspect of the noncoronary cusp of the AV without regurgitation.   Pemphigus foliaceous  Biologic on hold, agree while on IE treatment  Chronic hypoxic respiratory failure  Sjogren's disease  Immunosuppression due to chronic steroids   Acute metabolic encephalopathy  Resolved     Plan:   Discontinue Daptomycin in light of increased AVTAR  Start Vancomycin pharmacy to dose  She needs to complete 6 weeks of IV antbx, especially with relapsed bacteremia and immunocompromised state/immunotherapy plans in the future: EOT  1/22/25  PO therapy is no longer a recommended plan  She is NOT a candidate for home infusion, as noted above and would need placement        Anti-infectives:   IV Vanc 12/16-  IV daptomycin 12/6-12/16  IV cefepime 12/6-12/8    Subjective:   Interval History  Afebrile, sitting up in the chair, had soft stools but none today. Tolerating antbx. Discussed with ID attending and pharmacist.    Spent 55 min seeing patient today. More than 50% of the time documented was spent in face-to-face contact with the patient and in the care of the patient on the floor/unit where the patient is located.       Allergies   Allergen Reactions    Codeine Swelling    Aspirin Nausea And Vomiting and Other (See Comments)     Joint pain    Gabapentin Nausea And 
Infectious Disease Progress Note    Today's Date: 12/17/2024   Admit Date: 12/6/2024    Patient YOB: 1955    Impression:   MRSA aortic valve endocarditis  BC positive (10/3-Sherry) Discharged to complete 6 weeks IV ABX with EOT of 12/11/24  Non-compliant with taking IV medications at home   [facility] and was sent to ED to have line removed but was replaced and sent home with it.   Blood cx 12/6: Diphtheroids-contaminant  Line removed 12/7/24: tip cx negative  Repeat blood cx 12/9 with 1 of 4 MRSA, second diphtheroid--Dapto AVTAR increased to 3, form 1 in Oct  Repeat blood cx 12/11: negative  SILVA 12/13 with small density on ventral aspect of the noncoronary cusp of the AV without regurgitation.   Pemphigus foliaceous  Biologic on hold, agree while on IE treatment  Chronic hypoxic respiratory failure  Sjogren's disease  Immunosuppression due to chronic steroids   Acute metabolic encephalopathy  Resolved     Plan:   Continue Vancomycin pharmacy to dose  She needs to complete 6 weeks of IV antbx, especially with relapsed bacteremia and immunocompromised state/immunotherapy plans in the future: EOT  1/22/25  PO therapy is no longer a recommended plan  She is NOT a candidate for home infusion, as noted above and would need placement - per CM note referrals sent to multiple SNF's    Anti-infectives:   IV Vanc 12/16-  IV daptomycin 12/6-12/16  IV cefepime 12/6-12/8    Subjective:   Interval History  Afebrile, sitting up in the chair and washing off. Reports some improvement in skin lesions - slightly less itching and burning. Voices understanding for need for extended course of IV antibiotics. No new concerns today     Allergies   Allergen Reactions    Codeine Swelling    Aspirin Nausea And Vomiting and Other (See Comments)     Joint pain    Gabapentin Nausea And Vomiting        Objective:     Visit Vitals  BP (!) 152/68   Pulse 82   Temp 98.2 °F (36.8 °C) (Axillary)   Resp 15 
Infectious Disease Progress Note    Today's Date: 12/23/2024   Admit Date: 12/6/2024    Patient YOB: 1955    Impression:   MRSA aortic valve endocarditis, recurrent bacteremia  BC positive (10/3-Sherry) Discharged to complete 6 weeks IV ABX with EOT of 12/11/24  Non-compliant with taking IV medications at home   [facility] and was sent to ED to have line removed but was replaced and sent home with it.   Blood cx 12/6: Diphtheroids-contaminant  Line removed 12/7/24: tip cx negative  Repeat blood cx 12/9 with 1 of 4 MRSA, second diphtheroid--Dapto AVTAR increased to 3, form 1 in Oct  Repeat blood cx 12/11: negative  SILVA 12/13 with small density on ventral aspect of the noncoronary cusp of the AV without regurgitation.   Pemphigus foliaceous  Biologic on hold, agree while on IE treatment  Chronic hypoxic respiratory failure  Sjogren's disease  Immunosuppression due to chronic steroids   Acute metabolic encephalopathy  Resolved     Plan:   Continue Vancomycin pharmacy to dose  She needs to complete 6 weeks of IV antbx, especially with relapsed bacteremia and immunocompromised state/immunotherapy plans in the future: EOT  1/22/25  PO therapy is no longer a recommended plan  She is NOT a candidate for home infusion, as noted above and would need placement - per CM note referrals sent to multiple SNF's  ID will follow intermittently while she is admitted    Recommend weekly CBC with diff, and bi-weekly Creatinine/VT    Anti-infectives:   IV Vanc 12/16-  IV daptomycin 12/6-12/16  IV cefepime 12/6-12/8    Subjective:   Interval History  Afebrile, WBC WNL. Resting in bed, no acute complaints.    Allergies   Allergen Reactions    Codeine Swelling    Aspirin Nausea And Vomiting and Other (See Comments)     Joint pain    Gabapentin Nausea And Vomiting        Objective:     Visit Vitals  /84   Pulse 79   Temp 97.7 °F (36.5 °C) (Oral)   Resp 18   Ht 1.52 m (4' 11.84\")   Wt 74.8 kg 
Infectious Disease Progress Note    Today's Date: 2025   Admit Date: 2024    Patient YOB: 1955    Impression:   MRSA aortic valve endocarditis, recurrent bacteremia  BC positive (10/3-Sherry) Discharged to complete 6 weeks IV ABX with EOT of 24  Non-compliant with taking IV medications at home   [facility] and was sent to ED to have line removed but was replaced and sent home with it.   Blood cx : Diphtheroids-contaminant  Line removed 24: tip cx negative  Repeat blood cx  with 1 of 4 MRSA, second diphtheroid--Dapto AVTAR increased to 3, from 1 in Oct  Repeat blood cx : negative  SILVA  with small density on ventral aspect of the noncoronary cusp of the AV without regurgitation.     Pemphigus foliaceous  Biologic on hold while on IE treatment    RSV+ (1/15/25)  Chronic hypoxic respiratory failure  Sjogren's disease  Immunosuppression due to chronic steroids   DM type 2: A1c 7.2%, impacts infection healing     Plan:   Continue Vancomycin 1250mg VI q24h, pharmacy to dose with EOT 25.  Stop antibiotics and observe off at that time. Would recommend repeat surveillance blood cultures in 2 weeks as an outpatient. If positive, would then notify the ID team.   The ID team will sign off today, but please call with any questions or concerns.     Anti-infectives:   IV Vanc -  IV daptomycin -  IV cefepime -    Subjective:   Interval History  Seen resting in bed. No complaints or concerns.     Allergies   Allergen Reactions    Codeine Swelling    Aspirin Nausea And Vomiting and Other (See Comments)     Joint pain    Gabapentin Nausea And Vomiting        Objective:     Visit Vitals  /89   Pulse (!) 105   Temp 98.1 °F (36.7 °C) (Oral)   Resp 18   Ht 1.52 m (4' 11.84\")   Wt 74.8 kg (165 lb)   SpO2 93%   BMI 32.39 kg/m²     Temp (24hrs), Av.9 °F (36.6 °C), Min:97.3 °F (36.3 °C), Max:98.1 °F (36.7 °C)       Lines:    
MRI screening complete  
Medicated once for pain. Refused creams and lotions. Rested in long intervals today. Refused to get sit in chair. Good appetite. Up to bathroom, per pt had 4 loose BM's. Collection container placed in commode to check consistency.   
Notified Surekha Becker NP of /105, 175/118. Orders received  
Nutrition Assessment  Assessment Type: Initial, LOS  Reason for visit:  Length of Stay  Malnutrition Screening Tool Score: 0    Nutrition Intervention:   Food and/or Nutrient Delivery:   Meals and Snacks:  Diet: Continue current order  Medical Food Supplements:   Medical food supplement therapy:  None Not applicable     Malnutrition Assessment:  Malnutrition Status: No malnutrition    Nutrition Assessment:  Food/Nutrition Related History: Pt reports she eats well at home. She stated she eats 3+ meals per day at home. Pt denies any recent changes in intake.      Do You Have Any Cultural, [employer]itan, or Ethnic Food Preferences?: No   Weight History: Pt denies any wt changes. Per EMR wt hx review 5/17 146lb (IM), 10/23 164lb (IM).   Nutrition Background:       PMH significant for pemphigus foliaceous, sjogren syndrome, COPD, HTN, and MRSA aortic valve endocarditis. Pt admitted with endocarditis due staph and MRSA bacteremia.   Nutrition Monitoring/Evaluation:  Pt seen asleep in bed. She awoke to name. Pt reports hx as above. She stated she is eating more than 75% of her meals. She denies any changes in intake.      Current Nutrition Therapies:  ADULT DIET; Regular    Current Intake:   Average Meal Intake: %        Anthropometric Measures:  Height: 152 cm (4' 11.84\")  Current Body Wt: 74.8 kg (164 lb 14.5 oz) (12/10), Weight source: Stated  BMI: 32.4, Obese Class 1 (BMI 30.0-34.9)  Admission Body Weight: 74.8 kg (164 lb 14.5 oz) (12/10- stated)  Ideal Body Weight (Kg) (Calculated): 45 kg (99 lbs),    BMI Category Obese Class 1 (BMI 30.0-34.9)  Comparative Standards:  Energy (kcal/day): 7935-0856 (18-22 kcal/kg) (Kcal/kg Weight used: 74.8 kg Admission  Protein (g/day): 60-75 (0.8-1 g/kg) Weight Used: (Admission) 74.8 kg  Fluid (ml/day):   (1 ml/kcal)    Nutrition Diagnosis:   No nutrition diagnosis at this time     Nutrition Goal(s):      Active Goal: Meet at least 75% of estimated needs, by next RD assessment   
Nutrition Assessment  Assessment Type: Reassess  Reason for visit:  Length of Stay  Malnutrition Screening Tool Score: 0    Nutrition Intervention:   Food and/or Nutrient Delivery:   Meals and Snacks:  Diet: Continue current order  Medical Food Supplements:   Medical food supplement therapy:  None  oral intake continues to meet needs     Malnutrition Assessment:  Malnutrition Status: No malnutrition    Nutrition Assessment:  Food/Nutrition Related History: Pt reports she eats well at home. She stated she eats 3+ meals per day at home. Pt denies any recent changes in intake.      Do You Have Any Cultural, Samaritan, or Ethnic Food Preferences?: No   Weight History: Pt denies any wt changes. Per EMR wt hx review 5/17 146lb (IM), 10/23 164lb (IM).   Nutrition Background:       PMH significant for pemphigus foliaceous, sjogren syndrome, COPD, HTN, and MRSA aortic valve endocarditis. Pt admitted with endocarditis due staph and MRSA bacteremia.   IV antibiotics planned until 1/22/25.    Nutrition Monitoring/Evaluation:  Pt up to recliner, alert and oriented.  Denies any difficulties with oral intake.  She sends compliments to the cooks for the good food.  She continues to eat the majority of meals and outside foods ad dario without difficulties.  PO intake continues to meet estimated needs.      Current Nutrition Therapies:  ADULT DIET; Regular    Current Intake:   Average Meal Intake: %        Anthropometric Measures:  Height: 152 cm (4' 11.84\")  Current Body Wt: 74.8 kg (164 lb 14.5 oz) (12/10), Weight source: Stated  BMI: 32.4, Obese Class 1 (BMI 30.0-34.9)  Admission Body Weight: 74.8 kg (164 lb 14.5 oz) (12/10- stated)  Ideal Body Weight (Kg) (Calculated): 45 kg (99 lbs),    BMI Category Obese Class 1 (BMI 30.0-34.9)  Comparative Standards:  Energy (kcal/day): 6597-4297 (18-22 kcal/kg) (Kcal/kg Weight used: 74.8 kg Admission  Protein (g/day): 60-75 (0.8-1 g/kg) Weight Used: (Admission) 74.8 kg  Fluid (ml/day):   
Nutrition Assessment  Assessment Type: Reassess  Reason for visit:  Length of Stay  Malnutrition Screening Tool Score: 0    Nutrition Intervention:   Food and/or Nutrient Delivery:   Meals and Snacks:  Diet: Continue current order  Medical Food Supplements:   Medical food supplement therapy:  None  oral intake continues to meet needs     Malnutrition Assessment:  Malnutrition Status: No malnutrition    Nutrition Assessment:  Food/Nutrition Related History: Pt reports she eats well at home. She stated she eats 3+ meals per day at home. Pt denies any recent changes in intake.      Do You Have Any Cultural, Yarsani, or Ethnic Food Preferences?: No   Weight History: Pt denies any wt changes. Per EMR wt hx review 5/17 146lb (IM), 10/23 164lb (IM).   Nutrition Background:       PMH significant for pemphigus foliaceous, sjogren syndrome, COPD, HTN, and MRSA aortic valve endocarditis. Pt admitted with endocarditis due staph and MRSA bacteremia.   IV antibiotics planned until 1/22/25.    Nutrition Monitoring/Evaluation:  Pt is awake, watching television.  Alert and oriented.  She reports eating all of her foods, she now has some outside cookies to snack on as this was the only thing she was missing from home.  Recorded oral intake is consistent with po recall, intake remains adequate to meet needs.      Current Nutrition Therapies:  ADULT DIET; Regular    Current Intake:   Average Meal Intake: %        Anthropometric Measures:  Height: 152 cm (4' 11.84\")  Current Body Wt: 74.8 kg (164 lb 14.5 oz) (12/10), Weight source: Stated  BMI: 32.4, Obese Class 1 (BMI 30.0-34.9)  Admission Body Weight: 74.8 kg (164 lb 14.5 oz) (12/10- stated)  Ideal Body Weight (Kg) (Calculated): 45 kg (99 lbs),    BMI Category Obese Class 1 (BMI 30.0-34.9)  Comparative Standards:  Energy (kcal/day): 8375-1291 (18-22 kcal/kg) (Kcal/kg Weight used: 74.8 kg Admission  Protein (g/day): 60-75 (0.8-1 g/kg) Weight Used: (Admission) 74.8 kg  Fluid 
Outpatient Pharmacy Progress Note for Meds-to-Beds    Total number of Prescriptions Filled: 1    List of Medications (name,strength):  Hydrocodone/apap 5/325      Delivered to:  Patient's room    Co-pay:  $0.00    Payment Type:  N/A    Additional Documentation:  Medication(s) were delivered to the patient's room prior to discharge      Thank you for letting us serve your patients.  [facility] [id number] [address]  Phone: [phone]  Fax: [phone]       
Outpatient Pharmacy Progress Note for Meds-to-Beds    Total number of Prescriptions Filled: 4    List of Medications (name,strength):  Amlodipine 5mg  Bactrim ds   Prednisone 20mg  Duoneb       Delivered to:  Patient's room    Co-pay:  $4.17    Payment Type:  cash    Additional Documentation:  Medication(s) were delivered to the patient's room prior to discharge      Thank you for letting us serve your patients.  [facility] [id number]- [place] [address]  Phone: [phone]  Fax: [phone]       
PICC dressing and LLE dressing both remain intact. Pt medicated x1 this shift for generalized pain. Hourly and PRN rounds performed during this shift; all needs met at this time. Bed in low/locked position and call light, personal items within reach.    
PICC line dressing changed 1/3/25, at that time blisters were noted to be forming under PICC dressing. On 1/4/25 PICC dressing had drainage under dressing site. Open blisters and slough noted under PICC dressing. New PICC dressing applied. Consult to vascular access placed.   
PICC line dressing changed this shift (night shift Friday 1/3/25). Blisters forming under PICC dressing noted.    Hourly and PRN rounds performed during this shift; all needs met at this time. Bed in low/locked position and call light, personal items within reach.    
Pastoral conversation with Meli, alone in room at time of visit.  She is a very positive individual who credits her luisa in [place] to be \"there for her\" in every part of her life.  Previous to her hospitalization, she was living at home with assistance from home health.  She is hopeful to continue her course of care, and then return home.    Meli's Yarsanism luisa is an important part of her life perspective.  She \"prays a lot\" and scripture is an important resource as well.  She was raised in the [employer] denomination, but has not been active in a luisa community for some time due to lack of transportation and other health issues.     provided life review, prayer, scripture.  Continue to follow as needed.    Rev. Karli Leslie M.Div., BCC       
Patient denies any needs at this time. Bed is in the low position, locked and call light/personal items are within reach. Dressing changed and yellow thick drainage noted coming from ruptured blisters. Area cleaned and redressed. LLE is red and more swollen when compared to RLE. Hourly rounds performed during shift.      
Patient denies any needs at this time. Dressing change of the LLE twice today due to saturation of the dressing. Pt c/o pain twice during the shift and prn meds administered with relief noted. LLE was looking better earlier during the shift but mid way it started to swell and become red and warm to the touch. Bed is in the low position, locked and call light/personal items are within reach. Hourly rounds performed during shift.      
Patient not seen today.  Chart reviewed.      Patient afebrile.  BCXs 12/9/24 have NGTD.  TCC tip culture 12/7/24 negative.  BCX 12/6/24 positive for GPC is in progress.     Continue Daptomycin.  Final ID plan TBD but patient is not a candidate for OPAT going forward.            
Patient received to [facility] room # 8  via transport. Patient scheduled for SILVA today with Dr Dallas. Procedure reviewed & questions answered, voiced good understanding consent obtained & placed on chart. All medications and medical history reviewed. Will prep patient per orders. Patient & family updated on plan of care.      The patient has a fraility score of 3-MANAGING WELL, based on patient A&Ox3.  
Patient refused all morning meds and refusing to keep oxygen in nose. MD mcgill and Lyrica wasted in [place]. Patient attempting to get out of bed. Reoriented patient and educated her on using the purewick. [facility] ordered and placed in room and bed alarm on.    Patient was able to state her name, but when asked what day it was she gave her birthday. When asked where she was, she stated \"It's too hard to say.\" When asked if she was at home, she said no and when asked if she was at the hospital, she was able to say yes.     Patient restless in bed and stating she needed to have a bowel movement. RN and tech attempted to roll patient to place bedpan underneath her. Patient refused and became agitated. Patient insisted on getting up to the bathroom. RN offered to assist patient to the bathroom with walker, but patient refused to get out of bed stating, \"I don't know why you guys don't have a bedpan.\"    Patient is calm as long as she is left alone, but becomes agitated when any care is provided. PRN Zyprexa ordered.    PRN IV Benadryl given. Patient refused hydrocortisone cream.   
Patient refused morning medicine and continuously took off oxygen. Oxygen saturation was 83% on room air. Attempted to educate patient on importance of wearing O2. Patient stated, \"I know what I need now shut the fuck up.\" Patient began swatting at staff and shouting expletives. Code violet called.   
Patient seen   Developing bullous lesions in her left LE   Suspect this is due to her he pemphigus.   She had been weaning her steroids down   Okay to increase steroid dose per dermatology   If she is to remain on high dose steroids > 20mg for up to 4 weeks, start TMP/SMX DS MWF for PJP px     Continue vancomycin as previously detailed in ID note  Will follow peripherally     
Placed 20g 1.75in PIV in right forearm with ultrasound assistance.              
Pt 87% on 4 L nc. Increased to 6L high flow now 90%.  
Pt PICC line dressing changed, UA ordered to rule out UTI per pt request. All known needs met at this time. Bed is currently low, call light was left in reach, and pt was encouraged to ask for assistance. Pt was left resting in bed with no complaints.    
Pt denies needs at this time, NAD, sitting in recliner. LLE dressing changed and pt given PRN pain meds.Hourly and PRN rounds completed. Bed in lowest and locked position, call light and personal items within reach.     BSSR to oncoming nurse.  
Pt has a central line to her right chest, present upon arrival/admission. Dressing was changed and alcohol cap applied.  
Pt has been medicated according to the MAR. Hourly rounds performed this shift. Bed lowered and locked. Call light within reach. All needs met at this time.   
Pt is in a lot of pain/distress at this time. Meds given. Deferred admission database questions for a later time.   
Pt is in bed without complaints. Hourly rounds completed and all needs met. Pain managed per MAR.  Bed is low, locked, and call light is in reach. Pt encouraged to call for assistance.     
Pt is in bed without complaints. Hourly rounds completed and all needs met. Pain managed per MAR. Bed is low, locked, and call light is in reach. Pt encouraged to call for assistance.     
Pt is in bed without complaints. Hourly rounds completed and all needs met. Pain managed per MAR. PICC line flushed and capped. Bed is low, locked, and call light is in reach. Pt encouraged to call for assistance.     
Pt medicated with Norco for pain per MAR. All known needs met at this time. Bed is currently low, call light was left in reach, and pt was encouraged to ask for assistance. Pt was left resting in bed with no complaints.    
Pt refused to take medications at this time. Will try again later.  
Pt refusing all meds and lab draws at this time, MD notified.  
Pt resting in bed at present time without complaints. Hourly rounds completed, all needs met this shift. Bed lockedand low, call light within reach. Will give report to oncoming day shift nurse.   
Pt states she applied creams and ointments to affected areas after her bath last night.   
Pt still altered and disoriented x3. Pain treated per MAR and is effective. Hydrocortisone cream ordered for the itching lesions. Hourly rounds completed, all needs met this shift. Bed in L/L with call light in reach and alarm on. Report to be given to dayshift nurse.       
RSV on RVP  Sandra Cabrera MD   
Reg diet resumed. Discussed schedule for TTE with cath lab. Pt \"requesting\" an early procedure tomorrow so she wont miss her coffee. Pt to be NPO after MN  
Resting in bed at present time after receiving pain medication reporting that now pain is 2/10. See MAR for all details. HOB elevated Pt continues to c/o upper back hurting and not being able to take deep breaths, reports coughing green sputum last night not seen by this nurse.Upper lobes with expiratory wheezes noted and diminished at bases, receiving re sp treatment and using incentive spirometry as ordered. Notified Dr Marta palma and orders received, will come assess pt.    0952 Cont to rest in bed, less wheezing noted at present. Voices no complaints at present.    1830 Cont to have wheezing noted and has been medicated x 2 for c/o back pain as per MAR this shift with relief reported. Has refused to have bath or creams applied to skin this shift after multiple attempts, states\" I will do it later\"  IV site remains clean, dry, and intact. Hourly rounds completed, all needs met this shift. Bed locked in low position, call light with bedside table and personal items in reach. Instructed to call if needed anything.  
Resting in bed with breathing much better. Cont to have some scattered wheezes noted. Pt is on 4 L HF. No complaints voiced. States\" I actually rested last night.\"    1830 Has been up in chair this shift and ambulating in room.  States\" I am feeling some better but still getting SOB very easy,\" Pt allowed this nurse to apply creams to skin. Voices no complaints. Hourly rounds completed, all needs met this shift. Bed locked in low position, call light, bedside table with personal items in reach. Instructed to call if needed anything.  
Resting in bed without complaints. Has been up in chair and ambulating in room in room. Drsg to IV remains intact, drsg to LLE remains intact with blisters present to LLE creams have been applied x 1 this shift as pt allowed. Medicated x 1 for c/o pain with relief reported. Hourly rounds completed, all needs met this shift. Bed locked in low position with call light, bedside table with personal items in reach. Instructed pt to call if needed anything.  
Resting in bed. Continues to report feeling very bad, States\" I cannot sleep I cough, my throat still hurts so bad and I am feeling nauseous.\" Offered medication for nausea but refused at this time. Instructed pt to call if needed. Denies pain.    1830 Resting in bed. Has refused bath and having creams applied to skin all this shift. Offered on multiple occasions, also to have drsg changed to LLE, States \" I just feel to bad to have you do any of it today\"  Drsg remains intact to LLE.. Has been medicated x 2 this shift for c/o back pain with reported relief as per MAR. Hourly rounds completed, all needs met this shift. Bed locked in low position,call light ,bedside table with personal items in reach. Instructed pt to call if needed anything.   
Resting in bed. States \" I didn't have a good night last night. I could not breathe good and my upper back hurts so bad\" Wheezing is better than yesterday but continue to hear some expiratory wheezing. Sats are 92-93% on 4 L NC. Cont to receive breathing tx.    1830- Ambulated in room some this shift. Was very difficult to get  to do things.Attempted to apply creams multiple times without success until late evening. Con to c/o upper back hurting and having SoB when doing anything. Medicated x 3 this shift for pain. Refused po medication. Hourly rounds completed, all needs met this shift. Instructed to call if needed anything.  
TRANSFER - IN REPORT:    Verbal report received from Chuck MARTINO on Meli Blancas  being received from Cath Lab for routine post-op      Report consisted of patient's Situation, Background, Assessment and   Recommendations(SBAR).     Information from the following report(s) Nurse Handoff Report, Surgery Report, MAR, Recent Results, and Med Rec Status was reviewed with the receiving nurse.    Opportunity for questions and clarification was provided.      Assessment completed upon patient's arrival to unit and care assumed.    
TRANSFER - IN REPORT:    Verbal report received from SALENA Tripathi on Meli Blancas  being received from ED for routine progression of patient care      Report consisted of patient's Situation, Background, Assessment and   Recommendations(SBAR).     Information from the following report(s) Nurse Handoff Report, ED Encounter Summary, ED SBAR, MAR, and Recent Results was reviewed with the receiving nurse.    Opportunity for questions and clarification was provided.      Assessment completed upon patient's arrival to unit and care assumed.     
TRANSFER - OUT REPORT:    Verbal report given to Jess MARTINO on Meli Blancas  being transferred to [facility] for routine progression of patient care       Report consisted of patient's Situation, Background, Assessment and   Recommendations(SBAR).     Information from the following report(s) Nurse Handoff Report was reviewed with the receiving nurse.           Lines:   Peripheral IV 12/10/24 Right Forearm (Active)   Site Assessment Clean, dry & intact 12/13/24 0800   Line Status Capped;Flushed 12/13/24 0800   Line Care Connections checked and tightened 12/13/24 0800   Phlebitis Assessment No symptoms 12/13/24 0800   Infiltration Assessment 0 12/13/24 0800   Alcohol Cap Used Yes 12/13/24 0800   Dressing Status Clean, dry & intact 12/13/24 0800   Dressing Type Transparent 12/13/24 0800        Opportunity for questions and clarification was provided.      Patient transported with:  Tech       
TRANSFER - OUT REPORT:  SILVA  Viscous lidocaine @ 0815  Versedd 2mg  Fentanyl 50mcg  Mallampati 2  ASA II  Verbal report given to RN(name) on Meli Blancas being transferred to 6th floor(unit) for routine progression of patient care       Report consisted of patient's Situation, Background, Assessment and   Recommendations(SBAR).     Information from the following report(s) Nurse Handoff Report was reviewed with the receiving nurse.    Opportunity for questions and clarification was provided.      Patient transported with:   Tech    
Ultrasound was used to find the vein which was compressible and without any ultrasound features of an artery or nerve bundle. Skin was cleaned and disinfected prior to IV puncture.  Under real-time ultrasound guidance peripheral access was obtained in the left forearm using 22 G 1.75\" Peripheral IV catheter after 1 attempt(s). No immediate complications noted. Patient tolerated the procedure well.  Refer to IV flowsheet for further documentation.     
Unable to complete MRI screening due to patient's current mentation.  
Up in chair /102 Amlodipine 5 mg po given as ordered. IV site remains dry and intact. Drsg to LLE changed and remains intact. Medicated x 4 for c/o pain in LLE for 7-8/10 with relief reported. See Mar. Hourly rounds completed, all needs met this shift. Bed locked in low position, call light, bed side table and personal belongings in reach. Instructed to call if needed anything.   
Up in chair all shift. Has ambulated the entire unit x 1 this shift without difficulty. Only allowed creams to be applied to skin x 1 this shift, states \" I will have it put on again before I go to bed\" Drsg was changed to LLE and remains clean, dry, and intact. Medicated x 1 for c/o pain as per MAR with relief. Hourly rounds completed, all needs met this shift. Bed locked in low position, call light in reach with bedside table and belongings also in reach. Instructed to call if needed anything.  
Up in chair all shift. Medicated x 1 this shift foe c/o pain. Drsg to LLE changed. PICC drsg remains intact. Hourly rounds completed, all needs met this shift. Bed locked in low position. Call light, bedside table with personal items in reach. Instructed to call if needed anything.  
Up in chair without complaints. Has been up ambulating and tolerating without difficulty. Drsg remains intact to IV site. Drsg changed to LLE with new blisters noted to LLE extremities. With creams applied after lunch as ordered. See MAR for times.Medicated x 1 for pain this shift with reported relief. Hourly rounds completed, all needs met this shift. Bed locked in low position with call light and bedside table with personal items in reach.Instructed to call if needed anything.  
Up in chair. Pt has refused to have creams applied to skin this shift, stated \" I just do not feel good today. I feel very achy and my throat is very sore.\" Pt also has a deep cough noted. Temp take due to pt feeling chilled temp 97.7. Drsg changed to LLE. Medicated x 2 as per MAR this shift. Hourly rounds completed, all needs met this shift. Bed locked in low position with call light , bedside table and personal belongings in reach. Instructed to call if needed anything.  
VANCO DAILY FOLLOW UP NOTE  [facility]   Pharmacy Pharmacokinetic Monitoring Service - Vancomycin    Consulting Provider: Annemarie Francois APRN - CNP    Indication: MRSA bacteremia - MRSA aortic valve endocarditis   Target Concentration: Goal AUC/AVTAR 400-600 mg*hr/L  End of Therapy: 1/22/2025  Additional Antimicrobials: N/A    Pertinent Laboratory Values:   Wt Readings from Last 1 Encounters:   12/10/24 74.8 kg (165 lb)     Temp Readings from Last 1 Encounters:   01/14/25 98.2 °F (36.8 °C) (Oral)     Recent Labs     01/13/25  0638   CREATININE 0.91     Estimated Creatinine Clearance: 54 mL/min (based on SCr of 0.91 mg/dL).    Lab Results   Component Value Date/Time    VANCORANDOM 13.0 01/13/2025 06:38 AM      MRSA Nasal Swab: N/A. Non-respiratory infection    Assessment:  Date/Time Dose Concentration AUC   12/18 1203 750 mg q12h 12.9 291   12/19 0803 1500 mg q12h 34 753   12/20 0546 1000 mg q12h 26.6 732   12/21 0825 750 mg q12h 22.9 675   12/23 0459 500 mg q12h 16.6 398   12/24 0438 1250 mg q24h 15.7 643   12/26 0409 1000 mg q24h 15.4 397   12/27 0507 1250 mg q24h 13.4 479   12/30 0613 1250 mg q24h 12.2 480   1/6 0432 1250 mg q24h 13.2 457   1/13 0638 1250 mg Q24H 13.0 505   Note: Serum concentrations collected for AUC dosing may appear elevated if collected in close proximity to the dose administered, this is not necessarily an indication of toxicity    Plan:  Regimen continues to be therapeutic, so will continue vancomycin 1250 mg IV every 24 hours  Will monitor [facility] serum creatinine - if no acute changes, tentative plan to monitor vancomycin concentrations weekly   Repeat vancomycin concentrations as needed.  Pharmacy will continue to monitor patient and adjust therapy as indicated    Thank you for the consult,  Feliciano Anderson [facility]        
VANCO DAILY FOLLOW UP NOTE  Bon [facility]   Pharmacy Pharmacokinetic Monitoring Service - Vancomycin    Consulting Provider: Annemarie Francois APRN - CNP    Indication: MRSA bacteremia - MRSA aortic valve endocarditis   Target Concentration: Goal AUC/AVTAR 400-600 mg*hr/L  End of Therapy: 1/22/2025  Additional Antimicrobials: N/A    Pertinent Laboratory Values:   Wt Readings from Last 1 Encounters:   12/10/24 74.8 kg (165 lb)     Temp Readings from Last 1 Encounters:   01/06/25 97.2 °F (36.2 °C) (Oral)     Recent Labs     01/03/25  1648 01/04/25  1402 01/06/25  0432   BUN 17  --   --    CREATININE 0.95  --  0.79   WBC  --  10.2  --        Estimated Creatinine Clearance: 62 mL/min (based on SCr of 0.79 mg/dL).    Lab Results   Component Value Date/Time    VANCORANDOM 13.2 01/06/2025 04:32 AM        MRSA Nasal Swab: N/A. Non-respiratory infection    Assessment:  Date/Time Dose Concentration AUC   12/18 1203 750 mg q12h 12.9 291   12/19 0803 1500 mg q12h 34 753   12/20 0546 1000 mg q12h 26.6 732   12/21 0825 750 mg q12h 22.9 675   12/23 0459 500 mg q12h 16.6 398   12/24 0438 1250 mg q24h 15.7 643   12/26 0409 1000 mg q24h 15.4 397   12/27 0507 1250 mg q24h 13.4 479   12/30 0613 1250 mg q24h 12.2 480   1/6 0432 1250 mg Q24H 13.2 457   Note: Serum concentrations collected for AUC dosing may appear elevated if collected in close proximity to the dose administered, this is not necessarily an indication of toxicity    Plan:  Regimen continues to be therapeutic, so will continue vancomycin 1250 mg IV every 24 hours  Will monitor [facility] serum creatinine - if no acute changes, tentative plan to monitor vancomycin concentrations weekly   Repeat vancomycin concentrations as needed.  Pharmacy will continue to monitor patient and adjust therapy as indicated    Thank you for the consult,  Feliciano Anderson [facility]            
VANCO DAILY FOLLOW UP NOTE  Bon [place]   Pharmacy Pharmacokinetic Monitoring Service - Vancomycin    Consulting Provider: Annemarie Francois APRN - CNP    Indication: MRSA bacteremia - MRSA aortic valve endocarditis   Target Concentration: Goal AUC/AVTAR 400-600 mg*hr/L  End of Therapy: 1/22/2025  Additional Antimicrobials: N/A    Pertinent Laboratory Values:   Wt Readings from Last 1 Encounters:   12/10/24 74.8 kg (165 lb)     Temp Readings from Last 1 Encounters:   01/10/25 98.2 °F (36.8 °C) (Axillary)     Recent Labs     01/08/25  0509   CREATININE 0.81     Estimated Creatinine Clearance: 60 mL/min (based on SCr of 0.81 mg/dL).    Lab Results   Component Value Date/Time    VANCORANDOM 13.2 01/06/2025 04:32 AM      MRSA Nasal Swab: N/A. Non-respiratory infection    Assessment:  Date/Time Dose Concentration AUC   12/18 1203 750 mg q12h 12.9 291   12/19 0803 1500 mg q12h 34 753   12/20 0546 1000 mg q12h 26.6 732   12/21 0825 750 mg q12h 22.9 675   12/23 0459 500 mg q12h 16.6 398   12/24 0438 1250 mg q24h 15.7 643   12/26 0409 1000 mg q24h 15.4 397   12/27 0507 1250 mg q24h 13.4 479   12/30 0613 1250 mg q24h 12.2 480   1/6 0432 1250 mg Q24H 13.2 457   Note: Serum concentrations collected for AUC dosing may appear elevated if collected in close proximity to the dose administered, this is not necessarily an indication of toxicity    Plan:  Regimen continues to be therapeutic, so will continue vancomycin 1250 mg IV every 24 hours  Will monitor [facility] serum creatinine - if no acute changes, tentative plan to monitor vancomycin concentrations weekly   Repeat vancomycin concentrations as needed.  Pharmacy will continue to monitor patient and adjust therapy as indicated    Thank you for the consult,  Ileana Galdamez RPH  
VANCO DAILY FOLLOW UP NOTE  Bon [facility]   Pharmacy Pharmacokinetic Monitoring Service - Vancomycin    Consulting Provider: Annemarie Francois APRN - CNP    Indication: MRSA bacteremia - MRSA aortic valve endocarditis   Target Concentration: Goal AUC/AVTAR 400-600 mg*hr/L  End of Therapy: 1/22/2025  Additional Antimicrobials: N/A    Pertinent Laboratory Values:   Wt Readings from Last 1 Encounters:   12/10/24 74.8 kg (165 lb)     Temp Readings from Last 1 Encounters:   01/09/25 98 °F (36.7 °C) (Oral)     Recent Labs     01/08/25  0509   CREATININE 0.81       Estimated Creatinine Clearance: 60 mL/min (based on SCr of 0.81 mg/dL).    Lab Results   Component Value Date/Time    VANCORANDOM 13.2 01/06/2025 04:32 AM        MRSA Nasal Swab: N/A. Non-respiratory infection    Assessment:  Date/Time Dose Concentration AUC   12/18 1203 750 mg q12h 12.9 291   12/19 0803 1500 mg q12h 34 753   12/20 0546 1000 mg q12h 26.6 732   12/21 0825 750 mg q12h 22.9 675   12/23 0459 500 mg q12h 16.6 398   12/24 0438 1250 mg q24h 15.7 643   12/26 0409 1000 mg q24h 15.4 397   12/27 0507 1250 mg q24h 13.4 479   12/30 0613 1250 mg q24h 12.2 480   1/6 0432 1250 mg Q24H 13.2 457   Note: Serum concentrations collected for AUC dosing may appear elevated if collected in close proximity to the dose administered, this is not necessarily an indication of toxicity    Plan:  Regimen continues to be therapeutic, so will continue vancomycin 1250 mg IV every 24 hours  Will monitor [facility] serum creatinine - if no acute changes, tentative plan to monitor vancomycin concentrations weekly   Repeat vancomycin concentrations as needed.  Pharmacy will continue to monitor patient and adjust therapy as indicated    Thank you for the consult,  Feliciano Anderson [facility]                  
VANCO DAILY FOLLOW UP NOTE  Bon [facility]   Pharmacy Pharmacokinetic Monitoring Service - Vancomycin    Consulting Provider: Annemarie Francois, APRN - CNP    Indication: MRSA bacteremia - MRSA aortic valve endocarditis   Target Concentration: Goal AUC/AVTAR 400-600 mg*hr/L  End of Therapy: 1/22/2025  Additional Antimicrobials: N/A    Pertinent Laboratory Values:   Wt Readings from Last 1 Encounters:   12/10/24 74.8 kg (165 lb)     Temp Readings from Last 1 Encounters:   12/22/24 98.1 °F (36.7 °C) (Oral)     Recent Labs     12/20/24  0546 12/21/24  0825 12/22/24  0712   CREATININE 0.83 0.96 0.99     Estimated Creatinine Clearance: 49 mL/min (based on SCr of 0.99 mg/dL).    Lab Results   Component Value Date/Time    VANCORANDOM 22.9 12/21/2024 08:25 AM        MRSA Nasal Swab: N/A. Non-respiratory infection    Assessment:  Date/Time Dose Concentration AUC   12/18 1203 750 mg q12h 12.9 291   12/19 0803 1500 mg q12h 34 753   12/20 0546 1000 mg q12h 26.6 732   12/21 0825 750 mg q12h 22.9 675   Note: Serum concentrations collected for AUC dosing may appear elevated if collected in close proximity to the dose administered, this is not necessarily an indication of toxicity    Plan:  No changes today.  Continue  500 mg q12h for predicted AUC/Tr of 473/14.1  Repeat vancomycin concentrations will be ordered as clinically appropriate   Pharmacy will continue to monitor patient and adjust therapy as indicated    Thank you for the consult,  Jesse Ferrell, PharmD, BCOP  Clinical Pharmacist  Contact [employer]            
VANCO DAILY FOLLOW UP NOTE  Bon [facility]   Pharmacy Pharmacokinetic Monitoring Service - Vancomycin    Consulting Provider: Annemarie Francois APRN - CNP    Indication: MRSA bacteremia - MRSA aortic valve endocarditis   Target Concentration: Goal AUC/AVTAR 400-600 mg*hr/L  End of Therapy: 1/22/2025  Additional Antimicrobials: N/A    Pertinent Laboratory Values:   Wt Readings from Last 1 Encounters:   12/10/24 74.8 kg (165 lb)     Temp Readings from Last 1 Encounters:   01/13/25 97.2 °F (36.2 °C) (Oral)     Recent Labs     01/11/25  0507 01/13/25  0638   CREATININE  --  0.91   WBC 10.1  --      Estimated Creatinine Clearance: 54 mL/min (based on SCr of 0.91 mg/dL).    Lab Results   Component Value Date/Time    VANCORANDOM 13.0 01/13/2025 06:38 AM      MRSA Nasal Swab: N/A. Non-respiratory infection    Assessment:  Date/Time Dose Concentration AUC   12/18 1203 750 mg q12h 12.9 291   12/19 0803 1500 mg q12h 34 753   12/20 0546 1000 mg q12h 26.6 732   12/21 0825 750 mg q12h 22.9 675   12/23 0459 500 mg q12h 16.6 398   12/24 0438 1250 mg q24h 15.7 643   12/26 0409 1000 mg q24h 15.4 397   12/27 0507 1250 mg q24h 13.4 479   12/30 0613 1250 mg q24h 12.2 480   1/6 0432 1250 mg q24h 13.2 457   1/13 0638 1250 mg Q24H 13.0 505   Note: Serum concentrations collected for AUC dosing may appear elevated if collected in close proximity to the dose administered, this is not necessarily an indication of toxicity    Plan:  Regimen continues to be therapeutic, so will continue vancomycin 1250 mg IV every 24 hours  Will monitor [facility] serum creatinine - if no acute changes, tentative plan to monitor vancomycin concentrations weekly   Repeat vancomycin concentrations as needed.  Pharmacy will continue to monitor patient and adjust therapy as indicated    Thank you for the consult,  Feliciano Anderson RPH      
VANCO DAILY FOLLOW UP NOTE  Bon [facility]   Pharmacy Pharmacokinetic Monitoring Service - Vancomycin    Consulting Provider: Annemarie Francois APRN - CNP    Indication: MRSA bacteremia - MRSA aortic valve endocarditis   Target Concentration: Goal AUC/AVTAR 400-600 mg*hr/L  End of Therapy: 1/22/2025  Additional Antimicrobials: N/A    Pertinent Laboratory Values:   Wt Readings from Last 1 Encounters:   12/10/24 74.8 kg (165 lb)     Temp Readings from Last 1 Encounters:   12/24/24 98.2 °F (36.8 °C) (Axillary)     Recent Labs     12/22/24  0712 12/23/24  0459 12/24/24  0438   CREATININE 0.99 0.90 1.18*   WBC  --   --  9.7     Estimated Creatinine Clearance: 41 mL/min (A) (based on SCr of 1.18 mg/dL (H)).    Lab Results   Component Value Date/Time    VANCORANDOM 15.7 12/24/2024 04:38 AM        MRSA Nasal Swab: N/A. Non-respiratory infection    Assessment:  Date/Time Dose Concentration AUC   12/18 1203 750 mg q12h 12.9 291   12/19 0803 1500 mg q12h 34 753   12/20 0546 1000 mg q12h 26.6 732   12/21 0825 750 mg q12h 22.9 675   12/23 0459 500 mg Q12H 16.6 398   12/24 0438 1250 mg Q24H 15.7 643   Note: Serum concentrations collected for AUC dosing may appear elevated if collected in close proximity to the dose administered, this is not necessarily an indication of toxicity    Plan:  Regimen slightly supratherapeutic, so will adjust dose to 1000 mg Q24H for predicted AUC/Tr of 523/13.1  Repeat vancomycin concentrations will be ordered as clinically appropriate   Pharmacy will continue to monitor patient and adjust therapy as indicated    Thank you for the consult,  Feliciano Anderson [facility]              
VANCO DAILY FOLLOW UP NOTE  Bon [facility]   Pharmacy Pharmacokinetic Monitoring Service - Vancomycin    Consulting Provider: Annemarie Francois, APRN - CNP    Indication: MRSA bacteremia - MRSA aortic valve endocarditis   Target Concentration: Goal AUC/AVTAR 400-600 mg*hr/L  End of Therapy: 1/22/2025  Additional Antimicrobials: N/A    Pertinent Laboratory Values:   Wt Readings from Last 1 Encounters:   12/10/24 74.8 kg (165 lb)     Temp Readings from Last 1 Encounters:   12/31/24 98.1 °F (36.7 °C) (Oral)     No results for input(s): \"BUN\", \"CREATININE\", \"WBC\", \"PROCAL\", \"LACACIDPL\", \"LACTA\", \"LCAD\", \"LACTSEPSIS\" in the last 72 hours.    Invalid input(s): \"PROCAT\", \"PCT\", \"LAC\", \"LACT\", \"LACPOC\"    Estimated Creatinine Clearance: 54 mL/min (based on SCr of 0.91 mg/dL).    Lab Results   Component Value Date/Time    VANCORANDOM 12.2 12/30/2024 06:13 AM        MRSA Nasal Swab: N/A. Non-respiratory infection    Assessment:  Date/Time Dose Concentration AUC   12/18 1203 750 mg q12h 12.9 291   12/19 0803 1500 mg q12h 34 753   12/20 0546 1000 mg q12h 26.6 732   12/21 0825 750 mg q12h 22.9 675   12/23 0459 500 mg Q12H 16.6 398   12/24 0438 1250 mg Q24H 15.7 643   12/26 0409 1000 mg Q24H 15.4 397   12/27 0507 1250 mg Q24H 13.4 479   12/30 0613 1250 mg Q24H 12.2 480   Note: Serum concentrations collected for AUC dosing may appear elevated if collected in close proximity to the dose administered, this is not necessarily an indication of toxicity    Plan:  Regimen continues to be therapeutic, so will continue vancomycin 1250 mg IV every 24 hours  Repeat vancomycin concentrations will be ordered as clinically appropriate   Will monitor [facility] serum creatinine - if no acute changes, tentative plan to monitor vancomycin concentrations weekly   Pharmacy will continue to monitor patient and adjust therapy as indicated    Thank you for the consult,  Alfredo Martinez RP  
VANCO DAILY FOLLOW UP NOTE  Bon [facility]   Pharmacy Pharmacokinetic Monitoring Service - Vancomycin    Consulting Provider: Annemarie Francois APRN - CNP    Indication: MRSA bacteremia - MRSA aortic valve endocarditis   Target Concentration: Goal AUC/AVTAR 400-600 mg*hr/L  End of Therapy: 1/22/2025  Additional Antimicrobials: N/A    Pertinent Laboratory Values:   Wt Readings from Last 1 Encounters:   12/10/24 74.8 kg (165 lb)     Temp Readings from Last 1 Encounters:   01/15/25 97.7 °F (36.5 °C) (Oral)     Recent Labs     01/13/25  0638 01/15/25  0510   CREATININE 0.91 0.91     Estimated Creatinine Clearance: 54 mL/min (based on SCr of 0.91 mg/dL).    Lab Results   Component Value Date/Time    VANCORANDOM 13.0 01/13/2025 06:38 AM      MRSA Nasal Swab: N/A. Non-respiratory infection    Assessment:  Date/Time Dose Concentration AUC   12/18 1203 750 mg q12h 12.9 291   12/19 0803 1500 mg q12h 34 753   12/20 0546 1000 mg q12h 26.6 732   12/21 0825 750 mg q12h 22.9 675   12/23 0459 500 mg q12h 16.6 398   12/24 0438 1250 mg q24h 15.7 643   12/26 0409 1000 mg q24h 15.4 397   12/27 0507 1250 mg q24h 13.4 479   12/30 0613 1250 mg q24h 12.2 480   1/6 0432 1250 mg q24h 13.2 457   1/13 0638 1250 mg Q24H 13.0 505   Note: Serum concentrations collected for AUC dosing may appear elevated if collected in close proximity to the dose administered, this is not necessarily an indication of toxicity    Plan:  Regimen continues to be therapeutic, so will continue vancomycin 1250 mg IV every 24 hours  Will monitor [facility] serum creatinine - if no acute changes, tentative plan to monitor vancomycin concentrations weekly   Repeat vancomycin concentrations as needed.  Pharmacy will continue to monitor patient and adjust therapy as indicated    Thank you for the consult,  Feliciano Anderson [facility]          
VANCO DAILY FOLLOW UP NOTE  Bon [facility]   Pharmacy Pharmacokinetic Monitoring Service - Vancomycin    Consulting Provider: Annemarie Francois APRN - CNP    Indication: MRSA bacteremia - MRSA aortic valve endocarditis   Target Concentration: Goal AUC/AVTAR 400-600 mg*hr/L  End of Therapy: 1/22/2025  Additional Antimicrobials: N/A    Pertinent Laboratory Values:   Wt Readings from Last 1 Encounters:   12/10/24 74.8 kg (165 lb)     Temp Readings from Last 1 Encounters:   01/17/25 97.5 °F (36.4 °C) (Oral)     Recent Labs     01/15/25  0510 01/17/25  0454   CREATININE 0.91 1.06     Estimated Creatinine Clearance: 46 mL/min (based on SCr of 1.06 mg/dL).    Lab Results   Component Value Date/Time    VANCORANDOM 13.0 01/13/2025 06:38 AM      MRSA Nasal Swab: N/A. Non-respiratory infection    Assessment:  Date/Time Dose Concentration AUC   12/18 1203 750 mg q12h 12.9 291   12/19 0803 1500 mg q12h 34 753   12/20 0546 1000 mg q12h 26.6 732   12/21 0825 750 mg q12h 22.9 675   12/23 0459 500 mg q12h 16.6 398   12/24 0438 1250 mg q24h 15.7 643   12/26 0409 1000 mg q24h 15.4 397   12/27 0507 1250 mg q24h 13.4 479   12/30 0613 1250 mg q24h 12.2 480   1/6 0432 1250 mg q24h 13.2 457   1/13 0638 1250 mg q24h 13.0 505   Note: Serum concentrations collected for AUC dosing may appear elevated if collected in close proximity to the dose administered, this is not necessarily an indication of toxicity    Plan:  Regimen continues to be therapeutic, so will continue vancomycin 1250 mg IV every 24 hours  Given slightly upward trend in SrCr, will get additional SrCr and vancomycin random on 1/18/25 with AM labs.   Pharmacy will continue to monitor patient and adjust therapy as indicated    Thank you for the consult,  Feliciano Anderson [facility]    
VANCO DAILY FOLLOW UP NOTE  Bon [facility]   Pharmacy Pharmacokinetic Monitoring Service - Vancomycin    Consulting Provider: Annemarie Francois, APRN - CNP    Indication: MRSA bacteremia - MRSA aortic valve endocarditis   Target Concentration: Goal AUC/AVTAR 400-600 mg*hr/L  End of Therapy: 1/22/2025  Additional Antimicrobials: N/A    Pertinent Laboratory Values:   Wt Readings from Last 1 Encounters:   12/10/24 74.8 kg (165 lb)     Temp Readings from Last 1 Encounters:   12/21/24 98.4 °F (36.9 °C)     Recent Labs     12/20/24  0546 12/21/24  0825   CREATININE 0.83 0.96     Estimated Creatinine Clearance: 51 mL/min (based on SCr of 0.96 mg/dL).    Lab Results   Component Value Date/Time    VANCORANDOM 22.9 12/21/2024 08:25 AM        MRSA Nasal Swab: N/A. Non-respiratory infection    Assessment:  Date/Time Dose Concentration AUC   12/18 1203 750 mg q12h 12.9 291   12/19 0803 1500 mg q12h 34 753   12/20 0546 1000 mg q12h 26.6 732   12/21 0825 750 mg q12h 22.9 675   Note: Serum concentrations collected for AUC dosing may appear elevated if collected in close proximity to the dose administered, this is not necessarily an indication of toxicity    Plan:  Current dosing regimen is supra-therapeutic  Decrease dose to 500 mg q12h for predicted AUC/Tr of 470/14  Repeat vancomycin concentrations will be ordered as clinically appropriate   Pharmacy will continue to monitor patient and adjust therapy as indicated    Thank you for the consult,  Jesse Ferrell, PharmD, BCOP  Clinical Pharmacist  Contact [employer]          
VANCO DAILY FOLLOW UP NOTE  Bon [facility]   Pharmacy Pharmacokinetic Monitoring Service - Vancomycin    Consulting Provider: Annemarie Francois, APRN - CNP    Indication: MRSA bacteremia - MRSA aortic valve endocarditis   Target Concentration: Goal AUC/AVTAR 400-600 mg*hr/L  End of Therapy: 1/22/2025  Additional Antimicrobials: N/A    Pertinent Laboratory Values:   Wt Readings from Last 1 Encounters:   12/10/24 74.8 kg (165 lb)     Temp Readings from Last 1 Encounters:   12/26/24 97.9 °F (36.6 °C) (Oral)     Recent Labs     12/24/24  0438 12/25/24  0605 12/26/24  0409   CREATININE 1.18* 0.93  --    WBC 9.7  --  8.1     Estimated Creatinine Clearance: 53 mL/min (based on SCr of 0.93 mg/dL).    Lab Results   Component Value Date/Time    VANCORANDOM 15.4 12/26/2024 04:09 AM        MRSA Nasal Swab: N/A. Non-respiratory infection    Assessment:  Date/Time Dose Concentration AUC   12/18 1203 750 mg q12h 12.9 291   12/19 0803 1500 mg q12h 34 753   12/20 0546 1000 mg q12h 26.6 732   12/21 0825 750 mg q12h 22.9 675   12/23 0459 500 mg Q12H 16.6 398   12/24 0438 1250 mg Q24H 15.7 643   12/26 0409 1000 mg Q24H 15.4 397   Note: Serum concentrations collected for AUC dosing may appear elevated if collected in close proximity to the dose administered, this is not necessarily an indication of toxicity    Plan:  Regimen slightly supratherapeutic, so will adjust dose to 1250 mg Q24H for predicted AUC/Tr of 492/10.5   Repeat vancomycin concentrations will be ordered as clinically appropriate   Pharmacy will continue to monitor patient and adjust therapy as indicated    Thank you for the consult,  Feliciano Anderson [facility]                  
VANCO DAILY FOLLOW UP NOTE  Bon [facility]   Pharmacy Pharmacokinetic Monitoring Service - Vancomycin    Consulting Provider: Annemarie Francois APRN - CNP    Indication: MRSA bacteremia  Target Concentration: Goal AUC/AVTAR 400-600 mg*hr/L  End of Therapy: 1/22/2025  Additional Antimicrobials: N/A    Pertinent Laboratory Values:   Wt Readings from Last 1 Encounters:   12/10/24 74.8 kg (165 lb)     Temp Readings from Last 1 Encounters:   12/20/24 98.1 °F (36.7 °C) (Oral)     Recent Labs     12/18/24  0848 12/20/24  0546   BUN 20  --    CREATININE 0.76 0.83   WBC 8.0  --      Estimated Creatinine Clearance: 59 mL/min (based on SCr of 0.83 mg/dL).    Lab Results   Component Value Date/Time    VANCORANDOM 26.6 12/20/2024 05:46 AM        MRSA Nasal Swab: N/A. Non-respiratory infection    Assessment:  Date/Time Dose Concentration AUC   12/18 1203 750 mg q12h 12.9 291   12/19 0803 1500 mg q12h 34 753   12/20 0546 1000 mg q12h 26.6 732   Note: Serum concentrations collected for AUC dosing may appear elevated if collected in close proximity to the dose administered, this is not necessarily an indication of toxicity    Plan:  Dosing recommendations based on Bayesian software  AUC is supratherapeutic on 1000 mg IV q12h. Reduce dose to 750 mg IV q12h  Anticipated AUC of 559 and trough concentration of 15.3 at steady state  Renal labs as indicated   Vancomycin concentrations will be ordered as clinically appropriate   Pharmacy will continue to monitor patient and adjust therapy as indicated    Thank you for the consult,  Rubia Reed [facility]  
VANCO DAILY FOLLOW UP NOTE  Bon [facility]   Pharmacy Pharmacokinetic Monitoring Service - Vancomycin    Consulting Provider: Annemarie Francois, APRN - CNP    Indication: MRSA bacteremia - MRSA aortic valve endocarditis   Target Concentration: Goal AUC/AVTAR 400-600 mg*hr/L  End of Therapy: 1/22/2025  Additional Antimicrobials: N/A    Pertinent Laboratory Values:   Wt Readings from Last 1 Encounters:   12/10/24 74.8 kg (165 lb)     Temp Readings from Last 1 Encounters:   12/30/24 98.2 °F (36.8 °C) (Oral)     No results for input(s): \"BUN\", \"CREATININE\", \"WBC\", \"PROCAL\", \"LACACIDPL\", \"LACTA\", \"LCAD\", \"LACTSEPSIS\" in the last 72 hours.    Invalid input(s): \"PROCAT\", \"PCT\", \"LAC\", \"LACT\", \"LACPOC\"    Estimated Creatinine Clearance: 54 mL/min (based on SCr of 0.91 mg/dL).    Lab Results   Component Value Date/Time    VANCORANDOM 12.2 12/30/2024 06:13 AM        MRSA Nasal Swab: N/A. Non-respiratory infection    Assessment:  Date/Time Dose Concentration AUC   12/18 1203 750 mg q12h 12.9 291   12/19 0803 1500 mg q12h 34 753   12/20 0546 1000 mg q12h 26.6 732   12/21 0825 750 mg q12h 22.9 675   12/23 0459 500 mg Q12H 16.6 398   12/24 0438 1250 mg Q24H 15.7 643   12/26 0409 1000 mg Q24H 15.4 397   12/27 0507 1250 mg Q24H 13.4 479   12/30 0613 1250 mg Q24H 12.2 480   Note: Serum concentrations collected for AUC dosing may appear elevated if collected in close proximity to the dose administered, this is not necessarily an indication of toxicity    Plan:  Regimen continues to be therapeutic, so will continue vancomycin 1250 mg IV every 24 hours  Repeat vancomycin concentrations will be ordered as clinically appropriate   Will order [facility] serum creatinine - if no acute changes, tentative plan to monitor vancomycin concentrations weekly  Pharmacy will continue to monitor patient and adjust therapy as indicated    Thank you for the consult,  Alfredo Martinez RP    
VANCO DAILY FOLLOW UP NOTE  Bon [facility]   Pharmacy Pharmacokinetic Monitoring Service - Vancomycin    Consulting Provider: Annemarie Francois, APRN - CNP    Indication: MRSA bacteremia - MRSA aortic valve endocarditis   Target Concentration: Goal AUC/AVTAR 400-600 mg*hr/L  End of Therapy: 1/22/2025  Additional Antimicrobials: N/A    Pertinent Laboratory Values:   Wt Readings from Last 1 Encounters:   12/10/24 74.8 kg (165 lb)     Temp Readings from Last 1 Encounters:   12/29/24 97.2 °F (36.2 °C) (Oral)     Recent Labs     12/27/24  0507   BUN 18   CREATININE 0.91     Estimated Creatinine Clearance: 54 mL/min (based on SCr of 0.91 mg/dL).    Lab Results   Component Value Date/Time    VANCORANDOM 13.4 12/27/2024 05:07 AM        MRSA Nasal Swab: N/A. Non-respiratory infection    Assessment:  Date/Time Dose Concentration AUC   12/18 1203 750 mg q12h 12.9 291   12/19 0803 1500 mg q12h 34 753   12/20 0546 1000 mg q12h 26.6 732   12/21 0825 750 mg q12h 22.9 675   12/23 0459 500 mg Q12H 16.6 398   12/24 0438 1250 mg Q24H 15.7 643   12/26 0409 1000 mg Q24H 15.4 397   12/27 0507 1250 mg Q24H 13.4 479   Note: Serum concentrations collected for AUC dosing may appear elevated if collected in close proximity to the dose administered, this is not necessarily an indication of toxicity    Plan:  Regimen therapeutic, so continue vancomycin 1250 mg IV every 24 hours  Repeat vancomycin concentrations will be ordered as clinically appropriate   Pharmacy will continue to monitor patient and adjust therapy as indicated    Thank you for the consult,  Chuck Kaba RPH              
VANCO DAILY FOLLOW UP NOTE  [facility]   Pharmacy Pharmacokinetic Monitoring Service - Vancomycin    Consulting Provider: Annemarie Francois APRN - CNP    Indication: MRSA bacteremia - MRSA aortic valve endocarditis   Target Concentration: Goal AUC/AVTAR 400-600 mg*hr/L  End of Therapy: 1/22/2025  Additional Antimicrobials: Bactrim ppx    Pertinent Laboratory Values:   Wt Readings from Last 1 Encounters:   12/10/24 74.8 kg (165 lb)     Temp Readings from Last 1 Encounters:   01/20/25 97.5 °F (36.4 °C) (Oral)     Recent Labs     01/18/25  0601 01/19/25  0429 01/20/25  0431   BUN  --  17 19   CREATININE 0.99 1.08 0.95   WBC 11.6* 9.4  --      Estimated Creatinine Clearance: 51 mL/min (based on SCr of 0.95 mg/dL).    Lab Results   Component Value Date/Time    VANCORANDOM 14.0 01/18/2025 06:01 AM      MRSA Nasal Swab: N/A. Non-respiratory infection    Assessment:  Date/Time Dose Concentration AUC   12/18 1203 750 mg q12h 12.9 291   12/19 0803 1500 mg q12h 34 753   12/20 0546 1000 mg q12h 26.6 732   12/21 0825 750 mg q12h 22.9 675   12/23 0459 500 mg q12h 16.6 398   12/24 0438 1250 mg q24h 15.7 643   12/26 0409 1000 mg q24h 15.4 397   12/27 0507 1250 mg q24h 13.4 479   12/30 0613 1250 mg q24h 12.2 480   1/6 0432 1250 mg q24h 13.2 457   1/13 0638 1250 mg q24h 13.0 505   1/18 0601 1250 mg Q24H 14.0 525   Note: Serum concentrations collected for AUC dosing may appear elevated if collected in close proximity to the dose administered, this is not necessarily an indication of toxicity    Plan:  Regimen continues to be therapeutic, so will continue vancomycin 1250 mg IV every 24 hours  Unless renal function changes significantly, should not need any further levels through EOT on 1/22/25  Pharmacy will continue to monitor patient and adjust therapy as indicated    Thank you for the consult,  Feliciano Anderson RPH    
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    Lab Results   Component Value Date/Time    VANCORANDOM 13.0 01/13/2025 06:38 AM      MRSA Nasal Swab: N/A. Non-respiratory infection    Assessment:  Date/Time Dose Concentration AUC   12/18 1203 750 mg q12h 12.9 291   12/19 0803 1500 mg q12h 34 753   12/20 0546 1000 mg q12h 26.6 732   12/21 0825 750 mg q12h 22.9 675   12/23 0459 500 mg q12h 16.6 398   12/24 0438 1250 mg q24h 15.7 643   12/26 0409 1000 mg q24h 15.4 397   12/27 0507 1250 mg q24h 13.4 479   12/30 0613 1250 mg q24h 12.2 480   1/6 0432 1250 mg q24h 13.2 457   1/13 0638 1250 mg q24h 13.0 505   Note: Serum concentrations collected for AUC dosing may appear elevated if collected in close proximity to the dose administered, this is not necessarily an indication of toxicity    Plan:  Regimen continues to be therapeutic, so will continue vancomycin 1250 mg IV every 24 hours  Will continue to monitor [facility] serum creatinine - barring any acute changes in renal function or extension of therapy, no further vancomycin levels will be needed  Pharmacy will continue to monitor patient and adjust therapy as indicated    Thank you for the consult,  Alfredo Martinez RPH  
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    Lab Results   Component Value Date/Time    VANCORANDOM 15.7 12/24/2024 04:38 AM        MRSA Nasal Swab: N/A. Non-respiratory infection    Assessment:  Date/Time Dose Concentration AUC   12/18 1203 750 mg q12h 12.9 291   12/19 0803 1500 mg q12h 34 753   12/20 0546 1000 mg q12h 26.6 732   12/21 0825 750 mg q12h 22.9 675   12/23 0459 500 mg Q12H 16.6 398   12/24 0438 1250 mg Q24H 15.7 643   Note: Serum concentrations collected for AUC dosing may appear elevated if collected in close proximity to the dose administered, this is not necessarily an indication of toxicity    Plan:  Continue 1000 mg q24h  Repeat vancomycin concentrations will be ordered as clinically appropriate   Pharmacy will continue to monitor patient and adjust therapy as indicated    Thank you for the consult,  Jesse Ferrell, PharmD, BCOP  Clinical Pharmacist  Contact [employer]                  
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    Lab Results   Component Value Date/Time    VANCORANDOM 16.6 12/23/2024 04:59 AM        MRSA Nasal Swab: N/A. Non-respiratory infection    Assessment:  Date/Time Dose Concentration AUC   12/18 1203 750 mg q12h 12.9 291   12/19 0803 1500 mg q12h 34 753   12/20 0546 1000 mg q12h 26.6 732   12/21 0825 750 mg q12h 22.9 675   12/23 0459 500 mg Q12H 16.6 398   Note: Serum concentrations collected for AUC dosing may appear elevated if collected in close proximity to the dose administered, this is not necessarily an indication of toxicity    Plan:  Regimen slightly subtherapeutic, so will adjust dose to 1250 mg Q24H for predicted AUC/Tr of 497/10.6  Repeat vancomycin random level with AM labs on 12/24/24  Pharmacy will continue to monitor patient and adjust therapy as indicated    Thank you for the consult,  Feliciano Anderson [facility]            
VANCO DAILY FOLLOW UP NOTE  [facility]   Pharmacy Pharmacokinetic Monitoring Service - Vancomycin    Consulting Provider: Annemarie Francois, APRN - CNP    Indication: MRSA bacteremia  Target Concentration: Goal AUC/AVTAR 400-600 mg*hr/L  End of Therapy: 1/22/2025  Additional Antimicrobials: N/A    Pertinent Laboratory Values:   Wt Readings from Last 1 Encounters:   12/10/24 74.8 kg (165 lb)     Temp Readings from Last 1 Encounters:   12/19/24 97.3 °F (36.3 °C) (Oral)     Recent Labs     12/16/24  1030 12/17/24  0442 12/18/24  0848   BUN 18  --  20   CREATININE 0.56* 0.89 0.76   WBC 7.5  --  8.0     Estimated Creatinine Clearance: 64 mL/min (based on SCr of 0.76 mg/dL).    Lab Results   Component Value Date/Time    VANCORANDOM 34.0 12/19/2024 08:03 AM        MRSA Nasal Swab: N/A. Non-respiratory infection    Assessment:  Date/Time Dose Concentration AUC   12/18 1203 750 mg q12h 12.9 291   12/19 0803 1500 mg q12h 34 753   Note: Serum concentrations collected for AUC dosing may appear elevated if collected in close proximity to the dose administered, this is not necessarily an indication of toxicity    Plan:  Current dosing regimen is supratherapeutic  Will reduce vancomycin dosing to 1000 mg IV q12h for a predicted AUC/Tr of 508/12.6  Will ensure daily renal markers are ordered given difficulty of achieving therapeutic vancomycin levels   Repeat vancomycin concentrations will be ordered as clinically appropriate   Pharmacy will continue to monitor patient and adjust therapy as indicated    Thank you for the consult,  Alfredo Martinez RPH           
VANCO DAILY FOLLOW UP NOTE  [facility]   Pharmacy Pharmacokinetic Monitoring Service - Vancomycin    Consulting Provider: Annemarie Francois, APRN - CNP    Indication: MRSA bacteremia  Target Concentration: Goal AUC/AVTAR 400-600 mg*hr/L  Day of Therapy: 3  Additional Antimicrobials: N/A    Pertinent Laboratory Values:   Wt Readings from Last 1 Encounters:   12/10/24 74.8 kg (165 lb)     Temp Readings from Last 1 Encounters:   12/18/24 98.3 °F (36.8 °C) (Oral)     Recent Labs     12/16/24  1030 12/17/24  0442 12/18/24  0848   BUN 18  --  20   CREATININE 0.56* 0.89 0.76   WBC 7.5  --  8.0     Estimated Creatinine Clearance: 64 mL/min (based on SCr of 0.76 mg/dL).    Lab Results   Component Value Date/Time    VANCORANDOM 12.9 12/18/2024 12:03 PM        MRSA Nasal Swab: N/A. Non-respiratory infection    Assessment:  Date/Time Dose Concentration AUC   12/18 1203 750 mg q12h 12.9 291   Note: Serum concentrations collected for AUC dosing may appear elevated if collected in close proximity to the dose administered, this is not necessarily an indication of toxicity    Plan:  Current dosing regimen is sub-therapeutic  Increase dose to 1500 mg IV every 12 hours for predicted AUC/Tr of 570/12.9  Repeat vancomycin concentrations will be ordered as clinically appropriate   Pharmacy will continue to monitor patient and adjust therapy as indicated    Thank you for the consult,  Chuck Kaba [facility]         
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    Lab Results   Component Value Date/Time    VANCORANDOM 14.0 01/18/2025 06:01 AM      MRSA Nasal Swab: N/A. Non-respiratory infection    Assessment:  Date/Time Dose Concentration AUC   12/18 1203 750 mg q12h 12.9 291   12/19 0803 1500 mg q12h 34 753   12/20 0546 1000 mg q12h 26.6 732   12/21 0825 750 mg q12h 22.9 675   12/23 0459 500 mg q12h 16.6 398   12/24 0438 1250 mg q24h 15.7 643   12/26 0409 1000 mg q24h 15.4 397   12/27 0507 1250 mg q24h 13.4 479   12/30 0613 1250 mg q24h 12.2 480   1/6 0432 1250 mg q24h 13.2 457   1/13 0638 1250 mg q24h 13.0 505   1/18 0601 1250 mg Q24H 14.0 525   Note: Serum concentrations collected for AUC dosing may appear elevated if collected in close proximity to the dose administered, this is not necessarily an indication of toxicity    Plan:  Regimen continues to be therapeutic, so will continue vancomycin 1250 mg IV every 24 hours  Unless renal function changes significantly, should not need any further levels through EOT on 1/22/25  Pharmacy will continue to monitor patient and adjust therapy as indicated    Thank you for the consult,  Feliciano Anderson [facility]      
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    Lab Results   Component Value Date/Time    VANCORANDOM 14.0 01/18/2025 06:01 AM      MRSA Nasal Swab: N/A. Non-respiratory infection    Assessment:  Date/Time Dose Concentration AUC   12/18 1203 750 mg q12h 12.9 291   12/19 0803 1500 mg q12h 34 753   12/20 0546 1000 mg q12h 26.6 732   12/21 0825 750 mg q12h 22.9 675   12/23 0459 500 mg q12h 16.6 398   12/24 0438 1250 mg q24h 15.7 643   12/26 0409 1000 mg q24h 15.4 397   12/27 0507 1250 mg q24h 13.4 479   12/30 0613 1250 mg q24h 12.2 480   1/6 0432 1250 mg q24h 13.2 457   1/13 0638 1250 mg q24h 13.0 505   1/18 0601 1250 mg Q24H 14.0 525   Note: Serum concentrations collected for AUC dosing may appear elevated if collected in close proximity to the dose administered, this is not necessarily an indication of toxicity    Plan:  Regimen continues to be therapeutic, so will continue vancomycin 1250 mg IV every 24 hours  Unless renal function changes significantly, should not need any further levels through EOT on 1/22/25  Pharmacy will continue to monitor patient and adjust therapy as indicated    Thank you for the consult,  Alfredo Martinez [facility]  
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    Lab Results   Component Value Date/Time    VANCORANDOM 14.0 01/18/2025 06:01 AM      MRSA Nasal Swab: N/A. Non-respiratory infection    Assessment:  Date/Time Dose Concentration AUC   12/18 1203 750 mg q12h 12.9 291   12/19 0803 1500 mg q12h 34 753   12/20 0546 1000 mg q12h 26.6 732   12/21 0825 750 mg q12h 22.9 675   12/23 0459 500 mg q12h 16.6 398   12/24 0438 1250 mg q24h 15.7 643   12/26 0409 1000 mg q24h 15.4 397   12/27 0507 1250 mg q24h 13.4 479   12/30 0613 1250 mg q24h 12.2 480   1/6 0432 1250 mg q24h 13.2 457   1/13 0638 1250 mg q24h 13.0 505   1/18 0601 1250 mg Q24H 14.0 525   Note: Serum concentrations collected for AUC dosing may appear elevated if collected in close proximity to the dose administered, this is not necessarily an indication of toxicity    Plan:  Regimen continues to be therapeutic, so will continue vancomycin 1250 mg IV every 24 hours  Unless renal function changes significantly, should not need any further levels through EOT on 1/22/25  Pharmacy will continue to monitor patient and adjust therapy as indicated    Thank you for the consult,  Feliciano Anderson [facility]      
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    Lab Results   Component Value Date/Time    VANCORANDOM 12.2 12/30/2024 06:13 AM        MRSA Nasal Swab: N/A. Non-respiratory infection    Assessment:  Date/Time Dose Concentration AUC   12/18 1203 750 mg q12h 12.9 291   12/19 0803 1500 mg q12h 34 753   12/20 0546 1000 mg q12h 26.6 732   12/21 0825 750 mg q12h 22.9 675   12/23 0459 500 mg q12h 16.6 398   12/24 0438 1250 mg q24h 15.7 643   12/26 0409 1000 mg q24h 15.4 397   12/27 0507 1250 mg q24h 13.4 479   12/30 0613 1250 mg q24h 12.2 480   Note: Serum concentrations collected for AUC dosing may appear elevated if collected in close proximity to the dose administered, this is not necessarily an indication of toxicity    Plan:  Regimen continues to be therapeutic, so will continue vancomycin 1250 mg IV every 24 hours  Repeat vancomycin concentrations will be ordered as clinically appropriate   Will monitor [facility] serum creatinine - if no acute changes, tentative plan to monitor vancomycin concentrations weekly   Pharmacy will continue to monitor patient and adjust therapy as indicated    Thank you for the consult,  HOLLY SHAVER [facility]        
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    Lab Results   Component Value Date/Time    VANCORANDOM 12.2 12/30/2024 06:13 AM        MRSA Nasal Swab: N/A. Non-respiratory infection    Assessment:  Date/Time Dose Concentration AUC   12/18 1203 750 mg q12h 12.9 291   12/19 0803 1500 mg q12h 34 753   12/20 0546 1000 mg q12h 26.6 732   12/21 0825 750 mg q12h 22.9 675   12/23 0459 500 mg q12h 16.6 398   12/24 0438 1250 mg q24h 15.7 643   12/26 0409 1000 mg q24h 15.4 397   12/27 0507 1250 mg q24h 13.4 479   12/30 0613 1250 mg q24h 12.2 480   Note: Serum concentrations collected for AUC dosing may appear elevated if collected in close proximity to the dose administered, this is not necessarily an indication of toxicity    Plan:  Regimen continues to be therapeutic, so will continue vancomycin 1250 mg IV every 24 hours  Repeat vancomycin concentrations will be ordered as clinically appropriate   Will monitor [facility] serum creatinine - if no acute changes, tentative plan to monitor vancomycin concentrations weekly   Pharmacy will continue to monitor patient and adjust therapy as indicated    Thank you for the consult,  Feliciano Anderson [facility]    
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    Lab Results   Component Value Date/Time    VANCORANDOM 13.4 12/27/2024 05:07 AM        MRSA Nasal Swab: N/A. Non-respiratory infection    Assessment:  Date/Time Dose Concentration AUC   12/18 1203 750 mg q12h 12.9 291   12/19 0803 1500 mg q12h 34 753   12/20 0546 1000 mg q12h 26.6 732   12/21 0825 750 mg q12h 22.9 675   12/23 0459 500 mg Q12H 16.6 398   12/24 0438 1250 mg Q24H 15.7 643   12/26 0409 1000 mg Q24H 15.4 397   12/27 0507 1250 mg Q24H 13.4 479   Note: Serum concentrations collected for AUC dosing may appear elevated if collected in close proximity to the dose administered, this is not necessarily an indication of toxicity    Plan:  Regimen therapeutic, so continue vancomycin 1250 mg IV every 24 hours  Repeat vancomycin concentrations will be ordered as clinically appropriate   Pharmacy will continue to monitor patient and adjust therapy as indicated    Thank you for the consult,  Chuck Kaba RPH            
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    Lab Results   Component Value Date/Time    VANCORANDOM 14.0 01/18/2025 06:01 AM      MRSA Nasal Swab: N/A. Non-respiratory infection    Assessment:  Date/Time Dose Concentration AUC   12/18 1203 750 mg q12h 12.9 291   12/19 0803 1500 mg q12h 34 753   12/20 0546 1000 mg q12h 26.6 732   12/21 0825 750 mg q12h 22.9 675   12/23 0459 500 mg q12h 16.6 398   12/24 0438 1250 mg q24h 15.7 643   12/26 0409 1000 mg q24h 15.4 397   12/27 0507 1250 mg q24h 13.4 479   12/30 0613 1250 mg q24h 12.2 480   1/6 0432 1250 mg q24h 13.2 457   1/13 0638 1250 mg q24h 13.0 505   1/18 0601 1250 mg Q24H 14.0 525   Note: Serum concentrations collected for AUC dosing may appear elevated if collected in close proximity to the dose administered, this is not necessarily an indication of toxicity    Plan:  Regimen continues to be therapeutic, so will continue vancomycin 1250 mg IV every 24 hours  Today is expected end of therapy after dose at 1100.  Pharmacy will continue to monitor patient and adjust therapy as indicated    Thank you for the consult,  Feliciano Anderson [facility]        
VANCO DAILY FOLLOW UP NOTE  [facility]   Pharmacy Pharmacokinetic Monitoring Service - Vancomycin    Consulting Provider: Annemarie Francois, APRN - CNP    Indication: MRSA bacteremia  Target Concentration: Goal AUC/AVTAR 400-600 mg*hr/L  Day of Therapy: 1  Additional Antimicrobials: N/A    Pertinent Laboratory Values:   Wt Readings from Last 1 Encounters:   12/10/24 74.8 kg (165 lb)     Temp Readings from Last 1 Encounters:   12/16/24 97.5 °F (36.4 °C) (Oral)     Recent Labs     12/16/24  1030   BUN 18   CREATININE 0.56*   WBC 7.5     Estimated Creatinine Clearance: 87 mL/min (A) (based on SCr of 0.56 mg/dL (L)).      MRSA Nasal Swab: N/A. Non-respiratory infection    Assessment:  Date/Time Dose Concentration AUC         Note: Serum concentrations collected for AUC dosing may appear elevated if collected in close proximity to the dose administered, this is not necessarily an indication of toxicity    Plan:  Dosing recommendations based on Bayesian software  Start vancomycin 1500 mg X 1, then 750 mg Q8H  Anticipated AUC of 504 and trough concentration of 13.6 at steady state  Renal labs as indicated   Vancomycin concentrations will be ordered as clinically appropriate  Pharmacy will continue to monitor patient and adjust therapy as indicated    Thank you for the consult,  Feliciano Anderson RP    
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    Lab Results   Component Value Date/Time    VANCORANDOM 12.2 12/30/2024 06:13 AM        MRSA Nasal Swab: N/A. Non-respiratory infection    Assessment:  Date/Time Dose Concentration AUC   12/18 1203 750 mg q12h 12.9 291   12/19 0803 1500 mg q12h 34 753   12/20 0546 1000 mg q12h 26.6 732   12/21 0825 750 mg q12h 22.9 675   12/23 0459 500 mg q12h 16.6 398   12/24 0438 1250 mg q24h 15.7 643   12/26 0409 1000 mg q24h 15.4 397   12/27 0507 1250 mg q24h 13.4 479   12/30 0613 1250 mg q24h 12.2 480   Note: Serum concentrations collected for AUC dosing may appear elevated if collected in close proximity to the dose administered, this is not necessarily an indication of toxicity    Plan:  Regimen continues to be therapeutic, so will continue vancomycin 1250 mg IV every 24 hours  Will monitor [facility] serum creatinine - if no acute changes, tentative plan to monitor vancomycin concentrations weekly   Repeat vancomycin concentration ordered for 01/06 @ 0400    Pharmacy will continue to monitor patient and adjust therapy as indicated    Thank you for the consult,  HOLLY SHAVER JONATHAN          
VANCO DAILY FOLLOW UP NOTE  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    Lab Results   Component Value Date/Time    VANCORANDOM 12.2 12/30/2024 06:13 AM        MRSA Nasal Swab: N/A. Non-respiratory infection    Assessment:  Date/Time Dose Concentration AUC   12/18 1203 750 mg q12h 12.9 291   12/19 0803 1500 mg q12h 34 753   12/20 0546 1000 mg q12h 26.6 732   12/21 0825 750 mg q12h 22.9 675   12/23 0459 500 mg q12h 16.6 398   12/24 0438 1250 mg q24h 15.7 643   12/26 0409 1000 mg q24h 15.4 397   12/27 0507 1250 mg q24h 13.4 479   12/30 0613 1250 mg q24h 12.2 480   Note: Serum concentrations collected for AUC dosing may appear elevated if collected in close proximity to the dose administered, this is not necessarily an indication of toxicity    Plan:  Regimen continues to be therapeutic, so will continue vancomycin 1250 mg IV every 24 hours  Repeat vancomycin concentrations will be ordered as clinically appropriate   Will monitor F serum creatinine - if no acute changes, tentative plan to monitor vancomycin concentrations weekly   Pharmacy will continue to monitor patient and adjust therapy as indicated    Thank you for the consult,  Rubia Reed [facility]  
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    Lab Results   Component Value Date/Time    VANCORANDOM 13.2 01/06/2025 04:32 AM        MRSA Nasal Swab: N/A. Non-respiratory infection    Assessment:  Date/Time Dose Concentration AUC   12/18 1203 750 mg q12h 12.9 291   12/19 0803 1500 mg q12h 34 753   12/20 0546 1000 mg q12h 26.6 732   12/21 0825 750 mg q12h 22.9 675   12/23 0459 500 mg q12h 16.6 398   12/24 0438 1250 mg q24h 15.7 643   12/26 0409 1000 mg q24h 15.4 397   12/27 0507 1250 mg q24h 13.4 479   12/30 0613 1250 mg q24h 12.2 480   1/6 0432 1250 mg Q24H 13.2 457   Note: Serum concentrations collected for AUC dosing may appear elevated if collected in close proximity to the dose administered, this is not necessarily an indication of toxicity    Plan:  Regimen continues to be therapeutic, so will continue vancomycin 1250 mg IV every 24 hours  Will monitor F serum creatinine - if no acute changes, tentative plan to monitor vancomycin concentrations weekly   Repeat vancomycin concentrations as needed.  Pharmacy will continue to monitor patient and adjust therapy as indicated    Thank you for the consult,  Chuck Kaba RPH                
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    Lab Results   Component Value Date/Time    VANCORANDOM 13.4 12/27/2024 05:07 AM        MRSA Nasal Swab: N/A. Non-respiratory infection    Assessment:  Date/Time Dose Concentration AUC   12/18 1203 750 mg q12h 12.9 291   12/19 0803 1500 mg q12h 34 753   12/20 0546 1000 mg q12h 26.6 732   12/21 0825 750 mg q12h 22.9 675   12/23 0459 500 mg Q12H 16.6 398   12/24 0438 1250 mg Q24H 15.7 643   12/26 0409 1000 mg Q24H 15.4 397   12/27 0507 1250 mg Q24H 13.4 479   Note: Serum concentrations collected for AUC dosing may appear elevated if collected in close proximity to the dose administered, this is not necessarily an indication of toxicity    Plan:  Regimen therapeutic, so continue vancomycin 1250 mg Q24H.  Repeat vancomycin concentrations will be ordered as clinically appropriate   Pharmacy will continue to monitor patient and adjust therapy as indicated    Thank you for the consult,  Feliciano Anderson [facility]          
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    Lab Results   Component Value Date/Time    VANCORANDOM 12.2 12/30/2024 06:13 AM        MRSA Nasal Swab: N/A. Non-respiratory infection    Assessment:  Date/Time Dose Concentration AUC   12/18 1203 750 mg q12h 12.9 291   12/19 0803 1500 mg q12h 34 753   12/20 0546 1000 mg q12h 26.6 732   12/21 0825 750 mg q12h 22.9 675   12/23 0459 500 mg q12h 16.6 398   12/24 0438 1250 mg q24h 15.7 643   12/26 0409 1000 mg q24h 15.4 397   12/27 0507 1250 mg q24h 13.4 479   12/30 0613 1250 mg q24h 12.2 480   Note: Serum concentrations collected for AUC dosing may appear elevated if collected in close proximity to the dose administered, this is not necessarily an indication of toxicity    Plan:  Regimen continues to be therapeutic, so will continue vancomycin 1250 mg IV every 24 hours  Repeat vancomycin concentrations will be ordered as clinically appropriate   Will monitor [facility] serum creatinine - if no acute changes, tentative plan to monitor vancomycin concentrations weekly   Pharmacy will continue to monitor patient and adjust therapy as indicated    Thank you for the consult,  Feliciano Anderson [facility]      
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    Lab Results   Component Value Date/Time    VANCORANDOM 13.2 01/06/2025 04:32 AM        MRSA Nasal Swab: N/A. Non-respiratory infection    Assessment:  Date/Time Dose Concentration AUC   12/18 1203 750 mg q12h 12.9 291   12/19 0803 1500 mg q12h 34 753   12/20 0546 1000 mg q12h 26.6 732   12/21 0825 750 mg q12h 22.9 675   12/23 0459 500 mg q12h 16.6 398   12/24 0438 1250 mg q24h 15.7 643   12/26 0409 1000 mg q24h 15.4 397   12/27 0507 1250 mg q24h 13.4 479   12/30 0613 1250 mg q24h 12.2 480   1/6 0432 1250 mg Q24H 13.2 457   Note: Serum concentrations collected for AUC dosing may appear elevated if collected in close proximity to the dose administered, this is not necessarily an indication of toxicity    Plan:  Regimen continues to be therapeutic, so will continue vancomycin 1250 mg IV every 24 hours  Will monitor [facility] serum creatinine - if no acute changes, tentative plan to monitor vancomycin concentrations weekly   Repeat vancomycin concentrations as needed.  Pharmacy will continue to monitor patient and adjust therapy as indicated    Thank you for the consult,  Feliciano Anderson [facility]              
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    Lab Results   Component Value Date/Time    VANCORANDOM 13.2 01/06/2025 04:32 AM      MRSA Nasal Swab: N/A. Non-respiratory infection    Assessment:  Date/Time Dose Concentration AUC   12/18 1203 750 mg q12h 12.9 291   12/19 0803 1500 mg q12h 34 753   12/20 0546 1000 mg q12h 26.6 732   12/21 0825 750 mg q12h 22.9 675   12/23 0459 500 mg q12h 16.6 398   12/24 0438 1250 mg q24h 15.7 643   12/26 0409 1000 mg q24h 15.4 397   12/27 0507 1250 mg q24h 13.4 479   12/30 0613 1250 mg q24h 12.2 480   1/6 0432 1250 mg q24h 13.2 457   Note: Serum concentrations collected for AUC dosing may appear elevated if collected in close proximity to the dose administered, this is not necessarily an indication of toxicity    Plan:  Regimen continues to be therapeutic, so will continue vancomycin 1250 mg IV every 24 hours  Will monitor [facility] serum creatinine - if no acute changes, tentative plan to monitor vancomycin concentrations weekly   Repeat vancomycin concentrations as needed.  Pharmacy will continue to monitor patient and adjust therapy as indicated    Thank you for the consult,  Rubia Reed, [facility]  
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    Lab Results   Component Value Date/Time    VANCORANDOM 13.2 01/06/2025 04:32 AM      MRSA Nasal Swab: N/A. Non-respiratory infection    Assessment:  Date/Time Dose Concentration AUC   12/18 1203 750 mg q12h 12.9 291   12/19 0803 1500 mg q12h 34 753   12/20 0546 1000 mg q12h 26.6 732   12/21 0825 750 mg q12h 22.9 675   12/23 0459 500 mg q12h 16.6 398   12/24 0438 1250 mg q24h 15.7 643   12/26 0409 1000 mg q24h 15.4 397   12/27 0507 1250 mg q24h 13.4 479   12/30 0613 1250 mg q24h 12.2 480   1/6 0432 1250 mg Q24H 13.2 457   Note: Serum concentrations collected for AUC dosing may appear elevated if collected in close proximity to the dose administered, this is not necessarily an indication of toxicity    Plan:  Regimen continues to be therapeutic, so will continue vancomycin 1250 mg IV every 24 hours  Will monitor [facility] serum creatinine - if no acute changes, tentative plan to monitor vancomycin concentrations weekly   Repeat vancomycin concentrations as needed.  Pharmacy will continue to monitor patient and adjust therapy as indicated    Thank you for the consult,  Chuck Kaba [facility]    
VANCO DAILY FOLLOW UP NOTE  [facility]   Pharmacy Pharmacokinetic Monitoring Service - Vancomycin    Consulting Provider: Annemarie Francois APRN - CNP    Indication: MRSA bacteremia  Target Concentration: Goal AUC/AVTAR 400-600 mg*hr/L  Day of Therapy: 2  Additional Antimicrobials: N/A    Pertinent Laboratory Values:   Wt Readings from Last 1 Encounters:   12/10/24 74.8 kg (165 lb)     Temp Readings from Last 1 Encounters:   12/17/24 98.2 °F (36.8 °C) (Axillary)     Recent Labs     12/16/24  1030 12/17/24  0442   BUN 18  --    CREATININE 0.56* 0.89   WBC 7.5  --      Estimated Creatinine Clearance: 55 mL/min (based on SCr of 0.89 mg/dL).      MRSA Nasal Swab: N/A. Non-respiratory infection    Assessment:  Date/Time Dose Concentration AUC         Note: Serum concentrations collected for AUC dosing may appear elevated if collected in close proximity to the dose administered, this is not necessarily an indication of toxicity    Plan:  Dosing recommendations based on Bayesian software  Renal markers increasing over last 24 hours, will empirically reduce vancomycin to 750 mg IV q12h  Anticipated AUC of 464 and trough concentration of 12.2 at steady state  Renal labs as indicated   Vancomycin concentrations will be ordered as clinically appropriate  Pharmacy will continue to monitor patient and adjust therapy as indicated    Thank you for the consult,  Alfredo Martinez RPH      
needed norco       Active Problems:    Rash/skin eruption  Plan:     SLE (systemic lupus erythematosus) ([facility])  Plan:     H/O Sjogren's disease ([facility])  Plan:     Autoimmune disease ([facility])  Plan:     Pemphigus foliaceus  Plan:   1-15-25  Follows with derm- Dr. Vargas personally spoke with Dr. Huddleston her dermatologist 1-9-25 who advises IV solumedrol 30 mg every 12 hours for 5 days, then decrease to 30 mg IV daily for 5 days then prednisone 40 mg daily  She will need PJP prophylaxis if on dose of steroids > prednisone 20 mg for more than 4 weeks, on bactrim for PJP prophylaxis. Continue Bactrim.  She was referred for rituxan but missed that visit   Increase of steroids approved by ID 1-9  Dr. Vargas messaged with ID and feels LLE changes from 1-9 are consistent with her known derm issues   Plaquenil        COPD exacerbation  RSV infection  -Respiratory panel positive with RSV  -Already on steroids  -Continue Duoneb for wheezing  -Continue high-dose vitamin C and Mucinex  -Encouraged adequate hydration        Hypertension  Plan:   1-15-24  Lisinopril           Chronic respiratory failure with hypoxia  Plan:   1-15-25  Baseline 2L PRN          Acute metabolic encephalopathy  Plan:   1-15-25  Resolved         Hyperglycemia:  DM2  1-15-25  Stopped SSI        Anemia:  1-15-25  Trend HGB      Anticipated Discharge Arrangements:   Home    PT/OT evals ordered?  Therapy evals ordered  Diet:  ADULT DIET; Regular  VTE prophylaxis: Lovenox  Code status: Full Code      Non-peripheral Lines and Tubes (if present):       PICC 12/22/24 Left Cephalic (Active)       Telemetry (if present):  Cardiac/Telemetry Monitor On: No        Hospital Problems:  Principal Problem:    MRSA bacteremia  Active Problems:    Rash/skin eruption    Hypertension    GERD (gastroesophageal reflux disease)    History of right breast cancer    SLE (systemic lupus erythematosus) ([facility])    H/O Sjogren's disease ([facility])    Autoimmune disease ([facility])    COPD exacerbation 
  Patient Vitals for the past 24 hrs:   Temp Pulse Resp BP SpO2   01/12/25 1125 -- -- 16 -- --   01/12/25 1009 -- -- 16 -- --   01/12/25 0757 -- 86 16 -- 96 %   01/12/25 0715 97.9 °F (36.6 °C) 88 16 (!) 151/98 96 %   01/12/25 0655 -- -- 16 -- --   01/12/25 0348 97.5 °F (36.4 °C) 85 17 111/78 91 %   01/11/25 2127 -- -- 18 -- --   01/11/25 2057 -- -- 19 135/70 --   01/11/25 2027 -- 100 18 -- 92 %   01/11/25 2018 99 °F (37.2 °C) (!) 103 18 -- 92 %   01/11/25 1811 -- -- 18 -- --   01/11/25 1504 -- -- 18 -- --   01/11/25 1500 97.5 °F (36.4 °C) (!) 105 12 (!) 148/91 93 %       Oxygen Therapy  SpO2: 96 %  Pulse via Oximetry: 85 beats per minute  Pulse Oximeter Device Mode: Intermittent  Pulse Oximeter Device Location: Finger  O2 Device: Nasal cannula  Skin Assessment: Clean, dry, & intact  O2 Flow Rate (L/min): 2 L/min    Estimated body mass index is 32.39 kg/m² as calculated from the following:    Height as of this encounter: 1.52 m (4' 11.84\").    Weight as of this encounter: 74.8 kg (165 lb).  No intake or output data in the 24 hours ending 01/12/25 1250            Physical Exam:     General:    Well nourished.  no distress, alert  CV:   RRR.  III/VI BARRERA LUSB, no edema   Lungs:   Coarse  Abdomen:   Soft, nontender, nondistended.lipoma like lesion mid epigastric area,  nontender  Extremities: No cyanosis or clubbing.  No edema  Skin:     LE improved, less erythematous   Neuro:  Nonfocal     I have personally reviewed labs and tests:  Recent Labs:  Recent Results (from the past 48 hour(s))   CBC with Auto Differential    Collection Time: 01/11/25  5:07 AM   Result Value Ref Range    WBC 10.1 4.3 - 11.1 K/uL    RBC 4.32 4.05 - 5.2 M/uL    Hemoglobin 10.8 (L) 11.7 - 15.4 g/dL    Hematocrit 36.7 35.8 - 46.3 %    MCV 85.0 82 - 102 FL    MCH 25.0 (L) 26.1 - 32.9 PG    MCHC 29.4 (L) 31.4 - 35.0 g/dL    RDW 18.1 (H) 11.9 - 14.6 %    Platelets 245 150 - 450 K/uL    MPV 10.3 9.4 - 12.3 FL    nRBC 0.00 0.0 - 0.2 K/uL    
GERD (gastroesophageal reflux disease)    History of right breast cancer    SLE (systemic lupus erythematosus) ([facility])    H/O Sjogren's disease ([facility])    Autoimmune disease ([facility])    Pemphigus foliaceus    Chronic respiratory failure with hypoxia    Centrilobular emphysema ([facility])    Current chronic use of systemic steroids    Noncompliance    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    Acute metabolic encephalopathy    MRSA infection    Dehydration  Resolved Problems:    * No resolved hospital problems. *      Objective:   Patient Vitals for the past 24 hrs:   Temp Pulse Resp BP SpO2   12/15/24 0818 97.5 °F (36.4 °C) 82 16 (!) 140/70 90 %   12/15/24 0339 98.2 °F (36.8 °C) 84 19 120/79 90 %   12/14/24 1951 99.5 °F (37.5 °C) 91 17 132/76 93 %   12/14/24 1746 -- -- 16 -- --   12/14/24 1601 98.4 °F (36.9 °C) 92 16 (!) 142/69 93 %       Oxygen Therapy  SpO2: 90 %  Pulse via Oximetry: 85 beats per minute  Pulse Oximeter Device Mode: Intermittent  O2 Device: Nasal cannula  O2 Flow Rate (L/min): 3 L/min    Estimated body mass index is 32.39 kg/m² as calculated from the following:    Height as of this encounter: 1.52 m (4' 11.84\").    Weight as of this encounter: 74.8 kg (165 lb).    Intake/Output Summary (Last 24 hours) at 12/15/2024 0959  Last data filed at 12/14/2024 1751  Gross per 24 hour   Intake 360 ml   Output --   Net 360 ml           Physical Exam:   General:    Well nourished. Agitated.   Head:  Normocephalic, atraumatic  Eyes:  Sclerae appear normal.  Pupils equally round.  ENT:  Nares appear normal.  Moist oral mucosa  Neck:  No restricted ROM.  Trachea midline   CV:   RRR.  No m/r/g.  No jugular venous distension.  Lungs:   CTAB.  No wheezing, rhonchi, or rales.  Symmetric expansion.  Abdomen:   Soft, nontender, nondistended.  Extremities: No cyanosis or clubbing.  No edema  Skin:     No rashes.  Normal coloration.   Warm and dry.    Neuro:  CN II-XII grossly intact.    Psych:  Confused. Agitated.    I have 
AM   Result Value Ref Range    Vancomycin Rm 14.0 UG/ML   Basic Metabolic Panel w/ Reflex to MG    Collection Time: 01/19/25  4:29 AM   Result Value Ref Range    Sodium 143 136 - 145 mmol/L    Potassium 3.9 3.5 - 5.1 mmol/L    Chloride 102 98 - 107 mmol/L    CO2 29 20 - 29 mmol/L    Anion Gap 12 7 - 16 mmol/L    Glucose 133 (H) 70 - 99 mg/dL    BUN 17 8 - 23 MG/DL    Creatinine 1.08 0.60 - 1.10 MG/DL    Est, Glom Filt Rate 55 (L) >60 ml/min/1.73m2    Calcium 8.9 8.8 - 10.2 MG/DL   CBC with Auto Differential    Collection Time: 01/19/25  4:29 AM   Result Value Ref Range    WBC 9.4 4.3 - 11.1 K/uL    RBC 4.08 4.05 - 5.2 M/uL    Hemoglobin 10.2 (L) 11.7 - 15.4 g/dL    Hematocrit 33.9 (L) 35.8 - 46.3 %    MCV 83.1 82 - 102 FL    MCH 25.0 (L) 26.1 - 32.9 PG    MCHC 30.1 (L) 31.4 - 35.0 g/dL    RDW 19.0 (H) 11.9 - 14.6 %    Platelets 190 150 - 450 K/uL    MPV 10.1 9.4 - 12.3 FL    nRBC 0.00 0.0 - 0.2 K/uL    Differential Type AUTOMATED      Neutrophils % 62.6 43.0 - 78.0 %    Lymphocytes % 18.8 13.0 - 44.0 %    Monocytes % 11.4 4.0 - 12.0 %    Eosinophils % 3.6 0.5 - 7.8 %    Basophils % 1.0 0.0 - 2.0 %    Immature Granulocytes % 2.6 0.0 - 5.0 %    Neutrophils Absolute 5.89 1.70 - 8.20 K/UL    Lymphocytes Absolute 1.77 0.50 - 4.60 K/UL    Monocytes Absolute 1.07 0.10 - 1.30 K/UL    Eosinophils Absolute 0.34 0.00 - 0.80 K/UL    Basophils Absolute 0.09 0.00 - 0.20 K/UL    Immature Granulocytes Absolute 0.24 0.0 - 0.5 K/UL       No results for input(s): \"COVID19\" in the last 72 hours.    Current Meds:  Current Facility-Administered Medications   Medication Dose Route Frequency    ipratropium 0.5 mg-albuterol 2.5 mg (DUONEB) nebulizer solution 1 Dose  1 Dose Inhalation Q4H WA RT    methylPREDNISolone sodium succ (SOLU-MEDROL) 40 mg in sterile water 1 mL injection  40 mg IntraVENous Q8H    ascorbic acid (VITAMIN C) tablet 1,500 mg  1,500 mg Oral Daily    guaiFENesin (MUCINEX) extended release tablet 600 mg  600 mg Oral BID    
g Oral Daily PRN    acetaminophen (TYLENOL) tablet 650 mg  650 mg Oral Q6H PRN    Or    acetaminophen (TYLENOL) suppository 650 mg  650 mg Rectal Q6H PRN    DULoxetine (CYMBALTA) extended release capsule 60 mg  60 mg Oral Daily    hydroxychloroquine (PLAQUENIL) tablet 200 mg  200 mg Oral Daily    lisinopril (PRINIVIL;ZESTRIL) tablet 20 mg  20 mg Oral Daily    pantoprazole (PROTONIX) tablet 40 mg  40 mg Oral BID AC    pregabalin (LYRICA) capsule 150 mg  150 mg Oral BID    HYDROmorphone HCl PF (DILAUDID) injection 0.25 mg  0.25 mg IntraVENous Q4H PRN    HYDROmorphone HCl PF (DILAUDID) injection 0.5 mg  0.5 mg IntraVENous Q4H PRN    DAPTOmycin (CUBICIN) 750 mg in sodium chloride (PF) 0.9 % 15 mL IV syringe  10 mg/kg IntraVENous Q24H    hydrocortisone 1 % cream   Topical 4x Daily       Signed:  JOEY SWANSON MD    Part of this note may have been written by using a voice dictation software.  The note has been proof read but may still contain some grammatical/other typographical errors.    
injection 50 mg  50 mg IntraDERmal Once    sodium chloride flush 0.9 % injection 5-40 mL  5-40 mL IntraVENous 2 times per day    sodium chloride flush 0.9 % injection 5-40 mL  5-40 mL IntraVENous PRN    0.9 % sodium chloride infusion   IntraVENous PRN    lidocaine PF 1 % injection 50 mg  50 mg IntraDERmal Once    lactobacillus (CULTURELLE) capsule 1 capsule  1 capsule Oral BID WC    alcohol 62% (NOZIN) nasal  1 ampule  1 ampule Nasal Q12H    HYDROcodone-acetaminophen (NORCO) 7.5-325 MG per tablet 1 tablet  1 tablet Oral Q6H PRN    Hydrocerin (EUCERIN) cream CREA   Topical 4x Daily    mineral oil-hydrophilic petrolatum (AQUAPHOR) ointment   Topical BID PRN    triamcinolone (KENALOG) 0.1 % cream   Topical BID    predniSONE (DELTASONE) tablet 20 mg  20 mg Oral Daily    diphenhydrAMINE (BENADRYL) injection 25 mg  25 mg IntraVENous Q6H PRN    OLANZapine (ZyPREXA) 7.5 mg in sterile water 1.5 mL injection  7.5 mg IntraMUSCular BID PRN    QUEtiapine (SEROQUEL) tablet 50 mg  50 mg Oral Nightly    sodium chloride flush 0.9 % injection 5-40 mL  5-40 mL IntraVENous 2 times per day    sodium chloride flush 0.9 % injection 5-40 mL  5-40 mL IntraVENous PRN    0.9 % sodium chloride infusion   IntraVENous PRN    potassium chloride (KLOR-CON M) extended release tablet 40 mEq  40 mEq Oral PRN    Or    potassium bicarb-citric acid (EFFER-K) effervescent tablet 40 mEq  40 mEq Oral PRN    Or    potassium chloride 10 mEq/100 mL IVPB (Peripheral Line)  10 mEq IntraVENous PRN    magnesium sulfate 2000 mg in 50 mL IVPB premix  2,000 mg IntraVENous PRN    enoxaparin (LOVENOX) injection 40 mg  40 mg SubCUTAneous Daily    ondansetron (ZOFRAN-ODT) disintegrating tablet 4 mg  4 mg Oral Q8H PRN    Or    ondansetron (ZOFRAN) injection 4 mg  4 mg IntraVENous Q6H PRN    polyethylene glycol (GLYCOLAX) packet 17 g  17 g Oral Daily PRN    acetaminophen (TYLENOL) tablet 650 mg  650 mg Oral Q6H PRN    Or    acetaminophen (TYLENOL) suppository 650 
sodium chloride flush 0.9 % injection 5-40 mL  5-40 mL IntraVENous 2 times per day    sodium chloride flush 0.9 % injection 5-40 mL  5-40 mL IntraVENous PRN    0.9 % sodium chloride infusion   IntraVENous PRN    lidocaine PF 1 % injection 50 mg  50 mg IntraDERmal Once    lactobacillus (CULTURELLE) capsule 1 capsule  1 capsule Oral BID WC    alcohol 62% (NOZIN) nasal  1 ampule  1 ampule Nasal Q12H    HYDROcodone-acetaminophen (NORCO) 7.5-325 MG per tablet 1 tablet  1 tablet Oral Q6H PRN    Hydrocerin (EUCERIN) cream CREA   Topical 4x Daily    mineral oil-hydrophilic petrolatum (AQUAPHOR) ointment   Topical BID PRN    triamcinolone (KENALOG) 0.1 % cream   Topical BID    predniSONE (DELTASONE) tablet 20 mg  20 mg Oral Daily    diphenhydrAMINE (BENADRYL) injection 25 mg  25 mg IntraVENous Q6H PRN    OLANZapine (ZyPREXA) 7.5 mg in sterile water 1.5 mL injection  7.5 mg IntraMUSCular BID PRN    QUEtiapine (SEROQUEL) tablet 50 mg  50 mg Oral Nightly    sodium chloride flush 0.9 % injection 5-40 mL  5-40 mL IntraVENous 2 times per day    sodium chloride flush 0.9 % injection 5-40 mL  5-40 mL IntraVENous PRN    0.9 % sodium chloride infusion   IntraVENous PRN    potassium chloride (KLOR-CON M) extended release tablet 40 mEq  40 mEq Oral PRN    Or    potassium bicarb-citric acid (EFFER-K) effervescent tablet 40 mEq  40 mEq Oral PRN    Or    potassium chloride 10 mEq/100 mL IVPB (Peripheral Line)  10 mEq IntraVENous PRN    magnesium sulfate 2000 mg in 50 mL IVPB premix  2,000 mg IntraVENous PRN    enoxaparin (LOVENOX) injection 40 mg  40 mg SubCUTAneous Daily    ondansetron (ZOFRAN-ODT) disintegrating tablet 4 mg  4 mg Oral Q8H PRN    Or    ondansetron (ZOFRAN) injection 4 mg  4 mg IntraVENous Q6H PRN    polyethylene glycol (GLYCOLAX) packet 17 g  17 g Oral Daily PRN    acetaminophen (TYLENOL) tablet 650 mg  650 mg Oral Q6H PRN    Or    acetaminophen (TYLENOL) suppository 650 mg  650 mg Rectal Q6H PRN    DULoxetine 
   Or    ondansetron (ZOFRAN) injection 4 mg  4 mg IntraVENous Q6H PRN    polyethylene glycol (GLYCOLAX) packet 17 g  17 g Oral Daily PRN    acetaminophen (TYLENOL) tablet 650 mg  650 mg Oral Q6H PRN    Or    acetaminophen (TYLENOL) suppository 650 mg  650 mg Rectal Q6H PRN    DULoxetine (CYMBALTA) extended release capsule 60 mg  60 mg Oral Daily    hydroxychloroquine (PLAQUENIL) tablet 200 mg  200 mg Oral Daily    lisinopril (PRINIVIL;ZESTRIL) tablet 20 mg  20 mg Oral Daily    pantoprazole (PROTONIX) tablet 40 mg  40 mg Oral BID AC    pregabalin (LYRICA) capsule 150 mg  150 mg Oral BID    HYDROmorphone HCl PF (DILAUDID) injection 0.25 mg  0.25 mg IntraVENous Q4H PRN    HYDROmorphone HCl PF (DILAUDID) injection 0.5 mg  0.5 mg IntraVENous Q4H PRN    hydrocortisone 1 % cream   Topical 4x Daily       Signed:  Desi Reyes DO    Part of this note may have been written by using a voice dictation software.  The note has been proof read but may still contain some grammatical/other typographical errors.  
5-40 mL IntraVENous 2 times per day    sodium chloride flush 0.9 % injection 5-40 mL  5-40 mL IntraVENous PRN    0.9 % sodium chloride infusion   IntraVENous PRN    potassium chloride (KLOR-CON M) extended release tablet 40 mEq  40 mEq Oral PRN    Or    potassium bicarb-citric acid (EFFER-K) effervescent tablet 40 mEq  40 mEq Oral PRN    Or    potassium chloride 10 mEq/100 mL IVPB (Peripheral Line)  10 mEq IntraVENous PRN    magnesium sulfate 2000 mg in 50 mL IVPB premix  2,000 mg IntraVENous PRN    enoxaparin (LOVENOX) injection 40 mg  40 mg SubCUTAneous Daily    ondansetron (ZOFRAN-ODT) disintegrating tablet 4 mg  4 mg Oral Q8H PRN    Or    ondansetron (ZOFRAN) injection 4 mg  4 mg IntraVENous Q6H PRN    polyethylene glycol (GLYCOLAX) packet 17 g  17 g Oral Daily PRN    acetaminophen (TYLENOL) tablet 650 mg  650 mg Oral Q6H PRN    Or    acetaminophen (TYLENOL) suppository 650 mg  650 mg Rectal Q6H PRN    DULoxetine (CYMBALTA) extended release capsule 60 mg  60 mg Oral Daily    hydroxychloroquine (PLAQUENIL) tablet 200 mg  200 mg Oral Daily    lisinopril (PRINIVIL;ZESTRIL) tablet 20 mg  20 mg Oral Daily    pantoprazole (PROTONIX) tablet 40 mg  40 mg Oral BID AC    pregabalin (LYRICA) capsule 150 mg  150 mg Oral BID    HYDROmorphone HCl PF (DILAUDID) injection 0.25 mg  0.25 mg IntraVENous Q4H PRN    HYDROmorphone HCl PF (DILAUDID) injection 0.5 mg  0.5 mg IntraVENous Q4H PRN    hydrocortisone 1 % cream   Topical 4x Daily       Signed:  Dav Farfan MD    Part of this note may have been written by using a voice dictation software.  The note has been proof read but may still contain some grammatical/other typographical errors.    
BID    predniSONE (DELTASONE) tablet 20 mg  20 mg Oral Daily    diphenhydrAMINE (BENADRYL) injection 25 mg  25 mg IntraVENous Q6H PRN    OLANZapine (ZyPREXA) 7.5 mg in sterile water 1.5 mL injection  7.5 mg IntraMUSCular BID PRN    QUEtiapine (SEROQUEL) tablet 50 mg  50 mg Oral Nightly    sodium chloride flush 0.9 % injection 5-40 mL  5-40 mL IntraVENous 2 times per day    sodium chloride flush 0.9 % injection 5-40 mL  5-40 mL IntraVENous PRN    0.9 % sodium chloride infusion   IntraVENous PRN    potassium chloride (KLOR-CON M) extended release tablet 40 mEq  40 mEq Oral PRN    Or    potassium bicarb-citric acid (EFFER-K) effervescent tablet 40 mEq  40 mEq Oral PRN    Or    potassium chloride 10 mEq/100 mL IVPB (Peripheral Line)  10 mEq IntraVENous PRN    magnesium sulfate 2000 mg in 50 mL IVPB premix  2,000 mg IntraVENous PRN    enoxaparin (LOVENOX) injection 40 mg  40 mg SubCUTAneous Daily    ondansetron (ZOFRAN-ODT) disintegrating tablet 4 mg  4 mg Oral Q8H PRN    Or    ondansetron (ZOFRAN) injection 4 mg  4 mg IntraVENous Q6H PRN    polyethylene glycol (GLYCOLAX) packet 17 g  17 g Oral Daily PRN    acetaminophen (TYLENOL) tablet 650 mg  650 mg Oral Q6H PRN    Or    acetaminophen (TYLENOL) suppository 650 mg  650 mg Rectal Q6H PRN    DULoxetine (CYMBALTA) extended release capsule 60 mg  60 mg Oral Daily    hydroxychloroquine (PLAQUENIL) tablet 200 mg  200 mg Oral Daily    lisinopril (PRINIVIL;ZESTRIL) tablet 20 mg  20 mg Oral Daily    pantoprazole (PROTONIX) tablet 40 mg  40 mg Oral BID AC    pregabalin (LYRICA) capsule 150 mg  150 mg Oral BID    HYDROmorphone HCl PF (DILAUDID) injection 0.25 mg  0.25 mg IntraVENous Q4H PRN    HYDROmorphone HCl PF (DILAUDID) injection 0.5 mg  0.5 mg IntraVENous Q4H PRN    DAPTOmycin (CUBICIN) 750 mg in sodium chloride (PF) 0.9 % 15 mL IV syringe  10 mg/kg IntraVENous Q24H    hydrocortisone 1 % cream   Topical 4x Daily       Signed:  JOEY SWANSON MD    Part of this note may have 
DETECTED        Staphylococcus Aureus NOT DETECTED        Staphylococcus epidermidis by PCR NOT DETECTED        Staphylococcus lugdunensis by PCR NOT DETECTED        STREPTOCOCCUS NOT DETECTED        Streptococcus agalactiae (Group B) NOT DETECTED        Strep pneumoniae NOT DETECTED        Strep pyogenes,(Grp. A) NOT DETECTED        Acinetobacter calcoac baumannii complex by PCR NOT DETECTED        Bacteroides fragilis by PCR NOT DETECTED        Enterobacteriaceae by PCR NOT DETECTED        Enterobacter cloacae complex by PCR NOT DETECTED        Escherichia Coli NOT DETECTED        Klebsiella aerogenes by PCR NOT DETECTED        Klebsiella oxytoca by PCR NOT DETECTED        Klebsiella pneumoniae group by PCR NOT DETECTED        Proteus by PCR NOT DETECTED        Salmonella species by PCR NOT DETECTED        Serratia marcescens by PCR NOT DETECTED        Haemophilus Influenzae by PCR NOT DETECTED        Neisseria meningitidis by PCR NOT DETECTED        Pseudomonas aeruginosa NOT DETECTED        Stenotrophomonas maltophilia by PCR NOT DETECTED        Candida albicans by PCR NOT DETECTED        Candida auris by PCR NOT DETECTED        Candida glabrata NOT DETECTED        Candida krusei by PCR NOT DETECTED        Candida parapsilosis by PCR NOT DETECTED        Candida tropicalis by PCR NOT DETECTED        Cryptococcus neoformans/gattii by PCR NOT DETECTED        Resistant gene targets          [employer] test comment       False positive results may rarely occur. Correlate with clinical,epidemiologic, and other laboratory findings           Comment: Please see BC[facility] Interpretation Guide in EPIC Links               Imaging:     I have personally reviewed imaging studies showing:  XR CHEST PORTABLE    Result Date: 12/6/2024  No acute diagnostic abnormality. No interval change. Electronically signed by Deny Steve    CT HEAD WO CONTRAST    Result Date: 12/6/2024  1. No acute intracranial abnormality. 2. Left caudate 
Monocytes % 1 (L) 4.0 - 12.0 %    Eosinophils % 0 (L) 0.5 - 7.8 %    Basophils % 1 0.0 - 2.0 %    Immature Granulocytes % 0 0.0 - 5.0 %    Neutrophils Absolute 5.1 1.7 - 8.2 K/UL    Lymphocytes Absolute 0.6 0.5 - 4.6 K/UL    Monocytes Absolute 0.0 (L) 0.1 - 1.3 K/UL    Eosinophils Absolute 0.0 0.0 - 0.8 K/UL    Basophils Absolute 0.0 0.0 - 0.2 K/UL    Immature Granulocytes Absolute 0.0 0.0 - 0.5 K/UL   Basic Metabolic Panel w/ Reflex to MG    Collection Time: 12/07/24  6:26 AM   Result Value Ref Range    Sodium 140 136 - 145 mmol/L    Potassium 4.6 3.5 - 5.1 mmol/L    Chloride 107 98 - 107 mmol/L    CO2 21 20 - 29 mmol/L    Anion Gap 12 7 - 16 mmol/L    Glucose 151 (H) 70 - 99 mg/dL    BUN 18 8 - 23 MG/DL    Creatinine 0.86 0.60 - 1.10 MG/DL    Est, Glom Filt Rate 72 >60 ml/min/1.73m2    Calcium 8.6 (L) 8.8 - 10.2 MG/DL   C-Reactive Protein    Collection Time: 12/07/24  6:26 AM   Result Value Ref Range    CRP 13.4 (H) 0.0 - 0.4 mg/dL   CK    Collection Time: 12/07/24  6:26 AM   Result Value Ref Range    Total  21 - 215 U/L   TSH    Collection Time: 12/07/24  8:35 AM   Result Value Ref Range    TSH, 3rd Generation 0.759 0.270 - 4.200 uIU/mL   T4, Free    Collection Time: 12/07/24  8:35 AM   Result Value Ref Range    T4 Free 1.1 0.9 - 1.7 NG/DL   Ammonia    Collection Time: 12/07/24  8:35 AM   Result Value Ref Range    Ammonia 22 11 - 51 umol/L   Lactic Acid    Collection Time: 12/07/24  8:35 AM   Result Value Ref Range    Lactic Acid 0.6 0.5 - 2.0 mmol/L   Vitamin D 25 Hydroxy    Collection Time: 12/07/24  8:35 AM   Result Value Ref Range    Vit D, 25-Hydroxy 6.3 (L) 30.0 - 100.0 ng/mL       No results for input(s): \"COVID19\" in the last 72 hours.    Current Meds:  Current Facility-Administered Medications   Medication Dose Route Frequency    0.9 % sodium chloride infusion   IntraVENous Continuous    diphenhydrAMINE (BENADRYL) injection 25 mg  25 mg IntraVENous Q6H PRN    sodium chloride flush 0.9 % injection 
Proteus by PCR NOT DETECTED        Salmonella species by PCR NOT DETECTED        Serratia marcescens by PCR NOT DETECTED        Haemophilus Influenzae by PCR NOT DETECTED        Neisseria meningitidis by PCR NOT DETECTED        Pseudomonas aeruginosa NOT DETECTED        Stenotrophomonas maltophilia by PCR NOT DETECTED        Candida albicans by PCR NOT DETECTED        Candida auris by PCR NOT DETECTED        Candida glabrata NOT DETECTED        Candida krusei by PCR NOT DETECTED        Candida parapsilosis by PCR NOT DETECTED        Candida tropicalis by PCR NOT DETECTED        Cryptococcus neoformans/gattii by PCR NOT DETECTED        Resistant gene targets          [employer] test comment       False positive results may rarely occur. Correlate with clinical,epidemiologic, and other laboratory findings           Comment: Please see BC[facility] Interpretation Guide in EPIC Links               Imaging:     I have personally reviewed imaging studies showing:  XR CHEST PORTABLE    Result Date: 12/6/2024  No acute diagnostic abnormality. No interval change. Electronically signed by Deny Steve    CT HEAD WO CONTRAST    Result Date: 12/6/2024  1. No acute intracranial abnormality. 2. Left caudate nucleus lacune. Electronically signed by Deny Steve       Echocardiogram:  10/30/24    ECHO (TTE) COMPLETE (PRN CONTRAST/BUBBLE/STRAIN/3D) 11/02/2024  5:09 PM (Final)    Interpretation Summary    Left Ventricle: Normal left ventricular systolic function with a visually estimated EF of 55 - 60%. Left ventricle size is normal. Moderately increased wall thickness. Moderately increased ventricular mass. Findings consistent with moderate concentric hypertrophy. Normal wall motion.    Aortic Valve: Mildly thickened cusps. Mildly calcified cusps. Mild sclerosis of the aortic valve cusps.    Mitral Valve: Mild annular calcification.    Image quality is adequate.    Signed by: Pierce Aragon MD on 11/2/2024  5:09 PM      Signed By: 
Status: Canceled Specimen: Stool     Culture, Blood 1 [[id number]] Collected: 12/11/24 1128    Order Status: Completed Specimen: Blood Updated: 12/15/24 0717     Special Requests RIGHT FOREARM        Culture NO GROWTH 4 DAYS       Culture, Blood 1 [[id number]] Collected: 12/11/24 1128    Order Status: Completed Specimen: Blood Updated: 12/15/24 0717     Special Requests LEFT HAND        Culture NO GROWTH 4 DAYS       Culture, Blood 1 [[id number]]  (Abnormal)  (Susceptibility) Collected: 12/09/24 1139    Order Status: Completed Specimen: Blood Updated: 12/13/24 0742     Special Requests --        LEFT  HAND       Gram Stain GRAM POSITIVE RODS         ANAEROBIC BOTTLE POSITIVE               RESULTS VERIFIED, PHONED TO AND READ BACK BY SALENA ENRIQUEZ AT 1428 ON 12/10/24, BR            Gram positive cocci         AEROBIC BOTTLE POSITIVE               RESULTS VERIFIED, PHONED TO AND READ BACK BY ELPIDIO CHAN @ 8249 ON 12.10.24  SH           Culture       Methicillin Resistant Staphylococcus aureus                  DIPHTHEROIDS This organism may be indicative of culture contamination. Clinical correlation needs to be evaluated as each case is unique.                  Refer to Blood Culture ID Panel Accession [id number]          Susceptibility        Methicillin-Resistant Staphylococcus aureus      BACTERIAL SUSCEPTIBILITY PANEL AVTAR      DAPTOmycin 3 ug/mL  [1]        linezolid 2 ug/mL Sensitive      oxacillin >=4 ug/mL Resistant      rifampin <=0.5 ug/mL Sensitive  [2]       tetracycline <=1 ug/mL Sensitive      trimethoprim-sulfamethoxazole <=10 ug/mL Sensitive      vancomycin <=0.5 ug/mL Sensitive                   [1]  E TEST     [2]  Rifampin is not to be used for mono-therapy.                    Culture, Blood 2 [[id number]] Collected: 12/09/24 1139    Order Status: Completed Specimen: Blood Updated: 12/14/24 0755     Special Requests --        RIGHT  HAND       Culture NO GROWTH 5 DAYS       Culture, Blood, PCR ID 
diphenhydrAMINE (BENADRYL) injection 25 mg  25 mg IntraVENous Q6H PRN    OLANZapine (ZyPREXA) 7.5 mg in sterile water 1.5 mL injection  7.5 mg IntraMUSCular BID PRN    QUEtiapine (SEROQUEL) tablet 50 mg  50 mg Oral Nightly    sodium chloride flush 0.9 % injection 5-40 mL  5-40 mL IntraVENous 2 times per day    sodium chloride flush 0.9 % injection 5-40 mL  5-40 mL IntraVENous PRN    0.9 % sodium chloride infusion   IntraVENous PRN    potassium chloride (KLOR-CON M) extended release tablet 40 mEq  40 mEq Oral PRN    Or    potassium bicarb-citric acid (EFFER-K) effervescent tablet 40 mEq  40 mEq Oral PRN    Or    potassium chloride 10 mEq/100 mL IVPB (Peripheral Line)  10 mEq IntraVENous PRN    magnesium sulfate 2000 mg in 50 mL IVPB premix  2,000 mg IntraVENous PRN    enoxaparin (LOVENOX) injection 40 mg  40 mg SubCUTAneous Daily    ondansetron (ZOFRAN-ODT) disintegrating tablet 4 mg  4 mg Oral Q8H PRN    Or    ondansetron (ZOFRAN) injection 4 mg  4 mg IntraVENous Q6H PRN    polyethylene glycol (GLYCOLAX) packet 17 g  17 g Oral Daily PRN    acetaminophen (TYLENOL) tablet 650 mg  650 mg Oral Q6H PRN    Or    acetaminophen (TYLENOL) suppository 650 mg  650 mg Rectal Q6H PRN    DULoxetine (CYMBALTA) extended release capsule 60 mg  60 mg Oral Daily    hydroxychloroquine (PLAQUENIL) tablet 200 mg  200 mg Oral Daily    lisinopril (PRINIVIL;ZESTRIL) tablet 20 mg  20 mg Oral Daily    pantoprazole (PROTONIX) tablet 40 mg  40 mg Oral BID AC    pregabalin (LYRICA) capsule 150 mg  150 mg Oral BID    HYDROmorphone HCl PF (DILAUDID) injection 0.25 mg  0.25 mg IntraVENous Q4H PRN    HYDROmorphone HCl PF (DILAUDID) injection 0.5 mg  0.5 mg IntraVENous Q4H PRN    hydrocortisone 1 % cream   Topical 4x Daily       Signed:  RUFINA LOPEZ MD      
BID PRN    QUEtiapine (SEROQUEL) tablet 50 mg  50 mg Oral Nightly    sodium chloride flush 0.9 % injection 5-40 mL  5-40 mL IntraVENous 2 times per day    sodium chloride flush 0.9 % injection 5-40 mL  5-40 mL IntraVENous PRN    0.9 % sodium chloride infusion   IntraVENous PRN    potassium chloride (KLOR-CON M) extended release tablet 40 mEq  40 mEq Oral PRN    Or    potassium bicarb-citric acid (EFFER-K) effervescent tablet 40 mEq  40 mEq Oral PRN    Or    potassium chloride 10 mEq/100 mL IVPB (Peripheral Line)  10 mEq IntraVENous PRN    magnesium sulfate 2000 mg in 50 mL IVPB premix  2,000 mg IntraVENous PRN    enoxaparin (LOVENOX) injection 40 mg  40 mg SubCUTAneous Daily    ondansetron (ZOFRAN-ODT) disintegrating tablet 4 mg  4 mg Oral Q8H PRN    Or    ondansetron (ZOFRAN) injection 4 mg  4 mg IntraVENous Q6H PRN    polyethylene glycol (GLYCOLAX) packet 17 g  17 g Oral Daily PRN    acetaminophen (TYLENOL) tablet 650 mg  650 mg Oral Q6H PRN    Or    acetaminophen (TYLENOL) suppository 650 mg  650 mg Rectal Q6H PRN    DULoxetine (CYMBALTA) extended release capsule 60 mg  60 mg Oral Daily    hydroxychloroquine (PLAQUENIL) tablet 200 mg  200 mg Oral Daily    lisinopril (PRINIVIL;ZESTRIL) tablet 20 mg  20 mg Oral Daily    pantoprazole (PROTONIX) tablet 40 mg  40 mg Oral BID AC    pregabalin (LYRICA) capsule 150 mg  150 mg Oral BID    HYDROmorphone HCl PF (DILAUDID) injection 0.25 mg  0.25 mg IntraVENous Q4H PRN    HYDROmorphone HCl PF (DILAUDID) injection 0.5 mg  0.5 mg IntraVENous Q4H PRN    hydrocortisone 1 % cream   Topical 4x Daily       Signed:  RUFINA LOPEZ MD

## 2025-01-23 NOTE — NURSE NAVIGATOR
Per phone call with Zakiya with [facility], patient has a follow up appointment with them on Monday 1/27.

## 2025-01-23 NOTE — DISCHARGE SUMMARY
Hospitalist Discharge Summary   Admit Date:  2024  3:59 PM   DC Note date: 2025  Name:  Meli Blancas   Age:  69 y.o.  Sex:  female  :  1955   MRN:  [id number]   Room:  [facility]– [place] 2]  PCP:  Florencio Banegas MD    Presenting Complaint: Altered Mental Status and Fall     Initial Admission Diagnosis: Altered mental status, unspecified altered mental status type [R41.82]  Acute metabolic encephalopathy [G93.41]     Problem List for this Hospitalization (present on admission):    Principal Problem:    MRSA bacteremia  Active Problems:    Rash/skin eruption    Hypertension    GERD (gastroesophageal reflux disease)    History of right breast cancer    SLE (systemic lupus erythematosus) (HCC)    H/O Sjogren's disease (HCC)    Autoimmune disease (HCC)    COPD exacerbation (HCC)    Pemphigus foliaceus    Chronic respiratory failure with hypoxia    Centrilobular emphysema (HCC)    Current chronic use of systemic steroids    Noncompliance    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 female with medical history of MRSA bacteremia, penphigus foliaceous, sjogrens syndrome on steroids, followed by dermatology Dr. Huddleston, COPD on as needed 2 L NC, HTN, admitted for MRSA AV endocarditis, failing outpatient treatment.     Failed outpatient antibiotics for MRSA AV IE- noncompliant with meds, original EOT 24 discharged from home health. CT head shows chronic lacunar CVA. MRI Brain negative. SILVA with evidence of endocarditis. She was not an outpatient candidate due to failed initial compliance, and patient refuses STR.     Patient's dermatologist, Dr. Huddleston, recommended solumedrol course while inpatient, then reducing to prednisone 40 mg daily after. While she is on prednisone greater than 20mg daily she will

## 2025-01-24 ENCOUNTER — TELEPHONE (OUTPATIENT)
Dept: CASE MANAGEMENT | Age: 70
End: 2025-01-24

## 2025-01-24 NOTE — NURSE NAVIGATOR
RN Navigator attempted to call patient regarding follow up care, medications, appointment with PCP.  No answer, voicemail not set up so unable to leave a message.  Will call again on Monday 1/27/25 to attempt to reach and speak with patient.

## 2025-01-27 ENCOUNTER — APPOINTMENT (OUTPATIENT)
Dept: GENERAL RADIOLOGY | Age: 70
End: 2025-01-27
Payer: MEDICARE

## 2025-01-27 ENCOUNTER — APPOINTMENT (OUTPATIENT)
Dept: CT IMAGING | Age: 70
End: 2025-01-27
Payer: MEDICARE

## 2025-01-27 ENCOUNTER — HOSPITAL ENCOUNTER (INPATIENT)
Age: 70
LOS: 5 days | Discharge: HOME | End: 2025-02-01
Attending: EMERGENCY MEDICINE | Admitting: INTERNAL MEDICINE
Payer: MEDICARE

## 2025-01-27 ENCOUNTER — FOLLOWUP TELEPHONE ENCOUNTER (OUTPATIENT)
Dept: CASE MANAGEMENT | Age: 70
End: 2025-01-27

## 2025-01-27 DIAGNOSIS — R19.7 VOMITING AND DIARRHEA: ICD-10-CM

## 2025-01-27 DIAGNOSIS — R06.00 DYSPNEA, UNSPECIFIED TYPE: ICD-10-CM

## 2025-01-27 DIAGNOSIS — Z91.199 NONCOMPLIANCE: ICD-10-CM

## 2025-01-27 DIAGNOSIS — R09.02 HYPOXIA: ICD-10-CM

## 2025-01-27 DIAGNOSIS — R11.10 VOMITING AND DIARRHEA: ICD-10-CM

## 2025-01-27 DIAGNOSIS — L10.2 PEMPHIGUS FOLIACEUS: Primary | ICD-10-CM

## 2025-01-27 PROBLEM — J96.21 ACUTE ON CHRONIC RESPIRATORY FAILURE WITH HYPOXEMIA: Status: ACTIVE | Noted: 2025-01-27

## 2025-01-27 LAB
ALBUMIN SERPL-MCNC: 2.5 G/DL (ref 3.2–4.6)
ALBUMIN/GLOB SERPL: 0.8 (ref 1–1.9)
ALP SERPL-CCNC: 67 U/L (ref 35–104)
ALT SERPL-CCNC: 13 U/L (ref 8–45)
ANION GAP SERPL CALC-SCNC: 10 MMOL/L (ref 7–16)
ARTERIAL PATENCY WRIST A: ABNORMAL
AST SERPL-CCNC: 22 U/L (ref 15–37)
BASE EXCESS BLDV CALC-SCNC: 6.5 MMOL/L
BASOPHILS # BLD: 0.02 K/UL (ref 0–0.2)
BASOPHILS NFR BLD: 0.2 % (ref 0–2)
BILIRUB SERPL-MCNC: 0.3 MG/DL (ref 0–1.2)
BUN SERPL-MCNC: 21 MG/DL (ref 8–23)
CALCIUM SERPL-MCNC: 8.2 MG/DL (ref 8.8–10.2)
CHLORIDE SERPL-SCNC: 98 MMOL/L (ref 98–107)
CO2 SERPL-SCNC: 29 MMOL/L (ref 20–29)
CREAT SERPL-MCNC: 1 MG/DL (ref 0.6–1.1)
DIFFERENTIAL METHOD BLD: ABNORMAL
EOSINOPHIL # BLD: 1.59 K/UL (ref 0–0.8)
EOSINOPHIL NFR BLD: 12.7 % (ref 0.5–7.8)
ERYTHROCYTE [DISTWIDTH] IN BLOOD BY AUTOMATED COUNT: 18.4 % (ref 11.9–14.6)
GAS FLOW.O2 O2 DELIVERY SYS: ABNORMAL
GLOBULIN SER CALC-MCNC: 3.1 G/DL (ref 2.3–3.5)
GLUCOSE SERPL-MCNC: 125 MG/DL (ref 70–99)
HCO3 BLDV-SCNC: 33 MMOL/L (ref 23–28)
HCT VFR BLD AUTO: 38.5 % (ref 35.8–46.3)
HGB BLD-MCNC: 11.7 G/DL (ref 11.7–15.4)
IMM GRANULOCYTES # BLD AUTO: 0.07 K/UL (ref 0–0.5)
IMM GRANULOCYTES NFR BLD AUTO: 0.6 % (ref 0–5)
LACTATE SERPL-SCNC: 1.4 MMOL/L (ref 0.5–2)
LYMPHOCYTES # BLD: 2.3 K/UL (ref 0.5–4.6)
LYMPHOCYTES NFR BLD: 18.4 % (ref 13–44)
MCH RBC QN AUTO: 25.1 PG (ref 26.1–32.9)
MCHC RBC AUTO-ENTMCNC: 30.4 G/DL (ref 31.4–35)
MCV RBC AUTO: 82.6 FL (ref 82–102)
MONOCYTES # BLD: 1 K/UL (ref 0.1–1.3)
MONOCYTES NFR BLD: 8 % (ref 4–12)
NEUTS SEG # BLD: 7.5 K/UL (ref 1.7–8.2)
NEUTS SEG NFR BLD: 60.1 % (ref 43–78)
NRBC # BLD: 0 K/UL (ref 0–0.2)
NT PRO BNP: 236 PG/ML (ref 0–125)
PCO2 BLDV: 54.7 MMHG (ref 41–51)
PH BLDV: 7.39 (ref 7.32–7.42)
PLATELET # BLD AUTO: 237 K/UL (ref 150–450)
PMV BLD AUTO: 10.4 FL (ref 9.4–12.3)
PO2 BLDV: 30 MMHG
POC FIO2: 4
POTASSIUM SERPL-SCNC: 3.7 MMOL/L (ref 3.5–5.1)
PROCALCITONIN SERPL-MCNC: 0.11 NG/ML (ref 0–0.1)
PROT SERPL-MCNC: 5.7 G/DL (ref 6.3–8.2)
RBC # BLD AUTO: 4.66 M/UL (ref 4.05–5.2)
RESPIRATORY RATE, POC: 16 (ref 5–40)
SAO2 % BLDV: 55.1 % (ref 65–88)
SERVICE CMNT-IMP: ABNORMAL
SERVICE CMNT-IMP: ABNORMAL
SODIUM SERPL-SCNC: 137 MMOL/L (ref 136–145)
SPECIMEN TYPE: ABNORMAL
TROPONIN T SERPL HS-MCNC: 37 NG/L (ref 0–14)
TROPONIN T SERPL HS-MCNC: 42 NG/L (ref 0–14)
WBC # BLD AUTO: 12.5 K/UL (ref 4.3–11.1)

## 2025-01-27 PROCEDURE — 2500000003 HC RX 250 WO HCPCS: Performed by: EMERGENCY MEDICINE

## 2025-01-27 PROCEDURE — 85025 COMPLETE CBC W/AUTO DIFF WBC: CPT

## 2025-01-27 PROCEDURE — 84484 ASSAY OF TROPONIN QUANT: CPT

## 2025-01-27 PROCEDURE — 84145 PROCALCITONIN (PCT): CPT

## 2025-01-27 PROCEDURE — 36415 COLL VENOUS BLD VENIPUNCTURE: CPT

## 2025-01-27 PROCEDURE — 82803 BLOOD GASES ANY COMBINATION: CPT

## 2025-01-27 PROCEDURE — 99285 EMERGENCY DEPT VISIT HI MDM: CPT

## 2025-01-27 PROCEDURE — 6360000002 HC RX W HCPCS: Performed by: EMERGENCY MEDICINE

## 2025-01-27 PROCEDURE — 1100000000 HC RM PRIVATE

## 2025-01-27 PROCEDURE — 6360000004 HC RX CONTRAST MEDICATION: Performed by: EMERGENCY MEDICINE

## 2025-01-27 PROCEDURE — 6370000000 HC RX 637 (ALT 250 FOR IP): Performed by: EMERGENCY MEDICINE

## 2025-01-27 PROCEDURE — 71260 CT THORAX DX C+: CPT

## 2025-01-27 PROCEDURE — 83605 ASSAY OF LACTIC ACID: CPT

## 2025-01-27 PROCEDURE — 71045 X-RAY EXAM CHEST 1 VIEW: CPT

## 2025-01-27 PROCEDURE — 80053 COMPREHEN METABOLIC PANEL: CPT

## 2025-01-27 PROCEDURE — 83880 ASSAY OF NATRIURETIC PEPTIDE: CPT

## 2025-01-27 PROCEDURE — 96374 THER/PROPH/DIAG INJ IV PUSH: CPT

## 2025-01-27 RX ORDER — HYDROCODONE BITARTRATE AND ACETAMINOPHEN 5; 325 MG/1; MG/1
1 TABLET ORAL
Status: COMPLETED | OUTPATIENT
Start: 2025-01-27 | End: 2025-01-27

## 2025-01-27 RX ORDER — IOPAMIDOL 755 MG/ML
100 INJECTION, SOLUTION INTRAVASCULAR
Status: COMPLETED | OUTPATIENT
Start: 2025-01-27 | End: 2025-01-27

## 2025-01-27 RX ADMIN — IOPAMIDOL 100 ML: 755 INJECTION, SOLUTION INTRAVENOUS at 21:54

## 2025-01-27 RX ADMIN — HYDROCODONE BITARTRATE AND ACETAMINOPHEN 1 TABLET: 5; 325 TABLET ORAL at 20:19

## 2025-01-27 RX ADMIN — METHYLPREDNISOLONE SODIUM SUCCINATE 125 MG: 125 INJECTION INTRAMUSCULAR; INTRAVENOUS at 22:22

## 2025-01-27 ASSESSMENT — ENCOUNTER SYMPTOMS
VOMITING: 1
NAUSEA: 1
ABDOMINAL PAIN: 0
SORE THROAT: 0
DIARRHEA: 1
COUGH: 1
BACK PAIN: 0
SHORTNESS OF BREATH: 1
EYE REDNESS: 0
WHEEZING: 0

## 2025-01-27 ASSESSMENT — PAIN SCALES - GENERAL: PAINLEVEL_OUTOF10: 7

## 2025-01-27 NOTE — NURSE NAVIGATOR
RN navigator spoke to patient by phone for follow-up. Patient states she is \"not doing too good\" and being seen by the nurse right now from [facility].  Patient asked if this RN Navigator could call her back.  Advised I would call her back to speak with her shortly, so she could finish her appointment.

## 2025-01-27 NOTE — ED TRIAGE NOTES
Pt arrives via [facility] from home. NP came out for routine visit- 8L of O2 sating 88%. Per EMS- 5L sating at 94-96%. Does not use O2 at home.     Pt refused EKG en route.     Hx of endocarditis- hospitalized 12/6-1/23 (also positive for RSV during this time)    Per EMS report, pt also reports they have not urinated in \"3-4 days\".     128/70    98 HR

## 2025-01-27 NOTE — PROGRESS NOTES
RN navigator spoke to patient by phone for follow-up. Patient states she has an appointment with [facility] today and has an appointment with [facility] on 2/12/25.     Patient states no exacerbation of COPD symptoms at this time.     Medications reviewed with patient and patient verbalized understanding of how to administer medications. Patient states current adequate access to medications and refills. No need for assistance obtaining medications at this time. Patient states she has been unable to take her medications since she has been discharged home because has had \" a stomach bug and couldn't keep anything down.\" Patient states she is feeling better today and is getting an appetite.  This RN Navigator encouraged patient to eat small bland meals and attempt to take medications as prescribed today. Patient verbalized understanding.     Patient seems to be compliant with plan of care and education provided.     Patient encouraged to keep/make routine appointments with PCP and keep vaccinations current. This RN Navigator will continue to follow.

## 2025-01-27 NOTE — ED NOTES
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      Lorie Kim RN  01/27/25 1756

## 2025-01-27 NOTE — ED PROVIDER NOTES
[employer] Emergency Department Provider Note                   PCP:                Unknown, Provider, SOM               Age: 69 y.o.      Sex: female     MEDICAL DECISION MAKING  Complexity of Problems Addressed:   1 or more acute illness/injury that poses a threat to life or bodily function    Data Reviewed and Analyzed:  Category 1:    I have reviewed outside records from an external source for any pertinent PMH, ED visits, primary care visits, specialist visits, labs, EKG, and/or radiologic studies.    Category 2:     ED EKG Interpretation  Patient refused EKG due to skin disorder and discomfort    I independently ordered and reviewed the labs.  I have reviewed the Radiologist interpretation of the radiologic studies. My medical decision making regarding the radiologic studies is based on the interpretation report of the board certified Radiologist.        The patient was admitted and I have discussed patient management with the admitting provider.    Category 3:     Discussion of management or test interpretation:    MDM  Number of Diagnoses or Management Options  Dyspnea, unspecified type  Hypoxia  Vomiting and diarrhea  Diagnosis management comments: Patient presented with vomiting, diarrhea, shortness of breath, and cough for the past 3 days.  She was discharged from the hospital 4 days ago for endocarditis.  Patient does have end-stage COPD and is on home oxygen.  She reportedly was satting in the 80s prior to arrival.  Patient was satting in the mid 90s on her home O2 level.  Patient's lungs were little diminished but otherwise the lung sounds were clear.  Patient was not in respiratory distress and did not need BiPAP or intubation.  Her VBG showed normal pH with very mild hypercapnia.  Patient's CBC shows an elevated white count.  Her CMP showed no significant abnormalities.  Her lactic acid is normal and the location severe sepsis.  Her chest x-ray showed no acute process.  Her chest CT angiogram is

## 2025-01-28 LAB
ANION GAP SERPL CALC-SCNC: 14 MMOL/L (ref 7–16)
BASOPHILS # BLD: 0.01 K/UL (ref 0–0.2)
BASOPHILS NFR BLD: 0.2 % (ref 0–2)
BUN SERPL-MCNC: 19 MG/DL (ref 8–23)
CALCIUM SERPL-MCNC: 8.9 MG/DL (ref 8.8–10.2)
CHLORIDE SERPL-SCNC: 101 MMOL/L (ref 98–107)
CO2 SERPL-SCNC: 25 MMOL/L (ref 20–29)
CREAT SERPL-MCNC: 0.89 MG/DL (ref 0.6–1.1)
DIFFERENTIAL METHOD BLD: ABNORMAL
EOSINOPHIL # BLD: 0.01 K/UL (ref 0–0.8)
EOSINOPHIL NFR BLD: 0.2 % (ref 0.5–7.8)
ERYTHROCYTE [DISTWIDTH] IN BLOOD BY AUTOMATED COUNT: 18.3 % (ref 11.9–14.6)
GLUCOSE BLD STRIP.AUTO-MCNC: 159 MG/DL (ref 65–100)
GLUCOSE BLD STRIP.AUTO-MCNC: 209 MG/DL (ref 65–100)
GLUCOSE SERPL-MCNC: 282 MG/DL (ref 70–99)
HCT VFR BLD AUTO: 38.1 % (ref 35.8–46.3)
HGB BLD-MCNC: 11.8 G/DL (ref 11.7–15.4)
IMM GRANULOCYTES # BLD AUTO: 0.04 K/UL (ref 0–0.5)
IMM GRANULOCYTES NFR BLD AUTO: 0.6 % (ref 0–5)
LYMPHOCYTES # BLD: 0.77 K/UL (ref 0.5–4.6)
LYMPHOCYTES NFR BLD: 12.1 % (ref 13–44)
MCH RBC QN AUTO: 24.8 PG (ref 26.1–32.9)
MCHC RBC AUTO-ENTMCNC: 31 G/DL (ref 31.4–35)
MCV RBC AUTO: 80 FL (ref 82–102)
MONOCYTES # BLD: 0.12 K/UL (ref 0.1–1.3)
MONOCYTES NFR BLD: 1.9 % (ref 4–12)
NEUTS SEG # BLD: 5.4 K/UL (ref 1.7–8.2)
NEUTS SEG NFR BLD: 85 % (ref 43–78)
NRBC # BLD: 0 K/UL (ref 0–0.2)
PLATELET # BLD AUTO: 272 K/UL (ref 150–450)
PMV BLD AUTO: 10.7 FL (ref 9.4–12.3)
POTASSIUM SERPL-SCNC: 4 MMOL/L (ref 3.5–5.1)
RBC # BLD AUTO: 4.76 M/UL (ref 4.05–5.2)
SERVICE CMNT-IMP: ABNORMAL
SERVICE CMNT-IMP: ABNORMAL
SODIUM SERPL-SCNC: 140 MMOL/L (ref 136–145)
WBC # BLD AUTO: 6.4 K/UL (ref 4.3–11.1)

## 2025-01-28 PROCEDURE — 94761 N-INVAS EAR/PLS OXIMETRY MLT: CPT

## 2025-01-28 PROCEDURE — 6370000000 HC RX 637 (ALT 250 FOR IP): Performed by: DERMATOLOGY

## 2025-01-28 PROCEDURE — 6360000002 HC RX W HCPCS: Performed by: INTERNAL MEDICINE

## 2025-01-28 PROCEDURE — 6370000000 HC RX 637 (ALT 250 FOR IP): Performed by: INTERNAL MEDICINE

## 2025-01-28 PROCEDURE — 82962 GLUCOSE BLOOD TEST: CPT

## 2025-01-28 PROCEDURE — 2500000003 HC RX 250 WO HCPCS: Performed by: INTERNAL MEDICINE

## 2025-01-28 PROCEDURE — 2580000003 HC RX 258: Performed by: INTERNAL MEDICINE

## 2025-01-28 PROCEDURE — 94640 AIRWAY INHALATION TREATMENT: CPT

## 2025-01-28 PROCEDURE — 2700000000 HC OXYGEN THERAPY PER DAY

## 2025-01-28 PROCEDURE — 36415 COLL VENOUS BLD VENIPUNCTURE: CPT

## 2025-01-28 PROCEDURE — 97530 THERAPEUTIC ACTIVITIES: CPT

## 2025-01-28 PROCEDURE — 97161 PT EVAL LOW COMPLEX 20 MIN: CPT

## 2025-01-28 PROCEDURE — 85025 COMPLETE CBC W/AUTO DIFF WBC: CPT

## 2025-01-28 PROCEDURE — 1100000003 HC PRIVATE W/ TELEMETRY

## 2025-01-28 PROCEDURE — 80048 BASIC METABOLIC PNL TOTAL CA: CPT

## 2025-01-28 RX ORDER — SODIUM CHLORIDE 9 MG/ML
INJECTION, SOLUTION INTRAVENOUS PRN
Status: DISCONTINUED | OUTPATIENT
Start: 2025-01-28 | End: 2025-02-01 | Stop reason: HOSPADM

## 2025-01-28 RX ORDER — HYDROXYCHLOROQUINE SULFATE 200 MG/1
200 TABLET, FILM COATED ORAL DAILY
Status: DISCONTINUED | OUTPATIENT
Start: 2025-01-28 | End: 2025-02-01 | Stop reason: HOSPADM

## 2025-01-28 RX ORDER — IPRATROPIUM BROMIDE AND ALBUTEROL SULFATE 2.5; .5 MG/3ML; MG/3ML
1 SOLUTION RESPIRATORY (INHALATION)
Status: DISCONTINUED | OUTPATIENT
Start: 2025-01-28 | End: 2025-02-01 | Stop reason: HOSPADM

## 2025-01-28 RX ORDER — AMLODIPINE BESYLATE 5 MG/1
5 TABLET ORAL DAILY
Status: DISCONTINUED | OUTPATIENT
Start: 2025-01-28 | End: 2025-02-01 | Stop reason: HOSPADM

## 2025-01-28 RX ORDER — HYDROCODONE BITARTRATE AND ACETAMINOPHEN 7.5; 325 MG/1; MG/1
1 TABLET ORAL EVERY 6 HOURS PRN
Status: DISCONTINUED | OUTPATIENT
Start: 2025-01-28 | End: 2025-02-01 | Stop reason: HOSPADM

## 2025-01-28 RX ORDER — DEXTROSE MONOHYDRATE 100 MG/ML
INJECTION, SOLUTION INTRAVENOUS CONTINUOUS PRN
Status: DISCONTINUED | OUTPATIENT
Start: 2025-01-28 | End: 2025-02-01 | Stop reason: HOSPADM

## 2025-01-28 RX ORDER — PANTOPRAZOLE SODIUM 40 MG/1
40 TABLET, DELAYED RELEASE ORAL
Status: DISCONTINUED | OUTPATIENT
Start: 2025-01-28 | End: 2025-02-01 | Stop reason: HOSPADM

## 2025-01-28 RX ORDER — CEPHALEXIN 500 MG/1
500 CAPSULE ORAL EVERY 12 HOURS SCHEDULED
Status: DISCONTINUED | OUTPATIENT
Start: 2025-01-28 | End: 2025-02-01 | Stop reason: HOSPADM

## 2025-01-28 RX ORDER — POTASSIUM CHLORIDE 1500 MG/1
40 TABLET, EXTENDED RELEASE ORAL PRN
Status: DISCONTINUED | OUTPATIENT
Start: 2025-01-28 | End: 2025-02-01 | Stop reason: HOSPADM

## 2025-01-28 RX ORDER — POLYETHYLENE GLYCOL 3350 17 G/17G
17 POWDER, FOR SOLUTION ORAL DAILY PRN
Status: DISCONTINUED | OUTPATIENT
Start: 2025-01-28 | End: 2025-02-01 | Stop reason: HOSPADM

## 2025-01-28 RX ORDER — FERROUS SULFATE 325(65) MG
325 TABLET ORAL 2 TIMES DAILY WITH MEALS
Status: DISCONTINUED | OUTPATIENT
Start: 2025-01-28 | End: 2025-02-01 | Stop reason: HOSPADM

## 2025-01-28 RX ORDER — ONDANSETRON 4 MG/1
4 TABLET, ORALLY DISINTEGRATING ORAL EVERY 8 HOURS PRN
Status: DISCONTINUED | OUTPATIENT
Start: 2025-01-28 | End: 2025-02-01 | Stop reason: HOSPADM

## 2025-01-28 RX ORDER — NYSTATIN 100000 U/G
200000 CREAM TOPICAL 2 TIMES DAILY
COMMUNITY

## 2025-01-28 RX ORDER — ACETAMINOPHEN 650 MG/1
650 SUPPOSITORY RECTAL EVERY 6 HOURS PRN
Status: DISCONTINUED | OUTPATIENT
Start: 2025-01-28 | End: 2025-02-01 | Stop reason: HOSPADM

## 2025-01-28 RX ORDER — ENOXAPARIN SODIUM 100 MG/ML
40 INJECTION SUBCUTANEOUS DAILY
Status: DISCONTINUED | OUTPATIENT
Start: 2025-01-28 | End: 2025-02-01 | Stop reason: HOSPADM

## 2025-01-28 RX ORDER — SULFAMETHOXAZOLE AND TRIMETHOPRIM 800; 160 MG/1; MG/1
1 TABLET ORAL
Status: DISCONTINUED | OUTPATIENT
Start: 2025-01-29 | End: 2025-01-28

## 2025-01-28 RX ORDER — SODIUM CHLORIDE FOR INHALATION 3 %
4 VIAL, NEBULIZER (ML) INHALATION
Status: DISCONTINUED | OUTPATIENT
Start: 2025-01-28 | End: 2025-02-01 | Stop reason: HOSPADM

## 2025-01-28 RX ORDER — HYDROCHLOROTHIAZIDE 12.5 MG/1
12.5 CAPSULE ORAL 2 TIMES DAILY
Status: ON HOLD | COMMUNITY
Start: 2023-11-13 | End: 2025-02-01 | Stop reason: HOSPADM

## 2025-01-28 RX ORDER — TRAZODONE HYDROCHLORIDE 50 MG/1
100 TABLET, FILM COATED ORAL NIGHTLY
Status: DISCONTINUED | OUTPATIENT
Start: 2025-01-28 | End: 2025-02-01 | Stop reason: HOSPADM

## 2025-01-28 RX ORDER — SODIUM CHLORIDE 0.9 % (FLUSH) 0.9 %
5-40 SYRINGE (ML) INJECTION PRN
Status: DISCONTINUED | OUTPATIENT
Start: 2025-01-28 | End: 2025-02-01 | Stop reason: HOSPADM

## 2025-01-28 RX ORDER — ESOMEPRAZOLE MAGNESIUM 40 MG/1
40 CAPSULE, DELAYED RELEASE ORAL DAILY
Status: ON HOLD | COMMUNITY
End: 2025-02-01 | Stop reason: HOSPADM

## 2025-01-28 RX ORDER — MAGNESIUM SULFATE IN WATER 40 MG/ML
2000 INJECTION, SOLUTION INTRAVENOUS PRN
Status: DISCONTINUED | OUTPATIENT
Start: 2025-01-28 | End: 2025-02-01 | Stop reason: HOSPADM

## 2025-01-28 RX ORDER — ONDANSETRON 2 MG/ML
4 INJECTION INTRAMUSCULAR; INTRAVENOUS EVERY 6 HOURS PRN
Status: DISCONTINUED | OUTPATIENT
Start: 2025-01-28 | End: 2025-02-01 | Stop reason: HOSPADM

## 2025-01-28 RX ORDER — INSULIN LISPRO 100 [IU]/ML
0-4 INJECTION, SOLUTION INTRAVENOUS; SUBCUTANEOUS
Status: DISCONTINUED | OUTPATIENT
Start: 2025-01-28 | End: 2025-02-01 | Stop reason: HOSPADM

## 2025-01-28 RX ORDER — POTASSIUM CHLORIDE 7.45 MG/ML
10 INJECTION INTRAVENOUS PRN
Status: DISCONTINUED | OUTPATIENT
Start: 2025-01-28 | End: 2025-02-01 | Stop reason: HOSPADM

## 2025-01-28 RX ORDER — DULOXETIN HYDROCHLORIDE 60 MG/1
60 CAPSULE, DELAYED RELEASE ORAL DAILY
Status: DISCONTINUED | OUTPATIENT
Start: 2025-01-28 | End: 2025-02-01 | Stop reason: HOSPADM

## 2025-01-28 RX ORDER — DULOXETIN HYDROCHLORIDE 30 MG/1
30 CAPSULE, DELAYED RELEASE ORAL DAILY
Status: ON HOLD | COMMUNITY
End: 2025-02-01 | Stop reason: HOSPADM

## 2025-01-28 RX ORDER — ACETAMINOPHEN 325 MG/1
650 TABLET ORAL EVERY 6 HOURS PRN
Status: DISCONTINUED | OUTPATIENT
Start: 2025-01-28 | End: 2025-02-01 | Stop reason: HOSPADM

## 2025-01-28 RX ORDER — IBUPROFEN 600 MG/1
1 TABLET ORAL PRN
Status: DISCONTINUED | OUTPATIENT
Start: 2025-01-28 | End: 2025-02-01 | Stop reason: HOSPADM

## 2025-01-28 RX ORDER — SODIUM CHLORIDE 0.9 % (FLUSH) 0.9 %
5-40 SYRINGE (ML) INJECTION EVERY 12 HOURS SCHEDULED
Status: DISCONTINUED | OUTPATIENT
Start: 2025-01-28 | End: 2025-02-01 | Stop reason: HOSPADM

## 2025-01-28 RX ORDER — LISINOPRIL 20 MG/1
20 TABLET ORAL DAILY
Status: DISCONTINUED | OUTPATIENT
Start: 2025-01-28 | End: 2025-02-01 | Stop reason: HOSPADM

## 2025-01-28 RX ADMIN — DULOXETINE HYDROCHLORIDE 60 MG: 60 CAPSULE, DELAYED RELEASE ORAL at 09:17

## 2025-01-28 RX ADMIN — PREGABALIN 200 MG: 100 CAPSULE ORAL at 21:34

## 2025-01-28 RX ADMIN — FERROUS SULFATE TAB 325 MG (65 MG ELEMENTAL FE) 325 MG: 325 (65 FE) TAB at 09:17

## 2025-01-28 RX ADMIN — IPRATROPIUM BROMIDE AND ALBUTEROL SULFATE 1 DOSE: .5; 3 SOLUTION RESPIRATORY (INHALATION) at 10:04

## 2025-01-28 RX ADMIN — WATER 40 MG: 1 INJECTION INTRAMUSCULAR; INTRAVENOUS; SUBCUTANEOUS at 17:07

## 2025-01-28 RX ADMIN — PANTOPRAZOLE SODIUM 40 MG: 40 TABLET, DELAYED RELEASE ORAL at 17:07

## 2025-01-28 RX ADMIN — HYDROCODONE BITARTRATE AND ACETAMINOPHEN 1 TABLET: 7.5; 325 TABLET ORAL at 21:35

## 2025-01-28 RX ADMIN — HYDROXYCHLOROQUINE SULFATE 200 MG: 200 TABLET ORAL at 09:17

## 2025-01-28 RX ADMIN — SODIUM CHLORIDE, PRESERVATIVE FREE 10 ML: 5 INJECTION INTRAVENOUS at 09:17

## 2025-01-28 RX ADMIN — HYDROCODONE BITARTRATE AND ACETAMINOPHEN 1 TABLET: 7.5; 325 TABLET ORAL at 09:17

## 2025-01-28 RX ADMIN — IPRATROPIUM BROMIDE AND ALBUTEROL SULFATE 1 DOSE: .5; 3 SOLUTION RESPIRATORY (INHALATION) at 19:10

## 2025-01-28 RX ADMIN — TRAZODONE HYDROCHLORIDE 100 MG: 50 TABLET ORAL at 21:33

## 2025-01-28 RX ADMIN — PANTOPRAZOLE SODIUM 40 MG: 40 TABLET, DELAYED RELEASE ORAL at 09:09

## 2025-01-28 RX ADMIN — Medication 4 ML: at 10:04

## 2025-01-28 RX ADMIN — PREGABALIN 200 MG: 100 CAPSULE ORAL at 09:17

## 2025-01-28 RX ADMIN — Medication 4 ML: at 19:10

## 2025-01-28 RX ADMIN — SODIUM CHLORIDE, PRESERVATIVE FREE 10 ML: 5 INJECTION INTRAVENOUS at 21:36

## 2025-01-28 RX ADMIN — CEPHALEXIN 500 MG: 500 CAPSULE ORAL at 21:34

## 2025-01-28 RX ADMIN — ENOXAPARIN SODIUM 40 MG: 100 INJECTION SUBCUTANEOUS at 09:09

## 2025-01-28 RX ADMIN — METHYLPREDNISOLONE SODIUM SUCCINATE 40 MG: 40 INJECTION INTRAMUSCULAR; INTRAVENOUS at 09:09

## 2025-01-28 RX ADMIN — INSULIN LISPRO 1 UNITS: 100 INJECTION, SOLUTION INTRAVENOUS; SUBCUTANEOUS at 21:36

## 2025-01-28 RX ADMIN — FERROUS SULFATE TAB 325 MG (65 MG ELEMENTAL FE) 325 MG: 325 (65 FE) TAB at 17:07

## 2025-01-28 ASSESSMENT — PAIN DESCRIPTION - ONSET: ONSET: GRADUAL

## 2025-01-28 ASSESSMENT — PAIN DESCRIPTION - PAIN TYPE: TYPE: ACUTE PAIN

## 2025-01-28 ASSESSMENT — PAIN DESCRIPTION - ORIENTATION: ORIENTATION: POSTERIOR

## 2025-01-28 ASSESSMENT — PAIN SCALES - GENERAL
PAINLEVEL_OUTOF10: 6
PAINLEVEL_OUTOF10: 5
PAINLEVEL_OUTOF10: 7

## 2025-01-28 ASSESSMENT — PAIN DESCRIPTION - LOCATION
LOCATION: GENERALIZED
LOCATION: BACK;ABDOMEN;GENERALIZED

## 2025-01-28 ASSESSMENT — PAIN DESCRIPTION - DESCRIPTORS
DESCRIPTORS: BURNING;ACHING
DESCRIPTORS: ACHING

## 2025-01-28 ASSESSMENT — PAIN DESCRIPTION - FREQUENCY: FREQUENCY: INTERMITTENT

## 2025-01-28 ASSESSMENT — PAIN - FUNCTIONAL ASSESSMENT: PAIN_FUNCTIONAL_ASSESSMENT: PREVENTS OR INTERFERES SOME ACTIVE ACTIVITIES AND ADLS

## 2025-01-28 NOTE — PROGRESS NOTES
TRANSFER - IN REPORT:    Verbal report received from SALENA Bill(name) on Meli Blancas  being received from ER(unit) for routine progression of patient care      Report consisted of patient’s Situation, Background, Assessment and   Recommendations(SBAR).     Information from the following report(s) Nurse Handoff Report, Index, MAR, Recent Results, and Event Log was reviewed with the receiving nurse.    Opportunity for questions and clarification was provided.      Reort given to Lilli Romero RN, who will assume primary care of patient on arrival to floor.

## 2025-01-28 NOTE — CARE COORDINATION
Patient states she became sick again shortly after returning home.  She was not able to eat/keep food down, so she did not take any of her medications.  Patient also lost her CLTC caregiver due to lengthy hospitalization and was home alone.   01/28/25 1029   Readmission Assessment   Number of Days since last admission? 1-7 days   Previous Disposition Home Alone   Who is being Interviewed Patient   What was the patient's/caregiver's perception as to why they think they needed to return back to the hospital? Other (Comment)  (states she got sick again shortly after returning home)   Did you visit your Primary Care Physician after you left the hospital, before you returned this time? Yes   Why weren't you able to visit your PCP? Other (Comment)  (home visit)   Did you see a specialist, such as Cardiac, Pulmonary, Orthopedic Physician, etc. after you left the hospital? No   Who advised the patient to return to the hospital? Physician's Nurse/Office staff   Does the patient report anything that got in the way of taking their medications? Yes   What reasons did they give? Other (Comment)  (was sick and not able to eat so she did not take RX)   In our efforts to provide the best possible care to you and others like you, can you think of anything that we could have done to help you after you left the hospital the first time, so that you might not have needed to return so soon? Other (Comment)  (see note)

## 2025-01-28 NOTE — PROGRESS NOTES
Dermatology consult called with number provided by [facility]. Spoke to , stated  Is familiar as it is a patient of his.  Requested to fax face sheet to [phone]. Done.

## 2025-01-28 NOTE — PROGRESS NOTES
Hospitalist Progress Note   Admit Date:  2025  4:11 PM   Name:  Meli Blancas   Age:  69 y.o.  Sex:  female  :  1955   MRN:  [id number]   [facility]    Presenting/Chief Complaint: Shortness of Breath     Reason(s) for Admission: Acute on chronic respiratory failure with hypoxemia [J96.21]     Hospital Course:   Meli Blancas is a 69 y.o. 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  Patient presented secondary to worsening shortness of breath and increasing oxygen requirements.  Patient being treated for COPD exacerbation at this time.      Subjective & 24hr Events:   Patient resting comfortably on examination.  Patient very unhappy on exam and states she would rather not be in her room and would rather be in the hallway.  Patient states she does still feel short of breath on exam.      Assessment & Plan:     COPD exacerbation  Acute on chronic hypoxic respiratory failure  RSV  Patient still complaining of shortness of breath on exam  CT PE study negative for pulmonary embolism.  Pulmonary emphysema with superimposed atelectasis noted  Solu-Medrol decreased to 40 mg every 12 hours  Nebulizers as needed  Wean oxygen as able to baseline 2 L nasal cannula    Hypertension  Blood pressure adequately controlled  Continue home lisinopril  Continue to monitor and address as needed    Type 2 diabetes mellitus  Most recent A1c 7.2%  Will place on correctional scale insulin  Continue to monitor and adjust as needed    Autoimmune disease  Pemphigus fallacious  Continue outpatient follow-up with dermatology  On steroid taper and prophylactic Bactrim for PJP    Mood disorder  Continue Cymbalta    Recent endocarditis  Patient completed treatment with vancomycin on  1/22/2025    Diarrhea  Nausea  Patient denied any further symptoms on exam this morning  Follow-up stool studies  Continue to monitor    Anticipated Discharge Arrangements:   Therapy has not evaluated yet possible discharge tomorrow    PT/OT evals ordered?  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 *      Objective:   Patient Vitals for the past 24 hrs:   Temp Pulse Resp BP SpO2   01/28/25 1008 -- 95 18 -- 94 %   01/28/25 0917 -- -- 19 -- --   01/28/25 0754 -- 100 18 104/81 94 %   01/28/25 0749 97.7 °F (36.5 °C) -- -- -- --   01/27/25 2234 -- -- -- (!) 96/55 99 %   01/27/25 2015 -- -- -- (!) 145/106 96 %   01/27/25 1623 97.8 °F (36.6 °C) 95 18 122/74 96 %       Oxygen Therapy  SpO2: 94 %  Pulse via Oximetry: 92 beats per minute  Pulse Oximeter Device Mode: Continuous  Pulse Oximeter Device Location: Finger  O2 Device: Nasal cannula  O2 Flow Rate (L/min): 2.5 L/min    Estimated body mass index is 32.32 kg/m² as calculated from the following:    Height as of this encounter: 1.499 m (4' 11\").    Weight as of this encounter: 72.6 kg (160 lb).  No intake or output data in the 24 hours ending 01/28/25 1246      Physical Exam:   Physical Exam  Vitals and nursing note reviewed.   Constitutional:       Comments: Chronically ill-appearing  2 L nasal cannula   Eyes:      Conjunctiva/sclera: Conjunctivae normal.   Cardiovascular:      Rate and

## 2025-01-28 NOTE — PLAN OF CARE
Patient arrive to floor; able to independently [place] - does use walker in home baseline; 3L O2 - BL is 2L - COPD and emphysema; skin is covered in blistered lesions - face, back, abdomen, arms, and upper legs - some have erupted and are bleeding - placed xeroform for comfort and protection

## 2025-01-28 NOTE — NURSE NAVIGATOR
This RN Navigator at patient bedside. Per patient she is back in Emergency department because caregiver from [facility] called because patient was so sick and was \"projectile vomiting.\" Patient is able to keep food down now and states she is feeling much better and would like to go home. Patient states she still has access to medication and all needs at home. Per patient if she is not discharged today she will probably just leave.  Advised patient that this RN Navigator will continue to follow and to please contact with any further needs between now and our next interaction.  Patient states understanding.

## 2025-01-28 NOTE — H&P
Hospitalist History and Physical   Admit Date:  2025  4:11 PM   Name:  Meli Blancas   Age:  69 y.o.  Sex:  female  :  1955   MRN:  [id number]   [facility]    Presenting/Chief Complaint: Shortness of Breath     Reason(s) for Admission: Acute on chronic respiratory failure with hypoxemia [J96.21]     History of Present Illness:     Meli Blancas is a 69 y.o. 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 Huddleston, sjogrens syndrome, on chronic steorids / bactrim for PJP prophylaxis, COPD on 2 L NC, RSV Braeden 15-25, HTN, admitted with acute on chronic hypoxic respiratory failure up to 5 L NC.  Did ok at discharge to home 25 then developed nausea, emesis, diarrhea- that has since resolved as of today. No abdominal pain.  Became short of breath and improved in the ED  Norco helps back pain  Has not been able to take home meds   Skin issues worse  Has oral pain- declines magic mouth wash   Has not seen derm yet   Home health came today and advised ED         Refused EKG due to skin flare  No chest pain   CXR no acute issues  CTA chest -pending   VBG ok  S/p duoneb and IV solumedrol     ECHO EF 60-65%      Has had nausea/ emesis/ diarrhea-resolved   Cdiff and Stool culture ordered         FULL CODE  [employer] Tract Nahomi       Assessment & Plan:     Principal Problem:    Acute on chronic respiratory failure with hypoxemia  Plan:     COPD exacerbation (HCC)  Plan:     RSV (acute bronchiolitis due to respiratory syncytial virus)  Plan:   Admit remote tele   Duonebs every 6 hours   IV solumedrol 40 mg every 8 hours   IV steroids to wean to home dose for pemphigus   O2 to wean as able to baseline 2 L NC   CTA chest -pending         Active Problems:    Hypertension  Plan:   Lisinopril           Autoimmune disease (HCC)  Plan:   Pemphigus foliaceous  Plan:     Current chronic use of systemic  9.4 - 12.3 FL    nRBC 0.00 0.0 - 0.2 K/uL    Differential Type AUTOMATED      Neutrophils % 60.1 43.0 - 78.0 %    Lymphocytes % 18.4 13.0 - 44.0 %    Monocytes % 8.0 4.0 - 12.0 %    Eosinophils % 12.7 (H) 0.5 - 7.8 %    Basophils % 0.2 0.0 - 2.0 %    Immature Granulocytes % 0.6 0.0 - 5.0 %    Neutrophils Absolute 7.50 1.70 - 8.20 K/UL    Lymphocytes Absolute 2.30 0.50 - 4.60 K/UL    Monocytes Absolute 1.00 0.10 - 1.30 K/UL    Eosinophils Absolute 1.59 (H) 0.00 - 0.80 K/UL    Basophils Absolute 0.02 0.00 - 0.20 K/UL    Immature Granulocytes Absolute 0.07 0.0 - 0.5 K/UL   Comprehensive Metabolic Panel    Collection Time: 01/27/25  4:54 PM   Result Value Ref Range    Sodium 137 136 - 145 mmol/L    Potassium 3.7 3.5 - 5.1 mmol/L    Chloride 98 98 - 107 mmol/L    CO2 29 20 - 29 mmol/L    Anion Gap 10 7 - 16 mmol/L    Glucose 125 (H) 70 - 99 mg/dL    BUN 21 8 - 23 MG/DL    Creatinine 1.00 0.60 - 1.10 MG/DL    Est, Glom Filt Rate 60 (L) >60 ml/min/1.73m2    Calcium 8.2 (L) 8.8 - 10.2 MG/DL    Total Bilirubin 0.3 0.0 - 1.2 MG/DL    ALT 13 8 - 45 U/L    AST 22 15 - 37 U/L    Alk Phosphatase 67 35 - 104 U/L    Total Protein 5.7 (L) 6.3 - 8.2 g/dL    Albumin 2.5 (L) 3.2 - 4.6 g/dL    Globulin 3.1 2.3 - 3.5 g/dL    Albumin/Globulin Ratio 0.8 (L) 1.0 - 1.9     Procalcitonin    Collection Time: 01/27/25  4:54 PM   Result Value Ref Range    Procalcitonin 0.11 (H) 0.00 - 0.10 ng/mL   Lactate, Sepsis    Collection Time: 01/27/25  4:54 PM   Result Value Ref Range    Lactic Acid, Sepsis 1.4 0.5 - 2.0 MMOL/L   Brain Natriuretic Peptide    Collection Time: 01/27/25  4:54 PM   Result Value Ref Range    NT Pro- (H) 0 - 125 PG/ML   Troponin    Collection Time: 01/27/25  4:54 PM   Result Value Ref Range    Troponin T 42.0 (H) 0 - 14 ng/L   Venous Blood Gas, POC    Collection Time: 01/27/25  5:15 PM   Result Value Ref Range    DEVICE NASAL CANNULA      PH, VENOUS (POC) 7.39 7.32 - 7.42      PCO2, [place], POC 54.7 (H) 41 - 51 MMHG

## 2025-01-28 NOTE — PROGRESS NOTES
Occupational Therapy Note:    Attempted to see patient this AM for occupational therapy evaluation session. Patient reports that now is not a good time and that she is frustrated. Continued to refuse despite education. Will follow and re-attempt as schedule permits/patient available. Thank you,    Lauryn Gonzales, OT    Rehab Caseload Tracker

## 2025-01-28 NOTE — ACP (ADVANCE CARE PLANNING)
Advance Care Planning   Healthcare Decision Maker:    Primary Decision Maker: NahomiKaitlin - [phone]    Click here to complete Healthcare Decision Makers including selection of the Healthcare Decision Maker Relationship (ie \"Primary\").

## 2025-01-28 NOTE — CARE COORDINATION
Pt recently discharged from the hospital (re-admit assessment done) returns stating she became ill at home after discharge and was not able to take her medications.  She lives alone and uses oxy-gen 4 l NC supplied by [place] and a walker to assist her.  Her previous CLTC worker is Kaitlin Comer - who had to find other work during her lengthy hospitalization.  She does want Kaitlin to remain her emergency contact and she has no family.      [facility] was arranged at discharge as was [facility].  Pulmonary appt also arranged for February.  CM to follow clinical course for discharge planning needs.   01/28/25 1032   Service Assessment   Patient Orientation Alert and Oriented   Cognition Alert   History Provided By Patient   Primary Caregiver Self   Support Systems None   Patient's Healthcare Decision Maker is: Named in Scanned ACP Document   PCP Verified by CM Yes   Last Visit to PCP Within last 3 months   Prior Functional Level Assistance with the following:;Cooking;Housework;Shopping   Current Functional Level Shopping;Housework;Cooking;Assistance with the following:   Can patient return to prior living arrangement Unknown at present   Ability to make needs known: Good   Family able to assist with home care needs: No   Would you like for me to discuss the discharge plan with any other family members/significant others, and if so, who? Yes   Financial Resources Medicaid;Medicare   Community Resources ECF/Home Care   CM/SW Referral Other (see comment)  (pending)   Social/Functional History   Lives With Alone   Type of Home House   Home Layout One level   Home Access Stairs to enter with rails   Entrance Stairs - Number of Steps 5   Bathroom Shower/Tub Tub/Shower unit;Shower chair with back   Bathroom Toilet Standard   Bathroom Equipment Shower chair   Bathroom Accessibility Accessible   Home Equipment Oxygen;Walker - Standard   Receives Help From Home health   Prior Level of Assist for ADLs Independent   Prior Level of  Assist for Homemaking Needs assistance   Homemaking Responsibilities No   Ambulation Assistance Needs assistance   Prior Level of Assist for Transfers Independent   Active  No   Occupation Retired   Discharge Planning   Type of Residence House   Living Arrangements Alone   Current Services Prior To Admission Durable Medical Equipment;Home Care   Current DME Prior to Arrival Walker;Oxygen Therapy (Comment)   Potential Assistance Needed Outpatient PT/OT;Skilled Nursing Facility;Home Care   DME Ordered? No   Potential Assistance Purchasing Medications No   Type of Home Care Services Aide Services;OT;PT;Nursing Services   Patient expects to be discharged to: Skilled nursing facility   One/Two Story Residence One story   Services At/After Discharge   Transition of Care Consult (CM Consult) Discharge Planning   Services At/After Discharge Nursing services;OT;PT;Home Health;Community Resources   [place] Resource Information Provided? No   Confirm Follow Up Transport Self   Condition of Participation: Discharge Planning   The Plan for Transition of Care is related to the following treatment goals: based on clinical course   The Patient and/or Patient Representative was provided with a Choice of Provider? Patient   The Patient and/Or Patient Representative agree with the Discharge Plan? Yes   Freedom of Choice list was provided with basic dialogue that supports the patient's individualized plan of care/goals, treatment preferences, and shares the quality data associated with the providers?  Yes

## 2025-01-28 NOTE — H&P
Pt known to me  with pemphigus foliaceus  Was to start rituxin months ago but never did  Had endocarditis   Was on pred and clear but stopped  due to gi bug? AND NOW FLARING    O/   EXTENSIVE TOTAL BODY HEALING CRUSTED EROSIONS  AND DRYING IN BLISTERS  WITH 2NDARY INFECTION    A/   PEMPHGUS FOLIACEOUS  WITH IMPETIGO    P/ AGREE WITH SOLUMEDROL 40 BID FOR SEVERAL DAYS   WOULD CHANGE BACTRIM TO A KEFLEX  CONSULT DR WOODSON FOR STARTING RITUXIN

## 2025-01-28 NOTE — PROGRESS NOTES
ACUTE PHYSICAL THERAPY GOALS:   (Developed with and agreed upon by patient and/or caregiver.)  Pt will ambulate 500 ft independently with use of breaks as needed in 7 therapy sessions.  Pt will tolerate 23 minutes of standing activity with LRAD/no device in 7 therapy sessions.  Pt will negotiate up and down 10x steps [place] with use of a handrail in 7 therapy sessions.  Pt will perform standing dynamic balance activities with minimal postural sway in 7 therapy sessions.  Pt will tolerate multiple sets and reps of BLE exercises in 7 therapy sessions.   Pt will participate in 1x 23' PT session while maintaining 02 sats above 90% on 2L in 7 therapy sessions.      PHYSICAL THERAPY Initial Assessment and PM  (Link to Caseload Tracking: PT Visit Days : 1  Acknowledge Orders  Time In/Out  PT Charge Capture  Rehab Caseload Tracker    Meli Blancas is a 69 y.o. female   PRIMARY DIAGNOSIS: Acute on chronic respiratory failure with hypoxemia  Acute on chronic respiratory failure with hypoxemia [J96.21]       Reason for Referral: Other abnormalities of gait and mobility (R26.89)  Inpatient: Payor: [facility] MEDICARE / Plan: [facility] MEDICARE COMPLETE / Product Type: *No Product type* /     ASSESSMENT:     REHAB RECOMMENDATIONS:   Recommendation to date pending progress:  Setting:  Home Health Therapy    Equipment:    None     ASSESSMENT:  Ms. Blancas is a 69 y.o. female presenting to PT following a hospitalization due to acute respiratory failure. At baseline, pt ambulates independently and lives alone in a single story home with 2 JOAQUINA. At time of initial evaluation, pt presents near baseline with deficits in balance, gait and activity tolerance limiting overall functional mobility. Pt moves from supine to seated EOB with supervision, with good seated balance once upright. O2 sats were monitored throughout session, beginning on 3L with O2 sats reading 92% seated at rest. During ambulation across 100' with supervision and no AD, pt

## 2025-01-28 NOTE — ED NOTES
RN went to check on patient and she was asleep with a large pile of brown, chunky stool on the floor. RN came in a cleaned the floor and chairs and walls.  mopped and took out trash. RN went to clean pt and she advised that it was painful to wipe and the wipes were cold. RN took covers from pt to wipe her bottom and placed a pad in the chair for pt to sit on. Pt became biligerent and started yelling and cursing at RN stating she was cold and I needed to wipe her faster. RN explained what she was doing. Pt asked for her other nurse and RN advised that they had left for the night. Pt stated she no longer wanted Rns help and that she was willing to wait until other shift came on to get clean. Charge nurse notified. Pt was covered back up per her demand, and her room door was closed for privacy.     Yohan Subramanian RN  01/28/25 5222

## 2025-01-28 NOTE — PROGRESS NOTES
Physical Therapy Note:    Attempted to see patient this AM for physical therapy evaluation session. Patient reports that now is not a good time and that she is frustrated. Continued to refuse despite education. Will follow and re-attempt as schedule permits/patient available. Thank you,     Jacobo Giraldo, PT     Rehab Caseload Tracker

## 2025-01-29 DIAGNOSIS — L10.2 PEMPHIGUS FOLIACEUS: Primary | ICD-10-CM

## 2025-01-29 PROBLEM — R06.00 DYSPNEA: Status: ACTIVE | Noted: 2025-01-29

## 2025-01-29 PROBLEM — R19.7 VOMITING AND DIARRHEA: Status: ACTIVE | Noted: 2025-01-29

## 2025-01-29 PROBLEM — R11.10 VOMITING AND DIARRHEA: Status: ACTIVE | Noted: 2025-01-29

## 2025-01-29 LAB
25(OH)D3 SERPL-MCNC: <6 NG/ML (ref 30–100)
EST. AVERAGE GLUCOSE BLD GHB EST-MCNC: 175 MG/DL
FERRITIN SERPL-MCNC: 358 NG/ML (ref 8–388)
FOLATE SERPL-MCNC: 7.6 NG/ML (ref 3.1–17.5)
GLUCOSE BLD STRIP.AUTO-MCNC: 139 MG/DL (ref 65–100)
GLUCOSE BLD STRIP.AUTO-MCNC: 140 MG/DL (ref 65–100)
GLUCOSE BLD STRIP.AUTO-MCNC: 157 MG/DL (ref 65–100)
GLUCOSE BLD STRIP.AUTO-MCNC: 158 MG/DL (ref 65–100)
HBA1C MFR BLD: 7.7 % (ref 0–5.6)
IRON SATN MFR SERPL: 17 % (ref 20–50)
IRON SERPL-MCNC: 40 UG/DL (ref 35–100)
SERVICE CMNT-IMP: ABNORMAL
TIBC SERPL-MCNC: 235 UG/DL (ref 240–450)
UIBC SERPL-MCNC: 195 UG/DL (ref 112–347)
VIT B12 SERPL-MCNC: 362 PG/ML (ref 193–986)

## 2025-01-29 PROCEDURE — 6370000000 HC RX 637 (ALT 250 FOR IP): Performed by: INTERNAL MEDICINE

## 2025-01-29 PROCEDURE — 97530 THERAPEUTIC ACTIVITIES: CPT

## 2025-01-29 PROCEDURE — 83540 ASSAY OF IRON: CPT

## 2025-01-29 PROCEDURE — 82728 ASSAY OF FERRITIN: CPT

## 2025-01-29 PROCEDURE — 2500000003 HC RX 250 WO HCPCS: Performed by: INTERNAL MEDICINE

## 2025-01-29 PROCEDURE — 82746 ASSAY OF FOLIC ACID SERUM: CPT

## 2025-01-29 PROCEDURE — 99222 1ST HOSP IP/OBS MODERATE 55: CPT | Performed by: INTERNAL MEDICINE

## 2025-01-29 PROCEDURE — 2580000003 HC RX 258: Performed by: INTERNAL MEDICINE

## 2025-01-29 PROCEDURE — 83036 HEMOGLOBIN GLYCOSYLATED A1C: CPT

## 2025-01-29 PROCEDURE — 94640 AIRWAY INHALATION TREATMENT: CPT

## 2025-01-29 PROCEDURE — 36415 COLL VENOUS BLD VENIPUNCTURE: CPT

## 2025-01-29 PROCEDURE — 1100000003 HC PRIVATE W/ TELEMETRY

## 2025-01-29 PROCEDURE — 6360000002 HC RX W HCPCS: Performed by: INTERNAL MEDICINE

## 2025-01-29 PROCEDURE — 94761 N-INVAS EAR/PLS OXIMETRY MLT: CPT

## 2025-01-29 PROCEDURE — 2700000000 HC OXYGEN THERAPY PER DAY

## 2025-01-29 PROCEDURE — 82962 GLUCOSE BLOOD TEST: CPT

## 2025-01-29 PROCEDURE — 6370000000 HC RX 637 (ALT 250 FOR IP): Performed by: DERMATOLOGY

## 2025-01-29 PROCEDURE — 94760 N-INVAS EAR/PLS OXIMETRY 1: CPT

## 2025-01-29 PROCEDURE — 97165 OT EVAL LOW COMPLEX 30 MIN: CPT

## 2025-01-29 PROCEDURE — 82306 VITAMIN D 25 HYDROXY: CPT

## 2025-01-29 PROCEDURE — 82607 VITAMIN B-12: CPT

## 2025-01-29 PROCEDURE — 83550 IRON BINDING TEST: CPT

## 2025-01-29 RX ORDER — GUAIFENESIN 200 MG/10ML
LIQUID ORAL DAILY
Status: DISCONTINUED | OUTPATIENT
Start: 2025-01-29 | End: 2025-02-01 | Stop reason: HOSPADM

## 2025-01-29 RX ORDER — TRIAMCINOLONE ACETONIDE 1 MG/G
CREAM TOPICAL DAILY
Status: DISCONTINUED | OUTPATIENT
Start: 2025-01-30 | End: 2025-02-01 | Stop reason: HOSPADM

## 2025-01-29 RX ORDER — SODIUM CHLORIDE, SODIUM LACTATE, POTASSIUM CHLORIDE, AND CALCIUM CHLORIDE .6; .31; .03; .02 G/100ML; G/100ML; G/100ML; G/100ML
500 INJECTION, SOLUTION INTRAVENOUS ONCE
Status: COMPLETED | OUTPATIENT
Start: 2025-01-29 | End: 2025-01-29

## 2025-01-29 RX ORDER — SULFAMETHOXAZOLE AND TRIMETHOPRIM 800; 160 MG/1; MG/1
1 TABLET ORAL
Status: DISCONTINUED | OUTPATIENT
Start: 2025-01-29 | End: 2025-02-01 | Stop reason: HOSPADM

## 2025-01-29 RX ADMIN — HYDROCODONE BITARTRATE AND ACETAMINOPHEN 1 TABLET: 7.5; 325 TABLET ORAL at 18:06

## 2025-01-29 RX ADMIN — ENOXAPARIN SODIUM 40 MG: 100 INJECTION SUBCUTANEOUS at 09:54

## 2025-01-29 RX ADMIN — PANTOPRAZOLE SODIUM 40 MG: 40 TABLET, DELAYED RELEASE ORAL at 17:59

## 2025-01-29 RX ADMIN — DULOXETINE HYDROCHLORIDE 60 MG: 60 CAPSULE, DELAYED RELEASE ORAL at 09:56

## 2025-01-29 RX ADMIN — SODIUM CHLORIDE, POTASSIUM CHLORIDE, SODIUM LACTATE AND CALCIUM CHLORIDE 500 ML: 600; 310; 30; 20 INJECTION, SOLUTION INTRAVENOUS at 15:55

## 2025-01-29 RX ADMIN — HYDROXYCHLOROQUINE SULFATE 200 MG: 200 TABLET ORAL at 09:56

## 2025-01-29 RX ADMIN — IPRATROPIUM BROMIDE AND ALBUTEROL SULFATE 1 DOSE: .5; 3 SOLUTION RESPIRATORY (INHALATION) at 10:41

## 2025-01-29 RX ADMIN — WATER 40 MG: 1 INJECTION INTRAMUSCULAR; INTRAVENOUS; SUBCUTANEOUS at 05:36

## 2025-01-29 RX ADMIN — IPRATROPIUM BROMIDE AND ALBUTEROL SULFATE 1 DOSE: .5; 3 SOLUTION RESPIRATORY (INHALATION) at 15:32

## 2025-01-29 RX ADMIN — Medication 4 ML: at 19:52

## 2025-01-29 RX ADMIN — WATER 40 MG: 1 INJECTION INTRAMUSCULAR; INTRAVENOUS; SUBCUTANEOUS at 17:59

## 2025-01-29 RX ADMIN — IPRATROPIUM BROMIDE AND ALBUTEROL SULFATE 1 DOSE: .5; 3 SOLUTION RESPIRATORY (INHALATION) at 19:52

## 2025-01-29 RX ADMIN — HYDROCODONE BITARTRATE AND ACETAMINOPHEN 1 TABLET: 7.5; 325 TABLET ORAL at 09:00

## 2025-01-29 RX ADMIN — SODIUM CHLORIDE, PRESERVATIVE FREE 10 ML: 5 INJECTION INTRAVENOUS at 09:57

## 2025-01-29 RX ADMIN — CEPHALEXIN 500 MG: 500 CAPSULE ORAL at 09:56

## 2025-01-29 RX ADMIN — CEPHALEXIN 500 MG: 500 CAPSULE ORAL at 21:19

## 2025-01-29 RX ADMIN — SULFAMETHOXAZOLE AND TRIMETHOPRIM 1 TABLET: 800; 160 TABLET ORAL at 17:59

## 2025-01-29 RX ADMIN — TRAZODONE HYDROCHLORIDE 100 MG: 50 TABLET ORAL at 21:19

## 2025-01-29 RX ADMIN — PANTOPRAZOLE SODIUM 40 MG: 40 TABLET, DELAYED RELEASE ORAL at 05:36

## 2025-01-29 RX ADMIN — FERROUS SULFATE TAB 325 MG (65 MG ELEMENTAL FE) 325 MG: 325 (65 FE) TAB at 09:56

## 2025-01-29 RX ADMIN — PREGABALIN 200 MG: 100 CAPSULE ORAL at 21:19

## 2025-01-29 RX ADMIN — FERROUS SULFATE TAB 325 MG (65 MG ELEMENTAL FE) 325 MG: 325 (65 FE) TAB at 17:59

## 2025-01-29 RX ADMIN — PREGABALIN 200 MG: 100 CAPSULE ORAL at 09:56

## 2025-01-29 ASSESSMENT — PAIN SCALES - GENERAL
PAINLEVEL_OUTOF10: 4
PAINLEVEL_OUTOF10: 3
PAINLEVEL_OUTOF10: 6
PAINLEVEL_OUTOF10: 7

## 2025-01-29 ASSESSMENT — PAIN DESCRIPTION - LOCATION: LOCATION: BACK

## 2025-01-29 NOTE — CARE COORDINATION
MSN, cM:  patient currently on 3L NC with 2L NC at baseline.  Patient continues to require IV abx.  Patient with impetigo outbreak requiring special abx x2 doses.  Patient will be set up for a ride to infusion center 15 days after first infusion here at the hospital.  Attempting to get approval for med administration.  Patient will be discharged with HH and palliative care when medically stable. Case Management will continue to follow.

## 2025-01-29 NOTE — PROGRESS NOTES
ACUTE OCCUPATIONAL THERAPY GOALS:   (Developed with and agreed upon by patient and/or caregiver.)  Patient will perform functional mobility with supervision. -GOAL MET 1/29/25      OCCUPATIONAL THERAPY Initial Assessment and Discharge       OT Visit Days: 1  Acknowledge Orders  Time  OT Charge Capture  Rehab Caseload Tracker      Meli Blancas is a 69 y.o. female   PRIMARY DIAGNOSIS: Acute on chronic respiratory failure with hypoxemia  Hypoxia [R09.02]  Vomiting and diarrhea [R11.10, R19.7]  Acute on chronic respiratory failure with hypoxemia [J96.21]  Dyspnea, unspecified type [R06.00]       Reason for Referral: Generalized Muscle Weakness (M62.81)  Other lack of cordination (R27.8)  Difficulty in walking, Not elsewhere classified (R26.2)  Inpatient: Payor: [facility] [place] MEDICARE / Plan: [facility] MEDICARE COMPLETE / Product Type: *No Product type* /     ASSESSMENT:     REHAB RECOMMENDATIONS:   Recommendation to date pending progress:  Setting:  No further skilled occupational therapy after discharge from hospital    Equipment:    None     ASSESSMENT:  Meli Blancas is a 69 y.o. admitted for acute on chronic respiratory failure. Patient is on 2L of O2 which is her baseline. She states she lives alone and reports prior level of function as independent with all ADLs, IADLs, and performs functional/community mobility without the use of an assistive device. Based on OT evaluation completed this date, her current level of function is independent-stand by assist for functional mobility/transfers and ADL tasks. Patient presents close to baseline level, reports no concerns with her self care ability at this time and has been getting up to the bathroom without staff assistance. At this time, no further skilled OT services recommended.        [facility] AM-PAC™ “6 Clicks” Daily Activity Inpatient Short Form:    AM-PAC Daily Activity - Inpatient   How much help is needed for putting on and taking off regular lower body

## 2025-01-29 NOTE — CONSULTS
[facility] Hematology Oncology      Inpatient Hematology / Oncology Consult Note    Reason for Consult:  Hypoxia [R09.02]  Vomiting and diarrhea [R11.10, R19.7]  Acute on chronic respiratory failure with hypoxemia [J96.21]  Dyspnea, unspecified type [R06.00]  Referring Physician:  Alfredo Bryant MD    History of Present Illness:  Ms. Blancas is a 69 y.o. female admitted on 1/27/2025 with a primary diagnosis of The primary encounter diagnosis was Dyspnea, unspecified type. Diagnoses of Hypoxia and Vomiting and diarrhea were also pertinent to this visit..      We have been consulted for her history of pemphigus foliaceous for rituxan therapy.  The past medical history is significant for HTN, Tobacco use, Hx of CAD, Disorder of lipid metabolism, Hx of Breast cancer right-UIQ (6/2021), DDD, Sjogren's syndrome, Complex xyxic lesion L suprahyoid area, Depression, COPD, MRSA AV endocarditis.     She has been seen in our clinic back in October with Dr. Silverio to start Rituxan therapy for her pemphigus but she missed visits when she was going to start. More recently she was admitted from 12/06-01/23 for failed outpatient antibiotics for MRSA AV IE- noncompliant with meds.  SILVA showed evidence of endocarditis. She was not an outpatient candidate due to failed initial compliance and she refused STR. She  completed vancomycin course for endocarditis on 1/22/2025. Hospital course complicated by RSV positivity and acute on chronic hypoxic respiratory failure. She returned to ED on 01/27 complaining of worsening shortness of breath and increasing oxygen requirements - admitted for COPD exacerbation. Leading up to admission, she had nausea, vomiting, and diarrhea and unable to take her medications which led to exacerbation of her skin lesions.       Review of Systems:  Constitutional Denies fever, chills, weight loss, appetite changes, fatigue, night sweats.   HEENT Denies trauma, blurry vision, hearing loss, ear

## 2025-01-29 NOTE — WOUND CARE
Consulted for General body. Pt familiar to Wound team from previous admission, last seen 1/2. Pt w/ pemphigus foliaceus and following Derm who recommended Eucerin+Triamcinolone cream. Pt states most areas start off as blisters that pop and crust over. Pt states creams, steroids, & CHG wipes helped and was almost cleared up at last admission but since being sick, has not been able to get up to shower and apply creams. Refused assistance to apply creams currently as Pt wants to shower before applying creams. Noted Derm cx.     Pt did not want me to look at entire body, but allowed me to assess back and bilat axillary and stated \"this is what it's going to look like everywhere else\". Would recommend to cleanse w/ normal bathing or CHG wipes, continue Triamcinolone and Eucerin cream daily&PRN.      Bilat axillary w/ blisters and intertrigo, R>L, pink/red, erythema, small yellow/serous drainage, mild odor. Pt requesting antifungal powder at this time. Recommend normal bathing, Micotin powder BID&PRN. Okay to place ABD or pillowcase to wick moisture.

## 2025-01-29 NOTE — PLAN OF CARE
Problem: Respiratory - Adult  Goal: Achieves optimal ventilation and oxygenation  Outcome: Progressing  Flowsheets (Taken 1/28/2025 1912)  Achieves optimal ventilation and oxygenation:   Assess for changes in respiratory status   Assess for changes in mentation and behavior   Position to facilitate oxygenation and minimize respiratory effort   Oxygen supplementation based on oxygen saturation or arterial blood gases   Encourage broncho-pulmonary hygiene including cough, deep breathe, incentive spirometry   Assess and instruct to report shortness of breath or any respiratory difficulty   Respiratory therapy support as indicated

## 2025-01-29 NOTE — PLAN OF CARE
Problem: Respiratory - Adult  Goal: Achieves optimal ventilation and oxygenation  Outcome: Progressing  Flowsheets (Taken 1/29/2025 1043)  Achieves optimal ventilation and oxygenation:   Assess for changes in respiratory status   Position to facilitate oxygenation and minimize respiratory effort   Assess for changes in mentation and behavior   Oxygen supplementation based on oxygen saturation or arterial blood gases   Encourage broncho-pulmonary hygiene including cough, deep breathe, incentive spirometry   Assess and instruct to report shortness of breath or any respiratory difficulty   Respiratory therapy support as indicated

## 2025-01-30 LAB
ANION GAP SERPL CALC-SCNC: 9 MMOL/L (ref 7–16)
APPEARANCE UR: CLEAR
BACTERIA URNS QL MICRO: 0 /HPF
BASOPHILS # BLD: 0.02 K/UL (ref 0–0.2)
BASOPHILS NFR BLD: 0.2 % (ref 0–2)
BILIRUB UR QL: NEGATIVE
BUN SERPL-MCNC: 17 MG/DL (ref 8–23)
CALCIUM SERPL-MCNC: 9.1 MG/DL (ref 8.8–10.2)
CASTS URNS QL MICRO: 0 /LPF
CHLORIDE SERPL-SCNC: 103 MMOL/L (ref 98–107)
CO2 SERPL-SCNC: 31 MMOL/L (ref 20–29)
COLOR UR: YELLOW
CREAT SERPL-MCNC: 0.9 MG/DL (ref 0.6–1.1)
CRYSTALS URNS QL MICRO: 0 /LPF
DIFFERENTIAL METHOD BLD: ABNORMAL
EOSINOPHIL # BLD: 0.01 K/UL (ref 0–0.8)
EOSINOPHIL NFR BLD: 0.1 % (ref 0.5–7.8)
EPI CELLS #/AREA URNS HPF: ABNORMAL /HPF
ERYTHROCYTE [DISTWIDTH] IN BLOOD BY AUTOMATED COUNT: 18.6 % (ref 11.9–14.6)
GLUCOSE BLD STRIP.AUTO-MCNC: 117 MG/DL (ref 65–100)
GLUCOSE BLD STRIP.AUTO-MCNC: 134 MG/DL (ref 65–100)
GLUCOSE BLD STRIP.AUTO-MCNC: 142 MG/DL (ref 65–100)
GLUCOSE BLD STRIP.AUTO-MCNC: 88 MG/DL (ref 65–100)
GLUCOSE SERPL-MCNC: 131 MG/DL (ref 70–99)
GLUCOSE UR STRIP.AUTO-MCNC: NEGATIVE MG/DL
HCT VFR BLD AUTO: 33 % (ref 35.8–46.3)
HGB BLD-MCNC: 10.3 G/DL (ref 11.7–15.4)
HGB UR QL STRIP: ABNORMAL
IMM GRANULOCYTES # BLD AUTO: 0.17 K/UL (ref 0–0.5)
IMM GRANULOCYTES NFR BLD AUTO: 1.7 % (ref 0–5)
KETONES UR QL STRIP.AUTO: NEGATIVE MG/DL
LEUKOCYTE ESTERASE UR QL STRIP.AUTO: ABNORMAL
LYMPHOCYTES # BLD: 0.94 K/UL (ref 0.5–4.6)
LYMPHOCYTES NFR BLD: 9.1 % (ref 13–44)
MCH RBC QN AUTO: 24.9 PG (ref 26.1–32.9)
MCHC RBC AUTO-ENTMCNC: 31.2 G/DL (ref 31.4–35)
MCV RBC AUTO: 79.7 FL (ref 82–102)
MONOCYTES # BLD: 0.8 K/UL (ref 0.1–1.3)
MONOCYTES NFR BLD: 7.8 % (ref 4–12)
MUCOUS THREADS URNS QL MICRO: 0 /LPF
NEUTS SEG # BLD: 8.35 K/UL (ref 1.7–8.2)
NEUTS SEG NFR BLD: 81.1 % (ref 43–78)
NITRITE UR QL STRIP.AUTO: NEGATIVE
NRBC # BLD: 0 K/UL (ref 0–0.2)
PH UR STRIP: 6.5 (ref 5–9)
PLATELET # BLD AUTO: 259 K/UL (ref 150–450)
PMV BLD AUTO: 10.3 FL (ref 9.4–12.3)
POTASSIUM SERPL-SCNC: 4.7 MMOL/L (ref 3.5–5.1)
PROT UR STRIP-MCNC: NEGATIVE MG/DL
RBC # BLD AUTO: 4.14 M/UL (ref 4.05–5.2)
RBC #/AREA URNS HPF: ABNORMAL /HPF
SERVICE CMNT-IMP: ABNORMAL
SERVICE CMNT-IMP: NORMAL
SODIUM SERPL-SCNC: 142 MMOL/L (ref 136–145)
SP GR UR REFRACTOMETRY: <1.005 (ref 1–1.02)
URINE CULTURE IF INDICATED: ABNORMAL
UROBILINOGEN UR QL STRIP.AUTO: 0.2 EU/DL (ref 0.2–1)
WBC # BLD AUTO: 10.3 K/UL (ref 4.3–11.1)
WBC URNS QL MICRO: ABNORMAL /HPF

## 2025-01-30 PROCEDURE — 2500000003 HC RX 250 WO HCPCS: Performed by: INTERNAL MEDICINE

## 2025-01-30 PROCEDURE — 6370000000 HC RX 637 (ALT 250 FOR IP): Performed by: INTERNAL MEDICINE

## 2025-01-30 PROCEDURE — 80048 BASIC METABOLIC PNL TOTAL CA: CPT

## 2025-01-30 PROCEDURE — 85025 COMPLETE CBC W/AUTO DIFF WBC: CPT

## 2025-01-30 PROCEDURE — 1100000000 HC RM PRIVATE

## 2025-01-30 PROCEDURE — 82962 GLUCOSE BLOOD TEST: CPT

## 2025-01-30 PROCEDURE — 6360000002 HC RX W HCPCS: Performed by: INTERNAL MEDICINE

## 2025-01-30 PROCEDURE — 2700000000 HC OXYGEN THERAPY PER DAY

## 2025-01-30 PROCEDURE — 94760 N-INVAS EAR/PLS OXIMETRY 1: CPT

## 2025-01-30 PROCEDURE — 36415 COLL VENOUS BLD VENIPUNCTURE: CPT

## 2025-01-30 PROCEDURE — 6370000000 HC RX 637 (ALT 250 FOR IP): Performed by: DERMATOLOGY

## 2025-01-30 PROCEDURE — 94640 AIRWAY INHALATION TREATMENT: CPT

## 2025-01-30 PROCEDURE — 2580000003 HC RX 258: Performed by: INTERNAL MEDICINE

## 2025-01-30 PROCEDURE — 81001 URINALYSIS AUTO W/SCOPE: CPT

## 2025-01-30 RX ORDER — PREDNISONE 20 MG/1
40 TABLET ORAL DAILY
Status: DISCONTINUED | OUTPATIENT
Start: 2025-01-31 | End: 2025-02-01 | Stop reason: HOSPADM

## 2025-01-30 RX ORDER — VITAMIN B COMPLEX
1000 TABLET ORAL DAILY
Status: DISCONTINUED | OUTPATIENT
Start: 2025-01-31 | End: 2025-02-01 | Stop reason: HOSPADM

## 2025-01-30 RX ORDER — ERGOCALCIFEROL 1.25 MG/1
50000 CAPSULE, LIQUID FILLED ORAL WEEKLY
Status: DISCONTINUED | OUTPATIENT
Start: 2025-01-30 | End: 2025-02-01 | Stop reason: HOSPADM

## 2025-01-30 RX ADMIN — Medication: at 09:15

## 2025-01-30 RX ADMIN — ERGOCALCIFEROL 50000 UNITS: 1.25 CAPSULE ORAL at 09:14

## 2025-01-30 RX ADMIN — DULOXETINE HYDROCHLORIDE 60 MG: 60 CAPSULE, DELAYED RELEASE ORAL at 09:14

## 2025-01-30 RX ADMIN — HYDROCODONE BITARTRATE AND ACETAMINOPHEN 1 TABLET: 7.5; 325 TABLET ORAL at 00:17

## 2025-01-30 RX ADMIN — SODIUM CHLORIDE, PRESERVATIVE FREE 10 ML: 5 INJECTION INTRAVENOUS at 09:15

## 2025-01-30 RX ADMIN — PREGABALIN 200 MG: 100 CAPSULE ORAL at 21:34

## 2025-01-30 RX ADMIN — SODIUM CHLORIDE, PRESERVATIVE FREE 10 ML: 5 INJECTION INTRAVENOUS at 21:34

## 2025-01-30 RX ADMIN — HYDROCODONE BITARTRATE AND ACETAMINOPHEN 1 TABLET: 7.5; 325 TABLET ORAL at 12:40

## 2025-01-30 RX ADMIN — WATER 40 MG: 1 INJECTION INTRAMUSCULAR; INTRAVENOUS; SUBCUTANEOUS at 06:34

## 2025-01-30 RX ADMIN — ANTI-FUNGAL POWDER MICONAZOLE NITRATE TALC FREE: 1.42 POWDER TOPICAL at 09:17

## 2025-01-30 RX ADMIN — CEPHALEXIN 500 MG: 500 CAPSULE ORAL at 09:14

## 2025-01-30 RX ADMIN — HYDROCODONE BITARTRATE AND ACETAMINOPHEN 1 TABLET: 7.5; 325 TABLET ORAL at 18:32

## 2025-01-30 RX ADMIN — HYDROXYCHLOROQUINE SULFATE 200 MG: 200 TABLET ORAL at 09:14

## 2025-01-30 RX ADMIN — HYDROCODONE BITARTRATE AND ACETAMINOPHEN 1 TABLET: 7.5; 325 TABLET ORAL at 06:35

## 2025-01-30 RX ADMIN — IPRATROPIUM BROMIDE AND ALBUTEROL SULFATE 1 DOSE: .5; 3 SOLUTION RESPIRATORY (INHALATION) at 07:42

## 2025-01-30 RX ADMIN — PANTOPRAZOLE SODIUM 40 MG: 40 TABLET, DELAYED RELEASE ORAL at 17:04

## 2025-01-30 RX ADMIN — ENOXAPARIN SODIUM 40 MG: 100 INJECTION SUBCUTANEOUS at 09:14

## 2025-01-30 RX ADMIN — CEPHALEXIN 500 MG: 500 CAPSULE ORAL at 21:34

## 2025-01-30 RX ADMIN — Medication 1 AMPULE: at 12:09

## 2025-01-30 RX ADMIN — Medication 4 ML: at 07:41

## 2025-01-30 RX ADMIN — Medication 1 AMPULE: at 21:34

## 2025-01-30 RX ADMIN — FERROUS SULFATE TAB 325 MG (65 MG ELEMENTAL FE) 325 MG: 325 (65 FE) TAB at 09:14

## 2025-01-30 RX ADMIN — FERROUS SULFATE TAB 325 MG (65 MG ELEMENTAL FE) 325 MG: 325 (65 FE) TAB at 17:04

## 2025-01-30 RX ADMIN — TRAZODONE HYDROCHLORIDE 100 MG: 50 TABLET ORAL at 21:34

## 2025-01-30 RX ADMIN — PREGABALIN 200 MG: 100 CAPSULE ORAL at 09:14

## 2025-01-30 RX ADMIN — PANTOPRAZOLE SODIUM 40 MG: 40 TABLET, DELAYED RELEASE ORAL at 06:34

## 2025-01-30 RX ADMIN — IPRATROPIUM BROMIDE AND ALBUTEROL SULFATE 1 DOSE: .5; 3 SOLUTION RESPIRATORY (INHALATION) at 15:07

## 2025-01-30 RX ADMIN — TRIAMCINOLONE ACETONIDE: 1 CREAM TOPICAL at 09:16

## 2025-01-30 ASSESSMENT — PAIN SCALES - GENERAL
PAINLEVEL_OUTOF10: 8
PAINLEVEL_OUTOF10: 7
PAINLEVEL_OUTOF10: 0
PAINLEVEL_OUTOF10: 0
PAINLEVEL_OUTOF10: 8
PAINLEVEL_OUTOF10: 7

## 2025-01-30 ASSESSMENT — PAIN DESCRIPTION - DESCRIPTORS
DESCRIPTORS: ACHING
DESCRIPTORS: ACHING

## 2025-01-30 ASSESSMENT — PAIN DESCRIPTION - PAIN TYPE
TYPE: ACUTE PAIN
TYPE: CHRONIC PAIN

## 2025-01-30 ASSESSMENT — PAIN DESCRIPTION - FREQUENCY
FREQUENCY: CONTINUOUS
FREQUENCY: CONTINUOUS

## 2025-01-30 ASSESSMENT — PAIN DESCRIPTION - ORIENTATION
ORIENTATION: POSTERIOR
ORIENTATION: POSTERIOR

## 2025-01-30 ASSESSMENT — PAIN DESCRIPTION - ONSET
ONSET: PROGRESSIVE
ONSET: PROGRESSIVE

## 2025-01-30 ASSESSMENT — PAIN DESCRIPTION - LOCATION
LOCATION: BACK
LOCATION: BACK

## 2025-01-30 NOTE — PLAN OF CARE
Patient in bed; Aox4; pain controlled with norco; c/o decreased urine production - sent UA; no Bms - C Diff no longer a concern

## 2025-01-30 NOTE — PROGRESS NOTES
and Norvasc  -Continue monitoring BP off medication  -Fluid boluses as needed to maintain MAP greater than 65    Autoimmune disease  Pemphigus fallacious  Unable to start Rituxan as inpatient as per hospital policy.  Hematology/oncology aware and has arranged for outpatient follow-up with right to the facility for these injections to be done as outpatient.  For now, will need to be on prednisone until then given her current flare  -Switch to prednisone today, likely needs long steroid taper.  -Rituxan as outpatient with hematology  -Continue PJP prophylaxis while on steroid  -Continue Keflex  -Continue with hydroxychloroquine    Type 2 diabetes mellitus  Most recent A1c 7.2%.  Glucose levels within acceptable range  -Continue sliding scale, diabetic diet    Mood disorder  Mood is good today.  Continue Cymbalta     Recent endocarditis  Patient completed treatment with vancomycin on 1/22/2025.       Diarrhea, r/o C diff  Has no history of recent antibiotic use.  Will need to rule out C. difficile.  However, diarrhea appears to be slowing down  -Follow-up C. difficile studies -not collected his stool more formed today    Anticipated Discharge Arrangements:   Therapy has not evaluated yet    PT/OT evals ordered?  Therapy evals ordered  Diet:  ADULT DIET; Regular; 4 carb choices (60 gm/meal)  VTE prophylaxis: Lovenox  Code status: Full Code    Disposition: Anticipate discharge in 24 hours pending clinical improvement of wounds and respiratory status.    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1 (H) 43.0 - 78.0 %    Lymphocytes % 9.1 (L) 13.0 - 44.0 %    Monocytes % 7.8 4.0 - 12.0 %    Eosinophils % 0.1 (L) 0.5 - 7.8 %    Basophils % 0.2 0.0 - 2.0 %    Immature Granulocytes % 1.7 0.0 - 5.0 %    Neutrophils Absolute 8.35 (H) 1.70 - 8.20 K/UL    Lymphocytes Absolute 0.94 0.50 - 4.60 K/UL    Monocytes Absolute 0.80 0.10 - 1.30 K/UL    Eosinophils Absolute 0.01 0.00 - 0.80 K/UL    Basophils Absolute 0.02 0.00 - 0.20 K/UL    Immature Granulocytes Absolute 0.17 0.0 - 0.5 K/UL   Basic Metabolic Panel w/ Reflex to MG    Collection Time: 01/30/25  8:02 AM   Result Value Ref Range    Sodium 142 136 - 145 mmol/L    Potassium 4.7 3.5 - 5.1 mmol/L    Chloride 103 98 - 107 mmol/L    CO2 31 (H) 20 - 29 mmol/L    Anion Gap 9 7 - 16 mmol/L    Glucose 131 (H) 70 - 99 mg/dL    BUN 17 8 - 23 MG/DL    Creatinine 0.90 0.60 - 1.10 MG/DL    Est, Glom Filt Rate 68 >60 ml/min/1.73m2    Calcium 9.1 8.8 - 10.2 MG/DL   POCT Glucose    Collection Time: 01/30/25 11:39 AM   Result Value Ref Range    POC Glucose 142 (H) 65 - 100 mg/dL    Performed by: Lynn    Urinalysis with Reflex to Culture    Collection Time: 01/30/25 12:48 PM    Specimen: Urine   Result Value Ref Range    Color, UA YELLOW      Appearance CLEAR      Specific Gravity, UA <1.005 1.001 - 1.023    pH, Urine 6.5 5.0 - 9.0      Protein, UA Negative NEG

## 2025-01-31 LAB
GLUCOSE BLD STRIP.AUTO-MCNC: 116 MG/DL (ref 65–100)
GLUCOSE BLD STRIP.AUTO-MCNC: 154 MG/DL (ref 65–100)
GLUCOSE BLD STRIP.AUTO-MCNC: 176 MG/DL (ref 65–100)
GLUCOSE BLD STRIP.AUTO-MCNC: 93 MG/DL (ref 65–100)
GLUCOSE BLD STRIP.AUTO-MCNC: 95 MG/DL (ref 65–100)
SERVICE CMNT-IMP: ABNORMAL
SERVICE CMNT-IMP: NORMAL
SERVICE CMNT-IMP: NORMAL

## 2025-01-31 PROCEDURE — 2700000000 HC OXYGEN THERAPY PER DAY

## 2025-01-31 PROCEDURE — 6370000000 HC RX 637 (ALT 250 FOR IP): Performed by: INTERNAL MEDICINE

## 2025-01-31 PROCEDURE — 1100000000 HC RM PRIVATE

## 2025-01-31 PROCEDURE — 82962 GLUCOSE BLOOD TEST: CPT

## 2025-01-31 PROCEDURE — 6360000002 HC RX W HCPCS: Performed by: INTERNAL MEDICINE

## 2025-01-31 PROCEDURE — 2500000003 HC RX 250 WO HCPCS: Performed by: INTERNAL MEDICINE

## 2025-01-31 PROCEDURE — 6370000000 HC RX 637 (ALT 250 FOR IP): Performed by: DERMATOLOGY

## 2025-01-31 PROCEDURE — 94760 N-INVAS EAR/PLS OXIMETRY 1: CPT

## 2025-01-31 RX ADMIN — VITAMIN D, TAB 1000IU (100/BT) 1000 UNITS: 25 TAB at 09:11

## 2025-01-31 RX ADMIN — FERROUS SULFATE TAB 325 MG (65 MG ELEMENTAL FE) 325 MG: 325 (65 FE) TAB at 18:23

## 2025-01-31 RX ADMIN — DULOXETINE HYDROCHLORIDE 60 MG: 60 CAPSULE, DELAYED RELEASE ORAL at 09:11

## 2025-01-31 RX ADMIN — TRAZODONE HYDROCHLORIDE 100 MG: 50 TABLET ORAL at 21:00

## 2025-01-31 RX ADMIN — PREGABALIN 200 MG: 100 CAPSULE ORAL at 09:12

## 2025-01-31 RX ADMIN — ENOXAPARIN SODIUM 40 MG: 100 INJECTION SUBCUTANEOUS at 09:11

## 2025-01-31 RX ADMIN — SODIUM CHLORIDE, PRESERVATIVE FREE 10 ML: 5 INJECTION INTRAVENOUS at 09:13

## 2025-01-31 RX ADMIN — CEPHALEXIN 500 MG: 500 CAPSULE ORAL at 09:12

## 2025-01-31 RX ADMIN — CEPHALEXIN 500 MG: 500 CAPSULE ORAL at 21:00

## 2025-01-31 RX ADMIN — HYDROCODONE BITARTRATE AND ACETAMINOPHEN 1 TABLET: 7.5; 325 TABLET ORAL at 15:41

## 2025-01-31 RX ADMIN — FERROUS SULFATE TAB 325 MG (65 MG ELEMENTAL FE) 325 MG: 325 (65 FE) TAB at 09:12

## 2025-01-31 RX ADMIN — HYDROXYCHLOROQUINE SULFATE 200 MG: 200 TABLET ORAL at 09:12

## 2025-01-31 RX ADMIN — Medication 1 AMPULE: at 10:00

## 2025-01-31 RX ADMIN — HYDROCODONE BITARTRATE AND ACETAMINOPHEN 1 TABLET: 7.5; 325 TABLET ORAL at 07:40

## 2025-01-31 RX ADMIN — Medication 1 AMPULE: at 21:00

## 2025-01-31 RX ADMIN — TRIAMCINOLONE ACETONIDE: 1 CREAM TOPICAL at 09:20

## 2025-01-31 RX ADMIN — SULFAMETHOXAZOLE AND TRIMETHOPRIM 1 TABLET: 800; 160 TABLET ORAL at 06:30

## 2025-01-31 RX ADMIN — SODIUM CHLORIDE, PRESERVATIVE FREE 10 ML: 5 INJECTION INTRAVENOUS at 21:00

## 2025-01-31 RX ADMIN — PREDNISONE 40 MG: 20 TABLET ORAL at 09:12

## 2025-01-31 RX ADMIN — ANTI-FUNGAL POWDER MICONAZOLE NITRATE TALC FREE: 1.42 POWDER TOPICAL at 09:24

## 2025-01-31 RX ADMIN — HYDROCODONE BITARTRATE AND ACETAMINOPHEN 1 TABLET: 7.5; 325 TABLET ORAL at 01:03

## 2025-01-31 RX ADMIN — PANTOPRAZOLE SODIUM 40 MG: 40 TABLET, DELAYED RELEASE ORAL at 18:26

## 2025-01-31 RX ADMIN — PANTOPRAZOLE SODIUM 40 MG: 40 TABLET, DELAYED RELEASE ORAL at 06:17

## 2025-01-31 RX ADMIN — Medication: at 09:19

## 2025-01-31 RX ADMIN — ONDANSETRON 4 MG: 4 TABLET, ORALLY DISINTEGRATING ORAL at 07:40

## 2025-01-31 RX ADMIN — ANTI-FUNGAL POWDER MICONAZOLE NITRATE TALC FREE: 1.42 POWDER TOPICAL at 21:00

## 2025-01-31 RX ADMIN — PREGABALIN 200 MG: 100 CAPSULE ORAL at 21:00

## 2025-01-31 ASSESSMENT — PAIN SCALES - GENERAL
PAINLEVEL_OUTOF10: 0
PAINLEVEL_OUTOF10: 7
PAINLEVEL_OUTOF10: 7

## 2025-01-31 ASSESSMENT — PAIN DESCRIPTION - ORIENTATION: ORIENTATION: UPPER

## 2025-01-31 ASSESSMENT — PAIN DESCRIPTION - DESCRIPTORS: DESCRIPTORS: ACHING

## 2025-01-31 ASSESSMENT — PAIN DESCRIPTION - LOCATION
LOCATION: BACK

## 2025-01-31 NOTE — CARE COORDINATION
MSN, CM:  patient continues on IV steroids and 2L NC which is below baseline.  Patient is services through [facility] for home oxygen needs.  Patient is agreeable to palliative care (ordered).  Patient will require transport set up for infusion center x2 days when dates of infusions are known.  Case Management will continue to follow.

## 2025-01-31 NOTE — PROGRESS NOTES
Pt awake in bed. A/ox4. Respirations even and unlabored on 2L NC. Pt denies pain at this time, no distress noted. Pt instructed to use call light if assistance is needed with call light in reach. Plan of care ongoing.

## 2025-01-31 NOTE — PROGRESS NOTES
Hospitalist Progress Note   Admit Date:  2025  4:11 PM   Name:  Meli Blancas   Age:  69 y.o.  Sex:  female  :  1955   MRN:  [id number]   Room:  [facility]    Presenting/Chief Complaint: Shortness of Breath     Reason(s) for Admission: Hypoxia [R09.02]  Vomiting and diarrhea [R11.10, R19.7]  Acute on chronic respiratory failure with hypoxemia [J96.21]  Dyspnea, unspecified type [R06.00]     Hospital Course:   Meli Blancas is a 69 y.o. 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  Patient presented secondary to worsening shortness of breath and increasing oxygen requirements.  Patient being treated for COPD exacerbation at this time.  Unfortunately, patient was unable to take her steroids antibiotics that she was discharged on several days ago.  Because of this, her skin flared up and now she is having worsening pemphigus fallacious rash throughout her body.     Dermatology was consulted recommended starting patient on Rituxan.  Initially on IV steroids decreased to 40 mg p.o. prednisone daily; will need long taper at discharge.  Patient to follow-up outpatient for Rituxan as this is unable to be given inpatient.  Patient has appointments on  and  with heme/onc infusion center.  Patient will need assistance with transport to infusion center following discharge.  Hopeful for discharge in next 24-48 hours.      Subjective & 24hr Events:   Patient evaluated at bedside.  Patient resting in bed at time of exam on nasal cannula oxygen.  States she still is having shortness of breath.  Skin wounds mildly improving but patient states are still not back to near her baseline.      Assessment & Plan:     COPD exacerbation  Acute on chronic hypoxic respiratory failure  RSV  Patient back

## 2025-01-31 NOTE — PROGRESS NOTES
Physical Therapy Note:    Attempted to see patient this PM for physical therapy treatment  session. Patient declined therapy at this time, reports not feeling well today. Will follow and re-attempt as schedule permits/patient available. Thank you,    Rick Celis, PT     Rehab Caseload Tracker

## 2025-01-31 NOTE — NURSE NAVIGATOR
This RN Navigator at bedside for patient follow up. Plan for patient to be discharged this weekend.  Importance of COPD teaching and interventions reiterated.  Patient sleepy. Informed patient that this RN Navigator will be calling within 48-72 hours of discharge to follow up.  Patient states understanding and agrees to take the phone call when they see this RN Navigator's telephone number.

## 2025-02-01 VITALS
WEIGHT: 160 LBS | OXYGEN SATURATION: 91 % | RESPIRATION RATE: 19 BRPM | DIASTOLIC BLOOD PRESSURE: 70 MMHG | HEART RATE: 100 BPM | HEIGHT: 59 IN | BODY MASS INDEX: 32.25 KG/M2 | SYSTOLIC BLOOD PRESSURE: 142 MMHG | TEMPERATURE: 97.3 F

## 2025-02-01 LAB
ANION GAP SERPL CALC-SCNC: 9 MMOL/L (ref 7–16)
BASOPHILS # BLD: 0.05 K/UL (ref 0–0.2)
BASOPHILS NFR BLD: 0.4 % (ref 0–2)
BUN SERPL-MCNC: 19 MG/DL (ref 8–23)
CALCIUM SERPL-MCNC: 9.1 MG/DL (ref 8.8–10.2)
CHLORIDE SERPL-SCNC: 100 MMOL/L (ref 98–107)
CO2 SERPL-SCNC: 30 MMOL/L (ref 20–29)
CREAT SERPL-MCNC: 1.01 MG/DL (ref 0.6–1.1)
DIFFERENTIAL METHOD BLD: ABNORMAL
EOSINOPHIL # BLD: 0.04 K/UL (ref 0–0.8)
EOSINOPHIL NFR BLD: 0.4 % (ref 0.5–7.8)
ERYTHROCYTE [DISTWIDTH] IN BLOOD BY AUTOMATED COUNT: 18.8 % (ref 11.9–14.6)
GLUCOSE BLD STRIP.AUTO-MCNC: 109 MG/DL (ref 65–100)
GLUCOSE BLD STRIP.AUTO-MCNC: 128 MG/DL (ref 65–100)
GLUCOSE SERPL-MCNC: 107 MG/DL (ref 70–99)
HCT VFR BLD AUTO: 36.2 % (ref 35.8–46.3)
HGB BLD-MCNC: 11.1 G/DL (ref 11.7–15.4)
IMM GRANULOCYTES # BLD AUTO: 0.26 K/UL (ref 0–0.5)
IMM GRANULOCYTES NFR BLD AUTO: 2.3 % (ref 0–5)
LYMPHOCYTES # BLD: 1.62 K/UL (ref 0.5–4.6)
LYMPHOCYTES NFR BLD: 14.3 % (ref 13–44)
MCH RBC QN AUTO: 24.7 PG (ref 26.1–32.9)
MCHC RBC AUTO-ENTMCNC: 30.7 G/DL (ref 31.4–35)
MCV RBC AUTO: 80.4 FL (ref 82–102)
MONOCYTES # BLD: 0.84 K/UL (ref 0.1–1.3)
MONOCYTES NFR BLD: 7.4 % (ref 4–12)
NEUTS SEG # BLD: 8.5 K/UL (ref 1.7–8.2)
NEUTS SEG NFR BLD: 75.2 % (ref 43–78)
NRBC # BLD: 0 K/UL (ref 0–0.2)
PLATELET # BLD AUTO: 300 K/UL (ref 150–450)
PMV BLD AUTO: 10.4 FL (ref 9.4–12.3)
POTASSIUM SERPL-SCNC: 5 MMOL/L (ref 3.5–5.1)
RBC # BLD AUTO: 4.5 M/UL (ref 4.05–5.2)
SERVICE CMNT-IMP: ABNORMAL
SERVICE CMNT-IMP: ABNORMAL
SODIUM SERPL-SCNC: 139 MMOL/L (ref 136–145)
WBC # BLD AUTO: 11.3 K/UL (ref 4.3–11.1)

## 2025-02-01 PROCEDURE — 6370000000 HC RX 637 (ALT 250 FOR IP): Performed by: INTERNAL MEDICINE

## 2025-02-01 PROCEDURE — 85025 COMPLETE CBC W/AUTO DIFF WBC: CPT

## 2025-02-01 PROCEDURE — 82962 GLUCOSE BLOOD TEST: CPT

## 2025-02-01 PROCEDURE — 36415 COLL VENOUS BLD VENIPUNCTURE: CPT

## 2025-02-01 PROCEDURE — 94640 AIRWAY INHALATION TREATMENT: CPT

## 2025-02-01 PROCEDURE — 2580000003 HC RX 258: Performed by: INTERNAL MEDICINE

## 2025-02-01 PROCEDURE — 6370000000 HC RX 637 (ALT 250 FOR IP): Performed by: DERMATOLOGY

## 2025-02-01 PROCEDURE — 80048 BASIC METABOLIC PNL TOTAL CA: CPT

## 2025-02-01 PROCEDURE — 6360000002 HC RX W HCPCS: Performed by: INTERNAL MEDICINE

## 2025-02-01 PROCEDURE — 2500000003 HC RX 250 WO HCPCS: Performed by: INTERNAL MEDICINE

## 2025-02-01 RX ORDER — PREDNISONE 20 MG/1
40 TABLET ORAL DAILY
Qty: 42 TABLET | Refills: 0 | Status: SHIPPED | OUTPATIENT
Start: 2025-02-01 | End: 2025-02-01 | Stop reason: HOSPADM

## 2025-02-01 RX ORDER — CEPHALEXIN 500 MG/1
500 CAPSULE ORAL EVERY 12 HOURS SCHEDULED
Qty: 7 CAPSULE | Refills: 0 | Status: SHIPPED | OUTPATIENT
Start: 2025-02-01 | End: 2025-02-05

## 2025-02-01 RX ORDER — HYDROCODONE BITARTRATE AND ACETAMINOPHEN 7.5; 325 MG/1; MG/1
1 TABLET ORAL EVERY 8 HOURS PRN
Qty: 9 TABLET | Refills: 0 | Status: SHIPPED | OUTPATIENT
Start: 2025-02-01 | End: 2025-02-04

## 2025-02-01 RX ORDER — PREDNISONE 20 MG/1
40 TABLET ORAL DAILY
Qty: 42 TABLET | Refills: 0 | Status: SHIPPED | OUTPATIENT
Start: 2025-02-01 | End: 2025-02-22

## 2025-02-01 RX ADMIN — PANTOPRAZOLE SODIUM 40 MG: 40 TABLET, DELAYED RELEASE ORAL at 05:39

## 2025-02-01 RX ADMIN — PREGABALIN 200 MG: 100 CAPSULE ORAL at 10:29

## 2025-02-01 RX ADMIN — SODIUM CHLORIDE, PRESERVATIVE FREE 10 ML: 5 INJECTION INTRAVENOUS at 10:31

## 2025-02-01 RX ADMIN — VITAMIN D, TAB 1000IU (100/BT) 1000 UNITS: 25 TAB at 10:30

## 2025-02-01 RX ADMIN — ANTI-FUNGAL POWDER MICONAZOLE NITRATE TALC FREE: 1.42 POWDER TOPICAL at 10:58

## 2025-02-01 RX ADMIN — Medication 1 AMPULE: at 10:28

## 2025-02-01 RX ADMIN — CEPHALEXIN 500 MG: 500 CAPSULE ORAL at 10:30

## 2025-02-01 RX ADMIN — TRIAMCINOLONE ACETONIDE: 1 CREAM TOPICAL at 10:59

## 2025-02-01 RX ADMIN — ONDANSETRON 4 MG: 4 TABLET, ORALLY DISINTEGRATING ORAL at 11:02

## 2025-02-01 RX ADMIN — HYDROCODONE BITARTRATE AND ACETAMINOPHEN 1 TABLET: 7.5; 325 TABLET ORAL at 11:35

## 2025-02-01 RX ADMIN — DULOXETINE HYDROCHLORIDE 60 MG: 60 CAPSULE, DELAYED RELEASE ORAL at 10:30

## 2025-02-01 RX ADMIN — IPRATROPIUM BROMIDE AND ALBUTEROL SULFATE 1 DOSE: .5; 3 SOLUTION RESPIRATORY (INHALATION) at 07:53

## 2025-02-01 RX ADMIN — ENOXAPARIN SODIUM 40 MG: 100 INJECTION SUBCUTANEOUS at 10:28

## 2025-02-01 RX ADMIN — FERROUS SULFATE TAB 325 MG (65 MG ELEMENTAL FE) 325 MG: 325 (65 FE) TAB at 10:30

## 2025-02-01 RX ADMIN — HYDROCODONE BITARTRATE AND ACETAMINOPHEN 1 TABLET: 7.5; 325 TABLET ORAL at 05:39

## 2025-02-01 RX ADMIN — HYDROXYCHLOROQUINE SULFATE 200 MG: 200 TABLET ORAL at 10:30

## 2025-02-01 RX ADMIN — PREDNISONE 40 MG: 20 TABLET ORAL at 10:30

## 2025-02-01 RX ADMIN — Medication 4 ML: at 07:53

## 2025-02-01 RX ADMIN — Medication: at 10:58

## 2025-02-01 ASSESSMENT — PAIN SCALES - GENERAL
PAINLEVEL_OUTOF10: 8
PAINLEVEL_OUTOF10: 8

## 2025-02-01 ASSESSMENT — PAIN DESCRIPTION - LOCATION
LOCATION: BACK
LOCATION: BACK

## 2025-02-01 ASSESSMENT — PAIN DESCRIPTION - DESCRIPTORS: DESCRIPTORS: ACHING

## 2025-02-01 NOTE — PLAN OF CARE
Problem: Safety - Adult  Goal: Free from fall injury  Outcome: Adequate for Discharge     Problem: Discharge Planning  Goal: Discharge to home or other facility with appropriate resources  Outcome: Adequate for Discharge     Problem: ABCDS Injury Assessment  Goal: Absence of physical injury  Outcome: Adequate for Discharge     Problem: Respiratory - Adult  Goal: Achieves optimal ventilation and oxygenation  Outcome: Adequate for Discharge     Problem: Skin/Tissue Integrity  Goal: Skin integrity remains intact  Description: 1.  Monitor for areas of redness and/or skin breakdown  2.  Assess vascular access sites hourly  3.  Every 4-6 hours minimum:  Change oxygen saturation probe site  4.  Every 4-6 hours:  If on nasal continuous positive airway pressure, respiratory therapy assess nares and determine need for appliance change or resting period  Outcome: Adequate for Discharge     Problem: Pain  Goal: Verbalizes/displays adequate comfort level or baseline comfort level  Outcome: Adequate for Discharge

## 2025-02-01 NOTE — PROGRESS NOTES
Pt is discharging home via [employer]. Pt was supplied an O2 tank and will leave with 3 liters nasal cannula. Pt is stable and is leaving with all belongings. IV removed per protocol.

## 2025-02-01 NOTE — DISCHARGE SUMMARY
Hospitalist Discharge Summary   Admit Date:  2025  4:11 PM   DC Note date: 2025  Name:  Meli Blancas   Age:  69 y.o.  Sex:  female  :  1955   MRN:  [id number]   Room:  [facility]  PCP:  Unknown, Provider, ANP    Presenting Complaint: Shortness of Breath     Initial Admission Diagnosis: Hypoxia [R09.02]  Vomiting and diarrhea [R11.10, R19.7]  Acute on chronic respiratory failure with hypoxemia [J96.21]  Dyspnea, unspecified type [R06.00]     Problem List for this Hospitalization (present on admission):    Principal Problem:    Acute on chronic respiratory failure with hypoxemia  Active Problems:    Hypertension    Autoimmune disease (HCC)    COPD exacerbation (HCC)    Mood disorder (HCC)    Pemphigus foliaceous    Current chronic use of systemic steroids    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 *      Hospital Course:  Meli Blancas is a 69 y.o. 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  Patient presented secondary to worsening shortness of breath and increasing oxygen requirements.  Patient being treated for COPD exacerbation at this time.  Unfortunately, patient was unable to take her steroids antibiotics that she was discharged on several days ago.  Because of this, her skin flared up and now she is having worsening pemphigus fallacious rash throughout her body.     Dermatology was consulted recommended starting patient on Rituxan.  Initially on IV steroids decreased to 40 mg p.o. prednisone daily; will continue 40 mg prednisone daily at discharge until patient is able to see dermatology outpatient at which time  right lower lobe is likely atelectasis. Infiltrate in this location is not excluded. Correlate clinically for possible pneumonic process. 4.  Cholecystectomy Electronically signed by Miguel Zimmer    XR CHEST PORTABLE    Result Date: 1/27/2025  No acute findings in the chest Electronically signed by Rosas Iraheta    XR CHEST PORTABLE    Result Date: 1/22/2025  Emphysema and chronic interstitial lung disease. There is linear atelectasis or scarring in both lung bases. Electronically signed by Judd Meza    XR CHEST (2 VW)    Result Date: 1/19/2025  Chronic diffuse interstitial changes of moderate nature. Electronically signed by Carlos Escobar    XR RIBS RIGHT INCLUDE CHEST (MIN 3 VIEWS)    Result Date: 1/7/2025  Stable imaging findings compared to priors Electronically signed by Rosas Iraheta       Labs: Results:       BMP, Mg, Phos Recent Labs     01/30/25  0802 02/01/25  0531    139   K 4.7 5.0    100   CO2 31* 30*   ANIONGAP 9 9   BUN 17 19   CREATININE 0.90 1.01   LABGLOM 68 60*   CALCIUM 9.1 9.1   GLUCOSE 131* 107*      CBC Recent Labs     01/30/25  0802 02/01/25  0531   WBC 10.3 11.3*   RBC 4.14 4.50   HGB 10.3* 11.1*   HCT 33.0* 36.2   MCV 79.7* 80.4*   MCH 24.9* 24.7*   MCHC 31.2* 30.7*   RDW 18.6* 18.8*    300   MPV 10.3 10.4   NRBC 0.00 0.00   LYMPHOPCT 9.1* 14.3   MONOPCT 7.8 7.4   BASOPCT 0.2 0.4   IMMGRAN 1.7 2.3   LYMPHSABS 0.94 1.62   EOSABS 0.01 0.04   MONOSABS 0.80 0.84   BASOSABS 0.02 0.05   ABSIMMGRAN 0.17 0.26      LFT No results for input(s): \"BILITOT\", \"BILIDIR\", \"ALKPHOS\", \"AST\", \"ALT\", \"PROTEIN\", \"ALBUMIN\", \"GLOB\" in the last 72 hours.   Cardiac  Lab Results   Component Value Date/Time    TROPHS 6.1 04/15/2024 07:35 PM    TROPHS 7.5 04/15/2024 04:39 PM    TROPHS 12.8 12/30/2023 08:25 PM      Coags Lab Results   Component Value Date/Time    PROTIME 14.0 08/06/2023 02:31 PM    INR 1.0 08/06/2023 02:31 PM      A1c Lab Results   Component Value Date/Time    LABA1C 7.7 01/29/2025

## 2025-02-01 NOTE — PROGRESS NOTES
Pt awake in chair. A/ox4. Respirations even and unlabored on 2L NC. Pt denies pain at this time, no distress noted. Pt instructed to use call light if assistance is needed with call light in reach. Plan of care ongoing.

## 2025-02-01 NOTE — CARE COORDINATION
[id number]   Discharge Planning   Patient expects to be discharged to: House   Services At/After Discharge   Transition of Care Consult (CM Consult) Home Health   Internal Home Health No   Reason Outside Agency Chosen Patient already serviced by other home care/hospice agency  ([facility] health SN, PT, OT, SW)   Services At/After Discharge Home Health;OT;PT;Nursing services;Transport    Resource Information Provided? No   Mode of Transport at Discharge Other (see comment)  ([employer] through [employer])   Confirm Follow Up Transport Self  (Yellow cab)   Condition of Participation: Discharge Planning   The Plan for Transition of Care is related to the following treatment goals: Home with home health to assist and support return to baseline function.   The Patient and/or Patient Representative was provided with a Choice of Provider? Patient   The Patient and/Or Patient Representative agree with the Discharge Plan? Yes   Freedom of Choice list was provided with basic dialogue that supports the patient's individualized plan of care/goals, treatment preferences, and shares the quality data associated with the providers?  Yes     CM informed patient was medically stable for discharge if transportation was arranged for [facility] infusions  and .   CM met with patient with documented Medicaid on chart. Patient does not recognize the [facility]. CM contacted [facility] to schedule transport to the infusion center. CM informed patient's medicaid benefit  .   Pt reports her [facility] has been removed from her care do to prolonged hospitalizations.  CM informed patient transportation benefit  . CM obtained medicaid member service contact [phone] and provided the number to patient to follow up as Medicaid office closed today.   CM discussed option to get to infusion clinic Uber/Yellow Cab. Patient reported she would use yellow cab. CM printed follow up scheduling from [facility] and highlighted

## 2025-02-03 ENCOUNTER — FOLLOWUP TELEPHONE ENCOUNTER (OUTPATIENT)
Dept: CASE MANAGEMENT | Age: 70
End: 2025-02-03

## 2025-02-03 NOTE — PROGRESS NOTES
This RN Navigator attempted to call patient for follow up. No answer and unable to leave a message. This RN navigator will attempt to call again tomorrow, will continue to follow.

## 2025-02-04 ENCOUNTER — FOLLOWUP TELEPHONE ENCOUNTER (OUTPATIENT)
Dept: CASE MANAGEMENT | Age: 70
End: 2025-02-04

## 2025-02-04 NOTE — PROGRESS NOTES
RN navigator spoke to patient by phone for follow-up. Patient states appt with the infusion center this week on 2/6 at outpatient infusion for her skin disorder.      Patient states no exacerbation of symptoms at this time.     Medications reviewed with patient and patient verbalized understanding of how to administer medications. Patient states she is currently out of her pain medication, Lyrica and Trazadone and needs to get in contact with someone from [facility] to get refills. Patient states she needs assistance in obtaining medications at this time because she can't reach anyone to get those. This RN Navigator informed her I will call [employer] to try to get in contact with someone about those medications for her.     Patient seems to be compliant with plan of care and education provided.     Patient encouraged to keep/make routine appointments with PCP and keep vaccinations current.     This RN attempted to contact Zakiya with [employer] regarding patient prescriptions. No answer, message left.  This RN Navigator will follow up.

## 2025-02-05 ENCOUNTER — TELEPHONE (OUTPATIENT)
Dept: ONCOLOGY | Age: 70
End: 2025-02-05

## 2025-02-05 ENCOUNTER — CLINICAL DOCUMENTATION (OUTPATIENT)
Dept: ONCOLOGY | Age: 70
End: 2025-02-05

## 2025-02-05 NOTE — PROGRESS NOTES
Transportation Confirmation     Patient/Staff notified [facility] of transportation needs for future appointment(s).     Ride(s) arranged for the appointment date(s) below. Patient is scheduled to arrive prior to their first appointment time and will receive a message to coordinate will call transportation back once their visit is complete.      Date(s): 12/12   Transportation Service:  Roundtrip   Should appointments be added, altered, or canceled please notify  [facility] so adjustments can be made or additional transports arranged.   *Please see below for [employer] receipt confirmation*

## 2025-02-05 NOTE — TELEPHONE ENCOUNTER
Sw contacted pt to confirm transportation needs.  Pt requires transportation for two appointments in different facilities.  Sw will arrange two separate [employer] rides.

## 2025-02-06 ENCOUNTER — HOSPITAL ENCOUNTER (OUTPATIENT)
Dept: INFUSION THERAPY | Age: 70
Setting detail: INFUSION SERIES
End: 2025-02-06

## 2025-02-11 ENCOUNTER — TELEPHONE (OUTPATIENT)
Dept: ONCOLOGY | Age: 70
End: 2025-02-11

## 2025-02-11 ENCOUNTER — FOLLOWUP TELEPHONE ENCOUNTER (OUTPATIENT)
Dept: CASE MANAGEMENT | Age: 70
End: 2025-02-11

## 2025-02-11 NOTE — TELEPHONE ENCOUNTER
Call to pt to let her know that upon chart review, it appears she has been scheduled for Roundtrip rides for tomorrow 2/12 by TEE.  She VU and appreciated the call.  
Physician provider: Dr. Silverio  Reason for today's call (Please detail here patients chief complaint): ride  Last office visit:NA  Patient Callback Number: [phone]  Was callback number verified?: No  Preferred pharmacy (If refill request): NA  Calls to office within the last 48 hours?:No    Patient notified that their information will be routed to the [facility] clinical triage team for review. Patient is advised that they will receive a phone call from the triage department. If symptom related and symptoms worsen before receiving a call back, the patient has been advised to proceed to the nearest ED.         Patient need a ride for her upcoming appointment. Appointment is 02/12/2025   [phone]  
<<----- Click to add NO significant Past Surgical History

## 2025-02-11 NOTE — PROGRESS NOTES
RN navigator spoke to patient by phone for follow-up. Patient states she has appointments for Pulmonology and blood work for her infusion tomorrow. Patient states we supposed to set up a ride for her before she was discharged to make sure she attends appointments.    Patient states no exacerbation of symptoms at this time.  Patient states she may have to get extension on her oxygen tubing because it doesn't reach when she uses the bathroom.    Patient states current adequate access to medications and refills. No need for assistance obtaining medications at this time.      Patient seems to be compliant with plan of care and education provided.     Patient encouraged to keep/make routine appointments with PCP and keep vaccinations current. This RN Navigator will continue to follow.

## 2025-02-11 NOTE — PROGRESS NOTES
Sexually Abused: No   Housing Stability: Low Risk  (1/28/2025)    Housing Stability Vital Sign     Unable to Pay for Housing in the Last Year: No     Number of Times Moved in the Last Year: 0     Homeless in the Last Year: No       Family History   Problem Relation Age of Onset    Cancer Other         colon       Allergies   Allergen Reactions    Codeine Swelling    Aspirin Nausea And Vomiting and Other (See Comments)     Joint pain    Gabapentin Nausea And Vomiting       Current Outpatient Medications   Medication Sig    HYDROcodone-acetaminophen (NORCO) 7.5-325 MG per tablet Take 1 tablet by mouth every 6 hours as needed.    amLODIPine (NORVASC) 5 MG tablet Take 1 tablet by mouth daily    OXYGEN Inhale 4 L into the lungs continuous    predniSONE (DELTASONE) 20 MG tablet Take 2 tablets by mouth daily for 21 days    nystatin (MYCOSTATIN) 575634 UNIT/GM cream Apply 200,000 Units topically 2 times daily    sulfamethoxazole-trimethoprim (BACTRIM DS;SEPTRA DS) 800-160 MG per tablet Take 1 tablet by mouth three times a week    ipratropium 0.5 mg-albuterol 2.5 mg (DUONEB) 0.5-2.5 (3) MG/3ML SOLN nebulizer solution Inhale 3 mLs into the lungs every 6 hours as needed for Shortness of Breath    sodium chloride, Inhalant, 3 % nebulizer solution Take 4 mLs by nebulization in the morning and 4 mLs in the evening.    pantoprazole (PROTONIX) 40 MG tablet Take 1 tablet by mouth 2 times daily (before meals)    DULoxetine (CYMBALTA) 60 MG extended release capsule     triamcinolone (KENALOG) 0.1 % cream     traZODone (DESYREL) 100 MG tablet Take 1 tablet by mouth nightly    ferrous sulfate (IRON 325) 325 (65 Fe) MG tablet Take 1 tablet by mouth 2 times daily (with meals)    hydroxychloroquine (PLAQUENIL) 200 MG tablet Take 1 tablet by mouth daily    pregabalin (LYRICA) 200 MG capsule Take 1 capsule by mouth 2 times daily.     No current facility-administered medications for this visit.       Over 50% of today's office visit was

## 2025-02-12 ENCOUNTER — TELEPHONE (OUTPATIENT)
Dept: ONCOLOGY | Age: 70
End: 2025-02-12

## 2025-02-12 ENCOUNTER — FOLLOWUP TELEPHONE ENCOUNTER (OUTPATIENT)
Dept: CASE MANAGEMENT | Age: 70
End: 2025-02-12

## 2025-02-12 ENCOUNTER — CLINICAL DOCUMENTATION (OUTPATIENT)
Dept: ONCOLOGY | Age: 70
End: 2025-02-12

## 2025-02-12 ENCOUNTER — OFFICE VISIT (OUTPATIENT)
Dept: PULMONOLOGY | Age: 70
End: 2025-02-12
Payer: MEDICARE

## 2025-02-12 VITALS
TEMPERATURE: 97.7 F | DIASTOLIC BLOOD PRESSURE: 84 MMHG | BODY MASS INDEX: 33.38 KG/M2 | WEIGHT: 165.6 LBS | OXYGEN SATURATION: 98 % | HEIGHT: 59 IN | RESPIRATION RATE: 19 BRPM | SYSTOLIC BLOOD PRESSURE: 116 MMHG | HEART RATE: 89 BPM

## 2025-02-12 DIAGNOSIS — J96.11 CHRONIC RESPIRATORY FAILURE WITH HYPOXIA: Chronic | ICD-10-CM

## 2025-02-12 DIAGNOSIS — M32.9 SYSTEMIC LUPUS ERYTHEMATOSUS, UNSPECIFIED SLE TYPE, UNSPECIFIED ORGAN INVOLVEMENT STATUS (HCC): Chronic | ICD-10-CM

## 2025-02-12 DIAGNOSIS — J44.9 STAGE 4 VERY SEVERE COPD BY GOLD CLASSIFICATION (HCC): ICD-10-CM

## 2025-02-12 DIAGNOSIS — R06.89 INEFFECTIVE AIRWAY CLEARANCE: ICD-10-CM

## 2025-02-12 DIAGNOSIS — Z79.52 CURRENT CHRONIC USE OF SYSTEMIC STEROIDS: Chronic | ICD-10-CM

## 2025-02-12 DIAGNOSIS — M35.0C SJOGREN SYNDROME WITH DENTAL INVOLVEMENT (HCC): Chronic | ICD-10-CM

## 2025-02-12 DIAGNOSIS — F17.211 CIGARETTE NICOTINE DEPENDENCE IN REMISSION: Chronic | ICD-10-CM

## 2025-02-12 DIAGNOSIS — J43.2 CENTRILOBULAR EMPHYSEMA (HCC): Primary | Chronic | ICD-10-CM

## 2025-02-12 DIAGNOSIS — J20.9 ACUTE BRONCHITIS, UNSPECIFIED ORGANISM: ICD-10-CM

## 2025-02-12 PROCEDURE — 3023F SPIROM DOC REV: CPT | Performed by: NURSE PRACTITIONER

## 2025-02-12 PROCEDURE — G8417 CALC BMI ABV UP PARAM F/U: HCPCS | Performed by: NURSE PRACTITIONER

## 2025-02-12 PROCEDURE — 99214 OFFICE O/P EST MOD 30 MIN: CPT | Performed by: NURSE PRACTITIONER

## 2025-02-12 PROCEDURE — 3074F SYST BP LT 130 MM HG: CPT | Performed by: NURSE PRACTITIONER

## 2025-02-12 PROCEDURE — 1123F ACP DISCUSS/DSCN MKR DOCD: CPT | Performed by: NURSE PRACTITIONER

## 2025-02-12 PROCEDURE — 3017F COLORECTAL CA SCREEN DOC REV: CPT | Performed by: NURSE PRACTITIONER

## 2025-02-12 PROCEDURE — G8427 DOCREV CUR MEDS BY ELIG CLIN: HCPCS | Performed by: NURSE PRACTITIONER

## 2025-02-12 PROCEDURE — G8400 PT W/DXA NO RESULTS DOC: HCPCS | Performed by: NURSE PRACTITIONER

## 2025-02-12 PROCEDURE — 1126F AMNT PAIN NOTED NONE PRSNT: CPT | Performed by: NURSE PRACTITIONER

## 2025-02-12 PROCEDURE — 1090F PRES/ABSN URINE INCON ASSESS: CPT | Performed by: NURSE PRACTITIONER

## 2025-02-12 PROCEDURE — 1111F DSCHRG MED/CURRENT MED MERGE: CPT | Performed by: NURSE PRACTITIONER

## 2025-02-12 PROCEDURE — 3079F DIAST BP 80-89 MM HG: CPT | Performed by: NURSE PRACTITIONER

## 2025-02-12 PROCEDURE — 1036F TOBACCO NON-USER: CPT | Performed by: NURSE PRACTITIONER

## 2025-02-12 PROCEDURE — 1159F MED LIST DOCD IN RCRD: CPT | Performed by: NURSE PRACTITIONER

## 2025-02-12 PROCEDURE — 1160F RVW MEDS BY RX/DR IN RCRD: CPT | Performed by: NURSE PRACTITIONER

## 2025-02-12 RX ORDER — HYDROCODONE BITARTRATE AND ACETAMINOPHEN 7.5; 325 MG/1; MG/1
1 TABLET ORAL EVERY 6 HOURS PRN
COMMUNITY
Start: 2025-02-05

## 2025-02-12 RX ORDER — AMLODIPINE BESYLATE 5 MG/1
5 TABLET ORAL DAILY
COMMUNITY

## 2025-02-12 RX ORDER — SODIUM CHLORIDE FOR INHALATION 3 %
VIAL, NEBULIZER (ML) INHALATION
Qty: 240 ML | Refills: 11 | Status: SHIPPED | OUTPATIENT
Start: 2025-02-12

## 2025-02-12 RX ORDER — IPRATROPIUM BROMIDE AND ALBUTEROL SULFATE 2.5; .5 MG/3ML; MG/3ML
SOLUTION RESPIRATORY (INHALATION)
Qty: 360 ML | Refills: 11 | Status: SHIPPED | OUTPATIENT
Start: 2025-02-12

## 2025-02-12 ASSESSMENT — ENCOUNTER SYMPTOMS
HEMOPTYSIS: 0
SHORTNESS OF BREATH: 1
HOARSE VOICE: 1
WHEEZING: 0
SPUTUM PRODUCTION: 1
COUGH: 1

## 2025-02-12 NOTE — TELEPHONE ENCOUNTER
According to primary nurse, patient no showed today's appt. SW placed follow up call. Patient expressed feeling frustrated due to missing scheduled ride with roundtrip after her appt with [facility]. According to the patient, ride was cancelled due to patient not being ready when  arrived. Patient further explained it is difficult to transport with her oxygen tank. Patient stated she does not have anyone to accompany her, to provide assistance when attending appts. SW offered to arrange transportation for next appt. Patient did not wish to discuss appt details or transportation at this time, due to feeling frustrated. SW offered to contact patient tomorrow, to determine if patient would like transportation arranged. Patient agreeable. Primary nurse notified, as patient will need to be rescheduled for follow up appt with Dr. Silverio.    Primary SW- Dakota LEDEZMA to be notified upon returning to office.

## 2025-02-12 NOTE — PATIENT INSTRUCTIONS
Continue ipratropium/albuterol via nebulizer 4 times daily.    Sodium chloride via nebulizer twice daily to aid with mucus clearance.    OTC mucolytic, (mucinex or generic equivalent of guaifenesin), 2400mg in 24 hours in divided doses as needed to thin mucous.    Continue oxygen at 2 L/min at rest, 4 L/min with exertion and sleep.    Will send order for portable conserving device to [facility].     She is commended in smoking cessation.    Follow-up in 3 months with Dr. Leone or me with complete pulmonary function test prior to appointment.

## 2025-02-12 NOTE — PROGRESS NOTES
This RN Navigator spoke with patient by phone. Patient states she only made it to her pulmonary appointment today. She states it was a rough day and didn't make it to hematology for her blood draw. Patient states she is waiting on Medicaid to send her an aide. This RN Navigator will continue to follow.

## 2025-02-13 ENCOUNTER — TELEPHONE (OUTPATIENT)
Dept: ONCOLOGY | Age: 70
End: 2025-02-13

## 2025-02-13 NOTE — TELEPHONE ENCOUNTER
Spoke with patients caregiver/friend, kaitlin royal, about rescheduled HFU with Dr. Clarke. Offered appt tomorrow, 2/14 with dr clarke and kaitlin confirmed and said she will bring patient for labs at 11am and FU at 12pm. Patient does not need transportation tomorrow as caregiver will be bringing her. Kaitlin expressed thanks and encouraged to call with any questions/concerns prior to then.
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
## 2025-02-14 ENCOUNTER — HOSPITAL ENCOUNTER (OUTPATIENT)
Dept: INFUSION THERAPY | Age: 70
Setting detail: INFUSION SERIES
End: 2025-02-14
Payer: MEDICARE

## 2025-02-14 ENCOUNTER — OFFICE VISIT (OUTPATIENT)
Dept: ONCOLOGY | Age: 70
End: 2025-02-14
Payer: MEDICARE

## 2025-02-14 ENCOUNTER — HOSPITAL ENCOUNTER (OUTPATIENT)
Dept: LAB | Age: 70
Discharge: HOME | End: 2025-02-14
Payer: MEDICARE

## 2025-02-14 VITALS
OXYGEN SATURATION: 96 % | HEIGHT: 60 IN | DIASTOLIC BLOOD PRESSURE: 84 MMHG | SYSTOLIC BLOOD PRESSURE: 159 MMHG | WEIGHT: 164 LBS | TEMPERATURE: 97.8 F | BODY MASS INDEX: 32.2 KG/M2 | RESPIRATION RATE: 16 BRPM | HEART RATE: 90 BPM

## 2025-02-14 DIAGNOSIS — Z85.3 HISTORY OF RIGHT BREAST CANCER: Chronic | ICD-10-CM

## 2025-02-14 DIAGNOSIS — E61.1 IRON DEFICIENCY: ICD-10-CM

## 2025-02-14 DIAGNOSIS — M35.9 AUTOIMMUNE DISEASE: ICD-10-CM

## 2025-02-14 DIAGNOSIS — D64.9 ANEMIA, UNSPECIFIED TYPE: ICD-10-CM

## 2025-02-14 DIAGNOSIS — M35.9 AUTOIMMUNE DISEASE (HCC): ICD-10-CM

## 2025-02-14 DIAGNOSIS — L10.2 PEMPHIGUS FOLIACEUS: ICD-10-CM

## 2025-02-14 DIAGNOSIS — L10.2: Primary | ICD-10-CM

## 2025-02-14 LAB
ALBUMIN SERPL-MCNC: 2.8 G/DL (ref 3.2–4.6)
ALBUMIN/GLOB SERPL: 0.9 (ref 1–1.9)
ALP SERPL-CCNC: 103 U/L (ref 35–104)
ALT SERPL-CCNC: 28 U/L (ref 8–45)
ANION GAP SERPL CALC-SCNC: 12 MMOL/L (ref 7–16)
AST SERPL-CCNC: 23 U/L (ref 15–37)
BASOPHILS # BLD: 0.01 K/UL (ref 0–0.2)
BASOPHILS NFR BLD: 0.1 % (ref 0–2)
BILIRUB SERPL-MCNC: <0.2 MG/DL (ref 0–1.2)
BUN SERPL-MCNC: 19 MG/DL (ref 8–23)
CALCIUM SERPL-MCNC: 9.5 MG/DL (ref 8.8–10.2)
CHLORIDE SERPL-SCNC: 99 MMOL/L (ref 98–107)
CO2 SERPL-SCNC: 31 MMOL/L (ref 20–29)
CREAT SERPL-MCNC: 0.68 MG/DL (ref 0.6–1.1)
DIFFERENTIAL METHOD BLD: ABNORMAL
EOSINOPHIL # BLD: 0.1 K/UL (ref 0–0.8)
EOSINOPHIL NFR BLD: 0.9 % (ref 0.5–7.8)
ERYTHROCYTE [DISTWIDTH] IN BLOOD BY AUTOMATED COUNT: 19.5 % (ref 11.9–14.6)
GLOBULIN SER CALC-MCNC: 3.1 G/DL (ref 2.3–3.5)
GLUCOSE SERPL-MCNC: 116 MG/DL (ref 70–99)
HCT VFR BLD AUTO: 32.2 % (ref 35.8–46.3)
HGB BLD-MCNC: 9.5 G/DL (ref 11.7–15.4)
IMM GRANULOCYTES # BLD AUTO: 0.14 K/UL (ref 0–0.5)
IMM GRANULOCYTES NFR BLD AUTO: 1.2 % (ref 0–5)
LYMPHOCYTES # BLD: 2.17 K/UL (ref 0.5–4.6)
LYMPHOCYTES NFR BLD: 18.6 % (ref 13–44)
MCH RBC QN AUTO: 24.9 PG (ref 26.1–32.9)
MCHC RBC AUTO-ENTMCNC: 29.5 G/DL (ref 31.4–35)
MCV RBC AUTO: 84.5 FL (ref 82–102)
MONOCYTES # BLD: 1.1 K/UL (ref 0.1–1.3)
MONOCYTES NFR BLD: 9.4 % (ref 4–12)
NEUTS SEG # BLD: 8.13 K/UL (ref 1.7–8.2)
NEUTS SEG NFR BLD: 69.8 % (ref 43–78)
NRBC # BLD: 0 K/UL (ref 0–0.2)
PLATELET # BLD AUTO: 299 K/UL (ref 150–450)
PMV BLD AUTO: 9.4 FL (ref 9.4–12.3)
POTASSIUM SERPL-SCNC: 3.5 MMOL/L (ref 3.5–5.1)
PROT SERPL-MCNC: 5.8 G/DL (ref 6.3–8.2)
RBC # BLD AUTO: 3.81 M/UL (ref 4.05–5.2)
SODIUM SERPL-SCNC: 142 MMOL/L (ref 136–145)
WBC # BLD AUTO: 11.7 K/UL (ref 4.3–11.1)

## 2025-02-14 PROCEDURE — 85025 COMPLETE CBC W/AUTO DIFF WBC: CPT

## 2025-02-14 PROCEDURE — 80053 COMPREHEN METABOLIC PANEL: CPT

## 2025-02-14 PROCEDURE — 1090F PRES/ABSN URINE INCON ASSESS: CPT | Performed by: INTERNAL MEDICINE

## 2025-02-14 PROCEDURE — 1111F DSCHRG MED/CURRENT MED MERGE: CPT | Performed by: INTERNAL MEDICINE

## 2025-02-14 PROCEDURE — G8417 CALC BMI ABV UP PARAM F/U: HCPCS | Performed by: INTERNAL MEDICINE

## 2025-02-14 PROCEDURE — 3017F COLORECTAL CA SCREEN DOC REV: CPT | Performed by: INTERNAL MEDICINE

## 2025-02-14 PROCEDURE — G8427 DOCREV CUR MEDS BY ELIG CLIN: HCPCS | Performed by: INTERNAL MEDICINE

## 2025-02-14 PROCEDURE — 3079F DIAST BP 80-89 MM HG: CPT | Performed by: INTERNAL MEDICINE

## 2025-02-14 PROCEDURE — 1123F ACP DISCUSS/DSCN MKR DOCD: CPT | Performed by: INTERNAL MEDICINE

## 2025-02-14 PROCEDURE — 3077F SYST BP >= 140 MM HG: CPT | Performed by: INTERNAL MEDICINE

## 2025-02-14 PROCEDURE — 99215 OFFICE O/P EST HI 40 MIN: CPT | Performed by: INTERNAL MEDICINE

## 2025-02-14 PROCEDURE — G8400 PT W/DXA NO RESULTS DOC: HCPCS | Performed by: INTERNAL MEDICINE

## 2025-02-14 PROCEDURE — 1125F AMNT PAIN NOTED PAIN PRSNT: CPT | Performed by: INTERNAL MEDICINE

## 2025-02-14 PROCEDURE — 36415 COLL VENOUS BLD VENIPUNCTURE: CPT

## 2025-02-14 PROCEDURE — 1036F TOBACCO NON-USER: CPT | Performed by: INTERNAL MEDICINE

## 2025-02-14 RX ORDER — FERROUS SULFATE 325(65) MG
325 TABLET ORAL 2 TIMES DAILY WITH MEALS
Qty: 30 TABLET | Refills: 3 | Status: SHIPPED | OUTPATIENT
Start: 2025-02-14

## 2025-02-14 ASSESSMENT — PATIENT HEALTH QUESTIONNAIRE - PHQ9
SUM OF ALL RESPONSES TO PHQ QUESTIONS 1-9: 0
SUM OF ALL RESPONSES TO PHQ QUESTIONS 1-9: 0
2. FEELING DOWN, DEPRESSED OR HOPELESS: NOT AT ALL
1. LITTLE INTEREST OR PLEASURE IN DOING THINGS: NOT AT ALL
SUM OF ALL RESPONSES TO PHQ9 QUESTIONS 1 & 2: 0
SUM OF ALL RESPONSES TO PHQ QUESTIONS 1-9: 0
SUM OF ALL RESPONSES TO PHQ QUESTIONS 1-9: 0

## 2025-02-14 NOTE — PATIENT INSTRUCTIONS
Progressive multifocal leukoencephalopathy (PML) due to Florencio Cunningham (GENEVIEVE) virus infection has been reported with rituximab and may be fatal. Cases were reported in patients with hematologic malignancies receiving rituximab either with combination chemotherapy or with hematopoietic stem cell transplant. Cases were also reported in patients receiving rituximab for autoimmune diseases who had received concurrent or prior immunosuppressant therapy (Ref). Clinical findings included confusion/disorientation, motor weakness/hemiparesis, altered vision/speech, and poor motor coordination with symptoms progressing over weeks to months (Ref).  Mechanism: Non-dose-related; reactivation of the GENEVIEVE virus (Ref).  Onset: Delayed; most cases were diagnosed within 12 months of the last rituximab dose. A retrospective analysis of patients (n=57) diagnosed with PML following rituximab therapy found a median of 16 months (following rituximab initiation), 5.5 months (following last rituximab dose), and six rituximab doses preceded PML diagnosis (Ref). In a series of rheumatologic cases, a median of 12 months (range 1 to 57 months following rituximab initiation), and 5 months (range 0 to 29 months following last rituximab dose), and two rituximab courses preceded PML diagnosis (Ref).  Risk factors:   Systemic lupus erythematosus (SLE) (Ref)   Undiagnosed HIV (Ref)  Adverse Reactions  The following adverse drug reactions and incidences are derived from product labeling unless otherwise specified. Patients treated with rituximab for rheumatoid arthritis (RA) may experience fewer adverse reactions. Most reported adverse reactions are from studies in which rituximab was given concomitantly with chemotherapeutic agents, glucocorticoid steroids, or methotrexate. Reported adverse reactions are for adults.  >10%:  Cardiovascular: Cardiac disorder (5% to 29%), flushing (5% to 14%), hypertension (6% to 12%) (table 1), peripheral edema (8% to

## 2025-02-17 ENCOUNTER — HOSPITAL ENCOUNTER (OUTPATIENT)
Dept: INFUSION THERAPY | Age: 70
Setting detail: INFUSION SERIES
Discharge: HOME | End: 2025-02-17
Payer: MEDICARE

## 2025-02-17 VITALS
RESPIRATION RATE: 18 BRPM | WEIGHT: 164 LBS | OXYGEN SATURATION: 94 % | DIASTOLIC BLOOD PRESSURE: 82 MMHG | BODY MASS INDEX: 32.03 KG/M2 | TEMPERATURE: 98.3 F | HEART RATE: 90 BPM | SYSTOLIC BLOOD PRESSURE: 172 MMHG

## 2025-02-17 DIAGNOSIS — M35.9 AUTOIMMUNE DISEASE: Primary | ICD-10-CM

## 2025-02-17 DIAGNOSIS — L10.2: ICD-10-CM

## 2025-02-17 PROCEDURE — 6370000000 HC RX 637 (ALT 250 FOR IP): Performed by: INTERNAL MEDICINE

## 2025-02-17 PROCEDURE — 6360000002 HC RX W HCPCS: Performed by: INTERNAL MEDICINE

## 2025-02-17 PROCEDURE — 96413 CHEMO IV INFUSION 1 HR: CPT

## 2025-02-17 PROCEDURE — 96375 TX/PRO/DX INJ NEW DRUG ADDON: CPT

## 2025-02-17 PROCEDURE — 2580000003 HC RX 258: Performed by: INTERNAL MEDICINE

## 2025-02-17 PROCEDURE — 96415 CHEMO IV INFUSION ADDL HR: CPT

## 2025-02-17 RX ORDER — DIPHENHYDRAMINE HYDROCHLORIDE 50 MG/ML
50 INJECTION INTRAMUSCULAR; INTRAVENOUS
Status: DISCONTINUED | OUTPATIENT
Start: 2025-02-17 | End: 2025-02-18 | Stop reason: HOSPADM

## 2025-02-17 RX ORDER — DIPHENHYDRAMINE HYDROCHLORIDE 50 MG/ML
25 INJECTION INTRAMUSCULAR; INTRAVENOUS ONCE
Status: COMPLETED | OUTPATIENT
Start: 2025-02-17 | End: 2025-02-17

## 2025-02-17 RX ORDER — HYDROCORTISONE SODIUM SUCCINATE 100 MG/2ML
100 INJECTION INTRAMUSCULAR; INTRAVENOUS
Status: DISCONTINUED | OUTPATIENT
Start: 2025-02-17 | End: 2025-02-18 | Stop reason: HOSPADM

## 2025-02-17 RX ORDER — SODIUM CHLORIDE 9 MG/ML
5-250 INJECTION, SOLUTION INTRAVENOUS PRN
Status: DISCONTINUED | OUTPATIENT
Start: 2025-02-17 | End: 2025-02-18 | Stop reason: HOSPADM

## 2025-02-17 RX ORDER — ALBUTEROL SULFATE 90 UG/1
4 INHALANT RESPIRATORY (INHALATION) PRN
Status: DISCONTINUED | OUTPATIENT
Start: 2025-02-17 | End: 2025-02-18 | Stop reason: HOSPADM

## 2025-02-17 RX ORDER — MEPERIDINE HYDROCHLORIDE 25 MG/ML
12.5 INJECTION INTRAMUSCULAR; INTRAVENOUS; SUBCUTANEOUS PRN
Status: DISCONTINUED | OUTPATIENT
Start: 2025-02-17 | End: 2025-02-18 | Stop reason: HOSPADM

## 2025-02-17 RX ORDER — EPINEPHRINE 1 MG/ML
0.3 INJECTION, SOLUTION, CONCENTRATE INTRAVENOUS PRN
Status: DISCONTINUED | OUTPATIENT
Start: 2025-02-17 | End: 2025-02-18 | Stop reason: HOSPADM

## 2025-02-17 RX ORDER — ACETAMINOPHEN 325 MG/1
650 TABLET ORAL ONCE
Status: COMPLETED | OUTPATIENT
Start: 2025-02-17 | End: 2025-02-17

## 2025-02-17 RX ADMIN — SODIUM CHLORIDE 100 ML/HR: 9 INJECTION, SOLUTION INTRAVENOUS at 08:04

## 2025-02-17 RX ADMIN — DIPHENHYDRAMINE HYDROCHLORIDE 25 MG: 50 INJECTION INTRAMUSCULAR; INTRAVENOUS at 08:07

## 2025-02-17 RX ADMIN — SODIUM CHLORIDE 1000 MG: 9 INJECTION, SOLUTION INTRAVENOUS at 08:38

## 2025-02-17 RX ADMIN — ACETAMINOPHEN 650 MG: 325 TABLET ORAL at 08:05

## 2025-02-17 NOTE — PROGRESS NOTES
Arrived to the Infusion Center.  D1 Rituximab completed. Patient tolerated well.   Any issues or concerns during appointment: no.  Patient aware of next infusion appointment on 2/28 at 1445  Patient instructed to call provider with temperature of 100.4 or greater or nausea/vomiting/ diarrhea or pain not controlled by medications  Discharged to home via WC.

## 2025-02-19 ENCOUNTER — TELEPHONE (OUTPATIENT)
Dept: INFUSION THERAPY | Age: 70
End: 2025-02-19

## 2025-02-19 ENCOUNTER — FOLLOWUP TELEPHONE ENCOUNTER (OUTPATIENT)
Dept: CASE MANAGEMENT | Age: 70
End: 2025-02-19

## 2025-02-19 RX ORDER — ONDANSETRON 4 MG/1
4-8 TABLET, FILM COATED ORAL EVERY 8 HOURS PRN
Qty: 30 TABLET | Refills: 0 | Status: SHIPPED | OUTPATIENT
Start: 2025-02-19

## 2025-02-19 NOTE — TELEPHONE ENCOUNTER
Pts friend called stating patient doesn;t want to do Treatment anymore. She is having multiple Side effects. I informed Friend that I would have A nurse call her to speak to her.

## 2025-02-19 NOTE — TELEPHONE ENCOUNTER
Chart reviewed by NP.  Unsuccessful attempt to contact patient to notify of new prescription for Zofran.  Unable to LVM.

## 2025-02-19 NOTE — PROGRESS NOTES
RN navigator spoke to patient by phone for follow-up. Patient states she had her first infusion for her skin disorder and it really made her sick. Patient states she is having some trouble with her bowels and bladder. States she has been sleeping and is going to drink some ginger ale to feel better.     Patient states no exacerbation of COPD symptoms at this time.      She states she was glad to hear from this RN Navigator today, states it cheered her up. Patient states she is still being seen by [facility].  Informed patient to contact this RN Navigator if any needs arise.     Patient seems to be compliant with plan of care and education provided.      Patient encouraged to keep/make routine appointments with PCP and keep vaccinations current.      This RN Navigator will continue to follow.

## 2025-02-19 NOTE — TELEPHONE ENCOUNTER
Subjective    Patient Diagnosis: Pemphigus foliaceus     Chief Complaint (Brief): Incontinent of diarrhea.  States has diarrhea \"every 30 minutes or so\".  Took 1 dose of Imodium and states it has helped. Denies diarrhea @ present. Patient with pain in back and sides.  Denies using Norco as prescribed.  Facial blisters with drainage.  Patient with nausea.  Denies having antiemetics @ home.    Initial Onset: 2/17/25  Any Identifiable Triggers to Complaint: Treatment  Pain Score(0-10): 10  Description of pain, if applicable (location and characteristics): Sharp continuous pain on sides.  Fatigue Score (0-10):10  Difficulty Breathing: No  New Onset Altered Mental Status: No  Last oral temperature and time, if known:Denies fever      Objective/ Chart Review    Last OV Note Reviewed: Yes  Medication List Reviewed with patient and accuracy verified: Yes  Patient taking medications as prescribed: Yes  Treatment type, if applicable: Rituxan  Date of last treatment, if applicable:2/17/25  Recent labs:  Hospital Outpatient Visit on 02/14/2025   Component Date Value Ref Range Status    Sodium 02/14/2025 142  136 - 145 mmol/L Final    Potassium 02/14/2025 3.5  3.5 - 5.1 mmol/L Final    Chloride 02/14/2025 99  98 - 107 mmol/L Final    CO2 02/14/2025 31 (H)  20 - 29 mmol/L Final    Anion Gap 02/14/2025 12  7 - 16 mmol/L Final    Glucose 02/14/2025 116 (H)  70 - 99 mg/dL Final    Comment: <70 mg/dL Consistent with, but not fully diagnostic of hypoglycemia.  100 - 125 mg/dL Impaired fasting glucose/consistent with pre-diabetes mellitus.  > 126 mg/dl Fasting glucose consistent with overt diabetes mellitus      BUN 02/14/2025 19  8 - 23 MG/DL Final    Creatinine 02/14/2025 0.68  0.60 - 1.10 MG/DL Final    Est, Glom Filt Rate 02/14/2025 >90  >60 ml/min/1.73m2 Final    Comment:    Pediatric calculator link: https://www.kidney.org/professionals/kdoqi/gfr_calculatorped     These results are not intended for use in patients <18 years of

## 2025-02-24 DIAGNOSIS — D64.9 ANEMIA, UNSPECIFIED TYPE: ICD-10-CM

## 2025-02-24 DIAGNOSIS — Z79.899 HIGH RISK MEDICATION USE: ICD-10-CM

## 2025-02-24 DIAGNOSIS — Z85.3 HX OF BREAST CANCER: ICD-10-CM

## 2025-02-24 DIAGNOSIS — E61.1 IRON DEFICIENCY: Primary | ICD-10-CM

## 2025-02-26 ENCOUNTER — FOLLOWUP TELEPHONE ENCOUNTER (OUTPATIENT)
Dept: CASE MANAGEMENT | Age: 70
End: 2025-02-26

## 2025-02-26 NOTE — PROGRESS NOTES
This RN Navigator attempted to reach patient by phone for weekly follow up call. No answer, unable to leave a message. This RN Navigator will attempt to reach patient again.      This RN Navigator will continue to follow patient until 3/4/2025.

## 2025-02-26 NOTE — PROGRESS NOTES
Patient called this RN Navigtor back. Patient states no COPD exacerbations at this time. Patient states oxygen supply is good and she is breathing well.     Patient states she is not feeling great this morning. States she woke up soaked in urine today and just has not felt well the past few days. This RN Navigator offered to contact [facility] for a visit to patient several times during phone call. Patient declined. Informed patient to call this RN Navigator for any need, reminded patient that this RN Navigator is a resource. Advised patient I will follow up again next week.    This RN Navigator will continue to follow until 3/4/2025.

## 2025-02-28 ENCOUNTER — HOSPITAL ENCOUNTER (OUTPATIENT)
Dept: INFUSION THERAPY | Age: 70
Setting detail: INFUSION SERIES
End: 2025-02-28

## 2025-03-03 ENCOUNTER — FOLLOWUP TELEPHONE ENCOUNTER (OUTPATIENT)
Dept: CASE MANAGEMENT | Age: 70
End: 2025-03-03

## 2025-03-03 NOTE — PROGRESS NOTES
RN navigator spoke to patient by phone for follow-up. Patient missed her infusion appointment. Patient states since she had her first infusion, she has had trouble controlling her bladder and bowl. Patient also states she believes her arm where she is receiving infusions may be infected. A nurse is supposed to come see patient today for home visit.    Advised patient this RN Navigator will call and check on her tomorrow.      This RN Navigator will continue to follow.

## 2025-03-04 ENCOUNTER — FOLLOWUP TELEPHONE ENCOUNTER (OUTPATIENT)
Dept: CASE MANAGEMENT | Age: 70
End: 2025-03-04

## 2025-03-04 NOTE — PROGRESS NOTES
Spoke with patient this morning. She states she is feeling better this morning than yesterday. Patient states she has increased her oxygen to 5 liters and that has helped with her breathing. Informed patient I would no longer be following her.    This RN navigator has completed following patient for 30 days post-discharge to prevent hospital readmission. This RN navigator will no longer be following.

## 2025-03-07 ENCOUNTER — TELEPHONE (OUTPATIENT)
Dept: ONCOLOGY | Age: 70
End: 2025-03-07

## 2025-03-19 NOTE — PROGRESS NOTES
ACUTE PHYSICAL THERAPY GOALS:   (Developed with and agreed upon by patient and/or caregiver.)    (1.) Meli Blancas will ambulate with INDEPENDENCE for 500 feet with the least restrictive device within 7 treatment day(s).   (2.) Meli Blancas will perform standing static and dynamic balance activities x 20 minutes with SUPERVISION to improve safety within 7 treatment day(s).  (3.) Meli Blancas will ascend and descend 5 stairs using 1 hand rail(s) with SUPERVISION to improve functional mobility and safety within 7 treatment day(s).  (4.) Meli Blancas will perform therapeutic exercises x 20 min for HEP with INDEPENDENCE to improve strength, endurance, and functional mobility within 7 treatment day(s).       PHYSICAL THERAPY Initial Assessment, Daily Note, and AM  (Link to Caseload Tracking: PT Visit Days : 1  Acknowledge Orders  Time In/Out  PT Charge Capture  Rehab Caseload Tracker    Meli Blancas is a 68 y.o. female   PRIMARY DIAGNOSIS: Bilateral lower leg cellulitis  Rash [R21]       Reason for Referral: Generalized Muscle Weakness (M62.81)  Difficulty in walking, Not elsewhere classified (R26.2)  Inpatient: Payor: [facility] MEDICARE / Plan: [facility] MEDICARE COMPLETE / Product Type: *No Product type* /     ASSESSMENT:     REHAB RECOMMENDATIONS:   Recommendation to date pending progress:  Setting:  No further skilled physical therapy after discharge from hospital    Equipment:    None     ASSESSMENT:  Ms. Blancas is a 68 year old F who presents with B LE cellulitis. At baseline, pt has caregiver support for iADL's but is otherwise independent. Uses a cane as needed.  Pt received sitting at EOB. She was able to participate in standing balance activities and ambulate 250 ft with SBA-CGA and no AD. She was noted to get fatigued and slightly more unsteady at end of walk but overall tolerated well. Will continue to follow during admission to ensure she reaches baseline but anticipate no needs at d/c.  Patient has discharge orders to go home today. Patient is aware and in agreement with this discharge today.     No CM needs voiced or noted.     ASSESSMENT NOTE    Attending Physician: Frannie Hand, DO  Admit Problem: Hypokalemia [E87.6]  Chest pain, unspecified type [R07.9]  Date/Time of Admission: 3/14/2025 11:35 PM  Problem List:  Patient Active Problem List   Diagnosis    CAROL (generalized anxiety disorder)    GERD (gastroesophageal reflux disease)    Class 3 severe obesity with serious comorbidity and body mass index (BMI) of 40.0 to 44.9 in adult    Insomnia secondary to anxiety    Major depressive disorder, recurrent    Pulmonary sarcoidosis    Sleep apnea    TIA (transient ischemic attack)    Hypokalemia    Chronic renal disease, stage 2, mildly decreased glomerular filtration rate (GFR) between 60-89 mL/min/1.73 square meter    Hypertension    Spinal stenosis of lumbar region with neurogenic claudication    Lumbar radiculopathy    Spondylolisthesis of lumbar region    S/P lumbar fusion    Globus sensation    Esophageal dysphagia    Atypical chest pain       Service Assessment  Patient Orientation Alert and Oriented   Cognition Alert   History Provided By Patient   Primary Caregiver Self   Accompanied By/Relationship     Support Systems Children, Family Members   Patient's Healthcare Decision Maker is: Legal Next of Kin   PCP Verified by CM Yes (confirmed PCP is Brenda TAVARES)   Last Visit to PCP Within last 3 months (last PCP visit was 1-2 weeks ago)   Prior Functional Level Independent in ADLs/IADLs   Current Functional Level Other (see comment) (TBD by clinicals)   Can patient return to prior living arrangement Yes   Ability to make needs known: Good   Family able to assist with home care needs: Yes   Would you like for me to discuss the discharge plan with any other family members/significant others, and if so, who? Yes   Financial Resources Medicare, Medicaid ([facility] Medicare and Medicaid [place])   [facility]